# Patient Record
Sex: FEMALE | Race: WHITE | HISPANIC OR LATINO | Employment: OTHER | URBAN - METROPOLITAN AREA
[De-identification: names, ages, dates, MRNs, and addresses within clinical notes are randomized per-mention and may not be internally consistent; named-entity substitution may affect disease eponyms.]

---

## 2017-01-06 ENCOUNTER — TELEPHONE (OUTPATIENT)
Dept: FAMILY MEDICINE | Facility: CLINIC | Age: 57
End: 2017-01-06

## 2017-01-06 NOTE — TELEPHONE ENCOUNTER
----- Message from Ender Portillo LPN sent at 1/6/2017 11:36 AM CST -----  Contact: [phone] / self       ----- Message -----     From: Lucía Malloy     Sent: 1/6/2017  10:38 AM       To: Rayo Sampson Staff    Patient requesting to speak with you regarding test results.    States you can leave a voicemail.    Please advise.

## 2017-01-08 DIAGNOSIS — M06.9 RHEUMATOID ARTHRITIS INVOLVING MULTIPLE JOINTS: Primary | ICD-10-CM

## 2017-01-09 RX ORDER — LEFLUNOMIDE 20 MG/1
TABLET ORAL
Qty: 30 TABLET | Refills: 0 | Status: SHIPPED | OUTPATIENT
Start: 2017-01-09 | End: 2017-02-09 | Stop reason: SDUPTHER

## 2017-01-11 ENCOUNTER — OFFICE VISIT (OUTPATIENT)
Dept: RHEUMATOLOGY | Facility: CLINIC | Age: 57
End: 2017-01-11
Payer: COMMERCIAL

## 2017-01-11 ENCOUNTER — LAB VISIT (OUTPATIENT)
Dept: LAB | Facility: HOSPITAL | Age: 57
End: 2017-01-11
Attending: INTERNAL MEDICINE
Payer: COMMERCIAL

## 2017-01-11 VITALS
DIASTOLIC BLOOD PRESSURE: 70 MMHG | SYSTOLIC BLOOD PRESSURE: 122 MMHG | WEIGHT: 139.75 LBS | HEIGHT: 61 IN | BODY MASS INDEX: 26.39 KG/M2

## 2017-01-11 DIAGNOSIS — M06.9 RHEUMATOID ARTHRITIS INVOLVING MULTIPLE JOINTS: ICD-10-CM

## 2017-01-11 DIAGNOSIS — R21 RASH: Primary | ICD-10-CM

## 2017-01-11 LAB
ALBUMIN SERPL BCP-MCNC: 3.4 G/DL
ALP SERPL-CCNC: 68 U/L
ALT SERPL W/O P-5'-P-CCNC: 34 U/L
ANION GAP SERPL CALC-SCNC: 6 MMOL/L
AST SERPL-CCNC: 22 U/L
BASOPHILS # BLD AUTO: 0.05 K/UL
BASOPHILS NFR BLD: 0.8 %
BILIRUB SERPL-MCNC: 0.3 MG/DL
BUN SERPL-MCNC: 18 MG/DL
CALCIUM SERPL-MCNC: 9.1 MG/DL
CHLORIDE SERPL-SCNC: 107 MMOL/L
CO2 SERPL-SCNC: 30 MMOL/L
CREAT SERPL-MCNC: 0.7 MG/DL
CRP SERPL-MCNC: 1.4 MG/L
DIFFERENTIAL METHOD: ABNORMAL
EOSINOPHIL # BLD AUTO: 0.4 K/UL
EOSINOPHIL NFR BLD: 6.5 %
ERYTHROCYTE [DISTWIDTH] IN BLOOD BY AUTOMATED COUNT: 13.4 %
ERYTHROCYTE [SEDIMENTATION RATE] IN BLOOD BY WESTERGREN METHOD: 34 MM/HR
EST. GFR  (AFRICAN AMERICAN): >60 ML/MIN/1.73 M^2
EST. GFR  (NON AFRICAN AMERICAN): >60 ML/MIN/1.73 M^2
GLUCOSE SERPL-MCNC: 109 MG/DL
HCT VFR BLD AUTO: 38.3 %
HGB BLD-MCNC: 12.1 G/DL
LYMPHOCYTES # BLD AUTO: 2.2 K/UL
LYMPHOCYTES NFR BLD: 36.7 %
MCH RBC QN AUTO: 30.6 PG
MCHC RBC AUTO-ENTMCNC: 31.6 %
MCV RBC AUTO: 97 FL
MONOCYTES # BLD AUTO: 0.4 K/UL
MONOCYTES NFR BLD: 7.5 %
NEUTROPHILS # BLD AUTO: 2.9 K/UL
NEUTROPHILS NFR BLD: 48.3 %
PLATELET # BLD AUTO: 341 K/UL
PMV BLD AUTO: 9.8 FL
POTASSIUM SERPL-SCNC: 3.7 MMOL/L
PROT SERPL-MCNC: 6.7 G/DL
RBC # BLD AUTO: 3.95 M/UL
SODIUM SERPL-SCNC: 143 MMOL/L
WBC # BLD AUTO: 5.89 K/UL

## 2017-01-11 PROCEDURE — 86140 C-REACTIVE PROTEIN: CPT

## 2017-01-11 PROCEDURE — 36415 COLL VENOUS BLD VENIPUNCTURE: CPT

## 2017-01-11 PROCEDURE — 85025 COMPLETE CBC W/AUTO DIFF WBC: CPT

## 2017-01-11 PROCEDURE — 99999 PR PBB SHADOW E&M-EST. PATIENT-LVL IV: CPT | Mod: PBBFAC,,, | Performed by: INTERNAL MEDICINE

## 2017-01-11 PROCEDURE — 3078F DIAST BP <80 MM HG: CPT | Mod: S$GLB,,, | Performed by: INTERNAL MEDICINE

## 2017-01-11 PROCEDURE — 85652 RBC SED RATE AUTOMATED: CPT

## 2017-01-11 PROCEDURE — 1159F MED LIST DOCD IN RCRD: CPT | Mod: S$GLB,,, | Performed by: INTERNAL MEDICINE

## 2017-01-11 PROCEDURE — 99214 OFFICE O/P EST MOD 30 MIN: CPT | Mod: S$GLB,,, | Performed by: INTERNAL MEDICINE

## 2017-01-11 PROCEDURE — 3074F SYST BP LT 130 MM HG: CPT | Mod: S$GLB,,, | Performed by: INTERNAL MEDICINE

## 2017-01-11 PROCEDURE — 80053 COMPREHEN METABOLIC PANEL: CPT

## 2017-01-11 NOTE — MR AVS SNAPSHOT
[facility] Rheumatology  [address] [place] [address] [address]  [place] [address]  Phone: [phone]                  Silvia Cervantes   2017 11:00 AM   Office Visit    Description:  Female : 1960   Provider:  Magaly Rizo MD   Department:  [place]- Rheumatology           Diagnoses this Visit        Comments    Rash    -  Primary            To Do List           Future Appointments        Provider Department Dept Phone    2017 11:00 AM Magaly Rizo MD [facility] Rheumatology [phone]    2017 8:00 AM Adrián Cade MD [facility] Medicine [phone]    2017 11:00 AM Magaly Rizo MD [facility] [phone]      Goals (5 Years of Data)     None       These Medications        Disp Refills Start End    etanercept (ENBREL) 50 mg/mL (0.98 mL) injection 4 mL 11 2017    Inject 1 mL (50 mg total) into the skin once a week. - Subcutaneous    Pharmacy: [facility] [address] Ph #: [phone]         [facility]     [facility] -  Assistance  Registered nurses in the [facility] provide clinical advisement, health education, appointment booking, and other advisory services.  Call for this free service at [phone].             Medications           Message regarding Medications     Verify the changes and/or additions to your medication regime listed below are the same as discussed with your clinician today.  If any of these changes or additions are incorrect, please notify your healthcare provider.             Verify that the below list of medications is an accurate representation of the medications you are currently taking.  If none reported, the list may be blank. If incorrect, please contact your healthcare provider. Carry this list with you in case of emergency.           Current Medications     albuterol 90 mcg/actuation inhaler Inhale 2 puffs into the  "lungs every 6 (six) hours as needed for Wheezing.    ascorbic acid, vitamin C, (VITAMIN C) 1000 MG tablet Take 1,000 mg by mouth once daily.    cycloSPORINE (RESTASIS) 0.05 % ophthalmic emulsion Place 0.05 mLs (1 drop total) into both eyes 2 (two) times daily.    efinaconazole (JUBLIA) 10 % Ayesha Apply to affected toenails daily as directed.    estradiol (ESTRACE) 0.01 % (0.1 mg/gram) vaginal cream Use 1 gram per vagina nightly x 2 weeks then 3 times per week    etanercept (ENBREL) 50 mg/mL (0.98 mL) injection Inject 1 mL (50 mg total) into the skin once a week.    fluticasone (FLONASE) 50 mcg/actuation nasal spray 2 sprays by Each Nare route once daily.    ibuprofen (ADVIL,MOTRIN) 600 MG tablet Take 1 tablet (600 mg total) by mouth every 8 (eight) hours as needed for Pain. Take with food.    leflunomide (ARAVA) 20 MG Tab TAKE ONE TABLET BY MOUTH ONCE DAILY.    olmesartan-hydrochlorothiazide (BENICAR HCT) 40-12.5 mg Tab Take 1 tablet by mouth once daily.    omeprazole (PRILOSEC) 40 MG capsule Take 1 capsule (40 mg total) by mouth once daily.    ondansetron (ZOFRAN-ODT) 4 MG TbDL Take 2 tablets (8 mg total) by mouth every 8 (eight) hours as needed (nausea/vomiting).    triamcinolone acetonide 0.1% (KENALOG) 0.1 % cream Apply topically 2 (two) times daily.    UNABLE TO FIND medication name: Methyl Folate 400 mcg    diclofenac sodium (VOLTAREN) 1 % Gel Apply 2 g topically 4 (four) times daily.           Clinical Reference Information           Vital Signs - Last Recorded  Most recent update: 1/11/2017 10:08 AM by Kimmy Schmitt MA    BP Ht Wt BMI       122/70 5' 1" (1.549 m) 63.4 kg (139 lb 12.4 oz) 26.41 kg/m2       Blood Pressure          Most Recent Value    BP  122/70      Allergies as of 1/11/2017     No Known Allergies      Immunizations Administered on Date of Encounter - 1/11/2017     None      Orders Placed During Today's Visit      Normal Orders This Visit    Ambulatory Referral to Dermatology     "   Instructions    Dr. Camp

## 2017-01-11 NOTE — PROGRESS NOTES
Subjective:       Patient ID: Silvia Cervantes is a 54 y.o. female.    Chief Complaint:     HPI 55 yo F with PMH of HTN, abnormal pap (2013 but recent negative, no cancer), RA (+CCP, RF),erosive here for evaluation. She was diagnosed in 2011 with hand and feet with swelling and pain. She was initially treated with MTX but it gave her nausea which gave her nausea.  She was changed to leflunomide 20 mg 10/2014 from mtx due to nausea on the mtx. Then I added etanercept January 2015 due to incomplete control of her arthritis on leflunomide.  No am stiffness.  She reports mild aching in hands (3/10). Pain is aching.  Reports mild swelling in hands but better than usual.  She reports left upper quadrant pain (4/10) , non-radiating with possible nausea which has been present for months. Sometimes she has sour taste in mouth.      Interval history:  Patient is here for follow up.    She continues to take leflunomide 20mg a day and is also taking ENBREL. She skipped a week or Enbrel because she thought that it may be contributing to vitiligo.  Patient is 4/10.   She continues to have pain in right hand but uses it a lot at work.  SHE STILL HAS PAIN IN LOWER BACK.    She thinks it may be from heavy lifting at work.  Reports episodes of joint swelling.  Reports fatigue  For 6 months.  Reports poor sleep.      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   HENT: Negative for congestion, ear discharge, ear pain, facial swelling, mouth sores, sinus pressure, sneezing, sore throat, tinnitus and trouble swallowing.    Eyes: Negative for photophobia, pain,  discharge, redness, itching and visual disturbance.   Respiratory: Negative for apnea, chest tightness, shortness of breath, wheezing and stridor.    Cardiovascular: Negative for leg swelling.   Gastrointestinal: Negative for nausea, abdominal pain, diarrhea, constipation, blood in stool, abdominal distention and anal bleeding.   Endocrine: Negative for cold intolerance and heat intolerance.   Genitourinary: Negative for dysuria and difficulty urinating.   Musculoskeletal: Positive for arthralgias. Negative for myalgias, back pain, joint swelling, gait problem, neck pain and neck stiffness.   Skin: Negative for color change, pallor, rash and wound.   Neurological: Negative for dizziness, seizures, light-headedness and numbness.   Hematological: Negative for adenopathy. Does not bruise/bleed easily.   Psychiatric/Behavioral: Negative for sleep disturbance. The patient is not nervous/anxious.       Objective:        Physical Exam   Constitutional: She is oriented to person, place, and time.   HENT:   Head: Normocephalic and atraumatic.   Right Ear: External ear normal.   Left Ear: External ear normal.   Nose: Nose normal.   Mouth/Throat: Oropharynx is clear and moist. No oropharyngeal exudate.   Eyes: Conjunctivae and EOM are normal. Pupils are equal, round, and reactive to light. Right eye exhibits no discharge. Left eye exhibits no discharge. No scleral icterus.   Neck: Neck supple. No JVD present. No thyromegaly present.   Cardiovascular: Normal rate, regular rhythm, normal heart sounds and intact distal pulses.  Exam reveals no gallop and no friction rub.    No murmur heard.  Pulmonary/Chest: Effort normal and breath sounds normal. No respiratory distress. She has no wheezes. She has no rales. She exhibits no tenderness.   Abdominal: Soft. Bowel sounds are normal. She exhibits no distension and no mass. There is no tenderness. There is no rebound and no guarding.   Lymphadenopathy:     She has no cervical  adenopathy.   Neurological: She is alert and oriented to person, place, and time. No cranial nerve deficit. Gait normal. Coordination normal.   Skin:diffuse hypopigmented lesions in arms, hands, legs, chest, back   Psychiatric: Affect and judgment normal.   Musculoskeletal: She exhibits no  tenderness. She exhibits no edema.   FROM of all joints including neck, shoulders, spine, ankles, wrists, knees, and ankles; no joint deformities noted or effusions, erythema or warmth; no tophi or nodules noted; no crepitus; no nail pitting or onycholysis          Labs:  Esr-25  ccp-93  rf-36  Flor-negative    Imaging:  MRI (7/22/2015)   (Stable enhancing marginal erosions in the second and third metacarpal heads, lunate, triquetrum, and capitate)      Assessment:     56 yo F with PMH of HTN, abnormal pap (2013 but recent negative, no cancer), RA (+CCP, RF),erosive here , H. Pylori for follow up of seropositive RA/  She has been On Enbrel since Jan 2015 . She is also on Leflunomide. She is doing well clinically but has not been compliant with Enbrel due to her concern that it may be causing her skin discoloration.  I told patient that I did not think Enbrel would cause this.  She has asked me to refer her to another dermatologist for second opinion so will send her to Dr. Camp.  MRI from 7/2015 shows stable erosions.  Had long discussion with patient and asked her to be compliant.    Plan:     -labs today  -continue ENBREL and leflunomide 20mg poq day  -discuss dexa scan at next visit  rtc in  4 months*

## 2017-01-26 ENCOUNTER — OFFICE VISIT (OUTPATIENT)
Dept: FAMILY MEDICINE | Facility: CLINIC | Age: 57
End: 2017-01-26
Payer: COMMERCIAL

## 2017-01-26 VITALS
TEMPERATURE: 98 F | BODY MASS INDEX: 26.19 KG/M2 | HEART RATE: 87 BPM | DIASTOLIC BLOOD PRESSURE: 80 MMHG | SYSTOLIC BLOOD PRESSURE: 124 MMHG | HEIGHT: 61 IN | OXYGEN SATURATION: 95 % | WEIGHT: 138.69 LBS

## 2017-01-26 DIAGNOSIS — L29.9 PRURITUS: Primary | ICD-10-CM

## 2017-01-26 PROCEDURE — 1159F MED LIST DOCD IN RCRD: CPT | Mod: S$GLB,,, | Performed by: FAMILY MEDICINE

## 2017-01-26 PROCEDURE — 99999 PR PBB SHADOW E&M-EST. PATIENT-LVL IV: CPT | Mod: PBBFAC,,, | Performed by: FAMILY MEDICINE

## 2017-01-26 PROCEDURE — 3079F DIAST BP 80-89 MM HG: CPT | Mod: S$GLB,,, | Performed by: FAMILY MEDICINE

## 2017-01-26 PROCEDURE — 3074F SYST BP LT 130 MM HG: CPT | Mod: S$GLB,,, | Performed by: FAMILY MEDICINE

## 2017-01-26 PROCEDURE — 99214 OFFICE O/P EST MOD 30 MIN: CPT | Mod: S$GLB,,, | Performed by: FAMILY MEDICINE

## 2017-01-26 RX ORDER — HYDROXYZINE HYDROCHLORIDE 25 MG/1
25 TABLET, FILM COATED ORAL 4 TIMES DAILY PRN
Qty: 60 TABLET | Refills: 0 | Status: SHIPPED | OUTPATIENT
Start: 2017-01-26 | End: 2017-04-05

## 2017-01-26 NOTE — PATIENT INSTRUCTIONS
1. Over the counter claritin (not D) 24 hour for 1 week    2. Prescription hydroxyzine, you can take up to 4 x daily just for breakthrough itching (can make sleepy so it might help to take 1 pill at bedtime)    3. cetaphil cream/lotion applied daily to the skin to moisturize    4. If your symptoms get worse, you can take your leftover prednisone 20 mg pill once daily for 3 days.

## 2017-01-26 NOTE — PROGRESS NOTES
(Portions of this note were dictated using voice recognition software and may contain dictation related errors in spelling/grammar/syntax not found on text review)    CC:   Chief Complaint   Patient presents with    Rash     itching to neck with burning sensation       HPI: 56 y.o. female patient of Dr. Cade who presents for urgent care visit today.  She has history below including rheumatoid arthritis, hypertension, hyperlipidemia.  Presents with an itchy rash on the neck x 1 wk, using benadryl and cortisone cream. Worse at night. No new soap, detergent, shampoo.  However, she has been using some perfume over her clothes, also has been using some over-the-counter oil of olay lotion over the last several weeks.  Exercises outside in the park but denies any recent higher than expected sun exposure or new clothing.  Itching is mainly confined to the neck, upper shoulder region, face.  Sometimes get some redness here but denies significant eruption.  Denies itching of her extremities or chest or back.  Was concerned about possible liver disease given her symptoms, however just had recent lab work from her rheumatologist and this was reviewed with her today demonstrate a normal LFT, creatinine, hematologic profile.  Improved sedimentation rate.    Of note she mentions that she has some leftover prednisone 20 mg from her rheumatologist from her prior prescription but she is not currently taking any steroids.    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)        Past Surgical History   Procedure Laterality Date     section      Tubal ligation      Hysterectomy         Family History   Problem Relation Age of Onset    Diabetes Father      Hypertension Father     Cataracts Father     Heart disease Father     Hypertension Mother     Cataracts Mother     Stroke Brother     Hypertension Brother     Melanoma Neg Hx     Breast cancer Neg Hx     Colon cancer Neg Hx     Ovarian cancer Neg Hx     Blindness Neg Hx     Glaucoma Neg Hx        Social History     Social History    Marital status:      Spouse name: N/A    Number of children: N/A    Years of education: N/A     Occupational History    Not on file.     Social History Main Topics    Smoking status: Never Smoker    Smokeless tobacco: Never Used    Alcohol use Yes      Comment: Rarely    Drug use: Not on file    Sexual activity: Yes     Partners: Male     Birth control/ protection: Surgical      Comment: hyst     Other Topics Concern    Not on file     Social History Narrative     Lab Results   Component Value Date    WBC 5.89 01/11/2017    HGB 12.1 01/11/2017    HCT 38.3 01/11/2017     01/11/2017    CHOL 231 (H) 12/22/2015    TRIG 134 12/22/2015    HDL 53 12/22/2015    ALT 34 01/11/2017    AST 22 01/11/2017     01/11/2017    K 3.7 01/11/2017     01/11/2017    CREATININE 0.7 01/11/2017    BUN 18 01/11/2017    CO2 30 (H) 01/11/2017    TSH 2.743 08/01/2016    HGBA1C 5.7 12/27/2016    LDLCALC 151.2 12/22/2015     01/11/2017       ROS:  GENERAL: No fever, chills, fatigability or weight loss.  SKIN: Above.  HEAD: No headaches.  EYES: No visual changes  EARS: No ear pain or changes in hearing.  NOSE: No congestion or rhinorrhea.  MOUTH & THROAT: No hoarseness, change in voice, or sore throat.  NODES: Denies swollen glands.  CHEST: Denies ALY, cyanosis, wheezing, cough and sputum production.  CARDIOVASCULAR: Denies chest pain, PND, orthopnea.  ABDOMEN: No nausea, vomiting, or changes in bowel function.  URINARY: No flank pain, dysuria or hematuria.  PERIPHERAL VASCULAR: No claudication or cyanosis.  MUSCULOSKELETAL: No joint stiffness or swelling. Denies  back pain.  NEUROLOGIC: No weakness or numbness.    Vital signs reviewed  PE:   APPEARANCE: Well nourished, well developed, in no acute distress.    HEAD: Normocephalic, atraumatic.  EYES: PERRL. EOMI.   Conjunctivae noninjected.  EARS: TM's intact. Light reflex normal. No retraction or perforation  NOSE: Mucosa pink. Airway clear.  MOUTH & THROAT: No tonsillar enlargement. No pharyngeal erythema or exudate.   NECK: Supple with no cervical lymphadenopathy.    CHEST: Good inspiratory effort. Lungs clear to auscultation with no wheezes or crackles.  CARDIOVASCULAR: Normal S1, S2. No rubs, murmurs, or gallops.  ABDOMEN: Bowel sounds normal. Not distended. Soft. No tenderness or masses. No organomegaly.  EXTREMITIES: No edema  SKIN: She describes pruritus over the anterior and posterior neck, face.  There is some slight flushing here but no definitive eruption noted.  She does have some vitiligo change over her extremities      IMPRESSION  1. Pruritus            PLAN  1. Over the counter claritin (not D) 24 hour for 1 week    2. Prescription hydroxyzine, you can take up to 4 x daily just for breakthrough itching (can make sleepy so it might help to take 1 pill at bedtime)    3. cetaphil cream/lotion applied daily to the skin to moisturize    4. If your symptoms get worse, you can take your leftover prednisone 20 mg pill once daily for 3 days.    Follow-up with PCP as directed or if symptoms do not improve

## 2017-01-26 NOTE — MR AVS SNAPSHOT
[address]  [place] [address]  Phone: [phone]  Fax: [phone]                  Silvia Cervantes   2017 9:40 AM   Office Visit    Description:  Female : 1960   Provider:  Finesse Chaparro MD   Department:  [facility]           Reason for Visit     Rash           Diagnoses this Visit        Comments    Pruritus    -  Primary            To Do List           Future Appointments        Provider Department Dept Phone    2017 8:00 AM Adrián Cade MD [facility] [phone]    3/10/2017 9:20 AM Cony Camp MD [facility] - Dermatology [phone]    2017 11:00 AM Magaly Rizo MD [facility] - Rheumatology [phone]      Goals (5 Years of Data)     None       These Medications        Disp Refills Start End    hydrOXYzine HCl (ATARAX) 25 MG tablet 60 tablet 0 2017     Take 1 tablet (25 mg total) by mouth 4 (four) times daily as needed for Itching. - Oral    Pharmacy: [id number] [place] - [address] [facility] #: [phone]         [facility]     [facility] Care [facility] -  Assistance  Registered nurses in the [facility] provide clinical advisement, health education, appointment booking, and other advisory services.  Call for this free service at [phone].             Medications           Message regarding Medications     Verify the changes and/or additions to your medication regime listed below are the same as discussed with your clinician today.  If any of these changes or additions are incorrect, please notify your healthcare provider.        START taking these NEW medications        Refills    hydrOXYzine HCl (ATARAX) 25 MG tablet 0    Sig: Take 1 tablet (25 mg total) by mouth 4 (four) times daily as needed for Itching.    Class: Normal    Route: Oral           Verify that the below list of medications is an accurate representation of the  medications you are currently taking.  If none reported, the list may be blank. If incorrect, please contact your healthcare provider. Carry this list with you in case of emergency.           Current Medications     albuterol 90 mcg/actuation inhaler Inhale 2 puffs into the lungs every 6 (six) hours as needed for Wheezing.    ascorbic acid, vitamin C, (VITAMIN C) 1000 MG tablet Take 1,000 mg by mouth once daily.    cycloSPORINE (RESTASIS) 0.05 % ophthalmic emulsion Place 0.05 mLs (1 drop total) into both eyes 2 (two) times daily.    efinaconazole (JUBLIA) 10 % Ayesha Apply to affected toenails daily as directed.    estradiol (ESTRACE) 0.01 % (0.1 mg/gram) vaginal cream Use 1 gram per vagina nightly x 2 weeks then 3 times per week    etanercept (ENBREL) 50 mg/mL (0.98 mL) injection Inject 1 mL (50 mg total) into the skin once a week.    fluticasone (FLONASE) 50 mcg/actuation nasal spray 2 sprays by Each Nare route once daily.    ibuprofen (ADVIL,MOTRIN) 600 MG tablet Take 1 tablet (600 mg total) by mouth every 8 (eight) hours as needed for Pain. Take with food.    leflunomide (ARAVA) 20 MG Tab TAKE ONE TABLET BY MOUTH ONCE DAILY.    olmesartan-hydrochlorothiazide (BENICAR HCT) 40-12.5 mg Tab Take 1 tablet by mouth once daily.    omeprazole (PRILOSEC) 40 MG capsule Take 1 capsule (40 mg total) by mouth once daily.    ondansetron (ZOFRAN-ODT) 4 MG TbDL Take 2 tablets (8 mg total) by mouth every 8 (eight) hours as needed (nausea/vomiting).    triamcinolone acetonide 0.1% (KENALOG) 0.1 % cream Apply topically 2 (two) times daily.    UNABLE TO FIND medication name: Methyl Folate 400 mcg    diclofenac sodium (VOLTAREN) 1 % Gel Apply 2 g topically 4 (four) times daily.    hydrOXYzine HCl (ATARAX) 25 MG tablet Take 1 tablet (25 mg total) by mouth 4 (four) times daily as needed for Itching.           Clinical Reference Information           Vital Signs - Last Recorded  Most recent update: 1/26/2017 10:09 AM by Finesse Pichardo  "MD Shankar    BP Pulse Temp Ht Wt SpO2    124/80 87 98 °F (36.7 °C) 5' 1" (1.549 m) 62.9 kg (138 lb 10.7 oz) 95%    BMI                26.2 kg/m2          Blood Pressure          Most Recent Value    BP  124/80      Allergies as of 1/26/2017     No Known Allergies      Immunizations Administered on Date of Encounter - 1/26/2017     None      Instructions    1. Over the counter claritin (not D) 24 hour for 1 week    2. Prescription hydroxyzine, you can take up to 4 x daily just for breakthrough itching (can make sleepy so it might help to take 1 pill at bedtime)    3. cetaphil cream/lotion applied daily to the skin to moisturize    4. If your symptoms get worse, you can take your leftover prednisone 20 mg pill once daily for 3 days.             "

## 2017-02-01 ENCOUNTER — OFFICE VISIT (OUTPATIENT)
Dept: FAMILY MEDICINE | Facility: CLINIC | Age: 57
End: 2017-02-01
Payer: COMMERCIAL

## 2017-02-01 VITALS
HEART RATE: 81 BPM | WEIGHT: 138 LBS | DIASTOLIC BLOOD PRESSURE: 89 MMHG | BODY MASS INDEX: 26.06 KG/M2 | SYSTOLIC BLOOD PRESSURE: 138 MMHG | OXYGEN SATURATION: 97 % | HEIGHT: 61 IN

## 2017-02-01 DIAGNOSIS — Z23 IMMUNIZATION DUE: ICD-10-CM

## 2017-02-01 DIAGNOSIS — B00.1 HERPES LABIALIS: ICD-10-CM

## 2017-02-01 DIAGNOSIS — E78.00 HYPERCHOLESTEREMIA: ICD-10-CM

## 2017-02-01 DIAGNOSIS — Z00.00 ROUTINE GENERAL MEDICAL EXAMINATION AT HEALTH CARE FACILITY: Primary | ICD-10-CM

## 2017-02-01 DIAGNOSIS — M06.9 RHEUMATOID ARTHRITIS INVOLVING LEFT HAND, UNSPECIFIED RHEUMATOID FACTOR PRESENCE: ICD-10-CM

## 2017-02-01 DIAGNOSIS — I10 ESSENTIAL HYPERTENSION: ICD-10-CM

## 2017-02-01 PROCEDURE — 3079F DIAST BP 80-89 MM HG: CPT | Mod: S$GLB,,, | Performed by: FAMILY MEDICINE

## 2017-02-01 PROCEDURE — 99396 PREV VISIT EST AGE 40-64: CPT | Mod: 25,S$GLB,, | Performed by: FAMILY MEDICINE

## 2017-02-01 PROCEDURE — 90686 IIV4 VACC NO PRSV 0.5 ML IM: CPT | Mod: S$GLB,,, | Performed by: FAMILY MEDICINE

## 2017-02-01 PROCEDURE — 90471 IMMUNIZATION ADMIN: CPT | Mod: S$GLB,,, | Performed by: FAMILY MEDICINE

## 2017-02-01 PROCEDURE — 90715 TDAP VACCINE 7 YRS/> IM: CPT | Mod: S$GLB,,, | Performed by: FAMILY MEDICINE

## 2017-02-01 PROCEDURE — 90472 IMMUNIZATION ADMIN EACH ADD: CPT | Mod: S$GLB,,, | Performed by: FAMILY MEDICINE

## 2017-02-01 PROCEDURE — 99999 PR PBB SHADOW E&M-EST. PATIENT-LVL IV: CPT | Mod: PBBFAC,,, | Performed by: FAMILY MEDICINE

## 2017-02-01 PROCEDURE — 3075F SYST BP GE 130 - 139MM HG: CPT | Mod: S$GLB,,, | Performed by: FAMILY MEDICINE

## 2017-02-01 RX ORDER — VALACYCLOVIR HYDROCHLORIDE 1 G/1
1000 TABLET, FILM COATED ORAL EVERY 12 HOURS
Qty: 4 TABLET | Refills: 0 | Status: SHIPPED | OUTPATIENT
Start: 2017-02-01 | End: 2018-03-07

## 2017-02-01 NOTE — MR AVS SNAPSHOT
[address]  [place] [address]  Phone: [phone]  Fax: [phone]                  Silvia Cervantes   2017 8:00 AM   Office Visit    Description:  Female : 1960   Provider:  Adrián Cade MD   Department:  [facility]           Reason for Visit     Follow-up           Diagnoses this Visit        Comments    Routine general medical examination at health care facility    -  Primary     Hypercholesteremia         Essential hypertension         Rheumatoid arthritis involving left hand, unspecified rheumatoid factor presence         Immunization due         Herpes labialis                To Do List           Future Appointments        Provider Department Dept Phone    3/10/2017 9:20 AM Cony Camp MD [facility] Dermatology [phone]    2017 11:00 AM Magaly Rizo MD [facility] Rheumatology [phone]      Goals (5 Years of Data)     None      Follow-Up and Disposition     Return in about 1 year (around 2018), or if symptoms worsen or fail to improve.       These Medications        Disp Refills Start End    valacyclovir (VALTREX) 1000 MG tablet 4 tablet 0 2017 2/3/2017    Take 1 tablet (1,000 mg total) by mouth every 12 (twelve) hours. - Oral    Pharmacy: [id number] - [place] - [address] [facility] #: [phone]         [facility]     [facility] On [facility] Nurse Care Line -  Assistance  Registered nurses in the [facility] On Call Center provide clinical advisement, health education, appointment booking, and other advisory services.  Call for this free service at [phone].             Medications           Message regarding Medications     Verify the changes and/or additions to your medication regime listed below are the same as discussed with your clinician today.  If any of these changes or additions are incorrect, please notify your healthcare provider.        START taking these NEW  medications        Refills    valacyclovir (VALTREX) 1000 MG tablet 0    Sig: Take 1 tablet (1,000 mg total) by mouth every 12 (twelve) hours.    Class: Normal    Route: Oral           Verify that the below list of medications is an accurate representation of the medications you are currently taking.  If none reported, the list may be blank. If incorrect, please contact your healthcare provider. Carry this list with you in case of emergency.           Current Medications     albuterol 90 mcg/actuation inhaler Inhale 2 puffs into the lungs every 6 (six) hours as needed for Wheezing.    ascorbic acid, vitamin C, (VITAMIN C) 1000 MG tablet Take 1,000 mg by mouth once daily.    cycloSPORINE (RESTASIS) 0.05 % ophthalmic emulsion Place 0.05 mLs (1 drop total) into both eyes 2 (two) times daily.    efinaconazole (JUBLIA) 10 % Ayesha Apply to affected toenails daily as directed.    estradiol (ESTRACE) 0.01 % (0.1 mg/gram) vaginal cream Use 1 gram per vagina nightly x 2 weeks then 3 times per week    etanercept (ENBREL) 50 mg/mL (0.98 mL) injection Inject 1 mL (50 mg total) into the skin once a week.    fluticasone (FLONASE) 50 mcg/actuation nasal spray 2 sprays by Each Nare route once daily.    hydrOXYzine HCl (ATARAX) 25 MG tablet Take 1 tablet (25 mg total) by mouth 4 (four) times daily as needed for Itching.    ibuprofen (ADVIL,MOTRIN) 600 MG tablet Take 1 tablet (600 mg total) by mouth every 8 (eight) hours as needed for Pain. Take with food.    leflunomide (ARAVA) 20 MG Tab TAKE ONE TABLET BY MOUTH ONCE DAILY.    olmesartan-hydrochlorothiazide (BENICAR HCT) 40-12.5 mg Tab Take 1 tablet by mouth once daily.    omeprazole (PRILOSEC) 40 MG capsule Take 1 capsule (40 mg total) by mouth once daily.    ondansetron (ZOFRAN-ODT) 4 MG TbDL Take 2 tablets (8 mg total) by mouth every 8 (eight) hours as needed (nausea/vomiting).    triamcinolone acetonide 0.1% (KENALOG) 0.1 % cream Apply topically 2 (two) times daily.    UNABLE TO  "FIND medication name: Methyl Folate 400 mcg    diclofenac sodium (VOLTAREN) 1 % Gel Apply 2 g topically 4 (four) times daily.    valacyclovir (VALTREX) 1000 MG tablet Take 1 tablet (1,000 mg total) by mouth every 12 (twelve) hours.           Clinical Reference Information           Vital Signs - Last Recorded  Most recent update: 2/1/2017  8:14 AM by Adrián Cade MD    BP Pulse Ht Wt SpO2 BMI    138/89 81 5' 1" (1.549 m) 62.6 kg (138 lb 0.1 oz) 97% 26.08 kg/m2      Blood Pressure          Most Recent Value    BP  138/89      Allergies as of 2/1/2017     No Known Allergies      Immunizations Administered on Date of Encounter - 2/1/2017     Name Date Dose VIS Date Route    TDAP  Incomplete 0.5 mL 2/24/2015 Intramuscular    influenza - Quadrivalent - PF (ADULT)  Incomplete 0.5 mL 8/7/2015 Intramuscular      Orders Placed During Today's Visit      Normal Orders This Visit    Influenza - Quadrivalent (3 years & older) (PF)     Tdap Vaccine (Adult)     Future Labs/Procedures Expected by Expires    Lipid panel  2/1/2017 4/2/2018      "

## 2017-02-01 NOTE — PROGRESS NOTES
Subjective:       Patient ID: Silvia Cervantes is a 56 y.o. female.    Chief Complaint: Follow-up    HPI Comments: 56 yr old pleasant  female with allergies, HTN, HLD, Rheumatoid arthritis, Erosive gastritis, and other co morbidities presents today for her annual wellness check, lab work. C/o fever blisters.      HTN - controlled - on Benicar-HCT - compliant - no side effects       HLD - diet controlled -  LDLCALC                  151.2               12/22/2015      - lab due       RA - on Enbrel shots - follows Rheumatology - stable      Gastritis - stable - on PPI as needed - no side effects      History as below - reviewed       HM  -labs UTD except lipids  -vaccines due    Hypertension   This is a chronic problem. The current episode started more than 1 year ago. The problem has been gradually improving since onset. The problem is controlled. Pertinent negatives include no chest pain, headaches, malaise/fatigue, neck pain, palpitations or shortness of breath. There are no associated agents to hypertension. Risk factors for coronary artery disease include dyslipidemia and post-menopausal state. Past treatments include angiotensin blockers and diuretics. The current treatment provides significant improvement. There are no compliance problems.  There is no history of angina, CAD/MI, CVA, left ventricular hypertrophy, PVD or retinopathy. There is no history of chronic renal disease, coarctation of the aorta, hypercortisolism, hyperparathyroidism, a hypertension causing med or sleep apnea.   Hyperlipidemia   This is a chronic problem. The current episode started more than 1 year ago. The problem is controlled. Recent lipid tests were reviewed and are normal. She has no history of chronic renal disease, diabetes, hypothyroidism or liver disease. There are no known factors aggravating her hyperlipidemia. Pertinent negatives include no chest pain, focal sensory loss, focal weakness, leg pain, myalgias or shortness  of breath. She is currently on no antihyperlipidemic treatment. The current treatment provides mild improvement of lipids. There are no compliance problems.  Risk factors for coronary artery disease include dyslipidemia, hypertension and post-menopausal.   Mouth Lesions    The current episode started yesterday. The problem occurs continuously. The problem has been unchanged. The problem is mild. Nothing relieves the symptoms. Nothing aggravates the symptoms. Associated symptoms include mouth sores. Pertinent negatives include no constipation, no nausea, no congestion, no ear discharge, no headaches, no hearing loss, no rhinorrhea, no sore throat, no neck pain, no wheezing, no eye discharge and no eye pain.     Review of Systems   Constitutional: Negative.  Negative for activity change, diaphoresis, malaise/fatigue and unexpected weight change.   HENT: Positive for mouth sores. Negative for congestion, ear discharge, hearing loss, rhinorrhea, sore throat and voice change.         Fever blisters   Eyes: Negative.  Negative for pain, discharge and visual disturbance.   Respiratory: Negative.  Negative for chest tightness, shortness of breath and wheezing.    Cardiovascular: Negative.  Negative for chest pain and palpitations.   Gastrointestinal: Negative.  Negative for abdominal distention, anal bleeding, constipation and nausea.   Endocrine: Negative.  Negative for cold intolerance, polydipsia and polyuria.   Genitourinary: Negative.  Negative for decreased urine volume, difficulty urinating, dysuria, frequency, menstrual problem and vaginal pain.   Musculoskeletal: Negative.  Negative for arthralgias, gait problem, myalgias and neck pain.   Skin: Negative.  Negative for color change, pallor and wound.   Allergic/Immunologic: Negative.  Negative for environmental allergies and immunocompromised state.   Neurological: Negative.  Negative for dizziness, tremors, focal weakness, seizures, speech difficulty and headaches.    Hematological: Negative.  Negative for adenopathy. Does not bruise/bleed easily.   Psychiatric/Behavioral: Negative.  Negative for agitation, confusion, decreased concentration, hallucinations, self-injury and suicidal ideas. The patient is not nervous/anxious.        PMH/PSH/FH/SH/MED/ALLERGY reviewed    Objective:       Vitals:    02/01/17 0807   BP: 138/89   Pulse: 81       Physical Exam   Constitutional: She is oriented to person, place, and time. She appears well-developed and well-nourished. No distress.   HENT:   Head: Normocephalic and atraumatic.   Right Ear: External ear normal.   Left Ear: External ear normal.   Nose: Nose normal.   Mouth/Throat: Oropharynx is clear and moist. No oropharyngeal exudate.   Herpes labialis upper lip   Eyes: Conjunctivae and EOM are normal. Pupils are equal, round, and reactive to light. Right eye exhibits no discharge. Left eye exhibits no discharge. No scleral icterus.   Neck: Normal range of motion. Neck supple. No JVD present. No tracheal deviation present. No thyromegaly present.   Cardiovascular: Normal rate, regular rhythm, normal heart sounds and intact distal pulses.  Exam reveals no gallop and no friction rub.    No murmur heard.  Pulmonary/Chest: Effort normal and breath sounds normal. No stridor. She has no wheezes. She has no rales. She exhibits no tenderness.   Abdominal: Soft. Bowel sounds are normal. She exhibits no distension and no mass. There is no tenderness. There is no rebound and no guarding. No hernia.   Musculoskeletal: Normal range of motion. She exhibits no edema or tenderness.   Lymphadenopathy:     She has no cervical adenopathy.   Neurological: She is alert and oriented to person, place, and time. She has normal reflexes. She displays normal reflexes. No cranial nerve deficit. She exhibits normal muscle tone. Coordination normal.   Skin: Skin is warm and dry. No rash noted. She is not diaphoretic. No erythema. No pallor.   Psychiatric: She has  a normal mood and affect. Her behavior is normal. Judgment and thought content normal.       Assessment:       1. Routine general medical examination at health care facility    2. Hypercholesteremia    3. Essential hypertension    4. Rheumatoid arthritis involving left hand, unspecified rheumatoid factor presence    5. Immunization due        Plan:       Silvia was seen today for follow-up.    Diagnoses and all orders for this visit:    Routine general medical examination at health care facility  -     Lipid panel; Future    Hypercholesteremia  -     Lipid panel; Future    Essential hypertension  -     Lipid panel; Future    Rheumatoid arthritis involving left hand, unspecified rheumatoid factor presence    Immunization due  -     Influenza - Quadrivalent (3 years & older) (PF)  -     Tdap Vaccine (Adult)      Wellness check  -normal exam  -labs    HLD  -diet control  -labs    HTN  -controlled    RA  -stable  -follow rheumatology    Herpes labialis  -Valtrex as directed    Spent adequate time in obtaining history and explaining differentials    40 minutes spent during this visit of which greater than 50% devoted to face-face counseling and coordination of care regarding diagnosis and management plan    Return in about 1 year (around 2/1/2018), or if symptoms worsen or fail to improve.

## 2017-02-03 ENCOUNTER — LAB VISIT (OUTPATIENT)
Dept: LAB | Facility: HOSPITAL | Age: 57
End: 2017-02-03
Attending: FAMILY MEDICINE
Payer: COMMERCIAL

## 2017-02-03 DIAGNOSIS — Z00.00 ROUTINE GENERAL MEDICAL EXAMINATION AT HEALTH CARE FACILITY: ICD-10-CM

## 2017-02-03 DIAGNOSIS — I10 ESSENTIAL HYPERTENSION: ICD-10-CM

## 2017-02-03 DIAGNOSIS — E78.00 HYPERCHOLESTEREMIA: ICD-10-CM

## 2017-02-03 LAB
CHOLEST/HDLC SERPL: 4.5 {RATIO}
HDL/CHOLESTEROL RATIO: 22.1 %
HDLC SERPL-MCNC: 222 MG/DL
HDLC SERPL-MCNC: 49 MG/DL
LDLC SERPL CALC-MCNC: 153.8 MG/DL
NONHDLC SERPL-MCNC: 173 MG/DL
TRIGL SERPL-MCNC: 96 MG/DL

## 2017-02-03 PROCEDURE — 80061 LIPID PANEL: CPT

## 2017-02-03 PROCEDURE — 36415 COLL VENOUS BLD VENIPUNCTURE: CPT

## 2017-02-09 RX ORDER — LEFLUNOMIDE 20 MG/1
TABLET ORAL
Qty: 30 TABLET | Refills: 0 | Status: SHIPPED | OUTPATIENT
Start: 2017-02-09 | End: 2017-03-16 | Stop reason: SDUPTHER

## 2017-02-14 ENCOUNTER — TELEPHONE (OUTPATIENT)
Dept: PHARMACY | Facility: CLINIC | Age: 57
End: 2017-02-14

## 2017-02-14 ENCOUNTER — TELEPHONE (OUTPATIENT)
Dept: RHEUMATOLOGY | Facility: CLINIC | Age: 57
End: 2017-02-14

## 2017-02-14 NOTE — TELEPHONE ENCOUNTER
Verbalized to patient that I would look into this and giver her a call about her Enbrel Refill, pt. Verbalized understanding.

## 2017-02-15 ENCOUNTER — TELEPHONE (OUTPATIENT)
Dept: RHEUMATOLOGY | Facility: CLINIC | Age: 57
End: 2017-02-15

## 2017-02-16 ENCOUNTER — TELEPHONE (OUTPATIENT)
Dept: FAMILY MEDICINE | Facility: CLINIC | Age: 57
End: 2017-02-16

## 2017-02-16 NOTE — TELEPHONE ENCOUNTER
Spoke with patient, and she verbalized that she was able to  her medication through [facility] yesterday.

## 2017-02-16 NOTE — TELEPHONE ENCOUNTER
----- Message from April Rushing sent at 2/16/2017 11:45 AM CST -----  Contact: [phone].self  Pt would like to speak with the nurse to discuss test results. (pt states she may not be able to answer and would like the Dr to leave a message).   Please advise

## 2017-02-21 ENCOUNTER — TELEPHONE (OUTPATIENT)
Dept: FAMILY MEDICINE | Facility: CLINIC | Age: 57
End: 2017-02-21

## 2017-02-21 NOTE — TELEPHONE ENCOUNTER
If medications are not the option - she can just low fat diet/exercise and over the counter coenzyme Q 10 tablets

## 2017-02-21 NOTE — TELEPHONE ENCOUNTER
----- Message from Michelle Wilder sent at 2/20/2017  1:55 PM CST -----  Contact: Self [phone]  Patient Returning Your Phone Call concerning her test results. Please advice

## 2017-02-21 NOTE — TELEPHONE ENCOUNTER
Called patient to inform her of Dr. Cade's recommendations. Left a voicemail requesting a callback

## 2017-02-21 NOTE — TELEPHONE ENCOUNTER
Patient refusing cholesterol lowering medications. Will continue to diet and exercise. Do you have any other recommendations?

## 2017-02-22 ENCOUNTER — TELEPHONE (OUTPATIENT)
Dept: FAMILY MEDICINE | Facility: CLINIC | Age: 57
End: 2017-02-22

## 2017-02-22 NOTE — TELEPHONE ENCOUNTER
----- Message from Janette Schmitt sent at 2/22/2017 12:02 PM CST -----  Contact: [phone]  Pt returning your call/Erna

## 2017-03-06 ENCOUNTER — OFFICE VISIT (OUTPATIENT)
Dept: ENDOCRINOLOGY | Facility: CLINIC | Age: 57
End: 2017-03-06
Payer: COMMERCIAL

## 2017-03-06 VITALS
SYSTOLIC BLOOD PRESSURE: 129 MMHG | HEIGHT: 61 IN | WEIGHT: 137.38 LBS | DIASTOLIC BLOOD PRESSURE: 82 MMHG | BODY MASS INDEX: 25.94 KG/M2 | HEART RATE: 78 BPM

## 2017-03-06 DIAGNOSIS — M06.9 RHEUMATOID ARTHRITIS INVOLVING LEFT HAND, UNSPECIFIED RHEUMATOID FACTOR PRESENCE: Primary | ICD-10-CM

## 2017-03-06 DIAGNOSIS — L80 VITILIGO: ICD-10-CM

## 2017-03-06 DIAGNOSIS — E78.00 HYPERCHOLESTEREMIA: ICD-10-CM

## 2017-03-06 DIAGNOSIS — I10 ESSENTIAL HYPERTENSION: ICD-10-CM

## 2017-03-06 PROCEDURE — 3079F DIAST BP 80-89 MM HG: CPT | Mod: S$GLB,,, | Performed by: INTERNAL MEDICINE

## 2017-03-06 PROCEDURE — 99204 OFFICE O/P NEW MOD 45 MIN: CPT | Mod: S$GLB,,, | Performed by: INTERNAL MEDICINE

## 2017-03-06 PROCEDURE — 99999 PR PBB SHADOW E&M-EST. PATIENT-LVL III: CPT | Mod: PBBFAC,,, | Performed by: INTERNAL MEDICINE

## 2017-03-06 PROCEDURE — 3074F SYST BP LT 130 MM HG: CPT | Mod: S$GLB,,, | Performed by: INTERNAL MEDICINE

## 2017-03-06 NOTE — PROGRESS NOTES
Subjective:      Patient ID: Silvia Cervantes is a 56 y.o. female.    Chief Complaint:  No chief complaint on file.      History of Present Illness  Vitiligo and RA  Thyroid tests TSH and TPO neg    Father heart disease  Father diabetes insulin,    High chol    RA 2012  Vitiligo 2016    Review of Systems   Constitutional: Negative for fatigue and unexpected weight change.   HENT: Positive for sinus pressure. Negative for hearing loss.    Eyes: Negative for visual disturbance.   Respiratory: Negative for apnea, cough, chest tightness and shortness of breath.    Cardiovascular: Negative for chest pain and palpitations.   Gastrointestinal: Negative for abdominal pain, constipation, diarrhea, nausea and vomiting.   Genitourinary: Negative for dysuria, frequency, menstrual problem and vaginal discharge.   Musculoskeletal: Positive for arthralgias and back pain. Negative for gait problem.        Lower back pain   Skin: Negative for rash.   Neurological: Positive for headaches. Negative for dizziness, tremors, weakness and numbness.   Psychiatric/Behavioral: Negative for sleep disturbance. The patient is nervous/anxious.        Objective:   Physical Exam   Constitutional: She is oriented to person, place, and time. She appears well-developed and well-nourished.   HENT:   Head: Normocephalic.   Eyes: EOM are normal. Pupils are equal, round, and reactive to light. No scleral icterus.   Neck: No thyromegaly present.   Cardiovascular: Normal rate, regular rhythm, normal heart sounds and intact distal pulses.  Exam reveals no gallop.    No murmur heard.  Pulmonary/Chest: Effort normal and breath sounds normal. No respiratory distress. She has no wheezes. She has no rales. She exhibits no tenderness.   Abdominal: Soft. Bowel sounds are normal. She exhibits no mass. There is no tenderness. There is no rebound.   Musculoskeletal: Normal range of motion. She exhibits no edema or tenderness.   Small bone structure   Lymphadenopathy:      She has no cervical adenopathy.   Neurological: She is alert and oriented to person, place, and time. She has normal reflexes. No cranial nerve deficit. Coordination normal.   Skin: Skin is warm and dry. No rash noted.   Vitiligo hands and 2cm cirular lesion right upper back     Psychiatric: She has a normal mood and affect. Her behavior is normal. Thought content normal.   Vitals reviewed.      Lab Review:   Results for orders placed or performed in visit on 02/03/17   Lipid panel   Result Value Ref Range    Cholesterol 222 (H) 120 - 199 mg/dL    Triglycerides 96 30 - 150 mg/dL    HDL 49 40 - 75 mg/dL    LDL Cholesterol 153.8 63.0 - 159.0 mg/dL    HDL/Chol Ratio 22.1 20.0 - 50.0 %    Total Cholesterol/HDL Ratio 4.5 2.0 - 5.0    Non-HDL Cholesterol 173 mg/dL     Lab Results   Component Value Date    TSH 2.743 08/01/2016         Assessment:     Vitiligo and RA and frequent stools after eating and to check Celiac disease  Thyroid tests normal    RA    Father he had diabetes and paternal niece Vitiligo    HTN controlled    Chol high    Plan:     Orders Placed This Encounter   Procedures    DXA Bone Density Spine And Hip_Axial Skeleton     Standing Status:   Future     Standing Expiration Date:   3/6/2018    Tissue Transglutaminase Abs, IgA/IgG     Standing Status:   Future     Standing Expiration Date:   3/6/2018    Anti-thyroglobulin antibody     Standing Status:   Future     Standing Expiration Date:   5/5/2018

## 2017-03-06 NOTE — MR AVS SNAPSHOT
[facility] - End[facility]Diab/Metab  [address] [facility] [facility] [address]  Phone: [phone]  Fax: [phone]                  Silvia Cervantes   3/6/2017 8:00 AM   Office Visit    Description:  Female : 1960   Provider:  Danny Fish MD   Department:  [facility] - Endo/Diab/Metab           Diagnoses this Visit        Comments    Rheumatoid arthritis involving left hand, unspecified rheumatoid factor presence    -  Primary     Essential hypertension         Hypercholesteremia         Vitiligo                To Do List           Future Appointments        Provider Department Dept Phone    3/6/2017 9:10 AM LAB, APPOINTMENT [place] [facility] [phone]    3/10/2017 9:20 AM MD [facility] - Dermatology [phone]    3/13/2017 9:20 AM [facility], DEXA1 [facility]-Bone Mineral Density [phone]    2017 11:00 AM Magaly Rizo MD [facility] - Rheumatology [phone]      Goals (5 Years of Data)     None      Follow-Up and Disposition     Follow-up and Disposition History      [facility]     [facility] On [facility] Nurse Care Line -  Assistance  Registered nurses in the [facility] On Call Center provide clinical advisement, health education, appointment booking, and other advisory services.  Call for this free service at [phone].             Medications           Message regarding Medications     Verify the changes and/or additions to your medication regime listed below are the same as discussed with your clinician today.  If any of these changes or additions are incorrect, please notify your healthcare provider.             Verify that the below list of medications is an accurate representation of the medications you are currently taking.  If none reported, the list may be blank. If incorrect, please contact your healthcare provider. Carry this list with you in case of emergency.           Current Medications     albuterol 90 mcg/actuation inhaler Inhale  "2 puffs into the lungs every 6 (six) hours as needed for Wheezing.    ascorbic acid, vitamin C, (VITAMIN C) 1000 MG tablet Take 1,000 mg by mouth once daily.    cycloSPORINE (RESTASIS) 0.05 % ophthalmic emulsion Place 0.05 mLs (1 drop total) into both eyes 2 (two) times daily.    efinaconazole (JUBLIA) 10 % Ayesha Apply to affected toenails daily as directed.    estradiol (ESTRACE) 0.01 % (0.1 mg/gram) vaginal cream Use 1 gram per vagina nightly x 2 weeks then 3 times per week    etanercept (ENBREL) 50 mg/mL (0.98 mL) injection Inject 1 mL (50 mg total) into the skin once a week.    etanercept (ENBREL) 50 mg/mL (0.98 mL) injection Inject 1 mL (50 mg total) into the skin once a week.    fluticasone (FLONASE) 50 mcg/actuation nasal spray 2 sprays by Each Nare route once daily.    hydrOXYzine HCl (ATARAX) 25 MG tablet Take 1 tablet (25 mg total) by mouth 4 (four) times daily as needed for Itching.    ibuprofen (ADVIL,MOTRIN) 600 MG tablet Take 1 tablet (600 mg total) by mouth every 8 (eight) hours as needed for Pain. Take with food.    leflunomide (ARAVA) 20 MG Tab TAKE ONE TABLET BY MOUTH ONCE DAILY.    olmesartan-hydrochlorothiazide (BENICAR HCT) 40-12.5 mg Tab Take 1 tablet by mouth once daily.    omeprazole (PRILOSEC) 40 MG capsule Take 1 capsule (40 mg total) by mouth once daily.    ondansetron (ZOFRAN-ODT) 4 MG TbDL Take 2 tablets (8 mg total) by mouth every 8 (eight) hours as needed (nausea/vomiting).    triamcinolone acetonide 0.1% (KENALOG) 0.1 % cream Apply topically 2 (two) times daily.    UNABLE TO FIND medication name: Methyl Folate 400 mcg    diclofenac sodium (VOLTAREN) 1 % Gel Apply 2 g topically 4 (four) times daily.    valacyclovir (VALTREX) 1000 MG tablet Take 1 tablet (1,000 mg total) by mouth every 12 (twelve) hours.           Clinical Reference Information           Your Vitals Were     BP Pulse Height Weight BMI    129/82 (BP Location: Right arm, Patient Position: Sitting) 78 5' 1" (1.549 m) 62.3 " kg (137 lb 5.6 oz) 25.95 kg/m2      Blood Pressure          Most Recent Value    BP  129/82      Allergies as of 3/6/2017     No Known Allergies      Immunizations Administered on Date of Encounter - 3/6/2017     None      Orders Placed During Today's Visit     Future Labs/Procedures Expected by Expires    Anti-thyroglobulin antibody  3/6/2017 5/5/2018    DXA Bone Density Spine And Hip_Axial Skeleton  3/6/2017 3/6/2018    Tissue Transglutaminase Abs, IgA/IgG  3/6/2017 3/6/2018      Instructions    Vitiligo and RA and frequent stools after eating and to check Celiac disease  Thyroid tests normal    RA    Father he had diabetes and paternal niece Vitiligo    HTN controlled    Chol high       Language Assistance Services     ATTENTION: Language assistance services are available, free of charge. Please call [phone].      ATENCIÓN: Si reginala asif, tiene a mata disposición servicios gratuitos de asistencia lingüística. Llame al [phone].     CHÚ Ý: N?u b?n nói Ti?ng Vi?t, có các d?ch v? h? tr? ngôn ng? mi?n phí dành cho b?n. G?i s? [phone].         [facility] complies with applicable Federal civil rights laws and does not discriminate on the basis of race, color, national origin, age, disability, or sex.

## 2017-03-06 NOTE — PATIENT INSTRUCTIONS
Vitiligo and RA and frequent stools after eating and to check Celiac disease  Thyroid tests normal    RA    Father he had diabetes and paternal niece Vitiligo    HTN controlled    Chol high

## 2017-03-08 ENCOUNTER — TELEPHONE (OUTPATIENT)
Dept: PHARMACY | Facility: CLINIC | Age: 57
End: 2017-03-08

## 2017-03-09 NOTE — TELEPHONE ENCOUNTER
"F/u completed for Enbrel. Confirmed patient name and . Refill Patient wants to  in 001 tomorrow after appointment. When processed rejection of "Refills not Covered." Sending to advocate to address issue and advise patient.  Patient was advised and is aware of the situation. Understands we will reach back out to her when we get confirmation from insurance for where her medication can be filled. Patient has one dose left for Saturday, so no doses should be missed.   "

## 2017-03-10 ENCOUNTER — INITIAL CONSULT (OUTPATIENT)
Dept: DERMATOLOGY | Facility: CLINIC | Age: 57
End: 2017-03-10
Payer: COMMERCIAL

## 2017-03-10 DIAGNOSIS — L84 CLAVUS: Primary | ICD-10-CM

## 2017-03-10 DIAGNOSIS — L81.4 LENTIGINES: ICD-10-CM

## 2017-03-10 DIAGNOSIS — L80 VITILIGO: ICD-10-CM

## 2017-03-10 PROCEDURE — 1160F RVW MEDS BY RX/DR IN RCRD: CPT | Mod: S$GLB,,, | Performed by: DERMATOLOGY

## 2017-03-10 PROCEDURE — 99999 PR PBB SHADOW E&M-EST. PATIENT-LVL II: CPT | Mod: PBBFAC,,, | Performed by: DERMATOLOGY

## 2017-03-10 PROCEDURE — 99213 OFFICE O/P EST LOW 20 MIN: CPT | Mod: 25,S$GLB,, | Performed by: DERMATOLOGY

## 2017-03-10 NOTE — LETTER
March 10, 2017      Magaly Rizo MD  [address] [facility] [address]           [facility] - Dermatology  [address]  [facility] [address]  Phone: [phone]  Fax: [phone]          Patient: Silvia Cervantes   MR Number: [id number]   YOB: 1960   Date of Visit: 3/10/2017       Dear Dr. Magaly Rizo:    Thank you for referring Silvia Cervantes to me for evaluation. Attached you will find relevant portions of my assessment and plan of care.    If you have questions, please do not hesitate to call me. I look forward to following Silvia Cervantes along with you.    Sincerely,    Cony Camp MD    Enclosure  CC:  No Recipients    If you would like to receive this communication electronically, please contact [email or web address] or [phone] to request more information on [facility] Link access.    For providers and/or their staff who would like to refer a patient to [facility], please contact us through our one-stop-shop provider referral line, [facility] [place], at [phone].    If you feel you have received this communication in error or would no longer like to receive these types of communications, please e-mail [email or web address]

## 2017-03-10 NOTE — PROGRESS NOTES
Subjective:       Patient ID:  Silvia Cervantes is a 56 y.o. female who presents for   Chief Complaint   Patient presents with    Skin Discoloration     hands, back, legs     HPI Comments: Pt has RA and is on Enbrel  Thyroid tests all normal    Skin Discoloration  - Follow-up  Symptom course: worsening  Currently using: protopic oint per dr. ritchie (does not use regularly). last seen by Dr. Ritchie 3 weeks ago.  Affected locations: right hand, left hand, left fingers, right fingers, right shoulder and left upper leg  Signs / symptoms: asymptomatic        Review of Systems   Skin: Positive for daily sunscreen use and activity-related sunscreen use. Negative for itching and rash.        Objective:    Physical Exam   Constitutional: She appears well-developed and well-nourished. No distress.   Neurological: She is alert and oriented to person, place, and time. She is not disoriented.   Psychiatric: She has a normal mood and affect.   Skin:   Areas Examined (abnormalities noted in diagram):   Scalp / Hair Palpated and Inspected  Head / Face Inspection Performed  Neck Inspection Performed  Chest / Axilla Inspection Performed  Abdomen Inspection Performed  Genitals / Buttocks / Groin Inspection Performed  Back Inspection Performed  RUE Inspected  LUE Inspection Performed  RLE Inspected  LLE Inspection Performed  Nails and Digits Inspection Performed                  Diagram Legend     Erythematous scaling macule/papule c/w actinic keratosis       Vascular papule c/w angioma      Pigmented verrucoid papule/plaque c/w seborrheic keratosis      Yellow umbilicated papule c/w sebaceous hyperplasia      Irregularly shaped tan macule c/w lentigo     1-2 mm smooth white papules consistent with Milia      Movable subcutaneous cyst with punctum c/w epidermal inclusion cyst      Subcutaneous movable cyst c/w pilar cyst      Firm pink to brown papule c/w dermatofibroma      Pedunculated fleshy papule(s) c/w skin tag(s)      Evenly  pigmented macule c/w junctional nevus     Mildly variegated pigmented, slightly irregular-bordered macule c/w mildly atypical nevus      Flesh colored to evenly pigmented papule c/w intradermal nevus       Pink pearly papule/plaque c/w basal cell carcinoma      Erythematous hyperkeratotic cursted plaque c/w SCC      Surgical scar with no sign of skin cancer recurrence      Open and closed comedones      Inflammatory papules and pustules      Verrucoid papule consistent consistent with wart     Erythematous eczematous patches and plaques     Dystrophic onycholytic nail with subungual debris c/w onychomycosis     Umbilicated papule    Erythematous-base heme-crusted tan verrucoid plaque consistent with inflamed seborrheic keratosis     Erythematous Silvery Scaling Plaque c/w Psoriasis     See annotation      Assessment / Plan:        Clavus - right index finger  Cryosurgery procedure note:    Verbal consent from the patient is obtained. Liquid nitrogen cryosurgery is applied to 1 clavus with prior paring, as detailed in the physical exam, to produce a freeze injury. 2 consecutive freeze thaw cycles are applied to each verruca. The patient is aware that blisters (possibly blood blisters) may form.      Vitiligo  Unrelated to Enbrel  Thyroid nl  Discussed sun protection    Lentigines  This is a benign hyperpigmented sun induced lesion. Daily sun protection will reduce the number of new lesions. Treatment of these benign lesions are considered cosmetic.             Return if symptoms worsen or fail to improve.

## 2017-03-10 NOTE — PATIENT INSTRUCTIONS

## 2017-03-17 RX ORDER — LEFLUNOMIDE 20 MG/1
TABLET ORAL
Qty: 30 TABLET | Refills: 1 | Status: SHIPPED | OUTPATIENT
Start: 2017-03-17 | End: 2017-04-12 | Stop reason: SDUPTHER

## 2017-03-20 ENCOUNTER — TELEPHONE (OUTPATIENT)
Dept: ENDOCRINOLOGY | Facility: CLINIC | Age: 57
End: 2017-03-20

## 2017-03-22 ENCOUNTER — TELEPHONE (OUTPATIENT)
Dept: ENDOCRINOLOGY | Facility: CLINIC | Age: 57
End: 2017-03-22

## 2017-03-22 ENCOUNTER — HOSPITAL ENCOUNTER (OUTPATIENT)
Dept: RADIOLOGY | Facility: CLINIC | Age: 57
Discharge: HOME | End: 2017-03-22
Attending: INTERNAL MEDICINE
Payer: COMMERCIAL

## 2017-03-22 DIAGNOSIS — M06.9 RHEUMATOID ARTHRITIS INVOLVING LEFT HAND, UNSPECIFIED RHEUMATOID FACTOR PRESENCE: ICD-10-CM

## 2017-03-22 PROCEDURE — 77080 DXA BONE DENSITY AXIAL: CPT | Mod: 26,,, | Performed by: INTERNAL MEDICINE

## 2017-03-22 PROCEDURE — 77080 DXA BONE DENSITY AXIAL: CPT | Mod: TC

## 2017-03-22 NOTE — TELEPHONE ENCOUNTER
Please tell that blood tests were normal.  The BMD today shows osteopenia of the lumbar spine and normal hip. No bone loss and low fracture risk

## 2017-03-22 NOTE — TELEPHONE ENCOUNTER
----- Message from Eulalia Rockwell sent at 3/22/2017  2:03 PM CDT -----  Pt calling for lab and bmd results ,, call pt at [phone]

## 2017-03-28 ENCOUNTER — OFFICE VISIT (OUTPATIENT)
Dept: FAMILY MEDICINE | Facility: CLINIC | Age: 57
End: 2017-03-28
Payer: COMMERCIAL

## 2017-03-28 VITALS
SYSTOLIC BLOOD PRESSURE: 136 MMHG | WEIGHT: 136.44 LBS | HEART RATE: 79 BPM | BODY MASS INDEX: 25.76 KG/M2 | DIASTOLIC BLOOD PRESSURE: 83 MMHG | HEIGHT: 61 IN

## 2017-03-28 DIAGNOSIS — I10 ESSENTIAL HYPERTENSION: ICD-10-CM

## 2017-03-28 DIAGNOSIS — Z78.0 POST-MENOPAUSAL: ICD-10-CM

## 2017-03-28 DIAGNOSIS — F41.9 ANXIETY: Primary | ICD-10-CM

## 2017-03-28 DIAGNOSIS — E78.00 HYPERCHOLESTEREMIA: ICD-10-CM

## 2017-03-28 PROCEDURE — 3079F DIAST BP 80-89 MM HG: CPT | Mod: S$GLB,,, | Performed by: FAMILY MEDICINE

## 2017-03-28 PROCEDURE — 99214 OFFICE O/P EST MOD 30 MIN: CPT | Mod: S$GLB,,, | Performed by: FAMILY MEDICINE

## 2017-03-28 PROCEDURE — 3075F SYST BP GE 130 - 139MM HG: CPT | Mod: S$GLB,,, | Performed by: FAMILY MEDICINE

## 2017-03-28 PROCEDURE — 1160F RVW MEDS BY RX/DR IN RCRD: CPT | Mod: S$GLB,,, | Performed by: FAMILY MEDICINE

## 2017-03-28 PROCEDURE — 99999 PR PBB SHADOW E&M-EST. PATIENT-LVL III: CPT | Mod: PBBFAC,,, | Performed by: FAMILY MEDICINE

## 2017-03-28 RX ORDER — PAROXETINE 10 MG/1
10 TABLET, FILM COATED ORAL EVERY MORNING
Qty: 30 TABLET | Refills: 2 | Status: SHIPPED | OUTPATIENT
Start: 2017-03-28 | End: 2017-05-30

## 2017-03-28 NOTE — PROGRESS NOTES
Subjective:       Patient ID: Silvia Cervantes is a 56 y.o. female.    Chief Complaint: Abdominal Pain    HPI Comments: 56 yr old pleasant  female with allergies, HTN, HLD, Rheumatoid arthritis, Erosive gastritis, and other co morbidities presents today for her follow up and c/o anxiety and menopausal hot flashes. Onset 2-3 months ago and gradually worsening. Night sweats, hot flashes, abdominal pain, arm pain. No relieving factors. Denies any depression/SI/HI.      HTN - controlled - on Benicar-HCT - compliant - no side effects       HLD - diet controlled -  LDLCALC                  151.2               12/22/2015      - lab due       RA - on Enbrel shots - follows Rheumatology - stable      Gastritis - stable - on PPI as needed - no side effects      History as below - reviewed       HM  -labs UTD except lipids  -vaccines due    Hypertension   This is a chronic problem. The current episode started more than 1 year ago. The problem has been gradually improving since onset. The problem is controlled. Associated symptoms include anxiety. Pertinent negatives include no chest pain, headaches, malaise/fatigue, neck pain, palpitations or shortness of breath. There are no associated agents to hypertension. Risk factors for coronary artery disease include dyslipidemia and post-menopausal state. Past treatments include angiotensin blockers and diuretics. The current treatment provides significant improvement. There are no compliance problems.  There is no history of angina, CAD/MI, CVA, left ventricular hypertrophy, PVD or retinopathy. There is no history of chronic renal disease, coarctation of the aorta, hypercortisolism, hyperparathyroidism, a hypertension causing med or sleep apnea.   Hyperlipidemia   This is a chronic problem. The current episode started more than 1 year ago. The problem is controlled. Recent lipid tests were reviewed and are normal. She has no history of chronic renal disease, diabetes,  hypothyroidism or liver disease. There are no known factors aggravating her hyperlipidemia. Pertinent negatives include no chest pain, focal sensory loss, focal weakness, leg pain, myalgias or shortness of breath. She is currently on no antihyperlipidemic treatment. The current treatment provides mild improvement of lipids. There are no compliance problems.  Risk factors for coronary artery disease include dyslipidemia, hypertension and post-menopausal.   Mouth Lesions    The current episode started yesterday. The problem occurs continuously. The problem has been unchanged. The problem is mild. Nothing relieves the symptoms. Nothing aggravates the symptoms. Associated symptoms include mouth sores. Pertinent negatives include no constipation, no nausea, no congestion, no ear discharge, no headaches, no hearing loss, no rhinorrhea, no sore throat, no neck pain, no wheezing, no eye discharge and no eye pain.   Anxiety   Presents for initial visit. Onset was 1 to 6 months ago. The problem has been gradually worsening. Symptoms include insomnia, irritability, nervous/anxious behavior and restlessness. Patient reports no chest pain, compulsions, confusion, decreased concentration, dizziness, nausea, palpitations, shortness of breath or suicidal ideas. Symptoms occur constantly. The severity of symptoms is moderate. Nothing aggravates the symptoms. The quality of sleep is fair. Nighttime awakenings: occasional.     There are no known risk factors. Her past medical history is significant for anxiety/panic attacks. There is no history of asthma. Past treatments include nothing. Compliance with prior treatments has been good.     Review of Systems   Constitutional: Positive for irritability. Negative for activity change, diaphoresis, malaise/fatigue and unexpected weight change.   HENT: Positive for mouth sores. Negative for congestion, ear discharge, hearing loss, rhinorrhea, sore throat and voice change.    Eyes: Negative.   Negative for pain, discharge and visual disturbance.   Respiratory: Negative.  Negative for chest tightness, shortness of breath and wheezing.    Cardiovascular: Negative.  Negative for chest pain and palpitations.   Gastrointestinal: Negative.  Negative for abdominal distention, anal bleeding, constipation and nausea.   Endocrine: Negative.  Negative for cold intolerance, polydipsia and polyuria.   Genitourinary: Negative.  Negative for decreased urine volume, difficulty urinating, dysuria, frequency, menstrual problem and vaginal pain.   Musculoskeletal: Negative.  Negative for arthralgias, gait problem, myalgias and neck pain.   Skin: Negative.  Negative for color change, pallor and wound.   Allergic/Immunologic: Negative.  Negative for environmental allergies and immunocompromised state.   Neurological: Negative.  Negative for dizziness, tremors, focal weakness, seizures, speech difficulty and headaches.   Hematological: Negative.  Negative for adenopathy. Does not bruise/bleed easily.   Psychiatric/Behavioral: Positive for dysphoric mood and sleep disturbance. Negative for agitation, confusion, decreased concentration, hallucinations, self-injury and suicidal ideas. The patient is nervous/anxious and has insomnia.        PMH/PSH/FH/SH/MED/ALLERGY reviewed    Objective:       Vitals:    03/28/17 1106   BP: 136/83   Pulse: 79       Physical Exam   Constitutional: She is oriented to person, place, and time. She appears well-developed and well-nourished. No distress.   HENT:   Head: Normocephalic and atraumatic.   Right Ear: External ear normal.   Left Ear: External ear normal.   Nose: Nose normal.   Mouth/Throat: Oropharynx is clear and moist. No oropharyngeal exudate.   Eyes: Conjunctivae and EOM are normal. Pupils are equal, round, and reactive to light. Right eye exhibits no discharge. Left eye exhibits no discharge. No scleral icterus.   Neck: Normal range of motion. Neck supple. No JVD present. No tracheal  deviation present. No thyromegaly present.   Cardiovascular: Normal rate, regular rhythm, normal heart sounds and intact distal pulses.  Exam reveals no gallop and no friction rub.    No murmur heard.  Pulmonary/Chest: Effort normal and breath sounds normal. No stridor. She has no wheezes. She has no rales. She exhibits no tenderness.   Abdominal: Soft. Bowel sounds are normal. She exhibits no distension and no mass. There is no tenderness. There is no rebound and no guarding. No hernia.   Musculoskeletal: Normal range of motion. She exhibits no edema or tenderness.   Lymphadenopathy:     She has no cervical adenopathy.   Neurological: She is alert and oriented to person, place, and time. She has normal reflexes. She displays normal reflexes. No cranial nerve deficit. She exhibits normal muscle tone. Coordination normal.   Skin: Skin is warm and dry. No rash noted. She is not diaphoretic. No erythema. No pallor.   Psychiatric: Her behavior is normal. Judgment and thought content normal.   Anxious mood       Assessment:       1. Anxiety    2. Essential hypertension    3. Hypercholesteremia    4. Post-menopausal        Plan:       Silvia was seen today for abdominal pain.    Diagnoses and all orders for this visit:    Anxiety  -     paroxetine (PAXIL) 10 MG tablet; Take 1 tablet (10 mg total) by mouth every morning.    Essential hypertension    Hypercholesteremia    Post-menopausal  -     paroxetine (PAXIL) 10 MG tablet; Take 1 tablet (10 mg total) by mouth every morning.    anxiety/post menopausal  -lifestyle modification  -paxil once daily. Side effects of medications have been discussed and patient agreed to proceed with treatment and understands the risks and benefits.      HLD  -diet control  -labs    HTN  -controlled    RA  -stable  -follow rheumatology    Herpes labialis  -Valtrex as directed    Spent adequate time in obtaining history and explaining differentials    40 minutes spent during this visit of which  greater than 50% devoted to face-face counseling and coordination of care regarding diagnosis and management plan    Return in about 4 weeks (around 4/25/2017), or if symptoms worsen or fail to improve.

## 2017-03-28 NOTE — MR AVS SNAPSHOT
[address]  [place] [address]  Phone: [phone]  Fax: [phone]                  Silvia Cervantes   3/28/2017 11:00 AM   Office Visit    Description:  Female : 1960   Provider:  Adrián Cade MD   Department:  [facility]           Reason for Visit     Abdominal Pain           Diagnoses this Visit        Comments    Anxiety    -  Primary     Essential hypertension         Hypercholesteremia         Post-menopausal                To Do List           Future Appointments        Provider Department Dept Phone    2017 11:00 AM Magaly Rizo MD [facility] - Rheumatology [phone]      Goals (5 Years of Data)     None      Follow-Up and Disposition     Return in about 4 weeks (around 2017), or if symptoms worsen or fail to improve.       These Medications        Disp Refills Start End    paroxetine (PAXIL) 10 MG tablet 30 tablet 2 3/28/2017 3/28/2018    Take 1 tablet (10 mg total) by mouth every morning. - Oral    Pharmacy: [id number] - [place]  [address] [facility] #: [phone]         [facility]     [facility] On Call Nurse Care Line -  Assistance  Registered nurses in the [facility] Center provide clinical advisement, health education, appointment booking, and other advisory services.  Call for this free service at [phone].             Medications           Message regarding Medications     Verify the changes and/or additions to your medication regime listed below are the same as discussed with your clinician today.  If any of these changes or additions are incorrect, please notify your healthcare provider.        START taking these NEW medications        Refills    paroxetine (PAXIL) 10 MG tablet 2    Sig: Take 1 tablet (10 mg total) by mouth every morning.    Class: Normal    Route: Oral           Verify that the below list of medications is an accurate representation of the  "medications you are currently taking.  If none reported, the list may be blank. If incorrect, please contact your healthcare provider. Carry this list with you in case of emergency.           Current Medications     albuterol 90 mcg/actuation inhaler Inhale 2 puffs into the lungs every 6 (six) hours as needed for Wheezing.    ascorbic acid, vitamin C, (VITAMIN C) 1000 MG tablet Take 1,000 mg by mouth once daily.    cycloSPORINE (RESTASIS) 0.05 % ophthalmic emulsion Place 0.05 mLs (1 drop total) into both eyes 2 (two) times daily.    efinaconazole (JUBLIA) 10 % Ayesha Apply to affected toenails daily as directed.    estradiol (ESTRACE) 0.01 % (0.1 mg/gram) vaginal cream Use 1 gram per vagina nightly x 2 weeks then 3 times per week    etanercept (ENBREL) 50 mg/mL (0.98 mL) injection Inject 1 mL (50 mg total) into the skin once a week.    hydrOXYzine HCl (ATARAX) 25 MG tablet Take 1 tablet (25 mg total) by mouth 4 (four) times daily as needed for Itching.    ibuprofen (ADVIL,MOTRIN) 600 MG tablet Take 1 tablet (600 mg total) by mouth every 8 (eight) hours as needed for Pain. Take with food.    leflunomide (ARAVA) 20 MG Tab TAKE ONE TABLET BY MOUTH ONCE DAILY.    olmesartan-hydrochlorothiazide (BENICAR HCT) 40-12.5 mg Tab Take 1 tablet by mouth once daily.    omeprazole (PRILOSEC) 40 MG capsule Take 1 capsule (40 mg total) by mouth once daily.    ondansetron (ZOFRAN-ODT) 4 MG TbDL Take 2 tablets (8 mg total) by mouth every 8 (eight) hours as needed (nausea/vomiting).    triamcinolone acetonide 0.1% (KENALOG) 0.1 % cream Apply topically 2 (two) times daily.    UNABLE TO FIND medication name: Methyl Folate 400 mcg    paroxetine (PAXIL) 10 MG tablet Take 1 tablet (10 mg total) by mouth every morning.    valacyclovir (VALTREX) 1000 MG tablet Take 1 tablet (1,000 mg total) by mouth every 12 (twelve) hours.           Clinical Reference Information           Your Vitals Were     BP Pulse Height Weight BMI    136/83 79 5' 1" " (1.549 m) 61.9 kg (136 lb 7.4 oz) 25.78 kg/m2      Blood Pressure          Most Recent Value    BP  136/83      Allergies as of 3/28/2017     No Known Allergies      Immunizations Administered on Date of Encounter - 3/28/2017     None      Language Assistance Services     ATTENTION: Language assistance services are available, free of charge. Please call [phone].      ATENCIÓN: Si habla español, tiene a mata disposición servicios gratuitos de asistencia lingüística. Llame al [phone].     CHÚ Ý: N?u b?n nói Ti?ng Vi?t, có các d?ch v? h? tr? ngôn ng? mi?n phí dành cho b?n. G?i s? [phone].         [facility] complies with applicable Federal civil rights laws and does not discriminate on the basis of race, color, national origin, age, disability, or sex.

## 2017-04-05 ENCOUNTER — OFFICE VISIT (OUTPATIENT)
Dept: GASTROENTEROLOGY | Facility: CLINIC | Age: 57
End: 2017-04-05
Payer: COMMERCIAL

## 2017-04-05 VITALS
HEIGHT: 61 IN | BODY MASS INDEX: 25.72 KG/M2 | DIASTOLIC BLOOD PRESSURE: 78 MMHG | SYSTOLIC BLOOD PRESSURE: 142 MMHG | WEIGHT: 136.25 LBS | HEART RATE: 81 BPM

## 2017-04-05 DIAGNOSIS — R14.0 BLOATING: Primary | ICD-10-CM

## 2017-04-05 DIAGNOSIS — K58.9 IRRITABLE BOWEL SYNDROME, UNSPECIFIED TYPE: ICD-10-CM

## 2017-04-05 PROCEDURE — 3077F SYST BP >= 140 MM HG: CPT | Mod: S$GLB,,, | Performed by: INTERNAL MEDICINE

## 2017-04-05 PROCEDURE — 3078F DIAST BP <80 MM HG: CPT | Mod: S$GLB,,, | Performed by: INTERNAL MEDICINE

## 2017-04-05 PROCEDURE — 99213 OFFICE O/P EST LOW 20 MIN: CPT | Mod: S$GLB,,, | Performed by: INTERNAL MEDICINE

## 2017-04-05 PROCEDURE — 99999 PR PBB SHADOW E&M-EST. PATIENT-LVL III: CPT | Mod: PBBFAC,,, | Performed by: INTERNAL MEDICINE

## 2017-04-05 PROCEDURE — 1160F RVW MEDS BY RX/DR IN RCRD: CPT | Mod: S$GLB,,, | Performed by: INTERNAL MEDICINE

## 2017-04-05 NOTE — PATIENT INSTRUCTIONS
"  What is Irritable Bowel Syndrome (IBS)?     Muscle contraction moves food through the digestive tract.      People who have irritable bowel syndrome (IBS) have digestive tracts that react abnormally to certain substances or to stress. This leads to symptoms like cramps, gas, bloating, pain, constipation, and diarrhea. Sometimes called spastic colon, IBS is a common condition that is not a disease, but rather a group of symptoms that happen together.  IBS--a motility problem  The muscle movement that passes food through the digestive tract is called motility. When you have IBS, the normal motility of the digestive tract (especially the colon) is disrupted. Motility may speed up, slow down, or become irregular. If stool passes too quickly through the colon, not enough water is absorbed from it. Loose, watery stools (diarrhea) can result. If stool passes through the colon too slowly, too much water is absorbed and the stool becomes hard and dry (constipation). Also, stool and gas may back up and cause painful pressure and cramping. There is no single test that can diagnose IBS. It is a group of symptoms that help your healthcare provider with the diagnosis. Often multiple blood, stool, radiologic tests, or even colonoscopy are performed in the evaluation of people suspected to have IBS. These are done primarily to make sure that there are no other illnesses that can account for your symptoms.   What causes IBS?  A great deal of research has been done on IBS, but the cause is still not known. Some of the possible factors include:   · Smoking, eating certain foods, or drinking alcohol or caffeinated drinks can cause, or "trigger," symptoms of IBS.  · Although no one knows for sure, IBS may be caused by a problem with the nerves or muscles in your digestive tract.  · There is also some evidence that certain bacteria found after a severe gastroinstestinal infection in the small intestine and colon may cause " IBS.  · While stress and anxiety worsen the symptoms of IBS, it is not believed to be the cause.   What you can do  Recommendations include:  · Certain medicines may help regulate the working of your digestive tract. Your healthcare provider may prescribe one or more for you.  · Medicine cant cure IBS, but it may help manage the symptoms.  · Because some medicines may make IBS worse, dont take any medicine, especially laxatives, unless your healthcare provider prescribes it for you.  · Your healthcare provider may suggest some lifestyle changes to help control your IBS. Two of the most important are changing your diet and managing stress. If diet changes are suggested, ask for nutritional guidance from a dietitian so you maintain a healthy nutritional balance in your food intake.   Date Last Reviewed: 7/1/2016 © 2000-2016 [employer]. [address] [place] [address], [place] [address]. All rights reserved. This information is not intended as a substitute for professional medical care. Always follow your healthcare professional's instructions.

## 2017-04-05 NOTE — PROGRESS NOTES
"Subjective:      Patient ID: Silvia Cervantes is a 56 y.o. female.    Chief Complaint: GI Problem and Bloated    HPI:   Patient 55-year-old female presenting for GI follow-up.  She has no new complaints.  Still trouble with bloating at times.  Abdominal fullness after large meals.  "Noisy stomach".  No red flag symptoms.  Suspect these are functional complaints.  She was recently prescribed Paxil and is yet to begin that medication.  I encouraged her to proceed with that medication trial  Recent labs include a normal TTG and normal CRP.  Past medical history: Gastroscopy 2015 demonstrated a few erosions and a small hiatal hernia.   H. pylori was treated and documented by stool testing to have been eradicated.    Patient indicates she had a colonoscopy in  which was negative.  Most prominent medical history is that of rheumatoid arthritis. Hypertension.  Prior . Hysterectomy  Nonsmoker. Alcohol occasional.  Family history negative for GI neoplasm.  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)      Past Surgical History:   Procedure Laterality Date     SECTION      HYSTERECTOMY      TUBAL LIGATION       Family History   Problem Relation Age of Onset    Diabetes Father     Hypertension Father     Cataracts Father     Heart disease Father     Hypertension Mother     Cataracts Mother     Stroke Brother     Hypertension Brother     Melanoma Neg Hx     Breast cancer Neg Hx     Colon cancer Neg Hx     Ovarian cancer Neg Hx     Blindness Neg Hx     Glaucoma Neg Hx      Social History     Social History    Marital status:      Spouse name: N/A    Number of " "children: N/A    Years of education: N/A     Occupational History    Not on file.     Social History Main Topics    Smoking status: Never Smoker    Smokeless tobacco: Never Used    Alcohol use Yes      Comment: Rarely    Drug use: Not on file    Sexual activity: Yes     Partners: Male     Birth control/ protection: Surgical      Comment: hyst     Other Topics Concern    Not on file     Social History Narrative       Review of Systems:  Constitutional: Negative for appetite change.  Some weight gain  HENT: Negative for trouble swallowing.   Eyes: Negative for photophobia.   Respiratory: Negative for cough and shortness of breath.   Cardiovascular: Negative for palpitations.   Gastrointestinal: See HPI for details.  Genitourinary: Negative for frequency and hematuria.   Skin: Negative for rash.   Neurological: Negative for weakness and headaches.   Hematological: Negative.   Psychiatric/Behavioral: Negative for suicidal ideas and behavioral problems.  Anxiety    Objective:     BP (!) 142/78  Pulse 81  Ht 5' 1" (1.549 m)  Wt 61.8 kg (136 lb 3.9 oz)  BMI 25.74 kg/m2    Physical Exam:  Eyes: Pupils are equal, round, and reactive to light.   Neck: Supple. No mass  Cardiovascular: Regular rhythm . No murmur   Pulmonary/Chest: Lungs clear   Abdominal: Soft. No mass palpated. Nontender, no guarding. Positive bowel sounds   Musculoskeletal: No deformity. No edema.   Psychiatric: Alert and oriented    Assessment:     1. Bloating    2. Irritable bowel syndrome, unspecified type      Plan:     Silvia was seen today for gi problem and bloated.    Diagnoses and all orders for this visit:    Bloating    Irritable bowel syndrome, unspecified type      Plan:  Medications as prescribed  Routine follow-up with primary care    "

## 2017-04-05 NOTE — MR AVS SNAPSHOT
[facility]  [address]  [address]  [place] [address]  Phone: [phone]                  Silvia Cervantes   2017 4:20 PM   Office Visit    Description:  Female : 1960   Provider:  Jordi Santacruz Jr., MD   Department:  [facility]           Reason for Visit     GI Problem     Bloated           Diagnoses this Visit        Comments    Bloating    -  Primary     Irritable bowel syndrome, unspecified type                To Do List           Future Appointments        Provider Department Dept Phone    2017 11:00 AM Magaly Rizo MD [facility] - Rheumatology [phone]    2017 8:40 AM Adrián Cade MD [facility] [phone]      Goals (5 Years of Data)     None      [facility]     [facility] Care [facility] -  Assistance  Unless otherwise directed by your provider, please contact [facility], our nurse care line that is available for  assistance.     Registered nurses in the [facility] provide: appointment scheduling, clinical advisement, health education, and other advisory services.  Call: [phone] (toll free)               Medications           Message regarding Medications     Verify the changes and/or additions to your medication regime listed below are the same as discussed with your clinician today.  If any of these changes or additions are incorrect, please notify your healthcare provider.        STOP taking these medications     hydrOXYzine HCl (ATARAX) 25 MG tablet Take 1 tablet (25 mg total) by mouth 4 (four) times daily as needed for Itching.           Verify that the below list of medications is an accurate representation of the medications you are currently taking.  If none reported, the list may be blank. If incorrect, please contact your healthcare provider. Carry this list with you in case of emergency.           Current Medications     albuterol 90 mcg/actuation  "inhaler Inhale 2 puffs into the lungs every 6 (six) hours as needed for Wheezing.    ascorbic acid, vitamin C, (VITAMIN C) 1000 MG tablet Take 1,000 mg by mouth once daily.    cycloSPORINE (RESTASIS) 0.05 % ophthalmic emulsion Place 0.05 mLs (1 drop total) into both eyes 2 (two) times daily.    efinaconazole (JUBLIA) 10 % Ayesha Apply to affected toenails daily as directed.    estradiol (ESTRACE) 0.01 % (0.1 mg/gram) vaginal cream Use 1 gram per vagina nightly x 2 weeks then 3 times per week    etanercept (ENBREL) 50 mg/mL (0.98 mL) injection Inject 1 mL (50 mg total) into the skin once a week.    ibuprofen (ADVIL,MOTRIN) 600 MG tablet Take 1 tablet (600 mg total) by mouth every 8 (eight) hours as needed for Pain. Take with food.    leflunomide (ARAVA) 20 MG Tab TAKE ONE TABLET BY MOUTH ONCE DAILY.    olmesartan-hydrochlorothiazide (BENICAR HCT) 40-12.5 mg Tab Take 1 tablet by mouth once daily.    omeprazole (PRILOSEC) 40 MG capsule Take 1 capsule (40 mg total) by mouth once daily.    paroxetine (PAXIL) 10 MG tablet Take 1 tablet (10 mg total) by mouth every morning.    ondansetron (ZOFRAN-ODT) 4 MG TbDL Take 2 tablets (8 mg total) by mouth every 8 (eight) hours as needed (nausea/vomiting).    triamcinolone acetonide 0.1% (KENALOG) 0.1 % cream Apply topically 2 (two) times daily.    UNABLE TO FIND medication name: Methyl Folate 400 mcg    valacyclovir (VALTREX) 1000 MG tablet Take 1 tablet (1,000 mg total) by mouth every 12 (twelve) hours.           Clinical Reference Information           Your Vitals Were     BP Pulse Height Weight BMI    142/78 81 5' 1" (1.549 m) 61.8 kg (136 lb 3.9 oz) 25.74 kg/m2      Blood Pressure          Most Recent Value    BP  (!)  142/78      Allergies as of 4/5/2017     No Known Allergies      Immunizations Administered on Date of Encounter - 4/5/2017     None      Language Assistance Services     ATTENTION: Language assistance services are available, free of charge. Please call " [phone].      ATENCIÓN: Si habla español, tiene a mata disposición servicios gratuitos de asistencia lingüística. Llame al [phone].     CHÚ Ý: N?u b?n nói Ti?ng Vi?t, có các d?ch v? h? tr? ngôn ng? mi?n phí dành cho b?n. G?i s? [phone].         [facility] complies with applicable Federal civil rights laws and does not discriminate on the basis of race, color, national origin, age, disability, or sex.

## 2017-04-12 ENCOUNTER — OFFICE VISIT (OUTPATIENT)
Dept: RHEUMATOLOGY | Facility: CLINIC | Age: 57
End: 2017-04-12
Payer: COMMERCIAL

## 2017-04-12 ENCOUNTER — PATIENT MESSAGE (OUTPATIENT)
Dept: RHEUMATOLOGY | Facility: CLINIC | Age: 57
End: 2017-04-12

## 2017-04-12 VITALS
SYSTOLIC BLOOD PRESSURE: 115 MMHG | HEART RATE: 89 BPM | BODY MASS INDEX: 25.96 KG/M2 | WEIGHT: 137.5 LBS | HEIGHT: 61 IN | DIASTOLIC BLOOD PRESSURE: 80 MMHG

## 2017-04-12 DIAGNOSIS — D84.9 IMMUNOSUPPRESSION: ICD-10-CM

## 2017-04-12 DIAGNOSIS — M06.9 RHEUMATOID ARTHRITIS INVOLVING MULTIPLE JOINTS: Primary | ICD-10-CM

## 2017-04-12 PROCEDURE — 1160F RVW MEDS BY RX/DR IN RCRD: CPT | Mod: S$GLB,,, | Performed by: INTERNAL MEDICINE

## 2017-04-12 PROCEDURE — 99999 PR PBB SHADOW E&M-EST. PATIENT-LVL III: CPT | Mod: PBBFAC,,, | Performed by: INTERNAL MEDICINE

## 2017-04-12 PROCEDURE — 3074F SYST BP LT 130 MM HG: CPT | Mod: S$GLB,,, | Performed by: INTERNAL MEDICINE

## 2017-04-12 PROCEDURE — 99214 OFFICE O/P EST MOD 30 MIN: CPT | Mod: S$GLB,,, | Performed by: INTERNAL MEDICINE

## 2017-04-12 PROCEDURE — 3079F DIAST BP 80-89 MM HG: CPT | Mod: S$GLB,,, | Performed by: INTERNAL MEDICINE

## 2017-04-12 RX ORDER — LEFLUNOMIDE 20 MG/1
20 TABLET ORAL DAILY
Qty: 30 TABLET | Refills: 3 | Status: SHIPPED | OUTPATIENT
Start: 2017-04-12 | End: 2018-03-07

## 2017-04-12 ASSESSMENT — ROUTINE ASSESSMENT OF PATIENT INDEX DATA (RAPID3)
PATIENT GLOBAL ASSESSMENT SCORE: 5
TOTAL RAPID3 SCORE: 3.44
PSYCHOLOGICAL DISTRESS SCORE: 2.2
MDHAQ FUNCTION SCORE: .1
AM STIFFNESS SCORE: 1, YES
FATIGUE SCORE: 5.5
PAIN SCORE: 5

## 2017-04-12 NOTE — PROGRESS NOTES
Subjective:       Patient ID: Silvia Cervantes is a 54 y.o. female.    Chief Complaint:     HPI 53 yo F with PMH of HTN, abnormal pap (2013 but recent negative, no cancer), RA (+CCP, RF),erosive here for evaluation. She was diagnosed in 2011 with hand and feet with swelling and pain. She was initially treated with MTX but it gave her nausea which gave her nausea.  She was changed to leflunomide 20 mg 10/2014 from mtx due to nausea on the mtx. Then I added etanercept January 2015 due to incomplete control of her arthritis on leflunomide.  No am stiffness.  She reports mild aching in hands (3/10). Pain is aching.  Reports mild swelling in hands but better than usual.  She reports left upper quadrant pain (4/10) , non-radiating with possible nausea which has been present for months. Sometimes she has sour taste in mouth.      Interval history:  Patient is here for follow up.    She continues to take leflunomide 20mg a day and is also taking ENBREL.  Patient is 4/10.   She continues to have pain in right hand but uses it a lot at work.  SHE STILL HAS PAIN IN LOWER BACK.  Reports episodes of joint swelling.  Reports fatigue  For 6 months.      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   HENT: Negative for congestion, ear discharge, ear pain, facial swelling, mouth sores, sinus pressure, sneezing, sore throat, tinnitus and trouble swallowing.    Eyes: Negative for photophobia, pain, discharge, redness, itching and visual disturbance.   Respiratory: Negative for apnea, chest tightness, shortness of breath, wheezing and stridor.     Cardiovascular: Negative for leg swelling.   Gastrointestinal: Negative for nausea, abdominal pain, diarrhea, constipation, blood in stool, abdominal distention and anal bleeding.   Endocrine: Negative for cold intolerance and heat intolerance.   Genitourinary: Negative for dysuria and difficulty urinating.   Musculoskeletal: Positive for arthralgias. Negative for myalgias, back pain, joint swelling, gait problem, neck pain and neck stiffness.   Skin: Negative for color change, pallor, rash and wound.   Neurological: Negative for dizziness, seizures, light-headedness and numbness.   Hematological: Negative for adenopathy. Does not bruise/bleed easily.   Psychiatric/Behavioral: Negative for sleep disturbance. The patient is not nervous/anxious.       Objective:        Physical Exam   Constitutional: She is oriented to person, place, and time.   HENT:   Head: Normocephalic and atraumatic.   Right Ear: External ear normal.   Left Ear: External ear normal.   Nose: Nose normal.   Mouth/Throat: Oropharynx is clear and moist. No oropharyngeal exudate.   Eyes: Conjunctivae and EOM are normal. Pupils are equal, round, and reactive to light. Right eye exhibits no discharge. Left eye exhibits no discharge. No scleral icterus.   Neck: Neck supple. No JVD present. No thyromegaly present.   Cardiovascular: Normal rate, regular rhythm, normal heart sounds and intact distal pulses.  Exam reveals no gallop and no friction rub.    No murmur heard.  Pulmonary/Chest: Effort normal and breath sounds normal. No respiratory distress. She has no wheezes. She has no rales. She exhibits no tenderness.   Abdominal: Soft. Bowel sounds are normal. She exhibits no distension and no mass. There is no tenderness. There is no rebound and no guarding.   Lymphadenopathy:     She has no cervical adenopathy.   Neurological: She is alert and oriented to person, place, and time. No cranial nerve deficit. Gait normal. Coordination normal.   Skin:diffuse  hypopigmented lesions in arms, hands, legs, chest, back   Psychiatric: Affect and judgment normal.   Musculoskeletal: She exhibits no  tenderness. She exhibits no edema.   FROM of all joints including neck, shoulders, spine, ankles, wrists, knees, and ankles; no joint deformities noted or effusions, erythema or warmth; no tophi or nodules noted; no crepitus; no nail pitting or onycholysis          Labs:  Esr-25  ccp-93  rf-36  Flor-negative    Imaging:  MRI (7/22/2015)   (Stable enhancing marginal erosions in the second and third metacarpal heads, lunate, triquetrum, and capitate)        dexa scan:(2017):  Osteopenia of the femoral neck. FRAX calculation does not support treatment for osteoporosis.Total hip and lumbar spine BMD unchanged since 2013.    Recommendations:  1) Adequate calcium and Vitamin D therapy  2) Appropriate exercise  3) Consider repeat BMD in 2- 4 years    Assessment:     57 yo F with PMH of HTN, abnormal pap (2013 but recent negative, no cancer), RA (+CCP, RF),erosive here , H. Pylori for follow up of seropositive RA.   She has been On Enbrel since Jan 2015 . She is also on Leflunomide. She is doing well clinically .  MRI from 7/2015 shows stable erosions.  Had long discussion with patient and asked her to be compliant.    Plan:     -labs today  -continue ENBREL and leflunomide 20mg poq day  Labs today and in 4 months  rtc in  4 months*

## 2017-04-12 NOTE — MR AVS SNAPSHOT
[facility] - [facility]umat[facility]  [address]  [place] [address]  Phone: [phone]  Fax: [phone]                  Silvia Cervantes   2017 11:00 AM   Office Visit    Description:  Female : 1960   Provider:  Magaly Rizo MD   Department:  [facility]           Reason for Visit     Follow-up           Diagnoses this Visit        Comments    Rheumatoid arthritis involving multiple joints    -  Primary     Immunosuppression                To Do List           Future Appointments        Provider Department Dept Phone    2017 11:45 AM LAB, SAME DAY [facility] [phone]    2017 12:00 PM LAB, SAME DAY [facility] [phone]    2017 8:40 AM Adrián Cade MD [facility] [phone]    2017 10:30 AM [facility] [phone]      Goals (5 Years of Data)     None       These Medications        Disp Refills Start End    leflunomide (ARAVA) 20 MG Tab 30 tablet 3 2017     Take 1 tablet (20 mg total) by mouth once daily. - Oral    Pharmacy: [id number] [place]  [address] [place] [address] #: [phone]         [facility]     [facility] Care [facility] - 24/ Assistance  Unless otherwise directed by your provider, please contact [facility], our nurse care line that is available for / assistance.     Registered nurses in the [facility] provide: appointment scheduling, clinical advisement, health education, and other advisory services.  Call: [phone] (toll free)               Medications           Message regarding Medications     Verify the changes and/or additions to your medication regime listed below are the same as discussed with your clinician today.  If any of these changes or additions are incorrect, please notify your healthcare provider.        CHANGE how you are taking these medications     Start Taking Instead of     leflunomide (ARAVA) 20 MG Tab leflunomide (ARAVA) 20 MG Tab    Dosage:  Take 1 tablet (20 mg total) by mouth once daily. Dosage:  TAKE ONE TABLET BY MOUTH ONCE DAILY.    Reason for Change:  Reorder            Verify that the below list of medications is an accurate representation of the medications you are currently taking.  If none reported, the list may be blank. If incorrect, please contact your healthcare provider. Carry this list with you in case of emergency.           Current Medications     albuterol 90 mcg/actuation inhaler Inhale 2 puffs into the lungs every 6 (six) hours as needed for Wheezing.    ascorbic acid, vitamin C, (VITAMIN C) 1000 MG tablet Take 1,000 mg by mouth once daily.    cycloSPORINE (RESTASIS) 0.05 % ophthalmic emulsion Place 0.05 mLs (1 drop total) into both eyes 2 (two) times daily.    efinaconazole (JUBLIA) 10 % Ayesha Apply to affected toenails daily as directed.    estradiol (ESTRACE) 0.01 % (0.1 mg/gram) vaginal cream Use 1 gram per vagina nightly x 2 weeks then 3 times per week    etanercept (ENBREL) 50 mg/mL (0.98 mL) injection Inject 1 mL (50 mg total) into the skin once a week.    ibuprofen (ADVIL,MOTRIN) 600 MG tablet Take 1 tablet (600 mg total) by mouth every 8 (eight) hours as needed for Pain. Take with food.    leflunomide (ARAVA) 20 MG Tab Take 1 tablet (20 mg total) by mouth once daily.    olmesartan-hydrochlorothiazide (BENICAR HCT) 40-12.5 mg Tab Take 1 tablet by mouth once daily.    omeprazole (PRILOSEC) 40 MG capsule Take 1 capsule (40 mg total) by mouth once daily.    ondansetron (ZOFRAN-ODT) 4 MG TbDL Take 2 tablets (8 mg total) by mouth every 8 (eight) hours as needed (nausea/vomiting).    paroxetine (PAXIL) 10 MG tablet Take 1 tablet (10 mg total) by mouth every morning.    triamcinolone acetonide 0.1% (KENALOG) 0.1 % cream Apply topically 2 (two) times daily.    UNABLE TO FIND medication name: Methyl Folate 400 mcg    valacyclovir (VALTREX) 1000 MG tablet Take 1  "tablet (1,000 mg total) by mouth every 12 (twelve) hours.           Clinical Reference Information           Your Vitals Were     BP Pulse Height Weight BMI    115/80 (BP Location: Left arm, Patient Position: Sitting, BP Method: Automatic) 89 5' 1" (1.549 m) 62.4 kg (137 lb 8 oz) 25.98 kg/m2      Blood Pressure          Most Recent Value    BP  115/80      Allergies as of 4/12/2017     No Known Allergies      Immunizations Administered on Date of Encounter - 4/12/2017     None      Orders Placed During Today's Visit     Future Labs/Procedures Expected by Expires    C-reactive protein  4/12/2017 6/11/2018    C-reactive protein  4/12/2017 6/11/2018    CBC auto differential  4/12/2017 6/11/2018    CBC auto differential  4/12/2017 6/11/2018    Comprehensive metabolic panel  4/12/2017 6/11/2018    Comprehensive metabolic panel  4/12/2017 6/11/2018    Quantiferon Gold TB  4/12/2017 6/11/2018    Sedimentation rate, manual  4/12/2017 6/11/2018    Sedimentation rate, manual  4/12/2017 6/11/2018      Language Assistance Services     ATTENTION: Language assistance services are available, free of charge. Please call [phone].      ATENCIÓN: Si habla español, tiene a mata disposición servicios gratuitos de asistencia lingüística. Llame al [phone].     CHÚ Ý: N?u b?n nói Ti?ng Vi?t, có các d?ch v? h? tr? ngôn ng? mi?n phí dành cho b?n. G?i s? [phone].         [facility] complies with applicable Federal civil rights laws and does not discriminate on the basis of race, color, national origin, age, disability, or sex.        "

## 2017-05-04 DIAGNOSIS — I10 ESSENTIAL HYPERTENSION: ICD-10-CM

## 2017-05-04 RX ORDER — OLMESARTAN MEDOXOMIL AND HYDROCHLOROTHIAZIDE 40/12.5 40; 12.5 MG/1; MG/1
1 TABLET ORAL DAILY
Qty: 90 TABLET | Refills: 1 | Status: SHIPPED | OUTPATIENT
Start: 2017-05-04 | End: 2017-05-09 | Stop reason: SDUPTHER

## 2017-05-04 NOTE — TELEPHONE ENCOUNTER
----- Message from Gracy Cordoba sent at 5/4/2017  8:52 AM CDT -----  Contact: Self/[phone]  Patient needs a refill on her BENICAR HCT 40-12.5 mg Tab. She has an appointment scheduled on 5/30/17. Please advise

## 2017-05-09 DIAGNOSIS — I10 ESSENTIAL HYPERTENSION: ICD-10-CM

## 2017-05-09 RX ORDER — OLMESARTAN MEDOXOMIL AND HYDROCHLOROTHIAZIDE 40/12.5 40; 12.5 MG/1; MG/1
1 TABLET ORAL DAILY
Qty: 90 TABLET | Refills: 5 | Status: SHIPPED | OUTPATIENT
Start: 2017-05-09 | End: 2017-05-30

## 2017-05-11 ENCOUNTER — OFFICE VISIT (OUTPATIENT)
Dept: PODIATRY | Facility: CLINIC | Age: 57
End: 2017-05-11
Payer: COMMERCIAL

## 2017-05-11 VITALS
SYSTOLIC BLOOD PRESSURE: 113 MMHG | BODY MASS INDEX: 25.97 KG/M2 | DIASTOLIC BLOOD PRESSURE: 78 MMHG | HEART RATE: 80 BPM | HEIGHT: 61 IN | WEIGHT: 137.56 LBS

## 2017-05-11 DIAGNOSIS — B35.1 ONYCHOMYCOSIS DUE TO DERMATOPHYTE: Primary | ICD-10-CM

## 2017-05-11 PROCEDURE — 3074F SYST BP LT 130 MM HG: CPT | Mod: S$GLB,,, | Performed by: PODIATRIST

## 2017-05-11 PROCEDURE — 99213 OFFICE O/P EST LOW 20 MIN: CPT | Mod: S$GLB,,, | Performed by: PODIATRIST

## 2017-05-11 PROCEDURE — 99999 PR PBB SHADOW E&M-EST. PATIENT-LVL III: CPT | Mod: PBBFAC,,, | Performed by: PODIATRIST

## 2017-05-11 PROCEDURE — 1160F RVW MEDS BY RX/DR IN RCRD: CPT | Mod: S$GLB,,, | Performed by: PODIATRIST

## 2017-05-11 PROCEDURE — 3078F DIAST BP <80 MM HG: CPT | Mod: S$GLB,,, | Performed by: PODIATRIST

## 2017-05-11 RX ORDER — KETOCONAZOLE 20 MG/G
CREAM TOPICAL DAILY
Qty: 1 TUBE | Refills: 3 | Status: SHIPPED | OUTPATIENT
Start: 2017-05-11 | End: 2018-07-18

## 2017-05-11 NOTE — MR AVS SNAPSHOT
[facility]   [place] [address]  Phone: [phone]                  Silvia Cervantes   2017 11:00 AM   Office Visit    Description:  Female : 1960   Provider:  Jose Cruz Beach DPM   Department:  [facility]           Reason for Visit     toenail fungus           Diagnoses this Visit        Comments    Onychomycosis due to dermatophyte    -  Primary            To Do List           Future Appointments        Provider Department Dept Phone    2017 9:40 AM Adrián Cade MD [facility] [phone]    2017 10:30 AM LAB, [facility] [phone]      Goals (5 Years of Data)     None      Follow-Up and Disposition     Return if symptoms worsen or fail to improve.       These Medications        Disp Refills Start End    ketoconazole (NIZORAL) 2 % cream 1 Tube 3 2017     Apply topically once daily. - Topical (Top)    Pharmacy: [id number] - [place]  [address] [facility] Ph #: [phone]         [facility]     [facility] Care [facility] -  Assistance  Unless otherwise directed by your provider, please contact [facility], our nurse care line that is available for  assistance.     Registered nurses in the [facility] provide: appointment scheduling, clinical advisement, health education, and other advisory services.  Call: [phone] (toll free)               Medications           Message regarding Medications     Verify the changes and/or additions to your medication regime listed below are the same as discussed with your clinician today.  If any of these changes or additions are incorrect, please notify your healthcare provider.        START taking these NEW medications        Refills    ketoconazole (NIZORAL) 2 % cream 3    Sig: Apply topically once daily.    Class: Normal    Route: Topical (Top)      STOP taking these medications     efinaconazole (JUBLIA) 10 %  Ayesha Apply to affected toenails daily as directed.           Verify that the below list of medications is an accurate representation of the medications you are currently taking.  If none reported, the list may be blank. If incorrect, please contact your healthcare provider. Carry this list with you in case of emergency.           Current Medications     albuterol 90 mcg/actuation inhaler Inhale 2 puffs into the lungs every 6 (six) hours as needed for Wheezing.    ascorbic acid, vitamin C, (VITAMIN C) 1000 MG tablet Take 1,000 mg by mouth once daily.    cycloSPORINE (RESTASIS) 0.05 % ophthalmic emulsion Place 0.05 mLs (1 drop total) into both eyes 2 (two) times daily.    estradiol (ESTRACE) 0.01 % (0.1 mg/gram) vaginal cream Use 1 gram per vagina nightly x 2 weeks then 3 times per week    etanercept (ENBREL) 50 mg/mL (0.98 mL) injection Inject 1 mL (50 mg total) into the skin once a week.    ibuprofen (ADVIL,MOTRIN) 600 MG tablet Take 1 tablet (600 mg total) by mouth every 8 (eight) hours as needed for Pain. Take with food.    leflunomide (ARAVA) 20 MG Tab Take 1 tablet (20 mg total) by mouth once daily.    olmesartan-hydrochlorothiazide (BENICAR HCT) 40-12.5 mg Tab Take 1 tablet by mouth once daily.    omeprazole (PRILOSEC) 40 MG capsule Take 1 capsule (40 mg total) by mouth once daily.    ondansetron (ZOFRAN-ODT) 4 MG TbDL Take 2 tablets (8 mg total) by mouth every 8 (eight) hours as needed (nausea/vomiting).    paroxetine (PAXIL) 10 MG tablet Take 1 tablet (10 mg total) by mouth every morning.    triamcinolone acetonide 0.1% (KENALOG) 0.1 % cream Apply topically 2 (two) times daily.    UNABLE TO FIND medication name: Methyl Folate 400 mcg    ketoconazole (NIZORAL) 2 % cream Apply topically once daily.    valacyclovir (VALTREX) 1000 MG tablet Take 1 tablet (1,000 mg total) by mouth every 12 (twelve) hours.           Clinical Reference Information           Your Vitals Were     BP Pulse Height Weight BMI    113/78  "80 5' 1" (1.549 m) 62.4 kg (137 lb 9.1 oz) 25.99 kg/m2      Blood Pressure          Most Recent Value    BP  113/78      Allergies as of 5/11/2017     No Known Allergies      Immunizations Administered on Date of Encounter - 5/11/2017     None      Instructions           Language Assistance Services     ATTENTION: Language assistance services are available, free of charge. Please call [phone].      ATENCIÓN: Si habla español, tiene a mata disposición servicios gratuitos de asistencia lingüística. Llame al [phone].     CHÚ Ý: N?u b?n nói Ti?ng Vi?t, có các d?ch v? h? tr? ngôn ng? mi?n phí dành cho b?n. G?i s? [phone].         [facility] complies with applicable Federal civil rights laws and does not discriminate on the basis of race, color, national origin, age, disability, or sex.        "

## 2017-05-12 NOTE — PROGRESS NOTES
"Subjective:      Patient ID: Silvia Cervantes is a 56 y.o. female.    Chief Complaint: toenail fungus    Presents for follow up of nail fungus to both big toes. Applying Jublia as prescribed over the last year +. Notes some improvement overall but not much. Complains of mild pain in right big toe nail margin same as before she occasionally get an ingrown in this area that she trims back as needed. Denies trauma. No other complains today.     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)        Past Surgical History:   Procedure Laterality Date     SECTION      HYSTERECTOMY      TUBAL LIGATION         Family History   Problem Relation Age of Onset    Diabetes Father     Hypertension Father     Cataracts Father     Heart disease Father     Hypertension Mother     Cataracts Mother     Stroke Brother     Hypertension Brother     Melanoma Neg Hx     Breast cancer Neg Hx     Colon cancer Neg Hx     Ovarian cancer Neg Hx     Blindness Neg Hx     Glaucoma Neg Hx        Social History     Social History    Marital status:      Spouse name: N/A    Number of children: N/A    Years of education: N/A     Social History Main Topics    Smoking status: Never Smoker    Smokeless tobacco: Never Used    Alcohol use Yes      Comment: Rarely    Drug use: None    Sexual activity: Yes     Partners: Male     Birth control/ protection: Surgical      Comment: hyst     Other Topics Concern    None     Social History Narrative       Current Outpatient Prescriptions   Medication Sig Dispense Refill    albuterol 90 " mcg/actuation inhaler Inhale 2 puffs into the lungs every 6 (six) hours as needed for Wheezing. 18 g 0    ascorbic acid, vitamin C, (VITAMIN C) 1000 MG tablet Take 1,000 mg by mouth once daily.      cycloSPORINE (RESTASIS) 0.05 % ophthalmic emulsion Place 0.05 mLs (1 drop total) into both eyes 2 (two) times daily. 90 vial 11    estradiol (ESTRACE) 0.01 % (0.1 mg/gram) vaginal cream Use 1 gram per vagina nightly x 2 weeks then 3 times per week 42.5 g 1    etanercept (ENBREL) 50 mg/mL (0.98 mL) injection Inject 1 mL (50 mg total) into the skin once a week. 4 mL 11    ibuprofen (ADVIL,MOTRIN) 600 MG tablet Take 1 tablet (600 mg total) by mouth every 8 (eight) hours as needed for Pain. Take with food. 10 tablet 0    leflunomide (ARAVA) 20 MG Tab Take 1 tablet (20 mg total) by mouth once daily. 30 tablet 3    olmesartan-hydrochlorothiazide (BENICAR HCT) 40-12.5 mg Tab Take 1 tablet by mouth once daily. 90 tablet 5    omeprazole (PRILOSEC) 40 MG capsule Take 1 capsule (40 mg total) by mouth once daily. 30 capsule 2    ondansetron (ZOFRAN-ODT) 4 MG TbDL Take 2 tablets (8 mg total) by mouth every 8 (eight) hours as needed (nausea/vomiting). 20 tablet 1    paroxetine (PAXIL) 10 MG tablet Take 1 tablet (10 mg total) by mouth every morning. 30 tablet 2    triamcinolone acetonide 0.1% (KENALOG) 0.1 % cream Apply topically 2 (two) times daily. 80 g 1    UNABLE TO FIND medication name: Methyl Folate 400 mcg      ketoconazole (NIZORAL) 2 % cream Apply topically once daily. 1 Tube 3    valacyclovir (VALTREX) 1000 MG tablet Take 1 tablet (1,000 mg total) by mouth every 12 (twelve) hours. 4 tablet 0     No current facility-administered medications for this visit.        Review of patient's allergies indicates:  No Known Allergies    Review of Systems   Constitution: Negative for chills, fever, weakness and malaise/fatigue.   Cardiovascular: Negative for chest pain, claudication and leg swelling.   Respiratory: Negative  for cough and shortness of breath.    Skin: Positive for nail changes. Negative for dry skin, itching and rash.   Musculoskeletal: Positive for arthritis and back pain. Negative for muscle cramps and muscle weakness.   Gastrointestinal: Negative for nausea and vomiting.   Neurological: Negative for numbness and paresthesias.   Psychiatric/Behavioral: Negative for altered mental status.           Objective:      Physical Exam   Constitutional: She is oriented to person, place, and time. She appears well-developed and well-nourished. No distress.   Cardiovascular: Intact distal pulses.    Pulses:       Dorsalis pedis pulses are 2+ on the right side, and 2+ on the left side.        Posterior tibial pulses are 2+ on the right side, and 2+ on the left side.   Musculoskeletal:   Flexible cavus foot structure bilateral foot. Mild hallux valgus bilateral foot. Mild forefoot valgus with gastrocnemius equinus bilateral foot. No pain with ROM or MMT bilateral foot.   Neurological: She is alert and oriented to person, place, and time. She has normal strength. No sensory deficit.   Skin: Skin is warm, dry and intact. No ecchymosis and no rash noted. She is not diaphoretic. No cyanosis or erythema. No pallor. Nails show no clubbing.   Left hallux nail is thickened, distal 1/4 is yellow with loosening and moderate debris. The right hallux is slightly thickened with yellow discoloration and mild debris at distal lateral border. The hallux lateral nail border bilateral is moderately incurvated at distal border with mild localized pain on palpation. No open lesions or macerations bilateral lower extremity.       May 2017          January 2016              Assessment:       Encounter Diagnosis   Name Primary?    Onychomycosis due to dermatophyte Yes         Plan:       Silvia was seen today for toenail fungus.    Diagnoses and all orders for this visit:    Onychomycosis due to dermatophyte    Other orders  -     ketoconazole (NIZORAL)  2 % cream; Apply topically once daily.      I counseled the patient on her conditions, their implications and medical management.      Discussed treatment options for fungal toenails to include topical vs oral vs laser vs combined therapy in detail.    With patient's consent a sterile nail nipper was used to trim the hallux nail bilateral. She tolerated the procedure well without complication.  Discussed that if she continues to have problems with ingrown nails she should return for a procedure to permanently remove the ingrown portion of the nail, she will consider this.    She has been on the Jublia over a year with minimal improvement, she can discontinue the jublia. Her main complaint is itching to the area of the right great toe. Prescribed ketoconazole to be applied twice a day to the skin surrounding the area. She can try using OTC kerasal to improve the thickness and color of the nail.     Philip Santos DPM PGY-3  Pager [phone]    I have personally taken the history and examined this patient and agree with the resident's note as stated as above.   Jose Cruz Beach DPM, FACFAS        .

## 2017-05-30 ENCOUNTER — HOSPITAL ENCOUNTER (OUTPATIENT)
Dept: RADIOLOGY | Facility: HOSPITAL | Age: 57
Discharge: HOME | End: 2017-05-30
Attending: FAMILY MEDICINE
Payer: COMMERCIAL

## 2017-05-30 ENCOUNTER — OFFICE VISIT (OUTPATIENT)
Dept: FAMILY MEDICINE | Facility: CLINIC | Age: 57
End: 2017-05-30
Payer: COMMERCIAL

## 2017-05-30 ENCOUNTER — TELEPHONE (OUTPATIENT)
Dept: FAMILY MEDICINE | Facility: CLINIC | Age: 57
End: 2017-05-30

## 2017-05-30 VITALS
HEART RATE: 85 BPM | WEIGHT: 138.25 LBS | DIASTOLIC BLOOD PRESSURE: 86 MMHG | SYSTOLIC BLOOD PRESSURE: 126 MMHG | OXYGEN SATURATION: 97 % | BODY MASS INDEX: 26.1 KG/M2 | HEIGHT: 61 IN

## 2017-05-30 DIAGNOSIS — M54.50 CHRONIC LEFT-SIDED LOW BACK PAIN WITHOUT SCIATICA: ICD-10-CM

## 2017-05-30 DIAGNOSIS — M06.9 RHEUMATOID ARTHRITIS INVOLVING LEFT HAND, UNSPECIFIED RHEUMATOID FACTOR PRESENCE: ICD-10-CM

## 2017-05-30 DIAGNOSIS — E78.00 HYPERCHOLESTEREMIA: ICD-10-CM

## 2017-05-30 DIAGNOSIS — G89.29 CHRONIC LEFT-SIDED LOW BACK PAIN WITHOUT SCIATICA: ICD-10-CM

## 2017-05-30 DIAGNOSIS — I10 ESSENTIAL HYPERTENSION: Primary | ICD-10-CM

## 2017-05-30 PROCEDURE — 72202 X-RAY EXAM SI JOINTS 3/> VWS: CPT | Mod: 26,,, | Performed by: RADIOLOGY

## 2017-05-30 PROCEDURE — 72110 X-RAY EXAM L-2 SPINE 4/>VWS: CPT | Mod: TC

## 2017-05-30 PROCEDURE — 99214 OFFICE O/P EST MOD 30 MIN: CPT | Mod: S$GLB,,, | Performed by: FAMILY MEDICINE

## 2017-05-30 PROCEDURE — 99999 PR PBB SHADOW E&M-EST. PATIENT-LVL III: CPT | Mod: PBBFAC,,, | Performed by: FAMILY MEDICINE

## 2017-05-30 PROCEDURE — 72202 X-RAY EXAM SI JOINTS 3/> VWS: CPT | Mod: TC

## 2017-05-30 PROCEDURE — 72110 X-RAY EXAM L-2 SPINE 4/>VWS: CPT | Mod: 26,,, | Performed by: RADIOLOGY

## 2017-05-30 RX ORDER — LOSARTAN POTASSIUM AND HYDROCHLOROTHIAZIDE 12.5; 5 MG/1; MG/1
1 TABLET ORAL DAILY
Qty: 90 TABLET | Refills: 3 | Status: SHIPPED | OUTPATIENT
Start: 2017-05-30 | End: 2017-09-26 | Stop reason: SDUPTHER

## 2017-05-30 RX ORDER — IBUPROFEN 600 MG/1
600 TABLET ORAL EVERY 8 HOURS PRN
Qty: 30 TABLET | Refills: 0 | Status: SHIPPED | OUTPATIENT
Start: 2017-05-30 | End: 2018-04-18

## 2017-05-30 NOTE — TELEPHONE ENCOUNTER
----- Message from Adrián Cade MD sent at 5/30/2017 11:12 AM CDT -----  Call    X ray showed degeneration - continue heat pads and ibuprofen as needed

## 2017-05-30 NOTE — PROGRESS NOTES
Subjective:       Patient ID: Silvia Cervantes is a 56 y.o. female.    Chief Complaint: Follow-up and Back Pain    56 yr old pleasant  female with allergies, HTN, HLD, Rheumatoid arthritis, Erosive gastritis, and other co morbidities presents today for her 4 week follow up. C/o low back pain. Onset 2 months ago and gradually worsening - left lower back- does not radiate and no neurological symptoms. Nos addle anesthesia or bladder/bowel problems. Details as follows -      HTN - controlled - on Benicar-HCT - compliant - no side effects       HLD - diet controlled -  LDLCALC                  151.2               12/22/2015      - lab due       RA - on Enbrel shots - follows Rheumatology - stable      Gastritis - stable - on PPI as needed - no side effects      History as below - reviewed       HM  -labs UTD except lipids  -vaccines due      Hypertension   This is a chronic problem. The current episode started more than 1 year ago. The problem has been gradually improving since onset. The problem is controlled. Pertinent negatives include no chest pain, headaches, malaise/fatigue, palpitations or shortness of breath. There are no associated agents to hypertension. Risk factors for coronary artery disease include dyslipidemia and post-menopausal state. Past treatments include angiotensin blockers and diuretics. The current treatment provides significant improvement. There are no compliance problems.  There is no history of angina, CAD/MI, CVA, left ventricular hypertrophy, PVD or retinopathy. There is no history of chronic renal disease, coarctation of the aorta, hypercortisolism, hyperparathyroidism, a hypertension causing med or sleep apnea.   Hyperlipidemia   This is a chronic problem. The current episode started more than 1 year ago. The problem is controlled. Recent lipid tests were reviewed and are normal. She has no history of chronic renal disease, diabetes, hypothyroidism or liver disease. There are no  known factors aggravating her hyperlipidemia. Associated symptoms include myalgias. Pertinent negatives include no chest pain, focal sensory loss, focal weakness, leg pain or shortness of breath. She is currently on no antihyperlipidemic treatment. The current treatment provides mild improvement of lipids. There are no compliance problems.  Risk factors for coronary artery disease include dyslipidemia, hypertension and post-menopausal.   Back Pain   This is a chronic problem. The current episode started more than 1 month ago. The problem occurs constantly. The problem is unchanged. The pain is present in the sacro-iliac and lumbar spine. The quality of the pain is described as aching. The pain does not radiate. The symptoms are aggravated by bending, sitting and twisting. Pertinent negatives include no chest pain, dysuria, headaches, leg pain, perianal numbness or weight loss. She has tried nothing for the symptoms. The treatment provided no relief.     Review of Systems   Constitutional: Negative.  Negative for activity change, diaphoresis, malaise/fatigue, unexpected weight change and weight loss.   HENT: Negative.  Negative for congestion, ear discharge, hearing loss, rhinorrhea, sore throat and voice change.    Eyes: Negative.  Negative for pain, discharge and visual disturbance.   Respiratory: Negative.  Negative for chest tightness, shortness of breath and wheezing.    Cardiovascular: Negative.  Negative for chest pain and palpitations.   Gastrointestinal: Negative.  Negative for abdominal distention, anal bleeding, constipation and nausea.   Endocrine: Negative.  Negative for cold intolerance, polydipsia and polyuria.   Genitourinary: Negative.  Negative for decreased urine volume, difficulty urinating, dysuria, frequency, menstrual problem and vaginal pain.   Musculoskeletal: Positive for arthralgias, back pain and myalgias. Negative for gait problem.   Skin: Negative.  Negative for color change, pallor and  wound.   Allergic/Immunologic: Negative.  Negative for environmental allergies and immunocompromised state.   Neurological: Negative.  Negative for dizziness, tremors, focal weakness, seizures, speech difficulty and headaches.   Hematological: Negative.  Negative for adenopathy. Does not bruise/bleed easily.   Psychiatric/Behavioral: Negative.  Negative for agitation, confusion, decreased concentration, hallucinations, self-injury and suicidal ideas. The patient is not nervous/anxious.        Objective:      Physical Exam   Constitutional: She is oriented to person, place, and time. She appears well-developed and well-nourished. No distress.   HENT:   Head: Normocephalic and atraumatic.   Right Ear: External ear normal.   Left Ear: External ear normal.   Nose: Nose normal.   Mouth/Throat: Oropharynx is clear and moist. No oropharyngeal exudate.   Eyes: Conjunctivae and EOM are normal. Pupils are equal, round, and reactive to light. Right eye exhibits no discharge. Left eye exhibits no discharge. No scleral icterus.   Neck: Normal range of motion. Neck supple. No JVD present. No tracheal deviation present. No thyromegaly present.   Cardiovascular: Normal rate, regular rhythm, normal heart sounds and intact distal pulses.  Exam reveals no gallop and no friction rub.    No murmur heard.  Pulmonary/Chest: Effort normal and breath sounds normal. No stridor. She has no wheezes. She has no rales. She exhibits no tenderness.   Abdominal: Soft. Bowel sounds are normal. She exhibits no distension and no mass. There is no tenderness. There is no rebound and no guarding. No hernia.   Musculoskeletal: Normal range of motion. She exhibits tenderness (TTP left sacroiliac area. SLRT negative.). She exhibits no edema.   Lymphadenopathy:     She has no cervical adenopathy.   Neurological: She is alert and oriented to person, place, and time. She has normal reflexes. She displays normal reflexes. No cranial nerve deficit. She exhibits  normal muscle tone. Coordination normal.   Skin: Skin is warm and dry. No rash noted. She is not diaphoretic. No erythema. No pallor.   Psychiatric: She has a normal mood and affect. Her behavior is normal. Judgment and thought content normal.       Assessment:       1. Essential hypertension    2. Rheumatoid arthritis involving left hand, unspecified rheumatoid factor presence    3. Hypercholesteremia    4. Chronic left-sided low back pain without sciatica        Plan:       Silvia was seen today for follow-up and back pain.    Diagnoses and all orders for this visit:    Essential hypertension  -     losartan-hydrochlorothiazide 50-12.5 mg (HYZAAR) 50-12.5 mg per tablet; Take 1 tablet by mouth once daily.    Rheumatoid arthritis involving left hand, unspecified rheumatoid factor presence    Hypercholesteremia    Chronic left-sided low back pain without sciatica  -     X-Ray Lumbar Spine Complete 5 View; Future  -     X-Ray Sacroiliac Joints Complete; Future  -     ibuprofen (ADVIL,MOTRIN) 600 MG tablet; Take 1 tablet (600 mg total) by mouth every 8 (eight) hours as needed for Pain. Take with food.      Chronic low back pain  -likely muscular strain  -x rays  -heat pads, NSAIDS prn    HLD  -diet control  -labs    HTN  -controlled    RA  -stable  -follow rheumatology        Spent adequate time in obtaining history and explaining differentials    40 minutes spent during this visit of which greater than 50% devoted to face-face counseling and coordination of care regarding diagnosis and management plan    Return in about 3 months (around 8/30/2017), or if symptoms worsen or fail to improve.

## 2017-06-21 ENCOUNTER — TELEPHONE (OUTPATIENT)
Dept: FAMILY MEDICINE | Facility: CLINIC | Age: 57
End: 2017-06-21
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
## 2017-08-28 ENCOUNTER — TELEPHONE (OUTPATIENT)
Dept: FAMILY MEDICINE | Facility: CLINIC | Age: 57
End: 2017-08-28

## 2017-08-28 NOTE — TELEPHONE ENCOUNTER
----- Message from April Rushing sent at 8/28/2017  1:52 PM CDT -----  Contact: daughter/Nissa  [phone]  Pt daughter would like to know if you received the pt medical records from [facility] and [facility].  Please call and advise

## 2017-08-28 NOTE — TELEPHONE ENCOUNTER
Spoke to pt's daughter, Nissa and informed her that Dr. Cade has not received any medical records.

## 2017-08-28 NOTE — TELEPHONE ENCOUNTER
----- Message from Mitra Feng sent at 8/28/2017  1:43 PM CDT -----  Contact: [phone]/Ext Addie/ [facility]   Patient requesting to speak with you regarding  Evaluation. Please advise.

## 2017-08-29 ENCOUNTER — TELEPHONE (OUTPATIENT)
Dept: FAMILY MEDICINE | Facility: CLINIC | Age: 57
End: 2017-08-29

## 2017-08-29 DIAGNOSIS — Z78.9: Primary | ICD-10-CM

## 2017-08-29 NOTE — TELEPHONE ENCOUNTER
Spoke to [place] and needs a evaluation  for Transportation and assistance. [facility] fax order to  [phone] Attn: [place]

## 2017-08-29 NOTE — TELEPHONE ENCOUNTER
----- Message from Leda Mcguire sent at 8/29/2017 11:47 AM CDT -----  Contact: delma with [facility] [phone] fax    Ms delma is requesting orders for transportation

## 2017-08-31 ENCOUNTER — TELEPHONE (OUTPATIENT)
Dept: FAMILY MEDICINE | Facility: CLINIC | Age: 57
End: 2017-08-31

## 2017-08-31 NOTE — TELEPHONE ENCOUNTER
----- Message from Mitra Feng sent at 8/31/2017 11:44 AM CDT -----  Contact: [place] from [facility] [phone]  [place] stated that pt's insurance does cover  and transportation. Please advise.

## 2017-09-07 ENCOUNTER — TELEPHONE (OUTPATIENT)
Dept: FAMILY MEDICINE | Facility: CLINIC | Age: 57
End: 2017-09-07

## 2017-09-07 NOTE — TELEPHONE ENCOUNTER
----- Message from Mitra Feng sent at 9/7/2017  3:53 PM CDT -----  Contact: [phone]/self  Patient called in requesting to speak with you. Patient prefers to speak with a nurse. Please advise.
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
## 2017-09-08 ENCOUNTER — PATIENT MESSAGE (OUTPATIENT)
Dept: FAMILY MEDICINE | Facility: CLINIC | Age: 57
End: 2017-09-08

## 2017-09-11 ENCOUNTER — PATIENT MESSAGE (OUTPATIENT)
Dept: FAMILY MEDICINE | Facility: CLINIC | Age: 57
End: 2017-09-11

## 2017-09-11 ENCOUNTER — TELEPHONE (OUTPATIENT)
Dept: FAMILY MEDICINE | Facility: CLINIC | Age: 57
End: 2017-09-11

## 2017-09-11 NOTE — TELEPHONE ENCOUNTER
----- Message from Leda Mcguire sent at 9/11/2017  9:30 AM CDT -----  Contact: [phone]  Patient would like to speak with you regarding her fmla

## 2017-09-14 ENCOUNTER — TELEPHONE (OUTPATIENT)
Dept: FAMILY MEDICINE | Facility: CLINIC | Age: 57
End: 2017-09-14

## 2017-09-14 NOTE — TELEPHONE ENCOUNTER
----- Message from Delia Joshi sent at 9/14/2017  8:21 AM CDT -----  Shelly with [facility] called.    No. [phone]   Patient has a swollen lymph  node on the right side of her neck, and she has a sore throat.   Please order labs for today.    Fax no. [phone]

## 2017-09-14 NOTE — TELEPHONE ENCOUNTER
Informed Leslie with Home [facility] that pt needs an appt to determine why her lymph nodes are enlarged.

## 2017-09-14 NOTE — TELEPHONE ENCOUNTER
----- Message from Leda Mcguire sent at 9/14/2017 12:54 PM CDT -----  Contact: ms palma with [facility] [phone]  Ms palma states patient has swelling in the right side of jaw, it could an abscess as ms palma

## 2017-09-26 ENCOUNTER — TELEPHONE (OUTPATIENT)
Dept: FAMILY MEDICINE | Facility: CLINIC | Age: 57
End: 2017-09-26

## 2017-09-26 DIAGNOSIS — I10 ESSENTIAL HYPERTENSION: Primary | ICD-10-CM

## 2017-09-26 RX ORDER — LOSARTAN POTASSIUM AND HYDROCHLOROTHIAZIDE 12.5; 5 MG/1; MG/1
1 TABLET ORAL DAILY
Qty: 90 TABLET | Refills: 3 | Status: SHIPPED | OUTPATIENT
Start: 2017-09-26 | End: 2018-03-07

## 2017-09-26 NOTE — TELEPHONE ENCOUNTER
----- Message from April Rushing sent at 9/26/2017 11:33 AM CDT -----  Contact: [phone]/self  Pt requesting to speak with the nurse concerning her blood pressure being high.  Please call and advise

## 2017-09-26 NOTE — TELEPHONE ENCOUNTER
s she not taking losartan-HCTZ 50-12.5 mg daily - if she is taking - she can double up n see if that helps

## 2017-09-26 NOTE — TELEPHONE ENCOUNTER
She needs a refill of losartan-HCTZ 50-12.5 mg daily sent to [facility]. She stopped when she was in hospital.

## 2017-09-26 NOTE — TELEPHONE ENCOUNTER
Informed pt she can start HYZAAR for now and use propranolol for emergency raised BP. Pt wants to know if she still has to take the Losartan/HCTZ that was sent to the pharmacy today, also. Pls advise.

## 2017-09-26 NOTE — TELEPHONE ENCOUNTER
Spoke to pt and states that her BP was 160/120 when her therapist came this morning. Pt took a Propranolol after her BP reading at 7:30am. Pt took another Propanolol around 11:30, but has not rechecked her BP due to no machine. Pls advise.

## 2017-09-27 ENCOUNTER — TELEPHONE (OUTPATIENT)
Dept: FAMILY MEDICINE | Facility: CLINIC | Age: 57
End: 2017-09-27

## 2017-09-27 NOTE — TELEPHONE ENCOUNTER
----- Message from Shaina Mcfarlane sent at 9/27/2017  1:16 PM CDT -----  Contact: [facility]/ [place]/[phone]  They are calling to record that her bp is 154/100.

## 2017-10-02 ENCOUNTER — TELEPHONE (OUTPATIENT)
Dept: FAMILY MEDICINE | Facility: CLINIC | Age: 57
End: 2017-10-02

## 2017-10-02 NOTE — TELEPHONE ENCOUNTER
----- Message from Radha Acuna sent at 10/2/2017 10:32 AM CDT -----  Contact: Edward from [facility] [phone]  Edward would like to get a verbal order to continue physical therapy for this patient. Please advise.

## 2017-10-02 NOTE — TELEPHONE ENCOUNTER
----- Message from Shauna Foley MA sent at 10/2/2017 10:39 AM CDT -----  Contact: Edward from [facility] [phone]      ----- Message -----  From: Radha Acuna  Sent: 10/2/2017  10:32 AM  To: [facility] Staff    Edward would like to get a verbal order to continue physical therapy for this patient. Please advise.

## 2017-10-05 ENCOUNTER — OFFICE VISIT (OUTPATIENT)
Dept: FAMILY MEDICINE | Facility: CLINIC | Age: 57
End: 2017-10-05
Attending: FAMILY MEDICINE
Payer: COMMERCIAL

## 2017-10-05 VITALS
DIASTOLIC BLOOD PRESSURE: 89 MMHG | BODY MASS INDEX: 26.06 KG/M2 | TEMPERATURE: 98 F | WEIGHT: 138 LBS | HEIGHT: 61 IN | SYSTOLIC BLOOD PRESSURE: 139 MMHG | HEART RATE: 89 BPM

## 2017-10-05 DIAGNOSIS — F33.9 MAJOR DEPRESSION, RECURRENT, CHRONIC: ICD-10-CM

## 2017-10-05 DIAGNOSIS — L80 VITILIGO: ICD-10-CM

## 2017-10-05 DIAGNOSIS — M06.9 RHEUMATOID ARTHRITIS INVOLVING LEFT HAND, UNSPECIFIED RHEUMATOID FACTOR PRESENCE: ICD-10-CM

## 2017-10-05 DIAGNOSIS — I10 ESSENTIAL HYPERTENSION: Primary | ICD-10-CM

## 2017-10-05 DIAGNOSIS — R42 VERTIGO: ICD-10-CM

## 2017-10-05 PROCEDURE — 99999 PR PBB SHADOW E&M-EST. PATIENT-LVL III: CPT | Mod: PBBFAC,,, | Performed by: FAMILY MEDICINE

## 2017-10-05 PROCEDURE — 99214 OFFICE O/P EST MOD 30 MIN: CPT | Mod: S$GLB,,, | Performed by: FAMILY MEDICINE

## 2017-10-05 RX ORDER — ASPIRIN 81 MG/1
81 TABLET ORAL DAILY
COMMUNITY
End: 2023-04-03

## 2017-10-05 RX ORDER — DOCUSATE SODIUM 100 MG/1
100 CAPSULE, LIQUID FILLED ORAL 2 TIMES DAILY
COMMUNITY
End: 2018-03-07

## 2017-10-05 RX ORDER — MIRTAZAPINE 15 MG/1
15 TABLET, ORALLY DISINTEGRATING ORAL NIGHTLY
COMMUNITY
End: 2018-03-07

## 2017-10-05 RX ORDER — PANTOPRAZOLE SODIUM 40 MG/1
40 TABLET, DELAYED RELEASE ORAL DAILY
COMMUNITY
End: 2018-03-07

## 2017-10-05 RX ORDER — FLUTICASONE PROPIONATE 50 MCG
1 SPRAY, SUSPENSION (ML) NASAL DAILY
COMMUNITY
End: 2018-03-07

## 2017-10-05 RX ORDER — GABAPENTIN 800 MG/1
800 TABLET ORAL 3 TIMES DAILY
COMMUNITY
End: 2018-04-18

## 2017-10-05 RX ORDER — HYDROXYZINE HYDROCHLORIDE 25 MG/1
25 TABLET, FILM COATED ORAL 3 TIMES DAILY
COMMUNITY
End: 2018-03-07

## 2017-10-05 RX ORDER — MECLIZINE HCL 12.5 MG 12.5 MG/1
12.5 TABLET ORAL 3 TIMES DAILY PRN
COMMUNITY
End: 2017-10-05 | Stop reason: SDUPTHER

## 2017-10-05 RX ORDER — LEVOFLOXACIN 500 MG/1
500 TABLET, FILM COATED ORAL
COMMUNITY
End: 2018-03-07

## 2017-10-05 RX ORDER — MECLIZINE HCL 12.5 MG 12.5 MG/1
12.5 TABLET ORAL 2 TIMES DAILY PRN
Qty: 30 TABLET | Refills: 2 | Status: SHIPPED | OUTPATIENT
Start: 2017-10-05 | End: 2018-03-07

## 2017-10-05 RX ORDER — AMLODIPINE BESYLATE 5 MG/1
5 TABLET ORAL DAILY
COMMUNITY
End: 2018-05-08 | Stop reason: SDUPTHER

## 2017-10-05 RX ORDER — ONDANSETRON 4 MG/1
8 TABLET, ORALLY DISINTEGRATING ORAL
COMMUNITY
End: 2018-03-07
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
## 2017-10-13 ENCOUNTER — TELEPHONE (OUTPATIENT)
Dept: FAMILY MEDICINE | Facility: CLINIC | Age: 57
End: 2017-10-13

## 2017-10-13 NOTE — TELEPHONE ENCOUNTER
----- Message from Brenda Mcmillan sent at 10/13/2017  2:24 PM CDT -----  Contact: [phone]/Edward with [facility] physical therapy   Mr Leon its requesting verbal orders to continue at home physical therapy for three more weeks . Please advise

## 2017-10-13 NOTE — TELEPHONE ENCOUNTER
Edward with [facility] requesting 3 additional weeks for Physical Therapy. Pls advise.

## 2017-10-17 ENCOUNTER — TELEPHONE (OUTPATIENT)
Dept: FAMILY MEDICINE | Facility: CLINIC | Age: 57
End: 2017-10-17

## 2017-10-17 DIAGNOSIS — R29.898 WEAKNESS OF BOTH LOWER EXTREMITIES: Primary | ICD-10-CM

## 2017-10-17 NOTE — TELEPHONE ENCOUNTER
Edward with [facility] is requesting a referral for Outpatient Physical Therapy. Pls advise.

## 2017-10-20 ENCOUNTER — CLINICAL SUPPORT (OUTPATIENT)
Dept: REHABILITATION | Facility: HOSPITAL | Age: 57
End: 2017-10-20
Attending: FAMILY MEDICINE
Payer: COMMERCIAL

## 2017-10-20 DIAGNOSIS — Z74.09 DECREASED MOBILITY AND ENDURANCE: ICD-10-CM

## 2017-10-20 DIAGNOSIS — M25.669 DECREASED ROM OF LOWER EXTREMITY: ICD-10-CM

## 2017-10-20 DIAGNOSIS — R41.840 DECREASED ATTENTION SPAN: ICD-10-CM

## 2017-10-20 DIAGNOSIS — M62.81 DECREASED MUSCLE STRENGTH: ICD-10-CM

## 2017-10-20 PROCEDURE — 97163 PT EVAL HIGH COMPLEX 45 MIN: CPT | Mod: PN

## 2017-10-20 PROCEDURE — 97150 GROUP THERAPEUTIC PROCEDURES: CPT | Mod: PN

## 2017-10-20 PROCEDURE — 97530 THERAPEUTIC ACTIVITIES: CPT | Mod: PN
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
## 2017-10-23 ENCOUNTER — CLINICAL SUPPORT (OUTPATIENT)
Dept: REHABILITATION | Facility: HOSPITAL | Age: 57
End: 2017-10-23
Attending: FAMILY MEDICINE
Payer: COMMERCIAL

## 2017-10-23 DIAGNOSIS — M25.669 DECREASED ROM OF LOWER EXTREMITY: ICD-10-CM

## 2017-10-23 DIAGNOSIS — Z74.09 DECREASED MOBILITY AND ENDURANCE: ICD-10-CM

## 2017-10-23 DIAGNOSIS — R41.840 DECREASED ATTENTION SPAN: ICD-10-CM

## 2017-10-23 DIAGNOSIS — M62.81 DECREASED MUSCLE STRENGTH: ICD-10-CM

## 2017-10-23 PROCEDURE — 97112 NEUROMUSCULAR REEDUCATION: CPT | Mod: PN

## 2017-10-23 PROCEDURE — 97110 THERAPEUTIC EXERCISES: CPT | Mod: PN

## 2017-10-23 NOTE — PROGRESS NOTES
"TIME RECORD    Date:  10/23/2017    Start Time:  5:00  Stop Time:  6:00    PROCEDURES:    TIMED   Procedure Min.   TE 45   NMR 15                 UNTIMED  Procedure Min.             Total Timed Minutes:  60  Total Timed Units:  4  Total Untimed Units:  0  Charges Billed/# of units:  1NMR, 3 TE      Progress/Current Status    Subjective:     Patient ID: Silvia Cervantes is a 56 y.o. female.  Diagnosis: No diagnosis found.  Pain: pt states she "gets weak" due to B LE fatigue. She is agreeable to PT session.     Objective:   Pt initiated session on Sci fit stepper x 6 min for B LE ROM with min resistance of Level 2.0. Pt then taken to Low mat , transferred sit to stand from wheelchair w/ CGA to EOM. Pt then positioned in supine for therex as per logged below 1:1 w/ PTA, pt then taken to //bars for balance activities as tolerated: tandem gait with use of 1 UE, lateral sidestepping x 4 trials total in //bars and SBA for safety.     Date  10/23/17   VISIT 2    Gcode 2 of 10   FOTO    MT -   Long Sit HS MT   Gastroc Str. MT   Sci fit  L2 6 min    QS 5"x10   SAQ 1.5 2x10 B   SLR 1.5 2x10 B    SL Abd 2x10 B   Heel Slides -   Drew Hip Add 5"x15   LAQs 2x10 B   1.5 #   Seated Hip Flex 2x10 B  1.5#   Cybex   Leg Press -   TKE -   Hip Abd -   Hip Flex -   Hip Ext -   HS Curls -   Knee Ext -   Step Ups -   Step Downs -   Gait -   CP -   Initials JA 1/6         Assessment:     Pt tolerated session with reports of general fatigue but no pain. She required verbal instruction on postural awareness in standing and safety with standing balance.     Patient Education/Response:     Cont HEP    Plans and Goals:   Con t to advance PT as per POC, advance as appropriate  Short Term Goals (4 Weeks):   1. Pt will be I in HEP for progressive strengthening.  2. Pt will ambulate for 50' with SPC at CGA for beginning community distances and increased mobility.  3. Pt will ambulate 500' with RW at SPV for increased endurance and functional " mobility.  4. Pt will perform STS from 17 inch chair with min-mod upper extremity support for increased functional mobility.  5. Pt will be I in all bed mobility for increased functional mobility.  6. Pt will demo proper safety in w/c, ambulation, and car/wheelchair transfers for decreased fall risk.  7. Pt will stand with feet together x30 secs without LOB for increased balance.     Long Term Goals (8 Weeks):   1. Pt will demo WFL AROM in BLE for increased functional mobility.  2. Pt will ambulate with SPC for 600' on level surfaces at >/= SPV for increased community distances and mobility.  3. Pt will ambulate on outside surfaces at SBA with SPC for increased community mobility.  4. Pt will score </= 30'' on TUG to demo decreased fall risk and increased functional mobility.  5. Pt will perform STS without UE support for 5x to demo increased functional strength.  6. Pt will negotiate 12 stairs at Mod I for increased community negotiations.  7. Pt will have no falls throughout therapy to display improved safety and increased attention/cognition.  8. Pt will perform 1/2 tandem standing x30 secs B without LOB for increased balance in gait stance.

## 2017-10-26 ENCOUNTER — DOCUMENTATION ONLY (OUTPATIENT)
Dept: REHABILITATION | Facility: HOSPITAL | Age: 57
End: 2017-10-26

## 2017-10-26 DIAGNOSIS — Z74.09 DECREASED MOBILITY AND ENDURANCE: ICD-10-CM

## 2017-10-26 DIAGNOSIS — R41.840 DECREASED ATTENTION SPAN: ICD-10-CM

## 2017-10-26 DIAGNOSIS — M62.81 DECREASED MUSCLE STRENGTH: ICD-10-CM

## 2017-10-26 DIAGNOSIS — M25.669 DECREASED ROM OF LOWER EXTREMITY: ICD-10-CM

## 2017-10-26 NOTE — PROGRESS NOTES
Face to Face PTA Conference performed with Michelle Bowden PTA, Paige Fox PTA, Nikunj Schwartz PTA Reginald Frias PTA regarding patient's current status, overall progress, and plan of care    Face to face PT Conference performed with Yoli Macias PT , taran Knight PT, Bhumika Jackson PT , Jyotsna Garsia PT , Lisa Schwartz PT  regarding patient progress, current status, and plan of care.     Reginald Frias PTA    Face to face meeting completed with Taran Knight PT regarding current status and progress of   Silvia Cervantes .   Nikunj Schwartz PTA     Face to face meeting completed with Taran Knight PT regarding current status and progress of   Silvia Cullennca.  Paige Fox PTA

## 2017-10-27 ENCOUNTER — CLINICAL SUPPORT (OUTPATIENT)
Dept: REHABILITATION | Facility: HOSPITAL | Age: 57
End: 2017-10-27
Attending: FAMILY MEDICINE
Payer: COMMERCIAL

## 2017-10-27 DIAGNOSIS — M25.669 DECREASED ROM OF LOWER EXTREMITY: ICD-10-CM

## 2017-10-27 DIAGNOSIS — M62.81 DECREASED MUSCLE STRENGTH: ICD-10-CM

## 2017-10-27 DIAGNOSIS — Z74.09 DECREASED MOBILITY AND ENDURANCE: ICD-10-CM

## 2017-10-27 DIAGNOSIS — R41.840 DECREASED ATTENTION SPAN: ICD-10-CM

## 2017-10-27 PROCEDURE — 97112 NEUROMUSCULAR REEDUCATION: CPT | Mod: PN

## 2017-10-27 PROCEDURE — 97110 THERAPEUTIC EXERCISES: CPT | Mod: PN

## 2017-10-27 NOTE — PROGRESS NOTES
"TIME RECORD    Date:  10/27/2017    Start Time:  4:15  Stop Time:  5:10    PROCEDURES:    TIMED   Procedure Min.   TE 45   NMR 15                 UNTIMED  Procedure Min.             Total Timed Minutes:  60  Total Timed Units:  4  Total Untimed Units:  0  Charges Billed/# of units:  1NMR, 3 TE      Progress/Current Status    Subjective:     Patient ID: Silvia Cervantes is a 56 y.o. female.  Diagnosis:   1. Decreased ROM of upper extremity     2. Decreased ROM of lower extremity     3. Decreased muscle strength     4. Decreased mobility and endurance     5. Decreased attention Span       Pain:pt states she has been feeling "Ok". She states " I dont go out, I want to get rid of this wheelchair". Pt agreeable to PT session.      Objective:   Pt initiated session on Sci fit stepper x 6 min for B LE ROM with min resistance of Level 2.0. Pt then taken to Low mat , transferred sit to stand from wheelchair w/ CGA to EOM. Pt then positioned in supine for therex as per logged below 1:1 w/ PTA, pt then taken to //bars for balance activities as tolerated: tandem gait with use of 1 UE, lateral sidestepping x 4 trials total in //bars and SBA for safety, added ball rolling with use of 1 UE in //bars only 1' x 2 trials each.     Date  10/27/17 10/23/17   VISIT 3 2    Gcode 3 of 10 2 of 10   FOTO     MT  -   Long Sit HS MT MT   Gastroc Str. MT MT   Sci fit   L2 6 min    QS 5"X10 5"x10   SAQ 1.5 2x15 B 1.5 2x10 B   SLR 1.5 2x15 B 1.5 2x10 B    SL Abd 2x10 B 2x10 B   Lumbar bridge 2x10 -   Drew Hip Add 5"x15 5"x15   LAQs 2x10 B   1.5 # 2x10 B   1.5 #   Seated Hip Flex 2x10 B  1.5# 2x10 B  1.5#   Cybex   Leg Press  -   TKE  -   Hip Abd  -   Hip Flex  -   Hip Ext  -   HS Curls YTB 2x10   EOM -   Knee Ext  -   Step Ups  -   Step Downs  -   Gait NOTE  balance -   CP  -   Initials JA 2/6 JA 1/6         Assessment:     Pt completed session with no reports of increased pain. Pt distracts easily throughout session, reminders to perform therex " safely. Tolerated standing activities with 2 episodes of LOB but recovered with min assist while performing lateral sidesteps in //bars.     Patient Education/Response:     Cont HEP    Plans and Goals:   Con t to advance PT as per POC, advance as appropriate  Short Term Goals (4 Weeks):   1. Pt will be I in HEP for progressive strengthening.  2. Pt will ambulate for 50' with SPC at CGA for beginning community distances and increased mobility.  3. Pt will ambulate 500' with RW at SPV for increased endurance and functional mobility.  4. Pt will perform STS from 17 inch chair with min-mod upper extremity support for increased functional mobility.  5. Pt will be I in all bed mobility for increased functional mobility.  6. Pt will demo proper safety in w/c, ambulation, and car/wheelchair transfers for decreased fall risk.  7. Pt will stand with feet together x30 secs without LOB for increased balance.     Long Term Goals (8 Weeks):   1. Pt will demo WFL AROM in BLE for increased functional mobility.  2. Pt will ambulate with SPC for 600' on level surfaces at >/= SPV for increased community distances and mobility.  3. Pt will ambulate on outside surfaces at SBA with SPC for increased community mobility.  4. Pt will score </= 30'' on TUG to demo decreased fall risk and increased functional mobility.  5. Pt will perform STS without UE support for 5x to demo increased functional strength.  6. Pt will negotiate 12 stairs at Mod I for increased community negotiations.  7. Pt will have no falls throughout therapy to display improved safety and increased attention/cognition.  8. Pt will perform 1/2 tandem standing x30 secs B without LOB for increased balance in gait stance.

## 2017-10-31 ENCOUNTER — CLINICAL SUPPORT (OUTPATIENT)
Dept: REHABILITATION | Facility: HOSPITAL | Age: 57
End: 2017-10-31
Attending: FAMILY MEDICINE
Payer: COMMERCIAL

## 2017-10-31 DIAGNOSIS — Z74.09 DECREASED MOBILITY AND ENDURANCE: ICD-10-CM

## 2017-10-31 DIAGNOSIS — R41.840 DECREASED ATTENTION SPAN: ICD-10-CM

## 2017-10-31 DIAGNOSIS — M62.81 DECREASED MUSCLE STRENGTH: ICD-10-CM

## 2017-10-31 DIAGNOSIS — M25.669 DECREASED ROM OF LOWER EXTREMITY: ICD-10-CM

## 2017-10-31 PROCEDURE — 97112 NEUROMUSCULAR REEDUCATION: CPT | Mod: PN

## 2017-10-31 PROCEDURE — 97110 THERAPEUTIC EXERCISES: CPT | Mod: PN

## 2017-10-31 NOTE — PROGRESS NOTES
"TIME RECORD    Date:  10/31/2017    Start Time:  1058  Stop Time:  1145    PROCEDURES:    TIMED  Procedure Min.   NMR 12   TE 35                 UNTIMED  Procedure Min.             Total Timed Minutes:  47  Total Timed Units:  3  Total Untimed Units:  0  Charges Billed/# of units:  3 (1NMR, 2TE)      Progress/Current Status    Subjective:     Patient ID: Silvia Cervantes is a 56 y.o. female.  Diagnosis:   1. Decreased ROM of upper extremity     2. Decreased ROM of lower extremity     3. Decreased muscle strength     4. Decreased mobility and endurance     5. Decreased attention Span       Pain: 6 /10 B thighs  She stated that almost lost her balance with the walker in the bathroom.  No falls.  She stated that she     Objective:     Session initiated with Neuro re-ed and endurance training with B UE/LE extremities for reciprocal motion of all limbs on sci-fit x 8 min at level 2 at > or equal to 50 spm  w/o rest. Pt performed standing balance in // bars consisting of:  Side stepping x 3 laps // bars with B UE support, forward/backward x 3 laps // bar with R UE support only.  Pt performed TE x 35' 1:1 with PT with cues throughout.       Date  10/31/17 10/27/17 10/23/17   VISIT 4 3 2    Gcode 4 of 10 3 of 10 2 of 10   FOTO NEXT     MT --  -   Long Sit HS Next OOT MT MT   Gastroc Str. On fitter 3 x 30'' MT MT   Sci fit  L2 8'  L2 6 min    QS 5"X10 5"X10 5"x10   SAQ 1.5 2x15 B 1.5 2x15 B 1.5 2x10 B   SLR 1.5 2x15 B 1.5 2x15 B 1.5 2x10 B    SL Abd 2x10 B 2x10 B 2x10 B   Lumbar bridge 2x10 2x10 -   Drew Hip Add W/ leg press 5"x15 5"x15   LAQs 2x10 B   1.5 # 2x10 B   1.5 # 2x10 B   1.5 #   Seated Hip Flex 2x10 B  1.5# 2x10 B  1.5# 2x10 B  1.5#   Cybex   Leg Press 3.0 B 2 x 10 w/ ball squeeze  -   TKE --  -   Hip Abd --  -   Hip Flex --  -   Hip Ext --  -   HS Curls YTB 2x10   EOM YTB 2x10   EOM -   Knee Ext --  -   Step Ups --  -   Step Downs --  -   Heel raises 2 x 10 B     Gait See note NOTE  balance -   balance See note  - "   Initials FS JA 2/6 JA 1/6       Assessment:     Pt needs progression of TE as well as standing balance to increase overall function.      Patient Education/Response:     CONT HEP    Plans and Goals:     Con t to advance PT as per POC, advance as appropriate  Short Term Goals (4 Weeks):   1. Pt will be I in HEP for progressive strengthening.  2. Pt will ambulate for 50' with SPC at CGA for beginning community distances and increased mobility.  3. Pt will ambulate 500' with RW at SPV for increased endurance and functional mobility.  4. Pt will perform STS from 17 inch chair with min-mod upper extremity support for increased functional mobility.  5. Pt will be I in all bed mobility for increased functional mobility.  6. Pt will demo proper safety in w/c, ambulation, and car/wheelchair transfers for decreased fall risk.  7. Pt will stand with feet together x30 secs without LOB for increased balance.     Long Term Goals (8 Weeks):   1. Pt will demo WFL AROM in BLE for increased functional mobility.  2. Pt will ambulate with SPC for 600' on level surfaces at >/= SPV for increased community distances and mobility.  3. Pt will ambulate on outside surfaces at SBA with SPC for increased community mobility.  4. Pt will score </= 30'' on TUG to demo decreased fall risk and increased functional mobility.  5. Pt will perform STS without UE support for 5x to demo increased functional strength.  6. Pt will negotiate 12 stairs at Mod I for increased community negotiations.  7. Pt will have no falls throughout therapy to display improved safety and increased attention/cognition.  8. Pt will perform 1/2 tandem standing x30 secs B without LOB for increased balance in gait stance.

## 2017-11-02 ENCOUNTER — CLINICAL SUPPORT (OUTPATIENT)
Dept: REHABILITATION | Facility: HOSPITAL | Age: 57
End: 2017-11-02
Attending: FAMILY MEDICINE
Payer: COMMERCIAL

## 2017-11-02 DIAGNOSIS — M25.669 DECREASED ROM OF LOWER EXTREMITY: ICD-10-CM

## 2017-11-02 DIAGNOSIS — M62.81 DECREASED MUSCLE STRENGTH: ICD-10-CM

## 2017-11-02 DIAGNOSIS — R41.840 DECREASED ATTENTION SPAN: ICD-10-CM

## 2017-11-02 DIAGNOSIS — Z74.09 DECREASED MOBILITY AND ENDURANCE: ICD-10-CM

## 2017-11-02 PROCEDURE — 97112 NEUROMUSCULAR REEDUCATION: CPT | Mod: PN

## 2017-11-02 PROCEDURE — 97110 THERAPEUTIC EXERCISES: CPT | Mod: PN

## 2017-11-03 ENCOUNTER — TELEPHONE (OUTPATIENT)
Dept: RHEUMATOLOGY | Facility: CLINIC | Age: 57
End: 2017-11-03

## 2017-11-03 ENCOUNTER — CLINICAL SUPPORT (OUTPATIENT)
Dept: REHABILITATION | Facility: HOSPITAL | Age: 57
End: 2017-11-03
Attending: FAMILY MEDICINE
Payer: COMMERCIAL

## 2017-11-03 DIAGNOSIS — Z74.09 DECREASED MOBILITY AND ENDURANCE: ICD-10-CM

## 2017-11-03 DIAGNOSIS — M62.81 DECREASED MUSCLE STRENGTH: ICD-10-CM

## 2017-11-03 DIAGNOSIS — R41.840 DECREASED ATTENTION SPAN: ICD-10-CM

## 2017-11-03 DIAGNOSIS — M25.669 DECREASED ROM OF LOWER EXTREMITY: ICD-10-CM

## 2017-11-03 PROCEDURE — 97110 THERAPEUTIC EXERCISES: CPT | Mod: PN

## 2017-11-03 PROCEDURE — 97112 NEUROMUSCULAR REEDUCATION: CPT | Mod: PN

## 2017-11-03 NOTE — PROGRESS NOTES
"TIME RECORD    Date:  11/2/17  Start Time: 3:30  Stop Time: 4:30    PROCEDURES:    TIMED  Procedure Min.   NMR 30   TE 30                 UNTIMED  Procedure Min.             Total Timed Minutes:  60  Total Timed Units:  4  Total Untimed Units:  0  Charges Billed/# of units:  4 (2 NMR, 2TE)      Progress/Current Status    Subjective:     Patient ID: Silvia Cervantes is a 56 y.o. female.  Diagnosis:   1. Decreased ROM of upper extremity     2. Decreased ROM of lower extremity     3. Decreased muscle strength     4. Decreased mobility and endurance     5. Decreased attention Span       Pain: 6 /10 B thighs  She stated that almost lost her balance with the walker in the bathroom.  No falls.  She stated that she     Objective:     Session initiated with Neuro re-ed and endurance training with B UE/LE extremities for reciprocal motion of all limbs on sci-fit x 8 min at level 2 at > or equal to 50 spm  w/o rest. Pt performed standing balance in // bars consisting of:  Side stepping x 3 laps // bars with B UE support, forward/backward x 3 laps // bar with R UE support only.  Pt performed TE x 30' 1:1 with PTA with cues throughout.       Date  11/3/17 10/31/17 10/27/17 10/23/17   VISIT 5 4 3 2    Gcode 5 of 10 4 of 10 3 of 10 2 of 10   FOTO  NEXT     MT  --  -   Long Sit HS MT Next OOT MT MT   Gastroc Str.  On fitter 3 x 30'' MT MT   Sci fit  L2 8' L2 8'  L2 6 min    QS 5x10 5"X10 5"X10 5"x10   SAQ 1.5 2x15 B 1.5 2x15 B 1.5 2x15 B 1.5 2x10 B   SLR 1.5 2x15 B 1.5 2x15 B 1.5 2x15 B 1.5 2x10 B    SL Abd 2x10 B 2x10 B 2x10 B 2x10 B   Lumbar bridge 2x1 0B 2x10 2x10 -   Drew Hip Add  W/ leg press 5"x15 5"x15   LAQs 2x10 B  1.5 # 2x10 B   1.5 # 2x10 B   1.5 # 2x10 B   1.5 #   Seated Hip Flex  2x10 B  1.5# 2x10 B  1.5# 2x10 B  1.5#   Cybex   Leg Press oot 3.0 B 2 x 10 w/ ball squeeze  -   TKE  --  -   Hip Abd  --  -   Hip Flex  --  -   Hip Ext  --  -   HS Curls YTB 2x10   EOM YTB 2x10   EOM YTB 2x10   EOM -   Knee Ext  --  -   Step " Ups  --  -   Step Downs  --  -   Heel raises 2x10 B 2 x 10 B     Gait  See note NOTE  balance -   balance note See note  -   Initials JA 1/6 FS JA 2/6 JA 1/6       Assessment:   Pt completed session with reports of general B LE fatigue. No LOB with standing activities noted today. Pt cooperative throughout session, however she easily becomes distracted.    Patient Education/Response:     CONT HEP    Plans and Goals:     Con t to advance PT as per POC, advance as appropriate  Short Term Goals (4 Weeks):   1. Pt will be I in HEP for progressive strengthening.  2. Pt will ambulate for 50' with SPC at CGA for beginning community distances and increased mobility.  3. Pt will ambulate 500' with RW at SPV for increased endurance and functional mobility.  4. Pt will perform STS from 17 inch chair with min-mod upper extremity support for increased functional mobility.  5. Pt will be I in all bed mobility for increased functional mobility.  6. Pt will demo proper safety in w/c, ambulation, and car/wheelchair transfers for decreased fall risk.  7. Pt will stand with feet together x30 secs without LOB for increased balance.     Long Term Goals (8 Weeks):   1. Pt will demo WFL AROM in BLE for increased functional mobility.  2. Pt will ambulate with SPC for 600' on level surfaces at >/= SPV for increased community distances and mobility.  3. Pt will ambulate on outside surfaces at SBA with SPC for increased community mobility.  4. Pt will score </= 30'' on TUG to demo decreased fall risk and increased functional mobility.  5. Pt will perform STS without UE support for 5x to demo increased functional strength.  6. Pt will negotiate 12 stairs at Mod I for increased community negotiations.  7. Pt will have no falls throughout therapy to display improved safety and increased attention/cognition.  8. Pt will perform 1/2 tandem standing x30 secs B without LOB for increased balance in gait stance.

## 2017-11-03 NOTE — PROGRESS NOTES
"TIME RECORD    Date:  11/03/2017    Start Time:  11:50  Stop Time: 12:35    PROCEDURES:    TIMED  Procedure Min.   NMR 10   TE 35                 UNTIMED  Procedure Min.             Total Timed Minutes:  45  Total Timed Units:  3  Total Untimed Units:  0  Charges Billed/# of units:  3 (1NMR, 2TE)      Progress/Current Status    Subjective:     Patient ID: Silvia Cervantes is a 56 y.o. female.  Diagnosis:   1. Decreased ROM of upper extremity     2. Decreased ROM of lower extremity     3. Decreased muscle strength     4. Decreased mobility and endurance     5. Decreased attention Span       Pain: Pt reports she has been ambulating more at home with use of RW. Pt arrived to session in RW today. Agreeable to PT treatment. Reports minor soreness to B lateral hips today.     Objective:     Session initiated with Neuro re-ed and endurance training with B UE/LE extremities for reciprocal motion of all limbs on sci-fit x 8 min at level 2 at > or equal to 50 spm  w/o rest. Pt completed all therex as per logged below 1:1 w/ PTA for B LE strengthening and ROM. tandem gait with use of 1 UE, lateral sidestepping x 4 trials total in //bars and SBA for safety, added ball rolling with use of 1 UE in //bars only 1' x 2 trials each     Date  11/3/17 11/2/17 10/31/17 10/27/17 10/23/17   VISIT 6 5 4 3 2    Gcode 6 of 10 5 of 10 4 of 10 3 of 10 2 of 10   FOTO 6 of 10 DONE..   NEXT       MT    --   -   Long Sit HS  MT Next OOT MT MT   Gastroc Str.    On fitter 3 x 30'' MT MT   Sci fit  L2 8' L2 8' L2 8'   L2 6 min    QS 5x10 5x10 5"X10 5"X10 5"x10   SAQ 1.5 2x15 B 1.5 2x15 B 1.5 2x15 B 1.5 2x15 B 1.5 2x10 B   SLR 1.5 2x15 B 1.5 2x15 B 1.5 2x15 B 1.5 2x15 B 1.5 2x10 B    SL Abd 2x10 B 2x10 B 2x10 B 2x10 B 2x10 B   Lumbar bridge 2x10 B 2x1 0B 2x10 2x10 -   Drew Hip Add    W/ leg press 5"x15 5"x15   LAQs 2x10 B  1.5 # 2x10 B  1.5 # 2x10 B   1.5 # 2x10 B   1.5 # 2x10 B   1.5 #   Seated Hip Flex    2x10 B  1.5# 2x10 B  1.5# 2x10 B  1.5#   Cybex "   Leg Press 3.0 B 2 x 10 w/ ball squeeze oot 3.0 B 2 x 10 w/ ball squeeze   -   TKE    --   -   Hip Abd    --   -   Hip Flex    --   -   Hip Ext    --   -   HS Curls YTB 2x10   EOM YTB 2x10   EOM YTB 2x10   EOM YTB 2x10   EOM -   Knee Ext    --   -   Step Ups    --   -   Step Downs    --   -   Heel raises 2x10 B 2x10 B 2 x 10 B       Gait    See note NOTE  balance -   balance note note See note   -   Initials JA 2/6 JA 1/6 FS JA 2/6 JA 1/6         Assessment:     Pt completed all therex with no reports of increased pain today.     Patient Education/Response:     CONT HEP    Plans and Goals:     Con t to advance PT as per POC, advance as appropriate  Short Term Goals (4 Weeks):   1. Pt will be I in HEP for progressive strengthening.  2. Pt will ambulate for 50' with SPC at CGA for beginning community distances and increased mobility.  3. Pt will ambulate 500' with RW at SPV for increased endurance and functional mobility.  4. Pt will perform STS from 17 inch chair with min-mod upper extremity support for increased functional mobility.  5. Pt will be I in all bed mobility for increased functional mobility.  6. Pt will demo proper safety in w/c, ambulation, and car/wheelchair transfers for decreased fall risk.  7. Pt will stand with feet together x30 secs without LOB for increased balance.     Long Term Goals (8 Weeks):   1. Pt will demo WFL AROM in BLE for increased functional mobility.  2. Pt will ambulate with SPC for 600' on level surfaces at >/= SPV for increased community distances and mobility.  3. Pt will ambulate on outside surfaces at SBA with SPC for increased community mobility.  4. Pt will score </= 30'' on TUG to demo decreased fall risk and increased functional mobility.  5. Pt will perform STS without UE support for 5x to demo increased functional strength.  6. Pt will negotiate 12 stairs at Mod I for increased community negotiations.  7. Pt will have no falls throughout therapy to display improved  safety and increased attention/cognition.  8. Pt will perform 1/2 tandem standing x30 secs B without LOB for increased balance in gait stance.

## 2017-11-06 ENCOUNTER — TELEPHONE (OUTPATIENT)
Dept: RHEUMATOLOGY | Facility: CLINIC | Age: 57
End: 2017-11-06

## 2017-11-07 ENCOUNTER — CLINICAL SUPPORT (OUTPATIENT)
Dept: REHABILITATION | Facility: HOSPITAL | Age: 57
End: 2017-11-07
Attending: FAMILY MEDICINE
Payer: COMMERCIAL

## 2017-11-07 DIAGNOSIS — Z74.09 DECREASED MOBILITY AND ENDURANCE: ICD-10-CM

## 2017-11-07 DIAGNOSIS — M62.81 DECREASED MUSCLE STRENGTH: ICD-10-CM

## 2017-11-07 DIAGNOSIS — M25.669 DECREASED ROM OF LOWER EXTREMITY: ICD-10-CM

## 2017-11-07 DIAGNOSIS — R41.840 DECREASED ATTENTION SPAN: ICD-10-CM

## 2017-11-07 PROCEDURE — 97112 NEUROMUSCULAR REEDUCATION: CPT | Mod: PN

## 2017-11-07 PROCEDURE — 97110 THERAPEUTIC EXERCISES: CPT | Mod: PN

## 2017-11-07 NOTE — PROGRESS NOTES
"TIME RECORD    Date:  11/07/2017    Start Time:  11:50  Stop Time: 12:30    PROCEDURES:    TIMED  Procedure Min.   NMR 10   TE 35                 UNTIMED  Procedure Min.             Total Timed Minutes:  45  Total Timed Units:  3  Total Untimed Units:  0  Charges Billed/# of units:  3 (1NMR, 2TE)      Progress/Current Status    Subjective:     Patient ID: Silvia Cervantes is a 56 y.o. female.  Diagnosis:   1. Decreased ROM of upper extremity     2. Decreased ROM of lower extremity     3. Decreased muscle strength     4. Decreased mobility and endurance     5. Decreased attention Span       Pain: Pt states "I don't feel good today and I feel a little dizzy". Pt reports she thinks she's having a dizziness due to her high blood pressure. Pt reports no compliant of pain.       Objective:     Pt arrived to therapy with rolling walker. Due to pt states that she feels dizzy, SPTA performed blood pressure to monitor the patient BP. Pt BP was at 128/94 and pulsed o2 saturation at 97% without oxgyen. Pt then completed therex exercises below as per logged 1:1 with SPTA for B LE muscle strengthening.      Date  11/7/17 11/3/17 11/2/17 10/31/17 10/27/17 10/23/17   VISIT 7 6 5 4 3 2    Gcode 7/10 6 of 10 5 of 10 4 of 10 3 of 10 2 of 10   FOTO 7 of 10 6 of 10 DONE..   NEXT       MT     --   -   Long Sit HS   MT Next OOT MT MT   Gastroc Str.     On fitter 3 x 30'' MT MT   Sci fit  L2 8' L2 8' L2 8' L2 8'   L2 6 min    QS 5x10 5x10 5x10 5"X10 5"X10 5"x10   SAQ 1.5 2x15 B 1.5 2x15 B 1.5 2x15 B 1.5 2x15 B 1.5 2x15 B 1.5 2x10 B   SLR 2 x 10 B  1.5 2x15 B 1.5 2x15 B 1.5 2x15 B 1.5 2x15 B 1.5 2x10 B    SL Abd 2 x 10 B 2x10 B 2x10 B 2x10 B 2x10 B 2x10 B   Lumbar bridge  2x10 B 2x1 0B 2x10 2x10 -   Drew Hip Add     W/ leg press 5"x15 5"x15   LAQs 2 x 10 B  1.5# 2x10 B  1.5 # 2x10 B  1.5 # 2x10 B   1.5 # 2x10 B   1.5 # 2x10 B   1.5 #   Seated Hip Flex 2 x 10 B  With yellow theraband    2x10 B  1.5# 2x10 B  1.5# 2x10 B  1.5#   Cybex   Leg " Press NT 3.0 B 2 x 10 w/ ball squeeze oot 3.0 B 2 x 10 w/ ball squeeze   -   TKE     --   -   Hip Abd     --   -   Hip Flex     --   -   Hip Ext     --   -   HS Curls NT YTB 2x10   EOM YTB 2x10   EOM YTB 2x10   EOM YTB 2x10   EOM -   Knee Ext     --   -   Step Ups     --   -   Step Downs     --   -   Heel raises NT 2x10 B 2x10 B 2 x 10 B       Gait     See note NOTE  balance -   balance NT note note See note   -   Initials SM SPTA/JA 3/6 JA 2/6 JA 1/6 FS JA 2/6 JA 1/6         Assessment:     Pt was not able to performed SLR on the L LE with weight due to numbess, therefore, pt only performed one weight on the R LE. Pt also had a minimal of lower back pain during the bridges exercise and was discontinue due to pain. Pt dizziness had sub side post therapy session. Pt then developed pain at the anterior L thigh while on the scifit stepper and was deferred today. Pt pain was at 8/10 on the pain level.     Patient Education/Response:     CONT HEP    Plans and Goals:     Con t to advance PT as per POC, advance as appropriate  Short Term Goals (4 Weeks):   1. Pt will be I in HEP for progressive strengthening.  2. Pt will ambulate for 50' with SPC at [facility] for beginning community distances and increased mobility.  3. Pt will ambulate 500' with RW at SPV for increased endurance and functional mobility.  4. Pt will perform STS from 17 inch chair with min-mod upper extremity support for increased functional mobility.  5. Pt will be I in all bed mobility for increased functional mobility.  6. Pt will demo proper safety in w/c, ambulation, and car/wheelchair transfers for decreased fall risk.  7. Pt will stand with feet together x30 secs without LOB for increased balance.     Long Term Goals (8 Weeks):   1. Pt will demo WFL AROM in BLE for increased functional mobility.  2. Pt will ambulate with SPC for 600' on level surfaces at >/= SPV for increased community distances and mobility.  3. Pt will ambulate on outside surfaces at SBA  with SPC for increased community mobility.  4. Pt will score </= 30'' on TUG to demo decreased fall risk and increased functional mobility.  5. Pt will perform STS without UE support for 5x to demo increased functional strength.  6. Pt will negotiate 12 stairs at Mod I for increased community negotiations.  7. Pt will have no falls throughout therapy to display improved safety and increased attention/cognition.  8. Pt will perform 1/2 tandem standing x30 secs B without LOB for increased balance in gait stance.      Session conducted by Ana RODRIGUEZ (student Physical therapist assistant), entire session under Direct supervision of Nikunj Schwartz PTA

## 2017-11-09 ENCOUNTER — CLINICAL SUPPORT (OUTPATIENT)
Dept: REHABILITATION | Facility: HOSPITAL | Age: 57
End: 2017-11-09
Attending: FAMILY MEDICINE
Payer: COMMERCIAL

## 2017-11-09 DIAGNOSIS — M25.669 DECREASED ROM OF LOWER EXTREMITY: ICD-10-CM

## 2017-11-09 DIAGNOSIS — R41.840 DECREASED ATTENTION SPAN: ICD-10-CM

## 2017-11-09 DIAGNOSIS — Z74.09 DECREASED MOBILITY AND ENDURANCE: ICD-10-CM

## 2017-11-09 DIAGNOSIS — M62.81 DECREASED MUSCLE STRENGTH: ICD-10-CM

## 2017-11-09 PROCEDURE — 97530 THERAPEUTIC ACTIVITIES: CPT | Mod: PN

## 2017-11-09 NOTE — PROGRESS NOTES
"TIME RECORD    Date:  11/09/2017    Start Time:  12:00  Stop Time: 1:00    PROCEDURES:    TIMED  Procedure Min.   NMR    TE 60                 UNTIMED  Procedure Min.             Total Timed Minutes:  60  Total Timed Units:  4  Total Untimed Units:  0  Charges Billed/# of units:  4, (4TE)      Progress/Current Status    Subjective:     Patient ID: Silvia Cervantes is a 56 y.o. female.  Diagnosis:   1. Decreased ROM of upper extremity     2. Decreased ROM of lower extremity     3. Decreased muscle strength     4. Decreased mobility and endurance     5. Decreased attention Span       Pain: Pt states  Better today."I. Pt reports no compliant of pain.       Objective:     Pt arrived to therapy with rolling walker. Treatment initiated  completed therex exercises below as per logged 1:1 with PTA for B LE muscle strengthening.      Date  11/9/17 11/7/17 11/3/17 11/2/17 10/31/17 10/27/17 10/23/17   VISIT 8 7 6 5 4 3 2    Gcode 8/10 7/10 6 of 10 5 of 10 4 of 10 3 of 10 2 of 10   FOTO 8/10 7 of 10 6 of 10 DONE..   NEXT       MT      --   -   Eliseo quad stretch 3x30" B         Long Sit HS Sup HSS 2x30" B   MT Next OOT MT MT   Gastroc Str.      On fitter 3 x 30'' MT MT   Sci fit  L2 8' L2 8' L2 8' L2 8' L2 8'   L2 6 min    TA 5" x 15         QS 5"x 15 5x10 5x10 5x10 5"X10 5"X10 5"x10   SAQ 2x10 2# B 1.5 2x15 B 1.5 2x15 B 1.5 2x15 B 1.5 2x15 B 1.5 2x15 B 1.5 2x10 B   SLR 2x10 2# B 2 x 10 B  1.5 2x15 B 1.5 2x15 B 1.5 2x15 B 1.5 2x15 B 1.5 2x10 B    SL Abd 2x10 2# B   2 x 10 B 2x10 B 2x10 B 2x10 B 2x10 B 2x10 B   Lumbar bridge 2x10 B  2x10 B 2x1 0B 2x10 2x10 -   Drew Hip Add      W/ leg press 5"x15 5"x15   LAQs 2x10 B 2# 2 x 10 B  1.5# 2x10 B  1.5 # 2x10 B  1.5 # 2x10 B   1.5 # 2x10 B   1.5 # 2x10 B   1.5 #   Seated Hip Flex 2x10 B 2# 2 x 10 B  With yellow theraband    2x10 B  1.5# 2x10 B  1.5# 2x10 B  1.5#   Cybex   Leg Press 4.5 B 2x10  w/ball NT 3.0 B 2 x 10 w/ ball squeeze oot 3.0 B 2 x 10 w/ ball squeeze   -   TKE      --   - "   Hip Abd      --   -   Hip Flex      --   -   Hip Ext      --   -   HS Curls RTB 2 x 10 B NT YTB 2x10   EOM YTB 2x10   EOM YTB 2x10   EOM YTB 2x10   EOM -   Knee Ext      --   -   Step Ups      --   -   Step Downs      --   -   Heel raises 2x10 B NT 2x10 B 2x10 B 2 x 10 B       Gait      See note NOTE  balance -   balance  NT note note See note   -   Initials MM 4/6 SM SPTA/JA 3/6 JA 2/6 JA 1/6 FS JA 2/6 JA 1/6         Assessment:     Tolerated treatment well. Denied dizziness. Increased reps and resistance per log.    Patient Education/Response:     CONT HEP    Plans and Goals:     Con t to advance PT as per POC, advance as appropriate  Short Term Goals (4 Weeks):   1. Pt will be I in HEP for progressive strengthening.  2. Pt will ambulate for 50' with SPC at [facility] for beginning community distances and increased mobility.  3. Pt will ambulate 500' with RW at [facility] for increased endurance and functional mobility.  4. Pt will perform STS from 17 inch chair with min-mod upper extremity support for increased functional mobility.  5. Pt will be I in all bed mobility for increased functional mobility.  6. Pt will demo proper safety in w/c, ambulation, and car/wheelchair transfers for decreased fall risk.  7. Pt will stand with feet together x30 secs without LOB for increased balance.     Long Term Goals (8 Weeks):   1. Pt will demo WFL AROM in BLE for increased functional mobility.  2. Pt will ambulate with SPC for 600' on level surfaces at >/= SPV for increased community distances and mobility.  3. Pt will ambulate on outside surfaces at SBA with SPC for increased community mobility.  4. Pt will score </= 30'' on TUG to demo decreased fall risk and increased functional mobility.  5. Pt will perform STS without UE support for 5x to demo increased functional strength.  6. Pt will negotiate 12 stairs at Mod I for increased community negotiations.  7. Pt will have no falls throughout therapy to display improved safety and  increased attention/cognition.  8. Pt will perform 1/2 tandem standing x30 secs B without LOB for increased balance in gait stance.      Session conducted by Ana RODRIGUEZ (student Physical therapist assistant), entire session under Direct supervision of Reginald Frias PTA

## 2017-11-10 ENCOUNTER — CLINICAL SUPPORT (OUTPATIENT)
Dept: REHABILITATION | Facility: HOSPITAL | Age: 57
End: 2017-11-10
Attending: FAMILY MEDICINE
Payer: COMMERCIAL

## 2017-11-10 DIAGNOSIS — R41.840 DECREASED ATTENTION SPAN: ICD-10-CM

## 2017-11-10 DIAGNOSIS — M62.81 DECREASED MUSCLE STRENGTH: ICD-10-CM

## 2017-11-10 DIAGNOSIS — M25.669 DECREASED ROM OF LOWER EXTREMITY: ICD-10-CM

## 2017-11-10 DIAGNOSIS — Z74.09 DECREASED MOBILITY AND ENDURANCE: ICD-10-CM

## 2017-11-10 PROCEDURE — 97112 NEUROMUSCULAR REEDUCATION: CPT | Mod: PN

## 2017-11-10 PROCEDURE — 97110 THERAPEUTIC EXERCISES: CPT | Mod: PN

## 2017-11-10 NOTE — PROGRESS NOTES
"TIME RECORD    Date:  11/10/2017    Start Time:  1100  Stop Time: 1200    PROCEDURES:    TIMED  Procedure Min.   NMR 30   TE 30                 UNTIMED  Procedure Min.             Total Timed Minutes:  60  Total Timed Units:  4  Total Untimed Units:  0  Charges Billed/# of units:  4, (4TE)      Progress/Current Status    Subjective:     Patient ID: Silvia Cervantes is a 56 y.o. female.  Diagnosis:   1. Decreased ROM of upper extremity     2. Decreased ROM of lower extremity     3. Decreased muscle strength     4. Decreased mobility and endurance     5. Decreased attention Span       Pain: Pt states  "I feel better today". Pt reports no compliant of new pain today.     Objective:     Pt arrived to therapy with rolling walker. Treatment initiated  completed therex exercises below as per logged 1:1 with PTA for B LE muscle strengthening, included ambulation with SPC on level surfaces x 140 ft with CGA and verbal instructions on postural awareness and SPC management. NMR completed as per logged x 30 min. Tandem gait: FWD/ BWD, lateral sidestepping x4 trials each in // bars.     Date  11/10/17 11/9/17 11/7/17 11/3/17 11/2/17 10/31/17 10/27/17 10/23/17   VISIT 9 8 7 6 5 4 3 2    Gcode 9/10 8/10 7/10 6 of 10 5 of 10 4 of 10 3 of 10 2 of 10   FOTO 9/10 8/10 7 of 10 6 of 10 DONE..   NEXT       MT       --   -   Eliseo quad stretch 30"x3 B 3x30" B         Long Sit HS Sup HSS 2x30" B    MT Next OOT MT MT   Gastroc Str.       On fitter 3 x 30'' MT MT   Sci fit  L2 8' L2 8' L2 8' L2 8' L2 8' L2 8'   L2 6 min    TA 5"x15 5" x 15         QS 5"x15 5"x 15 5x10 5x10 5x10 5"X10 5"X10 5"x10   SAQ 2x10 2# B 2x10 2# B 1.5 2x15 B 1.5 2x15 B 1.5 2x15 B 1.5 2x15 B 1.5 2x15 B 1.5 2x10 B   SLR 2x10 2# B 2x10 2# B 2 x 10 B  1.5 2x15 B 1.5 2x15 B 1.5 2x15 B 1.5 2x15 B 1.5 2x10 B    SL Abd 2x10 2# B 2x10 2# B   2 x 10 B 2x10 B 2x10 B 2x10 B 2x10 B 2x10 B   Lumbar bridge 2x10 B 2x10 B  2x10 B 2x1 0B 2x10 2x10 -   Drew Hip Add       W/ leg press " "5"x15 5"x15   LAQs NT 2x10 B 2# 2 x 10 B  1.5# 2x10 B  1.5 # 2x10 B  1.5 # 2x10 B   1.5 # 2x10 B   1.5 # 2x10 B   1.5 #   Seated Hip Flex NT 2x10 B 2# 2 x 10 B  With yellow theraband    2x10 B  1.5# 2x10 B  1.5# 2x10 B  1.5#   Cybex   Leg Press 4.5 B 2x10  w/ball 4.5 B 2x10  w/ball NT 3.0 B 2 x 10 w/ ball squeeze oot 3.0 B 2 x 10 w/ ball squeeze   -   TKE       --   -   Hip Abd       --   -   Hip Flex       --   -   Hip Ext       --   -   HS Curls RTB 2x10 B RTB 2 x 10 B NT YTB 2x10   EOM YTB 2x10   EOM YTB 2x10   EOM YTB 2x10   EOM -   Knee Ext       --   -   Step Ups       --   -   Step Downs       --   -   Heel raises 2x10 B 2x10 B NT 2x10 B 2x10 B 2 x 10 B       Gait       See note NOTE  balance -   balance   NT note note See note   -   Initials JA 5/6 MM 4/6 SM SPTA/JA 3/6 JA 2/6 JA 1/6 FS JA 2/6 JA 1/6         Assessment:     Tolerated treatment with no reports of increased pain. She is continuing to show more confidence with ambulation with use of new assistive device. She experienced no LOB/ SOB with standing activities.     Patient Education/Response:     CONT HEP    Plans and Goals:     Con t to advance PT as per POC, advance as appropriate  Short Term Goals (4 Weeks):   1. Pt will be I in HEP for progressive strengthening.  2. Pt will ambulate for 50' with SPC at [facility] for beginning community distances and increased mobility.  3. Pt will ambulate 500' with RW at [facility] for increased endurance and functional mobility.  4. Pt will perform STS from 17 inch chair with min-mod upper extremity support for increased functional mobility.  5. Pt will be I in all bed mobility for increased functional mobility.  6. Pt will demo proper safety in w/c, ambulation, and car/wheelchair transfers for decreased fall risk.  7. Pt will stand with feet together x30 secs without LOB for increased balance.     Long Term Goals (8 Weeks):   1. Pt will demo WFL AROM in BLE for increased functional mobility.  2. Pt will ambulate with SPC " for 600' on level surfaces at >/= SPV for increased community distances and mobility.  3. Pt will ambulate on outside surfaces at SBA with SPC for increased community mobility.  4. Pt will score </= 30'' on TUG to demo decreased fall risk and increased functional mobility.  5. Pt will perform STS without UE support for 5x to demo increased functional strength.  6. Pt will negotiate 12 stairs at Mod I for increased community negotiations.  7. Pt will have no falls throughout therapy to display improved safety and increased attention/cognition.  8. Pt will perform 1/2 tandem standing x30 secs B without LOB for increased balance in gait stance.      Session conducted by Ana RODRIGUEZ (student Physical therapist assistant), entire session under Direct supervision of Nikunj Schwartz PTA

## 2017-11-13 ENCOUNTER — CLINICAL SUPPORT (OUTPATIENT)
Dept: REHABILITATION | Facility: HOSPITAL | Age: 57
End: 2017-11-13
Attending: FAMILY MEDICINE
Payer: COMMERCIAL

## 2017-11-13 DIAGNOSIS — M62.81 DECREASED MUSCLE STRENGTH: ICD-10-CM

## 2017-11-13 DIAGNOSIS — R41.840 DECREASED ATTENTION SPAN: ICD-10-CM

## 2017-11-13 DIAGNOSIS — Z74.09 DECREASED MOBILITY AND ENDURANCE: ICD-10-CM

## 2017-11-13 DIAGNOSIS — M25.669 DECREASED ROM OF LOWER EXTREMITY: ICD-10-CM

## 2017-11-13 PROCEDURE — 97112 NEUROMUSCULAR REEDUCATION: CPT | Mod: PN

## 2017-11-13 PROCEDURE — 97110 THERAPEUTIC EXERCISES: CPT | Mod: PN

## 2017-11-13 PROCEDURE — 97116 GAIT TRAINING THERAPY: CPT | Mod: PN

## 2017-11-13 NOTE — PROGRESS NOTES
"TIME RECORD    Date:  11/13/2017    Start Time:  1100  Stop Time:  1145    PROCEDURES:    TIMED  Procedure Min.   NMR 10   gait 8   TE 27             UNTIMED  Procedure Min.             Total Timed Minutes:  45  Total Timed Units:  3  Total Untimed Units:  0  Charges Billed/# of units:  3 (1NMR, 1gait, 1TE)      Progress/Current Status    Subjective:     Patient ID: Silvia Cervantes is a 56 y.o. female.  Diagnosis:   1. Decreased ROM of upper extremity     2. Decreased ROM of lower extremity     3. Decreased muscle strength     4. Decreased mobility and endurance     5. Decreased attention Span       Pain: 6 /10  Pt stated that she felt tight today in her L LE.     Objective:     Session initiated with Neuro re-ed and endurance training with B UE/LE extremities for reciprocal motion of all limbs on sci-fit x 10 min at level 2 at > or equal to 50 spm w/o rest. Pt performed gait training in gym on level surface 250 ft with SPC with cues from PT.  Pt performed TE per log x 20'.       Date  11/13/17 11/10/17 11/9/17 11/7/17 11/3/17 11/2/17 10/31/17 10/27/17 10/23/17   VISIT 10 9 8 7 6 5 4 3 2    Gcode 10/10 9/10 8/10 7/10 6 of 10 5 of 10 4 of 10 3 of 10 2 of 10   FOTO 10/10 9/10 8/10 7 of 10 6 of 10 DONE..   NEXT       MT --       --   -   Eliseo quad stretch NT 30"x3 B 3x30" B         Long Sit HS MT Sup HSS 2x30" L  Sup HSS 2x30" B   MT Next OOT MT MT   Gastroc Str. NT       On fitter 3 x 30'' MT MT   Sci fit  L2 10' L2 8' L2 8' L2 8' L2 8' L2 8' L2 8'   L2 6 min    TA NT 5"x15 5" x 15         QS NT 5"x15 5"x 15 5x10 5x10 5x10 5"X10 5"X10 5"x10   SAQ NT 2x10 2# B 2x10 2# B 1.5 2x15 B 1.5 2x15 B 1.5 2x15 B 1.5 2x15 B 1.5 2x15 B 1.5 2x10 B   SLR 2x10 2# B 2x10 2# B 2x10 2# B 2 x 10 B  1.5 2x15 B 1.5 2x15 B 1.5 2x15 B 1.5 2x15 B 1.5 2x10 B    SL Abd 2x10 2# B 2x10 2# B 2x10 2# B   2 x 10 B 2x10 B 2x10 B 2x10 B 2x10 B 2x10 B   Lumbar bridge --  2x10 B  2x10 B 2x1 0B 2x10 2x10 -   Drew Hip Add --       W/ leg press 5"x15 " "5"x15   LAQs 2x10 B 2# NT 2x10 B 2# 2 x 10 B  1.5# 2x10 B  1.5 # 2x10 B  1.5 # 2x10 B   1.5 # 2x10 B   1.5 # 2x10 B   1.5 #   Seated Hip Flex 2x10 B 2# NT 2x10 B 2# 2 x 10 B  With yellow theraband    2x10 B  1.5# 2x10 B  1.5# 2x10 B  1.5#   Cybex   Leg Press OOT/next  4.5 B 2x10  w/ball NT 3.0 B 2 x 10 w/ ball squeeze oot 3.0 B 2 x 10 w/ ball squeeze   -   TKE --       --   -   Hip Abd --       --   -   Hip Flex --       --   -   Hip Ext --       --   -   HS Curls OOT /next  RTB 2 x 10 B NT YTB 2x10   EOM YTB 2x10   EOM YTB 2x10   EOM YTB 2x10   EOM -   Knee Ext --       --   -   Step Ups --       --   -   Step Downs --       --   -   Heel raises NT/OOT  2x10 B NT 2x10 B 2x10 B 2 x 10 B       Gait See note 250 ft with SPC       See note NOTE  balance -   balance next   NT note note See note   -   Initials FS JA 5/6 MM 4/6 SM SPTA/JA 3/6 JA 2/6 JA 1/6 FS JA 2/6 JA 1/6         Assessment:     Pt would benefit from standing balance training reaching and catching to increase static and dy    Patient Education/Response:     CONT HEP and add HS stretch.     Plans and Goals:       Con t to advance PT as per POC, advance as appropriate  Short Term Goals (4 Weeks):   1. Pt will be I in HEP for progressive strengthening.  2. Pt will ambulate for 50' with SPC at [facility] for beginning community distances and increased mobility. Progressed with QC and Heidi  3. Pt will ambulate 500' with RW at SP for increased endurance and functional mobility. MET  4. Pt will perform STS from 17 inch chair with min-mod upper extremity support for increased functional mobility.  5. Pt will be I in all bed mobility for increased functional mobility. MET  6. Pt will demo proper safety in w/c, ambulation, and car/wheelchair transfers for decreased fall risk. MET  7. Pt will stand with feet together x30 secs without LOB for increased balance.     Long Term Goals (8 Weeks):   1. Pt will demo WFL AROM in BLE for increased functional mobility.  2. Pt will " ambulate with SPC for 600' on level surfaces at >/= SPV for increased community distances and mobility.  3. Pt will ambulate on outside surfaces at SBA with SPC for increased community mobility.  4. Pt will score </= 30'' on TUG to demo decreased fall risk and increased functional mobility.  5. Pt will perform STS without UE support for 5x to demo increased functional strength.  6. Pt will negotiate 12 stairs at Mod I for increased community negotiations.  7. Pt will have no falls throughout therapy to display improved safety and increased attention/cognition.  8. Pt will perform 1/2 tandem standing x30 secs B without LOB for increased balance in gait stance.

## 2017-11-15 ENCOUNTER — CLINICAL SUPPORT (OUTPATIENT)
Dept: REHABILITATION | Facility: HOSPITAL | Age: 57
End: 2017-11-15
Attending: FAMILY MEDICINE
Payer: COMMERCIAL

## 2017-11-15 DIAGNOSIS — M62.81 DECREASED MUSCLE STRENGTH: ICD-10-CM

## 2017-11-15 DIAGNOSIS — R41.840 DECREASED ATTENTION SPAN: ICD-10-CM

## 2017-11-15 DIAGNOSIS — M25.669 DECREASED ROM OF LOWER EXTREMITY: ICD-10-CM

## 2017-11-15 DIAGNOSIS — Z74.09 DECREASED MOBILITY AND ENDURANCE: ICD-10-CM

## 2017-11-15 PROCEDURE — 97112 NEUROMUSCULAR REEDUCATION: CPT | Mod: PN

## 2017-11-15 PROCEDURE — 97110 THERAPEUTIC EXERCISES: CPT | Mod: PN

## 2017-11-15 NOTE — PROGRESS NOTES
"TIME RECORD    Date:  11/15/2017    Start Time:  1145  Stop Time:  12:30    PROCEDURES:    TIMED  Procedure Min.   TE 30   NMR 15                 UNTIMED  Procedure Min.             Total Timed Minutes:  45  Total Timed Units:  3  Total Untimed Units:  0  Charges Billed/# of units:  3 (1NMR, 2TE)      Progress/Current Status    Subjective:     Patient ID: Silvia Cervantes is a 56 y.o. female.  Diagnosis:   1. Decreased ROM of upper extremity     2. Decreased ROM of lower extremity     3. Decreased muscle strength     4. Decreased mobility and endurance     5. Decreased attention Span       Pain: Pt agreeable to PT session. She states she has been having sensation of her R hip " asleep". She is agreeable to PT session. No reports of increased pain since her last session.     Objective:     Session initiated with completion on therex as per logged below 1:1 w/ PTA, Neuro re-ed standing therex for balance and strengthening of B Le including tandem gait fwd/ Bwd, sidestepping x 4 trials in //bars with use of 1 UE for balance. and endurance training with B UE/LE extremities for reciprocal motion of all limbs on sci-fit x 10 min at level 2 at > or equal to 50 spm w/o rest. Pt performed gait training in gym on level surface 200 ft with SPC.  Pt performed therex per log x 30'.       Date  11/15/17 11/13/17 11/10/17 11/9/17 11/7/17 11/3/17 11/2/17 10/31/17 10/27/17 10/23/17   VISIT 11 10 9 8 7 6 5 4 3 2    Gcode 1/10 10/10 9/10 8/10 7/10 6 of 10 5 of 10 4 of 10 3 of 10 2 of 10   FOTO dc 10/10 9/10 8/10 7 of 10 6 of 10 DONE..   NEXT       MT  --       --   -   Eliseo quad stretch mT NT 30"x3 B 3x30" B         Long Sit HS MT Sup HSS 2x30" L MT Sup HSS 2x30" L  Sup HSS 2x30" B   MT Next OOT MT MT   Gastroc Str.  NT       On fitter 3 x 30'' MT MT   Sci fit  L3 x5 L2 10' L2 8' L2 8' L2 8' L2 8' L2 8' L2 8'   L2 6 min    TA  NT 5"x15 5" x 15         QS  NT 5"x15 5"x 15 5x10 5x10 5x10 5"X10 5"X10 5"x10   SAQ  NT 2x10 2# B 2x10 2# B " "1.5 2x15 B 1.5 2x15 B 1.5 2x15 B 1.5 2x15 B 1.5 2x15 B 1.5 2x10 B   SLR 2x10 B  2# 2x10 2# B 2x10 2# B 2x10 2# B 2 x 10 B  1.5 2x15 B 1.5 2x15 B 1.5 2x15 B 1.5 2x15 B 1.5 2x10 B    SL Abd 2x10 B  2# 2x10 2# B 2x10 2# B 2x10 2# B   2 x 10 B 2x10 B 2x10 B 2x10 B 2x10 B 2x10 B   Lumbar bridge  --  2x10 B  2x10 B 2x1 0B 2x10 2x10 -   Drew Hip Add  --       W/ leg press 5"x15 5"x15   LAQs 2x10 B 2# 2x10 B 2# NT 2x10 B 2# 2 x 10 B  1.5# 2x10 B  1.5 # 2x10 B  1.5 # 2x10 B   1.5 # 2x10 B   1.5 # 2x10 B   1.5 #   Seated Hip Flex 2x10 B 2# 2x10 B 2# NT 2x10 B 2# 2 x 10 B  With yellow theraband    2x10 B  1.5# 2x10 B  1.5# 2x10 B  1.5#   Cybex   Leg Press  OOT/next  4.5 B 2x10  w/ball NT 3.0 B 2 x 10 w/ ball squeeze oot 3.0 B 2 x 10 w/ ball squeeze   -   TKE - --       --   -   Hip Abd - --       --   -   Hip Flex - --       --   -   Hip Ext - --       --   -   HS Curls 2x20 RTB OOT /next  RTB 2 x 10 B NT YTB 2x10   EOM YTB 2x10   EOM YTB 2x10   EOM YTB 2x10   EOM -   Knee Ext - --       --   -   Step Ups - --       --   -   Step Downs - --       --   -   Heel raises 2x20 B  // NT/OOT  2x10 B NT 2x10 B 2x10 B 2 x 10 B       Gait See note 200 ft with SPC See note 250 ft with SPC       See note NOTE  balance -   balance  next   NT note note See note   -   Initials JA 1/6 FS JA 5/6 MM 4/6 SM SPTA/JA 3/6 JA 2/6 JA 1/6 FS JA 2/6 JA 1/6         Assessment:     Pt completed session with no LOB/ SOB post standing activities. Some verbal instructions provided for postural awareness and safety in //bars. Ambulated on level surfaces with SPC with no LOB today, improved confidence noted with SPC despite request for CGA.     Patient Education/Response:     CONT HEP and add HS stretch.     Plans and Goals:       Con t to advance PT as per POC, advance as appropriate  Short Term Goals (4 Weeks):   1. Pt will be I in HEP for progressive strengthening.  2. Pt will ambulate for 50' with SPC at CGA for beginning community distances and increased " mobility. Progressed with QC and Heidi  3. Pt will ambulate 500' with RW at SPV for increased endurance and functional mobility. MET  4. Pt will perform STS from 17 inch chair with min-mod upper extremity support for increased functional mobility.  5. Pt will be I in all bed mobility for increased functional mobility. MET  6. Pt will demo proper safety in w/c, ambulation, and car/wheelchair transfers for decreased fall risk. MET  7. Pt will stand with feet together x30 secs without LOB for increased balance.     Long Term Goals (8 Weeks):   1. Pt will demo WFL AROM in BLE for increased functional mobility.  2. Pt will ambulate with SPC for 600' on level surfaces at >/= SPV for increased community distances and mobility.  3. Pt will ambulate on outside surfaces at SBA with SPC for increased community mobility.  4. Pt will score </= 30'' on TUG to demo decreased fall risk and increased functional mobility.  5. Pt will perform STS without UE support for 5x to demo increased functional strength.  6. Pt will negotiate 12 stairs at Mod I for increased community negotiations.  7. Pt will have no falls throughout therapy to display improved safety and increased attention/cognition.  8. Pt will perform 1/2 tandem standing x30 secs B without LOB for increased balance in gait stance.

## 2017-11-17 ENCOUNTER — CLINICAL SUPPORT (OUTPATIENT)
Dept: REHABILITATION | Facility: HOSPITAL | Age: 57
End: 2017-11-17
Attending: FAMILY MEDICINE
Payer: COMMERCIAL

## 2017-11-17 DIAGNOSIS — Z74.09 DECREASED MOBILITY AND ENDURANCE: ICD-10-CM

## 2017-11-17 DIAGNOSIS — M25.669 DECREASED ROM OF LOWER EXTREMITY: ICD-10-CM

## 2017-11-17 DIAGNOSIS — R41.840 DECREASED ATTENTION SPAN: ICD-10-CM

## 2017-11-17 DIAGNOSIS — M62.81 DECREASED MUSCLE STRENGTH: ICD-10-CM

## 2017-11-17 PROCEDURE — 97112 NEUROMUSCULAR REEDUCATION: CPT | Mod: PN

## 2017-11-17 PROCEDURE — 97110 THERAPEUTIC EXERCISES: CPT | Mod: PN

## 2017-11-17 NOTE — PROGRESS NOTES
"TIME RECORD    Date:  11/17/2017    Start Time:  10:05  Stop Time:  10:50    PROCEDURES:    TIMED  Procedure Min.   TE 30   NMR 15                 UNTIMED  Procedure Min.             Total Timed Minutes:  45  Total Timed Units:  3  Total Untimed Units:  0  Charges Billed/# of units:  3 (1NMR, 2TE)      Progress/Current Status    Subjective:     Patient ID: Silvia Cervantes is a 56 y.o. female.  Diagnosis:   1. Decreased ROM of upper extremity     2. Decreased ROM of lower extremity     3. Decreased muscle strength     4. Decreased mobility and endurance     5. Decreased attention Span       Pain: Pt agreeable to PT session. Pt reports only pain are in B shoulders, but L leg still feels very heavy and numb at times.    Objective:     Session initiated with completion on therex as per logged below 1:1 w/ PT, Neuro re-ed standing therex for balance and strengthening of B Le including tandem gait fwd/ Bwd, sidestepping x 4 trials in //bars with use of 1 UE for balance. and endurance training with B UE/LE extremities for reciprocal motion of all limbs on sci-fit x 10   min at level 3 at > or equal to 50 spm w/o rest. Pt performed gait training in gym on level surface 160 ft with SPC.  Pt performed therex per log x 30'.       Date  11/17/17 11/15/17 11/13/17 11/10/17 11/9/17 11/7/17 11/3/17 11/2/17 10/31/17 10/27/17 10/23/17   VISIT 12 11 10 9 8 7 6 5 4 3 2    Gcode 2/10 1/10 10/10 9/10 8/10 7/10 6 of 10 5 of 10 4 of 10 3 of 10 2 of 10   FOTO  dc 10/10 9/10 8/10 7 of 10 6 of 10 DONE..   NEXT       MT   --       --   -   Eliseo quad stretch MT mT NT 30"x3 B 3x30" B         Long Sit HS MT MT Sup HSS 2x30" L MT Sup HSS 2x30" L  Sup HSS 2x30" B   MT Next OOT MT MT   Gastroc Str. MT  NT       On fitter 3 x 30'' MT MT   Sci fit  L3 x6' L3 x5 L2 10' L2 8' L2 8' L2 8' L2 8' L2 8' L2 8'   L2 6 min    TA --  NT 5"x15 5" x 15         QS --  NT 5"x15 5"x 15 5x10 5x10 5x10 5"X10 5"X10 5"x10   SAQ --  NT 2x10 2# B 2x10 2# B 1.5 2x15 B " "1.5 2x15 B 1.5 2x15 B 1.5 2x15 B 1.5 2x15 B 1.5 2x10 B   SLR NT 2x10 B  2# 2x10 2# B 2x10 2# B 2x10 2# B 2 x 10 B  1.5 2x15 B 1.5 2x15 B 1.5 2x15 B 1.5 2x15 B 1.5 2x10 B    SL Abd NT 2x10 B  2# 2x10 2# B 2x10 2# B 2x10 2# B   2 x 10 B 2x10 B 2x10 B 2x10 B 2x10 B 2x10 B   Lumbar bridge 3x10 B  --  2x10 B  2x10 B 2x1 0B 2x10 2x10 -   Drew Hip Add --  --       W/ leg press 5"x15 5"x15   LAQs 2x15 B 2# 2x10 B 2# 2x10 B 2# NT 2x10 B 2# 2 x 10 B  1.5# 2x10 B  1.5 # 2x10 B  1.5 # 2x10 B   1.5 # 2x10 B   1.5 # 2x10 B   1.5 #   Seated Hip Flex 2x15 B 2# 2x10 B 2# 2x10 B 2# NT 2x10 B 2# 2 x 10 B  With yellow theraband    2x10 B  1.5# 2x10 B  1.5# 2x10 B  1.5#   Cybex   Leg Press 4.5 B 2x10  w/ball  OOT/next  4.5 B 2x10  w/ball NT 3.0 B 2 x 10 w/ ball squeeze oot 3.0 B 2 x 10 w/ ball squeeze   -   TKE -- - --       --   -   Hip Abd -- - --       --   -   Hip Flex -- - --       --   -   Hip Ext -- - --       --   -   HS Curls NT 2x20 RTB OOT /next  RTB 2 x 10 B NT YTB 2x10   EOM YTB 2x10   EOM YTB 2x10   EOM YTB 2x10   EOM -   Knee Ext -- - --       --   -   Step Ups -- - --       --   -   Step Downs -- - --       --   -   Heel raises 2x10 B 2x20 B  // NT/OOT  2x10 B NT 2x10 B 2x10 B 2 x 10 B       Gait See note  160' w/ SPC See note 200 ft with SPC See note 250 ft with SPC       See note NOTE  balance -   balance Next  next   NT note note See note   -   Initials JEANETTE JA 1/6 FS JA 5/6 MM 4/6 SM SPTA/JA 3/6 JA 2/6 JA 1/6 FS JA 2/6 JA 1/6         Assessment:     Pt completed session with no LOB/ SOB post standing activities. Pt required min cueing and responded well for Te. Pt ambulated 160' with SPC at [facility] w/o LOB and cues for increased L knee extension in stance phase along with increased B heel strike. Pt progressing in gait training along with confidence. Pt demo significant L HS and B quad tightness, cont soft tissue stretching.    Patient Education/Response:     CONT HEP and add HS stretch.     Plans and Goals:       Con " t to advance PT as per POC, advance as appropriate  Short Term Goals (4 Weeks):   1. Pt will be I in HEP for progressive strengthening.  2. Pt will ambulate for 50' with SPC at [facility] for beginning community distances and increased mobility. Progressed with QC and Heidi  3. Pt will ambulate 500' with RW at [facility] for increased endurance and functional mobility. MET  4. Pt will perform STS from 17 inch chair with min-mod upper extremity support for increased functional mobility.  5. Pt will be I in all bed mobility for increased functional mobility. MET  6. Pt will demo proper safety in w/c, ambulation, and car/wheelchair transfers for decreased fall risk. MET  7. Pt will stand with feet together x30 secs without LOB for increased balance.     Long Term Goals (8 Weeks):   1. Pt will demo WFL AROM in BLE for increased functional mobility.  2. Pt will ambulate with SPC for 600' on level surfaces at >/= SPV for increased community distances and mobility.  3. Pt will ambulate on outside surfaces at SBA with SPC for increased community mobility.  4. Pt will score </= 30'' on TUG to demo decreased fall risk and increased functional mobility.  5. Pt will perform STS without UE support for 5x to demo increased functional strength.  6. Pt will negotiate 12 stairs at Mod I for increased community negotiations.  7. Pt will have no falls throughout therapy to display improved safety and increased attention/cognition.  8. Pt will perform 1/2 tandem standing x30 secs B without LOB for increased balance in gait stance.

## 2017-11-20 ENCOUNTER — CLINICAL SUPPORT (OUTPATIENT)
Dept: REHABILITATION | Facility: HOSPITAL | Age: 57
End: 2017-11-20
Attending: FAMILY MEDICINE
Payer: COMMERCIAL

## 2017-11-20 DIAGNOSIS — R41.840 DECREASED ATTENTION SPAN: ICD-10-CM

## 2017-11-20 DIAGNOSIS — Z74.09 DECREASED MOBILITY AND ENDURANCE: ICD-10-CM

## 2017-11-20 DIAGNOSIS — M25.669 DECREASED ROM OF LOWER EXTREMITY: ICD-10-CM

## 2017-11-20 DIAGNOSIS — M62.81 DECREASED MUSCLE STRENGTH: ICD-10-CM

## 2017-11-20 PROCEDURE — 97110 THERAPEUTIC EXERCISES: CPT | Mod: PN

## 2017-11-20 PROCEDURE — 97112 NEUROMUSCULAR REEDUCATION: CPT | Mod: PN

## 2017-11-20 NOTE — PROGRESS NOTES
"TIME RECORD    Date:  11/20/2017    Start Time:  1100  Stop Time:  1150    PROCEDURES:    TIMED  Procedure Min.   TE 35   NMR 15                 UNTIMED  Procedure Min.             Total Timed Minutes:  50  Total Timed Units:  3  Total Untimed Units:  0  Charges Billed/# of units:  3 (1NMR, 2TE)      Progress/Current Status    Subjective:     Patient ID: Silvia Cervantes is a 56 y.o. female.  Diagnosis:   1. Decreased ROM of upper extremity     2. Decreased ROM of lower extremity     3. Decreased muscle strength     4. Decreased mobility and endurance     5. Decreased attention Span       Pain: No pain.  Left leg feels heavy.    Objective:     Session initiated with completion on therex as per logged below 1:1 w/ PT, Neuro re-ed standing therex for balance and strengthening of B Le including tandem gait fwd/ Bwd, gait training for 160' with SPC at [facility], sidestepping x 4 trials in //bars with use of 1 UE for balance and endurance training with B UE/LE extremities for reciprocal motion of all limbs on sci-fit x 6 mins at level 3 at > or equal to 50 spm w/o rest. Pt performed gait training in gym on level surface 160 ft with SPC.  Pt performed therex per log x 30'.       Date  11/20/17 11/17/17 11/15/17 11/13/17 11/10/17 11/9/17 11/7/17 11/3/17 11/2/17 10/31/17 10/27/17 10/23/17   VISIT 13 12 11 10 9 8 7 6 5 4 3 2    Gcode 3/10 2/10 1/10 10/10 9/10 8/10 7/10 6 of 10 5 of 10 4 of 10 3 of 10 2 of 10   FOTO D/C  dc 10/10 9/10 8/10 7 of 10 6 of 10 DONE..   NEXT       MT --   --       --   -   Eliseo quad stretch MT MT mT NT 30"x3 B 3x30" B         Long Sit HS MT MT MT Sup HSS 2x30" L MT Sup HSS 2x30" L  Sup HSS 2x30" B   MT Next OOT MT MT   Gastroc Str. MT MT  NT       On fitter 3 x 30'' MT MT   Sci fit  L3 6' L3 x6' L3 x5 L2 10' L2 8' L2 8' L2 8' L2 8' L2 8' L2 8'   L2 6 min    TA -- --  NT 5"x15 5" x 15         QS -- --  NT 5"x15 5"x 15 5x10 5x10 5x10 5"X10 5"X10 5"x10   SAQ -- --  NT 2x10 2# B 2x10 2# B 1.5 2x15 B 1.5 " "2x15 B 1.5 2x15 B 1.5 2x15 B 1.5 2x15 B 1.5 2x10 B   SLR 3x10 B  2# NT 2x10 B  2# 2x10 2# B 2x10 2# B 2x10 2# B 2 x 10 B  1.5 2x15 B 1.5 2x15 B 1.5 2x15 B 1.5 2x15 B 1.5 2x10 B    SL Abd 3x10 B  2# NT 2x10 B  2# 2x10 2# B 2x10 2# B 2x10 2# B   2 x 10 B 2x10 B 2x10 B 2x10 B 2x10 B 2x10 B   Lumbar bridge 3x10 B w/ hip add 3x10 B  --  2x10 B  2x10 B 2x1 0B 2x10 2x10 -   Drew Hip Add -- --  --       W/ leg press 5"x15 5"x15   LAQs 2x15 B 2# 2x15 B 2# 2x10 B 2# 2x10 B 2# NT 2x10 B 2# 2 x 10 B  1.5# 2x10 B  1.5 # 2x10 B  1.5 # 2x10 B   1.5 # 2x10 B   1.5 # 2x10 B   1.5 #   Seated Hip Flex 2x15 B 2# 2x15 B 2# 2x10 B 2# 2x10 B 2# NT 2x10 B 2# 2 x 10 B  With yellow theraband    2x10 B  1.5# 2x10 B  1.5# 2x10 B  1.5#   Cybex   Leg Press 4.5 B  3x10  w/ball 4.5 B 2x10  w/ball  OOT/next  4.5 B 2x10  w/ball NT 3.0 B 2 x 10 w/ ball squeeze oot 3.0 B 2 x 10 w/ ball squeeze   -   TKE -- -- - --       --   -   Hip Abd -- -- - --       --   -   Hip Flex -- -- - --       --   -   Hip Ext -- -- - --       --   -   HS Curls 2x10 standing NT 2x20 RTB OOT /next  RTB 2 x 10 B NT YTB 2x10   EOM YTB 2x10   EOM YTB 2x10   EOM YTB 2x10   EOM -   Knee Ext -- -- - --       --   -   Step Ups -- -- - --       --   -   Step Downs -- -- - --       --   -   Heel raises 3x10 B 2x10 B 2x20 B  // NT/OOT  2x10 B NT 2x10 B 2x10 B 2 x 10 B       Gait note See note  160' w/ SPC See note 200 ft with SPC See note 250 ft with SPC       See note NOTE  balance -   balance note Next  next   NT note note See note   -   Initials JEANETTE REID JA 1/6 FS JA 5/6 MM 4/6 SM SPTA/JA 3/6 JA 2/6 JA 1/6 FS JA 2/6 JA 1/6         Assessment:     Pt required cueing for increased speed and repeated instructions on performance; however pt improved throughout visit. Pt tolerated increased reps well, and at time would need 1-2 tries in getting started with TE until no issue. Pt performed standing balance with feet together x 1 min without LOB and 1/2 staggered stance B x1 min each " without LOB however challenged (increase to full staggered stance and foam double legged standing next visit). Pt demo improved gait pattern with nearly full B knee extension in stance B, increased chantell, increased B step lengths, and no LOB evident.      Patient Education/Response:     CONT HEP and add HS stretch.     Plans and Goals:       Con t to advance PT as per POC, advance as appropriate  Short Term Goals (4 Weeks):   1. Pt will be I in HEP for progressive strengthening.  2. Pt will ambulate for 50' with SPC at [facility] for beginning community distances and increased mobility. Progressed with QC and Heidi  3. Pt will ambulate 500' with RW at [facility] for increased endurance and functional mobility. MET  4. Pt will perform STS from 17 inch chair with min-mod upper extremity support for increased functional mobility.  5. Pt will be I in all bed mobility for increased functional mobility. MET  6. Pt will demo proper safety in w/c, ambulation, and car/wheelchair transfers for decreased fall risk. MET  7. Pt will stand with feet together x30 secs without LOB for increased balance.     Long Term Goals (8 Weeks):   1. Pt will demo WFL AROM in BLE for increased functional mobility.  2. Pt will ambulate with SPC for 600' on level surfaces at >/= SPV for increased community distances and mobility.  3. Pt will ambulate on outside surfaces at SBA with SPC for increased community mobility.  4. Pt will score </= 30'' on TUG to demo decreased fall risk and increased functional mobility.  5. Pt will perform STS without UE support for 5x to demo increased functional strength.  6. Pt will negotiate 12 stairs at Mod I for increased community negotiations.  7. Pt will have no falls throughout therapy to display improved safety and increased attention/cognition.  8. Pt will perform 1/2 tandem standing x30 secs B without LOB for increased balance in gait stance.

## 2017-11-22 ENCOUNTER — CLINICAL SUPPORT (OUTPATIENT)
Dept: REHABILITATION | Facility: HOSPITAL | Age: 57
End: 2017-11-22
Attending: FAMILY MEDICINE
Payer: COMMERCIAL

## 2017-11-22 ENCOUNTER — OFFICE VISIT (OUTPATIENT)
Dept: RHEUMATOLOGY | Facility: CLINIC | Age: 57
End: 2017-11-22
Payer: COMMERCIAL

## 2017-11-22 ENCOUNTER — LAB VISIT (OUTPATIENT)
Dept: LAB | Facility: HOSPITAL | Age: 57
End: 2017-11-22
Attending: INTERNAL MEDICINE
Payer: COMMERCIAL

## 2017-11-22 VITALS
RESPIRATION RATE: 18 BRPM | HEIGHT: 61 IN | BODY MASS INDEX: 23.27 KG/M2 | SYSTOLIC BLOOD PRESSURE: 140 MMHG | HEART RATE: 76 BPM | DIASTOLIC BLOOD PRESSURE: 84 MMHG | WEIGHT: 123.25 LBS

## 2017-11-22 DIAGNOSIS — M62.81 DECREASED MUSCLE STRENGTH: ICD-10-CM

## 2017-11-22 DIAGNOSIS — Z79.899 ENCOUNTER FOR MONITORING ARAVA THERAPY: ICD-10-CM

## 2017-11-22 DIAGNOSIS — M25.669 DECREASED ROM OF LOWER EXTREMITY: ICD-10-CM

## 2017-11-22 DIAGNOSIS — M06.9 RHEUMATOID ARTHRITIS INVOLVING MULTIPLE JOINTS: Primary | ICD-10-CM

## 2017-11-22 DIAGNOSIS — M06.9 RHEUMATOID ARTHRITIS INVOLVING MULTIPLE JOINTS: ICD-10-CM

## 2017-11-22 DIAGNOSIS — Z74.09 DECREASED MOBILITY AND ENDURANCE: ICD-10-CM

## 2017-11-22 DIAGNOSIS — Z51.81 ENCOUNTER FOR MONITORING ARAVA THERAPY: ICD-10-CM

## 2017-11-22 DIAGNOSIS — R41.840 DECREASED ATTENTION SPAN: ICD-10-CM

## 2017-11-22 LAB
ALBUMIN SERPL BCP-MCNC: 3.6 G/DL
ALP SERPL-CCNC: 135 U/L
ALT SERPL W/O P-5'-P-CCNC: 20 U/L
ANION GAP SERPL CALC-SCNC: 10 MMOL/L
AST SERPL-CCNC: 18 U/L
BASOPHILS # BLD AUTO: 0.01 K/UL
BASOPHILS NFR BLD: 0.1 %
BILIRUB SERPL-MCNC: 0.4 MG/DL
BUN SERPL-MCNC: 13 MG/DL
CALCIUM SERPL-MCNC: 9.9 MG/DL
CHLORIDE SERPL-SCNC: 104 MMOL/L
CO2 SERPL-SCNC: 28 MMOL/L
CREAT SERPL-MCNC: 0.7 MG/DL
CRP SERPL-MCNC: 3.8 MG/L
DIFFERENTIAL METHOD: ABNORMAL
EOSINOPHIL # BLD AUTO: 0.2 K/UL
EOSINOPHIL NFR BLD: 3 %
ERYTHROCYTE [DISTWIDTH] IN BLOOD BY AUTOMATED COUNT: 13.7 %
ERYTHROCYTE [SEDIMENTATION RATE] IN BLOOD BY WESTERGREN METHOD: 52 MM/HR
EST. GFR  (AFRICAN AMERICAN): >60 ML/MIN/1.73 M^2
EST. GFR  (NON AFRICAN AMERICAN): >60 ML/MIN/1.73 M^2
GLUCOSE SERPL-MCNC: 89 MG/DL
HCT VFR BLD AUTO: 41.6 %
HGB BLD-MCNC: 13.4 G/DL
LYMPHOCYTES # BLD AUTO: 1.8 K/UL
LYMPHOCYTES NFR BLD: 23.9 %
MCH RBC QN AUTO: 31.5 PG
MCHC RBC AUTO-ENTMCNC: 32.2 G/DL
MCV RBC AUTO: 98 FL
MONOCYTES # BLD AUTO: 0.7 K/UL
MONOCYTES NFR BLD: 9.8 %
NEUTROPHILS # BLD AUTO: 4.6 K/UL
NEUTROPHILS NFR BLD: 63.1 %
PLATELET # BLD AUTO: 394 K/UL
PMV BLD AUTO: 9.7 FL
POTASSIUM SERPL-SCNC: 3.5 MMOL/L
PROT SERPL-MCNC: 7.9 G/DL
RBC # BLD AUTO: 4.26 M/UL
SODIUM SERPL-SCNC: 142 MMOL/L
WBC # BLD AUTO: 7.36 K/UL

## 2017-11-22 PROCEDURE — 97112 NEUROMUSCULAR REEDUCATION: CPT | Mod: PN

## 2017-11-22 PROCEDURE — 36415 COLL VENOUS BLD VENIPUNCTURE: CPT

## 2017-11-22 PROCEDURE — 99999 PR PBB SHADOW E&M-EST. PATIENT-LVL IV: CPT | Mod: PBBFAC,,, | Performed by: INTERNAL MEDICINE

## 2017-11-22 PROCEDURE — 99214 OFFICE O/P EST MOD 30 MIN: CPT | Mod: S$GLB,,, | Performed by: INTERNAL MEDICINE

## 2017-11-22 PROCEDURE — 97110 THERAPEUTIC EXERCISES: CPT | Mod: PN

## 2017-11-22 PROCEDURE — 80053 COMPREHEN METABOLIC PANEL: CPT

## 2017-11-22 PROCEDURE — 85652 RBC SED RATE AUTOMATED: CPT

## 2017-11-22 PROCEDURE — 85025 COMPLETE CBC W/AUTO DIFF WBC: CPT

## 2017-11-22 PROCEDURE — 86140 C-REACTIVE PROTEIN: CPT

## 2017-11-22 NOTE — PROGRESS NOTES
"TIME RECORD    Date:  11/22/2017    Start Time:  1105  Stop Time:  1150    PROCEDURES:    TIMED  Procedure Min.   TE 30   NMR 15                 UNTIMED  Procedure Min.             Total Timed Minutes:  45  Total Timed Units:  3  Total Untimed Units:  0  Charges Billed/# of units:  3 (1NMR, 2TE)      Progress/Current Status    Subjective:     Patient ID: Silvia Cervantes is a 56 y.o. female.  Diagnosis:   1. Decreased ROM of upper extremity     2. Decreased ROM of lower extremity     3. Decreased muscle strength     4. Decreased mobility and endurance     5. Decreased attention Span       Pain:pt agreeable to PT session. Reports no new complaints of pain.     Objective:     Session initiated with endurance training with B UE/LE extremities for reciprocal motion of all limbs on sci-fit x 6 mins at level 3 at > or equal to 50 spm w/o rest. Pt then completed therex as per logged below 1:1 w/ PTA x30 min , Neuro re-ed standing therex for balance and strengthening of B Le including tandem gait fwd/ Bwd.  Pt ambulated in gym on level surface 160 ft with SPC w/ SBA for safety only.      Date  11/22/17 11/20/17 11/17/17 11/15/17 11/13/17 11/10/17 11/9/17 11/7/17 11/3/17 11/2/17 10/31/17 10/27/17 10/23/17   VISIT 14 13 12 11 10 9 8 7 6 5 4 3 2    Gcode 4/10 3/10 2/10 1/10 10/10 9/10 8/10 7/10 6 of 10 5 of 10 4 of 10 3 of 10 2 of 10   FOTO  D/C  dc 10/10 9/10 8/10 7 of 10 6 of 10 DONE..   NEXT       MT  --   --       --   -   Eliseo quad stretch NT MT MT mT NT 30"x3 B 3x30" B         Long Sit HS NT MT MT MT Sup HSS 2x30" L MT Sup HSS 2x30" L  Sup HSS 2x30" B   MT Next OOT MT MT   Gastroc Str. NT MT MT  NT       On fitter 3 x 30'' MT MT   Sci fit  L3 6' L3 6' L3 x6' L3 x5 L2 10' L2 8' L2 8' L2 8' L2 8' L2 8' L2 8'   L2 6 min    TA  -- --  NT 5"x15 5" x 15         QS  -- --  NT 5"x15 5"x 15 5x10 5x10 5x10 5"X10 5"X10 5"x10   SAQ  -- --  NT 2x10 2# B 2x10 2# B 1.5 2x15 B 1.5 2x15 B 1.5 2x15 B 1.5 2x15 B 1.5 2x15 B 1.5 2x10 B   SLR " "NT 3x10 B  2# NT 2x10 B  2# 2x10 2# B 2x10 2# B 2x10 2# B 2 x 10 B  1.5 2x15 B 1.5 2x15 B 1.5 2x15 B 1.5 2x15 B 1.5 2x10 B    SL Abd NT 3x10 B  2# NT 2x10 B  2# 2x10 2# B 2x10 2# B 2x10 2# B   2 x 10 B 2x10 B 2x10 B 2x10 B 2x10 B 2x10 B   Lumbar bridge NT 3x10 B w/ hip add 3x10 B  --  2x10 B  2x10 B 2x1 0B 2x10 2x10 -   Drew Hip Add  -- --  --       W/ leg press 5"x15 5"x15   LAQs NT 2x15 B 2# 2x15 B 2# 2x10 B 2# 2x10 B 2# NT 2x10 B 2# 2 x 10 B  1.5# 2x10 B  1.5 # 2x10 B  1.5 # 2x10 B   1.5 # 2x10 B   1.5 # 2x10 B   1.5 #   Seated Hip Flex NT 2x15 B 2# 2x15 B 2# 2x10 B 2# 2x10 B 2# NT 2x10 B 2# 2 x 10 B  With yellow theraband    2x10 B  1.5# 2x10 B  1.5# 2x10 B  1.5#   Cybex   Leg Press 4.5 B  3x10  w/ball 4.5 B  3x10  w/ball 4.5 B 2x10  w/ball  OOT/next  4.5 B 2x10  w/ball NT 3.0 B 2 x 10 w/ ball squeeze oot 3.0 B 2 x 10 w/ ball squeeze   -   TKE  -- -- - --       --   -   Hip Abd 2x10 B // -- -- - --       --   -   Hip Flex 2x10 B // -- -- - --       --   -   Hip Ext 2x10 B // -- -- - --       --   -   HS Curls 2x10 standing 2x10 standing NT 2x20 RTB OOT /next  RTB 2 x 10 B NT YTB 2x10   EOM YTB 2x10   EOM YTB 2x10   EOM YTB 2x10   EOM -   Knee Ext  -- -- - --       --   -   Step Ups  -- -- - --       --   -   Step Downs  -- -- - --       --   -   Heel raises 3x10 B 3x10 B 2x10 B 2x20 B  // NT/OOT  2x10 B NT 2x10 B 2x10 B 2 x 10 B       Gait See note  160' w/ SPC note See note  160' w/ SPC See note 200 ft with SPC See note 250 ft with SPC       See note NOTE  balance -   balance  note Next  next   NT note note See note   -   Initials JA 1/6 JEANETTE REID JA 1/6 FS  5/6 MM 4/6 SM SPTA/MARY ANN 3/6 JA 2/6  1/6 FS  2/6  1/6         Assessment:   Pt completed session with no reports of pain, no adverse reactions to added standing therex.   Today's session focused mainly on standing and weightbearing activities.   Patient Education/Response:     CONT HEP     Plans and Goals:       Con t to advance PT as per POC, advance as " appropriate  Short Term Goals (4 Weeks):   1. Pt will be I in HEP for progressive strengthening.  2. Pt will ambulate for 50' with SPC at [facility] for beginning community distances and increased mobility. Progressed with QC and Heidi  3. Pt will ambulate 500' with RW at [facility] for increased endurance and functional mobility. MET  4. Pt will perform STS from 17 inch chair with min-mod upper extremity support for increased functional mobility.  5. Pt will be I in all bed mobility for increased functional mobility. MET  6. Pt will demo proper safety in w/c, ambulation, and car/wheelchair transfers for decreased fall risk. MET  7. Pt will stand with feet together x30 secs without LOB for increased balance.     Long Term Goals (8 Weeks):   1. Pt will demo WFL AROM in BLE for increased functional mobility.  2. Pt will ambulate with SPC for 600' on level surfaces at >/= SPV for increased community distances and mobility.  3. Pt will ambulate on outside surfaces at SBA with SPC for increased community mobility.  4. Pt will score </= 30'' on TUG to demo decreased fall risk and increased functional mobility.  5. Pt will perform STS without UE support for 5x to demo increased functional strength.  6. Pt will negotiate 12 stairs at Mod I for increased community negotiations.  7. Pt will have no falls throughout therapy to display improved safety and increased attention/cognition.  8. Pt will perform 1/2 tandem standing x30 secs B without LOB for increased balance in gait stance.
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
## 2017-11-27 ENCOUNTER — LAB VISIT (OUTPATIENT)
Dept: LAB | Facility: HOSPITAL | Age: 57
End: 2017-11-27
Attending: INTERNAL MEDICINE
Payer: COMMERCIAL

## 2017-11-27 ENCOUNTER — CLINICAL SUPPORT (OUTPATIENT)
Dept: REHABILITATION | Facility: HOSPITAL | Age: 57
End: 2017-11-27
Attending: FAMILY MEDICINE
Payer: COMMERCIAL

## 2017-11-27 DIAGNOSIS — Z74.09 DECREASED MOBILITY AND ENDURANCE: ICD-10-CM

## 2017-11-27 DIAGNOSIS — R41.840 DECREASED ATTENTION SPAN: ICD-10-CM

## 2017-11-27 DIAGNOSIS — M62.81 DECREASED MUSCLE STRENGTH: ICD-10-CM

## 2017-11-27 DIAGNOSIS — M06.9 RHEUMATOID ARTHRITIS INVOLVING MULTIPLE JOINTS: ICD-10-CM

## 2017-11-27 DIAGNOSIS — M25.669 DECREASED ROM OF LOWER EXTREMITY: ICD-10-CM

## 2017-11-27 PROCEDURE — 97110 THERAPEUTIC EXERCISES: CPT | Mod: PN

## 2017-11-27 PROCEDURE — 36415 COLL VENOUS BLD VENIPUNCTURE: CPT | Mod: PO

## 2017-11-27 PROCEDURE — 86480 TB TEST CELL IMMUN MEASURE: CPT

## 2017-11-27 PROCEDURE — 97112 NEUROMUSCULAR REEDUCATION: CPT | Mod: PN

## 2017-11-27 NOTE — PROGRESS NOTES
"TIME RECORD    Date:  11/27/2017    Start Time:  1100  Stop Time:  1150    PROCEDURES:    TIMED  Procedure Min.   TE 30   NMR 15                 UNTIMED  Procedure Min.             Total Timed Minutes:  45  Total Timed Units:  3  Total Untimed Units:  0  Charges Billed/# of units:  3 (1NMR, 2TE)      Progress/Current Status    Subjective:     Patient ID: Silvia Cervantes is a 56 y.o. female.  Diagnosis:   1. Decreased ROM of upper extremity     2. Decreased ROM of lower extremity     3. Decreased muscle strength     4. Decreased mobility and endurance     5. Decreased attention Span       Pain:pt agreeable to PT session. Reports no she has some left anterior hip soreness today from shopping at [employer].     Objective:     Session initiated with endurance training with B UE/LE extremities for reciprocal motion of all limbs on sci-fit x 6 mins at level 3 at > or equal to 50 spm w/o rest. Pt then completed therex as per logged below 1:1 w/ PTA x30 min , Neuro re-ed standing therex for balance and strengthening of B Le including tandem gait fwd/ Bwd x 15 min       Date  11/27/17 11/22/17 11/20/17 11/17/17 11/15/17 11/13/17 11/10/17 11/9/17 11/7/17 11/3/17 11/2/17 10/31/17 10/27/17 10/23/17   VISIT 15 14 13 12 11 10 9 8 7 6 5 4 3 2    Gcode 5/10 4/10 3/10 2/10 1/10 10/10 9/10 8/10 7/10 6 of 10 5 of 10 4 of 10 3 of 10 2 of 10   FOTO   D/C  dc 10/10 9/10 8/10 7 of 10 6 of 10 DONE..   NEXT       MT   --   --       --   -   Eliseo quad stretch MT NT MT MT mT NT 30"x3 B 3x30" B         Long Sit HS MT NT MT MT MT Sup HSS 2x30" L MT Sup HSS 2x30" L  Sup HSS 2x30" B   MT Next OOT MT MT   Gastroc Str. MT NT MT MT  NT       On fitter 3 x 30'' MT MT   Sci fit  L3 6 L3 6' L3 6' L3 x6' L3 x5 L2 10' L2 8' L2 8' L2 8' L2 8' L2 8' L2 8'   L2 6 min    TA   -- --  NT 5"x15 5" x 15         QS   -- --  NT 5"x15 5"x 15 5x10 5x10 5x10 5"X10 5"X10 5"x10   SAQ   -- --  NT 2x10 2# B 2x10 2# B 1.5 2x15 B 1.5 2x15 B 1.5 2x15 B 1.5 2x15 B 1.5 2x15 B " "1.5 2x10 B   SLR  NT 3x10 B  2# NT 2x10 B  2# 2x10 2# B 2x10 2# B 2x10 2# B 2 x 10 B  1.5 2x15 B 1.5 2x15 B 1.5 2x15 B 1.5 2x15 B 1.5 2x10 B    SL Abd  NT 3x10 B  2# NT 2x10 B  2# 2x10 2# B 2x10 2# B 2x10 2# B   2 x 10 B 2x10 B 2x10 B 2x10 B 2x10 B 2x10 B   Lumbar bridge  NT 3x10 B w/ hip add 3x10 B  --  2x10 B  2x10 B 2x1 0B 2x10 2x10 -   Drew Hip Add   -- --  --       W/ leg press 5"x15 5"x15   LAQs  NT 2x15 B 2# 2x15 B 2# 2x10 B 2# 2x10 B 2# NT 2x10 B 2# 2 x 10 B  1.5# 2x10 B  1.5 # 2x10 B  1.5 # 2x10 B   1.5 # 2x10 B   1.5 # 2x10 B   1.5 #   Seated Hip Flex  NT 2x15 B 2# 2x15 B 2# 2x10 B 2# 2x10 B 2# NT 2x10 B 2# 2 x 10 B  With yellow theraband    2x10 B  1.5# 2x10 B  1.5# 2x10 B  1.5#   Cybex   Leg Press  4.5 B  3x10  w/ball 4.5 B  3x10  w/ball 4.5 B 2x10  w/ball  OOT/next  4.5 B 2x10  w/ball NT 3.0 B 2 x 10 w/ ball squeeze oot 3.0 B 2 x 10 w/ ball squeeze   -   TKE   -- -- - --       --   -   Hip Abd 2x10 B 2x10 B // -- -- - --       --   -   Hip Flex 2x10 B 2x10 B // -- -- - --       --   -   Hip Ext 2x10 B 2x10 B // -- -- - --       --   -   HS Curls 2x10 B  Standing  2x10 standing 2x10 standing NT 2x20 RTB OOT /next  RTB 2 x 10 B NT YTB 2x10   EOM YTB 2x10   EOM YTB 2x10   EOM YTB 2x10   EOM -   Mini squats 2x10 in //bars  -- -- - --       --   -   Step Ups   -- -- - --       --   -   Step Downs   -- -- - --       --   -   Heel raises 3x10 B 3x10 B 3x10 B 2x10 B 2x20 B  // NT/OOT  2x10 B NT 2x10 B 2x10 B 2 x 10 B       Gait 100 w. SPC See note  160' w/ SPC note See note  160' w/ SPC See note 200 ft with SPC See note 250 ft with SPC       See note NOTE  balance -   balance   note Next  next   NT note note See note   -   Initials JA 2/6 JA 1/6 JEANETTE JEANETTE JA 1/6 FS JA 5/6 MM 4/6 SM SPTA/JA 3/6 JA 2/6 JA 1/6 FS  2/6 JA 1/6         Assessment:   Pt completed session with no reports of increased pain. Pt demonstrating good tolerance to standing activities today. Added mini squats 2x10   Patient Education/Response: "     CONT HEP     Plans and Goals:       Con t to advance PT as per POC, advance as appropriate  Short Term Goals (4 Weeks):   1. Pt will be I in HEP for progressive strengthening.  2. Pt will ambulate for 50' with SPC at CGA for beginning community distances and increased mobility. Progressed with QC and Heidi  3. Pt will ambulate 500' with RW at SPV for increased endurance and functional mobility. MET  4. Pt will perform STS from 17 inch chair with min-mod upper extremity support for increased functional mobility.  5. Pt will be I in all bed mobility for increased functional mobility. MET  6. Pt will demo proper safety in w/c, ambulation, and car/wheelchair transfers for decreased fall risk. MET  7. Pt will stand with feet together x30 secs without LOB for increased balance.     Long Term Goals (8 Weeks):   1. Pt will demo WFL AROM in BLE for increased functional mobility.  2. Pt will ambulate with SPC for 600' on level surfaces at >/= SPV for increased community distances and mobility.  3. Pt will ambulate on outside surfaces at SBA with SPC for increased community mobility.  4. Pt will score </= 30'' on TUG to demo decreased fall risk and increased functional mobility.  5. Pt will perform STS without UE support for 5x to demo increased functional strength.  6. Pt will negotiate 12 stairs at Mod I for increased community negotiations.  7. Pt will have no falls throughout therapy to display improved safety and increased attention/cognition.  8. Pt will perform 1/2 tandem standing x30 secs B without LOB for increased balance in gait stance.

## 2017-11-29 ENCOUNTER — CLINICAL SUPPORT (OUTPATIENT)
Dept: REHABILITATION | Facility: HOSPITAL | Age: 57
End: 2017-11-29
Attending: FAMILY MEDICINE
Payer: COMMERCIAL

## 2017-11-29 DIAGNOSIS — M62.81 DECREASED MUSCLE STRENGTH: ICD-10-CM

## 2017-11-29 DIAGNOSIS — R41.840 DECREASED ATTENTION SPAN: ICD-10-CM

## 2017-11-29 DIAGNOSIS — M25.669 DECREASED ROM OF LOWER EXTREMITY: ICD-10-CM

## 2017-11-29 DIAGNOSIS — Z74.09 DECREASED MOBILITY AND ENDURANCE: ICD-10-CM

## 2017-11-29 LAB
MITOGEN NIL: 0.44 IU/ML
NIL: 0.03 IU/ML
TB ANTIGEN NIL: 0.01 IU/ML
TB ANTIGEN: 0.03 IU/ML
TB GOLD: ABNORMAL

## 2017-11-29 PROCEDURE — 97116 GAIT TRAINING THERAPY: CPT | Mod: PN

## 2017-11-29 PROCEDURE — 97110 THERAPEUTIC EXERCISES: CPT | Mod: PN

## 2017-11-29 PROCEDURE — 97112 NEUROMUSCULAR REEDUCATION: CPT | Mod: PN

## 2017-11-29 NOTE — PROGRESS NOTES
"TIME RECORD    Date:  11/29/2017    Start Time:  1100  Stop Time:  1200    PROCEDURES:    TIMED  Procedure Min.   Neuro re-edu 15   Therex 35   Gait 10     Total Timed Minutes:  60  Total Timed Units:  4  Total Untimed Units:  0  Charges Billed/# of units:  4  TEx2, NMR x 1, Gait x 1      Progress/Current Status    Subjective:     Patient ID: Silvia Cervantes is a 56 y.o. female.  Diagnosis:   1. Decreased ROM of upper extremity     2. Decreased ROM of lower extremity     3. Decreased muscle strength     4. Decreased mobility and endurance     5. Decreased attention Span       Patient reports doing HEP,  "can you stretch me - it really hellps with the tightness."    Objective:     Session initiated with endurance training with B UE/LE extremities for reciprocal motion of all limbs on sci-fit x 7 mins at level 3 at > or equal to 50 spm w/o rest. Pt then completed therex as per logged below 1:1 w/ PTA x 35 min . Worked on gait with SPC and CGA x 140', verbal cues for sequencing and technique, then additional 100' at end of session.       Date  11/29/17 11/27/17 11/22/17 11/20/17 11/17/17 11/15/17 11/13/17 11/10/17 11/9/17 11/7/17 11/3/17 11/2/17 10/31/17 10/27/17 10/23/17   VISIT 16 15 14 13 12 11 10 9 8 7 6 5 4 3 2    Gcode 6/10 5/10 4/10 3/10 2/10 1/10 10/10 9/10 8/10 7/10 6 of 10 5 of 10 4 of 10 3 of 10 2 of 10   FOTO    D/C  dc 10/10 9/10 8/10 7 of 10 6 of 10 DONE..   NEXT       MT    --   --       --   -   Eliseo quad stretch MT MT NT MT MT mT NT 30"x3 B 3x30" B         Long Sit HS MT MT NT MT MT MT Sup HSS 2x30" L MT Sup HSS 2x30" L  Sup HSS 2x30" B   MT Next OOT MT MT   Gastroc Str. MT MT NT MT MT  NT       On fitter 3 x 30'' MT MT   Sci fit  L3  7' L3   6' L3 6' L3 6' L3 x6' L3 x5 L2 10' L2 8' L2 8' L2 8' L2 8' L2 8' L2 8'   L2 6 min    TA    -- --  NT 5"x15 5" x 15         QS    -- --  NT 5"x15 5"x 15 5x10 5x10 5x10 5"X10 5"X10 5"x10   SAQ    -- --  NT 2x10 2# B 2x10 2# B 1.5 2x15 B 1.5 2x15 B 1.5 2x15 B 1.5 " "2x15 B 1.5 2x15 B 1.5 2x10 B   SLR NT  NT 3x10 B  2# NT 2x10 B  2# 2x10 2# B 2x10 2# B 2x10 2# B 2 x 10 B  1.5 2x15 B 1.5 2x15 B 1.5 2x15 B 1.5 2x15 B 1.5 2x10 B    SL Abd NT  NT 3x10 B  2# NT 2x10 B  2# 2x10 2# B 2x10 2# B 2x10 2# B   2 x 10 B 2x10 B 2x10 B 2x10 B 2x10 B 2x10 B   Lumbar bridge NT  NT 3x10 B w/ hip add 3x10 B  --  2x10 B  2x10 B 2x1 0B 2x10 2x10 -   Drew Hip Add NT   -- --  --       W/ leg press 5"x15 5"x15   LAQs NT  NT 2x15 B 2# 2x15 B 2# 2x10 B 2# 2x10 B 2# NT 2x10 B 2# 2 x 10 B  1.5# 2x10 B  1.5 # 2x10 B  1.5 # 2x10 B   1.5 # 2x10 B   1.5 # 2x10 B   1.5 #   Seated Hip Flex NT  NT 2x15 B 2# 2x15 B 2# 2x10 B 2# 2x10 B 2# NT 2x10 B 2# 2 x 10 B  With yellow theraband    2x10 B  1.5# 2x10 B  1.5# 2x10 B  1.5#   Cybex   Leg Press 4.5 B  2x15  w/ball  4.5 B  3x10  w/ball 4.5 B  3x10  w/ball 4.5 B 2x10  w/ball  OOT/next  4.5 B 2x10  w/ball NT 3.0 B 2 x 10 w/ ball squeeze oot 3.0 B 2 x 10 w/ ball squeeze   -   TKE    -- -- - --       --   -   Hip Abd // 2x12 B 2x10 B 2x10 B // -- -- - --       --   -   Hip Flex // 2x12 B 2x10 B 2x10 B // -- -- - --       --   -   Hip Ext // 2x12 B 2x10 B 2x10 B // -- -- - --       --   -   HS Curls // 2x12 B 2x10 B  Standing  2x10 standing 2x10 standing NT 2x20 RTB OOT /next  RTB 2 x 10 B NT YTB 2x10   EOM YTB 2x10   EOM YTB 2x10   EOM YTB 2x10   EOM -   Mini squats // 2x12 B 2x10 in //bars  -- -- - --       --   -   Step Ups    -- -- - --       --   -   Step Downs    -- -- - --       --   -   Heel raises 3x10 DL 3x10 B 3x10 B 3x10 B 2x10 B 2x20 B  // NT/OOT  2x10 B NT 2x10 B 2x10 B 2 x 10 B       Gait note 100 w. SPC See note  160' w/ SPC note See note  160' w/ SPC See note 200 ft with SPC See note 250 ft with SPC       See note NOTE  balance -   balance    note Next  next   NT note note See note   -   Initials DH 3/6 JA 2/6 JA 1/6 JEANETTE REID JA 1/6 FS JA 5/6 MM 4/6 SM SPTA/JA 3/6 JA 2/6 JA 1/6 FS JA 2/6 JA 1/6       Assessment:     Patient able to increase activity with " min verbal cues to stay on task and to maintain count with therex.  Demonstrate good technique with minimal cues for technique.  CGA for safety.    Patient Education/Response:     Patient educated to continue HEP.  Verbalized understanding.    Plans and Goals:     Cont to advance PT as per POC, advance as appropriate.    Short Term Goals (4 Weeks):   1. Pt will be I in HEP for progressive strengthening.  2. Pt will ambulate for 50' with SPC at [facility] for beginning community distances and increased mobility. Progressed with QC and Heidi  3. Pt will ambulate 500' with RW at SPV for increased endurance and functional mobility. MET  4. Pt will perform STS from 17 inch chair with min-mod upper extremity support for increased functional mobility.  5. Pt will be I in all bed mobility for increased functional mobility. MET  6. Pt will demo proper safety in w/c, ambulation, and car/wheelchair transfers for decreased fall risk. MET  7. Pt will stand with feet together x30 secs without LOB for increased balance.     Long Term Goals (8 Weeks):   1. Pt will demo WFL AROM in BLE for increased functional mobility.  2. Pt will ambulate with SPC for 600' on level surfaces at >/= SPV for increased community distances and mobility.  3. Pt will ambulate on outside surfaces at SBA with SPC for increased community mobility.  4. Pt will score </= 30'' on TUG to demo decreased fall risk and increased functional mobility.  5. Pt will perform STS without UE support for 5x to demo increased functional strength.  6. Pt will negotiate 12 stairs at Mod I for increased community negotiations.  7. Pt will have no falls throughout therapy to display improved safety and increased attention/cognition.  8. Pt will perform 1/2 tandem standing x30 secs B without LOB for increased balance in gait stance.

## 2017-11-30 ENCOUNTER — TELEPHONE (OUTPATIENT)
Dept: RHEUMATOLOGY | Facility: CLINIC | Age: 57
End: 2017-11-30

## 2017-12-01 ENCOUNTER — CLINICAL SUPPORT (OUTPATIENT)
Dept: REHABILITATION | Facility: HOSPITAL | Age: 57
End: 2017-12-01
Attending: FAMILY MEDICINE
Payer: COMMERCIAL

## 2017-12-01 DIAGNOSIS — R41.840 DECREASED ATTENTION SPAN: ICD-10-CM

## 2017-12-01 DIAGNOSIS — M25.669 DECREASED ROM OF LOWER EXTREMITY: ICD-10-CM

## 2017-12-01 DIAGNOSIS — M62.81 DECREASED MUSCLE STRENGTH: ICD-10-CM

## 2017-12-01 DIAGNOSIS — Z74.09 DECREASED MOBILITY AND ENDURANCE: ICD-10-CM

## 2017-12-01 PROCEDURE — 97110 THERAPEUTIC EXERCISES: CPT | Mod: PN

## 2017-12-01 PROCEDURE — 97140 MANUAL THERAPY 1/> REGIONS: CPT | Mod: PN

## 2017-12-01 PROCEDURE — 97112 NEUROMUSCULAR REEDUCATION: CPT | Mod: PN

## 2017-12-01 NOTE — PROGRESS NOTES
"TIME RECORD    Date:  12/01/2017    Start Time:  1100  Stop Time: 11:45    PROCEDURES:    TIMED  Procedure Min.   MT 10   TE 15   NMR 20     Total Timed Minutes:  45  Total Timed Units:  3  Total Untimed Units:  0  Charges Billed/# of units:1MT, 1 TE, 1 NMR      Progress/Current Status    Subjective:     Patient ID: Silvia Cervantes is a 56 y.o. female.  Diagnosis:   1. Decreased ROM of upper extremity     2. Decreased ROM of lower extremity     3. Decreased muscle strength     4. Decreased mobility and endurance     5. Decreased attention Span       Pt agreeable to PT session. She states she feels stretching of B LE's has really helped.    Objective:     Session initiated with endurance training with B UE/LE extremities for reciprocal motion of all limbs on sci-fit x 7 mins at level 3 at > or equal to 50 spm w/o rest. Pt positioned in supine for B LE Manual therapy : HSS, Gastroc st, hip add st 30"x3 w/ PTA. Pt then completed therex as per logged below 1:1 w/ PTA x 15 min, NMR including B LE 1 UE use for cone 3 Taps 2x10. Pt ambulated indoors 100 ft w/ SPC on level surfaces w/ CGA.      Date  12/1/17 11/29/17 11/27/17 11/22/17 11/20/17 11/17/17 11/15/17 11/13/17 11/10/17 11/9/17 11/7/17 11/3/17 11/2/17 10/31/17 10/27/17 10/23/17   VISIT 17 16 15 14 13 12 11 10 9 8 7 6 5 4 3 2    Gcode 7/10 6/10 5/10 4/10 3/10 2/10 1/10 10/10 9/10 8/10 7/10 6 of 10 5 of 10 4 of 10 3 of 10 2 of 10   FOTO     D/C  dc 10/10 9/10 8/10 7 of 10 6 of 10 DONE..   NEXT       MT     --   --       --   -   Eliseo quad stretch MT MT MT NT MT MT mT NT 30"x3 B 3x30" B         Long Sit HS MT MT MT NT MT MT MT Sup HSS 2x30" L MT Sup HSS 2x30" L  Sup HSS 2x30" B   MT Next OOT MT MT   Gastroc Str. MT MT MT NT MT MT  NT       On fitter 3 x 30'' MT MT   Sci fit  L4 8 min L3  7' L3   6' L3 6' L3 6' L3 x6' L3 x5 L2 10' L2 8' L2 8' L2 8' L2 8' L2 8' L2 8'   L2 6 min    TA     -- --  NT 5"x15 5" x 15         QS     -- --  NT 5"x15 5"x 15 5x10 5x10 5x10 5"X10 " "5"X10 5"x10   SAQ     -- --  NT 2x10 2# B 2x10 2# B 1.5 2x15 B 1.5 2x15 B 1.5 2x15 B 1.5 2x15 B 1.5 2x15 B 1.5 2x10 B   SLR  NT  NT 3x10 B  2# NT 2x10 B  2# 2x10 2# B 2x10 2# B 2x10 2# B 2 x 10 B  1.5 2x15 B 1.5 2x15 B 1.5 2x15 B 1.5 2x15 B 1.5 2x10 B    SL Abd  NT  NT 3x10 B  2# NT 2x10 B  2# 2x10 2# B 2x10 2# B 2x10 2# B   2 x 10 B 2x10 B 2x10 B 2x10 B 2x10 B 2x10 B   Lumbar bridge  NT  NT 3x10 B w/ hip add 3x10 B  --  2x10 B  2x10 B 2x1 0B 2x10 2x10 -   Drew Hip Add  NT   -- --  --       W/ leg press 5"x15 5"x15   LAQs  NT  NT 2x15 B 2# 2x15 B 2# 2x10 B 2# 2x10 B 2# NT 2x10 B 2# 2 x 10 B  1.5# 2x10 B  1.5 # 2x10 B  1.5 # 2x10 B   1.5 # 2x10 B   1.5 # 2x10 B   1.5 #   Seated Hip Flex  NT  NT 2x15 B 2# 2x15 B 2# 2x10 B 2# 2x10 B 2# NT 2x10 B 2# 2 x 10 B  With yellow theraband    2x10 B  1.5# 2x10 B  1.5# 2x10 B  1.5#   Cybex   Leg Press  4.5 B  2x15  w/ball  4.5 B  3x10  w/ball 4.5 B  3x10  w/ball 4.5 B 2x10  w/ball  OOT/next  4.5 B 2x10  w/ball NT 3.0 B 2 x 10 w/ ball squeeze oot 3.0 B 2 x 10 w/ ball squeeze   -   TKE     -- -- - --       --   -   Hip Abd 2x12 B // 2x12 B 2x10 B 2x10 B // -- -- - --       --   -   Hip Flex 2x12 B // 2x12 B 2x10 B 2x10 B // -- -- - --       --   -   Hip Ext 2x12 B // 2x12 B 2x10 B 2x10 B // -- -- - --       --   -   HS Curls 2x12 b // 2x12 B 2x10 B  Standing  2x10 standing 2x10 standing NT 2x20 RTB OOT /next  RTB 2 x 10 B NT YTB 2x10   EOM YTB 2x10   EOM YTB 2x10   EOM YTB 2x10   EOM -   Mini squats 2x12 B // 2x12 B 2x10 in //bars  -- -- - --       --   -   Step Ups     -- -- - --       --   -   Step Downs     -- -- - --       --   -   Heel raises 3x10 B 3x10 DL 3x10 B 3x10 B 3x10 B 2x10 B 2x20 B  // NT/OOT  2x10 B NT 2x10 B 2x10 B 2 x 10 B       Gait  note 100 w. SPC See note  160' w/ SPC note See note  160' w/ SPC See note 200 ft with SPC See note 250 ft with SPC       See note NOTE  balance -   balance note    note Next  next   NT note note See note   -   Initials JA 4/6 DH 3/6 " JA 2/6 JA 1/6 JEANETTE JEANETTE JA 1/6 FS JA 5/6 MM 4/6 SM SPTA/JA 3/6 JA 2/6 JA 1/6 FS JA 2/6 JA 1/6       Assessment:     Pt able to complete PT session with no reports of pain. No LOB/ SOB noted.     Patient Education/Response:     Patient educated to continue HEP.  Verbalized understanding.    Plans and Goals:     Cont to advance PT as per POC, advance as appropriate.    Short Term Goals (4 Weeks):   1. Pt will be I in HEP for progressive strengthening.  2. Pt will ambulate for 50' with SPC at CGA for beginning community distances and increased mobility. Progressed with QC and Heidi  3. Pt will ambulate 500' with RW at SPV for increased endurance and functional mobility. MET  4. Pt will perform STS from 17 inch chair with min-mod upper extremity support for increased functional mobility.  5. Pt will be I in all bed mobility for increased functional mobility. MET  6. Pt will demo proper safety in w/c, ambulation, and car/wheelchair transfers for decreased fall risk. MET  7. Pt will stand with feet together x30 secs without LOB for increased balance.     Long Term Goals (8 Weeks):   1. Pt will demo WFL AROM in BLE for increased functional mobility.  2. Pt will ambulate with SPC for 600' on level surfaces at >/= SPV for increased community distances and mobility.  3. Pt will ambulate on outside surfaces at SBA with SPC for increased community mobility.  4. Pt will score </= 30'' on TUG to demo decreased fall risk and increased functional mobility.  5. Pt will perform STS without UE support for 5x to demo increased functional strength.  6. Pt will negotiate 12 stairs at Mod I for increased community negotiations.  7. Pt will have no falls throughout therapy to display improved safety and increased attention/cognition.  8. Pt will perform 1/2 tandem standing x30 secs B without LOB for increased balance in gait stance.

## 2017-12-04 ENCOUNTER — CLINICAL SUPPORT (OUTPATIENT)
Dept: REHABILITATION | Facility: HOSPITAL | Age: 57
End: 2017-12-04
Attending: FAMILY MEDICINE
Payer: COMMERCIAL

## 2017-12-04 DIAGNOSIS — M25.669 DECREASED ROM OF LOWER EXTREMITY: ICD-10-CM

## 2017-12-04 DIAGNOSIS — R41.840 DECREASED ATTENTION SPAN: ICD-10-CM

## 2017-12-04 DIAGNOSIS — Z74.09 DECREASED MOBILITY AND ENDURANCE: ICD-10-CM

## 2017-12-04 DIAGNOSIS — M62.81 DECREASED MUSCLE STRENGTH: ICD-10-CM

## 2017-12-04 PROCEDURE — 97112 NEUROMUSCULAR REEDUCATION: CPT | Mod: PN

## 2017-12-04 PROCEDURE — 97116 GAIT TRAINING THERAPY: CPT | Mod: PN

## 2017-12-04 NOTE — PROGRESS NOTES
"  TIME RECORD    Date:  12/04/2017    Start Time:  1105  Stop Time: 11:50    PROCEDURES:    TIMED  Procedure Min.   Gait 30   TE    NMR 15     Total Timed Minutes:  45  Total Timed Units:  3  Total Untimed Units:  0  Charges Billed/# of units: 1 NMR, 2 GT      Progress/Current Status    Subjective:     Patient ID: Silvia Cervantes is a 56 y.o. female.  Diagnosis:   1. Decreased ROM of upper extremity     2. Decreased ROM of lower extremity     3. Decreased muscle strength     4. Decreased mobility and endurance     5. Decreased attention Span       Pt agreeable to PT session, and pt states that she tried to do her HEP at home but not always.  568'  Objective:     Session initiated with endurance training with recumbant bike x 5 mins at level 3. No MT today of :HSS, Gastroc st, hip add st 30"x3 w/ PT. Pt then completed therex as per logged below 1:1 w/ NMR x 15 min including cone tapping in // and standing balance activities, f/b gait training x 30 mins including indoor ambulation with SPC at [facility] with 1 LOB with self recovery in 360 turn to talk to PT. Pt cont gait training with pat stepping 15' x 4 with SPC at [facility], side stepping in // 4 laps with 1 UE use, and side stepping over hurdles in // with 1-2 UE use 3 laps.  Date  12/4/17 12/1/17 11/29/17 11/27/17 11/22/17 11/20/17 11/17/17 11/15/17 11/13/17 11/10/17 11/9/17 11/7/17 11/3/17 11/2/17 10/31/17 10/27/17 10/23/17   VISIT 18 17 16 15 14 13 12 11 10 9 8 7 6 5 4 3 2    Gcode 8/10 7/10 6/10 5/10 4/10 3/10 2/10 1/10 10/10 9/10 8/10 7/10 6 of 10 5 of 10 4 of 10 3 of 10 2 of 10   FOTO      D/C  dc 10/10 9/10 8/10 7 of 10 6 of 10 DONE..   NEXT       MT      --   --       --   -   Eliseo quad stretch NT MT MT MT NT MT MT mT NT 30"x3 B 3x30" B         Long Sit HS Stairs  2x45'' B MT MT MT NT MT MT MT Sup HSS 2x30" L MT Sup HSS 2x30" L  Sup HSS 2x30" B   MT Next OOT MT MT   Gastroc Str. 1x1'' MT MT MT NT MT MT  NT       On fitter 3 x 30'' MT MT   Sci fit  Bike L3  5 " "mins L4 8 min L3  7' L3   6' L3 6' L3 6' L3 x6' L3 x5 L2 10' L2 8' L2 8' L2 8' L2 8' L2 8' L2 8'   L2 6 min    TA --     -- --  NT 5"x15 5" x 15         QS --     -- --  NT 5"x15 5"x 15 5x10 5x10 5x10 5"X10 5"X10 5"x10   SAQ --     -- --  NT 2x10 2# B 2x10 2# B 1.5 2x15 B 1.5 2x15 B 1.5 2x15 B 1.5 2x15 B 1.5 2x15 B 1.5 2x10 B   SLR --  NT  NT 3x10 B  2# NT 2x10 B  2# 2x10 2# B 2x10 2# B 2x10 2# B 2 x 10 B  1.5 2x15 B 1.5 2x15 B 1.5 2x15 B 1.5 2x15 B 1.5 2x10 B    SL Abd --  NT  NT 3x10 B  2# NT 2x10 B  2# 2x10 2# B 2x10 2# B 2x10 2# B   2 x 10 B 2x10 B 2x10 B 2x10 B 2x10 B 2x10 B   Lumbar bridge --  NT  NT 3x10 B w/ hip add 3x10 B  --  2x10 B  2x10 B 2x1 0B 2x10 2x10 -   Drew Hip Add --  NT   -- --  --       W/ leg press 5"x15 5"x15   LAQs --  NT  NT 2x15 B 2# 2x15 B 2# 2x10 B 2# 2x10 B 2# NT 2x10 B 2# 2 x 10 B  1.5# 2x10 B  1.5 # 2x10 B  1.5 # 2x10 B   1.5 # 2x10 B   1.5 # 2x10 B   1.5 #   Seated Hip Flex --  NT  NT 2x15 B 2# 2x15 B 2# 2x10 B 2# 2x10 B 2# NT 2x10 B 2# 2 x 10 B  With yellow theraband    2x10 B  1.5# 2x10 B  1.5# 2x10 B  1.5#   Cybex   Leg Press Next  4.5 B  2x15  w/ball  4.5 B  3x10  w/ball 4.5 B  3x10  w/ball 4.5 B 2x10  w/ball  OOT/next  4.5 B 2x10  w/ball NT 3.0 B 2 x 10 w/ ball squeeze oot 3.0 B 2 x 10 w/ ball squeeze   -   TKE --     -- -- - --       --   -   Hip Abd NT 2x12 B // 2x12 B 2x10 B 2x10 B // -- -- - --       --   -   Hip Flex NT 2x12 B // 2x12 B 2x10 B 2x10 B // -- -- - --       --   -   Hip Ext NT 2x12 B // 2x12 B 2x10 B 2x10 B // -- -- - --       --   -   HS Curls NT 2x12 b // 2x12 B 2x10 B  Standing  2x10 standing 2x10 standing NT 2x20 RTB OOT /next  RTB 2 x 10 B NT YTB 2x10   EOM YTB 2x10   EOM YTB 2x10   EOM YTB 2x10   EOM -   Mini squats NT 2x12 B // 2x12 B 2x10 in //bars  -- -- - --       --   -   Step Ups --     -- -- - --       --   -   Step Downs --     -- -- - --       --   -   Heel raises 2x15 DL 3x10 B 3x10 DL 3x10 B 3x10 B 3x10 B 2x10 B 2x20 B  // NT/OOT  2x10 B NT " 2x10 B 2x10 B 2 x 10 B       Gait Note  note 100 w. SPC See note  160' w/ SPC note See note  160' w/ SPC See note 200 ft with SPC See note 250 ft with SPC       See note NOTE  balance -   balance Note note    note Next  next   NT note note See note   -   Initials JEANETTE JA 4/6 DH 3/6 JA 2/6 JA 1/6 JEANETTE REID JA 1/6 FS JA 5/6 MM 4/6 SM SPTA/JA 3/6 JA 2/6 JA 1/6 FS JA 2/6 JA 1/6       Assessment:     Pt did have 1 LOB in SPC gait training when she turned quickly to talked to [email or web address]; however did well after min cueing in level ground gait. Pt demo cont B LE HS tightness and requires cont stretching/strengthening along with SPC gait training. PT gave permission to pt to use SPC in home only and RW when in community.     Patient Education/Response:     Patient educated to continue HEP.  Verbalized understanding.    Plans and Goals:     Cont to advance PT as per POC, advance as appropriate.    Short Term Goals (4 Weeks):   1. Pt will be I in HEP for progressive strengthening.  2. Pt will ambulate for 50' with SPC at CGA for beginning community distances and increased mobility. Progressed with QC and Heidi  3. Pt will ambulate 500' with RW at SPV for increased endurance and functional mobility. MET  4. Pt will perform STS from 17 inch chair with min-mod upper extremity support for increased functional mobility.  5. Pt will be I in all bed mobility for increased functional mobility. MET  6. Pt will demo proper safety in w/c, ambulation, and car/wheelchair transfers for decreased fall risk. MET  7. Pt will stand with feet together x30 secs without LOB for increased balance.     Long Term Goals (8 Weeks):   1. Pt will demo WFL AROM in BLE for increased functional mobility.  2. Pt will ambulate with SPC for 600' on level surfaces at >/= SPV for increased community distances and mobility.  3. Pt will ambulate on outside surfaces at SBA with SPC for increased community mobility.  4. Pt will score </= 30'' on TUG to demo decreased fall  risk and increased functional mobility.  5. Pt will perform STS without UE support for 5x to demo increased functional strength.  6. Pt will negotiate 12 stairs at Mod I for increased community negotiations.  7. Pt will have no falls throughout therapy to display improved safety and increased attention/cognition.  8. Pt will perform 1/2 tandem standing x30 secs B without LOB for increased balance in gait stance.

## 2017-12-05 ENCOUNTER — OFFICE VISIT (OUTPATIENT)
Dept: FAMILY MEDICINE | Facility: CLINIC | Age: 57
End: 2017-12-05
Attending: FAMILY MEDICINE
Payer: COMMERCIAL

## 2017-12-05 VITALS
HEIGHT: 61 IN | HEART RATE: 96 BPM | WEIGHT: 122.13 LBS | SYSTOLIC BLOOD PRESSURE: 136 MMHG | OXYGEN SATURATION: 98 % | DIASTOLIC BLOOD PRESSURE: 87 MMHG | BODY MASS INDEX: 23.06 KG/M2

## 2017-12-05 DIAGNOSIS — Z23 IMMUNIZATION DUE: ICD-10-CM

## 2017-12-05 DIAGNOSIS — M54.9 CHRONIC NECK AND BACK PAIN: ICD-10-CM

## 2017-12-05 DIAGNOSIS — R29.898 WEAKNESS OF BOTH LOWER EXTREMITIES: ICD-10-CM

## 2017-12-05 DIAGNOSIS — F33.9 MAJOR DEPRESSION, RECURRENT, CHRONIC: ICD-10-CM

## 2017-12-05 DIAGNOSIS — M54.2 CHRONIC NECK AND BACK PAIN: ICD-10-CM

## 2017-12-05 DIAGNOSIS — G89.29 CHRONIC NECK AND BACK PAIN: ICD-10-CM

## 2017-12-05 DIAGNOSIS — M06.9 RHEUMATOID ARTHRITIS INVOLVING LEFT HAND, UNSPECIFIED RHEUMATOID FACTOR PRESENCE: ICD-10-CM

## 2017-12-05 DIAGNOSIS — I10 ESSENTIAL HYPERTENSION: Primary | ICD-10-CM

## 2017-12-05 DIAGNOSIS — E78.00 HYPERCHOLESTEREMIA: ICD-10-CM

## 2017-12-05 PROCEDURE — 90686 IIV4 VACC NO PRSV 0.5 ML IM: CPT | Mod: S$GLB,,, | Performed by: FAMILY MEDICINE

## 2017-12-05 PROCEDURE — 99214 OFFICE O/P EST MOD 30 MIN: CPT | Mod: 25,S$GLB,, | Performed by: FAMILY MEDICINE

## 2017-12-05 PROCEDURE — 99999 PR PBB SHADOW E&M-EST. PATIENT-LVL III: CPT | Mod: PBBFAC,,, | Performed by: FAMILY MEDICINE

## 2017-12-05 PROCEDURE — 90471 IMMUNIZATION ADMIN: CPT | Mod: S$GLB,,, | Performed by: FAMILY MEDICINE
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
## 2017-12-07 ENCOUNTER — CLINICAL SUPPORT (OUTPATIENT)
Dept: REHABILITATION | Facility: HOSPITAL | Age: 57
End: 2017-12-07
Attending: FAMILY MEDICINE
Payer: COMMERCIAL

## 2017-12-07 DIAGNOSIS — M62.81 DECREASED MUSCLE STRENGTH: ICD-10-CM

## 2017-12-07 DIAGNOSIS — R41.840 DECREASED ATTENTION SPAN: ICD-10-CM

## 2017-12-07 DIAGNOSIS — M25.669 DECREASED ROM OF LOWER EXTREMITY: ICD-10-CM

## 2017-12-07 DIAGNOSIS — Z74.09 DECREASED MOBILITY AND ENDURANCE: ICD-10-CM

## 2017-12-07 PROCEDURE — 97110 THERAPEUTIC EXERCISES: CPT | Mod: PN

## 2017-12-07 PROCEDURE — 97112 NEUROMUSCULAR REEDUCATION: CPT | Mod: PN
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
## 2017-12-08 ENCOUNTER — DOCUMENTATION ONLY (OUTPATIENT)
Dept: REHABILITATION | Facility: HOSPITAL | Age: 57
End: 2017-12-08

## 2017-12-08 DIAGNOSIS — M25.669 DECREASED ROM OF LOWER EXTREMITY: ICD-10-CM

## 2017-12-08 DIAGNOSIS — R41.840 DECREASED ATTENTION SPAN: ICD-10-CM

## 2017-12-08 DIAGNOSIS — Z74.09 DECREASED MOBILITY AND ENDURANCE: ICD-10-CM

## 2017-12-08 DIAGNOSIS — M62.81 DECREASED MUSCLE STRENGTH: ICD-10-CM

## 2017-12-08 NOTE — PROGRESS NOTES
Face to Face PTA Conference performed with Michelle Bowden PTA, Paige Fox PTA, Nikunj Schwartz PTA Reginald Frias PTA regarding patient's current status, overall progress, and plan of care    Dakota Knight, PT     Face to face meeting completed with Dakota Knight PT regarding current status and progress of   Silvia Cullennca .  Nikunj Schwartz PTA

## 2017-12-12 ENCOUNTER — CLINICAL SUPPORT (OUTPATIENT)
Dept: REHABILITATION | Facility: HOSPITAL | Age: 57
End: 2017-12-12
Attending: FAMILY MEDICINE
Payer: COMMERCIAL

## 2017-12-12 DIAGNOSIS — M25.669 DECREASED ROM OF LOWER EXTREMITY: ICD-10-CM

## 2017-12-12 DIAGNOSIS — M62.81 DECREASED MUSCLE STRENGTH: ICD-10-CM

## 2017-12-12 DIAGNOSIS — Z74.09 DECREASED MOBILITY AND ENDURANCE: ICD-10-CM

## 2017-12-12 DIAGNOSIS — R41.840 DECREASED ATTENTION SPAN: ICD-10-CM

## 2017-12-12 PROCEDURE — 97116 GAIT TRAINING THERAPY: CPT | Mod: PN

## 2017-12-12 PROCEDURE — 97110 THERAPEUTIC EXERCISES: CPT | Mod: PN

## 2017-12-12 PROCEDURE — 97140 MANUAL THERAPY 1/> REGIONS: CPT | Mod: PN

## 2017-12-12 NOTE — PROGRESS NOTES
"  TIME RECORD    Date:  12/12/2017    Start Time:  1015  Stop Time: 1105    PROCEDURES:    TIMED  Procedure Min.   MT 10   TE 30   GT 10     Total Timed Minutes:  50  Total Timed Units:  4  Total Untimed Units:  0  Charges Billed/# of units: 1MT, 1 TE, 2 TE      Progress/Current Status    Subjective:     Patient ID: Silvia Cervantes is a 57 y.o. female.  Diagnosis:   1. Decreased ROM of upper extremity     2. Decreased ROM of lower extremity     3. Decreased muscle strength     4. Decreased mobility and endurance     5. Decreased attention Span       PAIN: pt reports she feels "tired:" but agreeable to PT session. She states she has been walking at home with SPC.     Objective:     Session initiated with endurance training with B UE/LE extremities for reciprocal motion of all limbs on sci-fit x 7 mins at level 3 at > or equal to 50 spm w/o rest. Pt positioned in supine for B LE Manual therapy : HSS, Gastroc st, hip add st 30"x3 w/ PTA. Pt then completed therex as per logged below 1:1 w/ PTA x 15 min, NMR including B LE 1 UE use for cone 3 Taps 2x10. Pt ambulated indoors 100 ft w/ SPC on level surfaces w/ CGA.      Date  12/12/17 12/7/17 12/4/17 12/1/17 11/29/17 11/27/17 11/22/17 11/20/17 11/17/17 11/15/17 11/13/17 11/10/17 11/9/17 11/7/17 11/3/17 11/2/17 10/31/17 10/27/17 10/23/17   VISIT  19 18 17 16 15 14 13 12 11 10 9 8 7 6 5 4 3 2    Gcode 10/10 9/10 8/10 7/10 6/10 5/10 4/10 3/10 2/10 1/10 10/10 9/10 8/10 7/10 6 of 10 5 of 10 4 of 10 3 of 10 2 of 10   POC 12/20/17                      FOTO        D/C  dc 10/10 9/10 8/10 7 of 10 6 of 10 DONE..   NEXT       MT  NT      --   --       --   -   Eliseo stretch 3x30'' B 3x30'' B NT MT MT MT NT MT MT mT NT 30"x3 B 3x30" B         Long Sit HS 3x30'' B 3x30'' B Stairs  2x45'' B MT MT MT NT MT MT MT Sup HSS 2x30" L MT Sup HSS 2x30" L  Sup HSS 2x30" B   MT Next OOT MT MT   Gastroc Str. Fitter  3x30'' fitter  Fitter  3x30'' fitter  1x1'' MT MT MT NT MT MT  NT       On fitter 3 " "x 30'' MT MT   Sci fit  Bike L3  5 mins Bike L3  5 mins Bike L3  5 mins L4 8 min L3  7' L3   6' L3 6' L3 6' L3 x6' L3 x5 L2 10' L2 8' L2 8' L2 8' L2 8' L2 8' L2 8'   L2 6 min    TA  -- --     -- --  NT 5"x15 5" x 15         QS  -- --     -- --  NT 5"x15 5"x 15 5x10 5x10 5x10 5"X10 5"X10 5"x10   SAQ - -- --     -- --  NT 2x10 2# B 2x10 2# B 1.5 2x15 B 1.5 2x15 B 1.5 2x15 B 1.5 2x15 B 1.5 2x15 B 1.5 2x10 B   SLR - -- --  NT  NT 3x10 B  2# NT 2x10 B  2# 2x10 2# B 2x10 2# B 2x10 2# B 2 x 10 B  1.5 2x15 B 1.5 2x15 B 1.5 2x15 B 1.5 2x15 B 1.5 2x10 B    SL Abd - -- --  NT  NT 3x10 B  2# NT 2x10 B  2# 2x10 2# B 2x10 2# B 2x10 2# B   2 x 10 B 2x10 B 2x10 B 2x10 B 2x10 B 2x10 B   Lumbar bridge - -- --  NT  NT 3x10 B w/ hip add 3x10 B  --  2x10 B  2x10 B 2x1 0B 2x10 2x10 -   Drew Hip Add - -- --  NT   -- --  --       W/ leg press 5"x15 5"x15   LAQs - -- --  NT  NT 2x15 B 2# 2x15 B 2# 2x10 B 2# 2x10 B 2# NT 2x10 B 2# 2 x 10 B  1.5# 2x10 B  1.5 # 2x10 B  1.5 # 2x10 B   1.5 # 2x10 B   1.5 # 2x10 B   1.5 #   Seated Hip Flex - --   --  NT  NT 2x15 B 2# 2x15 B 2# 2x10 B 2# 2x10 B 2# NT 2x10 B 2# 2 x 10 B  With yellow theraband    2x10 B  1.5# 2x10 B  1.5# 2x10 B  1.5#   Cybex   Leg Press  4.5 B  310  w/ball Next  4.5 B  2x15  w/ball  4.5 B  3x10  w/ball 4.5 B  3x10  w/ball 4.5 B 2x10  w/ball  OOT/next  4.5 B 2x10  w/ball NT 3.0 B 2 x 10 w/ ball squeeze oot 3.0 B 2 x 10 w/ ball squeeze   -   TKE - -- --     -- -- - --       --   -   Hip Abd YTB 2x10 B 2x10 B NT 2x12 B // 2x12 B 2x10 B 2x10 B // -- -- - --       --   -   Hip Flex YTB 2x10 B 2x10 B NT 2x12 B // 2x12 B 2x10 B 2x10 B // -- -- - --       --   -   Hip Ext YTB 2x10 B 2x10 B NT 2x12 B // 2x12 B 2x10 B 2x10 B // -- -- - --       --   -   HS Curls 2x15 // 2x15 // NT 2x12 b // 2x12 B 2x10 B  Standing  2x10 standing 2x10 standing NT 2x20 RTB OOT /next  RTB 2 x 10 B NT YTB 2x10   EOM YTB 2x10   EOM YTB 2x10   EOM YTB 2x10   EOM -   Mini squats 3x10 B // w/ VC 3x10 B // NT 2x12 B " // 2x12 B 2x10 in //bars  -- -- - --       --   -   Step Ups - -- --     -- -- - --       --   -   Step Downs - -- --     -- -- - --       --   -   Heel raises 2x15 DL 2x15 DL 2x15 DL 3x10 B 3x10 DL 3x10 B 3x10 B 3x10 B 2x10 B 2x20 B  // NT/OOT  2x10 B NT 2x10 B 2x10 B 2 x 10 B       Gait note Note Note  note 100 w. SPC See note  160' w/ SPC note See note  160' w/ SPC See note 200 ft with SPC See note 250 ft with SPC       See note NOTE  balance -   balance   Note note    note Next  next   NT note note See note   -   Initials  1/6 JEANETTE REID  4/6 DH 3/6 JA 2/6 JA 1/6 JEANETTE REID  1/6 FS  5/6 MM 4/6 SM SPTA/JA 3/6 JA 2/6 JA 1/6 FS  2/6  1/6       Assessment:     Pt able to complete session with no episodes of pain. Ambulated indoors with no episodes of LOB, follows verbal instructions accordingly on posture awareness and proper step length.     Patient Education/Response:     Patient educated to continue HEP.  Verbalized understanding.    Plans and Goals:     Cont to advance PT as per POC, advance as appropriate.    Short Term Goals (4 Weeks):   1. Pt will be I in HEP for progressive strengthening.  2. Pt will ambulate for 50' with SPC at CGA for beginning community distances and increased mobility. Progressed with QC and Heidi  3. Pt will ambulate 500' with RW at SPV for increased endurance and functional mobility. MET  4. Pt will perform STS from 17 inch chair with min-mod upper extremity support for increased functional mobility.  5. Pt will be I in all bed mobility for increased functional mobility. MET  6. Pt will demo proper safety in w/c, ambulation, and car/wheelchair transfers for decreased fall risk. MET  7. Pt will stand with feet together x30 secs without LOB for increased balance.     Long Term Goals (8 Weeks):   1. Pt will demo WFL AROM in BLE for increased functional mobility.  2. Pt will ambulate with SPC for 600' on level surfaces at >/= SPV for increased community distances and mobility.  3. Pt  will ambulate on outside surfaces at A with SPC for increased community mobility.  4. Pt will score </= 30'' on TUG to demo decreased fall risk and increased functional mobility.  5. Pt will perform STS without UE support for 5x to demo increased functional strength.  6. Pt will negotiate 12 stairs at Mod I for increased community negotiations.  7. Pt will have no falls throughout therapy to display improved safety and increased attention/cognition.  8. Pt will perform 1/2 tandem standing x30 secs B without LOB for increased balance in gait stance.

## 2017-12-14 ENCOUNTER — TELEPHONE (OUTPATIENT)
Dept: FAMILY MEDICINE | Facility: CLINIC | Age: 57
End: 2017-12-14

## 2017-12-14 NOTE — TELEPHONE ENCOUNTER
----- Message from Gayatri Ray sent at 12/14/2017  3:34 PM CST -----  Contact: [phone]/self  Patient requesting to speak with you regarding bringing in FMLA paperwork to be filled out. Please advise.

## 2017-12-15 ENCOUNTER — CLINICAL SUPPORT (OUTPATIENT)
Dept: REHABILITATION | Facility: HOSPITAL | Age: 57
End: 2017-12-15
Attending: FAMILY MEDICINE
Payer: COMMERCIAL

## 2017-12-15 DIAGNOSIS — Z74.09 DECREASED MOBILITY AND ENDURANCE: ICD-10-CM

## 2017-12-15 DIAGNOSIS — M25.669 DECREASED ROM OF LOWER EXTREMITY: ICD-10-CM

## 2017-12-15 DIAGNOSIS — R41.840 DECREASED ATTENTION SPAN: ICD-10-CM

## 2017-12-15 DIAGNOSIS — M62.81 DECREASED MUSCLE STRENGTH: ICD-10-CM

## 2017-12-15 PROCEDURE — 97110 THERAPEUTIC EXERCISES: CPT | Mod: PN

## 2017-12-15 PROCEDURE — 97112 NEUROMUSCULAR REEDUCATION: CPT | Mod: PN

## 2017-12-15 NOTE — PROGRESS NOTES
"  TIME RECORD    Date:  12/15/2017    Start Time:  11:40  Stop Time:  12:30    PROCEDURES:    TIMED  Procedure Min.   TE 15   NMR 35         Total Timed Minutes:  50  Total Timed Units:  4  Total Untimed Units:  0  Charges Billed/# of units: 1 TE, 2 NMR      Progress/Current Status    Subjective:     Patient ID: Silvia Cervantes is a 57 y.o. female.  Diagnosis:   1. Decreased ROM of upper extremity     2. Decreased ROM of lower extremity     3. Decreased muscle strength     4. Decreased mobility and endurance     5. Decreased attention Span       PAIN: pt reports she has no complaints of pain upon arrival. She states her left ant hip feels better and seems to have decreased in pain. Pt agreeable to PT session.     Objective:     Session initiated with endurance training with B UE/LE extremities for reciprocal motion of all limbs on sci-fit x 8 mins at level 3 at > or equal to 50 spm w/o rest. Pt positioned in supine for B LE Manual therapy : HSS, Gastroc st, hip add st 30"x3 w/ PTA. Pt then completed therex as per logged below 1:1 w/ PTA x 15 min, NMR including B LE 1 UE use for cone 3 Taps 2x10. Pt ambulated indoors using SPC only today to ambulate to therex stations, parallel bars today.      Date  12/15/17 12/12/17 12/7/17 12/4/17 12/1/17 11/29/17 11/27/17 11/22/17 11/20/17 11/17/17 11/15/17 11/13/17 11/10/17 11/9/17 11/7/17 11/3/17 11/2/17 10/31/17 10/27/17 10/23/17   VISIT 21  19 18 17 16 15 14 13 12 11 10 9 8 7 6 5 4 3 2    Gcode 10/10 10/10 9/10 8/10 7/10 6/10 5/10 4/10 3/10 2/10 1/10 10/10 9/10 8/10 7/10 6 of 10 5 of 10 4 of 10 3 of 10 2 of 10   POC 12/20/17                       FOTO         D/C  dc 10/10 9/10 8/10 7 of 10 6 of 10 DONE..   NEXT       MT   NT      --   --       --   -   Eliseo stretch 30"x3 B 3x30'' B 3x30'' B NT MT MT MT NT MT MT mT NT 30"x3 B 3x30" B         Long Sit HS MT 30"X3 B 3x30'' B 3x30'' B Stairs  2x45'' B MT MT MT NT MT MT MT Sup HSS 2x30" L MT Sup HSS 2x30" L  Sup HSS 2x30" B   MT " "Next OOT MT MT   Gastroc Str. MT 30"x3 B Fitter  3x30'' fitter  Fitter  3x30'' fitter  1x1'' MT MT MT NT MT MT  NT       On fitter 3 x 30'' MT MT   Sci fit  Bike L3  6 mins Bike L3  5 mins Bike L3  5 mins Bike L3  5 mins L4 8 min L3  7' L3   6' L3 6' L3 6' L3 x6' L3 x5 L2 10' L2 8' L2 8' L2 8' L2 8' L2 8' L2 8'   L2 6 min    TA   -- --     -- --  NT 5"x15 5" x 15         QS   -- --     -- --  NT 5"x15 5"x 15 5x10 5x10 5x10 5"X10 5"X10 5"x10   SAQ  - -- --     -- --  NT 2x10 2# B 2x10 2# B 1.5 2x15 B 1.5 2x15 B 1.5 2x15 B 1.5 2x15 B 1.5 2x15 B 1.5 2x10 B   SLR  - -- --  NT  NT 3x10 B  2# NT 2x10 B  2# 2x10 2# B 2x10 2# B 2x10 2# B 2 x 10 B  1.5 2x15 B 1.5 2x15 B 1.5 2x15 B 1.5 2x15 B 1.5 2x10 B    SL Abd  - -- --  NT  NT 3x10 B  2# NT 2x10 B  2# 2x10 2# B 2x10 2# B 2x10 2# B   2 x 10 B 2x10 B 2x10 B 2x10 B 2x10 B 2x10 B   Lumbar bridge  - -- --  NT  NT 3x10 B w/ hip add 3x10 B  --  2x10 B  2x10 B 2x1 0B 2x10 2x10 -   Drew Hip Add  - -- --  NT   -- --  --       W/ leg press 5"x15 5"x15   LAQs  - -- --  NT  NT 2x15 B 2# 2x15 B 2# 2x10 B 2# 2x10 B 2# NT 2x10 B 2# 2 x 10 B  1.5# 2x10 B  1.5 # 2x10 B  1.5 # 2x10 B   1.5 # 2x10 B   1.5 # 2x10 B   1.5 #   Seated Hip Flex  - --   --  NT  NT 2x15 B 2# 2x15 B 2# 2x10 B 2# 2x10 B 2# NT 2x10 B 2# 2 x 10 B  With yellow theraband    2x10 B  1.5# 2x10 B  1.5# 2x10 B  1.5#   Cybex   Leg Press   4.5 B  310  w/ball Next  4.5 B  2x15  w/ball  4.5 B  3x10  w/ball 4.5 B  3x10  w/ball 4.5 B 2x10  w/ball  OOT/next  4.5 B 2x10  w/ball NT 3.0 B 2 x 10 w/ ball squeeze oot 3.0 B 2 x 10 w/ ball squeeze   -   TKE  - -- --     -- -- - --       --   -   Hip Abd YTB 2x10 B YTB 2x10 B 2x10 B NT 2x12 B // 2x12 B 2x10 B 2x10 B // -- -- - --       --   -   Hip Flex YTB 2x10 B YTB 2x10 B 2x10 B NT 2x12 B // 2x12 B 2x10 B 2x10 B // -- -- - --       --   -   Hip Ext YTB 2x10 B YTB 2x10 B 2x10 B NT 2x12 B // 2x12 B 2x10 B 2x10 B // -- -- - --       --   -   HS Curls 2x15 // 2x15 // 2x15 // NT 2x12 b // " 2x12 B 2x10 B  Standing  2x10 standing 2x10 standing NT 2x20 RTB OOT /next  RTB 2 x 10 B NT YTB 2x10   EOM YTB 2x10   EOM YTB 2x10   EOM YTB 2x10   EOM -   Mini squats 3x10 B // w/ VC 3x10 B // w/ VC 3x10 B // NT 2x12 B // 2x12 B 2x10 in //bars  -- -- - --       --   -   Step Ups  - -- --     -- -- - --       --   -   Step Downs  - -- --     -- -- - --       --   -   Heel raises 2x15 DL 2x15 DL 2x15 DL 2x15 DL 3x10 B 3x10 DL 3x10 B 3x10 B 3x10 B 2x10 B 2x20 B  // NT/OOT  2x10 B NT 2x10 B 2x10 B 2 x 10 B       Gait  note Note Note  note 100 w. SPC See note  160' w/ SPC note See note  160' w/ SPC See note 200 ft with SPC See note 250 ft with SPC       See note NOTE  balance -   balance    Note note    note Next  next   NT note note See note   -   Initials JA 2/6 JA 1/6 JEANETTE JEANETTE JA 4/6 DH 3/6 JA 2/6 JA 1/6 JEANETTE JEANETTE JA 1/6 FS JA 5/6 MM 4/6 SM SPTA/JA 3/6 JA 2/6 JA 1/6 FS JA 2/6 JA 1/6       Assessment:     Pt completed session with no reports of pain. She experienced no LOB/ SOB throughout session. Pt cooperative and able to follow verbal instructions on safety and postural awareness in standing.     Patient Education/Response:     Patient educated to continue HEP.  Verbalized understanding.    Plans and Goals:     Cont to advance PT as per POC, advance as appropriate.    Short Term Goals (4 Weeks):   1. Pt will be I in HEP for progressive strengthening.  2. Pt will ambulate for 50' with SPC at [facility] for beginning community distances and increased mobility. Progressed with QC and Heidi  3. Pt will ambulate 500' with RW at [facility] for increased endurance and functional mobility. MET  4. Pt will perform STS from 17 inch chair with min-mod upper extremity support for increased functional mobility.  5. Pt will be I in all bed mobility for increased functional mobility. MET  6. Pt will demo proper safety in w/c, ambulation, and car/wheelchair transfers for decreased fall risk. MET  7. Pt will stand with feet together x30 secs without  LOB for increased balance.     Long Term Goals (8 Weeks):   1. Pt will demo WFL AROM in BLE for increased functional mobility.  2. Pt will ambulate with SPC for 600' on level surfaces at >/= SPV for increased community distances and mobility.  3. Pt will ambulate on outside surfaces at SBA with SPC for increased community mobility.  4. Pt will score </= 30'' on TUG to demo decreased fall risk and increased functional mobility.  5. Pt will perform STS without UE support for 5x to demo increased functional strength.  6. Pt will negotiate 12 stairs at Mod I for increased community negotiations.  7. Pt will have no falls throughout therapy to display improved safety and increased attention/cognition.  8. Pt will perform 1/2 tandem standing x30 secs B without LOB for increased balance in gait stance.

## 2017-12-19 ENCOUNTER — CLINICAL SUPPORT (OUTPATIENT)
Dept: REHABILITATION | Facility: HOSPITAL | Age: 57
End: 2017-12-19
Attending: FAMILY MEDICINE
Payer: COMMERCIAL

## 2017-12-19 DIAGNOSIS — M25.669 DECREASED ROM OF LOWER EXTREMITY: ICD-10-CM

## 2017-12-19 DIAGNOSIS — Z74.09 DECREASED MOBILITY AND ENDURANCE: ICD-10-CM

## 2017-12-19 DIAGNOSIS — M62.81 DECREASED MUSCLE STRENGTH: ICD-10-CM

## 2017-12-19 DIAGNOSIS — R41.840 DECREASED ATTENTION SPAN: ICD-10-CM

## 2017-12-19 PROCEDURE — 97112 NEUROMUSCULAR REEDUCATION: CPT | Mod: PN

## 2017-12-19 PROCEDURE — 97110 THERAPEUTIC EXERCISES: CPT | Mod: PN

## 2017-12-19 NOTE — PROGRESS NOTES
"  TIME RECORD    Date:  12/19/2017    Start Time:  10:30  Stop Time:  11:00    PROCEDURES:    TIMED  Procedure Min.   TE 15   NMR 15         Total Timed Minutes:  30  Total Timed Units:  2  Total Untimed Units:  0  Charges Billed/# of units: 1 TE, 1 NMR      Progress/Current Status    Subjective:     Patient ID: Silvia Cervantes is a 57 y.o. female.  Diagnosis:   1. Decreased ROM of upper extremity     2. Decreased ROM of lower extremity     3. Decreased muscle strength     4. Decreased mobility and endurance     5. Decreased attention Span       PAIN: pt arrived 15 min late to session today. Pt agreeable to limited PT session.     Objective:     Session initiated with supine for B LE Manual therapy : HSS, Gastroc st, hip add st 30"x3 w/ PTA. Pt then completed therex as per logged below 1:1 w/ PTA x 15 min total, NMR including B LE 1 UE use for cone 3 Taps, lateral / forward stepping in //bars     Date  12/19/17 12/15/17 12/12/17 12/7/17 12/4/17 12/1/17 11/29/17 11/27/17 11/22/17 11/20/17 11/17/17 11/15/17 11/13/17 11/10/17 11/9/17 11/7/17 11/3/17 11/2/17 10/31/17 10/27/17 10/23/17   VISIT 22 21  19 18 17 16 15 14 13 12 11 10 9 8 7 6 5 4 3 2    Gcode   10/10 10/10 9/10 8/10 7/10 6/10 5/10 4/10 3/10 2/10 1/10 10/10 9/10 8/10 7/10 6 of 10 5 of 10 4 of 10 3 of 10 2 of 10   POC 12/20/17                        FOTO          D/C  dc 10/10 9/10 8/10 7 of 10 6 of 10 DONE..   NEXT       MT    NT      --   --       --   -   Eliseo stretch 30"x3 B 30"x3 B 3x30'' B 3x30'' B NT MT MT MT NT MT MT mT NT 30"x3 B 3x30" B         Long Sit HS 30"x3 B MT 30"X3 B 3x30'' B 3x30'' B Stairs  2x45'' B MT MT MT NT MT MT MT Sup HSS 2x30" L MT Sup HSS 2x30" L  Sup HSS 2x30" B   MT Next OOT MT MT   Gastroc Str. 30"x3 B MT 30"x3 B Fitter  3x30'' fitter  Fitter  3x30'' fitter  1x1'' MT MT MT NT MT MT  NT       On fitter 3 x 30'' MT MT   Sci fit  nt Bike L3  6 mins Bike L3  5 mins Bike L3  5 mins Bike L3  5 mins L4 8 min L3  7' L3   6' L3 6' L3 6' " "L3 x6' L3 x5 L2 10' L2 8' L2 8' L2 8' L2 8' L2 8' L2 8'   L2 6 min    TA    -- --     -- --  NT 5"x15 5" x 15         QS    -- --     -- --  NT 5"x15 5"x 15 5x10 5x10 5x10 5"X10 5"X10 5"x10   SAQ -  - -- --     -- --  NT 2x10 2# B 2x10 2# B 1.5 2x15 B 1.5 2x15 B 1.5 2x15 B 1.5 2x15 B 1.5 2x15 B 1.5 2x10 B   SLR -  - -- --  NT  NT 3x10 B  2# NT 2x10 B  2# 2x10 2# B 2x10 2# B 2x10 2# B 2 x 10 B  1.5 2x15 B 1.5 2x15 B 1.5 2x15 B 1.5 2x15 B 1.5 2x10 B    SL Abd -  - -- --  NT  NT 3x10 B  2# NT 2x10 B  2# 2x10 2# B 2x10 2# B 2x10 2# B   2 x 10 B 2x10 B 2x10 B 2x10 B 2x10 B 2x10 B   Lumbar bridge -  - -- --  NT  NT 3x10 B w/ hip add 3x10 B  --  2x10 B  2x10 B 2x1 0B 2x10 2x10 -   Drew Hip Add -  - -- --  NT   -- --  --       W/ leg press 5"x15 5"x15   LAQs -  - -- --  NT  NT 2x15 B 2# 2x15 B 2# 2x10 B 2# 2x10 B 2# NT 2x10 B 2# 2 x 10 B  1.5# 2x10 B  1.5 # 2x10 B  1.5 # 2x10 B   1.5 # 2x10 B   1.5 # 2x10 B   1.5 #   Seated Hip Flex -  - --   --  NT  NT 2x15 B 2# 2x15 B 2# 2x10 B 2# 2x10 B 2# NT 2x10 B 2# 2 x 10 B  With yellow theraband    2x10 B  1.5# 2x10 B  1.5# 2x10 B  1.5#   Cybex   Leg Press 4.5 B  310  w/ball   4.5 B  310  w/ball Next  4.5 B  2x15  w/ball  4.5 B  3x10  w/ball 4.5 B  3x10  w/ball 4.5 B 2x10  w/ball  OOT/next  4.5 B 2x10  w/ball NT 3.0 B 2 x 10 w/ ball squeeze oot 3.0 B 2 x 10 w/ ball squeeze   -   TKE   - -- --     -- -- - --       --   -   Hip Abd YTB 2x10 B YTB 2x10 B YTB 2x10 B 2x10 B NT 2x12 B // 2x12 B 2x10 B 2x10 B // -- -- - --       --   -   Hip Flex YTB 2x10 B YTB 2x10 B YTB 2x10 B 2x10 B NT 2x12 B // 2x12 B 2x10 B 2x10 B // -- -- - --       --   -   Hip Ext YTB 2x10 B YTB 2x10 B YTB 2x10 B 2x10 B NT 2x12 B // 2x12 B 2x10 B 2x10 B // -- -- - --       --   -   HS Curls  2x15 // 2x15 // 2x15 // NT 2x12 b // 2x12 B 2x10 B  Standing  2x10 standing 2x10 standing NT 2x20 RTB OOT /next  RTB 2 x 10 B NT YTB 2x10   EOM YTB 2x10   EOM YTB 2x10   EOM YTB 2x10   EOM -   Mini squats  3x10 B // w/ VC 3x10 " B // w/ VC 3x10 B // NT 2x12 B // 2x12 B 2x10 in //bars  -- -- - --       --   -   Step Ups   - -- --     -- -- - --       --   -   Step Downs   - -- --     -- -- - --       --   -   Heel raises  2x15 DL 2x15 DL 2x15 DL 2x15 DL 3x10 B 3x10 DL 3x10 B 3x10 B 3x10 B 2x10 B 2x20 B  // NT/OOT  2x10 B NT 2x10 B 2x10 B 2 x 10 B       Gait   note Note Note  note 100 w. SPC See note  160' w/ SPC note See note  160' w/ SPC See note 200 ft with SPC See note 250 ft with SPC       See note NOTE  balance -   balance     Note note    note Next  next   NT note note See note   -   Initials JA 3/6 JA 2/6 JA 1/6 JEANETTE JEANETTE JA 4/6 DH 3/6 JA 2/6 JA 1/6 JEANETTE JEANETTE JA 1/6 FS JA 5/6 MM 4/6 SM SPTA/JA 3/6 JA 2/6 JA 1/6 FS JA 2/6 JA 1/6       Assessment:     Pt able to complete session today with no reports of increased pain . She required verbal instruction on postural awareness with standing activities.      Patient Education/Response:     Patient educated to continue HEP.  Verbalized understanding.    Plans and Goals:     Cont to advance PT as per POC, advance as appropriate.    Short Term Goals (4 Weeks):   1. Pt will be I in HEP for progressive strengthening.  2. Pt will ambulate for 50' with SPC at [facility] for beginning community distances and increased mobility. Progressed with QC and Heidi  3. Pt will ambulate 500' with RW at SPV for increased endurance and functional mobility. MET  4. Pt will perform STS from 17 inch chair with min-mod upper extremity support for increased functional mobility.  5. Pt will be I in all bed mobility for increased functional mobility. MET  6. Pt will demo proper safety in w/c, ambulation, and car/wheelchair transfers for decreased fall risk. MET  7. Pt will stand with feet together x30 secs without LOB for increased balance.     Long Term Goals (8 Weeks):   1. Pt will demo WFL AROM in BLE for increased functional mobility.  2. Pt will ambulate with SPC for 600' on level surfaces at >/= SPV for increased community  distances and mobility.  3. Pt will ambulate on outside surfaces at A with SPC for increased community mobility.  4. Pt will score </= 30'' on TUG to demo decreased fall risk and increased functional mobility.  5. Pt will perform STS without UE support for 5x to demo increased functional strength.  6. Pt will negotiate 12 stairs at Mod I for increased community negotiations.  7. Pt will have no falls throughout therapy to display improved safety and increased attention/cognition.  8. Pt will perform 1/2 tandem standing x30 secs B without LOB for increased balance in gait stance.

## 2017-12-21 ENCOUNTER — CLINICAL SUPPORT (OUTPATIENT)
Dept: REHABILITATION | Facility: HOSPITAL | Age: 57
End: 2017-12-21
Attending: FAMILY MEDICINE
Payer: COMMERCIAL

## 2017-12-21 DIAGNOSIS — R41.840 DECREASED ATTENTION SPAN: ICD-10-CM

## 2017-12-21 DIAGNOSIS — M25.669 DECREASED ROM OF LOWER EXTREMITY: ICD-10-CM

## 2017-12-21 DIAGNOSIS — M62.81 DECREASED MUSCLE STRENGTH: ICD-10-CM

## 2017-12-21 DIAGNOSIS — Z74.09 DECREASED MOBILITY AND ENDURANCE: ICD-10-CM

## 2017-12-21 PROCEDURE — 97110 THERAPEUTIC EXERCISES: CPT | Mod: PN

## 2017-12-21 PROCEDURE — 97116 GAIT TRAINING THERAPY: CPT | Mod: PN

## 2017-12-21 NOTE — PROGRESS NOTES
"  TIME RECORD    Date:  12/21/2017    Start Time:  3:05  Stop Time:  3:55    PROCEDURES:    TIMED  Procedure Min.   TE 37   NMR    GT 13     Total Timed Minutes:  50  Total Timed Units:  3  Total Untimed Units:  0  Charges Billed/# of units: 2 TE, 1 GT      Progress/Current Status    Subjective:     Patient ID: Silvia Cervantes is a 57 y.o. female.  Diagnosis:   1. Decreased ROM of upper extremity     2. Decreased ROM of lower extremity     3. Decreased muscle strength     4. Decreased mobility and endurance     5. Decreased attention Span       Pain: 2/10 L leg  Pt reports she has been using her QC about 90% of time indoors and also outside at times.    Objective:     Pt then completed therex x 37 mins including NuStep x 8 mins as per logged below 1:1 w/ PT, and then performed GT with SPC indoors 180', outdoors 220', over grass 10', curb negotiation x4, and stair negotiation with 1 HR x 6 all without LOB at SPV-Mod I.     Date  12/21/17 12/19/17 12/15/17 12/12/17 12/7/17 12/4/17 12/1/17 11/29/17 11/27/17 11/22/17 11/20/17 11/17/17 11/15/17 11/13/17 11/10/17 11/9/17 11/7/17 11/3/17 11/2/17 10/31/17 10/27/17 10/23/17   VISIT 23 22 21  19 18 17 16 15 14 13 12 11 10 9 8 7 6 5 4 3 2    Gcode    10/10 10/10 9/10 8/10 7/10 6/10 5/10 4/10 3/10 2/10 1/10 10/10 9/10 8/10 7/10 6 of 10 5 of 10 4 of 10 3 of 10 2 of 10   POC 12/20/17                         FOTO           D/C  dc 10/10 9/10 8/10 7 of 10 6 of 10 DONE..   NEXT       MT     NT      --   --       --   -   Eliseo stretch 3x30'' B 30"x3 B 30"x3 B 3x30'' B 3x30'' B NT MT MT MT NT MT MT mT NT 30"x3 B 3x30" B         Long Sit HS 3x30'' B 30"x3 B MT 30"X3 B 3x30'' B 3x30'' B Stairs  2x45'' B MT MT MT NT MT MT MT Sup HSS 2x30" L MT Sup HSS 2x30" L  Sup HSS 2x30" B   MT Next OOT MT MT   Gastroc Str. 2x45' B 30"x3 B MT 30"x3 B Fitter  3x30'' fitter  Fitter  3x30'' fitter  1x1'' MT MT MT NT MT MT  NT       On fitter 3 x 30'' MT MT   Sci fit  L2 8 mins nt Bike L3  6 mins Bike " "L3  5 mins Bike L3  5 mins Bike L3  5 mins L4 8 min L3  7' L3   6' L3 6' L3 6' L3 x6' L3 x5 L2 10' L2 8' L2 8' L2 8' L2 8' L2 8' L2 8'   L2 6 min    TA --    -- --     -- --  NT 5"x15 5" x 15         QS --    -- --     -- --  NT 5"x15 5"x 15 5x10 5x10 5x10 5"X10 5"X10 5"x10   SAQ -- --  - -- --     -- --  NT 2x10 2# B 2x10 2# B 1.5 2x15 B 1.5 2x15 B 1.5 2x15 B 1.5 2x15 B 1.5 2x15 B 1.5 2x10 B   SLR -- --  - -- --  NT  NT 3x10 B  2# NT 2x10 B  2# 2x10 2# B 2x10 2# B 2x10 2# B 2 x 10 B  1.5 2x15 B 1.5 2x15 B 1.5 2x15 B 1.5 2x15 B 1.5 2x10 B    SL Abd -- --  - -- --  NT  NT 3x10 B  2# NT 2x10 B  2# 2x10 2# B 2x10 2# B 2x10 2# B   2 x 10 B 2x10 B 2x10 B 2x10 B 2x10 B 2x10 B   Lumbar bridge --- --  - -- --  NT  NT 3x10 B w/ hip add 3x10 B  --  2x10 B  2x10 B 2x1 0B 2x10 2x10 -   Drew Hip Add -- --  - -- --  NT   -- --  --       W/ leg press 5"x15 5"x15   LAQs -- --  - -- --  NT  NT 2x15 B 2# 2x15 B 2# 2x10 B 2# 2x10 B 2# NT 2x10 B 2# 2 x 10 B  1.5# 2x10 B  1.5 # 2x10 B  1.5 # 2x10 B   1.5 # 2x10 B   1.5 # 2x10 B   1.5 #   Seated Hip Flex -- --  - --   --  NT  NT 2x15 B 2# 2x15 B 2# 2x10 B 2# 2x10 B 2# NT 2x10 B 2# 2 x 10 B  With yellow theraband    2x10 B  1.5# 2x10 B  1.5# 2x10 B  1.5#   Cybex   Leg Press 4.5 B  3x10  w/ball 4.5 B  310  w/ball   4.5 B  310  w/ball Next  4.5 B  2x15  w/ball  4.5 B  3x10  w/ball 4.5 B  3x10  w/ball 4.5 B 2x10  w/ball  OOT/next  4.5 B 2x10  w/ball NT 3.0 B 2 x 10 w/ ball squeeze oot 3.0 B 2 x 10 w/ ball squeeze   -   TKE --   - -- --     -- -- - --       --   -   Hip Abd NT YTB 2x10 B YTB 2x10 B YTB 2x10 B 2x10 B NT 2x12 B // 2x12 B 2x10 B 2x10 B // -- -- - --       --   -   Hip Flex NT YTB 2x10 B YTB 2x10 B YTB 2x10 B 2x10 B NT 2x12 B // 2x12 B 2x10 B 2x10 B // -- -- - --       --   -   Hip Ext NT YTB 2x10 B YTB 2x10 B YTB 2x10 B 2x10 B NT 2x12 B // 2x12 B 2x10 B 2x10 B // -- -- - --       --   -   HS Curls 2x15 //  2x15 // 2x15 // 2x15 // NT 2x12 b // 2x12 B 2x10 B  Standing  2x10 " standing 2x10 standing NT 2x20 RTB OOT /next  RTB 2 x 10 B NT YTB 2x10   EOM YTB 2x10   EOM YTB 2x10   EOM YTB 2x10   EOM -   Mini squats NT  3x10 B // w/ VC 3x10 B // w/ VC 3x10 B // NT 2x12 B // 2x12 B 2x10 in //bars  -- -- - --       --   -   Foot taps Blue 2x15 B //  1 HR                         Step Ups NT   - -- --     -- -- - --       --   -   Step Downs --   - -- --     -- -- - --       --   -   Heel raises 2x15 DL  2x15 DL 2x15 DL 2x15 DL 2x15 DL 3x10 B 3x10 DL 3x10 B 3x10 B 3x10 B 2x10 B 2x20 B  // NT/OOT  2x10 B NT 2x10 B 2x10 B 2 x 10 B       Gait note   note Note Note  note 100 w. SPC See note  160' w/ SPC note See note  160' w/ SPC See note 200 ft with SPC See note 250 ft with SPC       See note NOTE  balance -   balance --     Note note    note Next  next   NT note note See note   -   Initials JEANETTE JA 3/6 JA 2/6 JA 1/6 JEANETTE JEANETTE JA 4/6 DH 3/6 JA 2/6 JA 1/6 JEANETTE JEANETTE JA 1/6 FS JA 5/6 MM 4/6 SM SPTA/JA 3/6 JA 2/6 JA 1/6 FS JA 2/6 JA 1/6       Assessment:     Pt performed well today with improved stretching tolerance and improved functional level in gait with SPC at SPV-Mod I. Pt needs to start ambulation with SPC or QC and to leave RW at home for now on unless severe weather conditions exist. Pt progressing and needs cont dynamic gait training and standing TE.    Patient Education/Response:     Patient educated to continue HEP.  Verbalized understanding.    Plans and Goals:     Cont to advance PT as per POC, advance as appropriate.    Short Term Goals (4 Weeks):   1. Pt will be I in HEP for progressive strengthening.  2. Pt will ambulate for 50' with SPC at [facility] for beginning community distances and increased mobility. Progressed with QC and Heidi  3. Pt will ambulate 500' with RW at [facility] for increased endurance and functional mobility. MET  4. Pt will perform STS from 17 inch chair with min-mod upper extremity support for increased functional mobility.  5. Pt will be I in all bed mobility for increased  functional mobility. MET  6. Pt will demo proper safety in w/c, ambulation, and car/wheelchair transfers for decreased fall risk. MET  7. Pt will stand with feet together x30 secs without LOB for increased balance.     Long Term Goals (8 Weeks):   1. Pt will demo WFL AROM in BLE for increased functional mobility.  2. Pt will ambulate with SPC for 600' on level surfaces at >/= SPV for increased community distances and mobility.  3. Pt will ambulate on outside surfaces at SBA with SPC for increased community mobility.  4. Pt will score </= 30'' on TUG to demo decreased fall risk and increased functional mobility.  5. Pt will perform STS without UE support for 5x to demo increased functional strength.  6. Pt will negotiate 12 stairs at Mod I for increased community negotiations.  7. Pt will have no falls throughout therapy to display improved safety and increased attention/cognition.  8. Pt will perform 1/2 tandem standing x30 secs B without LOB for increased balance in gait stance.

## 2017-12-26 ENCOUNTER — CLINICAL SUPPORT (OUTPATIENT)
Dept: REHABILITATION | Facility: HOSPITAL | Age: 57
End: 2017-12-26
Attending: FAMILY MEDICINE
Payer: COMMERCIAL

## 2017-12-26 DIAGNOSIS — Z74.09 DECREASED MOBILITY AND ENDURANCE: ICD-10-CM

## 2017-12-26 DIAGNOSIS — R41.840 DECREASED ATTENTION SPAN: ICD-10-CM

## 2017-12-26 DIAGNOSIS — M25.669 DECREASED ROM OF LOWER EXTREMITY: ICD-10-CM

## 2017-12-26 DIAGNOSIS — M62.81 DECREASED MUSCLE STRENGTH: ICD-10-CM

## 2017-12-26 PROCEDURE — 97112 NEUROMUSCULAR REEDUCATION: CPT | Mod: PN

## 2017-12-26 PROCEDURE — 97110 THERAPEUTIC EXERCISES: CPT | Mod: PN

## 2017-12-26 PROCEDURE — 97116 GAIT TRAINING THERAPY: CPT | Mod: PN

## 2017-12-26 NOTE — PLAN OF CARE
Date: 12/26/2017    PHYSICAL THERAPY UPDATED PLAN OF TREATMENT    Patient name: Silvia Cervantes  Onset Date:  6/17/17  SOC Date:  10/20/17  Primary Diagnosis:    1. Decreased ROM of upper extremity     2. Decreased ROM of lower extremity     3. Decreased muscle strength     4. Decreased mobility and endurance     5. Decreased attention Span       Treatment Diagnosis:   Decreased L UE/BLE A/PROM, Decreased B UE/LE strength, decreased endurance, decreased balance, gait instability, decreased attention, decreased functional mobility  Certification Period:  12/26/17 to 2/26/18  Precautions:  C2 Dens fracture, multiple fractures, and brain injury  Visits from SOC:  24  Functional Level Prior to SOC:   Range of Motion/Strength:   EVAL:  AROM: CERVICAL LUMBAR   Flexion 42 NT   Extension 30 NT   Right side bending 25 NT   Left side bending 15 NT   Right rotation 53 NT   Left rotation 33 with neck pain NT      Shoulder   Right     Left   Pain/Dysfunction with Movement     AROM PROM MMT AROM PROM MMT     flexion WFL WFL 4/5 80 81 2+/5 L UE pain with testing   extension WFL WFL 4/5 39 40 2+/5 L UE pain with testing   abduction WFL WFL 4/5 68 70 2+/5 L UE pain with testing   adduction WFL WFL 4/5 20 20 2+/5 L UE pain with testing   Internal rotation WFL WFL 4/5 40 retest retest 2+/5 L UE pain with testing   ER at 0° abd WFL WFL 4/5 50  retest retest 2+/5 L UE pain with testing        Hip   Right     Left   Pain/Dysfunction with Movement     AROM PROM MMT AROM PROM MMT     Flexion 115 115 3+/5 110 110 2+/5 Pain B with testing   Extension 25% 25% 2+/5 25% 25% 2+/5 LBP with B testing   Abduction 75% NT 3-/5 75% NT 3-/5     Adduction WFL NT 3+/5 WFL NT 3+/5     Internal rotation 50% 75% 4-/5 50% 75% 4/5 Pain B with testing   External rotation WFL 75% 4-/5 WFL 75% 4/5 Pain B with testing      Knee   Right     Left   Pain/Dysfunction with Movement     AROM PROM MMT AROM PROM MMT     Flexion 131 133 3+/5 104 115 3+/5 Pain with R  testing   Extension 0 0 4+/5 0 0 4-/5        Ankle   Right     Left   Pain/Dysfunction with Movement     AROM PROM MMT AROM PROM MMT     Plantarflexion 45 NT 5/5 43 NT 5/5     Dorsiflexion 8 NT 5/5 3 NT 4/5     Inversion WFL NT 5/5 WFL NT 4/5     Eversion 75% NT 5/5 75% NT 4/5        Gait: With AD.  Device Used -  Rolling walker  Analysis: CGA - decreased chantell, decreased coordination with foot placement (min), decreased B heel strike, decreased B knee extension (L>R) in stance.  Bed Mobility:Assistance - SBA  Transfers: Assistance - CGA - SBA with RW  Special Tests:   TUG = 77 seconds  145 feet (1 loop around gym) in 2 mins and 42 seconds  Other:   Able to stand without UE support for 1 min ( L knee shakes)    Updated Assessment:   Range of Motion/Strength:       Hip   Right     Left   Pain/Dysfunction with Movement     AROM PROM MMT AROM PROM MMT     Flexion 104 120 4-/5 108 116 4-/5 Pain with testing on L   Extension 50% 75% 4-/5 25% 75% 3+/5    Abduction 75% NT 3-/5 75% NT 3-/5     Adduction WFL NT 3+/5 WFL NT 3+/5     Internal rotation 50% 75% 4-/5 50% 75% 4/5    External rotation WFL 75% 4-/5 75% WFL 4/5       Knee   Right     Left   Pain/Dysfunction with Movement     AROM PROM MMT AROM PROM MMT     Flexion 131 140 4-/5 116 121 4-/5    Extension 0 0 5/5 0 0 4/5        Ankle   Right     Left   Pain/Dysfunction with Movement     AROM PROM MMT AROM PROM MMT     Plantarflexion WFL NT 5/5 WFL NT 5/5     Dorsiflexion WFL NT 5/5 WFL NT 4/5     Inversion WFL NT 5/5 WFL NT 4/5     Eversion WFL NT 5/5 WFL NT 4/5         Gait: With AD.  Device Used - QC  Analysis: CGA - decreased R step length, R trunk shift, decreased chantell, decreased L WB  Bed Mobility: Independent  Transfers: Mod I  Special Tests:   TUG = 29.48 seconds w/ SPC  145 feet (1 loop around gym) in 1 min and 48 seconds  Other:   Able to stand without UE support for 1 min ( L knee shakes)    Previous Short Term Goals Status:   Short Term Goals (4 Weeks):    1. Pt will be I in HEP for progressive strengthening.- MET  2. Pt will ambulate for 50' with SPC at CGA for beginning community distances and increased mobility. - Progressed with QC and Min A  3. Pt will ambulate 500' with RW at SPV for increased endurance and functional mobility. MET  4. Pt will perform STS from 17 inch chair with min-mod upper extremity support for increased functional mobility. - MET  5. Pt will be I in all bed mobility for increased functional mobility. MET  6. Pt will demo proper safety in w/c, ambulation, and car/wheelchair transfers for decreased fall risk. MET  7. Pt will stand with feet together x30 secs without LOB for increased balance. - MET     Long Term Goals (8 Weeks):   1. Pt will demo WFL AROM in BLE for increased functional mobility. - Partially met  2. Pt will ambulate with SPC for 600' on level surfaces at >/= SPV for increased community distances and mobility. - Met with Qc, partially with SPC  3. Pt will ambulate on outside surfaces at SBA with SPC for increased community mobility. - Met with Qc, partially with SPC  4. Pt will score </= 30'' on TUG to demo decreased fall risk and increased functional mobility. - MET  5. Pt will perform STS without UE support for 5x to demo increased functional strength. - Nearly MET  6. Pt will negotiate 12 stairs at Mod I for increased community negotiations. - currently at SPV, Partially MET  7. Pt will have no falls throughout therapy to display improved safety and increased attention/cognition. - MET  8. Pt will perform 1/2 tandem standing x30 secs B without LOB for increased balance in gait stance. - MET    New Short Term Goals Status:  SAME as above with additions of LTGs as noted below  Long Term Goal Status:   modified:    1. Pt will demo WFL AROM in BLE for increased functional mobility.   2. Pt will ambulate with SPC for 600' on level surfaces at Mod I for increased community distances and mobility.   3. Pt will ambulate on outside  surfaces at Mod I with SPC for increased community mobility.   4. Pt will score </= 20'' on TUG to demo decreased fall risk and increased functional mobility.  5. Pt will perform STS without UE support for 15x to demo increased functional strength.  6. Pt will negotiate 12 stairs at Mod I for increased community negotiations.   7. Pt will have no falls throughout therapy to display improved safety and increased attention/cognition.   8. Pt will perform tandem standing x30 secs B without LOB for increased balance in gait stance.    Reasons for Recertification of Therapy:  UPOC    Certification Period: 12/26/17 to 2/26/18  Recommended Treatment Plan: 3 times per week for 8 weeks: Cervical/Lumbar Traction, Electrical Stimulation IFC/Russian, Gait Training, Manual Therapy, Moist Heat/ Ice, Neuromuscular Re-ed, Paraffin, Patient Education, Self Care, Therapeutic Activites, Therapeutic Exercise and Ultrasound/Phonophoresis  Other Recommendations: Cont agressive B LE strengthening/stretching, and progress in gait training.        Therapist's Name: Dakota Knight PT, DPT  Date: 12/26/2017    I CERTIFY THE NEED FOR THESE SERVICES FURNISHED UNDER THIS PLAN OF TREATMENT AND WHILE UNDER MY CARE    Physician's comments: ________________________________________________________________________________________________________________________________________________      Physician's Name: ___________________________________

## 2017-12-26 NOTE — PROGRESS NOTES
"  TIME RECORD    Date:  12/26/2017    Start Time:  2:05  Stop Time:  2:55    PROCEDURES:    TIMED  Procedure Min.   TE 25   NMR 10   GT 15     Total Timed Minutes:  50  Total Timed Units:  4  Total Untimed Units:  0  Charges Billed/# of units: 4 (2 TE, 1 Gt, 1 NMR)      Progress/Current Status    Subjective:     Patient ID: Silvia Cervantes is a 57 y.o. female.  Diagnosis:   1. Decreased ROM of upper extremity     2. Decreased ROM of lower extremity     3. Decreased muscle strength     4. Decreased mobility and endurance     5. Decreased attention Span       Pain: 5/10 L leg  Pt came to therapy with QC today; pt c/o pain with tingling    Objective:     Pt first completed gait training x 15 mins on level surfaces with SPC with VC/TC and mirror use for self correction, and then TE x 30 mins per log below including tests and measures for UPOC, and finally finished on SciFit NeuStepwith SPC indoors 180', outdoors 220', over grass 10', curb negotiation x4, and stair negotiation with 1 HR x 6 all without LOB at SPV-Mod I.     Date  12/26/17 12/21/17 12/19/17 12/15/17 12/12/17 12/7/17 12/4/17 12/1/17 11/29/17 11/27/17 11/22/17 11/20/17 11/17/17 11/15/17 11/13/17 11/10/17 11/9/17 11/7/17 11/3/17 11/2/17 10/31/17 10/27/17 10/23/17   VISIT 24 23 22 21  19 18 17 16 15 14 13 12 11 10 9 8 7 6 5 4 3 2    Gcode     10/10 10/10 9/10 8/10 7/10 6/10 5/10 4/10 3/10 2/10 1/10 10/10 9/10 8/10 7/10 6 of 10 5 of 10 4 of 10 3 of 10 2 of 10   POC 12/20/17                          FOTO Done  UPOC           D/C  dc 10/10 9/10 8/10 7 of 10 6 of 10 DONE..   NEXT       MT      NT      --   --       --   -   Eliseo stretch NT 3x30'' B 30"x3 B 30"x3 B 3x30'' B 3x30'' B NT MT MT MT NT MT MT mT NT 30"x3 B 3x30" B         Long Sit HS NT 3x30'' B 30"x3 B MT 30"X3 B 3x30'' B 3x30'' B Stairs  2x45'' B MT MT MT NT MT MT MT Sup HSS 2x30" L MT Sup HSS 2x30" L  Sup HSS 2x30" B   MT Next OOT MT MT   Gastroc Str. 2x45'' B 2x45' B 30"x3 B MT 30"x3 B " "Fitter  3x30'' fitter  Fitter  3x30'' fitter  1x1'' MT MT MT NT MT MT  NT       On fitter 3 x 30'' MT MT   Sci fit  L2 10 mins L2 8 mins nt Bike L3  6 mins Bike L3  5 mins Bike L3  5 mins Bike L3  5 mins L4 8 min L3  7' L3   6' L3 6' L3 6' L3 x6' L3 x5 L2 10' L2 8' L2 8' L2 8' L2 8' L2 8' L2 8'   L2 6 min    TA -- --    -- --     -- --  NT 5"x15 5" x 15         QS -- --    -- --     -- --  NT 5"x15 5"x 15 5x10 5x10 5x10 5"X10 5"X10 5"x10   SAQ -- -- --  - -- --     -- --  NT 2x10 2# B 2x10 2# B 1.5 2x15 B 1.5 2x15 B 1.5 2x15 B 1.5 2x15 B 1.5 2x15 B 1.5 2x10 B   SLR -- -- --  - -- --  NT  NT 3x10 B  2# NT 2x10 B  2# 2x10 2# B 2x10 2# B 2x10 2# B 2 x 10 B  1.5 2x15 B 1.5 2x15 B 1.5 2x15 B 1.5 2x15 B 1.5 2x10 B    SL Abd -- -- --  - -- --  NT  NT 3x10 B  2# NT 2x10 B  2# 2x10 2# B 2x10 2# B 2x10 2# B   2 x 10 B 2x10 B 2x10 B 2x10 B 2x10 B 2x10 B   Lumbar bridge -- --- --  - -- --  NT  NT 3x10 B w/ hip add 3x10 B  --  2x10 B  2x10 B 2x1 0B 2x10 2x10 -   Drew Hip Add -- -- --  - -- --  NT   -- --  --       W/ leg press 5"x15 5"x15   LAQs --- -- --  - -- --  NT  NT 2x15 B 2# 2x15 B 2# 2x10 B 2# 2x10 B 2# NT 2x10 B 2# 2 x 10 B  1.5# 2x10 B  1.5 # 2x10 B  1.5 # 2x10 B   1.5 # 2x10 B   1.5 # 2x10 B   1.5 #   Seated Hip Flex -- -- --  - --   --  NT  NT 2x15 B 2# 2x15 B 2# 2x10 B 2# 2x10 B 2# NT 2x10 B 2# 2 x 10 B  With yellow theraband    2x10 B  1.5# 2x10 B  1.5# 2x10 B  1.5#   Cybex   Leg Press NT 4.5 B  3x10  w/ball 4.5 B  310  w/ball   4.5 B  310  w/ball Next  4.5 B  2x15  w/ball  4.5 B  3x10  w/ball 4.5 B  3x10  w/ball 4.5 B 2x10  w/ball  OOT/next  4.5 B 2x10  w/ball NT 3.0 B 2 x 10 w/ ball squeeze oot 3.0 B 2 x 10 w/ ball squeeze   -   TKE -- --   - -- --     -- -- - --       --   -   Hip Abd 2x10 B NT YTB 2x10 B YTB 2x10 B YTB 2x10 B 2x10 B NT 2x12 B // 2x12 B 2x10 B 2x10 B // -- -- - --       --   -   Hip Flex 2x10 B NT YTB 2x10 B YTB 2x10 B YTB 2x10 B 2x10 B NT 2x12 B // 2x12 B 2x10 B 2x10 B // -- -- - --       --   " -   Hip Ext 2x10 B NT YTB 2x10 B YTB 2x10 B YTB 2x10 B 2x10 B NT 2x12 B // 2x12 B 2x10 B 2x10 B // -- -- - --       --   -   HS Curls 2x15 // 2x15 //  2x15 // 2x15 // 2x15 // NT 2x12 b // 2x12 B 2x10 B  Standing  2x10 standing 2x10 standing NT 2x20 RTB OOT /next  RTB 2 x 10 B NT YTB 2x10   EOM YTB 2x10   EOM YTB 2x10   EOM YTB 2x10   EOM -   Mini squats NT NT  3x10 B // w/ VC 3x10 B // w/ VC 3x10 B // NT 2x12 B // 2x12 B 2x10 in //bars  -- -- - --       --   -   Foot taps NT Blue 2x15 B //  1 HR                         Step Ups NT NT   - -- --     -- -- - --       --   -   Step Downs -- --   - -- --     -- -- - --       --   -   Heel raises 2x15 DL 2x15 DL  2x15 DL 2x15 DL 2x15 DL 2x15 DL 3x10 B 3x10 DL 3x10 B 3x10 B 3x10 B 2x10 B 2x20 B  // NT/OOT  2x10 B NT 2x10 B 2x10 B 2 x 10 B       Gait Note note   note Note Note  note 100 w. SPC See note  160' w/ SPC note See note  160' w/ SPC See note 200 ft with SPC See note 250 ft with SPC       See note NOTE  balance -   balance -- --     Note note    note Next  next   NT note note See note   -   Initials JEANETTE JEANETTE JA 3/6 JA 2/6 JA 1/6 JEANETTE JEANETTE JA 4/6 DH 3/6 JA 2/6 JA 1/6 JEANETTE JEANETTE JA 1/6 FS JA 5/6 MM 4/6 SM SPTA/JA 3/6 JA 2/6 JA 1/6 FS JA 2/6 JA 1/6     Functional Limitation Reports:  Tool: FOTO Lower Leg SURVEY  Category: Mobility   Limitation: 68%  Predicted: 58%    Assessment:     PT took tests and measures today - see UPOC. Pt requires cont B LE strengthening that has improved along with outcomes measures. Pt still lacking in B hip A/PROM in IR/ER/Flexion/Extension and B LE strength (R>L). Pt requires cont gait training with mod-max VC/TCs for L trunk shift and upright posture - pt would benefit from trunk shift mobilization for R lateral trunk shift to trial next visit. Also check hip level and leg length for possible discrepancy.    Patient Education/Response:     Patient educated to continue HEP.  Verbalized understanding.    Plans and Goals:     Cont to advance PT as  per POC, advance as appropriate.    Short Term Goals (4 Weeks):   1. Pt will be I in HEP for progressive strengthening.  2. Pt will ambulate for 50' with SPC at CGA for beginning community distances and increased mobility. Progressed with QC and Heidi  3. Pt will ambulate 500' with RW at SPV for increased endurance and functional mobility. MET  4. Pt will perform STS from 17 inch chair with min-mod upper extremity support for increased functional mobility.  5. Pt will be I in all bed mobility for increased functional mobility. MET  6. Pt will demo proper safety in w/c, ambulation, and car/wheelchair transfers for decreased fall risk. MET  7. Pt will stand with feet together x30 secs without LOB for increased balance.     Long Term Goals (8 Weeks):   1. Pt will demo WFL AROM in BLE for increased functional mobility.  2. Pt will ambulate with SPC for 600' on level surfaces at >/= SPV for increased community distances and mobility.  3. Pt will ambulate on outside surfaces at SBA with SPC for increased community mobility.  4. Pt will score </= 30'' on TUG to demo decreased fall risk and increased functional mobility.  5. Pt will perform STS without UE support for 5x to demo increased functional strength.  6. Pt will negotiate 12 stairs at Mod I for increased community negotiations.  7. Pt will have no falls throughout therapy to display improved safety and increased attention/cognition.  8. Pt will perform 1/2 tandem standing x30 secs B without LOB for increased balance in gait stance.

## 2017-12-28 ENCOUNTER — CLINICAL SUPPORT (OUTPATIENT)
Dept: REHABILITATION | Facility: HOSPITAL | Age: 57
End: 2017-12-28
Attending: FAMILY MEDICINE
Payer: COMMERCIAL

## 2017-12-28 DIAGNOSIS — M54.2 NECK PAIN: Primary | ICD-10-CM

## 2017-12-28 DIAGNOSIS — M25.669 DECREASED ROM OF LOWER EXTREMITY: ICD-10-CM

## 2017-12-28 DIAGNOSIS — Z74.09 DECREASED MOBILITY AND ENDURANCE: ICD-10-CM

## 2017-12-28 DIAGNOSIS — R41.840 DECREASED ATTENTION SPAN: ICD-10-CM

## 2017-12-28 DIAGNOSIS — M62.81 DECREASED MUSCLE STRENGTH: ICD-10-CM

## 2017-12-28 PROCEDURE — 97112 NEUROMUSCULAR REEDUCATION: CPT | Mod: PN

## 2017-12-28 PROCEDURE — 97110 THERAPEUTIC EXERCISES: CPT | Mod: PN

## 2017-12-28 NOTE — PROGRESS NOTES
"TIME RECORD    Date: 12/28/2017      Start Time:  105  Stop Time:  200    PROCEDURES:    TIMED  Procedure Min.   NMR 15   Therex 40         Total Timed Minutes:  55  Total Timed Units:  4  Total Untimed Units:  0  Charges Billed/# of units:  4  TEx3, NMR x 1      Progress/Current Status    Subjective:     Patient ID: Silvia Cervantes is a 57 y.o. female.  Diagnosis:   1. Decreased ROM of upper extremity     2. Decreased ROM of lower extremity     3. Decreased muscle strength     4. Decreased mobility and endurance     5. Decreased attention Span         Reports of Left LE pain "Especially with standing and walking"  Not specific to scale.    Objective:     Neuro re-ed and endurance training with B UE/LE extremities for reciprocal motion of all limbs on sci-fit x 7 min at level L2 at > or equal to 50 spm w/o rest.  Patient then performed all therex as per log with 1:1 by PTA x 40 minutes total time.  Worked on stair negotiation with 1 HR with reciprocal step up, step to down pattern x 4 trials each all without LOB.  Added trial 4 laps each on foam side stepping R<>L with 1 UE support, and tandem walking with 2 UE support.  Also performed alternate toe taps as noted.     Date  12/28/17 12/26/17 12/21/17 12/19/17 12/15/17 12/12/17 12/7/17 12/4/17 12/1/17 11/29/17 11/27/17 11/22/17 11/20/17 11/17/17 11/15/17 11/13/17 11/10/17 11/9/17 11/7/17 11/3/17 11/2/17 10/31/17 10/27/17 10/23/17   VISIT 25 24 23 22 21  19 18 17 16 15 14 13 12 11 10 9 8 7 6 5 4 3 2    Gcode N/A     10/10 10/10 9/10 8/10 7/10 6/10 5/10 4/10 3/10 2/10 1/10 10/10 9/10 8/10 7/10 6 of 10 5 of 10 4 of 10 3 of 10 2 of 10   POC 12/20/17                           FOTO At dc Done  UPOC           D/C  dc 10/10 9/10 8/10 7 of 10 6 of 10 DONE..   NEXT       MT       NT      --   --       --   -   Eliseo stretch 30"x3 B NT 3x30'' B 30"x3 B 30"x3 B 3x30'' B 3x30'' B NT MT MT MT NT MT MT mT NT 30"x3 B 3x30" B         Long Sit HS 30"x3 B NT 3x30'' B 30"x3 B MT 30"X3 B " "3x30'' B 3x30'' B Stairs  2x45'' B MT MT MT NT MT MT MT Sup HSS 2x30" L MT Sup HSS 2x30" L  Sup HSS 2x30" B   MT Next OOT MT MT   Gastroc Str. 2x45" B 2x45'' B 2x45' B 30"x3 B MT 30"x3 B Fitter  3x30'' fitter  Fitter  3x30'' fitter  1x1'' MT MT MT NT MT MT  NT       On fitter 3 x 30'' MT MT   Sci fit  L2 8' L2 10 mins L2 8 mins nt Bike L3  6 mins Bike L3  5 mins Bike L3  5 mins Bike L3  5 mins L4 8 min L3  7' L3   6' L3 6' L3 6' L3 x6' L3 x5 L2 10' L2 8' L2 8' L2 8' L2 8' L2 8' L2 8'   L2 6 min    TA -- -- --    -- --     -- --  NT 5"x15 5" x 15         QS -- -- --    -- --     -- --  NT 5"x15 5"x 15 5x10 5x10 5x10 5"X10 5"X10 5"x10   SAQ -- -- -- --  - -- --     -- --  NT 2x10 2# B 2x10 2# B 1.5 2x15 B 1.5 2x15 B 1.5 2x15 B 1.5 2x15 B 1.5 2x15 B 1.5 2x10 B   SLR -- -- -- --  - -- --  NT  NT 3x10 B  2# NT 2x10 B  2# 2x10 2# B 2x10 2# B 2x10 2# B 2 x 10 B  1.5 2x15 B 1.5 2x15 B 1.5 2x15 B 1.5 2x15 B 1.5 2x10 B    SL Abd -- -- -- --  - -- --  NT  NT 3x10 B  2# NT 2x10 B  2# 2x10 2# B 2x10 2# B 2x10 2# B   2 x 10 B 2x10 B 2x10 B 2x10 B 2x10 B 2x10 B   Lumbar bridge -- -- --- --  - -- --  NT  NT 3x10 B w/ hip add 3x10 B  --  2x10 B  2x10 B 2x1 0B 2x10 2x10 -   Drew Hip Add -- -- -- --  - -- --  NT   -- --  --       W/ leg press 5"x15 5"x15   LAQs -- --- -- --  - -- --  NT  NT 2x15 B 2# 2x15 B 2# 2x10 B 2# 2x10 B 2# NT 2x10 B 2# 2 x 10 B  1.5# 2x10 B  1.5 # 2x10 B  1.5 # 2x10 B   1.5 # 2x10 B   1.5 # 2x10 B   1.5 #   Seated Hip Flex -- -- -- --  - --   --  NT  NT 2x15 B 2# 2x15 B 2# 2x10 B 2# 2x10 B 2# NT 2x10 B 2# 2 x 10 B  With yellow theraband    2x10 B  1.5# 2x10 B  1.5# 2x10 B  1.5#   Cybex   Leg Press 4.5 DL   NT 4.5 B  3x10  w/ball 4.5 B  310  w/ball   4.5 B  310  w/ball Next  4.5 B  2x15  w/ball  4.5 B  3x10  w/ball 4.5 B  3x10  w/ball 4.5 B 2x10  w/ball  OOT/next  4.5 B 2x10  w/ball NT 3.0 B 2 x 10 w/ ball squeeze oot 3.0 B 2 x 10 w/ ball squeeze   -   TKE  -- --   - -- --     -- -- - --       --   -   Hip Abd " "YTB 2x10 B 2x10 B NT YTB 2x10 B YTB 2x10 B YTB 2x10 B 2x10 B NT 2x12 B // 2x12 B 2x10 B 2x10 B // -- -- - --       --   -   Hip Flex YTB 2x10 B 2x10 B NT YTB 2x10 B YTB 2x10 B YTB 2x10 B 2x10 B NT 2x12 B // 2x12 B 2x10 B 2x10 B // -- -- - --       --   -   Hip Ext YTB 2x10 B 2x10 B NT YTB 2x10 B YTB 2x10 B YTB 2x10 B 2x10 B NT 2x12 B // 2x12 B 2x10 B 2x10 B // -- -- - --       --   -   HS Curls 2x15 // 2x15 // 2x15 //  2x15 // 2x15 // 2x15 // NT 2x12 b // 2x12 B 2x10 B  Standing  2x10 standing 2x10 standing NT 2x20 RTB OOT /next  RTB 2 x 10 B NT YTB 2x10   EOM YTB 2x10   EOM YTB 2x10   EOM YTB 2x10   EOM -   Mini squats 3x10 B NT NT  3x10 B // w/ VC 3x10 B // w/ VC 3x10 B // NT 2x12 B // 2x12 B 2x10 in //bars  -- -- - --       --   -   Foot taps 30"x2 1 UE sup.  28/31 NT Blue 2x15 B //  1 HR                         Step Ups Blue Unil 1x10 ea NT NT   - -- --     -- -- - --       --   -   Step Downs  -- --   - -- --     -- -- - --       --   -   Heel raises 2x15 DL 2x15 DL 2x15 DL  2x15 DL 2x15 DL 2x15 DL 2x15 DL 3x10 B 3x10 DL 3x10 B 3x10 B 3x10 B 2x10 B 2x20 B  // NT/OOT  2x10 B NT 2x10 B 2x10 B 2 x 10 B       Gait stairs Note note   note Note Note  note 100 w. SPC See note  160' w/ SPC note See note  160' w/ SPC See note 200 ft with SPC See note 250 ft with SPC       See note NOTE  balance -   balance  -- --     Note note    note Next  next   NT note note See note   -   Initials DH 1/6 JEANETTE REESE 3/6 JA 2/6 JA 1/6 [facility] JA 4/6 DH 3/6 JA 2/6 JA 1/6 [facility] JA 1/6 FS JA 5/6 MM 4/6 SM SPTA/JA 3/6 JA 2/6 JA 1/6 FS JA 2/6 JA 1/6       Assessment:     Patient requires verbal cues to maintain count and stay on task with therex.  Able to tolerate steamboats with resistance today.  Patient performed increased activity without complaint of increased pain.  Reports "tired" after treatment.    Patient Education/Response:     Patient educated to continue HEP.  Verbalized understanding.    Plans and Goals:     Cont to advance " PT as per POC, advance as appropriate.    Short Term Goals (4 Weeks):   1. Pt will be I in HEP for progressive strengthening.  2. Pt will ambulate for 50' with SPC at CGA for beginning community distances and increased mobility. Progressed with QC and Heidi  3. Pt will ambulate 500' with RW at SPV for increased endurance and functional mobility. MET  4. Pt will perform STS from 17 inch chair with min-mod upper extremity support for increased functional mobility.  5. Pt will be I in all bed mobility for increased functional mobility. MET  6. Pt will demo proper safety in w/c, ambulation, and car/wheelchair transfers for decreased fall risk. MET  7. Pt will stand with feet together x30 secs without LOB for increased balance.     Long Term Goals (8 Weeks):   1. Pt will demo WFL AROM in BLE for increased functional mobility.  2. Pt will ambulate with SPC for 600' on level surfaces at >/= SPV for increased community distances and mobility.  3. Pt will ambulate on outside surfaces at SBA with SPC for increased community mobility.  4. Pt will score </= 30'' on TUG to demo decreased fall risk and increased functional mobility.  5. Pt will perform STS without UE support for 5x to demo increased functional strength.  6. Pt will negotiate 12 stairs at Mod I for increased community negotiations.  7. Pt will have no falls throughout therapy to display improved safety and increased attention/cognition.  8. Pt will perform 1/2 tandem standing x30 secs B without LOB for increased balance in gait stance.

## 2017-12-29 ENCOUNTER — DOCUMENTATION ONLY (OUTPATIENT)
Dept: REHABILITATION | Facility: HOSPITAL | Age: 57
End: 2017-12-29

## 2017-12-29 DIAGNOSIS — M25.669 DECREASED ROM OF LOWER EXTREMITY: ICD-10-CM

## 2017-12-29 DIAGNOSIS — R41.840 DECREASED ATTENTION SPAN: ICD-10-CM

## 2017-12-29 DIAGNOSIS — M62.81 DECREASED MUSCLE STRENGTH: ICD-10-CM

## 2017-12-29 DIAGNOSIS — Z74.09 DECREASED MOBILITY AND ENDURANCE: ICD-10-CM

## 2018-01-03 ENCOUNTER — CLINICAL SUPPORT (OUTPATIENT)
Dept: REHABILITATION | Facility: HOSPITAL | Age: 58
End: 2018-01-03
Attending: FAMILY MEDICINE
Payer: COMMERCIAL

## 2018-01-03 DIAGNOSIS — M62.81 DECREASED MUSCLE STRENGTH: ICD-10-CM

## 2018-01-03 DIAGNOSIS — R41.840 DECREASED ATTENTION SPAN: ICD-10-CM

## 2018-01-03 DIAGNOSIS — Z74.09 DECREASED MOBILITY AND ENDURANCE: ICD-10-CM

## 2018-01-03 DIAGNOSIS — M25.669 DECREASED ROM OF LOWER EXTREMITY: ICD-10-CM

## 2018-01-03 PROCEDURE — 97110 THERAPEUTIC EXERCISES: CPT | Mod: PN

## 2018-01-03 PROCEDURE — 97112 NEUROMUSCULAR REEDUCATION: CPT | Mod: PN

## 2018-01-03 NOTE — PROGRESS NOTES
"TIME RECORD    Date: 1/3/2018      Start Time:  100  Stop Time:  200    PROCEDURES:    TIMED  Procedure Min.   NMR  20   Therex 40         Total Timed Minutes:  60  Total Timed Units:  4  Total Untimed Units:  0  Charges Billed/# of units:  4  NMRx1, TEx3      Progress/Current Status    Subjective:     Patient ID: Silvia Cervantes is a 57 y.o. female.  Diagnosis:   1. Decreased ROM of upper extremity     2. Decreased ROM of lower extremity     3. Decreased muscle strength     4. Decreased mobility and endurance     5. Decreased attention Span         Complaint of L leg "almost numb - worse than usual - maybe the weather."    Objective:     Patient then performed all therex as per log with 1:1 by PTA x 40 minutes total time.  Worked on stair negotiation with 1 HR with reciprocal step up, step to down pattern x 3 trials each all without LOB.  Added trial 3 laps each on foam side stepping R<>L without UE support, and tandem walking with 1 UE support.  Also performed alternate toe taps as noted without UE support.     Date  1/3/18 12/28/17 12/26/17 12/21/17 12/19/17 12/15/17 12/12/17 12/7/17 12/4/17 12/1/17 11/29/17 11/27/17 11/22/17 11/20/17 11/17/17 11/15/17 11/13/17 11/10/17 11/9/17 11/7/17 11/3/17 11/2/17 10/31/17 10/27/17 10/23/17   VISIT 26 25 24 23 22 21  19 18 17 16 15 14 13 12 11 10 9 8 7 6 5 4 3 2    Gcode -- N/A     10/10 10/10 9/10 8/10 7/10 6/10 5/10 4/10 3/10 2/10 1/10 10/10 9/10 8/10 7/10 6 of 10 5 of 10 4 of 10 3 of 10 2 of 10   POC 2/26/18                            FOTO At d/c At dc Done  UPOC           D/C  dc 10/10 9/10 8/10 7 of 10 6 of 10 DONE..   NEXT       MT        NT      --   --       --   -   Eliseo stretch 30"x3 B 30"x3 B NT 3x30'' B 30"x3 B 30"x3 B 3x30'' B 3x30'' B NT MT MT MT NT MT MT mT NT 30"x3 B 3x30" B         Long Sit HS 30"x3 B 30"x3 B NT 3x30'' B 30"x3 B MT 30"X3 B 3x30'' B 3x30'' B Stairs  2x45'' B MT MT MT NT MT MT MT Sup HSS 2x30" L MT Sup HSS 2x30" L  Sup HSS 2x30" B   MT Next " "OOT MT MT   Gastroc Str. w/hss 2x45" B 2x45'' B 2x45' B 30"x3 B MT 30"x3 B Fitter  3x30'' fitter  Fitter  3x30'' fitter  1x1'' MT MT MT NT MT MT  NT       On fitter 3 x 30'' MT MT   Sci fit  L3 6' L2 8' L2 10 mins L2 8 mins nt Bike L3  6 mins Bike L3  5 mins Bike L3  5 mins Bike L3  5 mins L4 8 min L3  7' L3   6' L3 6' L3 6' L3 x6' L3 x5 L2 10' L2 8' L2 8' L2 8' L2 8' L2 8' L2 8'   L2 6 min    TA -- -- -- --    -- --     -- --  NT 5"x15 5" x 15         QS -- -- -- --    -- --     -- --  NT 5"x15 5"x 15 5x10 5x10 5x10 5"X10 5"X10 5"x10   SAQ -- -- -- -- --  - -- --     -- --  NT 2x10 2# B 2x10 2# B 1.5 2x15 B 1.5 2x15 B 1.5 2x15 B 1.5 2x15 B 1.5 2x15 B 1.5 2x10 B   SLR -- -- -- -- --  - -- --  NT  NT 3x10 B  2# NT 2x10 B  2# 2x10 2# B 2x10 2# B 2x10 2# B 2 x 10 B  1.5 2x15 B 1.5 2x15 B 1.5 2x15 B 1.5 2x15 B 1.5 2x10 B    SL Abd -- -- -- -- --  - -- --  NT  NT 3x10 B  2# NT 2x10 B  2# 2x10 2# B 2x10 2# B 2x10 2# B   2 x 10 B 2x10 B 2x10 B 2x10 B 2x10 B 2x10 B   Lumbar bridge -- -- -- --- --  - -- --  NT  NT 3x10 B w/ hip add 3x10 B  --  2x10 B  2x10 B 2x1 0B 2x10 2x10 -   Drew Hip Add -- -- -- -- --  - -- --  NT   -- --  --       W/ leg press 5"x15 5"x15   LAQs -- -- --- -- --  - -- --  NT  NT 2x15 B 2# 2x15 B 2# 2x10 B 2# 2x10 B 2# NT 2x10 B 2# 2 x 10 B  1.5# 2x10 B  1.5 # 2x10 B  1.5 # 2x10 B   1.5 # 2x10 B   1.5 # 2x10 B   1.5 #   Seated Hip Flex -- -- -- -- --  - --   --  NT  NT 2x15 B 2# 2x15 B 2# 2x10 B 2# 2x10 B 2# NT 2x10 B 2# 2 x 10 B  With yellow theraband    2x10 B  1.5# 2x10 B  1.5# 2x10 B  1.5#   Cybex   Leg Press 4.5 DL  2x15 w/ball   4.5 DL   NT 4.5 B  3x10  w/ball 4.5 B  310  w/ball   4.5 B  310  w/ball Next  4.5 B  2x15  w/ball  4.5 B  3x10  w/ball 4.5 B  3x10  w/ball 4.5 B 2x10  w/ball  OOT/next  4.5 B 2x10  w/ball NT 3.0 B 2 x 10 w/ ball squeeze oot 3.0 B 2 x 10 w/ ball squeeze   -   TKE   -- --   - -- --     -- -- - --       --   -   Hip Abd YTB  2x12 B YTB 2x10 B 2x10 B NT YTB 2x10 B YTB 2x10 B " "YTB 2x10 B 2x10 B NT 2x12 B // 2x12 B 2x10 B 2x10 B // -- -- - --       --   -   Hip Flex YTB  2x12 B YTB 2x10 B 2x10 B NT YTB 2x10 B YTB 2x10 B YTB 2x10 B 2x10 B NT 2x12 B // 2x12 B 2x10 B 2x10 B // -- -- - --       --   -   Hip Ext YTB   2x12 B YTB 2x10 B 2x10 B NT YTB 2x10 B YTB 2x10 B YTB 2x10 B 2x10 B NT 2x12 B // 2x12 B 2x10 B 2x10 B // -- -- - --       --   -   HS Curls 1#   2x10 B 2x15 // 2x15 // 2x15 //  2x15 // 2x15 // 2x15 // NT 2x12 b // 2x12 B 2x10 B  Standing  2x10 standing 2x10 standing NT 2x20 RTB OOT /next  RTB 2 x 10 B NT YTB 2x10   EOM YTB 2x10   EOM YTB 2x10   EOM YTB 2x10   EOM -   Mini squats 3x10 3x10 B NT NT  3x10 B // w/ VC 3x10 B // w/ VC 3x10 B // NT 2x12 B // 2x12 B 2x10 in //bars  -- -- - --       --   -   Foot taps 30"x2 No UE sup.  17/21 30"x2 1 UE sup.  28/31 NT Blue 2x15 B //  1 HR                         Step Ups Blue 2x10 B Blue Unil 1x10 ea NT NT   - -- --     -- -- - --       --   -   Step Downs   -- --   - -- --     -- -- - --       --   -   Heel raises 2x15 DL 2x15 DL 2x15 DL 2x15 DL  2x15 DL 2x15 DL 2x15 DL 2x15 DL 3x10 B 3x10 DL 3x10 B 3x10 B 3x10 B 2x10 B 2x20 B  // NT/OOT  2x10 B NT 2x10 B 2x10 B 2 x 10 B       Gait stairs stairs Note note   note Note Note  note 100 w. SPC See note  160' w/ SPC note See note  160' w/ SPC See note 200 ft with SPC See note 250 ft with SPC       See note NOTE  balance -   balance   -- --     Note note    note Next  next   NT note note See note   -   Initials DH 2/6 DH 1/6 [facility] 3/6 JA 2/6 JA 1/6 [facility] 4/6 DH 3/6 JA 2/6 JA 1/6 [facility] 1/6 FS  5/6 MM 4/6 SM SPTA/JA 3/6 JA 2/6 JA 1/6 FS  2/6 JA 1/6       Assessment:     Able to progress TE with added weight to HS curls, added reps as noted and decreased UE support with balance activity side stepping and alternate toe taps.  No LOB. No complaints of pain or difficulty.    Patient Education/Response:     Patient educated to continue HEP.  Verbalized understanding.    Plans and " Goals:     Cont to advance PT as per POC, advance as appropriate.    Short Term Goals (4 Weeks):   1. Pt will be I in HEP for progressive strengthening.  2. Pt will ambulate for 50' with SPC at CGA for beginning community distances and increased mobility. Progressed with QC and Heidi  3. Pt will ambulate 500' with RW at SPV for increased endurance and functional mobility. MET  4. Pt will perform STS from 17 inch chair with min-mod upper extremity support for increased functional mobility.  5. Pt will be I in all bed mobility for increased functional mobility. MET  6. Pt will demo proper safety in w/c, ambulation, and car/wheelchair transfers for decreased fall risk. MET  7. Pt will stand with feet together x30 secs without LOB for increased balance.     Long Term Goals (8 Weeks):   1. Pt will demo WFL AROM in BLE for increased functional mobility.  2. Pt will ambulate with SPC for 600' on level surfaces at >/= SPV for increased community distances and mobility.  3. Pt will ambulate on outside surfaces at SBA with SPC for increased community mobility.  4. Pt will score </= 30'' on TUG to demo decreased fall risk and increased functional mobility.  5. Pt will perform STS without UE support for 5x to demo increased functional strength.  6. Pt will negotiate 12 stairs at Mod I for increased community negotiations.  7. Pt will have no falls throughout therapy to display improved safety and increased attention/cognition.  8. Pt will perform 1/2 tandem standing x30 secs B without LOB for increased balance in gait stance.

## 2018-01-09 ENCOUNTER — CLINICAL SUPPORT (OUTPATIENT)
Dept: REHABILITATION | Facility: HOSPITAL | Age: 58
End: 2018-01-09
Attending: FAMILY MEDICINE
Payer: COMMERCIAL

## 2018-01-09 DIAGNOSIS — M62.81 DECREASED MUSCLE STRENGTH: ICD-10-CM

## 2018-01-09 DIAGNOSIS — M25.669 DECREASED ROM OF LOWER EXTREMITY: ICD-10-CM

## 2018-01-09 DIAGNOSIS — Z74.09 DECREASED MOBILITY AND ENDURANCE: ICD-10-CM

## 2018-01-09 DIAGNOSIS — R41.840 DECREASED ATTENTION SPAN: ICD-10-CM

## 2018-01-09 PROCEDURE — 97112 NEUROMUSCULAR REEDUCATION: CPT | Mod: PN

## 2018-01-09 PROCEDURE — 97116 GAIT TRAINING THERAPY: CPT | Mod: PN

## 2018-01-09 PROCEDURE — 97110 THERAPEUTIC EXERCISES: CPT | Mod: PN

## 2018-01-09 NOTE — PROGRESS NOTES
"TIME RECORD    Date: 1/9/2018      Start Time:  1100  Stop Time:  1155    PROCEDURES:    TIMED  Procedure Min.   NMR  8   Therex 30   Gait 17     Total Timed Minutes:  55  Total Timed Units:  4  Total Untimed Units:  0  Charges Billed/# of units:  4  (NMRx1, 1 Gt, Tex2)      Progress/Current Status    Subjective:     Patient ID: Silvia Cervantes is a 57 y.o. female.  Diagnosis:   1. Decreased ROM of lower extremity     2. Decreased muscle strength     3. Decreased mobility and endurance     4. Decreased attention Span       Pt reports that her L leg feel heavy, and the numbness is about the same.    Objective:   Pt started on stepper L3 x 8 mins. Patient then performed all therex as per log with 1:1 by PT x 30 minutes total time.  Pt also performed gait x 17 mins consisting of stair negotiation with 1 HR with reciprocal step up, step to down pattern x 3 trials each all without LOB, and ambulation around gym 160' with QC at Mod I and then again at [facility] with Vcs to increase chantell.    Date  1/9/18 1/3/18 12/28/17 12/26/17 12/21/17 12/19/17 12/15/17 12/12/17 12/7/17 12/4/17 12/1/17 11/29/17 11/27/17 11/22/17 11/20/17 11/17/17 11/15/17 11/13/17 11/10/17 11/9/17 11/7/17 11/3/17 11/2/17 10/31/17 10/27/17 10/23/17   VISIT 27 26 25 24 23 22 21  19 18 17 16 15 14 13 12 11 10 9 8 7 6 5 4 3 2    Gcode  -- N/A     10/10 10/10 9/10 8/10 7/10 6/10 5/10 4/10 3/10 2/10 1/10 10/10 9/10 8/10 7/10 6 of 10 5 of 10 4 of 10 3 of 10 2 of 10   POC 2/26/18                             FOTO At d/c At d/c At dc Done  UPOC           D/C  dc 10/10 9/10 8/10 7 of 10 6 of 10 DONE..   NEXT       MT         NT      --   --       --   -   Eliseo stretch NT 30"x3 B 30"x3 B NT 3x30'' B 30"x3 B 30"x3 B 3x30'' B 3x30'' B NT MT MT MT NT MT MT mT NT 30"x3 B 3x30" B         Long Sit HS 3x30'' B  Stairs 30"x3 B 30"x3 B NT 3x30'' B 30"x3 B MT 30"X3 B 3x30'' B 3x30'' B Stairs  2x45'' B MT MT MT NT MT MT MT Sup HSS 2x30" L MT Sup HSS 2x30" L  Sup HSS 2x30" B   MT " "Next OOT MT MT   Gastroc Str. 2x45''  Fitter  w/hss 2x45" B 2x45'' B 2x45' B 30"x3 B MT 30"x3 B Fitter  3x30'' fitter  Fitter  3x30'' fitter  1x1'' MT MT MT NT MT MT  NT       On fitter 3 x 30'' MT MT   Sci fit  L2 8' L3 6' L2 8' L2 10 mins L2 8 mins nt Bike L3  6 mins Bike L3  5 mins Bike L3  5 mins Bike L3  5 mins L4 8 min L3  7' L3   6' L3 6' L3 6' L3 x6' L3 x5 L2 10' L2 8' L2 8' L2 8' L2 8' L2 8' L2 8'   L2 6 min    TA -- -- -- -- --    -- --     -- --  NT 5"x15 5" x 15         QS -- -- -- -- --    -- --     -- --  NT 5"x15 5"x 15 5x10 5x10 5x10 5"X10 5"X10 5"x10   SAQ -- -- -- -- -- --  - -- --     -- --  NT 2x10 2# B 2x10 2# B 1.5 2x15 B 1.5 2x15 B 1.5 2x15 B 1.5 2x15 B 1.5 2x15 B 1.5 2x10 B   SLR -- -- -- -- -- --  - -- --  NT  NT 3x10 B  2# NT 2x10 B  2# 2x10 2# B 2x10 2# B 2x10 2# B 2 x 10 B  1.5 2x15 B 1.5 2x15 B 1.5 2x15 B 1.5 2x15 B 1.5 2x10 B    SL Abd - -- -- -- -- --  - -- --  NT  NT 3x10 B  2# NT 2x10 B  2# 2x10 2# B 2x10 2# B 2x10 2# B   2 x 10 B 2x10 B 2x10 B 2x10 B 2x10 B 2x10 B   Lumbar bridge -- -- -- -- --- --  - -- --  NT  NT 3x10 B w/ hip add 3x10 B  --  2x10 B  2x10 B 2x1 0B 2x10 2x10 -   Drew Hip Add -- -- -- -- -- --  - -- --  NT   -- --  --       W/ leg press 5"x15 5"x15   LAQs -- -- -- --- -- --  - -- --  NT  NT 2x15 B 2# 2x15 B 2# 2x10 B 2# 2x10 B 2# NT 2x10 B 2# 2 x 10 B  1.5# 2x10 B  1.5 # 2x10 B  1.5 # 2x10 B   1.5 # 2x10 B   1.5 # 2x10 B   1.5 #   Seated Hip Flex -- -- -- -- -- --  - --   --  NT  NT 2x15 B 2# 2x15 B 2# 2x10 B 2# 2x10 B 2# NT 2x10 B 2# 2 x 10 B  With yellow theraband    2x10 B  1.5# 2x10 B  1.5# 2x10 B  1.5#   Cybex   Leg Press 4.5 DL  2x15  2.0 SL  2x10 B   4.5 DL  2x15 w/ball   4.5 DL   NT 4.5 B  3x10  w/ball 4.5 B  310  w/ball   4.5 B  310  w/ball Next  4.5 B  2x15  w/ball  4.5 B  3x10  w/ball 4.5 B  3x10  w/ball 4.5 B 2x10  w/ball  OOT/next  4.5 B 2x10  w/ball NT 3.0 B 2 x 10 w/ ball squeeze oot 3.0 B 2 x 10 w/ ball squeeze   -   TKE    -- --   - -- --     -- -- " "- --       --   -   Hip Abd oot YTB  2x12 B YTB 2x10 B 2x10 B NT YTB 2x10 B YTB 2x10 B YTB 2x10 B 2x10 B NT 2x12 B // 2x12 B 2x10 B 2x10 B // -- -- - --       --   -   Hip Flex oot YTB  2x12 B YTB 2x10 B 2x10 B NT YTB 2x10 B YTB 2x10 B YTB 2x10 B 2x10 B NT 2x12 B // 2x12 B 2x10 B 2x10 B // -- -- - --       --   -   Hip Ext oot YTB   2x12 B YTB 2x10 B 2x10 B NT YTB 2x10 B YTB 2x10 B YTB 2x10 B 2x10 B NT 2x12 B // 2x12 B 2x10 B 2x10 B // -- -- - --       --   -   HS Curls 1# 2x15 B 1#   2x10 B 2x15 // 2x15 // 2x15 //  2x15 // 2x15 // 2x15 // NT 2x12 b // 2x12 B 2x10 B  Standing  2x10 standing 2x10 standing NT 2x20 RTB OOT /next  RTB 2 x 10 B NT YTB 2x10   EOM YTB 2x10   EOM YTB 2x10   EOM YTB 2x10   EOM -   Mini squats 2x10  moderate depth 3x10 3x10 B NT NT  3x10 B // w/ VC 3x10 B // w/ VC 3x10 B // NT 2x12 B // 2x12 B 2x10 in //bars  -- -- - --       --   -   Foot taps 2x30'' No UE support  Blue 30"x2 No UE sup.  17/21 30"x2 1 UE sup.  28/31 NT Blue 2x15 B //  1 HR                         Step Ups Blue  2x10 b Blue 2x10 B Blue Unil 1x10 ea NT NT   - -- --     -- -- - --       --   -   Step Downs --   -- --   - -- --     -- -- - --       --   -   Heel raises 2x15 DL 2x15 DL 2x15 DL 2x15 DL 2x15 DL  2x15 DL 2x15 DL 2x15 DL 2x15 DL 3x10 B 3x10 DL 3x10 B 3x10 B 3x10 B 2x10 B 2x20 B  // NT/OOT  2x10 B NT 2x10 B 2x10 B 2 x 10 B       Gait note stairs stairs Note note   note Note Note  note 100 w. SPC See note  160' w/ SPC note See note  160' w/ SPC See note 200 ft with SPC See note 250 ft with SPC       See note NOTE  balance -   balance    -- --     Note note    note Next  next   NT note note See note   -   Initials JEANETTE DH 2/6 DH 1/6 JEANETTE JEANETTE JA 3/6 JA 2/6 JA 1/6 JEANETTE JEANETTE JA 4/6 DH 3/6 JA 2/6 JA 1/6 JEANETTE JEANETTE JA 1/6 FS JA 5/6 MM 4/6 SM SPTA/JA 3/6 JA 2/6 JA 1/6 FS JA 2/6 JA 1/6       Assessment:     Pt progressing in gait and needs cont encouragement to increased independence level from AD in clinic and at home. Pt performed " leg press well today as well as other TE performed.     Patient Education/Response:     Patient educated to continue HEP.  Verbalized understanding.    Plans and Goals:     Cont to advance PT as per POC, advance as appropriate.    Short Term Goals (4 Weeks):   1. Pt will be I in HEP for progressive strengthening.  2. Pt will ambulate for 50' with SPC at CGA for beginning community distances and increased mobility. Progressed with QC and Heidi  3. Pt will ambulate 500' with RW at SPV for increased endurance and functional mobility. MET  4. Pt will perform STS from 17 inch chair with min-mod upper extremity support for increased functional mobility.  5. Pt will be I in all bed mobility for increased functional mobility. MET  6. Pt will demo proper safety in w/c, ambulation, and car/wheelchair transfers for decreased fall risk. MET  7. Pt will stand with feet together x30 secs without LOB for increased balance.     Long Term Goals (8 Weeks):   1. Pt will demo WFL AROM in BLE for increased functional mobility.  2. Pt will ambulate with SPC for 600' on level surfaces at >/= SPV for increased community distances and mobility.  3. Pt will ambulate on outside surfaces at SBA with SPC for increased community mobility.  4. Pt will score </= 30'' on TUG to demo decreased fall risk and increased functional mobility.  5. Pt will perform STS without UE support for 5x to demo increased functional strength.  6. Pt will negotiate 12 stairs at Mod I for increased community negotiations.  7. Pt will have no falls throughout therapy to display improved safety and increased attention/cognition.  8. Pt will perform 1/2 tandem standing x30 secs B without LOB for increased balance in gait stance.

## 2018-01-11 ENCOUNTER — CLINICAL SUPPORT (OUTPATIENT)
Dept: REHABILITATION | Facility: HOSPITAL | Age: 58
End: 2018-01-11
Attending: FAMILY MEDICINE
Payer: COMMERCIAL

## 2018-01-11 DIAGNOSIS — M62.81 DECREASED MUSCLE STRENGTH: ICD-10-CM

## 2018-01-11 DIAGNOSIS — M25.669 DECREASED ROM OF LOWER EXTREMITY: ICD-10-CM

## 2018-01-11 DIAGNOSIS — Z74.09 DECREASED MOBILITY AND ENDURANCE: ICD-10-CM

## 2018-01-11 DIAGNOSIS — R41.840 DECREASED ATTENTION SPAN: ICD-10-CM

## 2018-01-11 PROCEDURE — 97116 GAIT TRAINING THERAPY: CPT | Mod: PN

## 2018-01-11 PROCEDURE — 97110 THERAPEUTIC EXERCISES: CPT | Mod: PN

## 2018-01-11 PROCEDURE — 97112 NEUROMUSCULAR REEDUCATION: CPT | Mod: PN

## 2018-01-11 NOTE — PROGRESS NOTES
"TIME RECORD    Date: 1/11/2018      Start Time:  1105  Stop Time:  1200    PROCEDURES:    TIMED  Procedure Min.   NMR 8   Therex 35   Gait  10     Total Timed Minutes:  53  Total Timed Units:  4  Total Untimed Units:  0  Charges Billed/# of units:  4  NMRx1, Gait x 1, TEx2      Progress/Current Status    Subjective:     Patient ID: Silvia Cervantes is a 57 y.o. female.  Diagnosis:   1. Decreased ROM of lower extremity     2. Decreased muscle strength     3. Decreased mobility and endurance     4. Decreased attention Span       Patient reports using cane in Left hand "to even my hips"  Not sure if it is working.  "I'm trying - my daughter said it looked good."    Objective:     Pt started on stepper L3 x 8 mins. Patient then performed all therex as per log with 1:1 by PTA x 35 minutes total time.  Pt also performed gait x 10 mins consisting of review of technique, not to switch hands intermittently and ambulation around gym 140' with SBQC in left Mod with Vcs to increase chantell.    Date  1/11/18 1/9/18 1/3/18 12/28/17 12/26/17 12/21/17 12/19/17 12/15/17 12/12/17 12/7/17 12/4/17 12/1/17 11/29/17 11/27/17 11/22/17 11/20/17 11/17/17 11/15/17 11/13/17 11/10/17 11/9/17 11/7/17 11/3/17 11/2/17 10/31/17 10/27/17 10/23/17   VISIT 28 27 26 25 24 23 22 21  19 18 17 16 15 14 13 12 11 10 9 8 7 6 5 4 3 2    Gcode   -- N/A     10/10 10/10 9/10 8/10 7/10 6/10 5/10 4/10 3/10 2/10 1/10 10/10 9/10 8/10 7/10 6 of 10 5 of 10 4 of 10 3 of 10 2 of 10   POC 2/26/18                              FOTO w/dc At d/c At d/c At dc Done  UPOC           D/C  dc 10/10 9/10 8/10 7 of 10 6 of 10 DONE..   NEXT       MT          NT      --   --       --   -   Eliseo stretch  NT 30"x3 B 30"x3 B NT 3x30'' B 30"x3 B 30"x3 B 3x30'' B 3x30'' B NT MT MT MT NT MT MT mT NT 30"x3 B 3x30" B         Long Sit HS 30"x3 B  stairs 3x30'' B  Stairs 30"x3 B 30"x3 B NT 3x30'' B 30"x3 B MT 30"X3 B 3x30'' B 3x30'' B Stairs  2x45'' B MT MT MT NT MT MT MT Sup HSS 2x30" L MT " "Sup HSS 2x30" L  Sup HSS 2x30" B   MT Next OOT MT MT   Gastroc Str. Fitter  2x45" 2x45''  Fitter  w/hss 2x45" B 2x45'' B 2x45' B 30"x3 B MT 30"x3 B Fitter  3x30'' fitter  Fitter  3x30'' fitter  1x1'' MT MT MT NT MT MT  NT       On fitter 3 x 30'' MT MT   Sci fit  L3 8' L2 8' L3 6' L2 8' L2 10 mins L2 8 mins nt Bike L3  6 mins Bike L3  5 mins Bike L3  5 mins Bike L3  5 mins L4 8 min L3  7' L3   6' L3 6' L3 6' L3 x6' L3 x5 L2 10' L2 8' L2 8' L2 8' L2 8' L2 8' L2 8'   L2 6 min    TA -- -- -- -- -- --    -- --     -- --  NT 5"x15 5" x 15         QS -- -- -- -- -- --    -- --     -- --  NT 5"x15 5"x 15 5x10 5x10 5x10 5"X10 5"X10 5"x10   SAQ -- -- -- -- -- -- --  - -- --     -- --  NT 2x10 2# B 2x10 2# B 1.5 2x15 B 1.5 2x15 B 1.5 2x15 B 1.5 2x15 B 1.5 2x15 B 1.5 2x10 B   SLR -- -- -- -- -- -- --  - -- --  NT  NT 3x10 B  2# NT 2x10 B  2# 2x10 2# B 2x10 2# B 2x10 2# B 2 x 10 B  1.5 2x15 B 1.5 2x15 B 1.5 2x15 B 1.5 2x15 B 1.5 2x10 B    SL Abd -- - -- -- -- -- --  - -- --  NT  NT 3x10 B  2# NT 2x10 B  2# 2x10 2# B 2x10 2# B 2x10 2# B   2 x 10 B 2x10 B 2x10 B 2x10 B 2x10 B 2x10 B   Lumbar bridge -- -- -- -- -- --- --  - -- --  NT  NT 3x10 B w/ hip add 3x10 B  --  2x10 B  2x10 B 2x1 0B 2x10 2x10 -   Drew Hip Add -- -- -- -- -- -- --  - -- --  NT   -- --  --       W/ leg press 5"x15 5"x15   LAQs -- -- -- -- --- -- --  - -- --  NT  NT 2x15 B 2# 2x15 B 2# 2x10 B 2# 2x10 B 2# NT 2x10 B 2# 2 x 10 B  1.5# 2x10 B  1.5 # 2x10 B  1.5 # 2x10 B   1.5 # 2x10 B   1.5 # 2x10 B   1.5 #   Seated Hip Flex -- -- -- -- -- -- --  - --   --  NT  NT 2x15 B 2# 2x15 B 2# 2x10 B 2# 2x10 B 2# NT 2x10 B 2# 2 x 10 B  With yellow theraband    2x10 B  1.5# 2x10 B  1.5# 2x10 B  1.5#   Cybex   Leg Press 4.5 DL  2x15  2.0 SL  3x10 B 4.5 DL  2x15  2.0 SL  2x10 B   4.5 DL  2x15 w/ball   4.5 DL   NT 4.5 B  3x10  w/ball 4.5 B  310  w/ball   4.5 B  310  w/ball Next  4.5 B  2x15  w/ball  4.5 B  3x10  w/ball 4.5 B  3x10  w/ball 4.5 B 2x10  w/ball  OOT/next  4.5 B " "2x10  w/ball NT 3.0 B 2 x 10 w/ ball squeeze oot 3.0 B 2 x 10 w/ ball squeeze   -   TKE     -- --   - -- --     -- -- - --       --   -   Hip Abd YTB   2x15 B oot YTB  2x12 B YTB 2x10 B 2x10 B NT YTB 2x10 B YTB 2x10 B YTB 2x10 B 2x10 B NT 2x12 B // 2x12 B 2x10 B 2x10 B // -- -- - --       --   -   Hip Flex YTB 2x15 oot YTB  2x12 B YTB 2x10 B 2x10 B NT YTB 2x10 B YTB 2x10 B YTB 2x10 B 2x10 B NT 2x12 B // 2x12 B 2x10 B 2x10 B // -- -- - --       --   -   Hip Ext YTB  2x15 B oot YTB   2x12 B YTB 2x10 B 2x10 B NT YTB 2x10 B YTB 2x10 B YTB 2x10 B 2x10 B NT 2x12 B // 2x12 B 2x10 B 2x10 B // -- -- - --       --   -   HS Curls YTB  2x15 B 1# 2x15 B 1#   2x10 B 2x15 // 2x15 // 2x15 //  2x15 // 2x15 // 2x15 // NT 2x12 b // 2x12 B 2x10 B  Standing  2x10 standing 2x10 standing NT 2x20 RTB OOT /next  RTB 2 x 10 B NT YTB 2x10   EOM YTB 2x10   EOM YTB 2x10   EOM YTB 2x10   EOM -   Mini squats 2x10  moderate depth 2x10  moderate depth 3x10 3x10 B NT NT  3x10 B // w/ VC 3x10 B // w/ VC 3x10 B // NT 2x12 B // 2x12 B 2x10 in //bars  -- -- - --       --   -   Foot taps 2x30" w/o  UE sup  18/23 2x30'' No UE support  Blue 30"x2 No UE sup.  17/21 30"x2 1 UE sup.  28/31 NT Blue 2x15 B //  1 HR                         Step Ups Blue  2x10 B Blue  2x10 b Blue 2x10 B Blue Unil 1x10 ea NT NT   - -- --     -- -- - --       --   -   Step Downs -- --   -- --   - -- --     -- -- - --       --   -   Heel raises 2x15 DL 2x15 DL 2x15 DL 2x15 DL 2x15 DL 2x15 DL  2x15 DL 2x15 DL 2x15 DL 2x15 DL 3x10 B 3x10 DL 3x10 B 3x10 B 3x10 B 2x10 B 2x20 B  // NT/OOT  2x10 B NT 2x10 B 2x10 B 2 x 10 B       Gait note note stairs stairs Note note   note Note Note  note 100 w. SPC See note  160' w/ SPC note See note  160' w/ SPC See note 200 ft with SPC See note 250 ft with SPC       See note NOTE  balance -   balance     -- --     Note note    note Next  next   NT note note See note   -   Initials  1/6 JEANETTE  2/6  1/6 JEANETTE REESE 3/6 MARY ANN 2/6 MARY ANN 1/6 JEANETTE REESE 4/6  " "3/6 JA 2/6 JA 1/6 JEANETTE JEANETTE JA 1/6 FS JA 5/6 MM 4/6 SM SPTA/JA 3/6 JA 2/6 JA 1/6 FS JA 2/6 JA 1/6       Assessment:     Patient able to increase activity with reps and complete program with reports of no pain "just a little tired" after treatment.  Improved technique with SBQC after review and practice.    Patient Education/Response:     Patient educated to continue HEP.  Importance and reason of not switching SBQC between R/L due to angle of feet.  Verbalized understanding.    Plans and Goals:     Cont to advance PT as per POC, advance as appropriate.    Short Term Goals (4 Weeks):   1. Pt will be I in HEP for progressive strengthening.  2. Pt will ambulate for 50' with SPC at CGA for beginning community distances and increased mobility. Progressed with QC and Heidi  3. Pt will ambulate 500' with RW at SPV for increased endurance and functional mobility. MET  4. Pt will perform STS from 17 inch chair with min-mod upper extremity support for increased functional mobility.  5. Pt will be I in all bed mobility for increased functional mobility. MET  6. Pt will demo proper safety in w/c, ambulation, and car/wheelchair transfers for decreased fall risk. MET  7. Pt will stand with feet together x30 secs without LOB for increased balance.     Long Term Goals (8 Weeks):   1. Pt will demo WFL AROM in BLE for increased functional mobility.  2. Pt will ambulate with SPC for 600' on level surfaces at >/= SPV for increased community distances and mobility.  3. Pt will ambulate on outside surfaces at SBA with SPC for increased community mobility.  4. Pt will score </= 30'' on TUG to demo decreased fall risk and increased functional mobility.  5. Pt will perform STS without UE support for 5x to demo increased functional strength.  6. Pt will negotiate 12 stairs at Mod I for increased community negotiations.  7. Pt will have no falls throughout therapy to display improved safety and increased attention/cognition.  8. Pt will perform " 1/2 tandem standing x30 secs B without LOB for increased balance in gait stance.

## 2018-01-16 ENCOUNTER — CLINICAL SUPPORT (OUTPATIENT)
Dept: REHABILITATION | Facility: HOSPITAL | Age: 58
End: 2018-01-16
Attending: FAMILY MEDICINE
Payer: COMMERCIAL

## 2018-01-16 DIAGNOSIS — R41.840 DECREASED ATTENTION SPAN: ICD-10-CM

## 2018-01-16 DIAGNOSIS — M62.81 DECREASED MUSCLE STRENGTH: ICD-10-CM

## 2018-01-16 DIAGNOSIS — M25.669 DECREASED ROM OF LOWER EXTREMITY: ICD-10-CM

## 2018-01-16 DIAGNOSIS — Z74.09 DECREASED MOBILITY AND ENDURANCE: ICD-10-CM

## 2018-01-16 PROCEDURE — 97112 NEUROMUSCULAR REEDUCATION: CPT | Mod: PN

## 2018-01-16 PROCEDURE — 97116 GAIT TRAINING THERAPY: CPT | Mod: PN

## 2018-01-16 PROCEDURE — 97110 THERAPEUTIC EXERCISES: CPT | Mod: PN

## 2018-01-16 NOTE — PROGRESS NOTES
"TIME RECORD    Date: 1/16/2018      Start Time:  1:00  Stop Time:   2:00    PROCEDURES:    TIMED  Procedure Min.   NMR 8   Therex 35   Gait  10     Total Timed Minutes:  53  Total Timed Units:  4  Total Untimed Units:  0  Charges Billed/# of units:  4  NMRx1, Gait x 1, TEx2      Progress/Current Status    Subjective:     Patient ID: Silvia Cervantes is a 57 y.o. female.  Diagnosis:   1. Decreased ROM of lower extremity     2. Decreased muscle strength     3. Decreased mobility and endurance     4. Decreased attention Span       Patient reports using cane in Left hand "to even my hips"  Not sure if it is working.  "I'm trying - my daughter said it looked good."    Objective:     Pt started on stepper L3 x 8 mins. Patient then performed all therex as per log with 1:1 by PTA x 35 minutes total time.  Pt also performed gait x 10 mins consisting of review of technique, not to switch hands intermittently and ambulation around gym 140' with SBQC in left Mod with Vcs to increase chantell and for = step length    Date  1/16/18 1/11/18 1/9/18 1/3/18 12/28/17 12/26/17 12/21/17 12/19/17 12/15/17 12/12/17 12/7/17 12/4/17 12/1/17 11/29/17 11/27/17 11/22/17 11/20/17 11/17/17 11/15/17 11/13/17 11/10/17 11/9/17 11/7/17 11/3/17 11/2/17 10/31/17 10/27/17 10/23/17   VISIT 29 28 27 26 25 24 23 22 21  19 18 17 16 15 14 13 12 11 10 9 8 7 6 5 4 3 2    Gcode    -- N/A     10/10 10/10 9/10 8/10 7/10 6/10 5/10 4/10 3/10 2/10 1/10 10/10 9/10 8/10 7/10 6 of 10 5 of 10 4 of 10 3 of 10 2 of 10   POC 2/26/18                               FOTO w/dc w/dc At d/c At d/c At dc Done  UPOC           D/C  dc 10/10 9/10 8/10 7 of 10 6 of 10 DONE..   NEXT       MT           NT      --   --       --   -   [facility] stretch   NT 30"x3 B 30"x3 B NT 3x30'' B 30"x3 B 30"x3 B 3x30'' B 3x30'' B NT MT MT MT NT MT MT mT NT 30"x3 B 3x30" B         Long Sit HS 3x30" HSS supine w/strap 30"x3 B  stairs 3x30'' B  Stairs 30"x3 B 30"x3 B NT 3x30'' B 30"x3 B MT 30"X3 B 3x30'' B " "3x30'' B Stairs  2x45'' B MT MT MT NT MT MT MT Sup HSS 2x30" L MT Sup HSS 2x30" L  Sup HSS 2x30" B   MT Next OOT MT MT   Gastroc Str. 2x45' fitter Fitter  2x45" 2x45''  Fitter  w/hss 2x45" B 2x45'' B 2x45' B 30"x3 B MT 30"x3 B Fitter  3x30'' fitter  Fitter  3x30'' fitter  1x1'' MT MT MT NT MT MT  NT       On fitter 3 x 30'' MT MT   Sci fit  L4 8' L3 8' L2 8' L3 6' L2 8' L2 10 mins L2 8 mins nt Bike L3  6 mins Bike L3  5 mins Bike L3  5 mins Bike L3  5 mins L4 8 min L3  7' L3   6' L3 6' L3 6' L3 x6' L3 x5 L2 10' L2 8' L2 8' L2 8' L2 8' L2 8' L2 8'   L2 6 min    TA  -- -- -- -- -- --    -- --     -- --  NT 5"x15 5" x 15         QS  -- -- -- -- -- --    -- --     -- --  NT 5"x15 5"x 15 5x10 5x10 5x10 5"X10 5"X10 5"x10   SAQ  -- -- -- -- -- -- --  - -- --     -- --  NT 2x10 2# B 2x10 2# B 1.5 2x15 B 1.5 2x15 B 1.5 2x15 B 1.5 2x15 B 1.5 2x15 B 1.5 2x10 B   SLR  -- -- -- -- -- -- --  - -- --  NT  NT 3x10 B  2# NT 2x10 B  2# 2x10 2# B 2x10 2# B 2x10 2# B 2 x 10 B  1.5 2x15 B 1.5 2x15 B 1.5 2x15 B 1.5 2x15 B 1.5 2x10 B    SL Abd  -- - -- -- -- -- --  - -- --  NT  NT 3x10 B  2# NT 2x10 B  2# 2x10 2# B 2x10 2# B 2x10 2# B   2 x 10 B 2x10 B 2x10 B 2x10 B 2x10 B 2x10 B   Lumbar bridge  -- -- -- -- -- --- --  - -- --  NT  NT 3x10 B w/ hip add 3x10 B  --  2x10 B  2x10 B 2x1 0B 2x10 2x10 -   Drew Hip Add  -- -- -- -- -- -- --  - -- --  NT   -- --  --       W/ leg press 5"x15 5"x15   LAQs  -- -- -- -- --- -- --  - -- --  NT  NT 2x15 B 2# 2x15 B 2# 2x10 B 2# 2x10 B 2# NT 2x10 B 2# 2 x 10 B  1.5# 2x10 B  1.5 # 2x10 B  1.5 # 2x10 B   1.5 # 2x10 B   1.5 # 2x10 B   1.5 #   Seated Hip Flex  -- -- -- -- -- -- --  - --   --  NT  NT 2x15 B 2# 2x15 B 2# 2x10 B 2# 2x10 B 2# NT 2x10 B 2# 2 x 10 B  With yellow theraband    2x10 B  1.5# 2x10 B  1.5# 2x10 B  1.5#   Cybex   Leg Press 4.5 DL  2x15  2.5 SL  3x10 B 4.5 DL  2x15  2.0 SL  3x10 B 4.5 DL  2x15  2.0 SL  2x10 B   4.5 DL  2x15 w/ball   4.5 DL   NT 4.5 B  3x10  w/ball 4.5 B  310  w/ball   " "4.5 B  310  w/ball Next  4.5 B  2x15  w/ball  4.5 B  3x10  w/ball 4.5 B  3x10  w/ball 4.5 B 2x10  w/ball  OOT/next  4.5 B 2x10  w/ball NT 3.0 B 2 x 10 w/ ball squeeze oot 3.0 B 2 x 10 w/ ball squeeze   -   TKE      -- --   - -- --     -- -- - --       --   -   Hip Abd RTB 2x15 B YTB   2x15 B oot YTB  2x12 B YTB 2x10 B 2x10 B NT YTB 2x10 B YTB 2x10 B YTB 2x10 B 2x10 B NT 2x12 B // 2x12 B 2x10 B 2x10 B // -- -- - --       --   -   Hip Flex RTB 2x15 B YTB 2x15 oot YTB  2x12 B YTB 2x10 B 2x10 B NT YTB 2x10 B YTB 2x10 B YTB 2x10 B 2x10 B NT 2x12 B // 2x12 B 2x10 B 2x10 B // -- -- - --       --   -   Hip Ext RTB 2x15 B YTB  2x15 B oot YTB   2x12 B YTB 2x10 B 2x10 B NT YTB 2x10 B YTB 2x10 B YTB 2x10 B 2x10 B NT 2x12 B // 2x12 B 2x10 B 2x10 B // -- -- - --       --   -   HS Curls RTB  2x15 B YTB  2x15 B 1# 2x15 B 1#   2x10 B 2x15 // 2x15 // 2x15 //  2x15 // 2x15 // 2x15 // NT 2x12 b // 2x12 B 2x10 B  Standing  2x10 standing 2x10 standing NT 2x20 RTB OOT /next  RTB 2 x 10 B NT YTB 2x10   EOM YTB 2x10   EOM YTB 2x10   EOM YTB 2x10   EOM -   Mini squats  2x10  moderate depth 2x10  moderate depth 3x10 3x10 B NT NT  3x10 B // w/ VC 3x10 B // w/ VC 3x10 B // NT 2x12 B // 2x12 B 2x10 in //bars  -- -- - --       --   -   Foot taps 2x13" w/o UE sup  22/23 2x30" w/o  UE sup  18/23 2x30'' No UE support  Blue 30"x2 No UE sup.  17/21 30"x2 1 UE sup.  28/31 NT Blue 2x15 B //  1 HR                         Step Ups  Blue  2x10 B Blue  2x10 b Blue 2x10 B Blue Unil 1x10 ea NT NT   - -- --     -- -- - --       --   -   Step Downs  -- --   -- --   - -- --     -- -- - --       --   -   Heel raises 2x15 B  No UE support 2x15 DL 2x15 DL 2x15 DL 2x15 DL 2x15 DL 2x15 DL  2x15 DL 2x15 DL 2x15 DL 2x15 DL 3x10 B 3x10 DL 3x10 B 3x10 B 3x10 B 2x10 B 2x20 B  // NT/OOT  2x10 B NT 2x10 B 2x10 B 2 x 10 B       Gait note note note stairs stairs Note note   note Note Note  note 100 w. SPC See note  160' w/ SPC note See note  160' w/ SPC See note 200 ft with " "SPC See note 250 ft with SPC       See note NOTE  balance -   balance      -- --     Note note    note Next  next   NT note note See note   -   Initials MB 2/6 DH 1/6 [facility] 2/6 DH 1/6 [facility] JA 3/6 JA 2/6 JA 1/6 [facility] JA 4/6 DH 3/6 JA 2/6 JA 1/6 [facility] JA 1/6 FS JA 5/6 MM 4/6 SM SPTA/JA 3/6 JA 2/6 JA 1/6 FS JA 2/6 JA 1/6       Assessment:     Patient able to increase activity with reps and complete program with reports of no pain "just a little tired" after treatment.  Improved technique with SBQC after review and practice.    Patient Education/Response:     Patient educated to continue HEP.  Importance and reason of not switching SBQC between R/L due to angle of feet.  Verbalized understanding.    Plans and Goals:     Cont to advance PT as per POC, advance as appropriate.    Short Term Goals (4 Weeks):   1. Pt will be I in HEP for progressive strengthening.  2. Pt will ambulate for 50' with SPC at [facility] for beginning community distances and increased mobility. Progressed with QC and Heidi  3. Pt will ambulate 500' with RW at [facility] for increased endurance and functional mobility. MET  4. Pt will perform STS from 17 inch chair with min-mod upper extremity support for increased functional mobility.  5. Pt will be I in all bed mobility for increased functional mobility. MET  6. Pt will demo proper safety in w/c, ambulation, and car/wheelchair transfers for decreased fall risk. MET  7. Pt will stand with feet together x30 secs without LOB for increased balance.     Long Term Goals (8 Weeks):   1. Pt will demo WFL AROM in BLE for increased functional mobility.  2. Pt will ambulate with SPC for 600' on level surfaces at >/= SPV for increased community distances and mobility.  3. Pt will ambulate on outside surfaces at SBA with SPC for increased community mobility.  4. Pt will score </= 30'' on TUG to demo decreased fall risk and increased functional mobility.  5. Pt will perform STS without UE support for 5x to demo " increased functional strength.  6. Pt will negotiate 12 stairs at Mod I for increased community negotiations.  7. Pt will have no falls throughout therapy to display improved safety and increased attention/cognition.  8. Pt will perform 1/2 tandem standing x30 secs B without LOB for increased balance in gait stance.

## 2018-01-24 ENCOUNTER — CLINICAL SUPPORT (OUTPATIENT)
Dept: REHABILITATION | Facility: HOSPITAL | Age: 58
End: 2018-01-24
Attending: FAMILY MEDICINE
Payer: COMMERCIAL

## 2018-01-24 DIAGNOSIS — M62.81 DECREASED MUSCLE STRENGTH: ICD-10-CM

## 2018-01-24 DIAGNOSIS — M25.669 DECREASED ROM OF LOWER EXTREMITY: ICD-10-CM

## 2018-01-24 DIAGNOSIS — Z74.09 DECREASED MOBILITY AND ENDURANCE: ICD-10-CM

## 2018-01-24 DIAGNOSIS — R41.840 DECREASED ATTENTION SPAN: ICD-10-CM

## 2018-01-24 PROCEDURE — 97112 NEUROMUSCULAR REEDUCATION: CPT | Mod: PN

## 2018-01-24 PROCEDURE — 97110 THERAPEUTIC EXERCISES: CPT | Mod: PN

## 2018-01-24 PROCEDURE — 97116 GAIT TRAINING THERAPY: CPT | Mod: PN

## 2018-01-24 NOTE — PROGRESS NOTES
"TIME RECORD    Date: 1/24/2018      Start Time:  11:00  Stop Time:   12:00    PROCEDURES:    TIMED  Procedure Min.   NMR 15   TE 30   GT 15     Total Timed Minutes:  60  Total Timed Units:  4  Total Untimed Units:  0  Charges Billed/# of units:  4  NMRx1, Gait x 1, TEx2      Progress/Current Status    Subjective:     Patient ID: Silvia Cervantes is a 57 y.o. female.  Diagnosis:   1. Decreased ROM of lower extremity     2. Decreased muscle strength     3. Decreased mobility and endurance     4. Decreased attention Span       Pt states she feels some soreness in B LE's and hips today. She is agreeable to PT session.     Objective:     Pt started on stepper L3 x 8 mins. Patient then performed all therex as per log with 1:1 by PTA x 30 minutes total time.  Pt also performed gait x 15 min ambulated outdoors w/ QC up / down 4 steps with use of Hand rail Left, up /down concrete slope and level/ unlevel surface 800 ft w/ SBA.   Date  1/24/18 1/16/18 1/11/18 1/9/18 1/3/18 12/28/17 12/26/17 12/21/17 12/19/17 12/15/17   VISIT 30 29 28 27 26 25 24 23 22 21   Gcode     -- N/A     10/10   POC 2/26/18             FOTO  w/dc w/dc At d/c At d/c At dc Done  UPOC      [facility] stretch    NT 30"x3 B 30"x3 B NT 3x30'' B 30"x3 B 30"x3 B   Long Sit HS 3x30" HSS supine w/strap 3x30" HSS supine w/strap 30"x3 B  stairs 3x30'' B  Stairs 30"x3 B 30"x3 B NT 3x30'' B 30"x3 B MT 30"X3 B   Gastroc Str.  2x45' fitter Fitter  2x45" 2x45''  Fitter  w/hss 2x45" B 2x45'' B 2x45' B 30"x3 B MT 30"x3 B   Sci fit  L4 8' L4 8' L3 8' L2 8' L3 6' L2 8' L2 10 mins L2 8 mins nt Bike L3  6 mins   TA -  -- -- -- -- -- --     QS -  -- -- -- -- -- --     SAQ -  -- -- -- -- -- -- --    SLR -  -- -- -- -- -- -- --    SL Abd -  -- - -- -- -- -- --    Lumbar bridge -  -- -- -- -- -- --- --    Drew Hip Add --  -- -- -- -- -- -- --    LAQs -  -- -- -- -- --- -- --    Seated Hip Flex -  -- -- -- -- -- -- --    Cybex   Leg Press -4.5 DL  2x15  2.5 SL  3x10 B 4.5 " "DL  2x15  2.5 SL  3x10 B 4.5 DL  2x15  2.0 SL  3x10 B 4.5 DL  2x15  2.0 SL  2x10 B   4.5 DL  2x15 w/ball   4.5 DL   NT 4.5 B  3x10  w/ball 4.5 B  310  w/ball    TKE       -- --     Hip Abd RTB 2x15 B RTB 2x15 B YTB   2x15 B oot YTB  2x12 B YTB 2x10 B 2x10 B NT YTB 2x10 B YTB 2x10 B   Hip Flex RTB 2x15 B RTB 2x15 B YTB 2x15 oot YTB  2x12 B YTB 2x10 B 2x10 B NT YTB 2x10 B YTB 2x10 B   Hip Ext RTB 2x15 B RTB 2x15 B YTB  2x15 B oot YTB   2x12 B YTB 2x10 B 2x10 B NT YTB 2x10 B YTB 2x10 B   HS Curls RTB 2x15 B RTB  2x15 B YTB  2x15 B 1# 2x15 B 1#   2x10 B 2x15 // 2x15 // 2x15 //  2x15 //   Mini squats   2x10  moderate depth 2x10  moderate depth 3x10 3x10 B NT NT  3x10 B // w/ VC   Foot taps NT 2x13" w/o UE sup  22/23 2x30" w/o  UE sup  18/23 2x30'' No UE support  Blue 30"x2 No UE sup.  17/21 30"x2 1 UE sup.  28/31 NT Blue 2x15 B //  1 HR      Step Ups   Blue  2x10 B Blue  2x10 b Blue 2x10 B Blue Unil 1x10 ea NT NT     Step Downs   -- --   -- --     Heel raises 2x15 B  No UE support 2x15 B  No UE support 2x15 DL 2x15 DL 2x15 DL 2x15 DL 2x15 DL 2x15 DL  2x15 DL   Gait note note note note stairs stairs Note note     balance       -- --     Initials  3/6 MB 2/6  1/6 JEANETTE  2/6  1/6 JEANETTE REID  3/6  2/6       Assessment:     Pt ambulated with no episodes of LOB on unlevel / level surfaces outdoors. She was able to complete session with no reports of increased pain in LEs / lower back today. Increased ambulation distance today.     Patient Education/Response:     Patient educated to continue HEP.  Reviewed proper use of QC outdoors and indoors.   Plans and Goals:     Cont to advance PT as per POC, advance as appropriate.    Short Term Goals (4 Weeks):   1. Pt will be I in HEP for progressive strengthening.  2. Pt will ambulate for 50' with SPC at [facility] for beginning community distances and increased mobility. Progressed with QC and Heidi  3. Pt will ambulate 500' with RW at SP for increased endurance and functional " mobility. MET  4. Pt will perform STS from 17 inch chair with min-mod upper extremity support for increased functional mobility.  5. Pt will be I in all bed mobility for increased functional mobility. MET  6. Pt will demo proper safety in w/c, ambulation, and car/wheelchair transfers for decreased fall risk. MET  7. Pt will stand with feet together x30 secs without LOB for increased balance.     Long Term Goals (8 Weeks):   1. Pt will demo WFL AROM in BLE for increased functional mobility.  2. Pt will ambulate with SPC for 600' on level surfaces at >/= SPV for increased community distances and mobility.  3. Pt will ambulate on outside surfaces at SBA with SPC for increased community mobility.  4. Pt will score </= 30'' on TUG to demo decreased fall risk and increased functional mobility.  5. Pt will perform STS without UE support for 5x to demo increased functional strength.  6. Pt will negotiate 12 stairs at Mod I for increased community negotiations.  7. Pt will have no falls throughout therapy to display improved safety and increased attention/cognition.  8. Pt will perform 1/2 tandem standing x30 secs B without LOB for increased balance in gait stance.

## 2018-01-25 ENCOUNTER — CLINICAL SUPPORT (OUTPATIENT)
Dept: REHABILITATION | Facility: HOSPITAL | Age: 58
End: 2018-01-25
Attending: FAMILY MEDICINE
Payer: COMMERCIAL

## 2018-01-25 DIAGNOSIS — Z74.09 DECREASED MOBILITY AND ENDURANCE: ICD-10-CM

## 2018-01-25 DIAGNOSIS — M62.81 DECREASED MUSCLE STRENGTH: ICD-10-CM

## 2018-01-25 DIAGNOSIS — M25.669 DECREASED ROM OF LOWER EXTREMITY: ICD-10-CM

## 2018-01-25 DIAGNOSIS — R41.840 DECREASED ATTENTION SPAN: ICD-10-CM

## 2018-01-25 PROCEDURE — 97110 THERAPEUTIC EXERCISES: CPT | Mod: PN

## 2018-01-25 PROCEDURE — 97116 GAIT TRAINING THERAPY: CPT | Mod: PN

## 2018-01-25 PROCEDURE — 97112 NEUROMUSCULAR REEDUCATION: CPT | Mod: PN

## 2018-01-25 NOTE — PROGRESS NOTES
"TIME RECORD    Date: 1/25/2018      Start Time:  11:00  Stop Time:   11:55    PROCEDURES:    TIMED  Procedure Min.   NMR 15   TE 30   GT 10     Total Timed Minutes:  55  Total Timed Units:  4  Total Untimed Units:  0  Charges Billed/# of units:  4  NMRx1, Gait x 1, TEx2      Progress/Current Status    Subjective:     Patient ID: Silvia Cervantes is a 57 y.o. female.  Diagnosis:   1. Decreased ROM of lower extremity     2. Decreased muscle strength     3. Decreased mobility and endurance     4. Decreased attention Span       Pt states she feels some soreness in B LE's and hips today. She is agreeable to PT session.     Objective:     Pt started on stepper L4 x 8 mins. Patient then performed all therex as per log with 1:1 by PTA x 30 minutes total time.  Pt also performed gait x 10 w/ QC up / down 4 steps 2 x then smooth level surface 400 ft w/ SBA.   Date  1/25/18 1/24/18 1/16/18 1/11/18 1/9/18 1/3/18 12/28/17 12/26/17 12/21/17 12/19/17 12/15/17   VISIT 31 30 29 28 27 26 25 24 23 22 21   Gcode      -- N/A     10/10   POC 2/26/18              FOTO At DC  w/dc w/dc At d/c At d/c At dc Done  UPOC      [facility] stretch     NT 30"x3 B 30"x3 B NT 3x30'' B 30"x3 B 30"x3 B   Long Sit HS 3x30" B 3x30" HSS supine w/strap 3x30" HSS supine w/strap 30"x3 B  stairs 3x30'' B  Stairs 30"x3 B 30"x3 B NT 3x30'' B 30"x3 B MT 30"X3 B   Gastroc Str. 3x30" B  2x45' fitter Fitter  2x45" 2x45''  Fitter  w/hss 2x45" B 2x45'' B 2x45' B 30"x3 B MT 30"x3 B   Sci fit  L4 8' L4 8' L4 8' L3 8' L2 8' L3 6' L2 8' L2 10 mins L2 8 mins nt Bike L3  6 mins   TA  -  -- -- -- -- -- --     QS  -  -- -- -- -- -- --     SAQ  -  -- -- -- -- -- -- --    SLR  -  -- -- -- -- -- -- --    SL Abd  -  -- - -- -- -- -- --    Lumbar bridge  -  -- -- -- -- -- --- --    Drew Hip Add  --  -- -- -- -- -- -- --    LAQs  -  -- -- -- -- --- -- --    Seated Hip Flex  -  -- -- -- -- -- -- --    Cybex   Leg Press 5.0 B  2x15  3.0 SL  3x30 B   -4.5 DL  2x15  2.5 " "SL  3x10 B 4.5 DL  2x15  2.5 SL  3x10 B 4.5 DL  2x15  2.0 SL  3x10 B 4.5 DL  2x15  2.0 SL  2x10 B   4.5 DL  2x15 w/ball   4.5 DL   NT 4.5 B  3x10  w/ball 4.5 B  310  w/ball    TKE        -- --     Hip Abd RTB  2x15 B RTB 2x15 B RTB 2x15 B YTB   2x15 B oot YTB  2x12 B YTB 2x10 B 2x10 B NT YTB 2x10 B YTB 2x10 B   Hip Flex RTB  2x15 B RTB 2x15 B RTB 2x15 B YTB 2x15 oot YTB  2x12 B YTB 2x10 B 2x10 B NT YTB 2x10 B YTB 2x10 B   Hip Ext RTB  2x15 B RTB 2x15 B RTB 2x15 B YTB  2x15 B oot YTB   2x12 B YTB 2x10 B 2x10 B NT YTB 2x10 B YTB 2x10 B   HS Curls RTB  2x15 B RTB 2x15 B RTB  2x15 B YTB  2x15 B 1# 2x15 B 1#   2x10 B 2x15 // 2x15 // 2x15 //  2x15 //   Mini squats    2x10  moderate depth 2x10  moderate depth 3x10 3x10 B NT NT  3x10 B // w/ VC   Cybex LAQ 2x15 2.5 B             Foot taps  NT 2x13" w/o UE sup  22/23 2x30" w/o  UE sup  18/23 2x30'' No UE support  Blue 30"x2 No UE sup.  17/21 30"x2 1 UE sup.  28/31 NT Blue 2x15 B //  1 HR      Step Ups 2x15  Blue   Blue  2x10 B Blue  2x10 b Blue 2x10 B Blue Unil 1x10 ea NT NT     Step Downs    -- --   -- --     Heel raises 2x15 B  No UE sup 2x15 B  No UE support 2x15 B  No UE support 2x15 DL 2x15 DL 2x15 DL 2x15 DL 2x15 DL 2x15 DL  2x15 DL   Gait  note note note note stairs stairs Note note     balance        -- --     Initials MB 4/6 JA 3/6 MB 2/6 DH 1/6 [facility] 2/6  1/6 [facility] 3/6 JA 2/6       Assessment:     Pt ambulated with no episodes of LOB on unlevel / level surfaces outdoors. She was able to complete session with ine episode of increased pain in  Left LE / lower back but abolished shortly thereafter.Increased ambulation distance today.     Patient Education/Response:     Patient educated to continue HEP.  Reviewed proper use of QC outdoors and indoors.   Plans and Goals:     Cont to advance PT as per POC, advance as appropriate.    Short Term Goals (4 Weeks):   1. Pt will be I in HEP for progressive strengthening.  2. Pt will ambulate for 50' with SPC at [facility] for " beginning community distances and increased mobility. Progressed with QC and Heidi  3. Pt will ambulate 500' with RW at SPV for increased endurance and functional mobility. MET  4. Pt will perform STS from 17 inch chair with min-mod upper extremity support for increased functional mobility.  5. Pt will be I in all bed mobility for increased functional mobility. MET  6. Pt will demo proper safety in w/c, ambulation, and car/wheelchair transfers for decreased fall risk. MET  7. Pt will stand with feet together x30 secs without LOB for increased balance.     Long Term Goals (8 Weeks):   1. Pt will demo WFL AROM in BLE for increased functional mobility.  2. Pt will ambulate with SPC for 600' on level surfaces at >/= SPV for increased community distances and mobility.  3. Pt will ambulate on outside surfaces at SBA with SPC for increased community mobility.  4. Pt will score </= 30'' on TUG to demo decreased fall risk and increased functional mobility.  5. Pt will perform STS without UE support for 5x to demo increased functional strength.  6. Pt will negotiate 12 stairs at Mod I for increased community negotiations.  7. Pt will have no falls throughout therapy to display improved safety and increased attention/cognition.  8. Pt will perform 1/2 tandem standing x30 secs B without LOB for increased balance in gait stance.

## 2018-01-30 ENCOUNTER — CLINICAL SUPPORT (OUTPATIENT)
Dept: REHABILITATION | Facility: HOSPITAL | Age: 58
End: 2018-01-30
Attending: FAMILY MEDICINE
Payer: COMMERCIAL

## 2018-01-30 DIAGNOSIS — Z74.09 DECREASED MOBILITY AND ENDURANCE: ICD-10-CM

## 2018-01-30 DIAGNOSIS — M62.81 DECREASED MUSCLE STRENGTH: ICD-10-CM

## 2018-01-30 DIAGNOSIS — R41.840 DECREASED ATTENTION SPAN: ICD-10-CM

## 2018-01-30 DIAGNOSIS — M25.669 DECREASED ROM OF LOWER EXTREMITY: ICD-10-CM

## 2018-01-30 PROCEDURE — 97110 THERAPEUTIC EXERCISES: CPT | Mod: PN

## 2018-01-30 NOTE — PROGRESS NOTES
"TIME RECORD    Date: 1/30/2018      Start Time:  11:00  Stop Time:   11:55    PROCEDURES:    TIMED  Procedure Min.   TE 45             Total Timed Minutes:  45  Total Timed Units:  3  Total Untimed Units:  0  Charges Billed/# of units:  3 TE      Progress/Current Status    Subjective:     Patient ID: Silvia Cervantes is a 57 y.o. female.  Diagnosis:   1. Decreased ROM of lower extremity     2. Decreased muscle strength     3. Decreased mobility and endurance     4. Decreased attention Span       Pt reports she drove here today due to her mother being too sick to drive her today. She states no complaints of pain and is agreeable to PT session.     Objective:     Pt started on stepper L4 x 8 mins. Patient then performed all therex as per log with 1:1 by PTA x 45 minutes total time.  Date  1/30/18 1/25/18 1/24/18 1/16/18 1/11/18 1/9/18 1/3/18 12/28/17 12/26/17 12/21/17 12/19/17 12/15/17   VISIT 33 31 30 29 28 27 26 25 24 23 22 21   Gcode       -- N/A     10/10   POC 2/26/18               FOTO  At DC  w/dc w/dc At d/c At d/c At dc Done  UPOC      MT               Eliseo stretch      NT 30"x3 B 30"x3 B NT 3x30'' B 30"x3 B 30"x3 B   Long Sit HS 3x30" B 3x30" B 3x30" HSS supine w/strap 3x30" HSS supine w/strap 30"x3 B  stairs 3x30'' B  Stairs 30"x3 B 30"x3 B NT 3x30'' B 30"x3 B MT 30"X3 B   Gastroc Str. 30"X3 B 3x30" B  2x45' fitter Fitter  2x45" 2x45''  Fitter  w/hss 2x45" B 2x45'' B 2x45' B 30"x3 B MT 30"x3 B   Sci fit  L4 8' L4 8' L4 8' L4 8' L3 8' L2 8' L3 6' L2 8' L2 10 mins L2 8 mins nt Bike L3  6 mins   TA   -  -- -- -- -- -- --     QS   -  -- -- -- -- -- --     SAQ   -  -- -- -- -- -- -- --    SLR   -  -- -- -- -- -- -- --    SL Abd   -  -- - -- -- -- -- --    Lumbar bridge   -  -- -- -- -- -- --- --    Drew Hip Add   --  -- -- -- -- -- -- --    LAQs   -  -- -- -- -- --- -- --    Seated Hip Flex   -  -- -- -- -- -- -- --    Cybex   Leg Press 5.0 B  2x15  3.0 SL  3x30 B 5.0 B  2x15  3.0 SL  3x30 B   -4.5 DL  2x15  2.5 " "SL  3x10 B 4.5 DL  2x15  2.5 SL  3x10 B 4.5 DL  2x15  2.0 SL  3x10 B 4.5 DL  2x15  2.0 SL  2x10 B   4.5 DL  2x15 w/ball   4.5 DL   NT 4.5 B  3x10  w/ball 4.5 B  310  w/ball    TKE         -- --     Hip Abd RTB  2x15 B RTB  2x15 B RTB 2x15 B RTB 2x15 B YTB   2x15 B oot YTB  2x12 B YTB 2x10 B 2x10 B NT YTB 2x10 B YTB 2x10 B   Hip Flex RTB  2x15 B RTB  2x15 B RTB 2x15 B RTB 2x15 B YTB 2x15 oot YTB  2x12 B YTB 2x10 B 2x10 B NT YTB 2x10 B YTB 2x10 B   Hip Ext RTB  2x15 B RTB  2x15 B RTB 2x15 B RTB 2x15 B YTB  2x15 B oot YTB   2x12 B YTB 2x10 B 2x10 B NT YTB 2x10 B YTB 2x10 B   HS Curls RTB  2x15 B RTB  2x15 B RTB 2x15 B RTB  2x15 B YTB  2x15 B 1# 2x15 B 1#   2x10 B 2x15 // 2x15 // 2x15 //  2x15 //   Mini squats     2x10  moderate depth 2x10  moderate depth 3x10 3x10 B NT NT  3x10 B // w/ VC   Cybex LAQ 15# 2x15 B 2x15 2.5 B             Foot taps   NT 2x13" w/o UE sup  22/23 2x30" w/o  UE sup  18/23 2x30'' No UE support  Blue 30"x2 No UE sup.  17/21 30"x2 1 UE sup.  28/31 NT Blue 2x15 B //  1 HR      Step Ups 2x15  Blue 2x15  Blue   Blue  2x10 B Blue  2x10 b Blue 2x10 B Blue Unil 1x10 ea NT NT     Step Downs     -- --   -- --     Heel raises 2x15 B  No UE sup 2x15 B  No UE sup 2x15 B  No UE support 2x15 B  No UE support 2x15 DL 2x15 DL 2x15 DL 2x15 DL 2x15 DL 2x15 DL  2x15 DL   Gait   note note note note stairs stairs Note note     balance         -- --     Initials JA 5/6 MB 4/6 JA 3/6 MB 2/6  1/6 [facility] 2/6  1/6 [facility] 3/6  2/6       Assessment:   Pt tolerated session with no reports of pain in B LE's.s he states she feels like she is getting stronger post session.     Patient Education/Response:     Patient educated to continue HEP.  Reviewed proper use of QC outdoors and indoors.   Plans and Goals:     Cont to advance PT as per POC, advance as appropriate.    Short Term Goals (4 Weeks):   1. Pt will be I in HEP for progressive strengthening.  2. Pt will ambulate for 50' with SPC at [facility] for beginning " community distances and increased mobility. Progressed with QC and Heidi  3. Pt will ambulate 500' with RW at SPV for increased endurance and functional mobility. MET  4. Pt will perform STS from 17 inch chair with min-mod upper extremity support for increased functional mobility.  5. Pt will be I in all bed mobility for increased functional mobility. MET  6. Pt will demo proper safety in w/c, ambulation, and car/wheelchair transfers for decreased fall risk. MET  7. Pt will stand with feet together x30 secs without LOB for increased balance.     Long Term Goals (8 Weeks):   1. Pt will demo WFL AROM in BLE for increased functional mobility.  2. Pt will ambulate with SPC for 600' on level surfaces at >/= SPV for increased community distances and mobility.  3. Pt will ambulate on outside surfaces at SBA with SPC for increased community mobility.  4. Pt will score </= 30'' on TUG to demo decreased fall risk and increased functional mobility.  5. Pt will perform STS without UE support for 5x to demo increased functional strength.  6. Pt will negotiate 12 stairs at Mod I for increased community negotiations.  7. Pt will have no falls throughout therapy to display improved safety and increased attention/cognition.  8. Pt will perform 1/2 tandem standing x30 secs B without LOB for increased balance in gait stance.

## 2018-02-01 ENCOUNTER — CLINICAL SUPPORT (OUTPATIENT)
Dept: REHABILITATION | Facility: HOSPITAL | Age: 58
End: 2018-02-01
Attending: FAMILY MEDICINE
Payer: COMMERCIAL

## 2018-02-01 DIAGNOSIS — M25.669 DECREASED ROM OF LOWER EXTREMITY: ICD-10-CM

## 2018-02-01 DIAGNOSIS — R41.840 DECREASED ATTENTION SPAN: ICD-10-CM

## 2018-02-01 DIAGNOSIS — M62.81 DECREASED MUSCLE STRENGTH: ICD-10-CM

## 2018-02-01 DIAGNOSIS — Z74.09 DECREASED MOBILITY AND ENDURANCE: ICD-10-CM

## 2018-02-01 PROCEDURE — 97110 THERAPEUTIC EXERCISES: CPT | Mod: PN

## 2018-02-01 NOTE — PROGRESS NOTES
"TIME RECORD    Date: 2/1/2018      Start Time:  1:00  Stop Time:   1:50    PROCEDURES:    TIMED  Procedure Min.   TE 50             Total Timed Minutes:  50  Total Timed Units:  3  Total Untimed Units:  0  Charges Billed/# of units:  3 TE      Progress/Current Status    Subjective:     Patient ID: Silvia Cervantes is a 57 y.o. female.  Diagnosis:   1. Decreased ROM of lower extremity     2. Decreased muscle strength     3. Decreased mobility and endurance     4. Decreased attention Span       Pt reports she was driven by mother today, and that her L leg feels heavy.    Objective:     Pt performed 50 mins of TE 1:1  With PT per log below. PT gave cues for increased L knee full extension, upright posture, decreased forward trunk lean, and decreased UE use. Pt also ambulated 320' without AD at SPV-SBA (more SPV than SBA) for endurance and gait training, and also negotiated 4 steps x 4 with min to mod UE use at CGA at step to initially progressing to step through stepping.    Date  2/1/18 1/30/18 1/25/18 1/24/18 1/16/18 1/11/18 1/9/18 1/3/18 12/28/17 12/26/17 12/21/17 12/19/17 12/15/17   VISIT 34 33 31 30 29 28 27 26 25 24 23 22 21   Gcode        -- N/A     10/10   POC 2/26/18                FOTO   At DC  w/dc w/dc At d/c At d/c At dc Done  UPOC      MT --               Eliseo stretch --      NT 30"x3 B 30"x3 B NT 3x30'' B 30"x3 B 30"x3 B   Long Sit HS 2x30'' B 3x30" B 3x30" B 3x30" HSS supine w/strap 3x30" HSS supine w/strap 30"x3 B  stairs 3x30'' B  Stairs 30"x3 B 30"x3 B NT 3x30'' B 30"x3 B MT 30"X3 B   Gastroc Str. 3x30'' B 30"X3 B 3x30" B  2x45' fitter Fitter  2x45" 2x45''  Fitter  w/hss 2x45" B 2x45'' B 2x45' B 30"x3 B MT 30"x3 B   Sci fit  Bike  Next L3  X 8   L4 8' L4 8' L4 8' L4 8' L3 8' L2 8' L3 6' L2 8' L2 10 mins L2 8 mins nt Bike L3  6 mins   TA --   -  -- -- -- -- -- --     QS --   -  -- -- -- -- -- --     SAQ --   -  -- -- -- -- -- -- --    SLR --   -  -- -- -- -- -- -- --    SL Abd --   -  -- - -- -- -- -- " "--    Lumbar bridge --   -  -- -- -- -- -- --- --    Drew Hip Add --   --  -- -- -- -- -- -- --    LAQs --   -  -- -- -- -- --- -- --    Seated Hip Flex --   -  -- -- -- -- -- -- --    Cybex   Leg Press NT 5.0 B  2x15  3.0 SL  3x30 B 5.0 B  2x15  3.0 SL  3x30 B   -4.5 DL  2x15  2.5 SL  3x10 B 4.5 DL  2x15  2.5 SL  3x10 B 4.5 DL  2x15  2.0 SL  3x10 B 4.5 DL  2x15  2.0 SL  2x10 B   4.5 DL  2x15 w/ball   4.5 DL   NT 4.5 B  3x10  w/ball 4.5 B  310  w/ball    TKE          -- --     Hip Abd NT RTB  2x15 B RTB  2x15 B RTB 2x15 B RTB 2x15 B YTB   2x15 B oot YTB  2x12 B YTB 2x10 B 2x10 B NT YTB 2x10 B YTB 2x10 B   Hip Flex NT RTB  2x15 B RTB  2x15 B RTB 2x15 B RTB 2x15 B YTB 2x15 oot YTB  2x12 B YTB 2x10 B 2x10 B NT YTB 2x10 B YTB 2x10 B   Hip Ext NT RTB  2x15 B RTB  2x15 B RTB 2x15 B RTB 2x15 B YTB  2x15 B oot YTB   2x12 B YTB 2x10 B 2x10 B NT YTB 2x10 B YTB 2x10 B   HS Curls NT RTB  2x15 B RTB  2x15 B RTB 2x15 B RTB  2x15 B YTB  2x15 B 1# 2x15 B 1#   2x10 B 2x15 // 2x15 // 2x15 //  2x15 //   Mini squats -     2x10  moderate depth 2x10  moderate depth 3x10 3x10 B NT NT  3x10 B // w/ VC   Cybex LAQ 25# 2x10  B 15# 2x15 B 2x15 2.5 B             Cybex HS curls 2x10 B  4 plates               Foot taps 2x15 B   NT 2x13" w/o UE sup  22/23 2x30" w/o  UE sup  18/23 2x30'' No UE support  Blue 30"x2 No UE sup.  17/21 30"x2 1 UE sup.  28/31 NT Blue 2x15 B //  1 HR      Step Ups 2x15 B Blue 2x15  Blue 2x15  Blue   Blue  2x10 B Blue  2x10 b Blue 2x10 B Blue Unil 1x10 ea NT NT     Step Downs --     -- --   -- --     Heel raises 2x15 B  No UE support 2x15 B  No UE sup 2x15 B  No UE sup 2x15 B  No UE support 2x15 B  No UE support 2x15 DL 2x15 DL 2x15 DL 2x15 DL 2x15 DL 2x15 DL  2x15 DL   Gait note   note note note note stairs stairs Note note     balance          -- --     Initials JEANETTE REESE 5/6 MB 4/6  3/6 MB 2/6  1/6 JEANETTE  2/6  1/6 JEANETTE REID  3/6  2/6       Assessment:   Pt performed well in endurance ambulation training without AD " at SPV-SBA, and stairs with decreased UE assistance with Hrs. Pt required increased cues due to anxiety to falls and encouragement to tolerate L LE discomfort. Pt well challenged with TE and requires cont aggressive strengthening and gait training without AD.    Patient Education/Response:     Patient educated to continue HEP.  Reviewed proper use of QC outdoors and indoors.   Plans and Goals:     Cont to advance PT as per POC, advance as appropriate.    Short Term Goals (4 Weeks):   1. Pt will be I in HEP for progressive strengthening.  2. Pt will ambulate for 50' with SPC at CGA for beginning community distances and increased mobility. Progressed with QC and Heidi  3. Pt will ambulate 500' with RW at SPV for increased endurance and functional mobility. MET  4. Pt will perform STS from 17 inch chair with min-mod upper extremity support for increased functional mobility.  5. Pt will be I in all bed mobility for increased functional mobility. MET  6. Pt will demo proper safety in w/c, ambulation, and car/wheelchair transfers for decreased fall risk. MET  7. Pt will stand with feet together x30 secs without LOB for increased balance.     Long Term Goals (8 Weeks):   1. Pt will demo WFL AROM in BLE for increased functional mobility.  2. Pt will ambulate with SPC for 600' on level surfaces at >/= SPV for increased community distances and mobility.  3. Pt will ambulate on outside surfaces at SBA with SPC for increased community mobility.  4. Pt will score </= 30'' on TUG to demo decreased fall risk and increased functional mobility.  5. Pt will perform STS without UE support for 5x to demo increased functional strength.  6. Pt will negotiate 12 stairs at Mod I for increased community negotiations.  7. Pt will have no falls throughout therapy to display improved safety and increased attention/cognition.  8. Pt will perform 1/2 tandem standing x30 secs B without LOB for increased balance in gait stance.

## 2018-02-05 ENCOUNTER — CLINICAL SUPPORT (OUTPATIENT)
Dept: REHABILITATION | Facility: HOSPITAL | Age: 58
End: 2018-02-05
Attending: FAMILY MEDICINE
Payer: COMMERCIAL

## 2018-02-05 DIAGNOSIS — Z74.09 DECREASED MOBILITY AND ENDURANCE: ICD-10-CM

## 2018-02-05 DIAGNOSIS — M25.669 DECREASED ROM OF LOWER EXTREMITY: ICD-10-CM

## 2018-02-05 DIAGNOSIS — R41.840 DECREASED ATTENTION SPAN: ICD-10-CM

## 2018-02-05 DIAGNOSIS — M62.81 DECREASED MUSCLE STRENGTH: ICD-10-CM

## 2018-02-05 PROCEDURE — 97110 THERAPEUTIC EXERCISES: CPT | Mod: PN

## 2018-02-05 NOTE — PROGRESS NOTES
"TIME RECORD    Date: 2/5/2018      Start Time:  12:35  Stop Time:   1:15    PROCEDURES:    TIMED  Procedure Min.   TE 45             Total Timed Minutes:  45  Total Timed Units:  3  Total Untimed Units:  0  Charges Billed/# of units:  3 TE      Progress/Current Status    Subjective:     Patient ID: Silvia Cervantes is a 57 y.o. female.  Diagnosis:   1. Decreased ROM of lower extremity     2. Decreased muscle strength     3. Decreased mobility and endurance     4. Decreased attention Span       Pt reports she feels "Ok" today. She is agreeable to PT session.    Objective:     Pt performed 45 mins of therapeutic exercise 1:1 with PTA per log below.    Date  2/6/18 2/1/18 1/30/18 1/25/18 1/24/18 1/16/18 1/11/18 1/9/18 1/3/18 12/28/17 12/26/17 12/21/17 12/19/17 12/15/17   VISIT 35 34 33 31 30 29 28 27 26 25 24 23 22 21   Gcode         -- N/A     10/10   POC 2/26/18                 FOTO    At DC  w/dc w/dc At d/c At d/c At dc Done  UPOC      MT - --               Eliseo stretch - --      NT 30"x3 B 30"x3 B NT 3x30'' B 30"x3 B 30"x3 B   Long Sit HS 2x30'' B 2x30'' B 3x30" B 3x30" B 3x30" HSS supine w/strap 3x30" HSS supine w/strap 30"x3 B  stairs 3x30'' B  Stairs 30"x3 B 30"x3 B NT 3x30'' B 30"x3 B MT 30"X3 B   Gastroc Str.  3x30'' B 30"X3 B 3x30" B  2x45' fitter Fitter  2x45" 2x45''  Fitter  w/hss 2x45" B 2x45'' B 2x45' B 30"x3 B MT 30"x3 B   Sci fit  Bike  Next L3  X 8 Bike  Next L3  X 8   L4 8' L4 8' L4 8' L4 8' L3 8' L2 8' L3 6' L2 8' L2 10 mins L2 8 mins nt Bike L3  6 mins   TA  --   -  -- -- -- -- -- --     QS  --   -  -- -- -- -- -- --     SAQ  --   -  -- -- -- -- -- -- --    SLR  --   -  -- -- -- -- -- -- --    SL Abd  --   -  -- - -- -- -- -- --    Lumbar bridge  --   -  -- -- -- -- -- --- --    Drew Hip Add  --   --  -- -- -- -- -- -- --    LAQs  --   -  -- -- -- -- --- -- --    Seated Hip Flex  --   -  -- -- -- -- -- -- --    Cybex   Leg Press 5.0 B  2x15  3.0 SL  3x30 B NT 5.0 B  2x15  3.0 SL  3x30 B 5.0 " "B  2x15  3.0 SL  3x30 B   -4.5 DL  2x15  2.5 SL  3x10 B 4.5 DL  2x15  2.5 SL  3x10 B 4.5 DL  2x15  2.0 SL  3x10 B 4.5 DL  2x15  2.0 SL  2x10 B   4.5 DL  2x15 w/ball   4.5 DL   NT 4.5 B  3x10  w/ball 4.5 B  310  w/ball    TKE           -- --     Hip Abd RTB  2x15 B NT RTB  2x15 B RTB  2x15 B RTB 2x15 B RTB 2x15 B YTB   2x15 B oot YTB  2x12 B YTB 2x10 B 2x10 B NT YTB 2x10 B YTB 2x10 B   Hip Flex RTB  2x15 B NT RTB  2x15 B RTB  2x15 B RTB 2x15 B RTB 2x15 B YTB 2x15 oot YTB  2x12 B YTB 2x10 B 2x10 B NT YTB 2x10 B YTB 2x10 B   Hip Ext RTB  2x15 B NT RTB  2x15 B RTB  2x15 B RTB 2x15 B RTB 2x15 B YTB  2x15 B oot YTB   2x12 B YTB 2x10 B 2x10 B NT YTB 2x10 B YTB 2x10 B   HS Curls RTB  2x15 B NT RTB  2x15 B RTB  2x15 B RTB 2x15 B RTB  2x15 B YTB  2x15 B 1# 2x15 B 1#   2x10 B 2x15 // 2x15 // 2x15 //  2x15 //   Mini squats  -     2x10  moderate depth 2x10  moderate depth 3x10 3x10 B NT NT  3x10 B // w/ VC   Cybex LAQ 25# 3x10  B 25# 2x10  B 15# 2x15 B 2x15 2.5 B             Cybex HS curls 3x10 B  4 plates 2x10 B  4 plates               Foot taps 2x15 B 2x15 B   NT 2x13" w/o UE sup  22/23 2x30" w/o  UE sup  18/23 2x30'' No UE support  Blue 30"x2 No UE sup.  17/21 30"x2 1 UE sup.  28/31 NT Blue 2x15 B //  1 HR      Step Ups 2x15 B Blue 2x15 B Blue 2x15  Blue 2x15  Blue   Blue  2x10 B Blue  2x10 b Blue 2x10 B Blue Unil 1x10 ea NT NT     Step Downs  --     -- --   -- --     Heel raises 2x15 B  No UE support 2x15 B  No UE support 2x15 B  No UE sup 2x15 B  No UE sup 2x15 B  No UE support 2x15 B  No UE support 2x15 DL 2x15 DL 2x15 DL 2x15 DL 2x15 DL 2x15 DL  2x15 DL   Gait  note   note note note note stairs stairs Note note     balance           -- --     Initials  1/6 [facility] 5/6 MB 4/6  3/6 MB 2/6  1/6 [facility] 2/6  1/6 [facility] 3/6  2/6       Assessment:   Pt able to complete session with no reports of pain . Pt states she felt B LE's are getting stronger with use of cybex machinery.     Patient Education/Response: "     Patient educated to continue HEP.  Reviewed proper use of QC outdoors and indoors.   Plans and Goals:     Cont to advance PT as per POC, advance as appropriate.    Short Term Goals (4 Weeks):   1. Pt will be I in HEP for progressive strengthening.  2. Pt will ambulate for 50' with SPC at CGA for beginning community distances and increased mobility. Progressed with QC and Heidi  3. Pt will ambulate 500' with RW at SPV for increased endurance and functional mobility. MET  4. Pt will perform STS from 17 inch chair with min-mod upper extremity support for increased functional mobility.  5. Pt will be I in all bed mobility for increased functional mobility. MET  6. Pt will demo proper safety in w/c, ambulation, and car/wheelchair transfers for decreased fall risk. MET  7. Pt will stand with feet together x30 secs without LOB for increased balance.     Long Term Goals (8 Weeks):   1. Pt will demo WFL AROM in BLE for increased functional mobility.  2. Pt will ambulate with SPC for 600' on level surfaces at >/= SPV for increased community distances and mobility.  3. Pt will ambulate on outside surfaces at SBA with SPC for increased community mobility.  4. Pt will score </= 30'' on TUG to demo decreased fall risk and increased functional mobility.  5. Pt will perform STS without UE support for 5x to demo increased functional strength.  6. Pt will negotiate 12 stairs at Mod I for increased community negotiations.  7. Pt will have no falls throughout therapy to display improved safety and increased attention/cognition.  8. Pt will perform 1/2 tandem standing x30 secs B without LOB for increased balance in gait stance.

## 2018-02-07 ENCOUNTER — CLINICAL SUPPORT (OUTPATIENT)
Dept: REHABILITATION | Facility: HOSPITAL | Age: 58
End: 2018-02-07
Attending: FAMILY MEDICINE
Payer: COMMERCIAL

## 2018-02-07 DIAGNOSIS — Z74.09 DECREASED MOBILITY AND ENDURANCE: ICD-10-CM

## 2018-02-07 DIAGNOSIS — M25.669 DECREASED ROM OF LOWER EXTREMITY: ICD-10-CM

## 2018-02-07 DIAGNOSIS — M62.81 DECREASED MUSCLE STRENGTH: ICD-10-CM

## 2018-02-07 DIAGNOSIS — R41.840 DECREASED ATTENTION SPAN: ICD-10-CM

## 2018-02-07 PROCEDURE — 97110 THERAPEUTIC EXERCISES: CPT | Mod: PN

## 2018-02-07 NOTE — PROGRESS NOTES
"TIME RECORD    Date: 2/7/2018      Start Time:  11:00  Stop Time:  12:00    PROCEDURES:    TIMED  Procedure Min.   TE 55             Total Timed Minutes:  55  Total Timed Units:  4  Total Untimed Units:  0  Charges Billed/# of units:  4 TE      Progress/Current Status    Subjective:     Patient ID: Silvia Cervantes is a 57 y.o. female.  Diagnosis:   1. Decreased ROM of lower extremity     2. Decreased muscle strength     3. Decreased mobility and endurance     4. Decreased attention Span       Pt agreeable to PT session today. She states she has no new complaints of pain today.     Objective:     Pt performed 45 mins of therapeutic exercise 1:1 with PTA per log below.  Lunges: 1/2 kneel on Blue step w/ foam block, limited UE support allowed 2x10 B  Resisted (red sport cord) marching 30"X3 FWD / BWD resistance.      Date  2/7/18 2/6/18 2/1/18 1/30/18 1/25/18 1/24/18 1/16/18 1/11/18 1/9/18 1/3/18   VISIT 36 35 34 33 31 30 29 28 27 26   Gcode          --   POC 2/26/18             FOTO     At DC  w/dc w/dc At d/c At d/c   MT -- - --          Eliseo stretch  - --      NT 30"x3 B   Long Sit HS 2x30 " B 2x30'' B 2x30'' B 3x30" B 3x30" B 3x30" HSS supine w/strap 3x30" HSS supine w/strap 30"x3 B  stairs 3x30'' B  Stairs 30"x3 B   Gastroc Str.   3x30'' B 30"X3 B 3x30" B  2x45' fitter Fitter  2x45" 2x45''  Fitter  w/hss   Sci fit  Bike Level 3.5  X 8min.  Bike  Next L3  X 8 Bike  Next L3  X 8   L4 8' L4 8' L4 8' L4 8' L3 8' L2 8' L3 6'   TA -  --   -  -- -- --   QS -  --   -  -- -- --   SAQ -  --   -  -- -- --   SLR -  --   -  -- -- --   SL Abd -  --   -  -- - --   Lumbar bridge -  --   -  -- -- --   Drew Hip Add -  --   --  -- -- --   LAQs -  --   -  -- -- --   Seated Hip Flex -  --   -  -- -- --   Cybex   Leg Press  5.0 B  2x15  3.0 SL  3x30 B NT 5.0 B  2x15  3.0 SL  3x30 B 5.0 B  2x15  3.0 SL  3x30 B   -4.5 DL  2x15  2.5 SL  3x10 B 4.5 DL  2x15  2.5 SL  3x10 B 4.5 DL  2x15  2.0 SL  3x10 B 4.5 DL  2x15  2.0 SL  2x10 B   4.5 " "DL  2x15 w/ball     TKE             Hip Abd Cbex 1.0   2x10 B RTB  2x15 B NT RTB  2x15 B RTB  2x15 B RTB 2x15 B RTB 2x15 B YTB   2x15 B oot YTB  2x12 B   Hip Flex Cbex 1.0   2x10 B RTB  2x15 B NT RTB  2x15 B RTB  2x15 B RTB 2x15 B RTB 2x15 B YTB 2x15 oot YTB  2x12 B   Hip Ext Cbex 1.0   2x10 B RTB  2x15 B NT RTB  2x15 B RTB  2x15 B RTB 2x15 B RTB 2x15 B YTB  2x15 B oot YTB   2x12 B   HS Curls CYBEX RTB  2x15 B NT RTB  2x15 B RTB  2x15 B RTB 2x15 B RTB  2x15 B YTB  2x15 B 1# 2x15 B 1#   2x10 B   Mini squats   -     2x10  moderate depth 2x10  moderate depth 3x10   Cybex LAQ 25# 3x10  B 25# 3x10  B 25# 2x10  B 15# 2x15 B 2x15 2.5 B        Cybex HS curls 3x10 B  4 plates 3x10 B  4 plates 2x10 B  4 plates          Foot taps  2x15 B 2x15 B   NT 2x13" w/o UE sup  22/23 2x30" w/o  UE sup  18/23 2x30'' No UE support  Blue 30"x2 No UE sup.  17/21   Step Ups 2x15 B BLue 2x15 B Blue 2x15 B Blue 2x15  Blue 2x15  Blue   Blue  2x10 B Blue  2x10 b Blue 2x10 B   Step Downs   --     -- --    Heel raises 2x15 B  No UE support 2x15 B  No UE support 2x15 B  No UE support 2x15 B  No UE sup 2x15 B  No UE sup 2x15 B  No UE support 2x15 B  No UE support 2x15 DL 2x15 DL 2x15 DL   Gait   note   note note note note stairs   balance NOTE            Initials JA 2/6 JA 1/6 JEANETTE JA 5/6 MB 4/6 JA 3/6 MB 2/6  1/6 [facility] 2/6       Assessment:   Pt able to complete session with no reports of pain . She responded well to additional multi hip machinery and resisted sports cord use today. Verbal instructions provided for technique, safety and proper.     Patient Education/Response:     Patient educated to continue HEP.  Reviewed proper use of QC outdoors and indoors.   Plans and Goals:     Cont to advance PT as per POC, advance as appropriate.    Short Term Goals (4 Weeks):   1. Pt will be I in HEP for progressive strengthening.  2. Pt will ambulate for 50' with SPC at CGA for beginning community distances and increased mobility. Progressed with QC and " Heidi  3. Pt will ambulate 500' with RW at SPV for increased endurance and functional mobility. MET  4. Pt will perform STS from 17 inch chair with min-mod upper extremity support for increased functional mobility.  5. Pt will be I in all bed mobility for increased functional mobility. MET  6. Pt will demo proper safety in w/c, ambulation, and car/wheelchair transfers for decreased fall risk. MET  7. Pt will stand with feet together x30 secs without LOB for increased balance.     Long Term Goals (8 Weeks):   1. Pt will demo WFL AROM in BLE for increased functional mobility.  2. Pt will ambulate with SPC for 600' on level surfaces at >/= SPV for increased community distances and mobility.  3. Pt will ambulate on outside surfaces at SBA with SPC for increased community mobility.  4. Pt will score </= 30'' on TUG to demo decreased fall risk and increased functional mobility.  5. Pt will perform STS without UE support for 5x to demo increased functional strength.  6. Pt will negotiate 12 stairs at Mod I for increased community negotiations.  7. Pt will have no falls throughout therapy to display improved safety and increased attention/cognition.  8. Pt will perform 1/2 tandem standing x30 secs B without LOB for increased balance in gait stance.

## 2018-02-09 ENCOUNTER — OFFICE VISIT (OUTPATIENT)
Dept: FAMILY MEDICINE | Facility: CLINIC | Age: 58
End: 2018-02-09
Attending: FAMILY MEDICINE
Payer: COMMERCIAL

## 2018-02-09 VITALS
TEMPERATURE: 98 F | SYSTOLIC BLOOD PRESSURE: 124 MMHG | DIASTOLIC BLOOD PRESSURE: 72 MMHG | OXYGEN SATURATION: 98 % | HEART RATE: 97 BPM | HEIGHT: 61 IN | BODY MASS INDEX: 23.22 KG/M2 | WEIGHT: 123 LBS

## 2018-02-09 DIAGNOSIS — R19.04 LEFT LOWER QUADRANT ABDOMINAL MASS: Primary | ICD-10-CM

## 2018-02-09 PROCEDURE — 3008F BODY MASS INDEX DOCD: CPT | Mod: S$GLB,,, | Performed by: FAMILY MEDICINE

## 2018-02-09 PROCEDURE — 99999 PR PBB SHADOW E&M-EST. PATIENT-LVL III: CPT | Mod: PBBFAC,,, | Performed by: FAMILY MEDICINE

## 2018-02-09 PROCEDURE — 99213 OFFICE O/P EST LOW 20 MIN: CPT | Mod: S$GLB,,, | Performed by: FAMILY MEDICINE

## 2018-02-09 NOTE — PROGRESS NOTES
Subjective:       Patient ID: Silvia Cervantes is a 57 y.o. female.    Chief Complaint: Mass (lump on the left side lower abdomen )    57 yr old pleasant  female with allergies, HTN, HLD, Rheumatoid arthritis, Erosive gastritis, and other co morbidities presents today for evaluation of mass in her left sided of lower abdomen. She noticed in a week ago and it is constant. She has no pain or any bowel or bladder issues. She just want to be sure this is normal. Details as follows -      History as below - reviewed       Mass   This is a new problem. The current episode started 1 to 4 weeks ago. The problem occurs constantly. The problem has been unchanged. Pertinent negatives include no abdominal pain, arthralgias, chest pain, chills, congestion, diaphoresis, fatigue, headaches, myalgias, nausea, numbness, rash, sore throat, vertigo or weakness. Nothing aggravates the symptoms. She has tried nothing for the symptoms. The treatment provided no relief.     Review of Systems   Constitutional: Negative.  Negative for activity change, chills, diaphoresis, fatigue and unexpected weight change.   HENT: Negative.  Negative for congestion, ear discharge, hearing loss, rhinorrhea, sore throat and voice change.    Eyes: Negative.  Negative for pain, discharge and visual disturbance.   Respiratory: Negative.  Negative for chest tightness, shortness of breath and wheezing.    Cardiovascular: Negative.  Negative for chest pain.   Gastrointestinal: Negative.  Negative for abdominal distention, abdominal pain, anal bleeding, constipation and nausea.        Abdominal mass   Endocrine: Negative.  Negative for cold intolerance, polydipsia and polyuria.   Genitourinary: Negative.  Negative for decreased urine volume, difficulty urinating, dysuria, frequency, menstrual problem and vaginal pain.   Musculoskeletal: Negative.  Negative for arthralgias, gait problem and myalgias.   Skin: Negative.  Negative for color change, pallor, rash  and wound.   Allergic/Immunologic: Negative.  Negative for environmental allergies and immunocompromised state.   Neurological: Negative.  Negative for dizziness, vertigo, tremors, seizures, speech difficulty, weakness, numbness and headaches.   Hematological: Negative.  Negative for adenopathy. Does not bruise/bleed easily.   Psychiatric/Behavioral: Negative.  Negative for agitation, confusion, decreased concentration, hallucinations, self-injury and suicidal ideas. The patient is not nervous/anxious.        Objective:      Physical Exam   Constitutional: She is oriented to person, place, and time. She appears well-developed and well-nourished. No distress.   HENT:   Head: Normocephalic and atraumatic.   Right Ear: External ear normal.   Left Ear: External ear normal.   Nose: Nose normal.   Mouth/Throat: Oropharynx is clear and moist. No oropharyngeal exudate.   Eyes: Conjunctivae and EOM are normal. Pupils are equal, round, and reactive to light. Right eye exhibits no discharge. Left eye exhibits no discharge. No scleral icterus.   Neck: Normal range of motion. Neck supple. No JVD present. No tracheal deviation present. No thyromegaly present.   Cardiovascular: Normal rate, regular rhythm, normal heart sounds and intact distal pulses.  Exam reveals no gallop and no friction rub.    No murmur heard.  Pulmonary/Chest: Effort normal and breath sounds normal. No stridor. She has no wheezes. She has no rales. She exhibits no tenderness.   Abdominal: Soft. Bowel sounds are normal. She exhibits mass (1.5 cm soft tissue mobile mass in left lower quadrant and few small satellite lesions in the surrpunding areas. non tender and they are mobile as well.). She exhibits no distension. There is no tenderness. There is no rebound and no guarding. No hernia.   Musculoskeletal: Normal range of motion. She exhibits no edema or tenderness.   Lymphadenopathy:     She has no cervical adenopathy.   Neurological: She is alert and  oriented to person, place, and time. She has normal reflexes. She displays normal reflexes. No cranial nerve deficit. She exhibits normal muscle tone. Coordination normal.   Skin: Skin is warm and dry. No rash noted. She is not diaphoretic. No erythema. No pallor.   Psychiatric: She has a normal mood and affect. Her behavior is normal. Judgment and thought content normal.       Assessment:       1. Left lower quadrant abdominal mass        Plan:       Silvia was seen today for mass.    Diagnoses and all orders for this visit:    Left lower quadrant abdominal mass  -     US Abdomen Limited; Future      LLQ abdominal swelling/mass  -likely scar tissue, lipoma or lymph node, less likely hernia  -USG abdomen  -reassurance    Spent adequate time in obtaining history and explaining differentials    Follow-up in about 3 months (around 5/9/2018), or if symptoms worsen or fail to improve.

## 2018-02-15 ENCOUNTER — CLINICAL SUPPORT (OUTPATIENT)
Dept: REHABILITATION | Facility: HOSPITAL | Age: 58
End: 2018-02-15
Attending: FAMILY MEDICINE
Payer: COMMERCIAL

## 2018-02-15 DIAGNOSIS — M62.81 DECREASED MUSCLE STRENGTH: ICD-10-CM

## 2018-02-15 DIAGNOSIS — Z74.09 DECREASED MOBILITY AND ENDURANCE: ICD-10-CM

## 2018-02-15 DIAGNOSIS — M25.669 DECREASED ROM OF LOWER EXTREMITY: ICD-10-CM

## 2018-02-15 DIAGNOSIS — R41.840 DECREASED ATTENTION SPAN: ICD-10-CM

## 2018-02-15 PROCEDURE — 97110 THERAPEUTIC EXERCISES: CPT | Mod: PN

## 2018-02-15 NOTE — PROGRESS NOTES
"TIME RECORD    Date: 2/15/2018      Start Time:  11:00  Stop Time:  12:00    PROCEDURES:    TIMED  Procedure Min.   TE 53             Total Timed Minutes:  53  Total Timed Units:  4  Total Untimed Units:  0  Charges Billed/# of units:  4 TE      Progress/Current Status    Subjective:     Patient ID: Silvia Cervantes is a 57 y.o. female.  Diagnosis:   1. Decreased ROM of lower extremity     2. Decreased muscle strength     3. Decreased mobility and endurance     4. Decreased attention Span       Pt agreeable to PT session today. She states she has no new complaints of pain today.     Objective:     Pt performed 45 mins of therapeutic exercise 1:1 with PTA per log below.  BOSU static stance 1' intermittent UE support; BOSU lateral weight shifts intermittent UE support.    Date  2/15/18 2/7/18 2/6/18 2/1/18 1/30/18 1/25/18 1/24/18 1/16/18 1/11/18 1/9/18 1/3/18   VISIT 37 36 35 34 33 31 30 29 28 27 26   Gcode           --   POC 2/26/18              FOTO      At DC  w/dc w/dc At d/c At d/c   MT  -- - --          Eliseo stretch   - --      NT 30"x3 B   Long Sit HS  2x30 " B 2x30'' B 2x30'' B 3x30" B 3x30" B 3x30" HSS supine w/strap 3x30" HSS supine w/strap 30"x3 B  stairs 3x30'' B  Stairs 30"x3 B   Gastroc Str. 3x30" B   3x30'' B 30"X3 B 3x30" B  2x45' fitter Fitter  2x45" 2x45''  Fitter  w/hss   Sci fit  Sci fit L6  X 7 min. Bike Level 3.5  X 8min.  Bike  Next L3  X 8 Bike  Next L3  X 8   L4 8' L4 8' L4 8' L4 8' L3 8' L2 8' L3 6'   TA  -  --   -  -- -- --   QS  -  --   -  -- -- --   SAQ  -  --   -  -- -- --   SLR  -  --   -  -- -- --   SL Abd  -  --   -  -- - --   Lumbar bridge  -  --   -  -- -- --   Drew Hip Add  -  --   --  -- -- --   LAQs  -  --   -  -- -- --   Seated Hip Flex  -  --   -  -- -- --   Cybex   Leg Press 6.0 B  2x15  3.5 SL B  3x10  5.0 B  2x15  3.0 SL  3x30 B NT 5.0 B  2x15  3.0 SL  3x30 B 5.0 B  2x15  3.0 SL  3x30 B   -4.5 DL  2x15  2.5 SL  3x10 B 4.5 DL  2x15  2.5 SL  3x10 B 4.5 DL  2x15  2.0 SL  3x10 B " "4.5 DL  2x15  2.0 SL  2x10 B   4.5 DL  2x15 w/ball     TKE              Hip Abd Cybex 1.5 B  2x10 Cbex 1.0   2x10 B RTB  2x15 B NT RTB  2x15 B RTB  2x15 B RTB 2x15 B RTB 2x15 B YTB   2x15 B oot YTB  2x12 B   Hip Flex Cybex 1.5 B   2x10 Cbex 1.0   2x10 B RTB  2x15 B NT RTB  2x15 B RTB  2x15 B RTB 2x15 B RTB 2x15 B YTB 2x15 oot YTB  2x12 B   Hip Ext Cybex 1.0 L  1.5 R  2x10  Cbex 1.0   2x10 B RTB  2x15 B NT RTB  2x15 B RTB  2x15 B RTB 2x15 B RTB 2x15 B YTB  2x15 B oot YTB   2x12 B   HS Curls See below CYBEX RTB  2x15 B NT RTB  2x15 B RTB  2x15 B RTB 2x15 B RTB  2x15 B YTB  2x15 B 1# 2x15 B 1#   2x10 B   Mini squats    -     2x10  moderate depth 2x10  moderate depth 3x10   Cybex LAQ 25# 3x10 B 25# 3x10  B 25# 3x10  B 25# 2x10  B 15# 2x15 B 2x15 2.5 B        Cybex HS curls 3x10 B  4 plates 3x10 B  4 plates 3x10 B  4 plates 2x10 B  4 plates          Foot taps   2x15 B 2x15 B   NT 2x13" w/o UE sup  22/23 2x30" w/o  UE sup  18/23 2x30'' No UE support  Blue 30"x2 No UE sup.  17/21   Lunge squats 2x10 B             Sports cord 4 way march 2x10 x 4 directions             Step Ups 2x15 B Blue  No UE support 2x15 B BLue 2x15 B Blue 2x15 B Blue 2x15  Blue 2x15  Blue   Blue  2x10 B Blue  2x10 b Blue 2x10 B   Step Downs    --     -- --    Heel raises 2x15 B   No UE support 2x15 B  No UE support 2x15 B  No UE support 2x15 B  No UE support 2x15 B  No UE sup 2x15 B  No UE sup 2x15 B  No UE support 2x15 B  No UE support 2x15 DL 2x15 DL 2x15 DL   Gait    note   note note note note stairs   balance NOTE NOTE            Initials MB 3/6 JA 2/6 JA 1/6 JEANETTE JA 5/6 MB 4/6 JA 3/6 MB 2/6  1/6 JEANETTE  2/6       Assessment:   Pt able to complete session with no reports of pain . No adverse reactions following addition of multi hip machinery and resisted sports cord use last visit.  Added trial  BOSU balance activities per log with good tolerance. Increased reps and resistance per log.      Patient Education/Response:     Patient educated to " continue HEP.  Reviewed proper use of QC outdoors and indoors.   Plans and Goals:     Cont to advance PT as per POC, advance as appropriate.    Short Term Goals (4 Weeks):   1. Pt will be I in HEP for progressive strengthening.  2. Pt will ambulate for 50' with SPC at CGA for beginning community distances and increased mobility. Progressed with QC and Heidi  3. Pt will ambulate 500' with RW at SPV for increased endurance and functional mobility. MET  4. Pt will perform STS from 17 inch chair with min-mod upper extremity support for increased functional mobility.  5. Pt will be I in all bed mobility for increased functional mobility. MET  6. Pt will demo proper safety in w/c, ambulation, and car/wheelchair transfers for decreased fall risk. MET  7. Pt will stand with feet together x30 secs without LOB for increased balance.     Long Term Goals (8 Weeks):   1. Pt will demo WFL AROM in BLE for increased functional mobility.  2. Pt will ambulate with SPC for 600' on level surfaces at >/= SPV for increased community distances and mobility.  3. Pt will ambulate on outside surfaces at SBA with SPC for increased community mobility.  4. Pt will score </= 30'' on TUG to demo decreased fall risk and increased functional mobility.  5. Pt will perform STS without UE support for 5x to demo increased functional strength.  6. Pt will negotiate 12 stairs at Mod I for increased community negotiations.  7. Pt will have no falls throughout therapy to display improved safety and increased attention/cognition.  8. Pt will perform 1/2 tandem standing x30 secs B without LOB for increased balance in gait stance.

## 2018-02-16 ENCOUNTER — TELEPHONE (OUTPATIENT)
Dept: PHARMACY | Facility: CLINIC | Age: 58
End: 2018-02-16

## 2018-02-20 ENCOUNTER — CLINICAL SUPPORT (OUTPATIENT)
Dept: REHABILITATION | Facility: HOSPITAL | Age: 58
End: 2018-02-20
Attending: FAMILY MEDICINE
Payer: COMMERCIAL

## 2018-02-20 ENCOUNTER — HOSPITAL ENCOUNTER (OUTPATIENT)
Dept: RADIOLOGY | Facility: HOSPITAL | Age: 58
Discharge: HOME | End: 2018-02-20
Attending: FAMILY MEDICINE
Payer: COMMERCIAL

## 2018-02-20 DIAGNOSIS — M62.81 DECREASED MUSCLE STRENGTH: ICD-10-CM

## 2018-02-20 DIAGNOSIS — R41.840 DECREASED ATTENTION SPAN: ICD-10-CM

## 2018-02-20 DIAGNOSIS — M25.669 DECREASED ROM OF LOWER EXTREMITY: ICD-10-CM

## 2018-02-20 DIAGNOSIS — Z74.09 DECREASED MOBILITY AND ENDURANCE: ICD-10-CM

## 2018-02-20 DIAGNOSIS — R19.04 LEFT LOWER QUADRANT ABDOMINAL MASS: ICD-10-CM

## 2018-02-20 PROCEDURE — 76999 ECHO EXAMINATION PROCEDURE: CPT | Mod: TC

## 2018-02-20 PROCEDURE — 76705 ECHO EXAM OF ABDOMEN: CPT | Mod: 26,,, | Performed by: RADIOLOGY

## 2018-02-20 PROCEDURE — 97110 THERAPEUTIC EXERCISES: CPT | Mod: PN

## 2018-02-20 NOTE — PROGRESS NOTES
"TIME RECORD    Date: 2/20/2018      Start Time:  11:00  Stop Time:  12:00    PROCEDURES:    TIMED  Procedure Min.   TE 54   NMR 6'         Total Timed Minutes:  54  Total Timed Units:  4  Total Untimed Units:  0  Charges Billed/# of units:  4 TE      Progress/Current Status    Subjective:     Patient ID: Silvia Cervantes is a 57 y.o. female.  Diagnosis:   1. Decreased ROM of lower extremity     2. Decreased muscle strength     3. Decreased mobility and endurance     4. Decreased attention Span       Pt reports no pain just stiffness and heaviness feeling when moving her L leg.     Objective:     Pt performed TE x 54' with PT per log below. Pt given cues in gait without QC at independent for increased chantell, increased B step length, and increased B heel strike when walking to and from exercise locations.    Date  2/20/18 2/15/18 2/7/18 2/6/18 2/1/18 1/30/18 1/25/18 1/24/18 1/16/18 1/11/18 1/9/18 1/3/18   VISIT 38 37 36 35 34 33 31 30 29 28 27 26   Gcode            --   POC 2/26/18               FOTO       At DC  w/dc w/dc At d/c At d/c   MT   -- - --          Eliseo stretch    - --      NT 30"x3 B   Long Sit HS NT  2x30 " B 2x30'' B 2x30'' B 3x30" B 3x30" B 3x30" HSS supine w/strap 3x30" HSS supine w/strap 30"x3 B  stairs 3x30'' B  Stairs 30"x3 B   Gastroc Str. 3x30'' B fitter 3x30" B   3x30'' B 30"X3 B 3x30" B  2x45' fitter Fitter  2x45" 2x45''  Fitter  w/hss   Sci fit  Sci Fit L6  x6 mins Sci fit L6  X 7 min. Bike Level 3.5  X 8min.  Bike  Next L3  X 8 Bike  Next L3  X 8   L4 8' L4 8' L4 8' L4 8' L3 8' L2 8' L3 6'   TA --  -  --   -  -- -- --   QS --  -  --   -  -- -- --   SAQ ---  -  --   -  -- -- --   SLR --  -  --   -  -- -- --   SL Abd --  -  --   -  -- - --   Lumbar bridge --  -  --   -  -- -- --   Drew Hip Add --  -  --   --  -- -- --   LAQs --  -  --   -  -- -- --   Seated Hip Flex --  -  --   -  -- -- --   Cybex   Leg Press 6.0 B  2x15  3.5 SL B  3x10 6.0 B  2x15  3.5 SL B  3x10  5.0 B  2x15  3.0 SL  3x30 B " "NT 5.0 B  2x15  3.0 SL  3x30 B 5.0 B  2x15  3.0 SL  3x30 B   -4.5 DL  2x15  2.5 SL  3x10 B 4.5 DL  2x15  2.5 SL  3x10 B 4.5 DL  2x15  2.0 SL  3x10 B 4.5 DL  2x15  2.0 SL  2x10 B   4.5 DL  2x15 w/ball     TKE               Hip Abd oot Cybex 1.5 B  2x10 Cbex 1.0   2x10 B RTB  2x15 B NT RTB  2x15 B RTB  2x15 B RTB 2x15 B RTB 2x15 B YTB   2x15 B oot YTB  2x12 B   Hip Flex oot Cybex 1.5 B   2x10 Cbex 1.0   2x10 B RTB  2x15 B NT RTB  2x15 B RTB  2x15 B RTB 2x15 B RTB 2x15 B YTB 2x15 oot YTB  2x12 B   Hip Ext oot Cybex 1.0 L  1.5 R  2x10  Cbex 1.0   2x10 B RTB  2x15 B NT RTB  2x15 B RTB  2x15 B RTB 2x15 B RTB 2x15 B YTB  2x15 B oot YTB   2x12 B   HS Curls CYBEX See below CYBEX RTB  2x15 B NT RTB  2x15 B RTB  2x15 B RTB 2x15 B RTB  2x15 B YTB  2x15 B 1# 2x15 B 1#   2x10 B   Mini squats --    -     2x10  moderate depth 2x10  moderate depth 3x10   Cybex LAQ 25# 3x10 B 25# 3x10 B 25# 3x10  B 25# 3x10  B 25# 2x10  B 15# 2x15 B 2x15 2.5 B        Cybex HS curls 4x10 B  4 plates  ^ weight 3x10 B  4 plates 3x10 B  4 plates 3x10 B  4 plates 2x10 B  4 plates          Foot taps 30x b no UE support   2x15 B 2x15 B   NT 2x13" w/o UE sup  22/23 2x30" w/o  UE sup  18/23 2x30'' No UE support  Blue 30"x2 No UE sup.  17/21   Lunge squats 2x10 B 2x10 B             Sports cord 4 way march  2x10 x 4 directions             Step Ups 2x10 B Blue  No UE support 2x15 B Blue  No UE support 2x15 B BLue 2x15 B Blue 2x15 B Blue 2x15  Blue 2x15  Blue   Blue  2x10 B Blue  2x10 b Blue 2x10 B   Lateral step ups 2x10 B Blue              Step Downs     --     -- --    Heel raises 2x15 B  No UE support 2x15 B   No UE support 2x15 B  No UE support 2x15 B  No UE support 2x15 B  No UE support 2x15 B  No UE sup 2x15 B  No UE sup 2x15 B  No UE support 2x15 B  No UE support 2x15 DL 2x15 DL 2x15 DL   Gait Note    note   note note note note stairs   balance Note  NOTE NOTE            Initials JEANETTE MB 3/6 JA 2/6 JA 1/6 JEANETTE JA 5/6 MB 4/6 JA 3/6 MB 2/6  1/6 JEANETTE  2/6 "       Assessment:   Pt responded fairly well to cueing for decreased B UE support for increased balance training throughout all Te, and cues for increased B knee extension in stance for gait and in lateral step ups. Cont balance and B LE strength training and progress pt away from QC use during outside activities. Start reassessment next visit for potential UPOC.     Patient Education/Response:     Patient educated to continue HEP.  Reviewed proper use of QC outdoors and indoors.   Plans and Goals:     Cont to advance PT as per POC, advance as appropriate.    Short Term Goals (4 Weeks):   1. Pt will be I in HEP for progressive strengthening.  2. Pt will ambulate for 50' with SPC at CGA for beginning community distances and increased mobility. Progressed with QC and Heidi  3. Pt will ambulate 500' with RW at SPV for increased endurance and functional mobility. MET  4. Pt will perform STS from 17 inch chair with min-mod upper extremity support for increased functional mobility.  5. Pt will be I in all bed mobility for increased functional mobility. MET  6. Pt will demo proper safety in w/c, ambulation, and car/wheelchair transfers for decreased fall risk. MET  7. Pt will stand with feet together x30 secs without LOB for increased balance.     Long Term Goals (8 Weeks):   1. Pt will demo WFL AROM in BLE for increased functional mobility.  2. Pt will ambulate with SPC for 600' on level surfaces at >/= SPV for increased community distances and mobility.  3. Pt will ambulate on outside surfaces at SBA with SPC for increased community mobility.  4. Pt will score </= 30'' on TUG to demo decreased fall risk and increased functional mobility.  5. Pt will perform STS without UE support for 5x to demo increased functional strength.  6. Pt will negotiate 12 stairs at Mod I for increased community negotiations.  7. Pt will have no falls throughout therapy to display improved safety and increased attention/cognition.  8. Pt will  perform 1/2 tandem standing x30 secs B without LOB for increased balance in gait stance.

## 2018-02-22 ENCOUNTER — CLINICAL SUPPORT (OUTPATIENT)
Dept: REHABILITATION | Facility: HOSPITAL | Age: 58
End: 2018-02-22
Attending: FAMILY MEDICINE
Payer: COMMERCIAL

## 2018-02-22 DIAGNOSIS — Z74.09 DECREASED MOBILITY AND ENDURANCE: ICD-10-CM

## 2018-02-22 DIAGNOSIS — M62.81 DECREASED MUSCLE STRENGTH: ICD-10-CM

## 2018-02-22 DIAGNOSIS — R41.840 DECREASED ATTENTION SPAN: ICD-10-CM

## 2018-02-22 DIAGNOSIS — M25.669 DECREASED ROM OF LOWER EXTREMITY: ICD-10-CM

## 2018-02-22 PROCEDURE — 97110 THERAPEUTIC EXERCISES: CPT | Mod: PN

## 2018-02-22 NOTE — PROGRESS NOTES
"TIME RECORD    Date: 2/22/2018      Start Time:  1100  Stop Time:  1200    PROCEDURES:    TIMED  Procedure Min.   Therex 55   Nu Step 5         Total Timed Minutes:  60  Total Timed Units:  4  Total Untimed Units:  0  Charges Billed/# of units:  4 TE      Progress/Current Status    Subjective:     Patient ID: Silvia Cervantes is a 57 y.o. female.  Diagnosis:   1. Decreased ROM of lower extremity     2. Decreased muscle strength     3. Decreased mobility and endurance     4. Decreased attention Span       Patient reports "heaviness and numbness" Left anterior thigh all the way to her foot.  "It's getting worse."    Objective:       Pt performed TE x 55' with PTA per log below. Pt given cues in gait without QC at independent for increased chantell, increased B step length, and increased B heel strike when walking to and from exercise locations.    Date  2/22/18 2/20/18 2/15/18 2/7/18 2/6/18 2/1/18 1/30/18 1/25/18 1/24/18 1/16/18 1/11/18 1/9/18 1/3/18   VISIT 39 38 37 36 35 34 33 31 30 29 28 27 26   Gcode             --   POC 2/26/18                FOTO        At DC  w/dc w/dc At d/c At d/c   MT    -- - --          Eliseo stretch     - --      NT 30"x3 B   Long Sit HS 30"x3 NT  2x30 " B 2x30'' B 2x30'' B 3x30" B 3x30" B 3x30" HSS supine w/strap 3x30" HSS supine w/strap 30"x3 B  stairs 3x30'' B  Stairs 30"x3 B   Gastroc Str. 30"3 B fitter 3x30'' B fitter 3x30" B   3x30'' B 30"X3 B 3x30" B  2x45' fitter Fitter  2x45" 2x45''  Fitter  w/hss   Sci fit  Sci fit L6  X 5 min Sci Fit L6  x6 mins Sci fit L6  X 7 min. Bike Level 3.5  X 8min.  Bike  Next L3  X 8 Bike  Next L3  X 8   L4 8' L4 8' L4 8' L4 8' L3 8' L2 8' L3 6'   TA -- --  -  --   -  -- -- --   QS -- --  -  --   -  -- -- --   SAQ -- ---  -  --   -  -- -- --   SLR -- --  -  --   -  -- -- --   SL Abd -- --  -  --   -  -- - --   Lumbar bridge -- --  -  --   -  -- -- --   Drew hip Add -- --  -  --   --  -- -- --   LAQs -- --  -  --   -  -- -- --   Seated Hip Flex -- --  -  " "--   -  -- -- --   Cybex   Leg Press 5.0 B  2x15  3.5 SL B  3x10   6.0 B  2x15  3.5 SL B  3x10 6.0 B  2x15  3.5 SL B  3x10  5.0 B  2x15  3.0 SL  3x30 B NT 5.0 B  2x15  3.0 SL  3x30 B 5.0 B  2x15  3.0 SL  3x30 B   -4.5 DL  2x15  2.5 SL  3x10 B 4.5 DL  2x15  2.5 SL  3x10 B 4.5 DL  2x15  2.0 SL  3x10 B 4.5 DL  2x15  2.0 SL  2x10 B   4.5 DL  2x15 w/ball     TKE                Hip Abd 1.5 B  2x10 oot Cybex 1.5 B  2x10 Cbex 1.0   2x10 B RTB  2x15 B NT RTB  2x15 B RTB  2x15 B RTB 2x15 B RTB 2x15 B YTB   2x15 B oot YTB  2x12 B   Hip Flex 1.5 B  2x10 oot Cybex 1.5 B   2x10 Cbex 1.0   2x10 B RTB  2x15 B NT RTB  2x15 B RTB  2x15 B RTB 2x15 B RTB 2x15 B YTB 2x15 oot YTB  2x12 B   Hip Ext 1.0 L/1.5 R  2x10    oot Cybex 1.0 L  1.5 R  2x10  Cbex 1.0   2x10 B RTB  2x15 B NT RTB  2x15 B RTB  2x15 B RTB 2x15 B RTB 2x15 B YTB  2x15 B oot YTB   2x12 B   HS Curls See below CYBEX See below CYBEX RTB  2x15 B NT RTB  2x15 B RTB  2x15 B RTB 2x15 B RTB  2x15 B YTB  2x15 B 1# 2x15 B 1#   2x10 B   Mini squats  --    -     2x10  moderate depth 2x10  moderate depth 3x10   Cybex LAQ 25# 3x10 B 25# 3x10 B 25# 3x10 B 25# 3x10  B 25# 3x10  B 25# 2x10  B 15# 2x15 B 2x15 2.5 B        Cybex HS curls 4.0  3x10 B 4x10 B  4 plates  ^ weight 3x10 B  4 plates 3x10 B  4 plates 3x10 B  4 plates 2x10 B  4 plates          Foot taps  30x b no UE support   2x15 B 2x15 B   NT 2x13" w/o UE sup  22/23 2x30" w/o  UE sup  18/23 2x30'' No UE support  Blue 30"x2 No UE sup.  17/21   Lunge squats -- 2x10 B 2x10 B             Sports cord 4 way march oot  2x10 x 4 directions             Step Ups 2x10 B Blue  No UE support 2x10 B Blue  No UE support 2x15 B Blue  No UE support 2x15 B BLue 2x15 B Blue 2x15 B Blue 2x15  Blue 2x15  Blue   Blue  2x10 B Blue  2x10 b Blue 2x10 B   Lateral step ups  2x10 B Blue              Step Downs      --     -- --    Heel raises 2x15 no UE 2x15 B  No UE support 2x15 B   No UE support 2x15 B  No UE support 2x15 B  No UE support 2x15 B  No UE " support 2x15 B  No UE sup 2x15 B  No UE sup 2x15 B  No UE support 2x15 B  No UE support 2x15 DL 2x15 DL 2x15 DL   Gait Note Note    note   note note note note stairs   balance  Note  NOTE NOTE            Initials  1/6 [facility] 3/6  2/6  1/6 [facility] 5/6 MB 4/6 JA 3/6 MB 2/6  1/6 On license of [facility] 2/6       Assessment:     Patient with difficulty performing Leg press initially with 6.0, requested to lower weight, then able to complete.  Patient required several short rest breaks due to reports of fatigue.  Intermittent rubbing/shaking left LE throughout session.    Patient Education/Response:     Patient educated to continue HEP.  Verbalized understanding.  Strongly advised to call MD and notify of change of status - increased symptoms.  See if they can move her appointment up sooner.    Plans and Goals:     Cont to advance PT as per POC, advance as appropriate.    Short Term Goals (4 Weeks):   1. Pt will be I in HEP for progressive strengthening.  2. Pt will ambulate for 50' with SPC at CGA for beginning community distances and increased mobility. Progressed with QC and Heidi  3. Pt will ambulate 500' with RW at SPV for increased endurance and functional mobility. MET  4. Pt will perform STS from 17 inch chair with min-mod upper extremity support for increased functional mobility.  5. Pt will be I in all bed mobility for increased functional mobility. MET  6. Pt will demo proper safety in w/c, ambulation, and car/wheelchair transfers for decreased fall risk. MET  7. Pt will stand with feet together x30 secs without LOB for increased balance.     Long Term Goals (8 Weeks):   1. Pt will demo WFL AROM in BLE for increased functional mobility.  2. Pt will ambulate with SPC for 600' on level surfaces at >/= SPV for increased community distances and mobility.  3. Pt will ambulate on outside surfaces at SBA with SPC for increased community mobility.  4. Pt will score </= 30'' on TUG to demo decreased fall risk and increased  functional mobility.  5. Pt will perform STS without UE support for 5x to demo increased functional strength.  6. Pt will negotiate 12 stairs at Mod I for increased community negotiations.  7. Pt will have no falls throughout therapy to display improved safety and increased attention/cognition.  8. Pt will perform 1/2 tandem standing x30 secs B without LOB for increased balance in gait stance.

## 2018-02-27 ENCOUNTER — CLINICAL SUPPORT (OUTPATIENT)
Dept: REHABILITATION | Facility: HOSPITAL | Age: 58
End: 2018-02-27
Attending: FAMILY MEDICINE
Payer: COMMERCIAL

## 2018-02-27 DIAGNOSIS — M25.669 DECREASED ROM OF LOWER EXTREMITY: ICD-10-CM

## 2018-02-27 DIAGNOSIS — M62.81 DECREASED MUSCLE STRENGTH: ICD-10-CM

## 2018-02-27 DIAGNOSIS — Z74.09 DECREASED MOBILITY AND ENDURANCE: ICD-10-CM

## 2018-02-27 DIAGNOSIS — R41.840 DECREASED ATTENTION SPAN: ICD-10-CM

## 2018-02-27 PROCEDURE — 97110 THERAPEUTIC EXERCISES: CPT | Mod: PN

## 2018-02-27 NOTE — PROGRESS NOTES
"TIME RECORD    Date: 2/27/2018      Start Time:  1100  Stop Time:  1200    PROCEDURES:    TIMED  Procedure Min.   Therex 55   Nu Step 5         Total Timed Minutes:  60  Total Timed Units:  4  Total Untimed Units:  0  Charges Billed/# of units:  4 TE      Progress/Current Status    Subjective:     Patient ID: Silvia Cervantes is a 57 y.o. female.  Diagnosis:   1. Decreased ROM of lower extremity     2. Decreased muscle strength     3. Decreased mobility and endurance     4. Decreased attention Span       Pt reports that she has been doing her walking without cane at home.    Objective:       Pt started with recumbant bike x 5 mins at L1 f/b TE x 55 mins per log below including tests and measures for UPOC/discharge today.    Date  2/27/18 2/22/18 2/20/18 2/15/18 2/7/18 2/6/18 2/1/18 1/30/18 1/25/18 1/24/18 1/16/18 1/11/18 1/9/18 1/3/18   VISIT 40 39 38 37 36 35 34 33 31 30 29 28 27 26   Gcode              --   POC 2/26/18                 FOTO         At DC  w/dc w/dc At d/c At d/c   MT     -- - --          Eliseo stretch      - --      NT 30"x3 B   Long Sit HS 3x30''  30"x3 NT  2x30 " B 2x30'' B 2x30'' B 3x30" B 3x30" B 3x30" HSS supine w/strap 3x30" HSS supine w/strap 30"x3 B  stairs 3x30'' B  Stairs 30"x3 B   Gastroc Str. 3x30'' B fitter 30"3 B fitter 3x30'' B fitter 3x30" B   3x30'' B 30"X3 B 3x30" B  2x45' fitter Fitter  2x45" 2x45''  Fitter  w/hss   Sci fit  SciFit L1 bike  x5mins Sci fit L6  X 5 min Sci Fit L6  x6 mins Sci fit L6  X 7 min. Bike Level 3.5  X 8min.  Bike  Next L3  X 8 Bike  Next L3  X 8   L4 8' L4 8' L4 8' L4 8' L3 8' L2 8' L3 6'   TA -- -- --  -  --   -  -- -- --   QS -- -- --  -  --   -  -- -- --   SAQ -- -- ---  -  --   -  -- -- --   SLR --- -- --  -  --   -  -- -- --   SL Abd -- -- --  -  --   -  -- - --   Lumbar bridge -- -- --  -  --   -  -- -- --   Drew hip Add --- -- --  -  --   --  -- -- --   LAQs -- -- --  -  --   -  -- -- --   Seated Hip Flex -- -- --  -  --   -  -- -- --   Cybex   Leg " "Press 5.0 B  2x15  3.5 SL B  3x10 5.0 B  2x15  3.5 SL B  3x10   6.0 B  2x15  3.5 SL B  3x10 6.0 B  2x15  3.5 SL B  3x10  5.0 B  2x15  3.0 SL  3x30 B NT 5.0 B  2x15  3.0 SL  3x30 B 5.0 B  2x15  3.0 SL  3x30 B   -4.5 DL  2x15  2.5 SL  3x10 B 4.5 DL  2x15  2.5 SL  3x10 B 4.5 DL  2x15  2.0 SL  3x10 B 4.5 DL  2x15  2.0 SL  2x10 B   4.5 DL  2x15 w/ball     TKE                 Hip Abd 1.5 B  2x10 1.5 B  2x10 oot Cybex 1.5 B  2x10 Cbex 1.0   2x10 B RTB  2x15 B NT RTB  2x15 B RTB  2x15 B RTB 2x15 B RTB 2x15 B YTB   2x15 B oot YTB  2x12 B   Hip Flex 1.5 B  2x10 1.5 B  2x10 oot Cybex 1.5 B   2x10 Cbex 1.0   2x10 B RTB  2x15 B NT RTB  2x15 B RTB  2x15 B RTB 2x15 B RTB 2x15 B YTB 2x15 oot YTB  2x12 B   Hip Ext 1.0 L/1.5 R  2x10  1.0 L/1.5 R  2x10    oot Cybex 1.0 L  1.5 R  2x10  Cbex 1.0   2x10 B RTB  2x15 B NT RTB  2x15 B RTB  2x15 B RTB 2x15 B RTB 2x15 B YTB  2x15 B oot YTB   2x12 B   HS Curls -- See below CYBEX See below CYBEX RTB  2x15 B NT RTB  2x15 B RTB  2x15 B RTB 2x15 B RTB  2x15 B YTB  2x15 B 1# 2x15 B 1#   2x10 B   Mini squats --  --    -     2x10  moderate depth 2x10  moderate depth 3x10   Cybex LAQ 25# 3x10 B 25# 3x10 B 25# 3x10 B 25# 3x10 B 25# 3x10  B 25# 3x10  B 25# 2x10  B 15# 2x15 B 2x15 2.5 B        Cybex HS curls 4.0  3x10 B 4.0  3x10 B 4x10 B  4 plates  ^ weight 3x10 B  4 plates 3x10 B  4 plates 3x10 B  4 plates 2x10 B  4 plates          Foot taps --  30x b no UE support   2x15 B 2x15 B   NT 2x13" w/o UE sup  22/23 2x30" w/o  UE sup  18/23 2x30'' No UE support  Blue 30"x2 No UE sup.  17/21   Lunge squats -- -- 2x10 B 2x10 B             Sports cord 4 way march -- oot  2x10 x 4 directions             Step Ups -- 2x10 B Blue  No UE support 2x10 B Blue  No UE support 2x15 B Blue  No UE support 2x15 B BLue 2x15 B Blue 2x15 B Blue 2x15  Blue 2x15  Blue   Blue  2x10 B Blue  2x10 b Blue 2x10 B   Lateral step ups --  2x10 B Blue              Step Downs --      --     -- --    Heel raises -- 2x15 no UE 2x15 B  No UE " support 2x15 B   No UE support 2x15 B  No UE support 2x15 B  No UE support 2x15 B  No UE support 2x15 B  No UE sup 2x15 B  No UE sup 2x15 B  No UE support 2x15 B  No UE support 2x15 DL 2x15 DL 2x15 DL   Gait Note Note Note    note   note note note note stairs   balance   Note  NOTE NOTE            Initials JEANETTE DH 1/6 JEANETTE MB 3/6 JA 2/6 JA 1/6 JEANETTE JA 5/6 MB 4/6 JA 3/6 MB 2/6  1/6 [facility] 2/6       Assessment:     Pt performed all TE well and ambulated well without SPC at independent with no cues. Pt is currently self limiting in gait and mobility as pt performs well with cues in therapy. Pt given education on to cont HEP and progressing all of her regular ADLs and cont to get back to all old activities.    Patient Education/Response:     See d/c note and UPOC.    Plans and Goals:     See d/c note and UPOC.    Short Term Goals (4 Weeks):   1. Pt will be I in HEP for progressive strengthening.  2. Pt will ambulate for 50' with SPC at CGA for beginning community distances and increased mobility. Progressed with QC and Heidi  3. Pt will ambulate 500' with RW at SPV for increased endurance and functional mobility. MET  4. Pt will perform STS from 17 inch chair with min-mod upper extremity support for increased functional mobility.  5. Pt will be I in all bed mobility for increased functional mobility. MET  6. Pt will demo proper safety in w/c, ambulation, and car/wheelchair transfers for decreased fall risk. MET  7. Pt will stand with feet together x30 secs without LOB for increased balance.     Long Term Goals (8 Weeks):   1. Pt will demo WFL AROM in BLE for increased functional mobility.  2. Pt will ambulate with SPC for 600' on level surfaces at >/= SPV for increased community distances and mobility.  3. Pt will ambulate on outside surfaces at SBA with SPC for increased community mobility.  4. Pt will score </= 30'' on TUG to demo decreased fall risk and increased functional mobility.  5. Pt will perform STS without UE  support for 5x to demo increased functional strength.  6. Pt will negotiate 12 stairs at Mod I for increased community negotiations.  7. Pt will have no falls throughout therapy to display improved safety and increased attention/cognition.  8. Pt will perform 1/2 tandem standing x30 secs B without LOB for increased balance in gait stance.

## 2018-03-01 ENCOUNTER — TELEPHONE (OUTPATIENT)
Dept: FAMILY MEDICINE | Facility: CLINIC | Age: 58
End: 2018-03-01

## 2018-03-01 NOTE — TELEPHONE ENCOUNTER
----- Message from Deliafroylan Joshi sent at 3/1/2018  1:01 PM [facility] -----  No. [phone]   Patient needs a copy of the ultrasound results from 3 weeks ago.   If she needs to sign a medical release form, she will  the copy.   If not, please mail the copy to her home.   She needs this copy for an upcoming doctor's appointment on 3/13/18.
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
## 2018-03-07 ENCOUNTER — OFFICE VISIT (OUTPATIENT)
Dept: RHEUMATOLOGY | Facility: CLINIC | Age: 58
End: 2018-03-07
Payer: COMMERCIAL

## 2018-03-07 VITALS
BODY MASS INDEX: 23.6 KG/M2 | SYSTOLIC BLOOD PRESSURE: 128 MMHG | DIASTOLIC BLOOD PRESSURE: 85 MMHG | RESPIRATION RATE: 18 BRPM | HEART RATE: 86 BPM | HEIGHT: 61 IN | WEIGHT: 125 LBS

## 2018-03-07 DIAGNOSIS — M06.9 RHEUMATOID ARTHRITIS INVOLVING MULTIPLE JOINTS: Primary | ICD-10-CM

## 2018-03-07 DIAGNOSIS — Z79.620 ENCOUNTER FOR MONITORING OF ETANERCEPT THERAPY: ICD-10-CM

## 2018-03-07 DIAGNOSIS — Z51.81 ENCOUNTER FOR MONITORING OF ETANERCEPT THERAPY: ICD-10-CM

## 2018-03-07 PROCEDURE — 3079F DIAST BP 80-89 MM HG: CPT | Mod: S$GLB,,, | Performed by: INTERNAL MEDICINE

## 2018-03-07 PROCEDURE — 99999 PR PBB SHADOW E&M-EST. PATIENT-LVL III: CPT | Mod: PBBFAC,,, | Performed by: INTERNAL MEDICINE

## 2018-03-07 PROCEDURE — 3074F SYST BP LT 130 MM HG: CPT | Mod: S$GLB,,, | Performed by: INTERNAL MEDICINE

## 2018-03-07 PROCEDURE — 99214 OFFICE O/P EST MOD 30 MIN: CPT | Mod: S$GLB,,, | Performed by: INTERNAL MEDICINE

## 2018-03-07 RX ORDER — AMANTADINE HYDROCHLORIDE 100 MG/1
100 CAPSULE, GELATIN COATED ORAL 2 TIMES DAILY
COMMUNITY
End: 2018-07-18

## 2018-03-07 RX ORDER — CELECOXIB 200 MG/1
200 CAPSULE ORAL DAILY PRN
Qty: 30 CAPSULE | Refills: 5 | Status: SHIPPED | OUTPATIENT
Start: 2018-03-07 | End: 2018-04-18

## 2018-03-07 RX ORDER — CELECOXIB 200 MG/1
200 CAPSULE ORAL 2 TIMES DAILY
COMMUNITY
End: 2018-04-18
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
## 2018-03-08 ENCOUNTER — LAB VISIT (OUTPATIENT)
Dept: LAB | Facility: HOSPITAL | Age: 58
End: 2018-03-08
Attending: INTERNAL MEDICINE
Payer: COMMERCIAL

## 2018-03-08 DIAGNOSIS — M06.9 RHEUMATOID ARTHRITIS INVOLVING MULTIPLE JOINTS: ICD-10-CM

## 2018-03-08 LAB
ALBUMIN SERPL BCP-MCNC: 4 G/DL
ALP SERPL-CCNC: 106 U/L
ALT SERPL W/O P-5'-P-CCNC: 23 U/L
ANION GAP SERPL CALC-SCNC: 9 MMOL/L
AST SERPL-CCNC: 20 U/L
BASOPHILS # BLD AUTO: 0.02 K/UL
BASOPHILS NFR BLD: 0.4 %
BILIRUB SERPL-MCNC: 0.3 MG/DL
BUN SERPL-MCNC: 12 MG/DL
CALCIUM SERPL-MCNC: 10.3 MG/DL
CHLORIDE SERPL-SCNC: 105 MMOL/L
CO2 SERPL-SCNC: 28 MMOL/L
CREAT SERPL-MCNC: 0.7 MG/DL
CRP SERPL-MCNC: 2.5 MG/L
DIFFERENTIAL METHOD: NORMAL
EOSINOPHIL # BLD AUTO: 0.2 K/UL
EOSINOPHIL NFR BLD: 4.1 %
ERYTHROCYTE [DISTWIDTH] IN BLOOD BY AUTOMATED COUNT: 13.5 %
ERYTHROCYTE [SEDIMENTATION RATE] IN BLOOD BY WESTERGREN METHOD: 38 MM/HR
EST. GFR  (AFRICAN AMERICAN): >60 ML/MIN/1.73 M^2
EST. GFR  (NON AFRICAN AMERICAN): >60 ML/MIN/1.73 M^2
GLUCOSE SERPL-MCNC: 73 MG/DL
HCT VFR BLD AUTO: 44.9 %
HGB BLD-MCNC: 14.4 G/DL
LYMPHOCYTES # BLD AUTO: 1.9 K/UL
LYMPHOCYTES NFR BLD: 34.5 %
MCH RBC QN AUTO: 31 PG
MCHC RBC AUTO-ENTMCNC: 32.1 G/DL
MCV RBC AUTO: 97 FL
MONOCYTES # BLD AUTO: 0.6 K/UL
MONOCYTES NFR BLD: 11.2 %
NEUTROPHILS # BLD AUTO: 2.7 K/UL
NEUTROPHILS NFR BLD: 49.6 %
PLATELET # BLD AUTO: 288 K/UL
PMV BLD AUTO: 9.6 FL
POTASSIUM SERPL-SCNC: 3.9 MMOL/L
PROT SERPL-MCNC: 8 G/DL
RBC # BLD AUTO: 4.64 M/UL
SODIUM SERPL-SCNC: 142 MMOL/L
WBC # BLD AUTO: 5.36 K/UL

## 2018-03-08 PROCEDURE — 86140 C-REACTIVE PROTEIN: CPT

## 2018-03-08 PROCEDURE — 80053 COMPREHEN METABOLIC PANEL: CPT

## 2018-03-08 PROCEDURE — 85652 RBC SED RATE AUTOMATED: CPT

## 2018-03-08 PROCEDURE — 85025 COMPLETE CBC W/AUTO DIFF WBC: CPT

## 2018-03-14 ENCOUNTER — TELEPHONE (OUTPATIENT)
Dept: FAMILY MEDICINE | Facility: CLINIC | Age: 58
End: 2018-03-14

## 2018-03-14 DIAGNOSIS — R93.5 ABNORMAL ULTRASOUND OF ABDOMEN: Primary | ICD-10-CM

## 2018-03-14 DIAGNOSIS — M54.9 CHRONIC NECK AND BACK PAIN: ICD-10-CM

## 2018-03-14 DIAGNOSIS — M54.2 CHRONIC NECK AND BACK PAIN: ICD-10-CM

## 2018-03-14 DIAGNOSIS — G89.29 CHRONIC NECK AND BACK PAIN: ICD-10-CM
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
## 2018-03-16 ENCOUNTER — HOSPITAL ENCOUNTER (OUTPATIENT)
Dept: RADIOLOGY | Facility: HOSPITAL | Age: 58
Discharge: HOME | End: 2018-03-16
Attending: FAMILY MEDICINE
Payer: COMMERCIAL

## 2018-03-16 DIAGNOSIS — R93.5 ABNORMAL ULTRASOUND OF ABDOMEN: ICD-10-CM

## 2018-03-16 PROCEDURE — 74150 CT ABDOMEN W/O CONTRAST: CPT | Mod: 26,,, | Performed by: RADIOLOGY

## 2018-03-16 PROCEDURE — 74150 CT ABDOMEN W/O CONTRAST: CPT | Mod: TC

## 2018-04-09 ENCOUNTER — TELEPHONE (OUTPATIENT)
Dept: FAMILY MEDICINE | Facility: CLINIC | Age: 58
End: 2018-04-09

## 2018-04-09 NOTE — TELEPHONE ENCOUNTER
----- Message from Delia Joshi sent at 4/9/2018  1:40 PM CDT -----  No. [phone]   Patient's appointment with the back and spine office called the patient and cancelled the appointment she had today.    She is rescheduled for 5/14/18.  She needs to be seen sooner.  Do you have a recommendation who else she can see.

## 2018-04-09 NOTE — TELEPHONE ENCOUNTER
Spoke to pt and stated that [facility] cancelled her appt and she wanted the doctor to schedule a sooner appt with them. Informed pt to call [facility] to see if they have a sooner appt. Pt stated that she will call on tomorrow to see if she can be seen sooner.

## 2018-04-09 NOTE — TELEPHONE ENCOUNTER
----- Message from Michelle Wilder sent at 4/9/2018 11:22 AM CDT -----  Contact: Self [phone]  Patient is calling to see if the doctor can recommend her another back doctor. Please Advice

## 2018-04-10 ENCOUNTER — TELEPHONE (OUTPATIENT)
Dept: SPINE | Facility: CLINIC | Age: 58
End: 2018-04-10

## 2018-04-10 NOTE — TELEPHONE ENCOUNTER
No Answer, left message to call back.  ----- Message from Conchita Boudreaux sent at 4/9/2018  4:26 PM CDT -----  Contact: pt  Name of Who is Calling: DARYN VENEGAS [[id number]]      What is the request in detail: Pt is upset that her original appt got rescheduled due to the DrSabiha Being in surgery. Pt very upset. Please call to schedule a sooner appt      Can the clinic reply by [facility]:yes      [facility] [phone]
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
## 2018-04-18 ENCOUNTER — OFFICE VISIT (OUTPATIENT)
Dept: SPINE | Facility: CLINIC | Age: 58
End: 2018-04-18
Attending: PHYSICAL MEDICINE & REHABILITATION
Payer: COMMERCIAL

## 2018-04-18 ENCOUNTER — HOSPITAL ENCOUNTER (OUTPATIENT)
Dept: RADIOLOGY | Facility: OTHER | Age: 58
Discharge: HOME | End: 2018-04-18
Attending: PHYSICAL MEDICINE & REHABILITATION
Payer: COMMERCIAL

## 2018-04-18 VITALS
DIASTOLIC BLOOD PRESSURE: 101 MMHG | BODY MASS INDEX: 23.22 KG/M2 | HEART RATE: 107 BPM | HEIGHT: 61 IN | SYSTOLIC BLOOD PRESSURE: 130 MMHG | WEIGHT: 123 LBS

## 2018-04-18 DIAGNOSIS — S12.100S CLOSED ODONTOID FRACTURE, SEQUELA: ICD-10-CM

## 2018-04-18 DIAGNOSIS — M54.50 CHRONIC BILATERAL LOW BACK PAIN WITHOUT SCIATICA: Primary | ICD-10-CM

## 2018-04-18 DIAGNOSIS — G89.29 CHRONIC BILATERAL LOW BACK PAIN WITHOUT SCIATICA: ICD-10-CM

## 2018-04-18 DIAGNOSIS — S32.810S MULTIPLE CLOSED FRACTURES OF PELVIS WITH STABLE DISRUPTION OF PELVIC RING, SEQUELA: ICD-10-CM

## 2018-04-18 DIAGNOSIS — M54.50 CHRONIC BILATERAL LOW BACK PAIN WITHOUT SCIATICA: ICD-10-CM

## 2018-04-18 DIAGNOSIS — G89.29 CHRONIC BILATERAL LOW BACK PAIN WITHOUT SCIATICA: Primary | ICD-10-CM

## 2018-04-18 PROCEDURE — 3080F DIAST BP >= 90 MM HG: CPT | Mod: CPTII,S$GLB,, | Performed by: PHYSICAL MEDICINE & REHABILITATION

## 2018-04-18 PROCEDURE — 72190 X-RAY EXAM OF PELVIS: CPT | Mod: TC,FY

## 2018-04-18 PROCEDURE — 72052 X-RAY EXAM NECK SPINE 6/>VWS: CPT | Mod: TC,FY

## 2018-04-18 PROCEDURE — 72052 X-RAY EXAM NECK SPINE 6/>VWS: CPT | Mod: 26,,, | Performed by: RADIOLOGY

## 2018-04-18 PROCEDURE — 99999 PR PBB SHADOW E&M-EST. PATIENT-LVL IV: CPT | Mod: PBBFAC,,, | Performed by: PHYSICAL MEDICINE & REHABILITATION

## 2018-04-18 PROCEDURE — 3075F SYST BP GE 130 - 139MM HG: CPT | Mod: CPTII,S$GLB,, | Performed by: PHYSICAL MEDICINE & REHABILITATION

## 2018-04-18 PROCEDURE — 72114 X-RAY EXAM L-S SPINE BENDING: CPT | Mod: TC,FY

## 2018-04-18 PROCEDURE — 99205 OFFICE O/P NEW HI 60 MIN: CPT | Mod: S$GLB,,, | Performed by: PHYSICAL MEDICINE & REHABILITATION

## 2018-04-18 PROCEDURE — 72114 X-RAY EXAM L-S SPINE BENDING: CPT | Mod: 26,,, | Performed by: RADIOLOGY

## 2018-04-18 PROCEDURE — 72190 X-RAY EXAM OF PELVIS: CPT | Mod: 26,,, | Performed by: RADIOLOGY

## 2018-04-18 RX ORDER — DIAZEPAM 2 MG/1
2 TABLET ORAL
Qty: 2 TABLET | Refills: 0 | Status: SHIPPED | OUTPATIENT
Start: 2018-04-18 | End: 2018-05-08 | Stop reason: SDUPTHER

## 2018-04-18 RX ORDER — CELECOXIB 200 MG/1
200 CAPSULE ORAL DAILY PRN
Qty: 30 CAPSULE | Refills: 5 | Status: SHIPPED | OUTPATIENT
Start: 2018-04-18 | End: 2018-07-25

## 2018-04-18 RX ORDER — GABAPENTIN 300 MG/1
300 CAPSULE ORAL 3 TIMES DAILY
Qty: 90 CAPSULE | Refills: 2 | Status: SHIPPED | OUTPATIENT
Start: 2018-04-18 | End: 2018-12-27 | Stop reason: SDUPTHER

## 2018-04-18 NOTE — LETTER
April 18, 2018      Adrián Cade MD  [address]  [address]  [facility] [address]           [facility]  [address], [address]  [facility] [address]  Phone: [phone]  Fax: [phone]          Patient: Silvia Cervantes   MR Number: [id number]   YOB: 1960   Date of Visit: 4/18/2018       Dear Dr. Adrián Cade:    Thank you for referring Silvia Cervantes to me for evaluation. Attached you will find relevant portions of my assessment and plan of care.    If you have questions, please do not hesitate to call me. I look forward to following Silvia Cervatnes along with you.    Sincerely,    Mariella Connelly MD    Enclosure  CC:  No Recipients    If you would like to receive this communication electronically, please contact [email or web address] or [phone] to request more information on [employer] Link access.    For providers and/or their staff who would like to refer a patient to [facility], please contact us through our one-stop-shop provider referral line, [facility], at [phone].    If you feel you have received this communication in error or would no longer like to receive these types of communications, please e-mail [email or web address]

## 2018-04-25 ENCOUNTER — HOSPITAL ENCOUNTER (OUTPATIENT)
Dept: RADIOLOGY | Facility: HOSPITAL | Age: 58
Discharge: HOME | End: 2018-04-25
Attending: PHYSICAL MEDICINE & REHABILITATION
Payer: COMMERCIAL

## 2018-04-25 DIAGNOSIS — S12.100S CLOSED ODONTOID FRACTURE, SEQUELA: ICD-10-CM

## 2018-04-25 DIAGNOSIS — M54.50 CHRONIC BILATERAL LOW BACK PAIN WITHOUT SCIATICA: ICD-10-CM

## 2018-04-25 DIAGNOSIS — G89.29 CHRONIC BILATERAL LOW BACK PAIN WITHOUT SCIATICA: ICD-10-CM

## 2018-04-25 PROCEDURE — 72148 MRI LUMBAR SPINE W/O DYE: CPT | Mod: TC

## 2018-04-25 PROCEDURE — 72141 MRI NECK SPINE W/O DYE: CPT | Mod: TC

## 2018-04-25 PROCEDURE — 72148 MRI LUMBAR SPINE W/O DYE: CPT | Mod: 26,,, | Performed by: RADIOLOGY

## 2018-04-25 PROCEDURE — 72141 MRI NECK SPINE W/O DYE: CPT | Mod: 26,,, | Performed by: RADIOLOGY

## 2018-04-30 ENCOUNTER — PATIENT MESSAGE (OUTPATIENT)
Dept: SPINE | Facility: CLINIC | Age: 58
End: 2018-04-30

## 2018-05-08 ENCOUNTER — TELEPHONE (OUTPATIENT)
Dept: SPINE | Facility: CLINIC | Age: 58
End: 2018-05-08

## 2018-05-08 ENCOUNTER — OFFICE VISIT (OUTPATIENT)
Dept: FAMILY MEDICINE | Facility: CLINIC | Age: 58
End: 2018-05-08
Attending: FAMILY MEDICINE
Payer: COMMERCIAL

## 2018-05-08 VITALS
OXYGEN SATURATION: 97 % | DIASTOLIC BLOOD PRESSURE: 84 MMHG | HEART RATE: 82 BPM | TEMPERATURE: 98 F | WEIGHT: 125.25 LBS | HEIGHT: 61 IN | BODY MASS INDEX: 23.65 KG/M2 | SYSTOLIC BLOOD PRESSURE: 142 MMHG

## 2018-05-08 DIAGNOSIS — Z12.31 ENCOUNTER FOR SCREENING MAMMOGRAM FOR BREAST CANCER: ICD-10-CM

## 2018-05-08 DIAGNOSIS — L80 VITILIGO: ICD-10-CM

## 2018-05-08 DIAGNOSIS — F33.9 MAJOR DEPRESSION, RECURRENT, CHRONIC: ICD-10-CM

## 2018-05-08 DIAGNOSIS — M06.9 RHEUMATOID ARTHRITIS INVOLVING LEFT HAND, UNSPECIFIED RHEUMATOID FACTOR PRESENCE: ICD-10-CM

## 2018-05-08 DIAGNOSIS — M54.2 CHRONIC NECK AND BACK PAIN: ICD-10-CM

## 2018-05-08 DIAGNOSIS — E74.39 GLUCOSE INTOLERANCE: ICD-10-CM

## 2018-05-08 DIAGNOSIS — M54.9 CHRONIC NECK AND BACK PAIN: ICD-10-CM

## 2018-05-08 DIAGNOSIS — M79.2 NEURALGIA: ICD-10-CM

## 2018-05-08 DIAGNOSIS — G89.29 CHRONIC NECK AND BACK PAIN: ICD-10-CM

## 2018-05-08 DIAGNOSIS — R29.898 WEAKNESS OF BOTH LOWER EXTREMITIES: ICD-10-CM

## 2018-05-08 DIAGNOSIS — I10 ESSENTIAL HYPERTENSION: Primary | ICD-10-CM

## 2018-05-08 DIAGNOSIS — T14.90XA TRAUMA: ICD-10-CM

## 2018-05-08 DIAGNOSIS — S12.100S CLOSED ODONTOID FRACTURE, SEQUELA: Primary | ICD-10-CM

## 2018-05-08 DIAGNOSIS — E78.00 HYPERCHOLESTEREMIA: ICD-10-CM

## 2018-05-08 DIAGNOSIS — S32.810S MULTIPLE CLOSED FRACTURES OF PELVIS WITH STABLE DISRUPTION OF PELVIC RING, SEQUELA: ICD-10-CM

## 2018-05-08 PROCEDURE — 99214 OFFICE O/P EST MOD 30 MIN: CPT | Mod: S$GLB,,, | Performed by: FAMILY MEDICINE

## 2018-05-08 PROCEDURE — 99999 PR PBB SHADOW E&M-EST. PATIENT-LVL III: CPT | Mod: PBBFAC,,, | Performed by: FAMILY MEDICINE

## 2018-05-08 PROCEDURE — 3008F BODY MASS INDEX DOCD: CPT | Mod: CPTII,S$GLB,, | Performed by: FAMILY MEDICINE

## 2018-05-08 PROCEDURE — 3077F SYST BP >= 140 MM HG: CPT | Mod: CPTII,S$GLB,, | Performed by: FAMILY MEDICINE

## 2018-05-08 PROCEDURE — 3079F DIAST BP 80-89 MM HG: CPT | Mod: CPTII,S$GLB,, | Performed by: FAMILY MEDICINE

## 2018-05-08 RX ORDER — DIAZEPAM 2 MG/1
2 TABLET ORAL
Qty: 2 TABLET | Refills: 0 | Status: SHIPPED | OUTPATIENT
Start: 2018-05-08 | End: 2018-06-15

## 2018-05-08 RX ORDER — LOSARTAN POTASSIUM AND HYDROCHLOROTHIAZIDE 12.5; 5 MG/1; MG/1
1 TABLET ORAL DAILY
COMMUNITY
End: 2018-05-08 | Stop reason: SDUPTHER

## 2018-05-08 RX ORDER — AMLODIPINE BESYLATE 5 MG/1
5 TABLET ORAL DAILY
Qty: 90 TABLET | Refills: 3 | Status: SHIPPED | OUTPATIENT
Start: 2018-05-08 | End: 2018-08-02 | Stop reason: SDUPTHER

## 2018-05-08 RX ORDER — LOSARTAN POTASSIUM AND HYDROCHLOROTHIAZIDE 12.5; 5 MG/1; MG/1
1 TABLET ORAL DAILY
Qty: 90 TABLET | Refills: 3 | Status: SHIPPED | OUTPATIENT
Start: 2018-05-08 | End: 2018-08-02 | Stop reason: SDUPTHER

## 2018-05-08 NOTE — TELEPHONE ENCOUNTER
Called to follow up on MRI results.  Discussed MRI of the brain to look for cause of left sided tingling and pain and CT scan of cervical spine to look at bone healing
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
## 2018-05-09 NOTE — TELEPHONE ENCOUNTER
Called spoke with patient schedule appointments for CT scan and MRI for 5/16 in [place] patient verbally agreed to times and date.

## 2018-05-10 ENCOUNTER — HOSPITAL ENCOUNTER (OUTPATIENT)
Dept: RADIOLOGY | Facility: HOSPITAL | Age: 58
Discharge: HOME | End: 2018-05-10
Attending: FAMILY MEDICINE
Payer: COMMERCIAL

## 2018-05-10 DIAGNOSIS — Z12.31 ENCOUNTER FOR SCREENING MAMMOGRAM FOR BREAST CANCER: ICD-10-CM

## 2018-05-10 PROCEDURE — 77067 SCR MAMMO BI INCL CAD: CPT | Mod: 26,,, | Performed by: RADIOLOGY

## 2018-05-10 PROCEDURE — 77067 SCR MAMMO BI INCL CAD: CPT | Mod: TC

## 2018-05-10 PROCEDURE — 77063 BREAST TOMOSYNTHESIS BI: CPT | Mod: 26,,, | Performed by: RADIOLOGY

## 2018-05-15 ENCOUNTER — TELEPHONE (OUTPATIENT)
Dept: SPINE | Facility: CLINIC | Age: 58
End: 2018-05-15

## 2018-05-15 NOTE — TELEPHONE ENCOUNTER
Called patient no answer left message on voicemail to return office call to reschedule her imaging.

## 2018-05-15 NOTE — TELEPHONE ENCOUNTER
Called spoke with patient reschedule patient MRI and CT for 5/23 at 9:15am.  ----- Message from Eugenio Barrera sent at 5/15/2018  2:48 PM CDT -----  Contact: Silvia Cervantes             Name of Who is Calling: Silvia Cervantes      What is the request in detail: Patient returning phone call to reschedule MRI      Can the clinic reply by [facility]: No      What Number to Call Back if not in [facility]: [phone]

## 2018-05-16 ENCOUNTER — TELEPHONE (OUTPATIENT)
Dept: SPINE | Facility: CLINIC | Age: 58
End: 2018-05-16

## 2018-05-16 NOTE — TELEPHONE ENCOUNTER
Called patient no answer left message on voicemail to return office call.  ----- Message from Luíca Schmitt sent at 5/16/2018  2:01 PM CDT -----            Name of Who is Calling: anibal      What is the request in detail: pt. Would like to speak with manuel.please call to discuss      Can the clinic reply by [facility]: no      What [facility] [phone]

## 2018-05-23 ENCOUNTER — HOSPITAL ENCOUNTER (OUTPATIENT)
Dept: RADIOLOGY | Facility: HOSPITAL | Age: 58
Discharge: HOME | End: 2018-05-23
Attending: PHYSICAL MEDICINE & REHABILITATION
Payer: COMMERCIAL

## 2018-05-23 DIAGNOSIS — S12.100S CLOSED ODONTOID FRACTURE, SEQUELA: ICD-10-CM

## 2018-05-23 DIAGNOSIS — M79.2 NEURALGIA: ICD-10-CM

## 2018-05-23 DIAGNOSIS — T14.90XA TRAUMA: ICD-10-CM

## 2018-05-23 PROCEDURE — 72125 CT NECK SPINE W/O DYE: CPT | Mod: 26,,, | Performed by: RADIOLOGY

## 2018-05-23 PROCEDURE — 72125 CT NECK SPINE W/O DYE: CPT | Mod: TC

## 2018-05-23 PROCEDURE — 70551 MRI BRAIN STEM W/O DYE: CPT | Mod: TC

## 2018-05-23 PROCEDURE — 70551 MRI BRAIN STEM W/O DYE: CPT | Mod: 26,,, | Performed by: RADIOLOGY

## 2018-05-24 ENCOUNTER — TELEPHONE (OUTPATIENT)
Dept: SPINE | Facility: CLINIC | Age: 58
End: 2018-05-24

## 2018-05-24 DIAGNOSIS — R91.1 LUNG NODULE: ICD-10-CM

## 2018-05-24 NOTE — TELEPHONE ENCOUNTER
Called and left message to return call.  Cervical fracture looks good, but pulmonary nodule.  She has had studies of her chest for nodules in the past.  The last one in 2013.  I will send a message to PCP to see if needs further imaging

## 2018-05-24 NOTE — TELEPHONE ENCOUNTER
----- Message from Jena Higgins sent at 5/24/2018  1:16 PM CDT -----  Contact: pt            Name of Who is Calling: Silvia      What is the request in detail: pt requesting call back in reference to test results for further instructions,Pt missed call. Please call pt and advise.      Can the clinic reply by [facility]: no      What Number to Call Back if not in [facility]: [phone]

## 2018-05-24 NOTE — TELEPHONE ENCOUNTER
Reviewed the imaging with her.  Dr. Cade ordered CT chest.  We discussed The MRI of the brain and Ct of the cervical spine, but does not appear to be causing left sided numbness

## 2018-05-31 ENCOUNTER — TELEPHONE (OUTPATIENT)
Dept: PHARMACY | Facility: CLINIC | Age: 58
End: 2018-05-31

## 2018-05-31 NOTE — TELEPHONE ENCOUNTER
Left v/m Please tell patient that I sent to our specialty pharmacy for authorization and once it gets authorized we can send it to [facility]. Tell her that her insurance only approves it for 6 months so we have to do this every 6 months.      //AEL

## 2018-05-31 NOTE — TELEPHONE ENCOUNTER
Left v/m that enbrel is at our specialty pharmacy and has 11 refills that should last her until 2/19     //AEL
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
## 2018-06-15 ENCOUNTER — OFFICE VISIT (OUTPATIENT)
Dept: SPINE | Facility: CLINIC | Age: 58
End: 2018-06-15
Attending: PHYSICAL MEDICINE & REHABILITATION
Payer: MEDICAID

## 2018-06-15 VITALS
HEART RATE: 92 BPM | HEIGHT: 61 IN | BODY MASS INDEX: 23.98 KG/M2 | WEIGHT: 127 LBS | SYSTOLIC BLOOD PRESSURE: 123 MMHG | DIASTOLIC BLOOD PRESSURE: 87 MMHG

## 2018-06-15 DIAGNOSIS — M54.50 CHRONIC BILATERAL LOW BACK PAIN WITHOUT SCIATICA: ICD-10-CM

## 2018-06-15 DIAGNOSIS — S12.100S CLOSED ODONTOID FRACTURE, SEQUELA: Primary | ICD-10-CM

## 2018-06-15 DIAGNOSIS — T14.90XA TRAUMA: ICD-10-CM

## 2018-06-15 DIAGNOSIS — G89.29 CHRONIC BILATERAL LOW BACK PAIN WITHOUT SCIATICA: ICD-10-CM

## 2018-06-15 DIAGNOSIS — M79.2 NEURALGIA: ICD-10-CM

## 2018-06-15 DIAGNOSIS — S32.810S MULTIPLE CLOSED FRACTURES OF PELVIS WITH STABLE DISRUPTION OF PELVIC RING, SEQUELA: ICD-10-CM

## 2018-06-15 DIAGNOSIS — M51.36 DDD (DEGENERATIVE DISC DISEASE), LUMBAR: ICD-10-CM

## 2018-06-15 PROCEDURE — 99999 PR PBB SHADOW E&M-EST. PATIENT-LVL III: CPT | Mod: PBBFAC,,, | Performed by: PHYSICAL MEDICINE & REHABILITATION

## 2018-06-15 PROCEDURE — 3008F BODY MASS INDEX DOCD: CPT | Mod: CPTII,S$GLB,, | Performed by: PHYSICAL MEDICINE & REHABILITATION

## 2018-06-15 PROCEDURE — 99214 OFFICE O/P EST MOD 30 MIN: CPT | Mod: S$GLB,,, | Performed by: PHYSICAL MEDICINE & REHABILITATION

## 2018-06-15 PROCEDURE — 99213 OFFICE O/P EST LOW 20 MIN: CPT | Mod: PBBFAC | Performed by: PHYSICAL MEDICINE & REHABILITATION

## 2018-06-21 ENCOUNTER — HOSPITAL ENCOUNTER (OUTPATIENT)
Dept: RADIOLOGY | Facility: HOSPITAL | Age: 58
Discharge: HOME | End: 2018-06-21
Attending: FAMILY MEDICINE
Payer: MEDICAID

## 2018-06-21 DIAGNOSIS — R91.1 LUNG NODULE: ICD-10-CM

## 2018-06-21 PROCEDURE — 71250 CT THORAX DX C-: CPT | Mod: 26,,, | Performed by: RADIOLOGY

## 2018-06-21 PROCEDURE — 71250 CT THORAX DX C-: CPT | Mod: TC

## 2018-06-26 ENCOUNTER — TELEPHONE (OUTPATIENT)
Dept: FAMILY MEDICINE | Facility: CLINIC | Age: 58
End: 2018-06-26

## 2018-06-26 DIAGNOSIS — R91.8 PULMONARY NODULES/LESIONS, MULTIPLE: Primary | ICD-10-CM

## 2018-06-26 NOTE — TELEPHONE ENCOUNTER
----- Message from Asmita Feng MD sent at 6/25/2018  4:16 PM CDT -----  Please schedule patient for a follow up visit with Dr Cade thanks  ----- Message -----  From: Tera Alvarado  Sent: 6/25/2018  11:13 AM  To: Adrián Cade MD    Ms. Cervantes would like a phone call to get an appt with Dr. Cade for some test she had done.

## 2018-06-26 NOTE — TELEPHONE ENCOUNTER
Left msg for CB to inform pt her CT showed multiple nodules in chest/lungs. Dr. Cade recommends pulmonologist opinion - let me know if she needs referral

## 2018-06-26 NOTE — TELEPHONE ENCOUNTER
CT showed multiple nodules in chest/lungs - I recommend pulmonologist opinion - let me know if she needs referral

## 2018-06-26 NOTE — TELEPHONE ENCOUNTER
Informed pt that her CT showed multiple nodules in chest/lungs. Dr. Cade recommends a Pulmonologist opinion. Pt states that she has previously seen Dr. Gallegos with [facility] and call her for an appt. Pt also stated that she would like for Dr. Cade to put in a referral also. Pls advise.

## 2018-06-27 ENCOUNTER — PATIENT MESSAGE (OUTPATIENT)
Dept: FAMILY MEDICINE | Facility: CLINIC | Age: 58
End: 2018-06-27

## 2018-07-06 ENCOUNTER — TELEPHONE (OUTPATIENT)
Dept: RHEUMATOLOGY | Facility: CLINIC | Age: 58
End: 2018-07-06

## 2018-07-06 NOTE — TELEPHONE ENCOUNTER
----- Message from Fady Torres sent at 7/6/2018 11:41 AM CDT -----  Contact: self  Patient ask for a call need to know what to due because she has medicaid now and she have to get her medication Patient can be reached on her home phone

## 2018-07-09 ENCOUNTER — TELEPHONE (OUTPATIENT)
Dept: FAMILY MEDICINE | Facility: CLINIC | Age: 58
End: 2018-07-09

## 2018-07-09 NOTE — TELEPHONE ENCOUNTER
----- Message from aGyatri Ray sent at 7/9/2018  1:09 PM CDT -----  Contact: Bridget hartley/ [employer] [phone]  Bridget is requesting to speak with you regarding forms for patient's restrictions and limitations. Please call and advise.

## 2018-07-10 ENCOUNTER — TELEPHONE (OUTPATIENT)
Dept: FAMILY MEDICINE | Facility: CLINIC | Age: 58
End: 2018-07-10

## 2018-07-10 NOTE — TELEPHONE ENCOUNTER
----- Message from Mai Wiseman sent at 7/10/2018 11:12 AM CDT -----  Contact: Bridget with [employer] - [phone]  Bridget with [employer] - [phone], forms that need to be filled out. Please advise

## 2018-07-11 ENCOUNTER — TELEPHONE (OUTPATIENT)
Dept: FAMILY MEDICINE | Facility: CLINIC | Age: 58
End: 2018-07-11

## 2018-07-11 NOTE — TELEPHONE ENCOUNTER
----- Message from Brenda Mcmillan sent at 7/11/2018 10:11 AM CDT -----  Contact: [phone]  Patient called in returning your call from yesterday . Please advise

## 2018-07-11 NOTE — TELEPHONE ENCOUNTER
Spoke to pt and informed her that a company names [employer] was requesting medical records on the pt. Pt was given [employer] number and stated that she will call them. Informed pt their request was forwarded to Medical Records.

## 2018-07-12 ENCOUNTER — TELEPHONE (OUTPATIENT)
Dept: SPINE | Facility: CLINIC | Age: 58
End: 2018-07-12

## 2018-07-17 ENCOUNTER — TELEPHONE (OUTPATIENT)
Dept: SPINE | Facility: CLINIC | Age: 58
End: 2018-07-17

## 2018-07-17 NOTE — TELEPHONE ENCOUNTER
Called and left message that the paperwork for disability was received and that it was sent to the disability department.  If any additional information is needed our phone number was provided.

## 2018-07-17 NOTE — TELEPHONE ENCOUNTER
----- Message from Leeroy Grier sent at 7/17/2018  4:50 PM CDT -----  Contact: Malaika ([employer])    Name of Who is Calling: Malaika ([employer])      What is the request in detail: Would like to speak with staff in regards to confirming that patients Long term disability papers were received. Needs to be completed ASAP      Can the clinic reply by [facility]: no      What Number to Call Back if not in [facility]: [phone] ext [phone]

## 2018-07-18 ENCOUNTER — OFFICE VISIT (OUTPATIENT)
Dept: RHEUMATOLOGY | Facility: CLINIC | Age: 58
End: 2018-07-18
Payer: MEDICAID

## 2018-07-18 ENCOUNTER — TELEPHONE (OUTPATIENT)
Dept: PHARMACY | Facility: CLINIC | Age: 58
End: 2018-07-18

## 2018-07-18 ENCOUNTER — TELEPHONE (OUTPATIENT)
Dept: SPINE | Facility: CLINIC | Age: 58
End: 2018-07-18

## 2018-07-18 VITALS
WEIGHT: 129.19 LBS | HEIGHT: 61 IN | BODY MASS INDEX: 24.39 KG/M2 | HEART RATE: 99 BPM | SYSTOLIC BLOOD PRESSURE: 145 MMHG | DIASTOLIC BLOOD PRESSURE: 91 MMHG

## 2018-07-18 DIAGNOSIS — Z79.620 ENCOUNTER FOR MONITORING OF ETANERCEPT THERAPY: ICD-10-CM

## 2018-07-18 DIAGNOSIS — Z51.81 ENCOUNTER FOR MONITORING OF ETANERCEPT THERAPY: ICD-10-CM

## 2018-07-18 DIAGNOSIS — D84.9 IMMUNOCOMPROMISED: Primary | ICD-10-CM

## 2018-07-18 PROCEDURE — 99214 OFFICE O/P EST MOD 30 MIN: CPT | Mod: S$PBB,,, | Performed by: INTERNAL MEDICINE

## 2018-07-18 PROCEDURE — 99999 PR PBB SHADOW E&M-EST. PATIENT-LVL III: CPT | Mod: PBBFAC,,, | Performed by: INTERNAL MEDICINE

## 2018-07-18 PROCEDURE — 99213 OFFICE O/P EST LOW 20 MIN: CPT | Mod: PBBFAC,PO | Performed by: INTERNAL MEDICINE
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
## 2018-07-18 NOTE — TELEPHONE ENCOUNTER
Called left message that the paperwork is with the disability department.  Will get the number to them so they can call them directly

## 2018-07-18 NOTE — TELEPHONE ENCOUNTER
----- Message from Agustin Schaffer sent at 7/18/2018  4:26 PM CDT -----            Name of Who is Calling: Malaika([employer])      What is the request in detail:Rep is calling to follow up regarding a disability formed that was faxed over on 7/11. Please call to discuss. She states it's urgent.      Can the clinic reply by [facility]: no      What Number [facility]: [phone] ext [phone]

## 2018-07-25 ENCOUNTER — HOSPITAL ENCOUNTER (INPATIENT)
Facility: HOSPITAL | Age: 58
LOS: 2 days | Discharge: HOME | DRG: 204 | End: 2018-07-27
Attending: EMERGENCY MEDICINE | Admitting: INTERNAL MEDICINE
Payer: MEDICAID

## 2018-07-25 ENCOUNTER — TELEPHONE (OUTPATIENT)
Dept: FAMILY MEDICINE | Facility: CLINIC | Age: 58
End: 2018-07-25

## 2018-07-25 ENCOUNTER — TELEPHONE (OUTPATIENT)
Dept: SPINE | Facility: CLINIC | Age: 58
End: 2018-07-25

## 2018-07-25 DIAGNOSIS — R91.8 PULMONARY NODULES: ICD-10-CM

## 2018-07-25 DIAGNOSIS — L80 VITILIGO: ICD-10-CM

## 2018-07-25 DIAGNOSIS — R07.9 CHEST PAIN: ICD-10-CM

## 2018-07-25 DIAGNOSIS — J98.4 PNEUMONITIS: ICD-10-CM

## 2018-07-25 DIAGNOSIS — R91.8 RETICULONODULAR INFILTRATE PRESENT ON IMAGING OF CHEST: ICD-10-CM

## 2018-07-25 DIAGNOSIS — M62.81 DECREASED MUSCLE STRENGTH: Primary | ICD-10-CM

## 2018-07-25 DIAGNOSIS — Z74.09 DECREASED MOBILITY AND ENDURANCE: ICD-10-CM

## 2018-07-25 DIAGNOSIS — M06.9 RHEUMATOID ARTHRITIS INVOLVING BOTH HANDS, UNSPECIFIED RHEUMATOID FACTOR PRESENCE: ICD-10-CM

## 2018-07-25 DIAGNOSIS — R07.9 CHEST PAIN, UNSPECIFIED TYPE: ICD-10-CM

## 2018-07-25 LAB
ALBUMIN SERPL BCP-MCNC: 3.9 G/DL
ALP SERPL-CCNC: 85 U/L
ALT SERPL W/O P-5'-P-CCNC: 22 U/L
ANION GAP SERPL CALC-SCNC: 9 MMOL/L
AST SERPL-CCNC: 20 U/L
BASOPHILS # BLD AUTO: 0.03 K/UL
BASOPHILS NFR BLD: 0.4 %
BILIRUB SERPL-MCNC: 0.6 MG/DL
BNP SERPL-MCNC: 73 PG/ML
BUN SERPL-MCNC: 10 MG/DL
CALCIUM SERPL-MCNC: 10.2 MG/DL
CHLORIDE SERPL-SCNC: 105 MMOL/L
CO2 SERPL-SCNC: 27 MMOL/L
CREAT SERPL-MCNC: 0.7 MG/DL
D DIMER PPP IA.FEU-MCNC: 0.67 MG/L FEU
DIFFERENTIAL METHOD: ABNORMAL
EOSINOPHIL # BLD AUTO: 0.1 K/UL
EOSINOPHIL NFR BLD: 1.9 %
ERYTHROCYTE [DISTWIDTH] IN BLOOD BY AUTOMATED COUNT: 13.3 %
EST. GFR  (AFRICAN AMERICAN): >60 ML/MIN/1.73 M^2
EST. GFR  (NON AFRICAN AMERICAN): >60 ML/MIN/1.73 M^2
GLUCOSE SERPL-MCNC: 104 MG/DL
HCT VFR BLD AUTO: 42.5 %
HGB BLD-MCNC: 14.1 G/DL
LYMPHOCYTES # BLD AUTO: 1.2 K/UL
LYMPHOCYTES NFR BLD: 17.6 %
MCH RBC QN AUTO: 31.7 PG
MCHC RBC AUTO-ENTMCNC: 33.2 G/DL
MCV RBC AUTO: 96 FL
MONOCYTES # BLD AUTO: 0.7 K/UL
MONOCYTES NFR BLD: 9.5 %
NEUTROPHILS # BLD AUTO: 4.9 K/UL
NEUTROPHILS NFR BLD: 70.3 %
PLATELET # BLD AUTO: 286 K/UL
PMV BLD AUTO: 9.3 FL
POTASSIUM SERPL-SCNC: 3.8 MMOL/L
PROT SERPL-MCNC: 8.3 G/DL
RBC # BLD AUTO: 4.45 M/UL
SODIUM SERPL-SCNC: 141 MMOL/L
TROPONIN I SERPL DL<=0.01 NG/ML-MCNC: 0.01 NG/ML
TROPONIN I SERPL DL<=0.01 NG/ML-MCNC: 0.01 NG/ML
WBC # BLD AUTO: 6.95 K/UL

## 2018-07-25 PROCEDURE — 85379 FIBRIN DEGRADATION QUANT: CPT

## 2018-07-25 PROCEDURE — 93306 TTE W/DOPPLER COMPLETE: CPT

## 2018-07-25 PROCEDURE — 99285 EMERGENCY DEPT VISIT HI MDM: CPT

## 2018-07-25 PROCEDURE — 25500020 PHARM REV CODE 255: Performed by: EMERGENCY MEDICINE

## 2018-07-25 PROCEDURE — 83880 ASSAY OF NATRIURETIC PEPTIDE: CPT

## 2018-07-25 PROCEDURE — 80053 COMPREHEN METABOLIC PANEL: CPT

## 2018-07-25 PROCEDURE — 11000001 HC ACUTE MED/SURG PRIVATE ROOM

## 2018-07-25 PROCEDURE — 84484 ASSAY OF TROPONIN QUANT: CPT

## 2018-07-25 PROCEDURE — 93005 ELECTROCARDIOGRAM TRACING: CPT

## 2018-07-25 PROCEDURE — 63600175 PHARM REV CODE 636 W HCPCS: Performed by: STUDENT IN AN ORGANIZED HEALTH CARE EDUCATION/TRAINING PROGRAM

## 2018-07-25 PROCEDURE — 25000003 PHARM REV CODE 250: Performed by: PHYSICIAN ASSISTANT

## 2018-07-25 PROCEDURE — 25000003 PHARM REV CODE 250: Performed by: STUDENT IN AN ORGANIZED HEALTH CARE EDUCATION/TRAINING PROGRAM

## 2018-07-25 PROCEDURE — A4216 STERILE WATER/SALINE, 10 ML: HCPCS | Performed by: STUDENT IN AN ORGANIZED HEALTH CARE EDUCATION/TRAINING PROGRAM

## 2018-07-25 PROCEDURE — 85025 COMPLETE CBC W/AUTO DIFF WBC: CPT

## 2018-07-25 RX ORDER — ASPIRIN 81 MG/1
81 TABLET ORAL DAILY
Status: DISCONTINUED | OUTPATIENT
Start: 2018-07-25 | End: 2018-07-27 | Stop reason: HOSPADM

## 2018-07-25 RX ORDER — ENOXAPARIN SODIUM 100 MG/ML
40 INJECTION SUBCUTANEOUS EVERY 24 HOURS
Status: DISCONTINUED | OUTPATIENT
Start: 2018-07-25 | End: 2018-07-27 | Stop reason: HOSPADM

## 2018-07-25 RX ORDER — ASPIRIN 81 MG/1
81 TABLET ORAL
Status: DISCONTINUED | OUTPATIENT
Start: 2018-07-25 | End: 2018-07-25

## 2018-07-25 RX ORDER — LOSARTAN POTASSIUM 50 MG/1
50 TABLET ORAL DAILY
Status: DISCONTINUED | OUTPATIENT
Start: 2018-07-26 | End: 2018-07-27 | Stop reason: HOSPADM

## 2018-07-25 RX ORDER — ACETAMINOPHEN 325 MG/1
325 TABLET ORAL EVERY 6 HOURS PRN
Status: DISCONTINUED | OUTPATIENT
Start: 2018-07-25 | End: 2018-07-27 | Stop reason: HOSPADM

## 2018-07-25 RX ORDER — FOLIC ACID 1 MG/1
1 TABLET ORAL DAILY
Status: DISCONTINUED | OUTPATIENT
Start: 2018-07-25 | End: 2018-07-27 | Stop reason: HOSPADM

## 2018-07-25 RX ORDER — SODIUM CHLORIDE 0.9 % (FLUSH) 0.9 %
3 SYRINGE (ML) INJECTION EVERY 8 HOURS
Status: DISCONTINUED | OUTPATIENT
Start: 2018-07-25 | End: 2018-07-27 | Stop reason: HOSPADM

## 2018-07-25 RX ORDER — AMLODIPINE BESYLATE 5 MG/1
5 TABLET ORAL DAILY
Status: DISCONTINUED | OUTPATIENT
Start: 2018-07-26 | End: 2018-07-26

## 2018-07-25 RX ORDER — ACETAMINOPHEN 500 MG
1000 TABLET ORAL
Status: COMPLETED | OUTPATIENT
Start: 2018-07-25 | End: 2018-07-25

## 2018-07-25 RX ORDER — HYDROCHLOROTHIAZIDE 12.5 MG/1
12.5 TABLET ORAL DAILY
Status: DISCONTINUED | OUTPATIENT
Start: 2018-07-26 | End: 2018-07-27 | Stop reason: HOSPADM

## 2018-07-25 RX ORDER — AMLODIPINE BESYLATE 5 MG/1
5 TABLET ORAL DAILY
Status: DISCONTINUED | OUTPATIENT
Start: 2018-07-25 | End: 2018-07-25

## 2018-07-25 RX ORDER — GABAPENTIN 300 MG/1
300 CAPSULE ORAL 3 TIMES DAILY
Status: DISCONTINUED | OUTPATIENT
Start: 2018-07-25 | End: 2018-07-27 | Stop reason: HOSPADM

## 2018-07-25 RX ADMIN — FOLIC ACID 1 MG: 1 TABLET ORAL at 04:07

## 2018-07-25 RX ADMIN — GABAPENTIN 300 MG: 300 CAPSULE ORAL at 08:07

## 2018-07-25 RX ADMIN — ACETAMINOPHEN 325 MG: 325 TABLET ORAL at 08:07

## 2018-07-25 RX ADMIN — SODIUM CHLORIDE, PRESERVATIVE FREE 3 ML: 5 INJECTION INTRAVENOUS at 10:07

## 2018-07-25 RX ADMIN — ACETAMINOPHEN 1000 MG: 500 TABLET ORAL at 12:07

## 2018-07-25 RX ADMIN — GABAPENTIN 300 MG: 300 CAPSULE ORAL at 04:07

## 2018-07-25 RX ADMIN — SODIUM CHLORIDE, PRESERVATIVE FREE 3 ML: 5 INJECTION INTRAVENOUS at 04:07

## 2018-07-25 RX ADMIN — ASPIRIN 81 MG: 81 TABLET, COATED ORAL at 04:07

## 2018-07-25 RX ADMIN — ENOXAPARIN SODIUM 40 MG: 100 INJECTION SUBCUTANEOUS at 04:07

## 2018-07-25 RX ADMIN — IOHEXOL 100 ML: 350 INJECTION, SOLUTION INTRAVENOUS at 11:07

## 2018-07-25 NOTE — TELEPHONE ENCOUNTER
Staff contacted Malaika with [employer] [phone] ext [phone] and left a message on her voicemail informing her that the patient's disability paperwork was sent to [facility]'s disability dept and the contact info was [phone] is the phone number and [phone] is the fax number

## 2018-07-25 NOTE — SUBJECTIVE & OBJECTIVE
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    aspirin (ECOTRIN) 81 MG EC tablet Take 81 mg by mouth once daily.    gabapentin (NEURONTIN) 300 MG capsule Take 1 capsule (300 mg total) by mouth 3 (three) times daily.    losartan-hydrochlorothiazide 50-12.5 mg (HYZAAR) 50-12.5 mg per tablet Take 1 tablet by mouth once daily.    etanercept (ENBREL) 50 mg/mL (0.98 mL) Syrg injection Inject 1 mL (50 mg total) into the skin once a week.     Antibiotics     None        Antifungals     None        Antivirals     None           Immunization History   Administered Date(s) Administered    Hepatitis B 2013, 2013, 01/10/2014    Influenza - Quadrivalent - PF 2017, 2017    Pneumococcal Conjugate 2013    Pneumococcal Polysaccharide - 23 Valent 2013    Tdap 2017    influenza - Quadrivalent 01/15/2016       Family History     Problem Relation (Age of Onset)    Cataracts Father, Mother    Diabetes Father    Heart disease Father    Hypertension Father, Mother, Brother    Stroke Brother        Social History     Social History    Marital status:      Spouse name: N/A    Number of children: N/A    Years of education: N/A     Social History Main Topics    Smoking status: Never Smoker    Smokeless tobacco: Never Used    Alcohol  use Yes      Comment: Rarely    Drug use: Unknown    Sexual activity: Yes     Partners: Male     Birth control/ protection: Surgical      Comment: hyst     Other Topics Concern    None     Social History Narrative    None     Review of Systems   Review of Systems   Constitutional: Negative for chills, diaphoresis, fatigue, fever and unexpected weight change.   HENT: Negative for congestion and sore throat.    Respiratory: +cough negative for shortness of breath and wheezing.    Cardiovascular: Negative for chest pain, palpitations and leg swelling.   Gastrointestinal: Negative for abdominal pain, constipation, diarrhea, nausea and vomiting.   Genitourinary: Negative for dysuria and hematuria.   Musculoskeletal: + Low back pain  Negative for arthralgias and myalgias.   Skin: Negative for rash and wound.   Neurological: + numbness and parasthesias Negative for light-headedness, headaches.     Objective:     Vital Signs (Most Recent):  Temp: 98.7 °F (37.1 °C) (07/25/18 1606)  Pulse: 85 (07/25/18 1626)  Resp: 18 (07/25/18 1606)  BP: 134/81 (07/25/18 1606)  SpO2: 96 % (07/25/18 1629) Vital Signs (24h Range):  Temp:  [98.3 °F (36.8 °C)-98.9 °F (37.2 °C)] 98.7 °F (37.1 °C)  Pulse:  [] 85  Resp:  [18-20] 18  SpO2:  [93 %-98 %] 96 %  BP: (126-159)/(81-96) 134/81     Weight: 58.5 kg (129 lb)  Body mass index is 24.37 kg/m².    Estimated Creatinine Clearance: 72.1 mL/min (based on SCr of 0.7 mg/dL).    Physical Exam    Constitutional: Well-developed, well-nourished. No acute distress.  HENT:   NCAT.  Bilateral external ears normal. OP clear. MMM   EYES:   Conjunctivae normal. EOMI. PERRL.   NECK:  Normal ROM. Neck supple. No cervical, supraclavicular, or axillary LAD  CV:  RRR. Normal S1 & S2. No murmurs, rubs, gallops.    Pulses:  Radial, DP, and PT 2+ bilateral and symmetric  Cap refil <2 secs.  PULM:   Diffuse Bilateral dry crackles throughout. No respiratory distress. No wheezes   ABD:   Normoactive BS. Soft,  NTND. No rebound, no guarding, no mass.  Back:   Spine midline and symmetric  TTP over lower lumber spinous processes  SKIN:   Warm and dry. No rash, no erythema. Cutaneous depigmentation consistent with her vitiligo  Extremity: LUE with well healed surgical incision  No cyanosis, clubbing, or edema  NEURO:  A&Ox4. CN III-XII grossly intact  Psychiatric:  Normal mood & affect.     Significant Labs:   CBC:   Recent Labs  Lab 07/25/18  0920   WBC 6.95   HGB 14.1   HCT 42.5        CMP:   Recent Labs  Lab 07/25/18  0920      K 3.8      CO2 27      BUN 10   CREATININE 0.7   CALCIUM 10.2   PROT 8.3   ALBUMIN 3.9   BILITOT 0.6   ALKPHOS 85   AST 20   ALT 22   ANIONGAP 9   EGFRNONAA >60     Procalcitonin: No results for input(s): PROCAL in the last 48 hours.  All pertinent labs within the past 24 hours have been reviewed.    Significant Imaging: I have reviewed all pertinent imaging results/findings within the past 24 hours.   Imaging Results          CTA Chest Non-Coronary (PE Study) (Final result)     Abnormal  Result time 07/25/18 11:29:08    Final result by Diana Morataya MD (07/25/18 11:29:08)                 Impression:      There is no evidence pulmonary embolus.  There is a continued micronodular infiltrative pattern seen throughout the pulmonary parenchyma in addition to mediastinal lymphadenopathy.  These findings are similar to prior exam and may be seen with infectious or neoplastic processes, it should be noted that to brachial oasis is in this differential.    This report was flagged in Epic as abnormal.      Electronically signed by: Diana Morataya MD  Date:    07/25/2018  Time:    11:29             Narrative:    EXAMINATION:  CTA CHEST NON CORONARY    CLINICAL HISTORY:  elevated d-dimer;    TECHNIQUE:  Low dose axial images, sagittal and coronal reformations were obtained from the thoracic inlet to the lung bases following the IV administration of 100 mL of Omnipaque 350.   Contrast timing was optimized to evaluate the pulmonary arteries.  MIP images were performed.    COMPARISON:  None    FINDINGS:  There is no evidence aneurysmal dilatation or dissection of the thoracic aorta.    There are no pulmonary arterial filling defects.    There are innumerable pulmonary nodules similar to prior exam in predominantly a micro nodular configuration.    There is right hilar and mediastinal lymphadenopathy similar to prior exam.    There is no evidence pericardial effusion.  Visualized portions of the superior abdomen are unremarkable.    There are degenerative changes of the spine.                               X-Ray Chest AP Portable (Final result)     Abnormal  Result time 07/25/18 09:47:28    Final result by Diana Morataya MD (07/25/18 09:47:28)                 Impression:      Again seen is the micronodular pattern throughout the pulmonary parenchyma which may be seen with neoplastic or infectious processes.  Tuberculosis is in the differential for this process.    This report was flagged in Epic as abnormal.      Electronically signed by: Diana Morataya MD  Date:    07/25/2018  Time:    09:47             Narrative:    EXAMINATION:  XR CHEST AP PORTABLE    CLINICAL HISTORY:  Chest pain, unspecified    TECHNIQUE:  Single frontal view of the chest was performed.    COMPARISON:  Ct 6/21/18    FINDINGS:  There is a micronodular pattern seen throughout CT from 2018.  There are no new regions of consolidation.

## 2018-07-25 NOTE — ED NOTES
Took over care of pt from Lorena CANDELARIO. Pt resting in stretcher. NAD noted. Remains on cardiac monitor, pt aware of plan for CTA. Will continue to monitor pt.
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
## 2018-07-25 NOTE — TELEPHONE ENCOUNTER
----- Message from Eugenio Barrera sent at 7/25/2018 10:06 AM CDT -----  Contact: Malaika from [employer]            Name of Who is Calling: Malaika from [employer]      What is the request in detail: Called to see if disability paperwork is ready      Can the clinic reply by [facility]: No      What Number to [facility] Back [facility] not [facility]: [phone] Ext. [phone]

## 2018-07-25 NOTE — ED NOTES
APPEARANCE: Alert, oriented and in no acute distress.  CARDIAC: Normal rate and rhythm, no murmur heard.   PERIPHERAL VASCULAR: peripheral pulses present. Normal cap refill. No edema. Warm to touch.    RESPIRATORY:Normal rate and effort, breath sounds clear bilaterally throughout chest. Respirations are equal and unlabored no obvious signs of distress. Mid sternal non radiating chest pain upon inspiration.   GASTRO: soft, bowel sounds normal, no tenderness, no abdominal distention.  MUSC: Full ROM. No bony tenderness or soft tissue tenderness. No obvious deformity.  SKIN: Skin is warm and dry, normal skin turgor, mucous membranes moist.  NEURO: 5/5 strength major flexors/extensors bilaterally. Sensory intact to light touch bilaterally. Simran coma scale: eyes open spontaneously-4, oriented & converses-5, obeys commands-6. No neurological abnormalities.   MENTAL STATUS: awake, alert and aware of environment.  EYE: PERRL, both eyes: pupils brisk and reactive to light. Normal size.  ENT: EARS: no obvious drainage. NOSE: no active bleeding.
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
## 2018-07-25 NOTE — ASSESSMENT & PLAN NOTE
- Pt with RA treated with eternacept with c/o achey chest pain x12 hours  - troponin neg  - appears non-cardiac in nature possibly 2/2 interstitial lung changes seen on imaging  - CT from May with innumerable pulmonary nodules  - XR and CT at admit showing again innumerable pulmonary nodules  - differential including TB vs RA vs malignant etiology  - Pt was to have outpatient F/U with Pulmonology in September following CT finings in May  - Quanteferon TB test in March negative  prior Quanteferon Gold test in Nov 2017 intermediate  - No white count  afebrile  clinically stable    Recs:  --- admitted for TB rule out and workup of numerous pulmonary nodules   --- airborne isolation  --- Induced sputum AFB smear/cultures q8 hours with respiratory  --- Ordered AFB blood cultures to be collected  --- Pulmonology consulted
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
## 2018-07-26 ENCOUNTER — TELEPHONE (OUTPATIENT)
Dept: FAMILY MEDICINE | Facility: CLINIC | Age: 58
End: 2018-07-26

## 2018-07-26 PROBLEM — M54.2 NECK PAIN, MUSCULOSKELETAL: Status: ACTIVE | Noted: 2018-07-26

## 2018-07-26 PROBLEM — J18.9 PNEUMONIA: Status: ACTIVE | Noted: 2017-07-23

## 2018-07-26 PROBLEM — S72.442A: Status: ACTIVE | Noted: 2017-06-18

## 2018-07-26 PROBLEM — R29.898 WEAKNESS OF BOTH LOWER EXTREMITIES: Status: RESOLVED | Noted: 2017-12-05 | Resolved: 2018-07-26

## 2018-07-26 PROBLEM — R07.9 CHEST PAIN: Status: RESOLVED | Noted: 2018-07-25 | Resolved: 2018-07-26

## 2018-07-26 PROBLEM — S42.293A HUMERUS HEAD FRACTURE: Status: ACTIVE | Noted: 2018-07-26

## 2018-07-26 PROBLEM — S32.9XXA: Status: ACTIVE | Noted: 2017-06-18

## 2018-07-26 PROBLEM — T14.90XA BLUNT TRAUMA: Status: ACTIVE | Noted: 2017-06-18

## 2018-07-26 PROBLEM — S12.100A FRACTURE OF C2 VERTEBRA, CLOSED: Status: ACTIVE | Noted: 2017-06-20

## 2018-07-26 PROBLEM — J18.9 PNEUMONIA: Status: RESOLVED | Noted: 2017-07-23 | Resolved: 2018-07-26

## 2018-07-26 PROBLEM — S06.6XAA TRAUMATIC SUBARACHNOID HEMORRHAGE: Status: RESOLVED | Noted: 2017-06-20 | Resolved: 2018-07-26

## 2018-07-26 PROBLEM — S15.102A INJURY OF LEFT VERTEBRAL ARTERY: Status: ACTIVE | Noted: 2017-06-20

## 2018-07-26 PROBLEM — S72.141A FRACTURE, INTERTROCHANTERIC, RIGHT FEMUR: Status: ACTIVE | Noted: 2018-07-26

## 2018-07-26 PROBLEM — S06.6XAA TRAUMATIC SUBARACHNOID HEMORRHAGE: Status: ACTIVE | Noted: 2017-06-20

## 2018-07-26 PROBLEM — E87.0 HYPERNATREMIA: Status: ACTIVE | Noted: 2017-06-25

## 2018-07-26 PROBLEM — S42.309A CLOSED FRACTURE OF HUMERUS: Status: ACTIVE | Noted: 2017-06-18

## 2018-07-26 PROBLEM — S72.90XA FRACTURE OF FEMUR: Status: ACTIVE | Noted: 2018-07-26

## 2018-07-26 PROBLEM — R13.10 DYSPHAGIA: Status: ACTIVE | Noted: 2017-06-18

## 2018-07-26 PROBLEM — S72.301A CLOSED FRACTURE OF SHAFT OF RIGHT FEMUR: Status: ACTIVE | Noted: 2017-06-18

## 2018-07-26 LAB
ALBUMIN SERPL BCP-MCNC: 3.4 G/DL
ALP SERPL-CCNC: 71 U/L
ALT SERPL W/O P-5'-P-CCNC: 16 U/L
ANION GAP SERPL CALC-SCNC: 9 MMOL/L
AST SERPL-CCNC: 16 U/L
BASOPHILS # BLD AUTO: 0.02 K/UL
BASOPHILS NFR BLD: 0.4 %
BILIRUB SERPL-MCNC: 0.5 MG/DL
BUN SERPL-MCNC: 12 MG/DL
CALCIUM SERPL-MCNC: 10 MG/DL
CHLORIDE SERPL-SCNC: 105 MMOL/L
CO2 SERPL-SCNC: 27 MMOL/L
CREAT SERPL-MCNC: 0.7 MG/DL
DIASTOLIC DYSFUNCTION: NO
DIFFERENTIAL METHOD: ABNORMAL
EOSINOPHIL # BLD AUTO: 0.2 K/UL
EOSINOPHIL NFR BLD: 4.9 %
ERYTHROCYTE [DISTWIDTH] IN BLOOD BY AUTOMATED COUNT: 13.4 %
EST. GFR  (AFRICAN AMERICAN): >60 ML/MIN/1.73 M^2
EST. GFR  (NON AFRICAN AMERICAN): >60 ML/MIN/1.73 M^2
ESTIMATED PA SYSTOLIC PRESSURE: 20.81
GLUCOSE SERPL-MCNC: 86 MG/DL
HCT VFR BLD AUTO: 39.5 %
HGB BLD-MCNC: 12.4 G/DL
LYMPHOCYTES # BLD AUTO: 1.6 K/UL
LYMPHOCYTES NFR BLD: 31.4 %
MCH RBC QN AUTO: 30 PG
MCHC RBC AUTO-ENTMCNC: 31.4 G/DL
MCV RBC AUTO: 95 FL
MONOCYTES # BLD AUTO: 0.7 K/UL
MONOCYTES NFR BLD: 14 %
NEUTROPHILS # BLD AUTO: 2.4 K/UL
NEUTROPHILS NFR BLD: 49.1 %
PLATELET # BLD AUTO: 282 K/UL
PMV BLD AUTO: 9.4 FL
POTASSIUM SERPL-SCNC: 3.9 MMOL/L
PROT SERPL-MCNC: 7.5 G/DL
RBC # BLD AUTO: 4.14 M/UL
RETIRED EF AND QEF - SEE NOTES: 60 (ref 55–65)
SODIUM SERPL-SCNC: 141 MMOL/L
TRICUSPID VALVE REGURGITATION: NORMAL
WBC # BLD AUTO: 4.93 K/UL

## 2018-07-26 PROCEDURE — 97165 OT EVAL LOW COMPLEX 30 MIN: CPT

## 2018-07-26 PROCEDURE — 11000001 HC ACUTE MED/SURG PRIVATE ROOM

## 2018-07-26 PROCEDURE — 87116 MYCOBACTERIA CULTURE: CPT

## 2018-07-26 PROCEDURE — 25000003 PHARM REV CODE 250: Performed by: STUDENT IN AN ORGANIZED HEALTH CARE EDUCATION/TRAINING PROGRAM

## 2018-07-26 PROCEDURE — 86480 TB TEST CELL IMMUN MEASURE: CPT

## 2018-07-26 PROCEDURE — 25000003 PHARM REV CODE 250: Performed by: INTERNAL MEDICINE

## 2018-07-26 PROCEDURE — 36415 COLL VENOUS BLD VENIPUNCTURE: CPT

## 2018-07-26 PROCEDURE — 94640 AIRWAY INHALATION TREATMENT: CPT

## 2018-07-26 PROCEDURE — 80053 COMPREHEN METABOLIC PANEL: CPT

## 2018-07-26 PROCEDURE — 97161 PT EVAL LOW COMPLEX 20 MIN: CPT

## 2018-07-26 PROCEDURE — 97535 SELF CARE MNGMENT TRAINING: CPT

## 2018-07-26 PROCEDURE — 94761 N-INVAS EAR/PLS OXIMETRY MLT: CPT

## 2018-07-26 PROCEDURE — G8978 MOBILITY CURRENT STATUS: HCPCS | Mod: CH

## 2018-07-26 PROCEDURE — G8980 MOBILITY D/C STATUS: HCPCS | Mod: CH

## 2018-07-26 PROCEDURE — 85025 COMPLETE CBC W/AUTO DIFF WBC: CPT

## 2018-07-26 PROCEDURE — A4216 STERILE WATER/SALINE, 10 ML: HCPCS | Performed by: STUDENT IN AN ORGANIZED HEALTH CARE EDUCATION/TRAINING PROGRAM

## 2018-07-26 PROCEDURE — 63600175 PHARM REV CODE 636 W HCPCS: Performed by: STUDENT IN AN ORGANIZED HEALTH CARE EDUCATION/TRAINING PROGRAM

## 2018-07-26 PROCEDURE — G8979 MOBILITY GOAL STATUS: HCPCS | Mod: CH

## 2018-07-26 PROCEDURE — 25000242 PHARM REV CODE 250 ALT 637 W/ HCPCS: Performed by: STUDENT IN AN ORGANIZED HEALTH CARE EDUCATION/TRAINING PROGRAM

## 2018-07-26 RX ORDER — SODIUM CHLORIDE FOR INHALATION 3 %
4 VIAL, NEBULIZER (ML) INHALATION EVERY 8 HOURS
Status: DISCONTINUED | OUTPATIENT
Start: 2018-07-26 | End: 2018-07-27 | Stop reason: HOSPADM

## 2018-07-26 RX ORDER — DIPHENHYDRAMINE HCL 25 MG
25 CAPSULE ORAL ONCE
Status: COMPLETED | OUTPATIENT
Start: 2018-07-26 | End: 2018-07-26

## 2018-07-26 RX ORDER — FOLIC ACID 1 MG/1
1 TABLET ORAL DAILY
Qty: 90 TABLET | Refills: 3 | Status: SHIPPED | OUTPATIENT
Start: 2018-07-27 | End: 2018-09-13 | Stop reason: SDUPTHER

## 2018-07-26 RX ORDER — AMLODIPINE BESYLATE 5 MG/1
5 TABLET ORAL NIGHTLY
Status: DISCONTINUED | OUTPATIENT
Start: 2018-07-26 | End: 2018-07-27 | Stop reason: HOSPADM

## 2018-07-26 RX ADMIN — SODIUM CHLORIDE, PRESERVATIVE FREE 3 ML: 5 INJECTION INTRAVENOUS at 10:07

## 2018-07-26 RX ADMIN — DIPHENHYDRAMINE HYDROCHLORIDE 25 MG: 25 CAPSULE ORAL at 09:07

## 2018-07-26 RX ADMIN — GABAPENTIN 300 MG: 300 CAPSULE ORAL at 08:07

## 2018-07-26 RX ADMIN — SODIUM CHLORIDE 30 MG/ML INHALATION SOLUTION 4 ML: 30 SOLUTION INHALANT at 08:07

## 2018-07-26 RX ADMIN — LOSARTAN POTASSIUM 50 MG: 50 TABLET, FILM COATED ORAL at 09:07

## 2018-07-26 RX ADMIN — SODIUM CHLORIDE, PRESERVATIVE FREE 3 ML: 5 INJECTION INTRAVENOUS at 06:07

## 2018-07-26 RX ADMIN — AMLODIPINE BESYLATE 5 MG: 5 TABLET ORAL at 08:07

## 2018-07-26 RX ADMIN — ASPIRIN 81 MG: 81 TABLET, COATED ORAL at 09:07

## 2018-07-26 RX ADMIN — SODIUM CHLORIDE 30 MG/ML INHALATION SOLUTION 15 ML: 30 SOLUTION INHALANT at 11:07

## 2018-07-26 RX ADMIN — ENOXAPARIN SODIUM 40 MG: 100 INJECTION SUBCUTANEOUS at 06:07

## 2018-07-26 RX ADMIN — GABAPENTIN 300 MG: 300 CAPSULE ORAL at 03:07

## 2018-07-26 RX ADMIN — FOLIC ACID 1 MG: 1 TABLET ORAL at 09:07

## 2018-07-26 RX ADMIN — HYDROCHLOROTHIAZIDE 12.5 MG: 12.5 TABLET ORAL at 09:07

## 2018-07-26 RX ADMIN — SODIUM CHLORIDE 30 MG/ML INHALATION SOLUTION 4 ML: 30 SOLUTION INHALANT at 06:07

## 2018-07-26 RX ADMIN — GABAPENTIN 300 MG: 300 CAPSULE ORAL at 09:07

## 2018-07-26 RX ADMIN — SODIUM CHLORIDE, PRESERVATIVE FREE 3 ML: 5 INJECTION INTRAVENOUS at 03:07
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
## 2018-07-26 NOTE — PROGRESS NOTES
Ambulatory Referral to Rheumatology [REF97] (Order [id number])   Outpatient Referral   Date and Time: 2018  3:33 PM Department: [facility] Acute Rel By/Authorizing: Cricket Ballard MD   Dept Order Information     Date Department   2018 [facility]   Order Information     Order Date/Time Release Date/Time Start Date/Time End Date/Time   18 03:33 PM None 2018 None   Order Details     Frequency Duration Priority Order Class   None None Routine External Referral   Quantity     Ordering Quantity   1   Quantity     Ordering Quantity Ordering Quantity   1 1   Order Details     Order ID   [id number]   Reprint Order Requisition     AMB REFERRAL TO RHEUMATOLOGY (Order #[id number]) on 18   Associated Diagnoses      ICD-10-CM ICD-9-CM   Rheumatoid arthritis involving both hands, unspecified rheumatoid factor presence     M06.9 714.0   Collection Information     Patient Details   MRN:[id number]   Name Sex  SSN   Daryn Cervantes Female 1960       Address Phone Email Aliases   [address]  [place] [address] Home: [phone]   Work:     Cell: [phone]    [email or web address] DARYN CERVANTES      Inpatient Unit Inpatient Room Inpatient Bed    [facility] MEDICAL SURGICAL UNIT ACUTE K514 K514 A       PCP Allergies     Adrián Cade MD No Known Allergies      Primary Visit Coverage     Payor Plan Sponsor Code Group Number Group Name   MEDICAID [employer] [place] [facility])      Primary visit coverage subscriber     Subscriber ID Subscriber Name Subscriber Address   [id number] DARYN CERVANTES [address]     [place] [id number]   Additional Information     Associated Reports   Priority and Order Details   ADT-Related Order Information    Encounter     View Encounter          Order Provider Info         Office phone Pager E-mail   Ordering User Cricket Ballard [phone] -- [email or web address]   Authorizing Provider Cricket Ballard MD  [phone] -- --   Attending Provider Nura Choi [phone] -- --   Ambulatory Referral to Rheumatology [[id number]]     Electronically signed by: Cricket Ballard MD on 07/26/18 1533 Status: Active   Ordering user: Cricket Ballard MD 07/26/18 1533 Ordering provider: Cricket Ballard MD   Authorized by: Cricket Ballard MD Ordering mode: Standard   Frequency:  07/26/18 -     Diagnoses  Rheumatoid arthritis involving both hands, unspecified rheumatoid factor presence [M06.9]   Order Diagnosis: Rheumatoid arthritis involving both hands, unspecified rheumatoid factor presence [M06.9 (ICD-10-CM)] CPT:             Electronically signed by: Cricket Ballard MD Lic #  < Not on File > NPI: [id number]     , Latoya #[id number] (CSN: [id number])  (57 y.o. F)  (Adm: 07/25/18)   [id number]   PCP     RAPHAEL PIÑA   Date of Birth     1960   Demographics     Address: Home Phone: Work Phone: Mobile Phone:     [address]  [place] [address] [phone]    SSN: Insurance: Marital Status: Bahai:      MEDICAID  Buddhism    Admission Dx      Chest pain   Chief Complaint     Complaint Comment   Chest Pain c/o midsternal chest pain that radiates to her back since last night. Had a CT scan about 2 months ago that showed nodules, and is scheduled to follow up with pulmonology in September   Documents Filed to Patient     Power of  Living Will Clinical Unknown Study Attachment Consent Form ABN Waiver After Visit Summary Lab Result Scan Code Status Patient Portal Status   Not on File Not on File Not on File Not on File Filed Not on File Filed Not on File FULL [Updated on 07/25/18 2470] Active   Auth/Cert Information      Open Auth/Cert for Hospital Account [id number]      Admission Information     Attending Provider Admitting Provider Admission Type Admission Date/Time   MD Nura Gonzales MD Emergency 07/25/18  7184   Discharge Date [facility]  Service Auth/Cert Status Service Area    Internal Medicine Incomplete [facility]   Unit Room/Bed Admission Status    [facility] MEDICAL SURGICAL UNIT ACUTE K514/K514 A Admission (Confirmed)    Hospital Account     Name Acct ID Class Status Primary Coverage   Silvia Cervantes [id number] IP- Inpatient Open MEDICAID - [facility])          Guarantor Account (for Hospital Account #[id number])     Name Relation to Pt Service Area Active? Acct Type   BillySilvia mccallum V Self OHSSA Yes Personal/Family   Address Phone     [address]  [place] [address] [phone](H)            Coverage Information (for Hospital Account #[id number])     F/O Payor/Plan Precert #   MEDICAID/[facility] [facility] ([facility])    Subscriber Subscriber #   Silvia Cervantes V [id number]   Address Phone   [address]  [place] [address]           Emergency Contact Information     Name: BillyNissa Relationship: Daughter   Address: [address]    City: [place] State: [place] Zip: [address] Phone:     Business phone:

## 2018-07-26 NOTE — PLAN OF CARE
[place] met with pt and her mother Polly  [phone]   pt lives with her mother     uses a quad cane - has a RW at home but doesn't use it   no HH     reviewed f/u apts with pt     unable to make a Rheumatology apt at [facility] or at [facility] not accepted   referral and pt info sent to [facility] at ; [phone] and  -- pt will be contacted with an apt.            Follow-Up           Follow-up With   Details   Why   Contact Info   Adrián Cade MD   Schedule an appointment as soon as possible for a visit on 8/2/2018   9:40 am - PLEASE call the office if you must cancel    [address]  [address]  [facility] [address]  [phone]   [facility]   Schedule an appointment as soon as possible for a visit on 8/6/2018   Hospital follow up ASAP 11:00 am You will see Dr. Mosher -- 1st floor of clinic    [address] [place] [address] [id number] [phone]           Rheumatology -- [facility] -- 504 [phone]                07/26/18 1704   Discharge Assessment   Assessment Type Discharge Planning Assessment   Confirmed/corrected address and phone number on facesheet? Yes   Assessment information obtained from? Patient   Expected Length of Stay (days) 3   Communicated expected length of stay with patient/caregiver yes   Prior to hospitilization cognitive status: Alert/Oriented   Prior to hospitalization functional status: Assistive Equipment   Current cognitive status: Alert/Oriented   Current Functional Status: Assistive Equipment   Lives With parent(s)   Able to Return to Prior Arrangements yes   Is patient able to care for self after discharge? Yes   Who are your caregiver(s) and their phone number(s)? mother - Ms. Matias  ([phone]      Patient's perception of discharge disposition home or selfcare   Readmission Within The Last 30 Days no previous admission in last 30 days   Patient currently being followed by outpatient case management? No    Patient currently receives any other outside agency services? No   Equipment Currently Used at Home cane, quad;walker, rolling   Do you have any problems affording any of your prescribed medications? TBD   Is the patient taking medications as prescribed? yes   Does the patient have transportation home? Yes   Transportation Available family or friend will provide   Does the patient receive services at the [facility]? No   Discharge Plan A Home;Home with family   Patient/Family In Agreement With Plan yes

## 2018-07-26 NOTE — PROGRESS NOTES
Pt. Did a 15min. 3% hypertonic saline sol. Tx. Via mouth piece.  Pt. With good non-prod. Cough.  Breath sounds clear.

## 2018-07-26 NOTE — PLAN OF CARE
Problem: Physical Therapy Goal  Goal: Physical Therapy Goal  Goals to be met by: 7/26/2018     No PT goals established        Outcome: Outcome(s) achieved Date Met: 07/26/18  No acute skilled PT needs  Patient appears to be at her functional baseline  Will DC PT service at this time

## 2018-07-26 NOTE — ASSESSMENT & PLAN NOTE
- Pt with RA treated with eternacept with c/o achey chest pain x12 hours  - troponin neg  - appears non-cardiac in nature possibly 2/2 interstitial lung changes seen on imaging  - CT from May with innumerable pulmonary nodules  - XR and CT at admit showing again innumerable pulmonary nodules  - differential including TB vs RA vs malignant etiology  - Pt was to have outpatient F/U with Pulmonology in September following CT finings in May  - Quanteferon TB test in March negative  prior Quanteferon Gold test in Nov 2017 intermediate  - No white count  afebrile  clinically stable  - No sputum yet  Pulmonology consulted with f/u recs    Recs:  --- admitted for TB rule out and workup of numerous pulmonary nodules   --- airborne isolation  --- Induced sputum AFB smear/cultures q8 hours with respiratory  --- Ordered AFB blood cultures to be collected  pending collection  --- Quanteferon gold oldered  --- Believe patient would benefit from pulmonary work-up inpatient if possible. Will follow up pulmonology recs

## 2018-07-26 NOTE — PROGRESS NOTES
Follow-Up       Follow-up With  Details  Why  Contact Info   Adrián Cade MD  Schedule an appointment as soon as possible for a visit on 8/2/2018  9:40 am - PLEASE call the office if you must cancel   [address]  [address]  [place] [address]  [phone]   [facility]  Schedule an appointment as soon as possible for a visit on 8/6/2018  Hospital follow up ASAP 11:00 am You will see Dr. Mosher -- 1st floor of clinic   [address] [place] [id number]  [phone]       Rheumatology -- [facility] -- [phone]
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
## 2018-07-26 NOTE — SUBJECTIVE & OBJECTIVE
Interval History:   Overnight pateint c/o posterior headache was given tylenol with minimal improvement. Reports having coffee this AM and headache improving. Pt also noticed urticaria-like rash to RUE which has improved overnight. Has no complaints at this time. Denies fever, chills, nausea, vomiting, diarrhea, diaphoresis, abdominal pain, dyspnea. Pt reports that she is still having achy sternal chest pain. Induced sputum attempted this AM with respiratory, no productive cough as of yet.     Review of Systems   All other systems reviewed and are negative.    Objective:     Vital Signs (Most Recent):  Temp: 98.1 °F (36.7 °C) (07/26/18 0508)  Pulse: 69 (07/26/18 0800)  Resp: 16 (07/26/18 0730)  BP: 131/80 (07/26/18 0508)  SpO2: 97 % (07/26/18 0730) Vital Signs (24h Range):  Temp:  [97.4 °F (36.3 °C)-98.8 °F (37.1 °C)] 98.1 °F (36.7 °C)  Pulse:  [69-91] 69  Resp:  [16-18] 16  SpO2:  [93 %-98 %] 97 %  BP: (126-144)/(73-95) 131/80     Weight: 58.5 kg (129 lb)  Body mass index is 24.37 kg/m².    Estimated Creatinine Clearance: 72.1 mL/min (based on SCr of 0.7 mg/dL).    Physical Exam   Constitutional: NAD  HENT:   NCAT  OP clear  MMM   EYES:   Conjunctivae normal  EOMI  PERRL.   CV:  RRR  Normal S1 & S2  No murmurs, rubs, gallops  Pulses:  Radial, DP, and PT 2+ bilateral and symmetric  Cap refil <2 secs.  PULM:    No respiratory distress. Diffuse soft crackles throughout  ABD:   Normoactive BS. Soft, NTND. No rebound, no guarding, no mass.  SKIN:   Small area of resolving urticarial rash RUE. Warm and dry. no erythema.  Extremity:  No cyanosis, clubbing, or edema  NEURO:  A&Ox4. No focal deficit  Psychiatric:  Normal mood & affect.    Significant Labs:   CBC:   Recent Labs  Lab 07/25/18  0920 07/26/18  0556   WBC 6.95 4.93   HGB 14.1 12.4   HCT 42.5 39.5    282     CMP:   Recent Labs  Lab 07/25/18  0920 07/26/18  0556    141   K 3.8 3.9    105   CO2 27 27    86   BUN 10 12   CREATININE  0.7 0.7   CALCIUM 10.2 10.0   PROT 8.3 7.5   ALBUMIN 3.9 3.4*   BILITOT 0.6 0.5   ALKPHOS 85 71   AST 20 16   ALT 22 16   ANIONGAP 9 9   EGFRNONAA >60 >60     Microbiology Results (last 7 days)     Procedure Component Value Units Date/Time    AFB Culture & Smear [[id number]]     Order Status:  No result Specimen:  Respiratory from Sputum, Induced     AFB Culture & Smear [[id number]]     Order Status:  No result Specimen:  Respiratory from Sputum, Induced     AFB Culture & Smear [[id number]]     Order Status:  Sent Specimen:  Blood from Blood     AFB Culture & Smear [[id number]]     Order Status:  Canceled Specimen:  Blood from Blood     AFB Culture & Smear [[id number]]     Order Status:  No result Specimen:  Respiratory from Sputum, Induced     Direct AFB stain [[id number]]     Order Status:  Canceled Specimen:  Respiratory from Sputum, Induced         All pertinent labs within the past 24 hours have been reviewed.    Significant Imaging: I have reviewed all pertinent imaging results/findings within the past 24 hours.   None new

## 2018-07-26 NOTE — CONSULTS
[facility]  Initial Consult Note      Primary Attending:  Nura Choi MD   Consultant Attending: Lane Molina MD   Consultant Fellow: Calos Gnun MD       Chief Complaint/Reason for Consult      Pulmonary Nodules     History of Present Illness      Ms. Cervantes is a 58yo White  femial w/ pmhx of RA and vitiligo who presented to the ED complaining of Chest pain. She described it as middle, substernal and pressure like pain. It came on at 9pm the night prior to arrival. It lasted the entire night. It worsened with laying on her side. Relieved with seleeping on her back. She notes some exertional pain when working. In the ED, she received a CTA to r/o PE. CTA revealed innumerable pulmonary nodules.  These were present on CT in . It was recommended that she follow up with a pulmonologist. She used to see Dr. Gallegos at [facility] for the following of pulmonary nodules that were deemed stable. ID was consulted and recommened ruling out for TB d/t hx of RA and currently on Enbrel (although reports not taking for last 3 weeks). She denies any fevers, chills, night sweats, cough, or sick contacts.      Past Medical History      Medical:  has a past medical history of Abnormal Pap smear; DEMENTIA; Dry eyes; Fever blister; History of bronchitis; History of headache; Hypercholesteremia; Hypertension; Major depression, recurrent, chronic; Rheumatoid arthritis(714.0); and 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    Surgical:  has a past surgical history that includes  section; Tubal ligation; and Hysterectomy.    Family: family history includes Cataracts in her father and mother; Diabetes in her father; Heart disease in her father; Hypertension in her brother, father, and mother; Stroke in her brother.    Social:  reports that she has never smoked. She has never used smokeless tobacco. She reports that she drinks alcohol.    Allergies and Medications reviewed      Review of Systems      · Other than those symptoms mentioned above, an extensive review of systems was unremarkable.         On Examination     Vital Signs   Temp:  [97.4 °F (36.3 °C)-98.8 °F (37.1 °C)]   Pulse:  [69-91]   Resp:  [16-18]   BP: (126-144)/(73-95)   SpO2:  [93 %-98 %]    Physical Exam   GENERAL: The patient is alert and oriented, in no apparent distress. she is pleasant and conversant in full sentences.    HEENT: Pupils are equally round and briskly reactive to light. Extraocular muscles are grossly intact. Oral mucous membranes are moist without lesions.    NECK: The patient has no noted JVD. No adenopathy is appreciated.    CHEST/LUNGS:her lungs are clear bilaterally without rhonchi, rales, or wheezes. There is no subcutaneous air appreciated. There is no tenderness to the chest wall.    HEART: The patient has a regular rate and rhythm. No murmurs, rubs, or gallops are appreciated. Distal pulses are 2+. Extremities are warm without evidence of cyanosis or poor perfusion.    ABDOMEN: The patients abdomen is completely soft, nontender, and nondistended. Bowel sounds are present. No organomegaly is appreciated. No masses are appreciated. There are no peritoneal signs.    EXTREMITIES: The patient has no peripheral edema. There is no focal long bone tenderness or deformity.    SKIN: The patients skin is warm and dry, vitiligo noted.    PSYCHIATRIC: The patient has normal mental status and has an appropriate affect.    NEUROLOGIC: The patient has equal strength in both upper and lower extremities without focal deficit. There are no gross deficits to the cranial nerves.       All recent labs and imaging studies have been reviewed    Pertinent findings include:      Recent Labs  Lab 07/26/18  0556   WBC 4.93   RBC 4.14   HGB 12.4   HCT 39.5      MCV 95   MCH 30.0   MCHC 31.4*       Recent Labs  Lab 07/26/18  0556      K 3.9      CO2 27   BUN 12   CREATININE 0.7         I have  personally and independently interpretted the following tests:  7/25 CTA  6/21 CT chest w/o      Assessment and Plan / Recommendations     Silvia Cervantes is a 58yo white  female with pmhx of vitiligo and RA who presented to the ED c/o chest pain. CTA found innumerable micronodules. Pulmonary consulted for evaluation    1) Pulmonary Nodules  -infectious etiologies considered; d/t immunocompromised state currently being r/o TB w/ AFB  -Patient formerly evaluated for nodules by Dr. Gallegos at [facility], will try to facilitate earlier appt (pt has appt in September)  -DDx remains lengthy at this point, Infectious vs CTD related vs Malignant  -will likely need EBUS d/t noted mediastinal LAD    We will continue to follow with you, thank you for the opportunity to be involved in /Ms. Cervantes's care.    Please call or message directly through EPIC with any additional questions or concerns.    Calos Gunn MD  [facility] Fellow  Pager: [phone]  Cell: [phone]    07/26/2018  8:59 AM
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
## 2018-07-26 NOTE — PLAN OF CARE
Nursing alerted me that the patient was complaining of headache. I went to assess the patient. She reports a 6/10 headache with photophobia, no nausea, localized to the back of her head. She denied dizziness.   I ordered PRN acetaminophen 350 mg for her headache.   While I was in the room she pointed out a rash she just noticed. It was a raised, erythematous, pruritic plaque over her cubital fossa that extended up her arm. The rash was not present on her abdomen or chest. She denied SOB, chest pain or itching of her throat.  I checked her medication list with the nurse and she has not received any medications other that her home medications.  I advised the nurse to observe her closely while in the room and to report any new symptoms of possible reaction to medication or otherwise.

## 2018-07-26 NOTE — PT/OT/SLP EVAL
Physical Therapy Evaluation Discharge    Patient Name:  Silvia Cervantes   MRN:  [id number]    Recommendations:     Discharge Recommendations:  home   Discharge Equipment Recommendations: none   Barriers to discharge: None    Assessment:     Silvia Cervantes is a 57 y.o. female admitted with a medical diagnosis of Pulmonary nodules.  She presents with the following impairments/functional limitations:    weakness, gait disturbance.  Patient at functional baseline. No acute skilled PT needs noted. Will dc PT service at this time  Rehab Prognosis:  good; patient would benefit from acute skilled PT services to address these deficits and reach maximum level of function.      Recent Surgery: * No surgery found *      Plan:     During this hospitalization, patient to be seen   to address the above listed problems via    · Plan of Care Expires:  07/26/18   Plan of Care Reviewed with: patient, mother    Subjective     Communicated with primary nurse prior to session.  Patient found supine upon PT entry to room, agreeable to evaluation.      Chief Complaint: none voiced  Patient comments/goals: go home  Pain/Comfort:  · Pain Rating 1: 0/10  · Pain Rating Post-Intervention 1: 0/10    Patients cultural, spiritual, Adventism conflicts given the current situation:      Living Environment:  Lives with mother SSH no concerns  Prior to admission, patients level of function was independent.  Patient has the following equipment: cane, quad, walker, rolling.  DME owned (not currently used): none.  Upon discharge, patient will have assistance from family.    Objective:     Patient found with: telemetry     General Precautions: Standard, fall   Orthopedic Precautions:N/A   Braces: N/A     Exams:  · RLE ROM: WFL  · RLE Strength: WFL  · LLE ROM: WFL  · LLE Strength: WFL    Functional Mobility:  · Bed Mobility:     · Supine to Sit: modified independence  · Sit to Supine: modified independence  · Transfers:     · Sit to Stand:  modified  independence with no AD  · Gait: Mod I   · Balance: Good    AM-PAC 6 CLICK MOBILITY  Total Score:24       Therapeutic Activities and Exercises:   na    Patient left up in chair with all lines intact and call button in reach.    GOALS:    Physical Therapy Goals     Not on file          Multidisciplinary Problems (Resolved)        Problem: Physical Therapy Goal    Goal Priority Disciplines Outcome Goal Variances Interventions   Physical Therapy Goal   (Resolved)     PT/OT, PT Outcome(s) achieved     Description:  Goals to be met by: 2018     No PT goals established                          History:     Past Medical History:   Diagnosis Date    Abnormal Pap smear     VAIN 2    DEMENTIA     Dry eyes     Fever blister     History of bronchitis     History of headache     Hypercholesteremia 3/6/2015    Hypertension     Major depression, recurrent, chronic 10/5/2017    Rheumatoid arthritis(714.0)     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)        Past Surgical History:   Procedure Laterality Date     SECTION      HYSTERECTOMY      TUBAL LIGATION         Clinical Decision Making:     History  Co-morbidities and personal factors that may impact the plan of care Examination  Body Structures and Functions, activity limitations and participation restrictions that may impact the plan of care Clinical Presentation   Decision Making/ Complexity Score   Co-morbidities:   [] Time since onset of injury / illness / exacerbation  [] Status of current condition  []Patient's cognitive status and safety concerns    [x] Multiple Medical Problems (see med hx)  Personal Factors:   [] Patient's age  [] Prior Level of function   [] Patient's home situation (environment and family support)  [] Patient's level of motivation  [] Expected progression of patient      HISTORY:(criteria)    [] 10977 - no personal factors/history    [x] 59371 - has 1-2 personal factor/comorbidity     [] 61026 - has >3 personal  factor/comorbidity     Body Regions:  [] Objective examination findings  [] Head     []  Neck  [] Trunk   [] Upper Extremity  [x] Lower Extremity    Body Systems:  [] For communication ability, affect, cognition, language, and learning style: the assessment of the ability to make needs known, consciousness, orientation (person, place, and time), expected emotional /behavioral responses, and learning preferences (eg, learning barriers, education  needs)  [x] For the neuromuscular system: a general assessment of gross coordinated movement (eg, balance, gait, locomotion, transfers, and transitions) and motor function  (motor control and motor learning)  [x] For the musculoskeletal system: the assessment of gross symmetry, gross range of motion, gross strength, height, and weight  [] For the integumentary system: the assessment of pliability(texture), presence of scar formation, skin color, and skin integrity  [] For cardiovascular/pulmonary system: the assessment of heart rate, respiratory rate, blood pressure, and edema     Activity limitations:    [] Patient's cognitive status and saf ety concerns          [] Status of current condition      [] Weight bearing restriction  [] Cardiopulmunary Restriction    Participation Restrictions:   [] Goals and goal agreement with the patient     [] Rehab potential (prognosis) and probable outcome      Examination of Body System: (criteria)    [x] 26217 - addressing 1-2 elements    [] 55305 - addressing a total of 3 or more elements     [] 79793 -  Addressing a total of 4 or more elements         Clinical Presentation: (criteria)  Stable - 77988     On examination of body system using standardized tests and measures patient presents with 1-2 elements from any of the following: body structures and functions, activity limitations, and/or participation restrictions.  Leading to a clinical presentation that is considered evolving with changing  characteristics                              Clinical Decision Making  (Eval Complexity):  Low- 39601     Time Tracking:     PT Received On: 07/26/18  PT Start Time: 1011     PT Stop Time: 1027  PT Total Time (min): 16 min     Billable Minutes: Evaluation 16      Rolly Gonzalez, PT  07/26/2018

## 2018-07-27 VITALS
HEIGHT: 61 IN | TEMPERATURE: 98 F | BODY MASS INDEX: 24.35 KG/M2 | RESPIRATION RATE: 15 BRPM | OXYGEN SATURATION: 97 % | SYSTOLIC BLOOD PRESSURE: 154 MMHG | DIASTOLIC BLOOD PRESSURE: 87 MMHG | HEART RATE: 103 BPM | WEIGHT: 129 LBS

## 2018-07-27 PROCEDURE — 25000242 PHARM REV CODE 250 ALT 637 W/ HCPCS: Performed by: STUDENT IN AN ORGANIZED HEALTH CARE EDUCATION/TRAINING PROGRAM

## 2018-07-27 PROCEDURE — 25000003 PHARM REV CODE 250: Performed by: STUDENT IN AN ORGANIZED HEALTH CARE EDUCATION/TRAINING PROGRAM

## 2018-07-27 PROCEDURE — 87206 SMEAR FLUORESCENT/ACID STAI: CPT

## 2018-07-27 PROCEDURE — 87385 HISTOPLASMA CAPSUL AG IA: CPT

## 2018-07-27 PROCEDURE — 87385 HISTOPLASMA CAPSUL AG IA: CPT | Mod: 91

## 2018-07-27 PROCEDURE — 87116 MYCOBACTERIA CULTURE: CPT

## 2018-07-27 PROCEDURE — A4216 STERILE WATER/SALINE, 10 ML: HCPCS | Performed by: STUDENT IN AN ORGANIZED HEALTH CARE EDUCATION/TRAINING PROGRAM

## 2018-07-27 PROCEDURE — 86403 PARTICLE AGGLUT ANTBDY SCRN: CPT

## 2018-07-27 PROCEDURE — 36415 COLL VENOUS BLD VENIPUNCTURE: CPT

## 2018-07-27 PROCEDURE — 94640 AIRWAY INHALATION TREATMENT: CPT

## 2018-07-27 PROCEDURE — 87015 SPECIMEN INFECT AGNT CONCNTJ: CPT

## 2018-07-27 PROCEDURE — 94761 N-INVAS EAR/PLS OXIMETRY MLT: CPT

## 2018-07-27 RX ORDER — DIPHENHYDRAMINE HCL 25 MG
25 CAPSULE ORAL ONCE
Status: COMPLETED | OUTPATIENT
Start: 2018-07-27 | End: 2018-07-27

## 2018-07-27 RX ORDER — CYCLOSPORINE 0.5 MG/ML
1 EMULSION OPHTHALMIC 2 TIMES DAILY
COMMUNITY
End: 2019-03-14 | Stop reason: SDUPTHER

## 2018-07-27 RX ADMIN — FOLIC ACID 1 MG: 1 TABLET ORAL at 09:07

## 2018-07-27 RX ADMIN — SODIUM CHLORIDE 30 MG/ML INHALATION SOLUTION 4 ML: 30 SOLUTION INHALANT at 07:07

## 2018-07-27 RX ADMIN — GABAPENTIN 300 MG: 300 CAPSULE ORAL at 09:07

## 2018-07-27 RX ADMIN — HYDROCHLOROTHIAZIDE 12.5 MG: 12.5 TABLET ORAL at 09:07

## 2018-07-27 RX ADMIN — LOSARTAN POTASSIUM 50 MG: 50 TABLET, FILM COATED ORAL at 09:07

## 2018-07-27 RX ADMIN — SODIUM CHLORIDE, PRESERVATIVE FREE 3 ML: 5 INJECTION INTRAVENOUS at 03:07

## 2018-07-27 RX ADMIN — GABAPENTIN 300 MG: 300 CAPSULE ORAL at 03:07

## 2018-07-27 RX ADMIN — SODIUM CHLORIDE, PRESERVATIVE FREE 3 ML: 5 INJECTION INTRAVENOUS at 06:07

## 2018-07-27 RX ADMIN — SODIUM CHLORIDE 30 MG/ML INHALATION SOLUTION 4 ML: 30 SOLUTION INHALANT at 03:07

## 2018-07-27 RX ADMIN — ASPIRIN 81 MG: 81 TABLET, COATED ORAL at 09:07

## 2018-07-27 RX ADMIN — DIPHENHYDRAMINE HYDROCHLORIDE 25 MG: 25 CAPSULE ORAL at 09:07

## 2018-07-27 NOTE — PROGRESS NOTES
Discharge instructions provided to pt.  Verbalized understanding of teaching.  Pt discharged to home.

## 2018-07-27 NOTE — PLAN OF CARE
Plan of Care    Discussed case with Infectious Disease team regarding plan.  Okay to discharge with close follow up.    Quantiferon Gold TB pending  Histoplasma urine and serum antigen pending  Cryptococcal serum Ag pending  AFB sputum smear and culture pending  AFB blood culture with smear pending    Future Appointments  Date Time Provider Department Center   8/2/2018 9:40 AM Adrián Cade MD [facility] [place] [facility]   8/6/2018 11:00 AM Tin Mosher MD [facility]   8/7/2018 1:20 PM Lane Molina MD [facility] [place]   9/13/2018 9:30 AM Mariella Connelly MD [facility]   9/26/2018 10:30 AM Pankaj Melo MD [facility]     Reliable phone number for patient: [phone] (cell)    Please call with questions. Will follow up results of above.    Dallin Dai DO  [facility] Internal Medicine-Pediatrics PGY-IV  [facility] Hospitalist Service Team B    [facility] Medicine Hospitalist Pager numbers:   [facility] Hospitalist Medicine Team B (Bel/Jimbo):  [phone]

## 2018-07-27 NOTE — ASSESSMENT & PLAN NOTE
- Pt with RA treated with eternacept with c/o achey chest pain x12 hours  - troponin neg  - appears non-cardiac in nature possibly 2/2 interstitial lung changes seen on imaging  - CT from May with innumerable pulmonary nodules  - XR and CT at admit showing again innumerable pulmonary nodules  - differential including TB vs RA vs malignant etiology  - Pt was to have outpatient F/U with Pulmonology in September following CT finings in May  - Quanteferon TB test in March negative  prior Quanteferon Gold test in Nov 2017 intermediate  - No white count  afebrile  clinically stable  - Pt produced 2 scant sputums with induction    Recs:  --- airborne isolation  Pt was given mask to wear and advised to use it when traveling to public or populated areas.  --- Will f/u quantiferon gold  AFB blood cultures  AFB sputum cultures   --- Histo urine and serum ag ordered  Crypto ag ordered  will f/u  --- Pt to have close follow up on discharge  has appt with PCP on 8/2  ID on 8/6  and Dr. Molina on 8/7  --- Unable to get good sputum during admission  okay to send patient out given that she has very close follow up.

## 2018-07-27 NOTE — PROGRESS NOTES
Follow-Up       Follow-up With  Details  Why  Contact Info   Adrián Cade MD  On 8/2/2018  9:40 am - PLEASE call the office if you must cancel   [address]  [address]  [place] [address]  [phone]   [facility]  On 8/6/2018  Hospital follow up ASAP 11:00 am You will see Dr. Mosher -- 1st floor of clinic   [address] [facility] [address]  [phone]       Rheumatology -- [facility] -- [phone] -- you will be contacted with a f/u apt      Pankaj Melo MD  On 9/26/2018  10:30 am previously booked -- you are on the wait list for a sooner apt.   [address] [facility] [address]  [phone]   Mariella Connelly MD  On 9/13/2018  9:30 am - previously booked   [address]  [address]  [facility]  [facility] [address]  [phone]   Lane Molina MD  On 8/7/2018  1:20 pm   [address]  [address]  [place] [address]  [phone]
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
## 2018-07-27 NOTE — SUBJECTIVE & OBJECTIVE
Interval History:   No acute events overnight. Pt noticing urticarial lesions on BUE and thigh which have been itching. Received benadryl. Pt denies fever, chills, chest pain, or dyspnea. No abdominal pain. No diarrhea.    Review of Systems   All other systems reviewed and are negative.    Objective:     Vital Signs (Most Recent):  Temp: 97.6 °F (36.4 °C) (07/27/18 0815)  Pulse: 91 (07/27/18 0815)  Resp: 17 (07/27/18 0815)  BP: 127/79 (07/27/18 0815)  SpO2: (!) 92 % (07/27/18 0815) Vital Signs (24h Range):  Temp:  [96.8 °F (36 °C)-98.3 °F (36.8 °C)] 97.6 °F (36.4 °C)  Pulse:  [] 91  Resp:  [16-20] 17  SpO2:  [92 %-100 %] 92 %  BP: (119-140)/(79-89) 127/79     Weight: 58.5 kg (129 lb)  Body mass index is 24.37 kg/m².    Estimated Creatinine Clearance: 72.1 mL/min (based on SCr of 0.7 mg/dL).    Physical Exam   Constitutional: NAD  HENT:   NCAT  OP clear  MMM   EYES:   Conjunctivae normal  EOMI  PERRL.   CV:  RRR  Normal S1 & S2  No murmurs, rubs, gallops  Pulses:  Radial, DP, and PT 2+ bilateral and symmetric  Cap refil <2 secs.  PULM:  CTAB. No respiratory distress. No wheezes, rales, rhonchi    ABD:   Normoactive BS. Soft, NTND. No rebound, no guarding, no mass.  SKIN:   Mild Urticarial patches on upper extremities and interior thigh.  Extremity:  No cyanosis, clubbing, or edema  NEURO:  A&Ox4. No focal deficit  Psychiatric:  Normal mood & affect.    Significant Labs:   CBC:   Recent Labs  Lab 07/25/18 0920 07/26/18  0556   WBC 6.95 4.93   HGB 14.1 12.4   HCT 42.5 39.5    282     CMP:   Recent Labs  Lab 07/25/18 0920 07/26/18  0556    141   K 3.8 3.9    105   CO2 27 27    86   BUN 10 12   CREATININE 0.7 0.7   CALCIUM 10.2 10.0   PROT 8.3 7.5   ALBUMIN 3.9 3.4*   BILITOT 0.6 0.5   ALKPHOS 85 71   AST 20 16   ALT 22 16   ANIONGAP 9 9   EGFRNONAA >60 >60     All pertinent labs within the past 24 hours have been reviewed.    Significant Imaging: I have reviewed all pertinent  imaging results/findings within the past 24 hours.   None new

## 2018-07-27 NOTE — PLAN OF CARE
Plan of care reviewed with patient. Patient is AAOx4. SR on cardiac monitoring, no true red alarms noted. Denies pain. Unable to obtain AFB culture during shift. Airborne precautions maintained throughout shift. Fall precautions explained and maintained. Patient advised to use call light for assistance, patient verbalized complete understanding. Will continue to monitor.

## 2018-07-27 NOTE — PROGRESS NOTES
Silvia Cervantes #[id number] (CSN: [id number])  (57 y.o. F)  (Adm: 07/25/18)   [facility] [id number]   PCP     RAPHAEL PIÑA   Date of Birth     1960   Demographics     Address: Home Phone: Work Phone: Mobile Phone:     [address]  [place] [address] [phone]    SSN: Insurance: Marital Status: Congregation:      MEDICAID  [employer]    Admission Dx      Chest pain   Chief Complaint     Complaint Comment   Chest Pain c/o midsternal chest pain that radiates to her back since last night. Had a CT scan about 2 months ago that showed nodules, and is scheduled to follow up with pulmonology in September   Documents Filed to Patient     Power of  Living Will Clinical Unknown Study Attachment Consent Form ABN Waiver After Visit Summary Lab Result Scan Code Status Patient Portal Status   Not on File Not on File Not on File Not on File Filed Not on File Filed Not on File FULL [Updated on 07/25/18 1330] Active   Auth/Cert Information      Open Auth/Cert for Hospital Account [id number]      Admission Information     Attending Provider Admitting Provider Admission Type Admission Date/Time   MD Nura Gonzales MD Emergency 07/25/18  0826   Discharge Date Hospital Service Auth/Cert Status Service Area    Internal Medicine Incomplete [facility] SERVICE AREA   Unit Room/Bed Admission Status    [facility] MEDICAL SURGICAL UNIT ACUTE [id number] A Admission (Confirmed)    Hospital Account     Name Acct ID Class Status Primary Coverage   Silvia Cervantes [id number] IP- Inpatient Open MEDICAID - [employer] [facility] ([facility])          Guarantor Account (for Hospital Account #[id number])     Name Relation to Pt Service Area Active? Acct Type   Silvia Cervantes Self OHSSA Yes Personal/Family   Address Phone     [address]  [place] [id number] [phone](H)            Coverage Information (for Hospital Account #[id number])     F/O Payor/Plan Precert #   MEDICAID/[employer]HEALTH  [place] ([facility])    Subscriber Subscriber #   Silvia Cervantes [id number]   Address Phone   [address]  [place] [address]           Emergency Contact Information     Name: BillyNissa Relationship: Daughter   Address: [address]    City: [place] State: [place] Zip: [address] Phone:     Business phone:

## 2018-07-27 NOTE — PLAN OF CARE
TN met with pt prior to d/c   Mother Polly at bedside     reviewed post d/c apts extensively     pt has transportation to home   no hh, no dme ordered.    pt's nurse to review d/c meds and instructions     Follow-Up           Follow-up With   Details   Why   Contact Info   Adrián Cade MD   On 8/2/2018   9:40 am - PLEASE call the office if you must cancel    [address]  [address]  [place] [address]  [phone]   [facility]ious [facility]   On 8/6/2018   Hospital follow up ASAP 11:00 am You will see Dr. Mosher -- 1st floor of clinic    [address] [facility] [address]  [phone]           Rheumatology -- [facility] -- [phone] -- you will be contacted with a f/u apt        Pankaj Melo MD   On 9/26/2018   10:30 am previously booked -- you are on the wait list for a sooner apt.    [address] [facility] [address]  [phone]   Mariella Connelly MD   On 9/13/2018   9:30 am - previously booked    [address]  [address]  [facility]  [facility] [address]  [phone]   Lane Molina MD   On 8/7/2018   1:20 pm    [address]  [address]  [place] [address]  [phone]               07/27/18 1649   Final Note   Assessment Type Final Discharge Note   Discharge Disposition Home   What phone number can be called within the next 1-3 days to see how you are doing after discharge? [phone]   Hospital Follow Up  Appt(s) scheduled? Yes   Discharge plans and expectations educations in teach back method with documentation complete? Yes   Right Care Referral Info   Post Acute Recommendation SNF / Sub-Acute Rehab   Referral Type (no care )

## 2018-07-27 NOTE — PROGRESS NOTES
Sputum specimen cup placed at bedside.  Instructed patient to cough any sputum into the cup and inform the nurse/respiratory when obtained.
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
## 2018-07-27 NOTE — PLAN OF CARE
Problem: Patient Care Overview  Goal: Plan of Care Review  Outcome: Ongoing (interventions implemented as appropriate)  The proper method of use, as well as anticipated side effects, of this aerosol treatment are discussed and demonstrated to the patient.   Pt on RA with documented sats.  Will continue to monitor.

## 2018-07-27 NOTE — PROGRESS NOTES
Follow-Up       Follow-up With  Details  Why  Contact Info   Adrián Cade MD  Schedule an appointment as soon as possible for a visit on 8/2/2018  9:40 am - PLEASE call the office if you must cancel   [address]  [address]  [place] [address]  [phone]   [facility]  Schedule an appointment as soon as possible for a visit on 8/6/2018  Hospital follow up ASAP 11:00 am You will see Dr. Mosher -- 1st floor of clinic   [address] [facility] [address]  [phone]       Rheumatology -- [facility] -- [phone] -- you will be contacted with a f/u apt      Pankaj Melo MD  On 9/26/2018  10:30 am previously booked -- you are on the wait list for a sooner apt.   [address] [facility] [address]  [phone]   Mariella Connelly MD  On 9/13/2018  9:30 am - previously booked   [address]  [address]  [facility] [address]  [phone]   Lane Molina MD  On 8/7/2018  1:20 pm   [address]  [address]  [place] [address]  [phone]         Discharge Phone Number    [facility]

## 2018-07-28 LAB — CRYPTOC AG SER QL LA: NEGATIVE
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
## 2018-07-30 ENCOUNTER — PATIENT OUTREACH (OUTPATIENT)
Dept: ADMINISTRATIVE | Facility: CLINIC | Age: 58
End: 2018-07-30

## 2018-07-30 NOTE — PATIENT INSTRUCTIONS

## 2018-07-31 ENCOUNTER — TELEPHONE (OUTPATIENT)
Dept: SPINE | Facility: CLINIC | Age: 58
End: 2018-07-31

## 2018-07-31 LAB
HISTOPLASMA AG VALUE: 0 NG/ML
HISTOPLASMA ANTIGEN URINE: NEGATIVE
MITOGEN IGNF BCKGRD COR BLD-ACNC: 0.05 IU/ML
MITOGEN NIL: 4.07 IU/ML
TB GOLD PLUS: NEGATIVE
TB1 - NIL: 0.01 IU/ML
TB2 - NIL: 0 IU/ML

## 2018-07-31 NOTE — TELEPHONE ENCOUNTER
Staff contacted [employer] and left a message on Malaika [employer] informing her that she is to contact [facility] Disability [facility] [phone] phone and fax [phone] regarding patient's paperwork.

## 2018-07-31 NOTE — TELEPHONE ENCOUNTER
----- Message from Jena Higgins sent at 7/31/2018  4:38 PM CDT -----  Contact: jennifer Dai            Name of Who is Calling: Malaika      What is the request in detail: requesting a call back in reference to disability paperwork for pt. Please call and advise      Can the clinic reply by [facility]: no      [facility] to [facility] Back [facility]: [phone] ext [phone]

## 2018-08-01 LAB
HISTOPLASMA AG INTERP.: NEGATIVE
HISTOPLASMA RESULT: NORMAL NG/ML

## 2018-08-02 ENCOUNTER — OFFICE VISIT (OUTPATIENT)
Dept: FAMILY MEDICINE | Facility: CLINIC | Age: 58
End: 2018-08-02
Attending: FAMILY MEDICINE
Payer: MEDICAID

## 2018-08-02 VITALS
SYSTOLIC BLOOD PRESSURE: 135 MMHG | BODY MASS INDEX: 24.26 KG/M2 | DIASTOLIC BLOOD PRESSURE: 85 MMHG | TEMPERATURE: 98 F | HEIGHT: 61 IN | HEART RATE: 86 BPM | OXYGEN SATURATION: 94 % | WEIGHT: 128.5 LBS

## 2018-08-02 DIAGNOSIS — L80 VITILIGO: ICD-10-CM

## 2018-08-02 DIAGNOSIS — I10 ESSENTIAL HYPERTENSION: ICD-10-CM

## 2018-08-02 DIAGNOSIS — M06.9 RHEUMATOID ARTHRITIS INVOLVING BOTH HANDS, UNSPECIFIED RHEUMATOID FACTOR PRESENCE: ICD-10-CM

## 2018-08-02 DIAGNOSIS — F33.9 MAJOR DEPRESSION, RECURRENT, CHRONIC: Primary | ICD-10-CM

## 2018-08-02 DIAGNOSIS — E78.00 HYPERCHOLESTEREMIA: ICD-10-CM

## 2018-08-02 DIAGNOSIS — R91.8 PULMONARY NODULES: ICD-10-CM

## 2018-08-02 PROCEDURE — 99999 PR PBB SHADOW E&M-EST. PATIENT-LVL III: CPT | Mod: PBBFAC,,, | Performed by: FAMILY MEDICINE

## 2018-08-02 PROCEDURE — 99213 OFFICE O/P EST LOW 20 MIN: CPT | Mod: PBBFAC,PO | Performed by: FAMILY MEDICINE

## 2018-08-02 PROCEDURE — 99214 OFFICE O/P EST MOD 30 MIN: CPT | Mod: S$PBB,,, | Performed by: FAMILY MEDICINE

## 2018-08-02 RX ORDER — AMLODIPINE BESYLATE 5 MG/1
5 TABLET ORAL DAILY
Qty: 90 TABLET | Refills: 3 | Status: SHIPPED | OUTPATIENT
Start: 2018-08-02 | End: 2019-02-06 | Stop reason: SDUPTHER

## 2018-08-02 RX ORDER — ATORVASTATIN CALCIUM 10 MG/1
10 TABLET, FILM COATED ORAL DAILY
Qty: 90 TABLET | Refills: 3 | Status: SHIPPED | OUTPATIENT
Start: 2018-08-02 | End: 2018-09-13 | Stop reason: SDUPTHER

## 2018-08-02 RX ORDER — LOSARTAN POTASSIUM AND HYDROCHLOROTHIAZIDE 12.5; 5 MG/1; MG/1
1 TABLET ORAL DAILY
Qty: 90 TABLET | Refills: 3 | Status: SHIPPED | OUTPATIENT
Start: 2018-08-02 | End: 2019-02-06 | Stop reason: SDUPTHER
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
## 2018-08-03 ENCOUNTER — TELEPHONE (OUTPATIENT)
Dept: SPINE | Facility: CLINIC | Age: 58
End: 2018-08-03

## 2018-08-03 NOTE — TELEPHONE ENCOUNTER
----- Message from [place] [place] sent at 8/3/2018 10:25 AM CDT -----            Name of Who is Calling: celestine from [employer]    What is the request in detail: they would like to verify your office received the  the disability paper work that they fax over.          What Number to Call Back [phone] ext [phone]

## 2018-08-03 NOTE — TELEPHONE ENCOUNTER
Staff spoke with Malaika from [facility] and informed her that our office has not received patient's disability paperwork and that [facility] has a disability dept that handles this paperwork.Malaika stated that she contacted the number that was alpavne to her but was told that she contact our office back instead. She stated that she would have her manager look into this matter and contact us

## 2018-08-06 ENCOUNTER — LAB VISIT (OUTPATIENT)
Dept: LAB | Facility: HOSPITAL | Age: 58
End: 2018-08-06
Attending: INTERNAL MEDICINE
Payer: MEDICAID

## 2018-08-06 ENCOUNTER — OFFICE VISIT (OUTPATIENT)
Dept: INFECTIOUS DISEASES | Facility: CLINIC | Age: 58
End: 2018-08-06
Payer: MEDICAID

## 2018-08-06 ENCOUNTER — CLINICAL SUPPORT (OUTPATIENT)
Dept: INFECTIOUS DISEASES | Facility: CLINIC | Age: 58
End: 2018-08-06
Payer: MEDICAID

## 2018-08-06 VITALS
SYSTOLIC BLOOD PRESSURE: 148 MMHG | WEIGHT: 129 LBS | DIASTOLIC BLOOD PRESSURE: 86 MMHG | HEIGHT: 61 IN | BODY MASS INDEX: 24.35 KG/M2 | TEMPERATURE: 98 F | HEART RATE: 85 BPM

## 2018-08-06 DIAGNOSIS — R91.1 PULMONARY NODULE: Primary | ICD-10-CM

## 2018-08-06 DIAGNOSIS — R05.3 CHRONIC COUGH: ICD-10-CM

## 2018-08-06 DIAGNOSIS — Z23 IMMUNIZATION DUE: ICD-10-CM

## 2018-08-06 DIAGNOSIS — R91.1 PULMONARY NODULE: ICD-10-CM

## 2018-08-06 PROCEDURE — 99999 PR PBB SHADOW E&M-EST. PATIENT-LVL III: CPT | Mod: PBBFAC,,, | Performed by: INTERNAL MEDICINE

## 2018-08-06 PROCEDURE — 99212 OFFICE O/P EST SF 10 MIN: CPT | Mod: PBBFAC,27,25

## 2018-08-06 PROCEDURE — 82164 ANGIOTENSIN I ENZYME TEST: CPT

## 2018-08-06 PROCEDURE — 86612 BLASTOMYCES ANTIBODY: CPT

## 2018-08-06 PROCEDURE — 90732 PPSV23 VACC 2 YRS+ SUBQ/IM: CPT | Mod: PBBFAC

## 2018-08-06 PROCEDURE — 86635 COCCIDIOIDES ANTIBODY: CPT

## 2018-08-06 PROCEDURE — 99213 OFFICE O/P EST LOW 20 MIN: CPT | Mod: PBBFAC,25 | Performed by: INTERNAL MEDICINE

## 2018-08-06 PROCEDURE — 87449 NOS EACH ORGANISM AG IA: CPT

## 2018-08-06 PROCEDURE — 86403 PARTICLE AGGLUT ANTBDY SCRN: CPT

## 2018-08-06 PROCEDURE — 99204 OFFICE O/P NEW MOD 45 MIN: CPT | Mod: S$PBB,,, | Performed by: INTERNAL MEDICINE

## 2018-08-06 PROCEDURE — 99999 PR PBB SHADOW E&M-EST. PATIENT-LVL II: CPT | Mod: PBBFAC,,,
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
## 2018-08-07 ENCOUNTER — TELEPHONE (OUTPATIENT)
Dept: FAMILY MEDICINE | Facility: CLINIC | Age: 58
End: 2018-08-07

## 2018-08-07 LAB
ACE SERPL-CCNC: 84 U/L
CRYPTOC AG SER QL LA: NEGATIVE

## 2018-08-07 NOTE — TELEPHONE ENCOUNTER
----- Message from Cindi Schmitt sent at 8/7/2018 11:29 AM CDT -----  Contact: Michelle from [employer]/[phone], ext case number [id number]  Michelle called to confirm your office received the disability form that she sent and also wants to know when it will be completed.    Please call and advise.

## 2018-08-07 NOTE — TELEPHONE ENCOUNTER
Informed Michelle that pt's disability paperwork has been received and forwarded to Medical records for medical records from pt's various doctor appts.

## 2018-08-07 NOTE — TELEPHONE ENCOUNTER
FOR DOCUMENTATION:  Enbrel prior authorization approved   Dates: 08/07/18 through 02/07/19  Co pay: $3

## 2018-08-07 NOTE — TELEPHONE ENCOUNTER
Pt returned Naga's call regarding her disability paperwork. Pt was transferred to [facility].

## 2018-08-09 LAB
BLASTOMYCES AG INTERP: NEGATIVE
BLASTOMYCES AG RESULT: NORMAL NG/ML
BLASTOMYCES AG SPECIMEN: NORMAL

## 2018-08-11 LAB
ASPERGILLUS AB SER QL ID: NORMAL
B DERMAT AB SER QL ID: NORMAL
C IMMITIS AB SER QL ID: NORMAL
H CAPSUL AB TITR SER ID: NORMAL {TITER}

## 2018-08-23 ENCOUNTER — TELEPHONE (OUTPATIENT)
Dept: PHARMACY | Facility: CLINIC | Age: 58
End: 2018-08-23

## 2018-08-23 ENCOUNTER — PATIENT MESSAGE (OUTPATIENT)
Dept: RHEUMATOLOGY | Facility: CLINIC | Age: 58
End: 2018-08-23

## 2018-08-27 ENCOUNTER — PATIENT MESSAGE (OUTPATIENT)
Dept: RHEUMATOLOGY | Facility: CLINIC | Age: 58
End: 2018-08-27

## 2018-08-28 ENCOUNTER — TELEPHONE (OUTPATIENT)
Dept: RHEUMATOLOGY | Facility: CLINIC | Age: 58
End: 2018-08-28

## 2018-08-29 ENCOUNTER — PATIENT MESSAGE (OUTPATIENT)
Dept: PHARMACY | Facility: CLINIC | Age: 58
End: 2018-08-29

## 2018-09-05 ENCOUNTER — CLINICAL SUPPORT (OUTPATIENT)
Dept: INFECTIOUS DISEASES | Facility: CLINIC | Age: 58
End: 2018-09-05
Payer: MEDICAID

## 2018-09-05 ENCOUNTER — OFFICE VISIT (OUTPATIENT)
Dept: INFECTIOUS DISEASES | Facility: CLINIC | Age: 58
End: 2018-09-05
Payer: MEDICAID

## 2018-09-05 VITALS
TEMPERATURE: 99 F | HEIGHT: 61 IN | DIASTOLIC BLOOD PRESSURE: 89 MMHG | WEIGHT: 129.63 LBS | BODY MASS INDEX: 24.47 KG/M2 | SYSTOLIC BLOOD PRESSURE: 132 MMHG | HEART RATE: 85 BPM

## 2018-09-05 DIAGNOSIS — Z23 IMMUNIZATION DUE: ICD-10-CM

## 2018-09-05 DIAGNOSIS — R91.1 PULMONARY NODULE: Primary | ICD-10-CM

## 2018-09-05 DIAGNOSIS — R05.3 CHRONIC COUGH: ICD-10-CM

## 2018-09-05 PROCEDURE — 90472 IMMUNIZATION ADMIN EACH ADD: CPT | Mod: PBBFAC

## 2018-09-05 PROCEDURE — 99999 PR PBB SHADOW E&M-EST. PATIENT-LVL III: CPT | Mod: PBBFAC,,, | Performed by: INTERNAL MEDICINE

## 2018-09-05 PROCEDURE — 99214 OFFICE O/P EST MOD 30 MIN: CPT | Mod: S$PBB,,, | Performed by: INTERNAL MEDICINE

## 2018-09-05 PROCEDURE — 99213 OFFICE O/P EST LOW 20 MIN: CPT | Mod: PBBFAC,25 | Performed by: INTERNAL MEDICINE

## 2018-09-05 PROCEDURE — 90471 IMMUNIZATION ADMIN: CPT | Mod: PBBFAC

## 2018-09-05 NOTE — PROGRESS NOTES
Subjective:      Patient ID: Silvia Cervantes is a 57 y.o. female.    Chief Complaint:Follow-up    History of Present Illness    58 y/o with rheumatoid arthritis followed by Dr. Rizo.  She saw me in clinic about 4 weeks ago for cough, pulmonary nodules and mediastinal lymphadenopathy.  She is still coughing.  She feels something tickling in her throat.  She saw Dr. Molina, [facility], and a  bronchoscopy was performed with samples sent to pathology.    Medical History  Saw Dr. Lane Molina with [facility].    Review of Systems   Constitution: Positive for weakness. Negative for chills, decreased appetite, fever, malaise/fatigue, night sweats, weight gain and weight loss.   HENT: Positive for sore throat. Negative for congestion, ear pain, hearing loss, hoarse voice and tinnitus.    Eyes: Negative for blurred vision, redness and visual disturbance.   Cardiovascular: Negative for chest pain, leg swelling and palpitations.   Respiratory: Positive for cough. Negative for hemoptysis, shortness of breath and sputum production.    Hematologic/Lymphatic: Negative for adenopathy. Does not bruise/bleed easily.   Skin: Negative for dry skin, itching, rash and suspicious lesions.   Musculoskeletal: Positive for myalgias. Negative for back pain, joint pain and neck pain.   Gastrointestinal: Positive for constipation. Negative for abdominal pain, diarrhea, heartburn, nausea and vomiting.   Genitourinary: Positive for frequency. Negative for dysuria, flank pain, hematuria, hesitancy and urgency.   Neurological: Positive for numbness and paresthesias. Negative for dizziness and headaches.   Psychiatric/Behavioral: Negative for depression and memory loss. The patient has insomnia and is nervous/anxious.      Objective:   Physical Exam   Constitutional: She is oriented to person, place, and time. She appears well-developed and well-nourished. No distress.   HENT:   Head: Normocephalic and atraumatic.   Right Ear:  External ear normal.   Left Ear: External ear normal.   Nose: Nose normal.   Mouth/Throat: Oropharynx is clear and moist. No oropharyngeal exudate.   Eyes: Conjunctivae and EOM are normal. Pupils are equal, round, and reactive to light. Right eye exhibits no discharge. Left eye exhibits no discharge. No scleral icterus.   Neck: Normal range of motion. Neck supple. No JVD present. No tracheal deviation present. No thyromegaly present.   Cardiovascular: Normal rate, regular rhythm, normal heart sounds and intact distal pulses. Exam reveals no gallop and no friction rub.   No murmur heard.  Pulmonary/Chest: Effort normal and breath sounds normal. No stridor. No respiratory distress. She has no wheezes. She has no rales. She exhibits no tenderness.   Abdominal: Soft. Bowel sounds are normal. She exhibits no distension and no mass. There is no tenderness. There is no rebound and no guarding.   Musculoskeletal: Normal range of motion. She exhibits no edema or tenderness.   Lymphadenopathy:     She has no cervical adenopathy.   Neurological: She is alert and oriented to person, place, and time. She displays normal reflexes. No cranial nerve deficit. She exhibits normal muscle tone. Coordination normal.   Skin: Skin is warm. No rash noted. She is not diaphoretic. No erythema. No pallor.   Psychiatric: She has a normal mood and affect. Her behavior is normal. Judgment and thought content normal.   Nursing note and vitals reviewed.    Assessment:       1. Pulmonary nodule :  Tests for tuberculosis exposure and endemic fungi were negative.  Labs from [facility] reviewed.  Sample wasn't sent for culture but was sent to pathology.  Special stains for AFB and fungi were negative. Pathology showed gramulomas.  I am suspicious that she does in fact have sarcoidosis.  She has a slightly elevated serum ACE on labs that I checked here.     2. Chronic cough :  Suspect this is secondary to sarcoidosis.   3. Immunization due :  Will give  influenza and hepatitis a vaccine.         Plan:       Pulmonary nodule    Chronic cough    Immunization due  -     Influenza - Quadrivalent (3 years & older) (PF); Future; Expected date: 09/05/2018  -     Hepatitis A Vaccine (Adult) (IM); Standing

## 2018-09-05 NOTE — PROGRESS NOTES
Patient received Influenza Quadrivalent and Hepatitis A vaccinations per order - understands to stay on campus 15 minutes and report any reactions - patient left in no acute distress

## 2018-09-12 ENCOUNTER — TELEPHONE (OUTPATIENT)
Dept: SPINE | Facility: CLINIC | Age: 58
End: 2018-09-12
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
## 2018-09-13 ENCOUNTER — OFFICE VISIT (OUTPATIENT)
Dept: SPINE | Facility: CLINIC | Age: 58
End: 2018-09-13
Attending: PHYSICAL MEDICINE & REHABILITATION
Payer: MEDICAID

## 2018-09-13 ENCOUNTER — TELEPHONE (OUTPATIENT)
Dept: NEUROLOGY | Facility: CLINIC | Age: 58
End: 2018-09-13

## 2018-09-13 VITALS
BODY MASS INDEX: 24.35 KG/M2 | HEART RATE: 85 BPM | WEIGHT: 129 LBS | SYSTOLIC BLOOD PRESSURE: 117 MMHG | HEIGHT: 61 IN | DIASTOLIC BLOOD PRESSURE: 82 MMHG

## 2018-09-13 DIAGNOSIS — R20.0 LEFT SIDED NUMBNESS: Primary | ICD-10-CM

## 2018-09-13 DIAGNOSIS — M54.50 CHRONIC BILATERAL LOW BACK PAIN WITHOUT SCIATICA: ICD-10-CM

## 2018-09-13 DIAGNOSIS — S32.810S MULTIPLE CLOSED FRACTURES OF PELVIS WITH STABLE DISRUPTION OF PELVIC RING, SEQUELA: ICD-10-CM

## 2018-09-13 DIAGNOSIS — G89.29 CHRONIC BILATERAL LOW BACK PAIN WITHOUT SCIATICA: ICD-10-CM

## 2018-09-13 DIAGNOSIS — S12.100S CLOSED ODONTOID FRACTURE, SEQUELA: ICD-10-CM

## 2018-09-13 DIAGNOSIS — M79.2 NEURALGIA: ICD-10-CM

## 2018-09-13 DIAGNOSIS — S15.102S: ICD-10-CM

## 2018-09-13 PROCEDURE — 99214 OFFICE O/P EST MOD 30 MIN: CPT | Mod: S$PBB,,, | Performed by: PHYSICAL MEDICINE & REHABILITATION

## 2018-09-13 PROCEDURE — 99999 PR PBB SHADOW E&M-EST. PATIENT-LVL III: CPT | Mod: PBBFAC,,, | Performed by: PHYSICAL MEDICINE & REHABILITATION

## 2018-09-13 PROCEDURE — 99213 OFFICE O/P EST LOW 20 MIN: CPT | Mod: PBBFAC | Performed by: PHYSICAL MEDICINE & REHABILITATION

## 2018-09-13 RX ORDER — FOLIC ACID 1 MG/1
1 TABLET ORAL
COMMUNITY
End: 2019-03-13 | Stop reason: SDUPTHER

## 2018-09-13 RX ORDER — ASPIRIN 81 MG/1
81 TABLET ORAL
COMMUNITY
End: 2018-09-13 | Stop reason: SDUPTHER

## 2018-09-13 RX ORDER — ATORVASTATIN CALCIUM 10 MG/1
10 TABLET, FILM COATED ORAL
COMMUNITY
End: 2019-02-06 | Stop reason: SDUPTHER

## 2018-09-13 NOTE — TELEPHONE ENCOUNTER
----- Message from Kenisha Mcelroy sent at 9/13/2018  9:33 AM CDT -----  Contact: Pt. [phone]  Needs Advice    Reason for call:The patient would like to speak to someone regarding scheduling her appointment. She needs to be seen right away per Dr. Connelly        Communication Preference:PHONE    Additional Information:

## 2018-09-18 ENCOUNTER — OFFICE VISIT (OUTPATIENT)
Dept: RHEUMATOLOGY | Facility: CLINIC | Age: 58
End: 2018-09-18
Payer: MEDICAID

## 2018-09-18 VITALS
HEIGHT: 61 IN | SYSTOLIC BLOOD PRESSURE: 138 MMHG | BODY MASS INDEX: 24.52 KG/M2 | HEART RATE: 76 BPM | DIASTOLIC BLOOD PRESSURE: 89 MMHG | WEIGHT: 129.88 LBS

## 2018-09-18 DIAGNOSIS — M06.9 RHEUMATOID ARTHRITIS INVOLVING MULTIPLE JOINTS: ICD-10-CM

## 2018-09-18 DIAGNOSIS — Z51.81 ENCOUNTER FOR MONITORING OF ETANERCEPT THERAPY: Primary | ICD-10-CM

## 2018-09-18 DIAGNOSIS — Z79.620 ENCOUNTER FOR MONITORING OF ETANERCEPT THERAPY: Primary | ICD-10-CM

## 2018-09-18 PROCEDURE — 99214 OFFICE O/P EST MOD 30 MIN: CPT | Mod: S$PBB,,, | Performed by: INTERNAL MEDICINE

## 2018-09-18 PROCEDURE — 99213 OFFICE O/P EST LOW 20 MIN: CPT | Mod: PBBFAC | Performed by: INTERNAL MEDICINE

## 2018-09-18 PROCEDURE — 99999 PR PBB SHADOW E&M-EST. PATIENT-LVL III: CPT | Mod: PBBFAC,,, | Performed by: INTERNAL MEDICINE
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
## 2018-09-19 ENCOUNTER — TELEPHONE (OUTPATIENT)
Dept: PHARMACY | Facility: CLINIC | Age: 58
End: 2018-09-19

## 2018-09-19 ENCOUNTER — PATIENT MESSAGE (OUTPATIENT)
Dept: PHARMACY | Facility: CLINIC | Age: 58
End: 2018-09-19

## 2018-09-24 DIAGNOSIS — M06.9 RHEUMATOID ARTHRITIS INVOLVING MULTIPLE JOINTS: Primary | ICD-10-CM

## 2018-09-26 ENCOUNTER — OFFICE VISIT (OUTPATIENT)
Dept: PULMONOLOGY | Facility: CLINIC | Age: 58
End: 2018-09-26
Payer: MEDICAID

## 2018-09-26 ENCOUNTER — TELEPHONE (OUTPATIENT)
Dept: RHEUMATOLOGY | Facility: CLINIC | Age: 58
End: 2018-09-26

## 2018-09-26 VITALS
WEIGHT: 129.63 LBS | HEART RATE: 70 BPM | SYSTOLIC BLOOD PRESSURE: 102 MMHG | OXYGEN SATURATION: 97 % | BODY MASS INDEX: 24.47 KG/M2 | HEIGHT: 61 IN | DIASTOLIC BLOOD PRESSURE: 78 MMHG

## 2018-09-26 DIAGNOSIS — D86.0 PULMONARY SARCOIDOSIS: Primary | ICD-10-CM

## 2018-09-26 PROCEDURE — 99999 PR PBB SHADOW E&M-EST. PATIENT-LVL III: CPT | Mod: PBBFAC,,, | Performed by: INTERNAL MEDICINE

## 2018-09-26 PROCEDURE — 99213 OFFICE O/P EST LOW 20 MIN: CPT | Mod: PBBFAC | Performed by: INTERNAL MEDICINE

## 2018-09-26 PROCEDURE — 99214 OFFICE O/P EST MOD 30 MIN: CPT | Mod: S$PBB,,, | Performed by: INTERNAL MEDICINE

## 2018-09-26 NOTE — TELEPHONE ENCOUNTER
----- Message from Magaly Rizo MD sent at 9/25/2018  4:26 PM CDT -----  Contact: patient  Please ask her what the question she has about the labs.    ----- Message -----  From: Erica Fuentes MA  Sent: 9/25/2018   3:39 PM  To: Magaly Rizo MD        ----- Message -----  From: Bekah Mccall  Sent: 9/25/2018   2:21 PM  To: [facility] Staff    Please call pt at [phone]. Patient would like to discuss future labs    Thank you

## 2018-09-26 NOTE — LETTER
September 26, 2018      Adrián Cade MD  [address]  [address]  [place], [place] [address]           [facility]  [address]  [place] [address]  Phone: [phone]          Patient: Silvia Cervantes   MR Number: [id number]   YOB: 1960   Date of Visit: 9/26/2018       Dear Dr. Adrián Cade:    Thank you for referring Silvia Cervantes to me for evaluation. Attached you will find relevant portions of my assessment and plan of care.    If you have questions, please do not hesitate to call me. I look forward to following Silvia Cervantes along with you.    Sincerely,    Pankaj Melo MD    Enclosure  CC:  No Recipients    If you would like to receive this communication electronically, please contact [email or web address] or [phone] to request more information on [facility] access.    For providers and/or their staff who would like to refer a patient to [facility], please contact us through our one-stop-shop provider referral line, [facility], at [phone].    If you feel you have received this communication in error or would no longer like to receive these types of communications, please e-mail [email or web address]
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
## 2018-09-27 LAB — MYCOBACTERIUM SPEC QL CULT: NORMAL

## 2018-09-28 LAB
ACID FAST MOD KINY STN SPEC: NORMAL
MYCOBACTERIUM SPEC QL CULT: NORMAL

## 2018-10-15 ENCOUNTER — OFFICE VISIT (OUTPATIENT)
Dept: NEUROLOGY | Facility: CLINIC | Age: 58
End: 2018-10-15
Payer: MEDICAID

## 2018-10-15 ENCOUNTER — PATIENT MESSAGE (OUTPATIENT)
Dept: RHEUMATOLOGY | Facility: CLINIC | Age: 58
End: 2018-10-15

## 2018-10-15 ENCOUNTER — LAB VISIT (OUTPATIENT)
Dept: LAB | Facility: HOSPITAL | Age: 58
End: 2018-10-15
Attending: INTERNAL MEDICINE
Payer: MEDICAID

## 2018-10-15 VITALS
DIASTOLIC BLOOD PRESSURE: 103 MMHG | WEIGHT: 130.94 LBS | HEART RATE: 88 BPM | SYSTOLIC BLOOD PRESSURE: 156 MMHG | BODY MASS INDEX: 24.72 KG/M2 | HEIGHT: 61 IN

## 2018-10-15 DIAGNOSIS — I10 ESSENTIAL HYPERTENSION: ICD-10-CM

## 2018-10-15 DIAGNOSIS — M54.16 LUMBAR RADICULOPATHY: Chronic | ICD-10-CM

## 2018-10-15 DIAGNOSIS — S15.102D INJURY OF LEFT VERTEBRAL ARTERY, SUBSEQUENT ENCOUNTER: ICD-10-CM

## 2018-10-15 DIAGNOSIS — M54.2 CHRONIC NECK AND BACK PAIN: ICD-10-CM

## 2018-10-15 DIAGNOSIS — M54.16 LUMBAR RADICULAR PAIN: ICD-10-CM

## 2018-10-15 DIAGNOSIS — M06.9 RHEUMATOID ARTHRITIS INVOLVING MULTIPLE JOINTS: ICD-10-CM

## 2018-10-15 DIAGNOSIS — E78.00 HYPERCHOLESTEREMIA: ICD-10-CM

## 2018-10-15 DIAGNOSIS — G89.29 CHRONIC NECK AND BACK PAIN: ICD-10-CM

## 2018-10-15 DIAGNOSIS — M54.9 CHRONIC NECK AND BACK PAIN: ICD-10-CM

## 2018-10-15 DIAGNOSIS — G56.92 NEUROPATHY, ARM, LEFT: Primary | ICD-10-CM

## 2018-10-15 LAB
ALBUMIN SERPL BCP-MCNC: 3.7 G/DL
ALP SERPL-CCNC: 89 U/L
ALT SERPL W/O P-5'-P-CCNC: 21 U/L
ANION GAP SERPL CALC-SCNC: 8 MMOL/L
AST SERPL-CCNC: 16 U/L
BASOPHILS # BLD AUTO: 0.04 K/UL
BASOPHILS NFR BLD: 0.5 %
BILIRUB SERPL-MCNC: 0.2 MG/DL
BUN SERPL-MCNC: 15 MG/DL
CALCIUM SERPL-MCNC: 10.1 MG/DL
CHLORIDE SERPL-SCNC: 105 MMOL/L
CO2 SERPL-SCNC: 27 MMOL/L
CREAT SERPL-MCNC: 0.7 MG/DL
CRP SERPL-MCNC: 5.3 MG/L
DIFFERENTIAL METHOD: ABNORMAL
EOSINOPHIL # BLD AUTO: 0.2 K/UL
EOSINOPHIL NFR BLD: 2.9 %
ERYTHROCYTE [DISTWIDTH] IN BLOOD BY AUTOMATED COUNT: 13 %
ERYTHROCYTE [SEDIMENTATION RATE] IN BLOOD BY WESTERGREN METHOD: 82 MM/HR
EST. GFR  (AFRICAN AMERICAN): >60 ML/MIN/1.73 M^2
EST. GFR  (NON AFRICAN AMERICAN): >60 ML/MIN/1.73 M^2
GLUCOSE SERPL-MCNC: 107 MG/DL
HCT VFR BLD AUTO: 41.5 %
HGB BLD-MCNC: 13.6 G/DL
IMM GRANULOCYTES # BLD AUTO: 0.03 K/UL
IMM GRANULOCYTES NFR BLD AUTO: 0.4 %
LYMPHOCYTES # BLD AUTO: 1.8 K/UL
LYMPHOCYTES NFR BLD: 21.6 %
MCH RBC QN AUTO: 32.2 PG
MCHC RBC AUTO-ENTMCNC: 32.8 G/DL
MCV RBC AUTO: 98 FL
MONOCYTES # BLD AUTO: 0.7 K/UL
MONOCYTES NFR BLD: 8 %
NEUTROPHILS # BLD AUTO: 5.4 K/UL
NEUTROPHILS NFR BLD: 66.6 %
NRBC BLD-RTO: 0 /100 WBC
PLATELET # BLD AUTO: 365 K/UL
PMV BLD AUTO: 9 FL
POTASSIUM SERPL-SCNC: 3.6 MMOL/L
PROT SERPL-MCNC: 8.2 G/DL
RBC # BLD AUTO: 4.22 M/UL
SODIUM SERPL-SCNC: 140 MMOL/L
WBC # BLD AUTO: 8.14 K/UL

## 2018-10-15 PROCEDURE — 99213 OFFICE O/P EST LOW 20 MIN: CPT | Mod: PBBFAC | Performed by: PSYCHIATRY & NEUROLOGY

## 2018-10-15 PROCEDURE — 80053 COMPREHEN METABOLIC PANEL: CPT

## 2018-10-15 PROCEDURE — 86140 C-REACTIVE PROTEIN: CPT

## 2018-10-15 PROCEDURE — 85025 COMPLETE CBC W/AUTO DIFF WBC: CPT

## 2018-10-15 PROCEDURE — 99204 OFFICE O/P NEW MOD 45 MIN: CPT | Mod: S$PBB,,, | Performed by: PSYCHIATRY & NEUROLOGY

## 2018-10-15 PROCEDURE — 85652 RBC SED RATE AUTOMATED: CPT

## 2018-10-15 PROCEDURE — 99999 PR PBB SHADOW E&M-EST. PATIENT-LVL III: CPT | Mod: PBBFAC,,, | Performed by: PSYCHIATRY & NEUROLOGY

## 2018-10-15 PROCEDURE — 36415 COLL VENOUS BLD VENIPUNCTURE: CPT

## 2018-10-15 RX ORDER — DULOXETIN HYDROCHLORIDE 30 MG/1
30 CAPSULE, DELAYED RELEASE ORAL 2 TIMES DAILY
Qty: 60 CAPSULE | Refills: 11 | Status: SHIPPED | OUTPATIENT
Start: 2018-10-15 | End: 2018-12-05
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
## 2018-10-15 NOTE — LETTER
October 15, 2018      Mariella Connelly MD  [address] [facility] [address]  [facility]  [facility] [address]           [facility]  [address]  [place] [address]  Phone: [phone]  Fax: [phone]          Patient: Silvia Cervantes   MR Number: [id number]   YOB: 1960   Date of Visit: 10/15/2018       Dear Dr. Mariella Connelly:    Thank you for referring Silvia Cervantes to me for evaluation. Attached you will find relevant portions of my assessment and plan of care.    If you have questions, please do not hesitate to call me. I look forward to following Silvia Cervantes along with you.    Sincerely,    Jose Maria Randolph MD    Enclosure  CC:  No Recipients    If you would like to receive this communication electronically, please contact [email or web address] or [phone] to request more information on SH[employer] Link access.    For providers and/or their staff who would like to refer a patient to [facility], please contact us through our one-stop-shop provider referral line, [facility], at [phone].    If you feel you have received this communication in error or would no longer like to receive these types of communications, please e-mail [email or web address]
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
## 2018-10-18 ENCOUNTER — OFFICE VISIT (OUTPATIENT)
Dept: OBSTETRICS AND GYNECOLOGY | Facility: CLINIC | Age: 58
End: 2018-10-18
Payer: MEDICAID

## 2018-10-18 VITALS
WEIGHT: 131.75 LBS | HEIGHT: 61 IN | DIASTOLIC BLOOD PRESSURE: 82 MMHG | SYSTOLIC BLOOD PRESSURE: 137 MMHG | BODY MASS INDEX: 24.87 KG/M2

## 2018-10-18 DIAGNOSIS — N89.1 VAIN II (VAGINAL INTRAEPITHELIAL NEOPLASIA GRADE II): ICD-10-CM

## 2018-10-18 DIAGNOSIS — Z01.419 WELL WOMAN EXAM WITH ROUTINE GYNECOLOGICAL EXAM: Primary | ICD-10-CM

## 2018-10-18 DIAGNOSIS — Z12.72 SCREENING FOR VAGINAL CANCER: ICD-10-CM

## 2018-10-18 PROCEDURE — 88175 CYTOPATH C/V AUTO FLUID REDO: CPT

## 2018-10-18 PROCEDURE — 99999 PR PBB SHADOW E&M-EST. PATIENT-LVL III: CPT | Mod: PBBFAC,,, | Performed by: OBSTETRICS & GYNECOLOGY

## 2018-10-18 PROCEDURE — 99213 OFFICE O/P EST LOW 20 MIN: CPT | Mod: PBBFAC,PO | Performed by: OBSTETRICS & GYNECOLOGY

## 2018-10-18 PROCEDURE — 99396 PREV VISIT EST AGE 40-64: CPT | Mod: S$PBB,,, | Performed by: OBSTETRICS & GYNECOLOGY
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
## 2018-11-06 ENCOUNTER — OFFICE VISIT (OUTPATIENT)
Dept: FAMILY MEDICINE | Facility: CLINIC | Age: 58
End: 2018-11-06
Attending: FAMILY MEDICINE
Payer: MEDICAID

## 2018-11-06 VITALS
DIASTOLIC BLOOD PRESSURE: 89 MMHG | HEIGHT: 61 IN | HEART RATE: 83 BPM | SYSTOLIC BLOOD PRESSURE: 137 MMHG | BODY MASS INDEX: 24.72 KG/M2 | WEIGHT: 130.94 LBS | OXYGEN SATURATION: 97 %

## 2018-11-06 DIAGNOSIS — J30.1 NON-SEASONAL ALLERGIC RHINITIS DUE TO POLLEN: ICD-10-CM

## 2018-11-06 DIAGNOSIS — E78.00 HYPERCHOLESTEREMIA: ICD-10-CM

## 2018-11-06 DIAGNOSIS — R91.8 PULMONARY NODULES: ICD-10-CM

## 2018-11-06 DIAGNOSIS — L80 VITILIGO: ICD-10-CM

## 2018-11-06 DIAGNOSIS — R22.31 MASS OF RIGHT FOREARM: ICD-10-CM

## 2018-11-06 DIAGNOSIS — I10 ESSENTIAL HYPERTENSION: Primary | ICD-10-CM

## 2018-11-06 DIAGNOSIS — F33.9 MAJOR DEPRESSION, RECURRENT, CHRONIC: ICD-10-CM

## 2018-11-06 PROCEDURE — 99999 PR PBB SHADOW E&M-EST. PATIENT-LVL III: CPT | Mod: PBBFAC,,, | Performed by: FAMILY MEDICINE

## 2018-11-06 PROCEDURE — 99213 OFFICE O/P EST LOW 20 MIN: CPT | Mod: PBBFAC,PO | Performed by: FAMILY MEDICINE

## 2018-11-06 PROCEDURE — 99214 OFFICE O/P EST MOD 30 MIN: CPT | Mod: S$PBB,,, | Performed by: FAMILY MEDICINE

## 2018-11-06 RX ORDER — AZELASTINE 1 MG/ML
1 SPRAY, METERED NASAL 2 TIMES DAILY
Qty: 30 ML | Refills: 3 | Status: SHIPPED | OUTPATIENT
Start: 2018-11-06 | End: 2021-01-26 | Stop reason: SDUPTHER
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
## 2018-11-12 ENCOUNTER — OFFICE VISIT (OUTPATIENT)
Dept: SURGERY | Facility: CLINIC | Age: 58
End: 2018-11-12
Attending: FAMILY MEDICINE
Payer: MEDICAID

## 2018-11-12 ENCOUNTER — APPOINTMENT (OUTPATIENT)
Dept: LAB | Facility: HOSPITAL | Age: 58
End: 2018-11-12
Attending: STUDENT IN AN ORGANIZED HEALTH CARE EDUCATION/TRAINING PROGRAM
Payer: MEDICAID

## 2018-11-12 VITALS
HEIGHT: 61 IN | BODY MASS INDEX: 25.02 KG/M2 | SYSTOLIC BLOOD PRESSURE: 130 MMHG | DIASTOLIC BLOOD PRESSURE: 87 MMHG | WEIGHT: 132.5 LBS | HEART RATE: 85 BPM | TEMPERATURE: 98 F

## 2018-11-12 DIAGNOSIS — M79.89 SOFT TISSUE MASS: Primary | ICD-10-CM

## 2018-11-12 PROCEDURE — 99999 PR PBB SHADOW E&M-EST. PATIENT-LVL III: CPT | Mod: PBBFAC,,, | Performed by: STUDENT IN AN ORGANIZED HEALTH CARE EDUCATION/TRAINING PROGRAM

## 2018-11-12 PROCEDURE — 99204 OFFICE O/P NEW MOD 45 MIN: CPT | Mod: S$PBB,25,, | Performed by: STUDENT IN AN ORGANIZED HEALTH CARE EDUCATION/TRAINING PROGRAM

## 2018-11-12 PROCEDURE — 88312 SPECIAL STAINS GROUP 1: CPT | Mod: 26,,, | Performed by: PATHOLOGY

## 2018-11-12 PROCEDURE — 11403 EXC TR-EXT B9+MARG 2.1-3CM: CPT | Mod: S$PBB,,, | Performed by: STUDENT IN AN ORGANIZED HEALTH CARE EDUCATION/TRAINING PROGRAM

## 2018-11-12 PROCEDURE — 11403 EXC TR-EXT B9+MARG 2.1-3CM: CPT | Mod: PBBFAC,PO | Performed by: STUDENT IN AN ORGANIZED HEALTH CARE EDUCATION/TRAINING PROGRAM

## 2018-11-12 PROCEDURE — 88305 TISSUE EXAM BY PATHOLOGIST: CPT | Mod: 26,,, | Performed by: PATHOLOGY

## 2018-11-12 PROCEDURE — 88305 TISSUE EXAM BY PATHOLOGIST: CPT | Performed by: PATHOLOGY

## 2018-11-12 PROCEDURE — 99213 OFFICE O/P EST LOW 20 MIN: CPT | Mod: PBBFAC,25,PO | Performed by: STUDENT IN AN ORGANIZED HEALTH CARE EDUCATION/TRAINING PROGRAM

## 2018-11-12 NOTE — LETTER
November 13, 2018      Adrián Cade MD  [address]  [address]  [place] [address]           [facility]  [address]  [address]  [place] [address]  Phone: [phone]          Patient: Silvia Cervantes   MR Number: [id number]   YOB: 1960   Date of Visit: 11/12/2018       Dear Dr. Adrián Cade:    Thank you for referring Silvia Cervantes to me for evaluation. Attached you will find relevant portions of my assessment and plan of care.    If you have questions, please do not hesitate to call me. I look forward to following Silvia Cervantes along with you.    Sincerely,    Velasquez Ulrich MD    Enclosure  CC:  No Recipients    If you would like to receive this communication electronically, please contact [email or web address] or [phone] to request more information on [email or web address] Link access.    For providers and/or their staff who would like to refer a patient to [facility], please contact us through our one-stop-shop provider referral line, [facility], at [phone].    If you feel you have received this communication in error or would no longer like to receive these types of communications, please e-mail [email or web address]
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
## 2018-11-13 PROBLEM — M79.89 SOFT TISSUE MASS: Status: ACTIVE | Noted: 2018-11-13

## 2018-11-13 NOTE — PROGRESS NOTES
"Silvia Cervantes is a 57 y.o. female patient.    Temp: 98.2 °F (36.8 °C) (11/12/18 1013)  Pulse: 85 (11/12/18 1013)  BP: 130/87 (11/12/18 1013)  Weight: 60.1 kg (132 lb 7.9 oz) (11/12/18 1013)  Height: 5' 1" (154.9 cm) (11/12/18 1013)       Exc, Benign Lesion  Date/Time: 11/13/2018 2:17 PM  Performed by: Velasquez Ulrich MD  Authorized by: Velasquez Ulrich MD     Consent Done?:  Yes (Written)  Time out: Immediately prior to procedure a "time out" was called to verify the correct patient, procedure, equipment, support staff and site/side marked as required.    INDICATIONS:cyst  LOCATION:  Body area:  Upper arm / elbowright    PREP:Position:  Supine    ANESTHESIA:  Anesthesia:  Local infiltration  Local anesthetic:  Lidocaine 1% with epinephrine    PROCEDURE DETAILS:  Excision size (cm):  2  Scalpel size:  15  Incision type:  Elliptical  Specimens?: Yes    Specimens submitted to pathology.  Hemostasis was obtained.  Wound closure:  Intermediate layered  Wound repair size (cm):  2  Sutures:  3-0 Vicryl and 4-0 Monocryl  Sterile dressings:  Gauze  Post-op diagnosis: Same as pre-op diagnosis.  Needle, instrument, and sponge counts were correct.  Patient tolerated the procedure well with no immediate complications.  Post-operative instructions were provided for the patient.  Patient was discharged and will follow up for wound check and pathology results. and Patient was discharged and will follow up for wound check.        Velasquez Ulrich  11/13/2018    "

## 2018-11-26 ENCOUNTER — OFFICE VISIT (OUTPATIENT)
Dept: SURGERY | Facility: CLINIC | Age: 58
End: 2018-11-26
Payer: MEDICAID

## 2018-11-26 VITALS
WEIGHT: 132.25 LBS | SYSTOLIC BLOOD PRESSURE: 134 MMHG | TEMPERATURE: 98 F | DIASTOLIC BLOOD PRESSURE: 87 MMHG | HEART RATE: 92 BPM | BODY MASS INDEX: 24.97 KG/M2 | HEIGHT: 61 IN

## 2018-11-26 DIAGNOSIS — M79.89 SOFT TISSUE MASS: Primary | ICD-10-CM

## 2018-11-26 PROCEDURE — 99999 PR PBB SHADOW E&M-EST. PATIENT-LVL III: CPT | Mod: PBBFAC,,, | Performed by: STUDENT IN AN ORGANIZED HEALTH CARE EDUCATION/TRAINING PROGRAM

## 2018-11-26 PROCEDURE — 99213 OFFICE O/P EST LOW 20 MIN: CPT | Mod: PBBFAC,PO | Performed by: STUDENT IN AN ORGANIZED HEALTH CARE EDUCATION/TRAINING PROGRAM

## 2018-11-26 PROCEDURE — 99024 POSTOP FOLLOW-UP VISIT: CPT | Mod: ,,, | Performed by: STUDENT IN AN ORGANIZED HEALTH CARE EDUCATION/TRAINING PROGRAM

## 2018-11-26 NOTE — PROGRESS NOTES
Feeling well with respect to RUE  No drainage  Minimal soreness at this point s/p excision of RUE subQ mass    Incision healed well  No erythema    Path - reviewed, consistent with rheumatoid nodule    Ok to get wet, resume normal activity  RTC prn

## 2018-11-29 ENCOUNTER — CLINICAL SUPPORT (OUTPATIENT)
Dept: REHABILITATION | Facility: OTHER | Age: 58
End: 2018-11-29
Attending: PSYCHIATRY & NEUROLOGY
Payer: MEDICAID

## 2018-11-29 DIAGNOSIS — M54.42 CHRONIC BILATERAL LOW BACK PAIN WITH LEFT-SIDED SCIATICA: ICD-10-CM

## 2018-11-29 DIAGNOSIS — G89.29 CHRONIC BILATERAL LOW BACK PAIN WITH LEFT-SIDED SCIATICA: ICD-10-CM

## 2018-11-29 PROCEDURE — 97110 THERAPEUTIC EXERCISES: CPT

## 2018-11-29 PROCEDURE — 97162 PT EVAL MOD COMPLEX 30 MIN: CPT
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
## 2018-12-05 ENCOUNTER — OFFICE VISIT (OUTPATIENT)
Dept: RHEUMATOLOGY | Facility: CLINIC | Age: 58
End: 2018-12-05
Payer: MEDICAID

## 2018-12-05 VITALS
DIASTOLIC BLOOD PRESSURE: 92 MMHG | BODY MASS INDEX: 24.92 KG/M2 | HEIGHT: 61 IN | WEIGHT: 132 LBS | HEART RATE: 87 BPM | SYSTOLIC BLOOD PRESSURE: 139 MMHG

## 2018-12-05 DIAGNOSIS — M06.9 RHEUMATOID ARTHRITIS INVOLVING MULTIPLE JOINTS: Primary | ICD-10-CM

## 2018-12-05 DIAGNOSIS — D86.9 SARCOIDOSIS: ICD-10-CM

## 2018-12-05 PROCEDURE — 99213 OFFICE O/P EST LOW 20 MIN: CPT | Mod: PBBFAC,PO | Performed by: INTERNAL MEDICINE

## 2018-12-05 PROCEDURE — 99214 OFFICE O/P EST MOD 30 MIN: CPT | Mod: S$PBB,,, | Performed by: INTERNAL MEDICINE

## 2018-12-05 PROCEDURE — 99999 PR PBB SHADOW E&M-EST. PATIENT-LVL III: CPT | Mod: PBBFAC,,, | Performed by: INTERNAL MEDICINE

## 2018-12-05 RX ORDER — FOLIC ACID 1 MG/1
1 TABLET ORAL DAILY
Qty: 90 TABLET | Refills: 3 | Status: SHIPPED | OUTPATIENT
Start: 2018-12-05 | End: 2018-12-05 | Stop reason: SDUPTHER

## 2018-12-05 RX ORDER — CELECOXIB 200 MG/1
200 CAPSULE ORAL 2 TIMES DAILY PRN
Qty: 60 CAPSULE | Refills: 6 | Status: SHIPPED | OUTPATIENT
Start: 2018-12-05 | End: 2020-11-12

## 2018-12-05 RX ORDER — METHOTREXATE 2.5 MG/1
TABLET ORAL
Qty: 32 TABLET | Refills: 0 | Status: SHIPPED | OUTPATIENT
Start: 2018-12-05 | End: 2019-03-13 | Stop reason: SDUPTHER

## 2018-12-05 RX ORDER — METHOTREXATE 2.5 MG/1
TABLET ORAL
Qty: 32 TABLET | Refills: 0 | Status: SHIPPED | OUTPATIENT
Start: 2018-12-05 | End: 2018-12-05 | Stop reason: SDUPTHER

## 2018-12-05 RX ORDER — FOLIC ACID 1 MG/1
1 TABLET ORAL DAILY
Qty: 90 TABLET | Refills: 3 | Status: SHIPPED | OUTPATIENT
Start: 2018-12-05 | End: 2019-03-14 | Stop reason: SDUPTHER
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
## 2018-12-20 ENCOUNTER — OFFICE VISIT (OUTPATIENT)
Dept: SPINE | Facility: CLINIC | Age: 58
End: 2018-12-20
Attending: PHYSICAL MEDICINE & REHABILITATION
Payer: MEDICAID

## 2018-12-20 VITALS
WEIGHT: 135 LBS | HEIGHT: 61 IN | BODY MASS INDEX: 25.49 KG/M2 | DIASTOLIC BLOOD PRESSURE: 85 MMHG | HEART RATE: 90 BPM | SYSTOLIC BLOOD PRESSURE: 131 MMHG

## 2018-12-20 DIAGNOSIS — M54.50 CHRONIC BILATERAL LOW BACK PAIN WITHOUT SCIATICA: ICD-10-CM

## 2018-12-20 DIAGNOSIS — S12.100S CLOSED ODONTOID FRACTURE, SEQUELA: ICD-10-CM

## 2018-12-20 DIAGNOSIS — R20.0 LEFT SIDED NUMBNESS: Primary | ICD-10-CM

## 2018-12-20 DIAGNOSIS — T14.90XA TRAUMA: ICD-10-CM

## 2018-12-20 DIAGNOSIS — S15.102S: ICD-10-CM

## 2018-12-20 DIAGNOSIS — G89.29 CHRONIC BILATERAL LOW BACK PAIN WITHOUT SCIATICA: ICD-10-CM

## 2018-12-20 DIAGNOSIS — S32.810S MULTIPLE CLOSED FRACTURES OF PELVIS WITH STABLE DISRUPTION OF PELVIC RING, SEQUELA: ICD-10-CM

## 2018-12-20 DIAGNOSIS — M79.2 NEURALGIA: ICD-10-CM

## 2018-12-20 PROCEDURE — 99999 PR PBB SHADOW E&M-EST. PATIENT-LVL III: CPT | Mod: PBBFAC,,, | Performed by: PHYSICAL MEDICINE & REHABILITATION

## 2018-12-20 PROCEDURE — 99214 OFFICE O/P EST MOD 30 MIN: CPT | Mod: S$PBB,,, | Performed by: PHYSICAL MEDICINE & REHABILITATION

## 2018-12-20 PROCEDURE — 99213 OFFICE O/P EST LOW 20 MIN: CPT | Mod: PBBFAC | Performed by: PHYSICAL MEDICINE & REHABILITATION

## 2018-12-27 ENCOUNTER — PROCEDURE VISIT (OUTPATIENT)
Dept: NEUROLOGY | Facility: CLINIC | Age: 58
End: 2018-12-27
Payer: MEDICAID

## 2018-12-27 DIAGNOSIS — G56.92 NEUROPATHY, ARM, LEFT: ICD-10-CM

## 2018-12-27 DIAGNOSIS — M54.16 LUMBAR RADICULOPATHY: Chronic | ICD-10-CM

## 2018-12-27 DIAGNOSIS — M54.16 LUMBAR RADICULAR PAIN: ICD-10-CM

## 2018-12-27 PROCEDURE — 95886 MUSC TEST DONE W/N TEST COMP: CPT | Mod: PBBFAC | Performed by: PSYCHIATRY & NEUROLOGY

## 2018-12-27 PROCEDURE — 95911 NRV CNDJ TEST 9-10 STUDIES: CPT | Mod: PBBFAC | Performed by: PSYCHIATRY & NEUROLOGY

## 2018-12-27 PROCEDURE — 95886 MUSC TEST DONE W/N TEST COMP: CPT | Mod: 26,S$PBB,, | Performed by: PSYCHIATRY & NEUROLOGY

## 2018-12-27 PROCEDURE — 95911 NRV CNDJ TEST 9-10 STUDIES: CPT | Mod: 26,S$PBB,, | Performed by: PSYCHIATRY & NEUROLOGY

## 2018-12-27 PROCEDURE — 95860 NEEDLE EMG 1 EXTREMITY: CPT | Mod: PBBFAC | Performed by: PSYCHIATRY & NEUROLOGY

## 2018-12-27 RX ORDER — GABAPENTIN 300 MG/1
300 CAPSULE ORAL 3 TIMES DAILY
Qty: 90 CAPSULE | Refills: 11 | Status: SHIPPED | OUTPATIENT
Start: 2018-12-27 | End: 2019-09-23 | Stop reason: SDUPTHER

## 2019-01-11 ENCOUNTER — CLINICAL SUPPORT (OUTPATIENT)
Dept: REHABILITATION | Facility: OTHER | Age: 59
End: 2019-01-11
Attending: PSYCHIATRY & NEUROLOGY
Payer: MEDICAID

## 2019-01-11 DIAGNOSIS — G89.29 CHRONIC BILATERAL LOW BACK PAIN WITH LEFT-SIDED SCIATICA: ICD-10-CM

## 2019-01-11 DIAGNOSIS — M54.42 CHRONIC BILATERAL LOW BACK PAIN WITH LEFT-SIDED SCIATICA: ICD-10-CM

## 2019-01-11 PROCEDURE — G8979 MOBILITY GOAL STATUS: HCPCS | Mod: CL

## 2019-01-11 PROCEDURE — 97110 THERAPEUTIC EXERCISES: CPT

## 2019-01-11 PROCEDURE — G8978 MOBILITY CURRENT STATUS: HCPCS | Mod: CL
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
## 2019-01-15 ENCOUNTER — CLINICAL SUPPORT (OUTPATIENT)
Dept: REHABILITATION | Facility: OTHER | Age: 59
End: 2019-01-15
Attending: PSYCHIATRY & NEUROLOGY
Payer: MEDICAID

## 2019-01-15 DIAGNOSIS — M54.42 CHRONIC BILATERAL LOW BACK PAIN WITH LEFT-SIDED SCIATICA: ICD-10-CM

## 2019-01-15 DIAGNOSIS — G89.29 CHRONIC BILATERAL LOW BACK PAIN WITH LEFT-SIDED SCIATICA: ICD-10-CM

## 2019-01-15 PROCEDURE — 97110 THERAPEUTIC EXERCISES: CPT
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
## 2019-01-17 ENCOUNTER — CLINICAL SUPPORT (OUTPATIENT)
Dept: REHABILITATION | Facility: OTHER | Age: 59
End: 2019-01-17
Attending: PSYCHIATRY & NEUROLOGY
Payer: MEDICAID

## 2019-01-17 ENCOUNTER — TELEPHONE (OUTPATIENT)
Dept: FAMILY MEDICINE | Facility: CLINIC | Age: 59
End: 2019-01-17

## 2019-01-17 DIAGNOSIS — G89.29 CHRONIC BILATERAL LOW BACK PAIN WITH LEFT-SIDED SCIATICA: ICD-10-CM

## 2019-01-17 DIAGNOSIS — M54.42 CHRONIC BILATERAL LOW BACK PAIN WITH LEFT-SIDED SCIATICA: ICD-10-CM

## 2019-01-17 PROCEDURE — 97110 THERAPEUTIC EXERCISES: CPT

## 2019-01-17 NOTE — TELEPHONE ENCOUNTER
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
## 2019-01-17 NOTE — TELEPHONE ENCOUNTER
----- Message from Mai Wiseman sent at 1/17/2019  3:26 PM CST -----  Contact: Nissa Miller - [phone]  Patient is requesting a call back regarding, a form she needs to be filled out. Please advise

## 2019-01-22 ENCOUNTER — CLINICAL SUPPORT (OUTPATIENT)
Dept: REHABILITATION | Facility: OTHER | Age: 59
End: 2019-01-22
Attending: PSYCHIATRY & NEUROLOGY
Payer: MEDICAID

## 2019-01-22 DIAGNOSIS — G89.29 CHRONIC BILATERAL LOW BACK PAIN WITH LEFT-SIDED SCIATICA: Primary | ICD-10-CM

## 2019-01-22 DIAGNOSIS — M54.42 CHRONIC BILATERAL LOW BACK PAIN WITH LEFT-SIDED SCIATICA: Primary | ICD-10-CM

## 2019-01-22 PROCEDURE — 97110 THERAPEUTIC EXERCISES: CPT
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
## 2019-01-24 ENCOUNTER — CLINICAL SUPPORT (OUTPATIENT)
Dept: REHABILITATION | Facility: OTHER | Age: 59
End: 2019-01-24
Attending: PSYCHIATRY & NEUROLOGY
Payer: MEDICAID

## 2019-01-24 DIAGNOSIS — M54.42 CHRONIC BILATERAL LOW BACK PAIN WITH LEFT-SIDED SCIATICA: Primary | ICD-10-CM

## 2019-01-24 DIAGNOSIS — G89.29 CHRONIC BILATERAL LOW BACK PAIN WITH LEFT-SIDED SCIATICA: Primary | ICD-10-CM

## 2019-01-24 PROCEDURE — 97110 THERAPEUTIC EXERCISES: CPT
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
## 2019-01-29 ENCOUNTER — CLINICAL SUPPORT (OUTPATIENT)
Dept: REHABILITATION | Facility: OTHER | Age: 59
End: 2019-01-29
Attending: PSYCHIATRY & NEUROLOGY
Payer: MEDICAID

## 2019-01-29 DIAGNOSIS — M54.42 CHRONIC BILATERAL LOW BACK PAIN WITH LEFT-SIDED SCIATICA: Primary | ICD-10-CM

## 2019-01-29 DIAGNOSIS — G89.29 CHRONIC BILATERAL LOW BACK PAIN WITH LEFT-SIDED SCIATICA: Primary | ICD-10-CM

## 2019-01-29 PROCEDURE — 97110 THERAPEUTIC EXERCISES: CPT
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
## 2019-01-31 ENCOUNTER — CLINICAL SUPPORT (OUTPATIENT)
Dept: REHABILITATION | Facility: OTHER | Age: 59
End: 2019-01-31
Attending: PSYCHIATRY & NEUROLOGY
Payer: MEDICAID

## 2019-01-31 DIAGNOSIS — G89.29 CHRONIC BILATERAL LOW BACK PAIN WITH LEFT-SIDED SCIATICA: Primary | ICD-10-CM

## 2019-01-31 DIAGNOSIS — M54.42 CHRONIC BILATERAL LOW BACK PAIN WITH LEFT-SIDED SCIATICA: Primary | ICD-10-CM

## 2019-01-31 PROCEDURE — 97110 THERAPEUTIC EXERCISES: CPT
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
## 2019-02-04 ENCOUNTER — LAB VISIT (OUTPATIENT)
Dept: LAB | Facility: HOSPITAL | Age: 59
End: 2019-02-04
Attending: FAMILY MEDICINE
Payer: MEDICAID

## 2019-02-04 DIAGNOSIS — I10 ESSENTIAL HYPERTENSION: ICD-10-CM

## 2019-02-04 DIAGNOSIS — E78.00 HYPERCHOLESTEREMIA: ICD-10-CM

## 2019-02-04 LAB
25(OH)D3+25(OH)D2 SERPL-MCNC: 23 NG/ML
ALBUMIN SERPL BCP-MCNC: 3.8 G/DL
ALP SERPL-CCNC: 86 U/L
ALT SERPL W/O P-5'-P-CCNC: 30 U/L
ANION GAP SERPL CALC-SCNC: 9 MMOL/L
AST SERPL-CCNC: 24 U/L
BASOPHILS # BLD AUTO: 0.03 K/UL
BASOPHILS NFR BLD: 0.4 %
BILIRUB SERPL-MCNC: 0.4 MG/DL
BUN SERPL-MCNC: 12 MG/DL
CALCIUM SERPL-MCNC: 10.1 MG/DL
CHLORIDE SERPL-SCNC: 104 MMOL/L
CHOLEST SERPL-MCNC: 180 MG/DL
CHOLEST/HDLC SERPL: 3.8 {RATIO}
CO2 SERPL-SCNC: 29 MMOL/L
CREAT SERPL-MCNC: 0.8 MG/DL
DIFFERENTIAL METHOD: ABNORMAL
EOSINOPHIL # BLD AUTO: 0.3 K/UL
EOSINOPHIL NFR BLD: 3.5 %
ERYTHROCYTE [DISTWIDTH] IN BLOOD BY AUTOMATED COUNT: 13.8 %
EST. GFR  (AFRICAN AMERICAN): >60 ML/MIN/1.73 M^2
EST. GFR  (NON AFRICAN AMERICAN): >60 ML/MIN/1.73 M^2
GLUCOSE SERPL-MCNC: 88 MG/DL
HCT VFR BLD AUTO: 41.8 %
HDLC SERPL-MCNC: 47 MG/DL
HDLC SERPL: 26.1 %
HGB BLD-MCNC: 13.2 G/DL
LDLC SERPL CALC-MCNC: 106.6 MG/DL
LYMPHOCYTES # BLD AUTO: 2.5 K/UL
LYMPHOCYTES NFR BLD: 32.4 %
MCH RBC QN AUTO: 31.7 PG
MCHC RBC AUTO-ENTMCNC: 31.6 G/DL
MCV RBC AUTO: 100 FL
MONOCYTES # BLD AUTO: 0.6 K/UL
MONOCYTES NFR BLD: 7.1 %
NEUTROPHILS # BLD AUTO: 4.3 K/UL
NEUTROPHILS NFR BLD: 56.3 %
NONHDLC SERPL-MCNC: 133 MG/DL
PLATELET # BLD AUTO: 385 K/UL
PMV BLD AUTO: 9.3 FL
POTASSIUM SERPL-SCNC: 4 MMOL/L
PROT SERPL-MCNC: 7.9 G/DL
RBC # BLD AUTO: 4.17 M/UL
SODIUM SERPL-SCNC: 142 MMOL/L
TRIGL SERPL-MCNC: 132 MG/DL
WBC # BLD AUTO: 7.71 K/UL

## 2019-02-04 PROCEDURE — 80053 COMPREHEN METABOLIC PANEL: CPT

## 2019-02-04 PROCEDURE — 82306 VITAMIN D 25 HYDROXY: CPT

## 2019-02-04 PROCEDURE — 80061 LIPID PANEL: CPT

## 2019-02-04 PROCEDURE — 85025 COMPLETE CBC W/AUTO DIFF WBC: CPT

## 2019-02-04 PROCEDURE — 36415 COLL VENOUS BLD VENIPUNCTURE: CPT

## 2019-02-06 ENCOUNTER — OFFICE VISIT (OUTPATIENT)
Dept: FAMILY MEDICINE | Facility: CLINIC | Age: 59
End: 2019-02-06
Attending: FAMILY MEDICINE
Payer: MEDICAID

## 2019-02-06 VITALS
WEIGHT: 138.25 LBS | DIASTOLIC BLOOD PRESSURE: 81 MMHG | BODY MASS INDEX: 26.1 KG/M2 | OXYGEN SATURATION: 98 % | HEIGHT: 61 IN | HEART RATE: 98 BPM | TEMPERATURE: 98 F | SYSTOLIC BLOOD PRESSURE: 120 MMHG

## 2019-02-06 DIAGNOSIS — I10 ESSENTIAL HYPERTENSION: Primary | ICD-10-CM

## 2019-02-06 DIAGNOSIS — Z12.31 ENCOUNTER FOR SCREENING MAMMOGRAM FOR BREAST CANCER: ICD-10-CM

## 2019-02-06 DIAGNOSIS — F33.9 MAJOR DEPRESSION, RECURRENT, CHRONIC: ICD-10-CM

## 2019-02-06 DIAGNOSIS — E78.00 HYPERCHOLESTEREMIA: ICD-10-CM

## 2019-02-06 PROCEDURE — 99214 OFFICE O/P EST MOD 30 MIN: CPT | Mod: S$PBB,,, | Performed by: FAMILY MEDICINE

## 2019-02-06 PROCEDURE — 99214 PR OFFICE/OUTPT VISIT, EST, LEVL IV, 30-39 MIN: ICD-10-PCS | Mod: S$PBB,,, | Performed by: FAMILY MEDICINE

## 2019-02-06 PROCEDURE — 99999 PR PBB SHADOW E&M-EST. PATIENT-LVL IV: CPT | Mod: PBBFAC,,, | Performed by: FAMILY MEDICINE

## 2019-02-06 PROCEDURE — 99999 PR PBB SHADOW E&M-EST. PATIENT-LVL IV: ICD-10-PCS | Mod: PBBFAC,,, | Performed by: FAMILY MEDICINE

## 2019-02-06 PROCEDURE — 99214 OFFICE O/P EST MOD 30 MIN: CPT | Mod: PBBFAC,PO | Performed by: FAMILY MEDICINE

## 2019-02-06 RX ORDER — ATORVASTATIN CALCIUM 10 MG/1
10 TABLET, FILM COATED ORAL DAILY
Qty: 90 TABLET | Refills: 3 | Status: SHIPPED | OUTPATIENT
Start: 2019-02-06 | End: 2019-08-06 | Stop reason: SDUPTHER

## 2019-02-06 RX ORDER — SERTRALINE HYDROCHLORIDE 25 MG/1
25 TABLET, FILM COATED ORAL DAILY
Qty: 90 TABLET | Refills: 3 | Status: SHIPPED | OUTPATIENT
Start: 2019-02-06 | End: 2019-08-06 | Stop reason: SDUPTHER

## 2019-02-06 RX ORDER — AMLODIPINE BESYLATE 5 MG/1
5 TABLET ORAL DAILY
Qty: 90 TABLET | Refills: 3 | Status: SHIPPED | OUTPATIENT
Start: 2019-02-06 | End: 2019-08-06 | Stop reason: SDUPTHER

## 2019-02-06 RX ORDER — LOSARTAN POTASSIUM AND HYDROCHLOROTHIAZIDE 12.5; 5 MG/1; MG/1
1 TABLET ORAL DAILY
Qty: 90 TABLET | Refills: 3 | Status: SHIPPED | OUTPATIENT
Start: 2019-02-06 | End: 2019-05-28 | Stop reason: SDUPTHER

## 2019-02-12 ENCOUNTER — CLINICAL SUPPORT (OUTPATIENT)
Dept: REHABILITATION | Facility: OTHER | Age: 59
End: 2019-02-12
Attending: PSYCHIATRY & NEUROLOGY
Payer: MEDICAID

## 2019-02-12 DIAGNOSIS — M54.42 CHRONIC BILATERAL LOW BACK PAIN WITH LEFT-SIDED SCIATICA: Primary | ICD-10-CM

## 2019-02-12 DIAGNOSIS — G89.29 CHRONIC BILATERAL LOW BACK PAIN WITH LEFT-SIDED SCIATICA: Primary | ICD-10-CM

## 2019-02-12 PROCEDURE — 97110 THERAPEUTIC EXERCISES: CPT
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
## 2019-02-14 ENCOUNTER — CLINICAL SUPPORT (OUTPATIENT)
Dept: REHABILITATION | Facility: OTHER | Age: 59
End: 2019-02-14
Attending: PSYCHIATRY & NEUROLOGY
Payer: MEDICAID

## 2019-02-14 DIAGNOSIS — M54.42 CHRONIC BILATERAL LOW BACK PAIN WITH LEFT-SIDED SCIATICA: Primary | ICD-10-CM

## 2019-02-14 DIAGNOSIS — G89.29 CHRONIC BILATERAL LOW BACK PAIN WITH LEFT-SIDED SCIATICA: Primary | ICD-10-CM

## 2019-02-14 PROCEDURE — 97110 THERAPEUTIC EXERCISES: CPT

## 2019-02-14 PROCEDURE — 97750 PHYSICAL PERFORMANCE TEST: CPT
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
## 2019-02-22 ENCOUNTER — CLINICAL SUPPORT (OUTPATIENT)
Dept: REHABILITATION | Facility: OTHER | Age: 59
End: 2019-02-22
Attending: PSYCHIATRY & NEUROLOGY
Payer: MEDICAID

## 2019-02-22 DIAGNOSIS — G89.29 CHRONIC BILATERAL LOW BACK PAIN WITH LEFT-SIDED SCIATICA: Primary | ICD-10-CM

## 2019-02-22 DIAGNOSIS — M54.42 CHRONIC BILATERAL LOW BACK PAIN WITH LEFT-SIDED SCIATICA: Primary | ICD-10-CM

## 2019-02-22 PROBLEM — Z74.09 DECREASED MOBILITY AND ENDURANCE: Status: RESOLVED | Noted: 2017-10-23 | Resolved: 2019-02-22

## 2019-02-22 PROBLEM — M62.81 DECREASED MUSCLE STRENGTH: Status: RESOLVED | Noted: 2017-10-23 | Resolved: 2019-02-22

## 2019-02-22 PROBLEM — R41.840 DECREASED ATTENTION SPAN: Status: RESOLVED | Noted: 2017-10-23 | Resolved: 2019-02-22

## 2019-02-22 PROBLEM — M25.669 DECREASED ROM OF LOWER EXTREMITY: Status: RESOLVED | Noted: 2017-10-23 | Resolved: 2019-02-22

## 2019-02-22 PROCEDURE — 97110 THERAPEUTIC EXERCISES: CPT
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
## 2019-02-26 ENCOUNTER — CLINICAL SUPPORT (OUTPATIENT)
Dept: REHABILITATION | Facility: OTHER | Age: 59
End: 2019-02-26
Attending: PSYCHIATRY & NEUROLOGY
Payer: MEDICAID

## 2019-02-26 DIAGNOSIS — R13.10 DIFFICULTY IN SWALLOWING: Primary | ICD-10-CM

## 2019-02-26 DIAGNOSIS — M54.42 CHRONIC BILATERAL LOW BACK PAIN WITH LEFT-SIDED SCIATICA: Primary | ICD-10-CM

## 2019-02-26 DIAGNOSIS — G89.29 CHRONIC BILATERAL LOW BACK PAIN WITH LEFT-SIDED SCIATICA: Primary | ICD-10-CM

## 2019-02-26 PROCEDURE — 97110 THERAPEUTIC EXERCISES: CPT | Performed by: PHYSICAL THERAPIST
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
## 2019-02-27 ENCOUNTER — LAB VISIT (OUTPATIENT)
Dept: LAB | Facility: HOSPITAL | Age: 59
End: 2019-02-27
Attending: INTERNAL MEDICINE
Payer: MEDICAID

## 2019-02-27 DIAGNOSIS — M06.9 RHEUMATOID ARTHRITIS INVOLVING MULTIPLE JOINTS: ICD-10-CM

## 2019-02-27 LAB
ALBUMIN SERPL BCP-MCNC: 4.1 G/DL
ALP SERPL-CCNC: 85 U/L
ALT SERPL W/O P-5'-P-CCNC: 25 U/L
ANION GAP SERPL CALC-SCNC: 9 MMOL/L
AST SERPL-CCNC: 23 U/L
BASOPHILS # BLD AUTO: 0.02 K/UL
BASOPHILS NFR BLD: 0.3 %
BILIRUB SERPL-MCNC: 0.3 MG/DL
BUN SERPL-MCNC: 13 MG/DL
CALCIUM SERPL-MCNC: 10.3 MG/DL
CHLORIDE SERPL-SCNC: 102 MMOL/L
CO2 SERPL-SCNC: 30 MMOL/L
CREAT SERPL-MCNC: 0.7 MG/DL
CRP SERPL-MCNC: 6.7 MG/L
DIFFERENTIAL METHOD: ABNORMAL
EOSINOPHIL # BLD AUTO: 0.2 K/UL
EOSINOPHIL NFR BLD: 2.4 %
ERYTHROCYTE [DISTWIDTH] IN BLOOD BY AUTOMATED COUNT: 13.7 %
ERYTHROCYTE [SEDIMENTATION RATE] IN BLOOD BY WESTERGREN METHOD: 54 MM/HR
EST. GFR  (AFRICAN AMERICAN): >60 ML/MIN/1.73 M^2
EST. GFR  (NON AFRICAN AMERICAN): >60 ML/MIN/1.73 M^2
GLUCOSE SERPL-MCNC: 107 MG/DL
HCT VFR BLD AUTO: 41.2 %
HGB BLD-MCNC: 13 G/DL
LYMPHOCYTES # BLD AUTO: 2.3 K/UL
LYMPHOCYTES NFR BLD: 28.9 %
MCH RBC QN AUTO: 31.7 PG
MCHC RBC AUTO-ENTMCNC: 31.6 G/DL
MCV RBC AUTO: 101 FL
MONOCYTES # BLD AUTO: 0.5 K/UL
MONOCYTES NFR BLD: 5.9 %
NEUTROPHILS # BLD AUTO: 4.9 K/UL
NEUTROPHILS NFR BLD: 62.4 %
PLATELET # BLD AUTO: 384 K/UL
PMV BLD AUTO: 9.1 FL
POTASSIUM SERPL-SCNC: 3.9 MMOL/L
PROT SERPL-MCNC: 8.2 G/DL
RBC # BLD AUTO: 4.1 M/UL
SODIUM SERPL-SCNC: 141 MMOL/L
WBC # BLD AUTO: 7.79 K/UL

## 2019-02-27 PROCEDURE — 85025 COMPLETE CBC W/AUTO DIFF WBC: CPT

## 2019-02-27 PROCEDURE — 86140 C-REACTIVE PROTEIN: CPT

## 2019-02-27 PROCEDURE — 36415 COLL VENOUS BLD VENIPUNCTURE: CPT

## 2019-02-27 PROCEDURE — 83520 IMMUNOASSAY QUANT NOS NONAB: CPT | Mod: 59

## 2019-02-27 PROCEDURE — 85652 RBC SED RATE AUTOMATED: CPT

## 2019-02-27 PROCEDURE — 84165 PROTEIN E-PHORESIS SERUM: CPT | Mod: 26,,, | Performed by: PATHOLOGY

## 2019-02-27 PROCEDURE — 80053 COMPREHEN METABOLIC PANEL: CPT

## 2019-02-27 PROCEDURE — 84165 PROTEIN E-PHORESIS SERUM: CPT

## 2019-02-27 PROCEDURE — 84165 PATHOLOGIST INTERPRETATION SPE: ICD-10-PCS | Mod: 26,,, | Performed by: PATHOLOGY

## 2019-02-28 ENCOUNTER — CLINICAL SUPPORT (OUTPATIENT)
Dept: REHABILITATION | Facility: OTHER | Age: 59
End: 2019-02-28
Attending: PSYCHIATRY & NEUROLOGY
Payer: MEDICAID

## 2019-02-28 DIAGNOSIS — M54.42 CHRONIC BILATERAL LOW BACK PAIN WITH LEFT-SIDED SCIATICA: Primary | ICD-10-CM

## 2019-02-28 DIAGNOSIS — G89.29 CHRONIC BILATERAL LOW BACK PAIN WITH LEFT-SIDED SCIATICA: Primary | ICD-10-CM

## 2019-02-28 LAB
ALBUMIN SERPL ELPH-MCNC: 4.22 G/DL
ALPHA1 GLOB SERPL ELPH-MCNC: 0.33 G/DL
ALPHA2 GLOB SERPL ELPH-MCNC: 0.94 G/DL
B-GLOBULIN SERPL ELPH-MCNC: 1.03 G/DL
GAMMA GLOB SERPL ELPH-MCNC: 1.29 G/DL
KAPPA LC SER QL IA: 1.89 MG/DL
KAPPA LC/LAMBDA SER IA: 0.98
LAMBDA LC SER QL IA: 1.92 MG/DL
PROT SERPL-MCNC: 7.8 G/DL

## 2019-02-28 PROCEDURE — 97110 THERAPEUTIC EXERCISES: CPT
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
## 2019-03-01 LAB — PATHOLOGIST INTERPRETATION SPE: NORMAL

## 2019-03-06 ENCOUNTER — CLINICAL SUPPORT (OUTPATIENT)
Dept: INFECTIOUS DISEASES | Facility: CLINIC | Age: 59
End: 2019-03-06
Payer: MEDICAID

## 2019-03-06 DIAGNOSIS — R13.10 PROBLEMS WITH SWALLOWING AND MASTICATION: Primary | ICD-10-CM

## 2019-03-06 DIAGNOSIS — Z23 IMMUNIZATION DUE: ICD-10-CM

## 2019-03-06 DIAGNOSIS — R13.10 DYSPHAGIA: ICD-10-CM

## 2019-03-06 PROCEDURE — 90471 IMMUNIZATION ADMIN: CPT | Mod: PBBFAC

## 2019-03-07 ENCOUNTER — CLINICAL SUPPORT (OUTPATIENT)
Dept: REHABILITATION | Facility: OTHER | Age: 59
End: 2019-03-07
Attending: PSYCHIATRY & NEUROLOGY
Payer: MEDICAID

## 2019-03-07 DIAGNOSIS — M54.42 CHRONIC BILATERAL LOW BACK PAIN WITH LEFT-SIDED SCIATICA: Primary | ICD-10-CM

## 2019-03-07 DIAGNOSIS — G89.29 CHRONIC BILATERAL LOW BACK PAIN WITH LEFT-SIDED SCIATICA: Primary | ICD-10-CM

## 2019-03-07 PROCEDURE — 97110 THERAPEUTIC EXERCISES: CPT
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
## 2019-03-11 ENCOUNTER — CLINICAL SUPPORT (OUTPATIENT)
Dept: REHABILITATION | Facility: OTHER | Age: 59
End: 2019-03-11
Attending: PSYCHIATRY & NEUROLOGY
Payer: MEDICAID

## 2019-03-11 DIAGNOSIS — G89.29 CHRONIC BILATERAL LOW BACK PAIN WITH LEFT-SIDED SCIATICA: Primary | ICD-10-CM

## 2019-03-11 DIAGNOSIS — M54.42 CHRONIC BILATERAL LOW BACK PAIN WITH LEFT-SIDED SCIATICA: Primary | ICD-10-CM

## 2019-03-11 PROCEDURE — 97110 THERAPEUTIC EXERCISES: CPT
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
## 2019-03-13 ENCOUNTER — OFFICE VISIT (OUTPATIENT)
Dept: RHEUMATOLOGY | Facility: CLINIC | Age: 59
End: 2019-03-13
Payer: MEDICAID

## 2019-03-13 VITALS
WEIGHT: 138.25 LBS | TEMPERATURE: 98 F | HEIGHT: 61 IN | DIASTOLIC BLOOD PRESSURE: 91 MMHG | BODY MASS INDEX: 26.1 KG/M2 | HEART RATE: 88 BPM | SYSTOLIC BLOOD PRESSURE: 144 MMHG

## 2019-03-13 DIAGNOSIS — D86.9 SARCOID: ICD-10-CM

## 2019-03-13 DIAGNOSIS — M06.9 RHEUMATOID ARTHRITIS INVOLVING MULTIPLE JOINTS: Primary | ICD-10-CM

## 2019-03-13 DIAGNOSIS — Z79.631 ENCOUNTER FOR METHOTREXATE MONITORING: ICD-10-CM

## 2019-03-13 DIAGNOSIS — Z51.81 ENCOUNTER FOR METHOTREXATE MONITORING: ICD-10-CM

## 2019-03-13 PROCEDURE — 99214 PR OFFICE/OUTPT VISIT, EST, LEVL IV, 30-39 MIN: ICD-10-PCS | Mod: S$PBB,,, | Performed by: INTERNAL MEDICINE

## 2019-03-13 PROCEDURE — 99999 PR PBB SHADOW E&M-EST. PATIENT-LVL IV: CPT | Mod: PBBFAC,,, | Performed by: INTERNAL MEDICINE

## 2019-03-13 PROCEDURE — 99214 OFFICE O/P EST MOD 30 MIN: CPT | Mod: S$PBB,,, | Performed by: INTERNAL MEDICINE

## 2019-03-13 PROCEDURE — 99214 OFFICE O/P EST MOD 30 MIN: CPT | Mod: PBBFAC,PO | Performed by: INTERNAL MEDICINE

## 2019-03-13 PROCEDURE — 99999 PR PBB SHADOW E&M-EST. PATIENT-LVL IV: ICD-10-PCS | Mod: PBBFAC,,, | Performed by: INTERNAL MEDICINE

## 2019-03-13 RX ORDER — FOLIC ACID 1 MG/1
1 TABLET ORAL DAILY
Qty: 90 TABLET | Refills: 1 | Status: SHIPPED | OUTPATIENT
Start: 2019-03-13 | End: 2019-03-13 | Stop reason: SDUPTHER

## 2019-03-13 RX ORDER — METHOTREXATE 2.5 MG/1
TABLET ORAL
Qty: 32 TABLET | Refills: 0 | Status: SHIPPED | OUTPATIENT
Start: 2019-03-13 | End: 2019-06-05 | Stop reason: SDUPTHER

## 2019-03-13 RX ORDER — METHOTREXATE 2.5 MG/1
TABLET ORAL
Qty: 32 TABLET | Refills: 0 | Status: SHIPPED | OUTPATIENT
Start: 2019-03-13 | End: 2019-03-13 | Stop reason: SDUPTHER

## 2019-03-13 RX ORDER — FOLIC ACID 1 MG/1
1 TABLET ORAL DAILY
Qty: 90 TABLET | Refills: 1 | Status: SHIPPED | OUTPATIENT
Start: 2019-03-13 | End: 2019-06-05 | Stop reason: SDUPTHER

## 2019-03-13 NOTE — PROGRESS NOTES
"Subjective:       Patient ID: Silvia Cervantes is a 54 y.o. female.    Chief Complaint:     HPI 53 yo F with PMH of HTN, abnormal pap (2013 but recent negative, no cancer), RA (+CCP, RF),erosive here for evaluation. She was diagnosed in 2011 with hand and feet with swelling and pain. She was initially treated with MTX but it gave her nausea which gave her nausea.  She was changed to leflunomide 20 mg 10/2014 from mtx due to nausea on the mtx. Then I added etanercept January 2015 due to incomplete control of her arthritis on leflunomide.  No am stiffness.  She reports mild aching in hands (3/10). Pain is aching.  Reports mild swelling in hands but better than usual.  She reports left upper quadrant pain (4/10) , non-radiating with possible nausea which has been present for months. Sometimes she has sour taste in mouth.       Interval history:  Patient is here for follow up.   Reports she is getting rheumatoid nodules in elbows.  Reports mild swelling and mild swelling in hands.  Reports she has diffuse morning stiffness for a few minutes. She is not taking the celebrex.  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  She took methotrexate for a laura and then stopped.        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   HENT: Negative for congestion, ear discharge, ear pain, facial swelling, mouth sores, sinus pressure, sneezing, sore throat, tinnitus and trouble swallowing.    Eyes: Negative for photophobia, pain, discharge, redness, itching and visual disturbance.   Respiratory: Negative for apnea, chest " tightness, shortness of breath, wheezing and stridor.    Cardiovascular: Negative for leg swelling.   Gastrointestinal: Negative for nausea, abdominal pain, diarrhea, constipation, blood in stool, abdominal distention and anal bleeding.   Endocrine: Negative for cold intolerance and heat intolerance.   Genitourinary: Negative for dysuria and difficulty urinating.   Musculoskeletal: Positive for arthralgias. Negative for myalgias, back pain, joint swelling, gait problem, neck pain and neck stiffness.   Skin: Negative for color change, pallor, rash and wound.   Neurological: Negative for dizziness, seizures, light-headedness and numbness.   Hematological: Negative for adenopathy. Does not bruise/bleed easily.   Psychiatric/Behavioral: Negative for sleep disturbance. The patient is not nervous/anxious.       Objective:        Physical Exam   Constitutional: She is oriented to person, place, and time.   HENT:   Head: Normocephalic and atraumatic.   Right Ear: External ear normal.   Left Ear: External ear normal.   Nose: Nose normal.   Mouth/Throat: Oropharynx is clear and moist. No oropharyngeal exudate.   Eyes: Conjunctivae and EOM are normal. Pupils are equal, round, and reactive to light. Right eye exhibits no discharge. Left eye exhibits no discharge. No scleral icterus.   Neck: Neck supple. No JVD present. No thyromegaly present.   Cardiovascular: Normal rate, regular rhythm, normal heart sounds and intact distal pulses.  Exam reveals no gallop and no friction rub.    No murmur heard.  Pulmonary/Chest: Effort normal and breath sounds normal. No respiratory distress. She has no wheezes. She has no rales. She exhibits no tenderness.   Abdominal: Soft. Bowel sounds are normal. She exhibits no distension and no mass. There is no tenderness. There is no rebound and no guarding.   Lymphadenopathy:     She has no cervical adenopathy.   Neurological: She is alert and oriented to person, place, and time. No cranial nerve  "deficit. Gait normal. Coordination normal.   Skin:diffuse hypopigmented lesions in arms, hands, legs, chest, back   Psychiatric: Affect and judgment normal.   Musculoskeletal: She exhibits no  tenderness. She exhibits no edema.   FROM of all joints including neck, shoulders, spine, ankles, wrists, knees, and ankles; no joint deformities noted or effusions, erythema or warmth; no tophi or nodules noted; no crepitus; no nail pitting or onycholysis          Labs:  Esr-25  ccp-93  rf-36  Flor-negative    Imaging:  MRI (7/22/2015)   (Stable enhancing marginal erosions in the second and third metacarpal heads, lunate, triquetrum, and capitate)        dexa scan:(2017):  Osteopenia of the femoral neck. FRAX calculation does not support treatment for osteoporosis.Total hip and lumbar spine BMD unchanged since 2013.    Recommendations:  1) Adequate calcium and Vitamin D therapy  2) Appropriate exercise  3) Consider repeat BMD in 2- 4 years    Assessment:     59 yo F with PMH of HTN, abnormal pap (2013 but recent negative, no cancer), RA (+CCP, RF),erosive here , H. Pylori for follow up of seropositive RA.  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  She has abnormal brain MRI  AMYLOID ANGIOPATHY and IS SEEING NEURO.  I discussed treatment option with patient and plan was for her to take methotrexate but she took it for a month and then stopped.Patient was previously doing well on enbrel but she would prefer not to injections.    Plan:    - start MTX 4 pills a week for first 2 weeks and then increase to 6 pills a week  (Risks and benefits of initiating MTX discussed. Patient aware that risks include and not limited to lung toxicity, liver abnormalities, cell count abnormalities, malignancy, and allergic  reaction to medication. Patient agrees with starting medication.  Start folic acid 1 mg po qday  Labs in a month  -follow up with [facility]  continue " celebrex 200mg po BID PRN  rtc in  3 -4 months*

## 2019-03-14 ENCOUNTER — CLINICAL SUPPORT (OUTPATIENT)
Dept: REHABILITATION | Facility: OTHER | Age: 59
End: 2019-03-14
Attending: PSYCHIATRY & NEUROLOGY
Payer: MEDICAID

## 2019-03-14 ENCOUNTER — OFFICE VISIT (OUTPATIENT)
Dept: SPINE | Facility: CLINIC | Age: 59
End: 2019-03-14
Attending: PHYSICAL MEDICINE & REHABILITATION
Payer: MEDICAID

## 2019-03-14 VITALS
HEART RATE: 90 BPM | HEIGHT: 61 IN | BODY MASS INDEX: 26.06 KG/M2 | TEMPERATURE: 99 F | DIASTOLIC BLOOD PRESSURE: 81 MMHG | SYSTOLIC BLOOD PRESSURE: 124 MMHG | WEIGHT: 138 LBS

## 2019-03-14 DIAGNOSIS — G89.29 CHRONIC BILATERAL LOW BACK PAIN WITHOUT SCIATICA: ICD-10-CM

## 2019-03-14 DIAGNOSIS — R20.0 LEFT SIDED NUMBNESS: Primary | ICD-10-CM

## 2019-03-14 DIAGNOSIS — M54.50 CHRONIC BILATERAL LOW BACK PAIN WITHOUT SCIATICA: ICD-10-CM

## 2019-03-14 DIAGNOSIS — T14.90XA TRAUMA: ICD-10-CM

## 2019-03-14 DIAGNOSIS — M79.2 NEURALGIA: ICD-10-CM

## 2019-03-14 DIAGNOSIS — M54.42 CHRONIC BILATERAL LOW BACK PAIN WITH LEFT-SIDED SCIATICA: Primary | ICD-10-CM

## 2019-03-14 DIAGNOSIS — G89.29 CHRONIC BILATERAL LOW BACK PAIN WITH LEFT-SIDED SCIATICA: Primary | ICD-10-CM

## 2019-03-14 DIAGNOSIS — S12.100S CLOSED ODONTOID FRACTURE, SEQUELA: ICD-10-CM

## 2019-03-14 DIAGNOSIS — M51.36 DDD (DEGENERATIVE DISC DISEASE), LUMBAR: ICD-10-CM

## 2019-03-14 DIAGNOSIS — S32.810S MULTIPLE CLOSED FRACTURES OF PELVIS WITH STABLE DISRUPTION OF PELVIC RING, SEQUELA: ICD-10-CM

## 2019-03-14 PROCEDURE — 99214 OFFICE O/P EST MOD 30 MIN: CPT | Mod: S$PBB,,, | Performed by: PHYSICAL MEDICINE & REHABILITATION

## 2019-03-14 PROCEDURE — 99999 PR PBB SHADOW E&M-EST. PATIENT-LVL III: CPT | Mod: PBBFAC,,, | Performed by: PHYSICAL MEDICINE & REHABILITATION

## 2019-03-14 PROCEDURE — 99999 PR PBB SHADOW E&M-EST. PATIENT-LVL III: ICD-10-PCS | Mod: PBBFAC,,, | Performed by: PHYSICAL MEDICINE & REHABILITATION

## 2019-03-14 PROCEDURE — 99214 PR OFFICE/OUTPT VISIT, EST, LEVL IV, 30-39 MIN: ICD-10-PCS | Mod: S$PBB,,, | Performed by: PHYSICAL MEDICINE & REHABILITATION

## 2019-03-14 PROCEDURE — 99213 OFFICE O/P EST LOW 20 MIN: CPT | Mod: PBBFAC | Performed by: PHYSICAL MEDICINE & REHABILITATION

## 2019-03-14 PROCEDURE — 97110 THERAPEUTIC EXERCISES: CPT
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
## 2019-03-14 NOTE — PATIENT INSTRUCTIONS
If decide to take something for depression.  Try the cymbalta 30mg once a day, can increase to 2 a day, but try once a day first.  Take with food    Was given zoloft as well.  Can take that instead but do not take both     Taking 2 gabapentin a day, can decrease to one and see if feel different.  If not try stopping it.  Ok to keeping taking, but if not helping you do not need to

## 2019-03-19 ENCOUNTER — CLINICAL SUPPORT (OUTPATIENT)
Dept: REHABILITATION | Facility: OTHER | Age: 59
End: 2019-03-19
Attending: PSYCHIATRY & NEUROLOGY
Payer: MEDICAID

## 2019-03-19 DIAGNOSIS — M54.42 CHRONIC BILATERAL LOW BACK PAIN WITH LEFT-SIDED SCIATICA: Primary | ICD-10-CM

## 2019-03-19 DIAGNOSIS — G89.29 CHRONIC BILATERAL LOW BACK PAIN WITH LEFT-SIDED SCIATICA: Primary | ICD-10-CM

## 2019-03-19 PROCEDURE — 97110 THERAPEUTIC EXERCISES: CPT
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
## 2019-03-20 ENCOUNTER — CLINICAL SUPPORT (OUTPATIENT)
Dept: SPEECH THERAPY | Facility: HOSPITAL | Age: 59
End: 2019-03-20
Attending: INTERNAL MEDICINE
Payer: MEDICAID

## 2019-03-20 ENCOUNTER — HOSPITAL ENCOUNTER (OUTPATIENT)
Dept: RADIOLOGY | Facility: HOSPITAL | Age: 59
Discharge: HOME | End: 2019-03-20
Attending: INTERNAL MEDICINE
Payer: MEDICAID

## 2019-03-20 DIAGNOSIS — R13.10 DIFFICULTY IN SWALLOWING: ICD-10-CM

## 2019-03-20 PROCEDURE — 92611 MOTION FLUOROSCOPY/SWALLOW: CPT

## 2019-03-20 PROCEDURE — 74230 FL MODIFIED BARIUM SWALLOW SPEECH STUDY: ICD-10-PCS | Mod: 26,,, | Performed by: RADIOLOGY

## 2019-03-20 PROCEDURE — 74230 X-RAY XM SWLNG FUNCJ C+: CPT | Mod: TC

## 2019-03-20 PROCEDURE — 74230 X-RAY XM SWLNG FUNCJ C+: CPT | Mod: 26,,, | Performed by: RADIOLOGY

## 2019-03-20 PROCEDURE — 97535 SELF CARE MNGMENT TRAINING: CPT
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
## 2019-03-21 ENCOUNTER — CLINICAL SUPPORT (OUTPATIENT)
Dept: REHABILITATION | Facility: OTHER | Age: 59
End: 2019-03-21
Attending: PSYCHIATRY & NEUROLOGY
Payer: MEDICAID

## 2019-03-21 DIAGNOSIS — M54.42 CHRONIC BILATERAL LOW BACK PAIN WITH LEFT-SIDED SCIATICA: Primary | ICD-10-CM

## 2019-03-21 DIAGNOSIS — G89.29 CHRONIC BILATERAL LOW BACK PAIN WITH LEFT-SIDED SCIATICA: Primary | ICD-10-CM

## 2019-03-21 PROCEDURE — 97110 THERAPEUTIC EXERCISES: CPT
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
## 2019-03-26 ENCOUNTER — CLINICAL SUPPORT (OUTPATIENT)
Dept: REHABILITATION | Facility: OTHER | Age: 59
End: 2019-03-26
Attending: PSYCHIATRY & NEUROLOGY
Payer: MEDICAID

## 2019-03-26 DIAGNOSIS — M54.42 CHRONIC BILATERAL LOW BACK PAIN WITH LEFT-SIDED SCIATICA: Primary | ICD-10-CM

## 2019-03-26 DIAGNOSIS — G89.29 CHRONIC BILATERAL LOW BACK PAIN WITH LEFT-SIDED SCIATICA: Primary | ICD-10-CM

## 2019-03-26 PROCEDURE — 97110 THERAPEUTIC EXERCISES: CPT
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
## 2019-03-28 ENCOUNTER — CLINICAL SUPPORT (OUTPATIENT)
Dept: REHABILITATION | Facility: OTHER | Age: 59
End: 2019-03-28
Attending: PSYCHIATRY & NEUROLOGY
Payer: MEDICAID

## 2019-03-28 DIAGNOSIS — M54.42 CHRONIC BILATERAL LOW BACK PAIN WITH LEFT-SIDED SCIATICA: Primary | ICD-10-CM

## 2019-03-28 DIAGNOSIS — G89.29 CHRONIC BILATERAL LOW BACK PAIN WITH LEFT-SIDED SCIATICA: Primary | ICD-10-CM

## 2019-03-28 PROCEDURE — 97110 THERAPEUTIC EXERCISES: CPT
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
## 2019-04-01 ENCOUNTER — DOCUMENTATION ONLY (OUTPATIENT)
Dept: REHABILITATION | Facility: OTHER | Age: 59
End: 2019-04-01
Attending: PSYCHIATRY & NEUROLOGY
Payer: MEDICAID
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
## 2019-04-03 DIAGNOSIS — M51.36 DDD (DEGENERATIVE DISC DISEASE), LUMBAR: ICD-10-CM

## 2019-04-03 DIAGNOSIS — M54.50 CHRONIC BILATERAL LOW BACK PAIN WITHOUT SCIATICA: ICD-10-CM

## 2019-04-03 DIAGNOSIS — G89.29 CHRONIC BILATERAL LOW BACK PAIN WITHOUT SCIATICA: ICD-10-CM

## 2019-04-03 DIAGNOSIS — T14.90XA TRAUMA: ICD-10-CM

## 2019-04-03 DIAGNOSIS — S32.810S MULTIPLE CLOSED FRACTURES OF PELVIS WITH STABLE DISRUPTION OF PELVIC RING, SEQUELA: Primary | ICD-10-CM

## 2019-04-10 ENCOUNTER — TELEPHONE (OUTPATIENT)
Dept: FAMILY MEDICINE | Facility: CLINIC | Age: 59
End: 2019-04-10

## 2019-04-10 ENCOUNTER — OFFICE VISIT (OUTPATIENT)
Dept: URGENT CARE | Facility: CLINIC | Age: 59
End: 2019-04-10
Payer: MEDICAID

## 2019-04-10 VITALS
WEIGHT: 138 LBS | BODY MASS INDEX: 26.06 KG/M2 | OXYGEN SATURATION: 97 % | HEART RATE: 90 BPM | RESPIRATION RATE: 18 BRPM | TEMPERATURE: 99 F | HEIGHT: 61 IN | SYSTOLIC BLOOD PRESSURE: 151 MMHG | DIASTOLIC BLOOD PRESSURE: 86 MMHG

## 2019-04-10 DIAGNOSIS — K59.00 CONSTIPATION, UNSPECIFIED CONSTIPATION TYPE: ICD-10-CM

## 2019-04-10 DIAGNOSIS — R10.32 LEFT LOWER QUADRANT PAIN: Primary | ICD-10-CM

## 2019-04-10 LAB
BILIRUB UR QL STRIP: NEGATIVE
GLUCOSE UR QL STRIP: NEGATIVE
KETONES UR QL STRIP: NEGATIVE
LEUKOCYTE ESTERASE UR QL STRIP: NEGATIVE
PH, POC UA: 6.5 (ref 5–8)
POC BLOOD, URINE: NEGATIVE
POC NITRATES, URINE: NEGATIVE
PROT UR QL STRIP: NEGATIVE
SP GR UR STRIP: 1 (ref 1–1.03)
UROBILINOGEN UR STRIP-ACNC: NORMAL (ref 0.1–1.1)

## 2019-04-10 PROCEDURE — 81003 URINALYSIS AUTO W/O SCOPE: CPT | Mod: QW,S$GLB,, | Performed by: NURSE PRACTITIONER

## 2019-04-10 PROCEDURE — 81003 POCT URINALYSIS, DIPSTICK, AUTOMATED, W/O SCOPE: ICD-10-PCS | Mod: QW,S$GLB,, | Performed by: NURSE PRACTITIONER

## 2019-04-10 PROCEDURE — 99213 PR OFFICE/OUTPT VISIT, EST, LEVL III, 20-29 MIN: ICD-10-PCS | Mod: 25,S$GLB,, | Performed by: NURSE PRACTITIONER

## 2019-04-10 PROCEDURE — 99213 OFFICE O/P EST LOW 20 MIN: CPT | Mod: 25,S$GLB,, | Performed by: NURSE PRACTITIONER

## 2019-04-10 RX ORDER — HYDROCORTISONE 25 MG/G
CREAM TOPICAL 2 TIMES DAILY
Qty: 1 TUBE | Refills: 1 | Status: SHIPPED | OUTPATIENT
Start: 2019-04-10 | End: 2019-04-10

## 2019-04-10 RX ORDER — HYDROCORTISONE 25 MG/G
CREAM TOPICAL 2 TIMES DAILY
Qty: 30 G | Refills: 1 | Status: SHIPPED | OUTPATIENT
Start: 2019-04-10 | End: 2019-08-28 | Stop reason: SDUPTHER

## 2019-04-10 RX ORDER — POLYETHYLENE GLYCOL 3350 17 G/17G
17 POWDER, FOR SOLUTION ORAL DAILY
Qty: 1 BOTTLE | Refills: 1 | Status: SHIPPED | OUTPATIENT
Start: 2019-04-10 | End: 2019-04-10

## 2019-04-10 RX ORDER — POLYETHYLENE GLYCOL 3350 17 G/17G
17 POWDER, FOR SOLUTION ORAL DAILY
Qty: 510 G | Refills: 1 | Status: SHIPPED | OUTPATIENT
Start: 2019-04-10 | End: 2022-07-28

## 2019-04-10 NOTE — TELEPHONE ENCOUNTER
----- Message from Chioma Kolb sent at 4/10/2019  7:19 AM CDT -----  Contact: self  Pt is calling to schedule an appt to be seen today for stomach pain and blood in her stool.  She can be reached at [phone]

## 2019-04-10 NOTE — PROGRESS NOTES
"+Subjective:       Patient ID: Silvia Cervantes is a 58 y.o. female.    Vitals:  height is 5' 1" (1.549 m) and weight is 62.6 kg (138 lb). Her temperature is 98.7 °F (37.1 °C). Her blood pressure is 151/86 (abnormal) and her pulse is 90. Her respiration is 18 and oxygen saturation is 97%.     Chief Complaint: Abdominal Pain    Pt. Reports having LLQ abdominal pain x 1 hour that was severe 4 days ago.  She then had a large BM which was mixed with liquid.  The abdominal pain resolved but she still feels bloated and she noticed blood on the tissue when she wipes since then. She has a history of hemorrhoids and constipation.  Denies fever, chills, back pain, dysuria. She has an appointment with GI on 4/29. She is taking gabapentin and was instructed to take a stool softener, but has not.     Abdominal Pain   This is a new problem. Episode onset: 4 days. The onset quality is sudden. The pain is located in the generalized abdominal region and LLQ. Pertinent negatives include no arthralgias, diarrhea, dysuria, fever, frequency, headaches, myalgias, nausea or vomiting. The pain is relieved by bowel movements.       Constitution: Negative for chills, fatigue and fever.   HENT: Negative for congestion and sore throat.    Neck: Negative for painful lymph nodes.   Cardiovascular: Negative for chest pain and leg swelling.   Eyes: Negative for double vision and blurred vision.   Respiratory: Negative for cough and shortness of breath.    Gastrointestinal: Positive for abdominal pain and abdominal bloating. Negative for nausea, vomiting and diarrhea.   Genitourinary: Negative for dysuria, frequency, urgency and history of kidney stones.   Musculoskeletal: Negative for joint pain, joint swelling, muscle cramps and muscle ache.   Skin: Negative for color change, pale, rash and bruising.   Allergic/Immunologic: Negative for seasonal allergies.   Neurological: Negative for dizziness, history of vertigo, light-headedness, passing out and " headaches.   Hematologic/Lymphatic: Negative for swollen lymph nodes.   Psychiatric/Behavioral: Negative for nervous/anxious, sleep disturbance and depression. The patient is not nervous/anxious.        Objective:      Physical Exam   Constitutional: She is oriented to person, place, and time. She appears well-developed and well-nourished.   HENT:   Head: Normocephalic and atraumatic.   Right Ear: External ear normal.   Left Ear: External ear normal.   Nose: Nose normal.   Mouth/Throat: Oropharynx is clear and moist.   Eyes: Pupils are equal, round, and reactive to light. EOM are normal.   Neck: Normal range of motion. Neck supple.   Cardiovascular: Normal rate and regular rhythm.   Pulses:       Radial pulses are 2+ on the right side, and 2+ on the left side.   Pulmonary/Chest: Effort normal and breath sounds normal. No stridor. No respiratory distress. She has no wheezes.   Abdominal: Soft. Normal appearance and bowel sounds are normal. She exhibits no distension. There is no tenderness. There is no CVA tenderness.   Neurological: She is alert and oriented to person, place, and time. She exhibits normal muscle tone. Coordination and gait normal.   Skin: Skin is warm, dry and intact. No rash noted.   Psychiatric: She has a normal mood and affect. Her speech is normal and behavior is normal.   Nursing note and vitals reviewed.      Results for orders placed or performed in visit on 04/10/19   POCT Urinalysis, Dipstick, Automated, W/O Scope   Result Value Ref Range    POC Blood, Urine Negative Negative    POC Bilirubin, Urine Negative Negative    POC Urobilinogen, Urine normal 0.1 - 1.1    POC Ketones, Urine Negative Negative    POC Protein, Urine Negative Negative    POC Nitrates, Urine Negative Negative    POC Glucose, Urine Negative Negative    pH, UA 6.5 5 - 8    POC Specific Gravity, Urine 1.005 1.003 - 1.029    POC Leukocytes, Urine Negative Negative         Assessment:       1. Left lower quadrant pain    2.  Constipation, unspecified constipation type        Plan:         Left lower quadrant pain  -     POCT Urinalysis, Dipstick, Automated, W/O Scope    Constipation, unspecified constipation type    Other orders  -     Discontinue: polyethylene glycol (GLYCOLAX) 17 gram/dose powder; Take 17 g by mouth once daily.  Dispense: 1 Bottle; Refill: 1  -     Discontinue: hydrocortisone 2.5 % cream; Apply topically 2 (two) times daily.  Dispense: 1 Tube; Refill: 1  -     polyethylene glycol (GLYCOLAX) 17 gram/dose powder; Take 17 g by mouth once daily.  Dispense: 510 g; Refill: 1  -     hydrocortisone 2.5 % cream; Apply topically 2 (two) times daily.  Dispense: 30 g; Refill: 1

## 2019-04-10 NOTE — TELEPHONE ENCOUNTER
----- Message from Kisha López sent at 4/10/2019 12:12 PM CDT -----  Contact: Patient came to office  Patient came in the office she said that she was told from the Urgent care to follow up with Dr. Cade because she had blood coming from her side. She is scheduled to see a Gastro doctor and patient states that she saw Dr. Cade [place] and he told her to speak with Shauna. I try to book patient for an appointment patient has medicaid. Patient would like for Shauna to please give her a call.

## 2019-04-10 NOTE — PATIENT INSTRUCTIONS
Keep appointment with GI for 4/29/19  Take Colace as instructed  May take with miralax  F/u with PCP if symptoms worsen or fail to improve with treatment  Constipation (Adult)  Constipation means that you have bowel movements that are less frequent than usual. Stools often become very hard and difficult to pass.  Constipation is very common. At some point in life it affects almost everyone. Since everyone's bowel habits are different, what is constipation to one person may not be to another. Your healthcare provider may do tests to diagnose constipation. It depends on what he or she finds when evaluating you.    Symptoms of constipation include:  · Abdominal pain  · Bloating  · Vomiting  · Painful bowel movements  · Itching, swelling, bleeding, or pain around the anus  Causes  Constipation can have many causes. These include:  · Diet low in fiber  · Too much dairy  · Not drinking enough liquids  · Lack of exercise or physical activity. This is especially true for older adults.  · Changes in lifestyle or daily routine, including pregnancy, aging, work, and travel  · Frequent use or misuse of laxatives  · Ignoring the urge to have a bowel movement or delaying it until later  · Medicines, such as certain prescription pain medicines, iron supplements, antacids, certain antidepressants, and calcium supplements  · Diseases like irritable bowel syndrome, bowel obstructions, stroke, diabetes, thyroid disease, Parkinson disease, hemorrhoids, and colon cancer  Complications  Potential complications of constipation can include:  · Hemorrhoids  · Rectal bleeding from hemorrhoids or anal fissures (skin tears)  · Hernias  · Dependency on laxatives  · Chronic constipation  · Fecal impaction  · Bowel obstruction or perforation  Home care  All treatment should be done after talking with your healthcare provider. This is especially true if you have another medical problems, are taking prescription medicines, or are an older adult.  Treatment most often involves lifestyle changes. You may also need medicines. Your healthcare provider will tell you which will work best for you. Follow the advice below to help avoid this problem in the future.  Lifestyle changes  These lifestyle changes can help prevent constipation:  · Diet. Eat a high-fiber diet, with fresh fruit and vegetables, and reduce dairy intake, meats, and processed foods  · Fluids. It's important to get enough fluids each day. Drink plenty of water when you eat more fiber. If you are on diet that limits the amount of fluid you can have, talk about this with your healthcare provider.  · Regular exercise. Check with your healthcare provider first.  Medications  Take any medicines as directed. Some laxatives are safe to use only every now and then. Others can be taken on a regular basis. Talk with your doctor or pharmacist if you have questions.  Prescription pain medicines can cause constipation. If you are taking this kind of medicine, ask your healthcare provider if you should also take a stool softener.  Medicines you may take to treat constipation include:  · Fiber supplements  · Stool softeners  · Laxatives  · Enemas  · Rectal suppositories  Follow-up care  Follow up with your healthcare provider if symptoms don't get better in the next few days. You may need to have more tests or see a specialist.  Call 911  Call 911 if any of these occur:  · Trouble breathing  · Stiff, rigid abdomen that is severely painful to touch  · Confusion  · Fainting or loss of consciousness  · Rapid heart rate  · Chest pain  When to seek medical advice  Call your healthcare provider right away if any of these occur:  · Fever over 100.4°F (38°C)  · Failure to resume normal bowel movements  · Pain in your abdomen or back gets worse  · Nausea or vomiting  · Swelling in your abdomen  · Blood in the stool  · Black, tarry stool  · Involuntary weight loss  · Weakness  Date Last Reviewed: 12/30/2015  © 5051-9208  [employer]. [address] [place] [address], [place] [address]. All rights reserved. This information is not intended as a substitute for professional medical care. Always follow your healthcare professional's instructions.        Treating Constipation    Constipation is a common and often uncomfortable problem. Constipation means you have bowel movements fewer than 3 times per week, or strain to pass hard, dry stool. It can last a short time. Or it can be a problem that never seems to go away. The good news is that it can often be treated and controlled.  Eat more fiber  One of the best ways to help treat constipation is to increase your fiber intake. You can do this either through diet or by using fiber supplements. Fiber (in whole grains, fruits, and vegetables) adds bulk and absorbs water to soften the stool. This helps the stool pass through the colon more easily. When you increase your fiber intake, do it slowly to avoid side effects such as bloating. Also increase the amount of water that you drink. Eating more of the following foods can add fiber to your diet.  · High-fiber cereals  · Whole grains, bran, and brown rice  · Vegetables such as carrots, broccoli, and greens  · Fresh fruits (especially apples, pears, and dried fruits like raisins and apricots)  · Nuts and legumes (especially beans such as lentils, kidney beans, and lima beans)  Get physically active  Exercise helps improve the working of your colon which helps ease constipation. Try to get some physical activity every day. If you havent been active for a while, talk to your healthcare provider before starting again.  Laxatives  Your healthcare provider may suggest an over-the-counter product to help ease your constipation. He or she may suggest the use of bulk-forming agents or laxatives. The use of laxatives, if used as directed, is common and safe. Follow directions carefully when using them. See your healthcare provider for new-onset  constipation, or long-term constipation, to rule out other causes such as medicines or thyroid disease.  Date Last Reviewed: 7/1/2016  © 3143-0425 [employer]. [address] [place] [address], [place] [address]. All rights reserved. This information is not intended as a substitute for professional medical care. Always follow your healthcare professional's instructions.

## 2019-04-11 ENCOUNTER — HOSPITAL ENCOUNTER (OUTPATIENT)
Dept: RADIOLOGY | Facility: HOSPITAL | Age: 59
Discharge: HOME | End: 2019-04-11
Attending: INTERNAL MEDICINE
Payer: MEDICAID

## 2019-04-11 ENCOUNTER — OFFICE VISIT (OUTPATIENT)
Dept: FAMILY MEDICINE | Facility: CLINIC | Age: 59
End: 2019-04-11
Attending: FAMILY MEDICINE
Payer: MEDICAID

## 2019-04-11 VITALS
DIASTOLIC BLOOD PRESSURE: 78 MMHG | WEIGHT: 135.38 LBS | BODY MASS INDEX: 25.56 KG/M2 | SYSTOLIC BLOOD PRESSURE: 118 MMHG | HEART RATE: 99 BPM | HEIGHT: 61 IN | OXYGEN SATURATION: 96 %

## 2019-04-11 DIAGNOSIS — D86.0 SARCOIDOSIS OF LUNG: Primary | ICD-10-CM

## 2019-04-11 DIAGNOSIS — L80 VITILIGO: ICD-10-CM

## 2019-04-11 DIAGNOSIS — M06.9 RHEUMATOID ARTHRITIS INVOLVING BOTH HANDS, UNSPECIFIED RHEUMATOID FACTOR PRESENCE: ICD-10-CM

## 2019-04-11 DIAGNOSIS — E78.00 HYPERCHOLESTEREMIA: ICD-10-CM

## 2019-04-11 DIAGNOSIS — D86.0 SARCOIDOSIS OF LUNG: ICD-10-CM

## 2019-04-11 DIAGNOSIS — I10 ESSENTIAL HYPERTENSION: Primary | ICD-10-CM

## 2019-04-11 PROCEDURE — 71046 X-RAY EXAM CHEST 2 VIEWS: CPT | Mod: TC,FY

## 2019-04-11 PROCEDURE — 99999 PR PBB SHADOW E&M-EST. PATIENT-LVL IV: ICD-10-PCS | Mod: PBBFAC,,, | Performed by: FAMILY MEDICINE

## 2019-04-11 PROCEDURE — 71046 X-RAY EXAM CHEST 2 VIEWS: CPT | Mod: 26,,, | Performed by: RADIOLOGY

## 2019-04-11 PROCEDURE — 71046 XR CHEST PA AND LATERAL: ICD-10-PCS | Mod: 26,,, | Performed by: RADIOLOGY

## 2019-04-11 PROCEDURE — 99214 OFFICE O/P EST MOD 30 MIN: CPT | Mod: PBBFAC,PO,25 | Performed by: FAMILY MEDICINE

## 2019-04-11 PROCEDURE — 99999 PR PBB SHADOW E&M-EST. PATIENT-LVL IV: CPT | Mod: PBBFAC,,, | Performed by: FAMILY MEDICINE

## 2019-04-11 PROCEDURE — 99214 OFFICE O/P EST MOD 30 MIN: CPT | Mod: S$PBB,,, | Performed by: FAMILY MEDICINE

## 2019-04-11 PROCEDURE — 99214 PR OFFICE/OUTPT VISIT, EST, LEVL IV, 30-39 MIN: ICD-10-PCS | Mod: S$PBB,,, | Performed by: FAMILY MEDICINE
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
## 2019-04-12 ENCOUNTER — TELEPHONE (OUTPATIENT)
Dept: NEUROLOGY | Facility: HOSPITAL | Age: 59
End: 2019-04-12

## 2019-04-12 NOTE — TELEPHONE ENCOUNTER
----- Message from Gayatri Ray sent at 4/9/2019  2:29 PM CDT -----  Regarding: new pt appt  Contact: [phone]  GI  New patient requesting to establish care. Stomach pain, rectal bleeding. Please call and advise.

## 2019-04-29 ENCOUNTER — OFFICE VISIT (OUTPATIENT)
Dept: GASTROENTEROLOGY | Facility: CLINIC | Age: 59
End: 2019-04-29
Payer: MEDICAID

## 2019-04-29 VITALS
DIASTOLIC BLOOD PRESSURE: 55 MMHG | HEART RATE: 109 BPM | SYSTOLIC BLOOD PRESSURE: 115 MMHG | BODY MASS INDEX: 25.78 KG/M2 | WEIGHT: 136.44 LBS

## 2019-04-29 DIAGNOSIS — Z12.11 SCREENING FOR MALIGNANT NEOPLASM OF COLON: Primary | ICD-10-CM

## 2019-04-29 DIAGNOSIS — R10.9 ABDOMINAL CRAMPING: ICD-10-CM

## 2019-04-29 PROCEDURE — 99214 OFFICE O/P EST MOD 30 MIN: CPT | Mod: S$PBB,,, | Performed by: INTERNAL MEDICINE

## 2019-04-29 PROCEDURE — 99214 PR OFFICE/OUTPT VISIT, EST, LEVL IV, 30-39 MIN: ICD-10-PCS | Mod: S$PBB,,, | Performed by: INTERNAL MEDICINE

## 2019-04-29 PROCEDURE — 99999 PR PBB SHADOW E&M-EST. PATIENT-LVL III: CPT | Mod: PBBFAC,,, | Performed by: INTERNAL MEDICINE

## 2019-04-29 PROCEDURE — 99213 OFFICE O/P EST LOW 20 MIN: CPT | Mod: PBBFAC,PO | Performed by: INTERNAL MEDICINE

## 2019-04-29 PROCEDURE — 99999 PR PBB SHADOW E&M-EST. PATIENT-LVL III: ICD-10-PCS | Mod: PBBFAC,,, | Performed by: INTERNAL MEDICINE

## 2019-04-29 RX ORDER — POLYETHYLENE GLYCOL 3350, SODIUM SULFATE ANHYDROUS, SODIUM BICARBONATE, SODIUM CHLORIDE, POTASSIUM CHLORIDE 236; 22.74; 6.74; 5.86; 2.97 G/4L; G/4L; G/4L; G/4L; G/4L
4 POWDER, FOR SOLUTION ORAL ONCE
Qty: 4000 ML | Refills: 0 | Status: SHIPPED | OUTPATIENT
Start: 2019-04-29 | End: 2019-04-30

## 2019-04-29 RX ORDER — HYOSCYAMINE SULFATE 0.12 MG/1
0.12 TABLET SUBLINGUAL EVERY 6 HOURS PRN
Qty: 10 TABLET | Refills: 0 | Status: SHIPPED | OUTPATIENT
Start: 2019-04-29 | End: 2020-11-12

## 2019-04-29 NOTE — PATIENT INSTRUCTIONS
GOLYTELY/ COLYTE/ NULYTELY Instructions    You are scheduled for a colonoscopy with Dr. Woodall  on 5/23/19 at [facility]  To ensure that your test is accurate and complete, you MUST follow these instructions listed below.  If you have any questions, please call our office at [phone].  Plan on being at the hospital for your procedure for 3-4 hours.    1.  Follow a CLEAR LIQUID DIET for the entire day before your scheduled colonoscopy.  This means no solid food the entire day starting when you wake.  You may have as much of the clear liquids as you want throughout the day.   CLEAR LIQUID DIET:   - Avoid Red, Orange, Purple, and/or Blue food coloring   - NO DAIRY   - You can have:  Coffee with sugar (no creamer), tea, water, soda, apple or white grape juice, chicken or beef broth/bouillon (no meat, noodles, or veggies), green/yellow popsicles, green/yellow Jell-O, lemonade.    2.  MIX GOLYTELY/COLYTE/NULYTELY (all names for same product) WITH ONE (1) GALLON OF WATER.  YOU MAY ADD A FLAVOR PACKET OR YELLOW/GREEN POWDER DRINK MIX TO THIS.  PUT IN REFRIGERATOR.  This is easier to drink if this solution is cold, so you can mix the solution one day ahead of time and place in the refrigerator prior to drinking.  You have to drink the solution within 24-36 hours of mixing it.  Do NOT put this solution over ice.  It IS ok to drink with a straw.    3. AT 5 PM THE DAY BEFORE YOUR COLONOSCOPY, DRINK ONE (1) 8 OUNCE GLASS OF MIXTURE EVERY 10 MINUTES UNTIL HALF OF THE GALLON IS CONSUMED.  Keep this mixture cold and in refrigerator as much as you can while drinking it.  Place the remaining half of mixture in the refrigerator when you finish the first half.    4.  The endoscopy department will call you 2 days before your colonoscopy to tell you the exact time to arrive, AND to tell you the exact time to drink the 2nd portion of your prep (which will be FIVE HOURS BEFORE YOUR ARRIVAL TIME).  At this time given to you, DRINK  ONE (1) 8 OUNCE GLASS OF MIXTURE EVERY 10 MINUTES UNTIL THE OTHER HALF IS CONSUMED. Keep the mixture cold while you are drinking it. Once this is complete, you may not have ANYTHING else by mouth!      5.  You must have someone with you to DRIVE YOU HOME since you will be receiving IV sedation for the colonoscopy.    6.  It is ok to take your heart, blood pressure, and seizure medications in the morning of your test with a SIP of water.  Hold other medications until after your procedure.  Do NOT have anything else to eat or drink the morning of your colonoscopy.  It is ok to brush your teeth.    7.  If you are on blood thinners THAT YOU HAVE BEEN INSTRUCTED TO HOLD BY YOUR DOCTOR FOR THIS PROCEDURE, then do NOT take this the morning of your colonoscopy.  Do NOT stop these medications on your own, they must be approved to be held by your doctor.  Your colonoscopy can NOT be done if you are on these medications.  Examples of blood thinners include: Coumadin, Aggrenox, Plavix, Pradaxa, Reapro, Pletal, Xarelto, Ticagrelor, Brilinta, Eliquis, and high dose aspirin (325 mg).  You do not have to stop baby aspirin 81 mg.    8.  IF YOU ARE DIABETIC:  NO INSULIN OR ORAL MEDICATIONS THE MORNING OF THE COLONOSCOPY.  TAKE ONLY HALF THE DOSE OF YOUR INSULIN THE DAY BEFORE THE COLONOSCOPY.  DO NOT TAKE ANY ORAL DIABETIC MEDICATIONS THE DAY BEFORE THE COLONOSCOPY.  IF YOU ARE AN INSULIN DEPENDENT DIABETIC WITH UNSTABLE BLOOD SUGARDS, NOTIFY YOUR PRIMARY CARE PHYSICIAN FOR INSTRUCTIONS.

## 2019-04-29 NOTE — PROGRESS NOTES
Subjective:       Patient ID: Silvia Cervantes is a 58 y.o. female.    Chief Complaint: Abdominal Pain    This is a 58-year-old female here for a follow-up visit.  She is noted to have chronic abdominal complaints described as a diffuse abdominal distention and bloating which can be postprandial.  It can occur with spicy foods.  Occurs approximately twice a year is severe, mostly left upper quadrant nonradiating, similar to prior.  It was for this symptom that she had the workup.  No other exacerbating or relieving factors or other associated symptoms.  She has had a CT scan in 2018 which was without acute findings and ultrasound of 2016 which was negative.  She underwent upper endoscopy in 2015 with some mild antral erosions in H pylori was treated with documented eradication.  Last colonoscopy was in 2011 with a 7 year follow-up recommended.    The following portions of the patient's history were reviewed and updated as appropriate: allergies, current medications, past family history, past medical history, past social history, past surgical history and problem list.    (Portions of this note were dictated using voice recognition software and may contain dictation related errors in spelling/grammar/syntax not found on text review)    HPI  Review of Systems   Constitutional: Negative for appetite change and unexpected weight change.   Respiratory: Negative for cough and chest tightness.    Cardiovascular: Negative for chest pain and palpitations.   Gastrointestinal: Positive for abdominal pain. Negative for diarrhea.       Objective:      Physical Exam   Constitutional: She is oriented to person, place, and time. She appears well-developed and well-nourished. No distress.   HENT:   Head: Normocephalic and atraumatic.   Eyes: Conjunctivae are normal. No scleral icterus.   Neck: Normal range of motion. Neck supple. No tracheal deviation present. No thyromegaly present.   Cardiovascular: Normal rate and regular rhythm.  Exam reveals no gallop and no friction rub.   Pulmonary/Chest: Effort normal. No respiratory distress. She has no wheezes.   Abdominal: Soft. Bowel sounds are normal. She exhibits no distension. There is no tenderness.   Neurological: She is alert and oriented to person, place, and time.   Skin: Skin is warm and dry. No rash noted. She is not diaphoretic. No erythema.   Psychiatric: She has a normal mood and affect. Her behavior is normal.   Nursing note and vitals reviewed.      Labs; reviewed  Assessment:       1. Screening for malignant neoplasm of colon    2. Abdominal cramping        Plan:   1. Screening colonoscopy  2. Continue Levsin

## 2019-05-09 ENCOUNTER — PATIENT MESSAGE (OUTPATIENT)
Dept: SPINE | Facility: CLINIC | Age: 59
End: 2019-05-09
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
## 2019-05-15 ENCOUNTER — HOSPITAL ENCOUNTER (OUTPATIENT)
Dept: RADIOLOGY | Facility: HOSPITAL | Age: 59
Discharge: HOME | End: 2019-05-15
Attending: FAMILY MEDICINE
Payer: MEDICAID

## 2019-05-15 DIAGNOSIS — Z12.31 ENCOUNTER FOR SCREENING MAMMOGRAM FOR BREAST CANCER: ICD-10-CM

## 2019-05-15 PROCEDURE — 77067 SCR MAMMO BI INCL CAD: CPT | Mod: TC

## 2019-05-15 PROCEDURE — 77063 BREAST TOMOSYNTHESIS BI: CPT | Mod: 26,,, | Performed by: RADIOLOGY

## 2019-05-15 PROCEDURE — 77067 SCR MAMMO BI INCL CAD: CPT | Mod: 26,,, | Performed by: RADIOLOGY

## 2019-05-15 PROCEDURE — 77067 MAMMO DIGITAL SCREENING BILAT WITH TOMOSYNTHESIS_CAD: ICD-10-PCS | Mod: 26,,, | Performed by: RADIOLOGY

## 2019-05-15 PROCEDURE — 77063 MAMMO DIGITAL SCREENING BILAT WITH TOMOSYNTHESIS_CAD: ICD-10-PCS | Mod: 26,,, | Performed by: RADIOLOGY

## 2019-05-16 ENCOUNTER — TELEPHONE (OUTPATIENT)
Dept: RADIOLOGY | Facility: HOSPITAL | Age: 59
End: 2019-05-16

## 2019-05-21 ENCOUNTER — TELEPHONE (OUTPATIENT)
Dept: ENDOSCOPY | Facility: HOSPITAL | Age: 59
End: 2019-05-21

## 2019-05-21 NOTE — TELEPHONE ENCOUNTER
Left message instructing patient to call dept @ [phone] between 8am-4pm.    Arrival time to be given @ 7341/colon

## 2019-05-22 ENCOUNTER — TELEPHONE (OUTPATIENT)
Dept: ENDOSCOPY | Facility: HOSPITAL | Age: 59
End: 2019-05-22

## 2019-05-22 NOTE — TELEPHONE ENCOUNTER
Spoke with patient about arrival time @ 0930.     Patient stated she ate oatmeal this morning and reinforced the CLEAR liquid diet and resent the Golytely prep instructions via patient portal.    Prep instructions reviewed: the day before the procedure, follow a clear liquid diet all day, then start the first 1/2 of prep at 5pm and take 2nd 1/2 of prep @ 0430.  Pt must be completely NPO when prep completed @ 0630.              Medications: Do not take Insulin or oral diabetic medications the day of the procedure.  Take as prescribed: heart, seizure and blood pressure medication in the morning with a sip of water (less than an ounce).  Take any breathing medications and bring inhalers to hospital with you Leave all valuables and jewelry at home.     Wear comfortable clothes to procedure to change into hospital gown You cannot drive for 24 hours after your procedure because you will receive sedation for your procedure to make you comfortable.  A ride must be provided at discharge.

## 2019-05-23 ENCOUNTER — HOSPITAL ENCOUNTER (OUTPATIENT)
Facility: HOSPITAL | Age: 59
Discharge: HOME | End: 2019-05-23
Attending: INTERNAL MEDICINE | Admitting: INTERNAL MEDICINE
Payer: MEDICAID

## 2019-05-23 ENCOUNTER — ANESTHESIA (OUTPATIENT)
Dept: ENDOSCOPY | Facility: HOSPITAL | Age: 59
End: 2019-05-23
Payer: MEDICAID

## 2019-05-23 ENCOUNTER — ANESTHESIA EVENT (OUTPATIENT)
Dept: ENDOSCOPY | Facility: HOSPITAL | Age: 59
End: 2019-05-23
Payer: MEDICAID

## 2019-05-23 VITALS
DIASTOLIC BLOOD PRESSURE: 92 MMHG | BODY MASS INDEX: 24.17 KG/M2 | SYSTOLIC BLOOD PRESSURE: 145 MMHG | RESPIRATION RATE: 18 BRPM | HEIGHT: 61 IN | TEMPERATURE: 98 F | OXYGEN SATURATION: 100 % | WEIGHT: 128 LBS | HEART RATE: 62 BPM

## 2019-05-23 DIAGNOSIS — Z12.11 SCREENING FOR MALIGNANT NEOPLASM OF COLON: ICD-10-CM

## 2019-05-23 PROCEDURE — 63600175 PHARM REV CODE 636 W HCPCS: Performed by: NURSE ANESTHETIST, CERTIFIED REGISTERED

## 2019-05-23 PROCEDURE — 45385 COLONOSCOPY W/LESION REMOVAL: CPT | Mod: ,,, | Performed by: INTERNAL MEDICINE

## 2019-05-23 PROCEDURE — 88305 TISSUE EXAM BY PATHOLOGIST: CPT | Performed by: PATHOLOGY

## 2019-05-23 PROCEDURE — 25000003 PHARM REV CODE 250: Performed by: NURSE ANESTHETIST, CERTIFIED REGISTERED

## 2019-05-23 PROCEDURE — 45385 PR COLONOSCOPY,REMV LESN,SNARE: ICD-10-PCS | Mod: ,,, | Performed by: INTERNAL MEDICINE

## 2019-05-23 PROCEDURE — 45385 COLONOSCOPY W/LESION REMOVAL: CPT | Performed by: INTERNAL MEDICINE

## 2019-05-23 PROCEDURE — 88305 TISSUE SPECIMEN TO PATHOLOGY - SURGERY: ICD-10-PCS | Mod: 26,,, | Performed by: PATHOLOGY

## 2019-05-23 PROCEDURE — 37000009 HC ANESTHESIA EA ADD 15 MINS: Performed by: INTERNAL MEDICINE

## 2019-05-23 PROCEDURE — 25000003 PHARM REV CODE 250: Performed by: INTERNAL MEDICINE

## 2019-05-23 PROCEDURE — 37000008 HC ANESTHESIA 1ST 15 MINUTES: Performed by: INTERNAL MEDICINE

## 2019-05-23 PROCEDURE — 27201089 HC SNARE, DISP (ANY): Performed by: INTERNAL MEDICINE

## 2019-05-23 PROCEDURE — 88305 TISSUE EXAM BY PATHOLOGIST: CPT | Mod: 26,,, | Performed by: PATHOLOGY

## 2019-05-23 PROCEDURE — 00811 ANES LWR INTST NDSC NOS: CPT | Performed by: INTERNAL MEDICINE

## 2019-05-23 RX ORDER — PROPOFOL 10 MG/ML
VIAL (ML) INTRAVENOUS
Status: DISCONTINUED | OUTPATIENT
Start: 2019-05-23 | End: 2019-05-23

## 2019-05-23 RX ORDER — LIDOCAINE HCL/PF 100 MG/5ML
SYRINGE (ML) INTRAVENOUS
Status: DISCONTINUED | OUTPATIENT
Start: 2019-05-23 | End: 2019-05-23

## 2019-05-23 RX ORDER — SODIUM CHLORIDE 9 MG/ML
INJECTION, SOLUTION INTRAVENOUS CONTINUOUS
Status: DISCONTINUED | OUTPATIENT
Start: 2019-05-23 | End: 2019-05-23 | Stop reason: HOSPADM

## 2019-05-23 RX ORDER — DEXTROMETHORPHAN/PSEUDOEPHED 2.5-7.5/.8
DROPS ORAL
Status: COMPLETED | OUTPATIENT
Start: 2019-05-23 | End: 2019-05-23

## 2019-05-23 RX ORDER — SODIUM CHLORIDE 0.9 % (FLUSH) 0.9 %
10 SYRINGE (ML) INJECTION
Status: DISCONTINUED | OUTPATIENT
Start: 2019-05-23 | End: 2019-05-23 | Stop reason: HOSPADM

## 2019-05-23 RX ORDER — PROPOFOL 10 MG/ML
VIAL (ML) INTRAVENOUS CONTINUOUS PRN
Status: DISCONTINUED | OUTPATIENT
Start: 2019-05-23 | End: 2019-05-23

## 2019-05-23 RX ADMIN — PROPOFOL 150 MCG/KG/MIN: 10 INJECTION, EMULSION INTRAVENOUS at 10:05

## 2019-05-23 RX ADMIN — SODIUM CHLORIDE: 0.9 INJECTION, SOLUTION INTRAVENOUS at 10:05

## 2019-05-23 RX ADMIN — PROPOFOL 50 MG: 10 INJECTION, EMULSION INTRAVENOUS at 10:05

## 2019-05-23 RX ADMIN — LIDOCAINE HYDROCHLORIDE 100 MG: 20 INJECTION, SOLUTION INTRAVENOUS at 10:05

## 2019-05-23 RX ADMIN — SIMETHICONE 66.7 MG: 20 SUSPENSION/ DROPS ORAL at 11:05

## 2019-05-23 NOTE — ANESTHESIA PREPROCEDURE EVALUATION
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)      Past Surgical History:   Procedure Laterality Date     SECTION      ESOPHAGOGASTRODUODENOSCOPY (EGD) N/A 2015    Performed by Turner Victoria [facility] at [facility]    HYSTERECTOMY      REDUCTION-TURBINATE Bilateral 2016    [facility]    SEPTOPLASTY N/A 2016    Performed by [facility] at [facility] OR [address] [place] [address]    SINUS SURGERY FUNCTIONAL ENDOSCOPIC WITH NAVIGATION Bilateral 2016    Performed by [facility] at [facility] OR [address] [place] [address]    TUBAL LIGATION           Anesthesia Evaluation    I have reviewed the Patient Summary Reports.    I have reviewed the Nursing Notes.   I have reviewed the Medications.     Review of Systems  Anesthesia Hx:  History of prior surgery of interest to airway management or planning:  Denies Personal Hx of Anesthesia complications.   Cardiovascular:   Exercise tolerance: good Hypertension hyperlipidemia    Musculoskeletal:   Arthritis (Rheumatoid)     Neurological:   Neuromuscular Disease, Headaches    Psych:   depression          Physical Exam  General:  Well nourished     Eyes/Ears/Nose:  EYES/EARS/NOSE FINDINGS: Normal    Chest/Lungs:  Chest/Lungs Clear    Heart/Vascular:  Heart Findings: Normal       Mental Status:  Mental Status Findings: Normal        Anesthesia Plan  Type of Anesthesia, risks & benefits discussed:  Anesthesia Type:   general, MAC  Patient's Preference:   Intra-op Monitoring Plan:   Intra-op Monitoring Plan Comments:   Post Op Pain Control Plan:   Post Op Pain Control Plan Comments:   Induction:    Beta Blocker:         Informed Consent: Patient understands risks and agrees with Anesthesia plan.  Questions answered. Anesthesia consent signed with patient.  ASA Score: 3     Day of Surgery Review of History & Physical:            Ready For Surgery From Anesthesia Perspective.

## 2019-05-23 NOTE — [employer] PATIENT INSTRUCTIONS
Discharge Summary/Instructions after an Endoscopic Procedure  Patient Name: Silvia Cervantes  Patient MRN: [id number]  Patient YOB: 1960  Thursday, May 23, 2019  Dimas Woodall MD  RESTRICTIONS:  During your procedure today, you received medications for sedation.  These   medications may affect your judgment, balance and coordination.  Therefore,   for 24 hours, you have the following restrictions:   - DO NOT drive a car, operate machinery, make legal/financial decisions,   sign important papers or drink alcohol.    ACTIVITY:  Today: no heavy lifting, straining or running due to procedural   sedation/anesthesia.  The following day: return to full activity including work.  DIET:  Eat and drink normally unless instructed otherwise.     TREATMENT FOR COMMON SIDE EFFECTS:  - Mild abdominal pain, nausea, belching, bloating or excessive gas:  rest,   eat lightly and use a heating pad.  - Sore Throat: treat with throat lozenges and/or gargle with warm salt   water.  - Because air was used during the procedure, expelling large amounts of air   from your rectum or belching is normal.  - If a bowel prep was taken, you may not have a bowel movement for 1-3 days.    This is normal.  SYMPTOMS TO WATCH FOR AND REPORT TO YOUR PHYSICIAN:  1. Abdominal pain or bloating, other than gas cramps.  2. Chest pain.  3. Back pain.  4. Signs of infection such as: chills or fever occurring within 24 hours   after the procedure.  5. Rectal bleeding, which would show as bright red, maroon, or black stools.   (A tablespoon of blood from the rectum is not serious, especially if   hemorrhoids are present.)  6. Vomiting.  7. Weakness or dizziness.  GO DIRECTLY TO THE NEAREST EMERGENCY ROOM IF YOU HAVE ANY OF THE FOLLOWING:      Difficulty breathing              Chills and/or fever over 101 F   Persistent vomiting and/or vomiting blood   Severe abdominal pain   Severe chest pain   Black, tarry stools   Bleeding- more than one  tablespoon   Any other symptom or condition that you feel may need urgent attention  Your doctor recommends these additional instructions:  If any biopsies were taken, your doctors clinic will contact you in 1 to 2   weeks with any results.  - Discharge patient to home (via wheelchair).   - Patient has a contact number available for emergencies.  The signs and   symptoms of potential delayed complications were discussed with the   patient.  Return to normal activities tomorrow.  Written discharge   instructions were provided to the patient.   - Resume previous diet.   - Continue present medications.   - Await pathology results.   - Repeat colonoscopy date to be determined after pending pathology results   are reviewed for surveillance.  For questions, problems or results please call your physician - Dimas Woodall MD at Work:  [phone].  EMERGENCY PHONE NUMBER: [phone],  LAB RESULTS: [phone]  IF A COMPLICATION OR EMERGENCY SITUATION ARISES AND YOU ARE UNABLE TO REACH   YOUR PHYSICIAN - GO DIRECTLY TO THE EMERGENCY ROOM.  Dimas Woodall MD  5/23/2019 11:30:21 AM  This report has been verified and signed electronically.  [employer]

## 2019-05-23 NOTE — ANESTHESIA POSTPROCEDURE EVALUATION
Anesthesia Post Evaluation    Patient: Silvia Cervantes    Procedure(s) Performed: Procedure(s) (LRB):  COLONOSCOPY/golytley  (N/A)    Final Anesthesia Type: MAC  Patient location during evaluation: GI PACU  Patient participation: Yes- Able to Participate  Level of consciousness: awake and alert and oriented  Post-procedure vital signs: reviewed and stable  Pain management: adequate  Airway patency: patent  PONV status at discharge: No PONV  Anesthetic complications: no      Cardiovascular status: blood pressure returned to baseline, hemodynamically stable and stable  Respiratory status: unassisted, spontaneous ventilation and room air  Hydration status: euvolemic  Follow-up not needed.          Vitals Value Taken Time   /78 5/23/2019 11:20 AM   Temp 36 °C (96.8 °F) 5/23/2019 11:20 AM   Pulse 68 5/23/2019 11:20 AM   Resp 12 5/23/2019 11:20 AM   SpO2 100 % 5/23/2019 11:20 AM         No case tracking events are documented in the log.      Pain/Mita Score: No data recorded

## 2019-05-23 NOTE — TRANSFER OF CARE
"Anesthesia Transfer of Care Note    Patient: Sivlia Cervantes    Procedure(s) Performed: Procedure(s) (LRB):  COLONOSCOPY/golytley  (N/A)    Patient location: GI    Anesthesia Type: MAC    Transport from OR: Transported from OR on room air with adequate spontaneous ventilation    Post pain: adequate analgesia    Post assessment: no apparent anesthetic complications and tolerated procedure well    Post vital signs: stable    Level of consciousness: awake, alert and oriented    Nausea/Vomiting: no nausea/vomiting    Complications: none    Transfer of care protocol was followed      Last vitals:   Visit Vitals  BP (!) 140/90 (BP Location: Left arm, Patient Position: Lying)   Pulse 90   Temp 36.7 °C (98.1 °F) (Skin)   Resp 16   Ht 5' 1" (1.549 m)   Wt 58.1 kg (128 lb)   LMP  (LMP Unknown)   SpO2 99%   Breastfeeding? No   BMI 24.19 kg/m²     "

## 2019-05-24 ENCOUNTER — HOSPITAL ENCOUNTER (OUTPATIENT)
Dept: RADIOLOGY | Facility: HOSPITAL | Age: 59
Discharge: HOME | End: 2019-05-24
Attending: FAMILY MEDICINE
Payer: MEDICAID

## 2019-05-24 DIAGNOSIS — R92.8 ABNORMAL MAMMOGRAM: ICD-10-CM

## 2019-05-24 PROCEDURE — 77061 BREAST TOMOSYNTHESIS UNI: CPT | Mod: TC

## 2019-05-24 PROCEDURE — 77065 MAMMO DIGITAL DIAGNOSTIC RIGHT WITH TOMOSYNTHESIS_CAD: ICD-10-PCS | Mod: 26,,, | Performed by: RADIOLOGY

## 2019-05-24 PROCEDURE — 77061 BREAST TOMOSYNTHESIS UNI: CPT | Mod: 26,,, | Performed by: RADIOLOGY

## 2019-05-24 PROCEDURE — 77065 DX MAMMO INCL CAD UNI: CPT | Mod: 26,,, | Performed by: RADIOLOGY

## 2019-05-24 PROCEDURE — 77061 MAMMO DIGITAL DIAGNOSTIC RIGHT WITH TOMOSYNTHESIS_CAD: ICD-10-PCS | Mod: 26,,, | Performed by: RADIOLOGY

## 2019-05-24 PROCEDURE — 77065 DX MAMMO INCL CAD UNI: CPT | Mod: TC

## 2019-05-28 DIAGNOSIS — I10 ESSENTIAL HYPERTENSION: ICD-10-CM

## 2019-05-28 RX ORDER — LOSARTAN POTASSIUM AND HYDROCHLOROTHIAZIDE 12.5; 5 MG/1; MG/1
TABLET ORAL
Qty: 90 TABLET | Refills: 1 | Status: SHIPPED | OUTPATIENT
Start: 2019-05-28 | End: 2019-06-21 | Stop reason: SDUPTHER

## 2019-06-03 ENCOUNTER — TELEPHONE (OUTPATIENT)
Dept: RHEUMATOLOGY | Facility: CLINIC | Age: 59
End: 2019-06-03

## 2019-06-04 ENCOUNTER — TELEPHONE (OUTPATIENT)
Dept: GASTROENTEROLOGY | Facility: CLINIC | Age: 59
End: 2019-06-04

## 2019-06-04 NOTE — TELEPHONE ENCOUNTER
----- Message from Dimas Woodall MD sent at 6/4/2019  7:53 AM CDT -----  One tubular adenoma, 5 year recall colonoscopy

## 2019-06-05 ENCOUNTER — OFFICE VISIT (OUTPATIENT)
Dept: RHEUMATOLOGY | Facility: CLINIC | Age: 59
End: 2019-06-05
Payer: MEDICAID

## 2019-06-05 VITALS
HEIGHT: 61 IN | BODY MASS INDEX: 25.78 KG/M2 | DIASTOLIC BLOOD PRESSURE: 78 MMHG | SYSTOLIC BLOOD PRESSURE: 116 MMHG | HEART RATE: 81 BPM | WEIGHT: 136.56 LBS

## 2019-06-05 DIAGNOSIS — Z51.81 ENCOUNTER FOR METHOTREXATE MONITORING: ICD-10-CM

## 2019-06-05 DIAGNOSIS — Z79.631 ENCOUNTER FOR METHOTREXATE MONITORING: ICD-10-CM

## 2019-06-05 DIAGNOSIS — D86.0 SARCOIDOSIS OF LUNG: ICD-10-CM

## 2019-06-05 DIAGNOSIS — M06.9 RHEUMATOID ARTHRITIS FLARE: Primary | ICD-10-CM

## 2019-06-05 PROCEDURE — 99999 PR PBB SHADOW E&M-EST. PATIENT-LVL III: ICD-10-PCS | Mod: PBBFAC,,, | Performed by: INTERNAL MEDICINE

## 2019-06-05 PROCEDURE — 99213 OFFICE O/P EST LOW 20 MIN: CPT | Mod: PBBFAC,PO | Performed by: INTERNAL MEDICINE

## 2019-06-05 PROCEDURE — 99999 PR PBB SHADOW E&M-EST. PATIENT-LVL III: CPT | Mod: PBBFAC,,, | Performed by: INTERNAL MEDICINE

## 2019-06-05 PROCEDURE — 99215 PR OFFICE/OUTPT VISIT, EST, LEVL V, 40-54 MIN: ICD-10-PCS | Mod: S$PBB,,, | Performed by: INTERNAL MEDICINE

## 2019-06-05 PROCEDURE — 99215 OFFICE O/P EST HI 40 MIN: CPT | Mod: S$PBB,,, | Performed by: INTERNAL MEDICINE

## 2019-06-05 RX ORDER — METHOTREXATE 2.5 MG/1
20 TABLET ORAL
Qty: 32 TABLET | Refills: 4 | Status: SHIPPED | OUTPATIENT
Start: 2019-06-05 | End: 2019-08-06 | Stop reason: SDUPTHER

## 2019-06-05 RX ORDER — FOLIC ACID 1 MG/1
1 TABLET ORAL DAILY
Qty: 90 TABLET | Refills: 4 | Status: SHIPPED | OUTPATIENT
Start: 2019-06-05 | End: 2019-11-06 | Stop reason: SDUPTHER

## 2019-06-05 NOTE — PROGRESS NOTES
Subjective:       Patient ID: Silvia Cervantes is a 54 y.o. female.    Chief Complaint:     HPI 53 yo F with PMH of HTN, abnormal pap (2013 but recent negative, no cancer), RA (+CCP, RF),erosive here for evaluation. She was diagnosed in 2011 with hand and feet with swelling and pain. She was initially treated with MTX but it gave her nausea which gave her nausea.  She was changed to leflunomide 20 mg 10/2014 from mtx due to nausea on the mtx. Then I added etanercept January 2015 due to incomplete control of her arthritis on leflunomide.  No am stiffness.  She reports mild aching in hands (3/10). Pain is aching.  Reports mild swelling in hands but better than usual.  She reports left upper quadrant pain (4/10) , non-radiating with possible nausea which has been present for months. Sometimes she has sour taste in mouth.       Interval history:  Patient is here for follow up.   She is taking methotrexate 6 pills a week. She is taking folic acid 1 mg po qday.  Last dose last week.  She has mild pain in elbows.Reports she is getting rheumatoid nodules in elbows.  Reports mild swelling in hands.Reports she has diffuse morning stiffness for a few minutes. She is not taking the celebrex.      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   HENT: Negative for congestion, ear discharge, ear pain, facial swelling, mouth sores, sinus pressure, sneezing, sore throat, tinnitus and trouble swallowing.    Eyes: Negative for photophobia, pain, discharge, redness, itching and visual disturbance.   Respiratory: Negative for apnea, chest tightness, shortness  of breath, wheezing and stridor.    Cardiovascular: Negative for leg swelling.   Gastrointestinal: Negative for nausea, abdominal pain, diarrhea, constipation, blood in stool, abdominal distention and anal bleeding.   Endocrine: Negative for cold intolerance and heat intolerance.   Genitourinary: Negative for dysuria and difficulty urinating.   Musculoskeletal: Positive for arthralgias. Negative for myalgias, back pain, joint swelling, gait problem, neck pain and neck stiffness.   Skin: Negative for color change, pallor, rash and wound.   Neurological: Negative for dizziness, seizures, light-headedness and numbness.   Hematological: Negative for adenopathy. Does not bruise/bleed easily.   Psychiatric/Behavioral: Negative for sleep disturbance. The patient is not nervous/anxious.       Objective:        Physical Exam   Constitutional: She is oriented to person, place, and time.   HENT:   Head: Normocephalic and atraumatic.   Right Ear: External ear normal.   Left Ear: External ear normal.   Nose: Nose normal.   Mouth/Throat: Oropharynx is clear and moist. No oropharyngeal exudate.   Eyes: Conjunctivae and EOM are normal. Pupils are equal, round, and reactive to light. Right eye exhibits no discharge. Left eye exhibits no discharge. No scleral icterus.   Neck: Neck supple. No JVD present. No thyromegaly present.   Cardiovascular: Normal rate, regular rhythm, normal heart sounds and intact distal pulses.  Exam reveals no gallop and no friction rub.    No murmur heard.  Pulmonary/Chest: Effort normal and breath sounds normal. No respiratory distress. She has no wheezes. She has no rales. She exhibits no tenderness.   Abdominal: Soft. Bowel sounds are normal. She exhibits no distension and no mass. There is no tenderness. There is no rebound and no guarding.   Lymphadenopathy:     She has no cervical adenopathy.   Neurological: She is alert and oriented to person, place, and time. No cranial nerve deficit. Gait normal.  "Coordination normal.   Skin:diffuse hypopigmented lesions in arms, hands, legs, chest, back   Psychiatric: Affect and judgment normal.   Musculoskeletal: She exhibits no  tenderness. She exhibits no edema.   FROM of all joints including neck, shoulders, spine, ankles, wrists, knees, and ankles; no joint deformities noted or effusions, erythema or warmth; no tophi or nodules noted; no crepitus; no nail pitting or onycholysis          Labs:  Esr-25  ccp-93  rf-36  Flor-negative    Imaging:  MRI (7/22/2015)   (Stable enhancing marginal erosions in the second and third metacarpal heads, lunate, triquetrum, and capitate)        dexa scan:(2017):  Osteopenia of the femoral neck. FRAX calculation does not support treatment for osteoporosis.Total hip and lumbar spine BMD unchanged since 2013.    Recommendations:  1) Adequate calcium and Vitamin D therapy  2) Appropriate exercise  3) Consider repeat BMD in 2- 4 years    Assessment:     57 yo F with PMH of HTN, abnormal pap (2013 but recent negative, no cancer), RA (+CCP, RF),erosive here , H. Pylori for follow up of seropositive RA and sarcoid.   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  She has abnormal brain MRI  AMYLOID ANGIOPATHY and IS SEEING NEURO.  Patient was previously doing well on enbrel but she would prefer not to injections. She is having increased pain and swelling in hands, so will increase MTX.    Plan:    - increase  MTX  From 6 pills a week to 8 pills a week  continue folic acid 1 mg po qday  Labs in a month and in 4 months  -follow up with EMG  restart celebrex 200mg po BID PRN  rtc in  3 -4 months*    "

## 2019-06-17 ENCOUNTER — OFFICE VISIT (OUTPATIENT)
Dept: URGENT CARE | Facility: CLINIC | Age: 59
End: 2019-06-17
Payer: MEDICAID

## 2019-06-17 ENCOUNTER — PATIENT MESSAGE (OUTPATIENT)
Dept: FAMILY MEDICINE | Facility: CLINIC | Age: 59
End: 2019-06-17

## 2019-06-17 ENCOUNTER — TELEPHONE (OUTPATIENT)
Dept: FAMILY MEDICINE | Facility: CLINIC | Age: 59
End: 2019-06-17

## 2019-06-17 VITALS
WEIGHT: 136 LBS | RESPIRATION RATE: 18 BRPM | OXYGEN SATURATION: 100 % | BODY MASS INDEX: 25.68 KG/M2 | DIASTOLIC BLOOD PRESSURE: 85 MMHG | HEART RATE: 90 BPM | HEIGHT: 61 IN | TEMPERATURE: 98 F | SYSTOLIC BLOOD PRESSURE: 128 MMHG

## 2019-06-17 DIAGNOSIS — B02.9 HERPES ZOSTER WITHOUT COMPLICATION: Primary | ICD-10-CM

## 2019-06-17 PROCEDURE — 99213 OFFICE O/P EST LOW 20 MIN: CPT | Mod: S$GLB,,, | Performed by: NURSE PRACTITIONER

## 2019-06-17 PROCEDURE — 99213 PR OFFICE/OUTPT VISIT, EST, LEVL III, 20-29 MIN: ICD-10-PCS | Mod: S$GLB,,, | Performed by: NURSE PRACTITIONER

## 2019-06-17 RX ORDER — VALACYCLOVIR HYDROCHLORIDE 1 G/1
1000 TABLET, FILM COATED ORAL 3 TIMES DAILY
Qty: 21 TABLET | Refills: 0 | Status: SHIPPED | OUTPATIENT
Start: 2019-06-17 | End: 2019-06-17

## 2019-06-17 RX ORDER — IBUPROFEN 600 MG/1
600 TABLET ORAL EVERY 6 HOURS PRN
Qty: 60 TABLET | Refills: 0 | Status: SHIPPED | OUTPATIENT
Start: 2019-06-17 | End: 2019-06-17

## 2019-06-17 RX ORDER — VALACYCLOVIR HYDROCHLORIDE 1 G/1
1000 TABLET, FILM COATED ORAL 3 TIMES DAILY
Qty: 21 TABLET | Refills: 0 | Status: SHIPPED | OUTPATIENT
Start: 2019-06-17 | End: 2021-06-03

## 2019-06-17 NOTE — TELEPHONE ENCOUNTER
Patient was confirming that medication was for shingles.  Advised that it was and that she definitely take it to keep it from spreading.  Patient verbalized understanding,.

## 2019-06-17 NOTE — PROGRESS NOTES
"Subjective:       Patient ID: Silvia Cervantes is a 58 y.o. female.    Vitals:  height is 5' 1" (1.549 m) and weight is 61.7 kg (136 lb). Her temperature is 98.2 °F (36.8 °C). Her blood pressure is 128/85 and her pulse is 90. Her respiration is 18 and oxygen saturation is 100%.     Chief Complaint: Rash    Rash   This is a new problem. The current episode started in the past 7 days (3 days). The problem has been gradually worsening since onset. Location: left thigh  The rash is characterized by pain, redness, swelling, burning and blistering. She was exposed to nothing. Pertinent negatives include no anorexia, congestion, cough, diarrhea, eye pain, facial edema, fatigue, fever, joint pain, nail changes, rhinorrhea, shortness of breath, sore throat or vomiting. Past treatments include anti-itch cream and antibiotic cream. The treatment provided mild relief.       Constitution: Negative for chills, fatigue and fever.   HENT: Negative for facial swelling, congestion and sore throat.    Neck: Negative for painful lymph nodes.   Eyes: Negative for eye itching, eye pain and eyelid swelling.   Respiratory: Negative for cough and shortness of breath.    Gastrointestinal: Negative for vomiting and diarrhea.   Musculoskeletal: Positive for pain. Negative for joint pain and joint swelling.   Skin: Positive for color change, rash, erythema, bruising and abscess. Negative for pale, wound, abrasion, laceration, lesion, skin thickening/induration, puncture wound, avulsion and hives.   Allergic/Immunologic: Negative for environmental allergies, immunocompromised state and hives.   Hematologic/Lymphatic: Negative for swollen lymph nodes.       Objective:      Physical Exam   Constitutional: She is oriented to person, place, and time. She appears well-developed and well-nourished. She is cooperative.  Non-toxic appearance. She does not appear ill. No distress.   HENT:   Head: Normocephalic and atraumatic.   Right Ear: Hearing, tympanic " membrane, external ear and ear canal normal.   Left Ear: Hearing, tympanic membrane, external ear and ear canal normal.   Nose: Nose normal. No mucosal edema, rhinorrhea or nasal deformity. No epistaxis. Right sinus exhibits no maxillary sinus tenderness and no frontal sinus tenderness. Left sinus exhibits no maxillary sinus tenderness and no frontal sinus tenderness.   Mouth/Throat: Uvula is midline, oropharynx is clear and moist and mucous membranes are normal. No trismus in the jaw. Normal dentition. No uvula swelling. No posterior oropharyngeal erythema.   Eyes: Conjunctivae and lids are normal. Right eye exhibits no discharge. Left eye exhibits no discharge. No scleral icterus.   Sclera clear bilat   Neck: Trachea normal, normal range of motion, full passive range of motion without pain and phonation normal. Neck supple.   Cardiovascular: Normal rate, regular rhythm, normal heart sounds, intact distal pulses and normal pulses.   Pulmonary/Chest: Effort normal and breath sounds normal. No respiratory distress.   Abdominal: Soft. Normal appearance and bowel sounds are normal. She exhibits no distension, no pulsatile midline mass and no mass. There is no tenderness.   Musculoskeletal: Normal range of motion. She exhibits no edema or deformity.   Neurological: She is alert and oriented to person, place, and time. She exhibits normal muscle tone. Coordination normal.   Skin: Skin is warm, dry and intact. Lesion and rash noted. Rash is vesicular. She is not diaphoretic. There is erythema. No pallor.        Psychiatric: She has a normal mood and affect. Her speech is normal and behavior is normal. Judgment and thought content normal. Cognition and memory are normal.   Nursing note and vitals reviewed.      Assessment:       1. Herpes zoster without complication        Plan:         Herpes zoster without complication  -     Discontinue: valACYclovir (VALTREX) 1000 MG tablet; Take 1 tablet (1,000 mg total) by mouth 3  (three) times daily. for 7 days  Dispense: 21 tablet; Refill: 0  -     valACYclovir (VALTREX) 1000 MG tablet; Take 1 tablet (1,000 mg total) by mouth 3 (three) times daily. for 7 days  Dispense: 21 tablet; Refill: 0  -     Discontinue: ibuprofen (ADVIL,MOTRIN) 600 MG tablet; Take 1 tablet (600 mg total) by mouth every 6 (six) hours as needed.  Dispense: 60 tablet; Refill: 0    Please follow up with your Primary care provider within 2-5 days if your signs and symptoms have not resolved or worsen.     If your condition worsens or fails to improve we recommend that you receive another evaluation at the emergency room immediately or contact your primary medical clinic to discuss your concerns.   You must understand that you have received an Urgent Care treatment only and that you may be released before all of your medical problems are known or treated. You, the patient, will arrange for follow up care as instructed.     RED FLAGS/WARNING SYMPTOMS DISCUSSED WITH PATIENT THAT WOULD WARRANT EMERGENT MEDICAL ATTENTION. PATIENT VERBALIZED UNDERSTANDING.       Shingles  Shingles is a viral infection caused by the same virus as chicken pox. Anyone who has had chicken pox may get shingles later in life. The virus stays in the body, but remains dormant (asleep). Shingles often occurs in older persons or persons with lowered immunity. But it can affect anyone at any age.  Shingles starts as a tingling patch of skin on one side of the body. Small, painful blisters may then appear. The rash does not spread to the rest of the body.  Exposure to shingles cannot cause shingles. However, it can cause chicken pox in anyone who has not had chicken pox or has not been vaccinated. The contagious period ends when all blisters have crusted over (generally about 2 weeks after the illness begins).  After the blisters heal, the affected skin may be sensitive or painful for months (neuralgia). This often gradually goes away.  A shingles vaccine  is available. This can help prevent shingles or make it less painful. It is generally recommended for adults over the age of 60 who have had chicken pox in the past, but who have never had shingles. Adults over 60 who have had neither chicken pox nor shingles can prevent both diseases with the chicken pox vaccine. Ask your healthcare provider about these vaccines.  Home care  · Medicines may be prescribed to help relieve pain. Take these medicines as directed. Ask your healthcare provider or pharmacist before using over-the-counter medicines for helping treat pain and itching.  · In certain cases, antiviral medicines may be prescribed to reduce pain, shorten the illness, and prevent neuralgia. Take these medicines as directed.  · Compresses made from a solution of cool water mixed with cornstarch or baking soda may help relieve pain and itching.   · Gently wash skin daily with soap and water to help prevent infection.  Be certain to rinse off all of the soap, which can be irritating.  · Trim fingernails and try not to scratch. Scratching the sores may leave scars.  · Stay home from work or school until all blisters have formed a crust and you are no longer contagious.  Follow-up care  Follow up with your healthcare provider or as directed by our staff.  When to seek medical advice  · Fever of 100.4°F (38°C) or higher, or as directed by your healthcare provider  · Affected skin is on the face or neck and any of the following occur:  ¨ Headache  ¨ Eye pain  ¨ Changes in vision  ¨ Sores near the eye  ¨ Weakness of facial muscles  · Pain, redness, or swelling of a joint  · Signs of skin infection: colored drainage from the sores, warmth, increasing redness, or increasing pain  Date Last Reviewed: 9/25/2015  © 0940-1244 [employer]. [address] [place] [address], [place] [address]. All rights reserved. This information is not intended as a substitute for professional medical care. Always follow your healthcare  professional's instructions.

## 2019-06-17 NOTE — PATIENT INSTRUCTIONS
Please follow up with your Primary care provider within 2-5 days if your signs and symptoms have not resolved or worsen.     If your condition worsens or fails to improve we recommend that you receive another evaluation at the emergency room immediately or contact your primary medical clinic to discuss your concerns.   You must understand that you have received an Urgent Care treatment only and that you may be released before all of your medical problems are known or treated. You, the patient, will arrange for follow up care as instructed.     RED FLAGS/WARNING SYMPTOMS DISCUSSED WITH PATIENT THAT WOULD WARRANT EMERGENT MEDICAL ATTENTION. PATIENT VERBALIZED UNDERSTANDING.       Shingles  Shingles is a viral infection caused by the same virus as chicken pox. Anyone who has had chicken pox may get shingles later in life. The virus stays in the body, but remains dormant (asleep). Shingles often occurs in older persons or persons with lowered immunity. But it can affect anyone at any age.  Shingles starts as a tingling patch of skin on one side of the body. Small, painful blisters may then appear. The rash does not spread to the rest of the body.  Exposure to shingles cannot cause shingles. However, it can cause chicken pox in anyone who has not had chicken pox or has not been vaccinated. The contagious period ends when all blisters have crusted over (generally about 2 weeks after the illness begins).  After the blisters heal, the affected skin may be sensitive or painful for months (neuralgia). This often gradually goes away.  A shingles vaccine is available. This can help prevent shingles or make it less painful. It is generally recommended for adults over the age of 60 who have had chicken pox in the past, but who have never had shingles. Adults over 60 who have had neither chicken pox nor shingles can prevent both diseases with the chicken pox vaccine. Ask your healthcare provider about these vaccines.  Home  care  · Medicines may be prescribed to help relieve pain. Take these medicines as directed. Ask your healthcare provider or pharmacist before using over-the-counter medicines for helping treat pain and itching.  · In certain cases, antiviral medicines may be prescribed to reduce pain, shorten the illness, and prevent neuralgia. Take these medicines as directed.  · Compresses made from a solution of cool water mixed with cornstarch or baking soda may help relieve pain and itching.   · Gently wash skin daily with soap and water to help prevent infection.  Be certain to rinse off all of the soap, which can be irritating.  · Trim fingernails and try not to scratch. Scratching the sores may leave scars.  · Stay home from work or school until all blisters have formed a crust and you are no longer contagious.  Follow-up care  Follow up with your healthcare provider or as directed by our staff.  When to seek medical advice  · Fever of 100.4°F (38°C) or higher, or as directed by your healthcare provider  · Affected skin is on the face or neck and any of the following occur:  ¨ Headache  ¨ Eye pain  ¨ Changes in vision  ¨ Sores near the eye  ¨ Weakness of facial muscles  · Pain, redness, or swelling of a joint  · Signs of skin infection: colored drainage from the sores, warmth, increasing redness, or increasing pain  Date Last Reviewed: 9/25/2015  © 9415-0753 [employer] [facility]. [address] [place] [address], [place] [address]. All rights reserved. This information is not intended as a substitute for professional medical care. Always follow your healthcare professional's instructions.

## 2019-06-17 NOTE — TELEPHONE ENCOUNTER
----- Message from Sveta Hanson sent at 6/17/2019  3:06 PM CDT -----  Contact: Self/ [phone]  Patient has a question about the medicine she was given valACYclovir (VALTREX) 1000 MG tablet.    She thinks it might be too strong.    Please call.

## 2019-06-21 ENCOUNTER — OFFICE VISIT (OUTPATIENT)
Dept: FAMILY MEDICINE | Facility: CLINIC | Age: 59
End: 2019-06-21
Attending: FAMILY MEDICINE
Payer: MEDICAID

## 2019-06-21 VITALS
WEIGHT: 136 LBS | BODY MASS INDEX: 25.68 KG/M2 | DIASTOLIC BLOOD PRESSURE: 82 MMHG | OXYGEN SATURATION: 98 % | SYSTOLIC BLOOD PRESSURE: 110 MMHG | HEART RATE: 87 BPM | HEIGHT: 61 IN

## 2019-06-21 DIAGNOSIS — B02.9 HERPES ZOSTER WITHOUT COMPLICATION: Primary | ICD-10-CM

## 2019-06-21 DIAGNOSIS — I10 ESSENTIAL HYPERTENSION: ICD-10-CM

## 2019-06-21 PROCEDURE — 99999 PR PBB SHADOW E&M-EST. PATIENT-LVL III: ICD-10-PCS | Mod: PBBFAC,,, | Performed by: FAMILY MEDICINE

## 2019-06-21 PROCEDURE — 99214 PR OFFICE/OUTPT VISIT, EST, LEVL IV, 30-39 MIN: ICD-10-PCS | Mod: S$PBB,,, | Performed by: FAMILY MEDICINE

## 2019-06-21 PROCEDURE — 99214 OFFICE O/P EST MOD 30 MIN: CPT | Mod: S$PBB,,, | Performed by: FAMILY MEDICINE

## 2019-06-21 PROCEDURE — 99999 PR PBB SHADOW E&M-EST. PATIENT-LVL III: CPT | Mod: PBBFAC,,, | Performed by: FAMILY MEDICINE

## 2019-06-21 PROCEDURE — 99213 OFFICE O/P EST LOW 20 MIN: CPT | Mod: PBBFAC,PO | Performed by: FAMILY MEDICINE

## 2019-06-21 RX ORDER — TRIAMCINOLONE ACETONIDE 1 MG/G
CREAM TOPICAL 2 TIMES DAILY
Qty: 15 G | Refills: 1 | Status: SHIPPED | OUTPATIENT
Start: 2019-06-21 | End: 2021-07-23 | Stop reason: SDUPTHER

## 2019-06-21 RX ORDER — LOSARTAN POTASSIUM AND HYDROCHLOROTHIAZIDE 12.5; 5 MG/1; MG/1
1 TABLET ORAL DAILY
Qty: 90 TABLET | Refills: 3 | Status: SHIPPED | OUTPATIENT
Start: 2019-06-21 | End: 2019-08-06 | Stop reason: SDUPTHER

## 2019-06-21 NOTE — PROGRESS NOTES
Subjective:       Patient ID: Silvia Cervantes is a 58 y.o. female.    Chief Complaint: Follow-up (Shingles)    58 yr old pleasant  female with depression, allergies, vitiligo, HTN, HLD, Rheumatoid arthritis, Erosive gastritis, and other co morbidities presents today for evaluation of HTN and shingles. She was seen at urgent care on 17 th for rash left thigh n shingles and given valtrex. She is not fully compliant with valtrex. She also want referral for neurology.    Hypertension   This is a chronic problem. The current episode started more than 1 year ago. The problem has been gradually improving since onset. The problem is controlled. Associated symptoms include neck pain. Pertinent negatives include no chest pain, headaches, malaise/fatigue, palpitations or shortness of breath. There are no associated agents to hypertension. Risk factors for coronary artery disease include dyslipidemia and post-menopausal state. Past treatments include angiotensin blockers and diuretics. The current treatment provides significant improvement. There are no compliance problems.  There is no history of angina, CAD/MI, CVA, left ventricular hypertrophy, PVD or retinopathy. There is no history of chronic renal disease, coarctation of the aorta, hypercortisolism, hyperparathyroidism, a hypertension causing med or sleep apnea.   Hyperlipidemia   This is a chronic problem. The current episode started more than 1 year ago. The problem is controlled. Recent lipid tests were reviewed and are normal. She has no history of chronic renal disease, diabetes, hypothyroidism or liver disease. There are no known factors aggravating her hyperlipidemia. Associated symptoms include myalgias. Pertinent negatives include no chest pain, focal sensory loss, focal weakness, leg pain or shortness of breath. She is currently on no antihyperlipidemic treatment. The current treatment provides mild improvement of lipids. There are no compliance problems.   Risk factors for coronary artery disease include dyslipidemia, hypertension and post-menopausal.   Back Pain   This is a chronic problem. The current episode started more than 1 month ago. The problem occurs constantly. The problem is unchanged. The pain is present in the sacro-iliac and lumbar spine. The quality of the pain is described as aching. The pain does not radiate. The symptoms are aggravated by bending, sitting and twisting. Associated symptoms include weakness. Pertinent negatives include no chest pain, dysuria, headaches, leg pain, perianal numbness or weight loss. She has tried nothing for the symptoms. The treatment provided no relief.     Review of Systems   Constitutional: Negative.  Negative for activity change, diaphoresis, malaise/fatigue, unexpected weight change and weight loss.   HENT: Negative.  Negative for congestion, ear discharge, hearing loss, rhinorrhea, sore throat and voice change.    Eyes: Negative.  Negative for pain, discharge and visual disturbance.   Respiratory: Negative.  Negative for chest tightness, shortness of breath and wheezing.    Cardiovascular: Negative.  Negative for chest pain and palpitations.   Gastrointestinal: Negative.  Negative for abdominal distention, anal bleeding, constipation and nausea.   Endocrine: Negative.  Negative for cold intolerance, polydipsia and polyuria.   Genitourinary: Negative.  Negative for decreased urine volume, difficulty urinating, dysuria, frequency, menstrual problem and vaginal pain.   Musculoskeletal: Positive for back pain, myalgias and neck pain. Negative for arthralgias and gait problem.   Skin: Positive for rash. Negative for color change, pallor and wound.   Allergic/Immunologic: Negative.  Negative for environmental allergies and immunocompromised state.   Neurological: Positive for weakness. Negative for dizziness, tremors, focal weakness, seizures, speech difficulty and headaches.   Hematological: Negative.  Negative for  adenopathy. Does not bruise/bleed easily.   Psychiatric/Behavioral: Negative.  Negative for agitation, confusion, decreased concentration, hallucinations, self-injury and suicidal ideas. The patient is not nervous/anxious.        PMH/PSH/FH/SH/MED/ALLERGY reviewed    Objective:       Vitals:    06/21/19 1059   BP: 110/82   Pulse: 87       Physical Exam   Constitutional: She is oriented to person, place, and time. She appears well-developed and well-nourished. No distress.   HENT:   Head: Normocephalic and atraumatic.   Right Ear: External ear normal.   Left Ear: External ear normal.   Nose: Nose normal.   Mouth/Throat: Oropharynx is clear and moist. No oropharyngeal exudate.   Eyes: Pupils are equal, round, and reactive to light. Conjunctivae and EOM are normal. Right eye exhibits no discharge. Left eye exhibits no discharge. No scleral icterus.   Neck: Normal range of motion. Neck supple. No JVD present. No tracheal deviation present. No thyromegaly present.   Cardiovascular: Normal rate, regular rhythm, normal heart sounds and intact distal pulses. Exam reveals no gallop and no friction rub.   No murmur heard.  Pulmonary/Chest: Effort normal and breath sounds normal. No stridor. She has no wheezes. She has no rales. She exhibits no tenderness.   Abdominal: Soft. Bowel sounds are normal. She exhibits no distension and no mass. There is no tenderness. There is no rebound and no guarding. No hernia.   Musculoskeletal: Normal range of motion. She exhibits no edema or tenderness.   Lymphadenopathy:     She has no cervical adenopathy.   Neurological: She is alert and oriented to person, place, and time. She has normal reflexes. She displays normal reflexes. No cranial nerve deficit. She exhibits normal muscle tone. Coordination normal.   Skin: Skin is warm and dry. Rash noted. She is not diaphoretic. There is erythema. No pallor.   shingles healing rash left thigh   Psychiatric: She has a normal mood and affect. Her  S/P PDA repair  Questionable, as child behavior is normal. Judgment and thought content normal.       Assessment:       1. Herpes zoster without complication    2. Essential hypertension        Plan:           Silvia was seen today for follow-up.    Diagnoses and all orders for this visit:    Herpes zoster without complication  -     triamcinolone acetonide 0.1% (KENALOG) 0.1 % cream; Apply topically 2 (two) times daily.    Essential hypertension  -     losartan-hydrochlorothiazide 50-12.5 mg (HYZAAR) 50-12.5 mg per tablet; Take 1 tablet by mouth once daily.      Shingles/rash left leg  -continue valtrex  -kenalog cream - use as directed    HTN  -controlled    Spent adequate time in obtaining history and explaining differentials    40 minutes spent during this visit of which greater than 50% devoted to face-face counseling and coordination of care regarding diagnosis and management plan    RTC prn worsening

## 2019-07-05 ENCOUNTER — LAB VISIT (OUTPATIENT)
Dept: LAB | Facility: HOSPITAL | Age: 59
End: 2019-07-05
Attending: INTERNAL MEDICINE
Payer: MEDICAID

## 2019-07-05 DIAGNOSIS — M06.9 RHEUMATOID ARTHRITIS FLARE: ICD-10-CM

## 2019-07-05 LAB
ALBUMIN SERPL BCP-MCNC: 4 G/DL (ref 3.5–5.2)
ALP SERPL-CCNC: 79 U/L (ref 55–135)
ALT SERPL W/O P-5'-P-CCNC: 21 U/L (ref 10–44)
ANION GAP SERPL CALC-SCNC: 10 MMOL/L (ref 8–16)
AST SERPL-CCNC: 20 U/L (ref 10–40)
BASOPHILS # BLD AUTO: 0.04 K/UL (ref 0–0.2)
BASOPHILS NFR BLD: 0.6 % (ref 0–1.9)
BILIRUB SERPL-MCNC: 0.3 MG/DL (ref 0.1–1)
BUN SERPL-MCNC: 13 MG/DL (ref 6–20)
CALCIUM SERPL-MCNC: 10 MG/DL (ref 8.7–10.5)
CHLORIDE SERPL-SCNC: 105 MMOL/L (ref 95–110)
CO2 SERPL-SCNC: 27 MMOL/L (ref 23–29)
CREAT SERPL-MCNC: 0.7 MG/DL (ref 0.5–1.4)
CRP SERPL-MCNC: 3.8 MG/L (ref 0–8.2)
DIFFERENTIAL METHOD: ABNORMAL
EOSINOPHIL # BLD AUTO: 0.2 K/UL (ref 0–0.5)
EOSINOPHIL NFR BLD: 3 % (ref 0–8)
ERYTHROCYTE [DISTWIDTH] IN BLOOD BY AUTOMATED COUNT: 14.9 % (ref 11.5–14.5)
ERYTHROCYTE [SEDIMENTATION RATE] IN BLOOD BY WESTERGREN METHOD: 18 MM/HR (ref 0–36)
EST. GFR  (AFRICAN AMERICAN): >60 ML/MIN/1.73 M^2
EST. GFR  (NON AFRICAN AMERICAN): >60 ML/MIN/1.73 M^2
GLUCOSE SERPL-MCNC: 84 MG/DL (ref 70–110)
HCT VFR BLD AUTO: 40.9 % (ref 37–48.5)
HGB BLD-MCNC: 13.1 G/DL (ref 12–16)
IMM GRANULOCYTES # BLD AUTO: 0.02 K/UL (ref 0–0.04)
IMM GRANULOCYTES NFR BLD AUTO: 0.3 % (ref 0–0.5)
LYMPHOCYTES # BLD AUTO: 1.9 K/UL (ref 1–4.8)
LYMPHOCYTES NFR BLD: 26.6 % (ref 18–48)
MCH RBC QN AUTO: 32.5 PG (ref 27–31)
MCHC RBC AUTO-ENTMCNC: 32 G/DL (ref 32–36)
MCV RBC AUTO: 102 FL (ref 82–98)
MONOCYTES # BLD AUTO: 0.7 K/UL (ref 0.3–1)
MONOCYTES NFR BLD: 9.4 % (ref 4–15)
NEUTROPHILS # BLD AUTO: 4.4 K/UL (ref 1.8–7.7)
NEUTROPHILS NFR BLD: 60.1 % (ref 38–73)
NRBC BLD-RTO: 0 /100 WBC
PLATELET # BLD AUTO: 401 K/UL (ref 150–350)
PMV BLD AUTO: 9.5 FL (ref 9.2–12.9)
POTASSIUM SERPL-SCNC: 3.7 MMOL/L (ref 3.5–5.1)
PROT SERPL-MCNC: 7.8 G/DL (ref 6–8.4)
RBC # BLD AUTO: 4.03 M/UL (ref 4–5.4)
SODIUM SERPL-SCNC: 142 MMOL/L (ref 136–145)
WBC # BLD AUTO: 7.25 K/UL (ref 3.9–12.7)

## 2019-07-05 PROCEDURE — 80053 COMPREHEN METABOLIC PANEL: CPT

## 2019-07-05 PROCEDURE — 85652 RBC SED RATE AUTOMATED: CPT

## 2019-07-05 PROCEDURE — 85025 COMPLETE CBC W/AUTO DIFF WBC: CPT

## 2019-07-05 PROCEDURE — 86140 C-REACTIVE PROTEIN: CPT

## 2019-07-05 PROCEDURE — 86480 TB TEST CELL IMMUN MEASURE: CPT

## 2019-07-05 PROCEDURE — 36415 COLL VENOUS BLD VENIPUNCTURE: CPT | Mod: PO

## 2019-07-06 ENCOUNTER — PATIENT MESSAGE (OUTPATIENT)
Dept: RHEUMATOLOGY | Facility: HOSPITAL | Age: 59
End: 2019-07-06

## 2019-07-09 LAB
M TB IFN-G CD4+ BCKGRND COR BLD-ACNC: -0.03 IU/ML
MITOGEN IGNF BCKGRD COR BLD-ACNC: 3.89 IU/ML
MITOGEN IGNF BCKGRD COR BLD-ACNC: NEGATIVE [IU]/ML
NIL: 0.06 IU/ML
TB2 - NIL: -0.04 IU/ML

## 2019-07-10 ENCOUNTER — TELEPHONE (OUTPATIENT)
Dept: SPINE | Facility: CLINIC | Age: 59
End: 2019-07-10

## 2019-07-10 ENCOUNTER — PATIENT MESSAGE (OUTPATIENT)
Dept: SPINE | Facility: CLINIC | Age: 59
End: 2019-07-10

## 2019-07-10 NOTE — TELEPHONE ENCOUNTER
----- Message from Tonya Melo sent at 7/10/2019  4:37 PM CDT -----  Contact: [phone]  Patient is requesting to speak to someone to reschedule her appointment she has for tomorrow to a sooner date.    Please advise thanks
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
## 2019-07-19 ENCOUNTER — OFFICE VISIT (OUTPATIENT)
Dept: SPINE | Facility: CLINIC | Age: 59
End: 2019-07-19
Attending: PHYSICAL MEDICINE & REHABILITATION
Payer: MEDICAID

## 2019-07-19 VITALS
HEART RATE: 76 BPM | WEIGHT: 138 LBS | HEIGHT: 61 IN | DIASTOLIC BLOOD PRESSURE: 81 MMHG | BODY MASS INDEX: 26.06 KG/M2 | TEMPERATURE: 99 F | SYSTOLIC BLOOD PRESSURE: 121 MMHG

## 2019-07-19 DIAGNOSIS — M79.2 NEURALGIA: ICD-10-CM

## 2019-07-19 DIAGNOSIS — R20.0 LEFT SIDED NUMBNESS: ICD-10-CM

## 2019-07-19 DIAGNOSIS — M51.36 DDD (DEGENERATIVE DISC DISEASE), LUMBAR: ICD-10-CM

## 2019-07-19 DIAGNOSIS — G89.29 CHRONIC BILATERAL LOW BACK PAIN WITHOUT SCIATICA: ICD-10-CM

## 2019-07-19 DIAGNOSIS — M54.50 CHRONIC BILATERAL LOW BACK PAIN WITHOUT SCIATICA: ICD-10-CM

## 2019-07-19 DIAGNOSIS — S32.810S MULTIPLE CLOSED FRACTURES OF PELVIS WITH STABLE DISRUPTION OF PELVIC RING, SEQUELA: Primary | ICD-10-CM

## 2019-07-19 DIAGNOSIS — T14.90XA TRAUMA: ICD-10-CM

## 2019-07-19 PROCEDURE — 99214 PR OFFICE/OUTPT VISIT, EST, LEVL IV, 30-39 MIN: ICD-10-PCS | Mod: S$PBB,,, | Performed by: PHYSICAL MEDICINE & REHABILITATION

## 2019-07-19 PROCEDURE — 99999 PR PBB SHADOW E&M-EST. PATIENT-LVL III: ICD-10-PCS | Mod: PBBFAC,,, | Performed by: PHYSICAL MEDICINE & REHABILITATION

## 2019-07-19 PROCEDURE — 99213 OFFICE O/P EST LOW 20 MIN: CPT | Mod: PBBFAC | Performed by: PHYSICAL MEDICINE & REHABILITATION

## 2019-07-19 PROCEDURE — 99214 OFFICE O/P EST MOD 30 MIN: CPT | Mod: S$PBB,,, | Performed by: PHYSICAL MEDICINE & REHABILITATION

## 2019-07-19 PROCEDURE — 99999 PR PBB SHADOW E&M-EST. PATIENT-LVL III: CPT | Mod: PBBFAC,,, | Performed by: PHYSICAL MEDICINE & REHABILITATION

## 2019-08-06 ENCOUNTER — PATIENT MESSAGE (OUTPATIENT)
Dept: RHEUMATOLOGY | Facility: CLINIC | Age: 59
End: 2019-08-06

## 2019-08-06 ENCOUNTER — OFFICE VISIT (OUTPATIENT)
Dept: FAMILY MEDICINE | Facility: CLINIC | Age: 59
End: 2019-08-06
Attending: FAMILY MEDICINE
Payer: MEDICAID

## 2019-08-06 ENCOUNTER — TELEPHONE (OUTPATIENT)
Dept: FAMILY MEDICINE | Facility: CLINIC | Age: 59
End: 2019-08-06

## 2019-08-06 VITALS
BODY MASS INDEX: 25.72 KG/M2 | DIASTOLIC BLOOD PRESSURE: 78 MMHG | HEIGHT: 61 IN | SYSTOLIC BLOOD PRESSURE: 134 MMHG | OXYGEN SATURATION: 97 % | WEIGHT: 136.25 LBS | HEART RATE: 71 BPM

## 2019-08-06 DIAGNOSIS — M06.9 RHEUMATOID ARTHRITIS INVOLVING BOTH HANDS, UNSPECIFIED RHEUMATOID FACTOR PRESENCE: ICD-10-CM

## 2019-08-06 DIAGNOSIS — F33.9 MAJOR DEPRESSION, RECURRENT, CHRONIC: ICD-10-CM

## 2019-08-06 DIAGNOSIS — G89.29 CHRONIC BILATERAL LOW BACK PAIN WITH LEFT-SIDED SCIATICA: ICD-10-CM

## 2019-08-06 DIAGNOSIS — I10 ESSENTIAL HYPERTENSION: Primary | ICD-10-CM

## 2019-08-06 DIAGNOSIS — L80 VITILIGO: ICD-10-CM

## 2019-08-06 DIAGNOSIS — M54.42 CHRONIC BILATERAL LOW BACK PAIN WITH LEFT-SIDED SCIATICA: ICD-10-CM

## 2019-08-06 DIAGNOSIS — E78.00 HYPERCHOLESTEREMIA: ICD-10-CM

## 2019-08-06 DIAGNOSIS — I10 ESSENTIAL HYPERTENSION: ICD-10-CM

## 2019-08-06 DIAGNOSIS — Z00.00 ROUTINE GENERAL MEDICAL EXAMINATION AT A HEALTH CARE FACILITY: Primary | ICD-10-CM

## 2019-08-06 PROBLEM — Z12.11 SCREENING FOR MALIGNANT NEOPLASM OF COLON: Status: RESOLVED | Noted: 2019-05-23 | Resolved: 2019-08-06

## 2019-08-06 PROCEDURE — 99214 PR OFFICE/OUTPT VISIT, EST, LEVL IV, 30-39 MIN: ICD-10-PCS | Mod: S$PBB,,, | Performed by: FAMILY MEDICINE

## 2019-08-06 PROCEDURE — 99999 PR PBB SHADOW E&M-EST. PATIENT-LVL III: ICD-10-PCS | Mod: PBBFAC,,, | Performed by: FAMILY MEDICINE

## 2019-08-06 PROCEDURE — 99999 PR PBB SHADOW E&M-EST. PATIENT-LVL III: CPT | Mod: PBBFAC,,, | Performed by: FAMILY MEDICINE

## 2019-08-06 PROCEDURE — 99214 OFFICE O/P EST MOD 30 MIN: CPT | Mod: S$PBB,,, | Performed by: FAMILY MEDICINE

## 2019-08-06 PROCEDURE — 99213 OFFICE O/P EST LOW 20 MIN: CPT | Mod: PBBFAC,PO | Performed by: FAMILY MEDICINE

## 2019-08-06 RX ORDER — SERTRALINE HYDROCHLORIDE 25 MG/1
25 TABLET, FILM COATED ORAL DAILY
Qty: 90 TABLET | Refills: 3 | Status: SHIPPED | OUTPATIENT
Start: 2019-08-06 | End: 2020-05-11 | Stop reason: SDUPTHER

## 2019-08-06 RX ORDER — AMLODIPINE BESYLATE 5 MG/1
5 TABLET ORAL DAILY
Qty: 90 TABLET | Refills: 3 | Status: SHIPPED | OUTPATIENT
Start: 2019-08-06 | End: 2020-02-11 | Stop reason: SDUPTHER

## 2019-08-06 RX ORDER — ATORVASTATIN CALCIUM 10 MG/1
10 TABLET, FILM COATED ORAL DAILY
Qty: 90 TABLET | Refills: 3 | Status: SHIPPED | OUTPATIENT
Start: 2019-08-06 | End: 2020-05-11 | Stop reason: SDUPTHER

## 2019-08-06 RX ORDER — METHOTREXATE 2.5 MG/1
20 TABLET ORAL
Qty: 32 TABLET | Refills: 0 | Status: SHIPPED | OUTPATIENT
Start: 2019-08-06 | End: 2019-12-30 | Stop reason: SDUPTHER

## 2019-08-06 RX ORDER — LOSARTAN POTASSIUM AND HYDROCHLOROTHIAZIDE 12.5; 5 MG/1; MG/1
1 TABLET ORAL DAILY
Qty: 90 TABLET | Refills: 3 | Status: SHIPPED | OUTPATIENT
Start: 2019-08-06 | End: 2019-10-24
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
## 2019-08-26 ENCOUNTER — PATIENT MESSAGE (OUTPATIENT)
Dept: FAMILY MEDICINE | Facility: CLINIC | Age: 59
End: 2019-08-26

## 2019-08-26 ENCOUNTER — PATIENT MESSAGE (OUTPATIENT)
Dept: OBSTETRICS AND GYNECOLOGY | Facility: CLINIC | Age: 59
End: 2019-08-26

## 2019-08-27 ENCOUNTER — TELEPHONE (OUTPATIENT)
Dept: FAMILY MEDICINE | Facility: CLINIC | Age: 59
End: 2019-08-27

## 2019-08-27 NOTE — TELEPHONE ENCOUNTER
----- Message from April Rushing sent at 8/26/2019  4:37 PM CDT -----  Contact: [phone]/self  Patient is returning your call   Please advise

## 2019-08-27 NOTE — TELEPHONE ENCOUNTER
Spoke to pt and states that she has been having problems with her Hemorrhoids. Pt states that she has been using Preparation H. Pt would like to know if there's anything else she can use.

## 2019-08-28 ENCOUNTER — OFFICE VISIT (OUTPATIENT)
Dept: URGENT CARE | Facility: CLINIC | Age: 59
End: 2019-08-28
Payer: MEDICAID

## 2019-08-28 VITALS
HEIGHT: 61 IN | TEMPERATURE: 98 F | SYSTOLIC BLOOD PRESSURE: 129 MMHG | DIASTOLIC BLOOD PRESSURE: 88 MMHG | BODY MASS INDEX: 25.68 KG/M2 | OXYGEN SATURATION: 97 % | WEIGHT: 136 LBS | RESPIRATION RATE: 18 BRPM | HEART RATE: 95 BPM

## 2019-08-28 DIAGNOSIS — B37.89 CANDIDIASIS OF ANUS: ICD-10-CM

## 2019-08-28 DIAGNOSIS — K64.9 HEMORRHOIDS, UNSPECIFIED HEMORRHOID TYPE: Primary | ICD-10-CM

## 2019-08-28 PROCEDURE — 99214 PR OFFICE/OUTPT VISIT, EST, LEVL IV, 30-39 MIN: ICD-10-PCS | Mod: S$GLB,,, | Performed by: STUDENT IN AN ORGANIZED HEALTH CARE EDUCATION/TRAINING PROGRAM

## 2019-08-28 PROCEDURE — 99214 OFFICE O/P EST MOD 30 MIN: CPT | Mod: S$GLB,,, | Performed by: STUDENT IN AN ORGANIZED HEALTH CARE EDUCATION/TRAINING PROGRAM

## 2019-08-28 RX ORDER — FLUNISOLIDE 0.25 MG/ML
SOLUTION NASAL
Refills: 11 | COMMUNITY
Start: 2019-08-20 | End: 2021-03-23

## 2019-08-28 RX ORDER — HYDROCORTISONE 25 MG/G
CREAM TOPICAL 2 TIMES DAILY
Qty: 30 G | Refills: 1 | Status: SHIPPED | OUTPATIENT
Start: 2019-08-28 | End: 2019-08-28

## 2019-08-28 RX ORDER — KETOCONAZOLE 20 MG/G
CREAM TOPICAL DAILY
Qty: 15 G | Refills: 0 | Status: SHIPPED | OUTPATIENT
Start: 2019-08-28 | End: 2021-04-22

## 2019-08-28 RX ORDER — HYDROCORTISONE 25 MG/G
CREAM TOPICAL 2 TIMES DAILY
Qty: 30 G | Refills: 0 | Status: SHIPPED | OUTPATIENT
Start: 2019-08-28 | End: 2021-06-03

## 2019-08-28 NOTE — PROGRESS NOTES
"Subjective:       Patient ID: Silvia Cervantes is a 58 y.o. female.    Vitals:  height is 5' 1" (1.549 m) and weight is 61.7 kg (136 lb). Her temperature is 98.3 °F (36.8 °C). Her blood pressure is 129/88 and her pulse is 95. Her respiration is 18 and oxygen saturation is 97%.     Chief Complaint: Hemorrhoids    Pt presenting for hemorrhoids. Has had them on and off for last 30 years, concerned because over last few days she has noticed blood on toilet paper. Has been using preparation H x3d without resolution. No blood in toilet or anemia sx's. Stools normal without constipation, diarrhea, or color change.    Rectal Bleeding   This is a recurrent problem. The current episode started in the past 7 days. The problem occurs constantly. The problem has been unchanged. Pertinent negatives include no abdominal pain, arthralgias, chest pain, chills, congestion, coughing, fatigue, headaches, joint swelling, myalgias, nausea, rash, vomiting or weakness. Treatments tried: wipes and hemorrhoid cream. The treatment provided mild relief.       Constitution: Negative for chills, fatigue and generalized weakness.   HENT: Negative for congestion.    Neck: Negative for painful lymph nodes.   Cardiovascular: Negative for chest pain, leg swelling and sob on exertion.   Eyes: Negative for double vision and blurred vision.   Respiratory: Negative for cough and shortness of breath.    Gastrointestinal: Positive for rectal bleeding and hemorrhoids. Negative for abdominal pain, nausea, vomiting, constipation, diarrhea, bright red blood in stool, dark colored stools and rectal pain.   Genitourinary: Negative for dysuria, frequency, urgency, history of kidney stones and vaginal pain.   Musculoskeletal: Negative for joint pain, joint swelling, muscle cramps and muscle ache.   Skin: Negative for color change, pale, rash and bruising.   Allergic/Immunologic: Negative for seasonal allergies.   Neurological: Negative for dizziness, " "light-headedness, passing out and headaches.   Hematologic/Lymphatic: Negative for swollen lymph nodes.   Psychiatric/Behavioral: Negative for confusion, nervous/anxious, sleep disturbance and depression. The patient is not nervous/anxious.        Objective:       Vitals:    08/28/19 0858   BP: 129/88   Pulse: 95   Resp: 18   Temp: 98.3 °F (36.8 °C)   SpO2: 97%   Weight: 61.7 kg (136 lb)   Height: 5' 1" (1.549 m)     Physical Exam   Constitutional: She is oriented to person, place, and time. She appears well-developed and well-nourished. No distress.   HENT:   Head: Normocephalic and atraumatic.   Right Ear: External ear normal.   Left Ear: External ear normal.   Nose: Nose normal.   Eyes: Conjunctivae and EOM are normal. Right eye exhibits no discharge. Left eye exhibits no discharge.   Neck: Normal range of motion. Neck supple.   Cardiovascular: Normal rate, regular rhythm and normal heart sounds. Exam reveals no gallop and no friction rub.   No murmur heard.  Pulmonary/Chest: Effort normal and breath sounds normal. She has no wheezes. She has no rales.   Abdominal: Soft. Bowel sounds are normal. She exhibits no distension. There is no tenderness.   Rectal exam without blood or mass; erythema and satellite lesions are anus; small mixed hemorrhoid   Musculoskeletal: She exhibits no edema or tenderness.   Neurological: She is alert and oriented to person, place, and time. No cranial nerve deficit (grossly intact).   Skin: Skin is warm and dry. No rash noted.   Psychiatric: She has a normal mood and affect. Her behavior is normal. Judgment and thought content normal.   Nursing note and vitals reviewed.      Assessment:       1. Hemorrhoids, unspecified hemorrhoid type    2. Candidiasis of anus        Plan:         Hemorrhoids, unspecified hemorrhoid type  -     hydrocortisone 2.5 % cream; Apply topically 2 (two) times daily for 7 days.  Dispense: 30 g; Refill: 0    Candidiasis of anus  -     ketoconazole (NIZORAL) 2 " % cream; Apply topically once daily for 7 days.  Dispense: 15 g; Refill: 0    Medications and diagnosis reviewed with patient, questions answered, and return precautions given.    Follow up if symptoms worsen or fail to improve.    Brandan Jolly MD/MPH  [facility]  [facility]

## 2019-08-28 NOTE — PATIENT INSTRUCTIONS
Hemorrhoids    Hemorrhoids are swollen and inflamed veins inside the rectum and near the anus. The rectum is the last several inches of the colon. The anus is the passage between the rectum and the outside of the body.  Causes  The veins can become swollen due to increased pressure in them. This is most often caused by:  · Chronic constipation or diarrhea  · Straining when having a bowel movement  · Sitting too long on the toilet  · A low-fiber diet  · Pregnancy  Symptoms  · Bleeding from the rectum (this may be noticeable after bowel movements)  · Lump near the anus  · Itching around the anus  · Pain around the anus  There are different types of hemorrhoids. Depending on the type you have and the severity, you may be able to treat yourself at home. In some cases, a procedure may be the best treatment option. Your healthcare provider can tell you more about this, if needed.  Home care  General care  · To get relief from pain or itching, try:  ¨ Topical products. Your healthcare provider may prescribe or recommend creams, ointments, or pads that can be applied to the hemorrhoid. Use these exactly as directed.  ¨ Medicines. Your healthcare provider may recommend stool softeners, suppositories, or laxatives to help manage constipation. Use these exactly as directed.  ¨ Sitz baths. A sitz bath involves sitting in a few inches of warm bath water. Be careful not to make the water so hot that you burn yourself--test it before sitting in it. Soak for about 10 to 15 minutes a few times a day. This may help relieve pain.  Tips to help prevent hemorrhoids  · Eat more fiber. Fiber adds bulk to stool and absorbs water as it moves through your colon. This makes stool softer and easier to pass.  ¨ Increase the fiber in your diet with more fiber-rich foods. These include fresh fruit, vegetables, and whole grains.  ¨ Take a fiber supplement or bulking agent, if advised to by your provider. These include products such as psyllium  or methylcellulose.  · Drink plenty of water, if directed to by your provider. This can help keep stool soft.  · Be more active. Frequent exercise aids digestion and helps prevent constipation. It may also help make bowel movements more regular.  · Dont strain during bowel movements. This can make hemorrhoids more likely. Also, dont sit on the toilet for long periods of time.  Follow-up care  Follow up with your healthcare provider, or as advised. If a culture or imaging tests were done, you will be notified of the results when they are ready. This may take a few days or longer.  When to seek medical advice  Call your healthcare provider right away if any of these occur:  · Increased bleeding from the rectum  · Increased pain around the rectum or anus  · Weakness or dizziness  Call 911  Call 911 or return to the emergency department right away if any of these occur:  · Trouble breathing or swallowing  · Fainting or loss of consciousness  · Unusually fast heart rate  · Vomiting blood  · Large amounts of blood in stool  Date Last Reviewed: 6/22/2015 © 2000-2017 [employer]. [address] [place] [address], [place] [address]. All rights reserved. This information is not intended as a substitute for professional medical care. Always follow your healthcare professional's instructions.

## 2019-09-20 ENCOUNTER — PATIENT OUTREACH (OUTPATIENT)
Dept: ADMINISTRATIVE | Facility: OTHER | Age: 59
End: 2019-09-20

## 2019-09-23 ENCOUNTER — PATIENT MESSAGE (OUTPATIENT)
Dept: RHEUMATOLOGY | Facility: CLINIC | Age: 59
End: 2019-09-23

## 2019-09-23 ENCOUNTER — OFFICE VISIT (OUTPATIENT)
Dept: NEUROLOGY | Facility: CLINIC | Age: 59
End: 2019-09-23
Payer: MEDICAID

## 2019-09-23 DIAGNOSIS — M54.16 LUMBAR RADICULAR PAIN: ICD-10-CM

## 2019-09-23 DIAGNOSIS — I10 ESSENTIAL HYPERTENSION: ICD-10-CM

## 2019-09-23 DIAGNOSIS — M54.2 CHRONIC NECK AND BACK PAIN: Primary | ICD-10-CM

## 2019-09-23 DIAGNOSIS — M54.16 LUMBAR RADICULOPATHY: Chronic | ICD-10-CM

## 2019-09-23 DIAGNOSIS — E78.00 HYPERCHOLESTEREMIA: ICD-10-CM

## 2019-09-23 DIAGNOSIS — M54.9 CHRONIC NECK AND BACK PAIN: Primary | ICD-10-CM

## 2019-09-23 DIAGNOSIS — G56.92 NEUROPATHY, ARM, LEFT: ICD-10-CM

## 2019-09-23 DIAGNOSIS — G89.29 CHRONIC NECK AND BACK PAIN: Primary | ICD-10-CM

## 2019-09-23 PROCEDURE — 99214 OFFICE O/P EST MOD 30 MIN: CPT | Mod: S$PBB,,, | Performed by: PSYCHIATRY & NEUROLOGY

## 2019-09-23 PROCEDURE — 99999 PR PBB SHADOW E&M-EST. PATIENT-LVL III: CPT | Mod: PBBFAC,,, | Performed by: PSYCHIATRY & NEUROLOGY

## 2019-09-23 PROCEDURE — 99214 PR OFFICE/OUTPT VISIT, EST, LEVL IV, 30-39 MIN: ICD-10-PCS | Mod: S$PBB,,, | Performed by: PSYCHIATRY & NEUROLOGY

## 2019-09-23 PROCEDURE — 99999 PR PBB SHADOW E&M-EST. PATIENT-LVL III: ICD-10-PCS | Mod: PBBFAC,,, | Performed by: PSYCHIATRY & NEUROLOGY

## 2019-09-23 PROCEDURE — 99213 OFFICE O/P EST LOW 20 MIN: CPT | Mod: PBBFAC | Performed by: PSYCHIATRY & NEUROLOGY

## 2019-09-23 RX ORDER — GABAPENTIN 300 MG/1
300 CAPSULE ORAL 3 TIMES DAILY
Qty: 90 CAPSULE | Refills: 11 | Status: SHIPPED | OUTPATIENT
Start: 2019-09-23 | End: 2020-09-25 | Stop reason: SDUPTHER
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
## 2019-10-01 NOTE — ASSESSMENT & PLAN NOTE
Previously reported LBP which improved with visits to [facility]. She had cervical spine pains at that time too but attention was only to the low back.   - Referral to the [facility] for work with cervical spine pain.

## 2019-10-04 ENCOUNTER — LAB VISIT (OUTPATIENT)
Dept: LAB | Facility: HOSPITAL | Age: 59
End: 2019-10-04
Attending: INTERNAL MEDICINE
Payer: MEDICAID

## 2019-10-04 DIAGNOSIS — M06.9 RHEUMATOID ARTHRITIS FLARE: ICD-10-CM

## 2019-10-04 LAB
ALBUMIN SERPL BCP-MCNC: 4 G/DL (ref 3.5–5.2)
ALP SERPL-CCNC: 80 U/L (ref 55–135)
ALT SERPL W/O P-5'-P-CCNC: 11 U/L (ref 10–44)
ANION GAP SERPL CALC-SCNC: 11 MMOL/L (ref 8–16)
AST SERPL-CCNC: 14 U/L (ref 10–40)
BASOPHILS # BLD AUTO: 0.02 K/UL (ref 0–0.2)
BASOPHILS NFR BLD: 0.3 % (ref 0–1.9)
BILIRUB SERPL-MCNC: 0.3 MG/DL (ref 0.1–1)
BUN SERPL-MCNC: 14 MG/DL (ref 6–20)
CALCIUM SERPL-MCNC: 9.8 MG/DL (ref 8.7–10.5)
CHLORIDE SERPL-SCNC: 104 MMOL/L (ref 95–110)
CO2 SERPL-SCNC: 27 MMOL/L (ref 23–29)
CREAT SERPL-MCNC: 0.8 MG/DL (ref 0.5–1.4)
CRP SERPL-MCNC: 11.3 MG/L (ref 0–8.2)
DIFFERENTIAL METHOD: ABNORMAL
EOSINOPHIL # BLD AUTO: 0.2 K/UL (ref 0–0.5)
EOSINOPHIL NFR BLD: 2.3 % (ref 0–8)
ERYTHROCYTE [DISTWIDTH] IN BLOOD BY AUTOMATED COUNT: 13 % (ref 11.5–14.5)
ERYTHROCYTE [SEDIMENTATION RATE] IN BLOOD BY WESTERGREN METHOD: 46 MM/HR (ref 0–20)
EST. GFR  (AFRICAN AMERICAN): >60 ML/MIN/1.73 M^2
EST. GFR  (NON AFRICAN AMERICAN): >60 ML/MIN/1.73 M^2
GLUCOSE SERPL-MCNC: 81 MG/DL (ref 70–110)
HCT VFR BLD AUTO: 41.5 % (ref 37–48.5)
HGB BLD-MCNC: 13.3 G/DL (ref 12–16)
LYMPHOCYTES # BLD AUTO: 1.7 K/UL (ref 1–4.8)
LYMPHOCYTES NFR BLD: 23.9 % (ref 18–48)
MCH RBC QN AUTO: 32.3 PG (ref 27–31)
MCHC RBC AUTO-ENTMCNC: 32 G/DL (ref 32–36)
MCV RBC AUTO: 101 FL (ref 82–98)
MONOCYTES # BLD AUTO: 0.6 K/UL (ref 0.3–1)
MONOCYTES NFR BLD: 9.2 % (ref 4–15)
NEUTROPHILS # BLD AUTO: 4.5 K/UL (ref 1.8–7.7)
NEUTROPHILS NFR BLD: 64.3 % (ref 38–73)
PLATELET # BLD AUTO: 390 K/UL (ref 150–350)
PMV BLD AUTO: 9.4 FL (ref 9.2–12.9)
POTASSIUM SERPL-SCNC: 3.9 MMOL/L (ref 3.5–5.1)
PROT SERPL-MCNC: 7.9 G/DL (ref 6–8.4)
RBC # BLD AUTO: 4.12 M/UL (ref 4–5.4)
SODIUM SERPL-SCNC: 142 MMOL/L (ref 136–145)
WBC # BLD AUTO: 6.99 K/UL (ref 3.9–12.7)

## 2019-10-04 PROCEDURE — 36415 COLL VENOUS BLD VENIPUNCTURE: CPT

## 2019-10-04 PROCEDURE — 85652 RBC SED RATE AUTOMATED: CPT

## 2019-10-04 PROCEDURE — 86140 C-REACTIVE PROTEIN: CPT

## 2019-10-04 PROCEDURE — 80053 COMPREHEN METABOLIC PANEL: CPT

## 2019-10-04 PROCEDURE — 85025 COMPLETE CBC W/AUTO DIFF WBC: CPT

## 2019-10-07 ENCOUNTER — IMMUNIZATION (OUTPATIENT)
Dept: PHARMACY | Facility: CLINIC | Age: 59
End: 2019-10-07
Payer: MEDICAID

## 2019-10-08 ENCOUNTER — PATIENT MESSAGE (OUTPATIENT)
Dept: RHEUMATOLOGY | Facility: CLINIC | Age: 59
End: 2019-10-08

## 2019-10-09 ENCOUNTER — CLINICAL SUPPORT (OUTPATIENT)
Dept: REHABILITATION | Facility: OTHER | Age: 59
End: 2019-10-09
Attending: PSYCHIATRY & NEUROLOGY
Payer: MEDICAID

## 2019-10-09 DIAGNOSIS — M54.2 NECK PAIN: ICD-10-CM

## 2019-10-09 DIAGNOSIS — R29.898 DECREASED RANGE OF MOTION OF NECK: ICD-10-CM

## 2019-10-09 PROCEDURE — 97162 PT EVAL MOD COMPLEX 30 MIN: CPT

## 2019-10-09 PROCEDURE — 97110 THERAPEUTIC EXERCISES: CPT

## 2019-10-09 NOTE — PATIENT INSTRUCTIONS
AROM: Lateral Neck Flexion        Slowly tilt head toward one shoulder, then the other. Hold each position ____ seconds.  Repeat ____ times per set. Do ____ sets per session. Do ____ sessions per day.     https://[employer].OpenQ/296     Copyright © [employer]. All rights reserved.   Flexibility: Neck Retraction        Pull head straight back, keeping eyes and jaw level.  Repeat ____ times per set. Do ____ sets per session. Do ____ sessions per day.     https://[employer].reMail.OpenQ/344     Copyright © [employer]. All rights reserved.
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
## 2019-10-14 ENCOUNTER — PATIENT MESSAGE (OUTPATIENT)
Dept: OBSTETRICS AND GYNECOLOGY | Facility: CLINIC | Age: 59
End: 2019-10-14

## 2019-10-14 ENCOUNTER — CLINICAL SUPPORT (OUTPATIENT)
Dept: REHABILITATION | Facility: OTHER | Age: 59
End: 2019-10-14
Attending: PSYCHIATRY & NEUROLOGY
Payer: MEDICAID

## 2019-10-14 DIAGNOSIS — R29.898 DECREASED RANGE OF MOTION OF NECK: ICD-10-CM

## 2019-10-14 DIAGNOSIS — M54.2 NECK PAIN: ICD-10-CM

## 2019-10-14 PROCEDURE — 97110 THERAPEUTIC EXERCISES: CPT
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
## 2019-10-15 ENCOUNTER — HOSPITAL ENCOUNTER (OUTPATIENT)
Dept: RADIOLOGY | Facility: HOSPITAL | Age: 59
Discharge: HOME | End: 2019-10-15
Attending: INTERNAL MEDICINE
Payer: MEDICAID

## 2019-10-15 ENCOUNTER — PATIENT MESSAGE (OUTPATIENT)
Dept: OBSTETRICS AND GYNECOLOGY | Facility: CLINIC | Age: 59
End: 2019-10-15

## 2019-10-15 DIAGNOSIS — D86.0 SARCOIDOSIS OF LUNG: Primary | ICD-10-CM

## 2019-10-15 DIAGNOSIS — D86.0 SARCOIDOSIS OF LUNG: ICD-10-CM

## 2019-10-15 PROCEDURE — 71046 X-RAY EXAM CHEST 2 VIEWS: CPT | Mod: TC,FY

## 2019-10-15 PROCEDURE — 71046 X-RAY EXAM CHEST 2 VIEWS: CPT | Mod: 26,,, | Performed by: RADIOLOGY

## 2019-10-15 PROCEDURE — 71046 XR CHEST PA AND LATERAL: ICD-10-PCS | Mod: 26,,, | Performed by: RADIOLOGY

## 2019-10-15 NOTE — TELEPHONE ENCOUNTER
Yes and if symptoms sound like BV as well can send Flagyl. Has never had yeast but affirm not done in awhile

## 2019-10-15 NOTE — TELEPHONE ENCOUNTER
"Patient sent a message on 10/12/2019 requesting an appointment.  I messaged her back and asked if it was for a problem visit or for her well woman exam?  (Patient's last well woman exam was on 10/18/2018 and she has not scheduled for her annual exam).    Patient responded in a new portal message last night: ", I feel I have  a vaginal yeast infection  symptoms"   I messaged her back asking her specific symptoms. Patient states in a separate portal message:"Burn,iching  outside  vagina".     Please advise if I should have patient use Hydrocortisone cream for the external itching and have her schedule for the next available appointment for her WWE?    "

## 2019-10-16 ENCOUNTER — TELEPHONE (OUTPATIENT)
Dept: PODIATRY | Facility: CLINIC | Age: 59
End: 2019-10-16

## 2019-10-16 NOTE — TELEPHONE ENCOUNTER
----- Message from Mai Wiseman sent at 10/16/2019  4:32 PM CDT -----  Contact: [facility] / [phone]  Patient is returning a call from your office. Please advise

## 2019-10-17 ENCOUNTER — CLINICAL SUPPORT (OUTPATIENT)
Dept: REHABILITATION | Facility: OTHER | Age: 59
End: 2019-10-17
Attending: PSYCHIATRY & NEUROLOGY
Payer: MEDICAID

## 2019-10-17 DIAGNOSIS — M54.2 NECK PAIN: ICD-10-CM

## 2019-10-17 DIAGNOSIS — R29.898 DECREASED RANGE OF MOTION OF NECK: ICD-10-CM

## 2019-10-17 PROCEDURE — 97110 THERAPEUTIC EXERCISES: CPT
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
## 2019-10-18 ENCOUNTER — TELEPHONE (OUTPATIENT)
Dept: SPINE | Facility: CLINIC | Age: 59
End: 2019-10-18

## 2019-10-18 NOTE — TELEPHONE ENCOUNTER
----- Message from Grisel Barron sent at 10/18/2019  3:35 PM CDT -----  Contact: DARYN VENEGAS [[id number]]  Name of Who is Calling: DARYN VENEGAS [[id number]]      What is the request in detail: Pt is calling in regards to appt on 11.13.2019     Please contact to further discuss and advise.       Can the clinic reply by [facility]:       What Number to Call Back if not in [facility]: [phone]

## 2019-10-18 NOTE — TELEPHONE ENCOUNTER
Patient is complaining of vulvar itching.  Patient denies vaginal discharge or abnormal odor.  Notified it sounds like a contact irritant, can try some Hydrocortisone cream.  Also due for annual exam.  Patient verbalized understanding and scheduled Thursday 10/24/19 @ 1:30

## 2019-10-22 ENCOUNTER — CLINICAL SUPPORT (OUTPATIENT)
Dept: REHABILITATION | Facility: OTHER | Age: 59
End: 2019-10-22
Attending: PSYCHIATRY & NEUROLOGY
Payer: MEDICAID

## 2019-10-22 DIAGNOSIS — R29.898 DECREASED RANGE OF MOTION OF NECK: ICD-10-CM

## 2019-10-22 DIAGNOSIS — M54.2 NECK PAIN: ICD-10-CM

## 2019-10-22 PROCEDURE — 97110 THERAPEUTIC EXERCISES: CPT
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
## 2019-10-24 ENCOUNTER — TELEPHONE (OUTPATIENT)
Dept: FAMILY MEDICINE | Facility: CLINIC | Age: 59
End: 2019-10-24

## 2019-10-24 ENCOUNTER — OFFICE VISIT (OUTPATIENT)
Dept: OBSTETRICS AND GYNECOLOGY | Facility: CLINIC | Age: 59
End: 2019-10-24
Payer: MEDICAID

## 2019-10-24 VITALS
BODY MASS INDEX: 25.07 KG/M2 | SYSTOLIC BLOOD PRESSURE: 106 MMHG | DIASTOLIC BLOOD PRESSURE: 64 MMHG | HEIGHT: 61 IN | WEIGHT: 132.81 LBS

## 2019-10-24 DIAGNOSIS — Z12.4 ROUTINE CERVICAL SMEAR: ICD-10-CM

## 2019-10-24 DIAGNOSIS — R35.0 URINARY FREQUENCY: ICD-10-CM

## 2019-10-24 DIAGNOSIS — Z01.419 WELL WOMAN EXAM WITH ROUTINE GYNECOLOGICAL EXAM: Primary | ICD-10-CM

## 2019-10-24 DIAGNOSIS — N89.8 VAGINAL DISCHARGE: ICD-10-CM

## 2019-10-24 PROCEDURE — 87481 CANDIDA DNA AMP PROBE: CPT | Mod: 59

## 2019-10-24 PROCEDURE — 88175 CYTOPATH C/V AUTO FLUID REDO: CPT

## 2019-10-24 PROCEDURE — 99396 PR PREVENTIVE VISIT,EST,40-64: ICD-10-PCS | Mod: S$PBB,,, | Performed by: OBSTETRICS & GYNECOLOGY

## 2019-10-24 PROCEDURE — 99999 PR PBB SHADOW E&M-EST. PATIENT-LVL III: ICD-10-PCS | Mod: PBBFAC,,, | Performed by: OBSTETRICS & GYNECOLOGY

## 2019-10-24 PROCEDURE — 87801 DETECT AGNT MULT DNA AMPLI: CPT

## 2019-10-24 PROCEDURE — 99396 PREV VISIT EST AGE 40-64: CPT | Mod: S$PBB,,, | Performed by: OBSTETRICS & GYNECOLOGY

## 2019-10-24 PROCEDURE — 87086 URINE CULTURE/COLONY COUNT: CPT

## 2019-10-24 PROCEDURE — 87661 TRICHOMONAS VAGINALIS AMPLIF: CPT

## 2019-10-24 PROCEDURE — 99213 OFFICE O/P EST LOW 20 MIN: CPT | Mod: PBBFAC,PO | Performed by: OBSTETRICS & GYNECOLOGY

## 2019-10-24 PROCEDURE — 99999 PR PBB SHADOW E&M-EST. PATIENT-LVL III: CPT | Mod: PBBFAC,,, | Performed by: OBSTETRICS & GYNECOLOGY

## 2019-10-24 RX ORDER — TERBINAFINE HYDROCHLORIDE 250 MG/1
250 TABLET ORAL DAILY
Refills: 0 | COMMUNITY
Start: 2019-09-19 | End: 2021-04-22

## 2019-10-24 RX ORDER — CLOTRIMAZOLE AND BETAMETHASONE DIPROPIONATE 10; .64 MG/G; MG/G
1 CREAM TOPICAL 2 TIMES DAILY
Refills: 0 | COMMUNITY
Start: 2019-09-26 | End: 2021-06-03

## 2019-10-24 RX ORDER — LOSARTAN POTASSIUM 50 MG/1
50 TABLET ORAL DAILY
Qty: 90 TABLET | Refills: 3 | Status: SHIPPED | OUTPATIENT
Start: 2019-10-24 | End: 2019-11-27

## 2019-10-24 RX ORDER — HYDROCHLOROTHIAZIDE 12.5 MG/1
12.5 TABLET ORAL DAILY
Qty: 90 TABLET | Refills: 3 | Status: SHIPPED | OUTPATIENT
Start: 2019-10-24 | End: 2019-11-27
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
## 2019-10-24 NOTE — TELEPHONE ENCOUNTER
----- Message from Loli Joseph MA sent at 10/24/2019  2:07 PM CDT -----      ----- Message -----  From: Bruce Montiel PharmD  Sent: 10/24/2019   1:10 PM CDT  To: [facility] Staff    Pt has been on losartn 50/12.5 combo tablet. This is on manuf back order until middle to end of November. If possible can Md re-send 2 prescriptions ( 1 )   For losartan 50mg and ( 1 ) for HCTZ 12.5mg. Please. thank you

## 2019-10-26 ENCOUNTER — PATIENT MESSAGE (OUTPATIENT)
Dept: OBSTETRICS AND GYNECOLOGY | Facility: HOSPITAL | Age: 59
End: 2019-10-26

## 2019-10-26 LAB
BACTERIAL VAGINOSIS DNA: POSITIVE
CANDIDA GLABRATA DNA: NEGATIVE
CANDIDA KRUSEI DNA: NEGATIVE
CANDIDA RRNA VAG QL PROBE: NEGATIVE
T VAGINALIS RRNA GENITAL QL PROBE: NEGATIVE

## 2019-10-26 RX ORDER — METRONIDAZOLE 500 MG/1
500 TABLET ORAL EVERY 12 HOURS
Qty: 14 TABLET | Refills: 0 | Status: SHIPPED | OUTPATIENT
Start: 2019-10-26 | End: 2019-11-06

## 2019-10-27 LAB — BACTERIA UR CULT: NORMAL

## 2019-10-29 ENCOUNTER — CLINICAL SUPPORT (OUTPATIENT)
Dept: REHABILITATION | Facility: OTHER | Age: 59
End: 2019-10-29
Attending: PSYCHIATRY & NEUROLOGY
Payer: MEDICAID

## 2019-10-29 DIAGNOSIS — R29.898 DECREASED RANGE OF MOTION OF NECK: ICD-10-CM

## 2019-10-29 DIAGNOSIS — M54.2 NECK PAIN: ICD-10-CM

## 2019-10-29 PROCEDURE — 97110 THERAPEUTIC EXERCISES: CPT
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
## 2019-10-31 ENCOUNTER — CLINICAL SUPPORT (OUTPATIENT)
Dept: REHABILITATION | Facility: OTHER | Age: 59
End: 2019-10-31
Attending: PSYCHIATRY & NEUROLOGY
Payer: MEDICAID

## 2019-10-31 DIAGNOSIS — R29.898 DECREASED RANGE OF MOTION OF NECK: ICD-10-CM

## 2019-10-31 DIAGNOSIS — M54.2 NECK PAIN: ICD-10-CM

## 2019-10-31 PROCEDURE — 97110 THERAPEUTIC EXERCISES: CPT
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
## 2019-11-01 ENCOUNTER — PATIENT MESSAGE (OUTPATIENT)
Dept: RHEUMATOLOGY | Facility: CLINIC | Age: 59
End: 2019-11-01

## 2019-11-01 ENCOUNTER — TELEPHONE (OUTPATIENT)
Dept: RHEUMATOLOGY | Facility: CLINIC | Age: 59
End: 2019-11-01

## 2019-11-01 NOTE — TELEPHONE ENCOUNTER
LM for pt to move her appt 11/6 to [facility] [place] at the same time. Provided #

## 2019-11-05 ENCOUNTER — CLINICAL SUPPORT (OUTPATIENT)
Dept: REHABILITATION | Facility: OTHER | Age: 59
End: 2019-11-05
Attending: PSYCHIATRY & NEUROLOGY
Payer: MEDICAID

## 2019-11-05 DIAGNOSIS — M54.2 NECK PAIN: ICD-10-CM

## 2019-11-05 DIAGNOSIS — R29.898 DECREASED RANGE OF MOTION OF NECK: ICD-10-CM

## 2019-11-05 PROCEDURE — 97110 THERAPEUTIC EXERCISES: CPT
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
## 2019-11-06 ENCOUNTER — OFFICE VISIT (OUTPATIENT)
Dept: RHEUMATOLOGY | Facility: CLINIC | Age: 59
End: 2019-11-06
Payer: MEDICAID

## 2019-11-06 VITALS
SYSTOLIC BLOOD PRESSURE: 139 MMHG | BODY MASS INDEX: 25.64 KG/M2 | DIASTOLIC BLOOD PRESSURE: 84 MMHG | HEIGHT: 61 IN | WEIGHT: 135.81 LBS | HEART RATE: 88 BPM

## 2019-11-06 DIAGNOSIS — D86.9 SARCOIDOSIS: Primary | ICD-10-CM

## 2019-11-06 PROCEDURE — 99213 OFFICE O/P EST LOW 20 MIN: CPT | Mod: PBBFAC | Performed by: INTERNAL MEDICINE

## 2019-11-06 PROCEDURE — 99999 PR PBB SHADOW E&M-EST. PATIENT-LVL III: ICD-10-PCS | Mod: PBBFAC,,, | Performed by: INTERNAL MEDICINE

## 2019-11-06 PROCEDURE — 99214 PR OFFICE/OUTPT VISIT, EST, LEVL IV, 30-39 MIN: ICD-10-PCS | Mod: S$PBB,,, | Performed by: INTERNAL MEDICINE

## 2019-11-06 PROCEDURE — 99999 PR PBB SHADOW E&M-EST. PATIENT-LVL III: CPT | Mod: PBBFAC,,, | Performed by: INTERNAL MEDICINE

## 2019-11-06 PROCEDURE — 99214 OFFICE O/P EST MOD 30 MIN: CPT | Mod: S$PBB,,, | Performed by: INTERNAL MEDICINE

## 2019-11-06 RX ORDER — FOLIC ACID 1 MG/1
2 TABLET ORAL DAILY
Qty: 60 TABLET | Refills: 11 | Status: SHIPPED | OUTPATIENT
Start: 2019-11-06 | End: 2020-03-18 | Stop reason: SDUPTHER

## 2019-11-06 RX ORDER — METHOTREXATE 2.5 MG/1
TABLET ORAL
Qty: 32 TABLET | Refills: 0 | Status: SHIPPED | OUTPATIENT
Start: 2019-11-06 | End: 2020-02-11

## 2019-11-06 ASSESSMENT — ROUTINE ASSESSMENT OF PATIENT INDEX DATA (RAPID3)
AM STIFFNESS SCORE: 1, YES
PATIENT GLOBAL ASSESSMENT SCORE: 5.5
FATIGUE SCORE: 5
TOTAL RAPID3 SCORE: 4.94
MDHAQ FUNCTION SCORE: 1.3
PAIN SCORE: 5
PSYCHOLOGICAL DISTRESS SCORE: 3.3

## 2019-11-06 NOTE — PROGRESS NOTES
Subjective:       Patient ID: Silvia Cervantes is a 54 y.o. female.    Chief Complaint:     HPI 55 yo F with PMH of HTN, abnormal pap (2013 but recent negative, no cancer), RA (+CCP, RF),erosive here for evaluation. She was diagnosed in 2011 with hand and feet with swelling and pain. She was initially treated with MTX but it gave her nausea which gave her nausea.  She was changed to leflunomide 20 mg 10/2014 from mtx due to nausea on the mtx. Then I added etanercept January 2015 due to incomplete control of her arthritis on leflunomide.  No am stiffness.  She reports mild aching in hands (3/10). Pain is aching.  Reports mild swelling in hands but better than usual.  She reports left upper quadrant pain (4/10) , non-radiating with possible nausea which has been present for months. Sometimes she has sour taste in mouth.       Interval history:  Patient is here for follow up.  She has not taking methotrexate in a month. She stopped methotrexate because she didn't want to take it. She is taking folic acid 1 mg po qday.  .Reports she is getting rheumatoid nodules in elbows.  Reports worsening swelling in hands.  Reports she has diffuse morning stiffness for a few minutes. She is not taking the celebrex.  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   HENT: Negative for congestion, ear discharge, ear pain, facial swelling, mouth sores, sinus pressure, sneezing, sore throat, tinnitus and trouble swallowing.    Eyes: Negative for photophobia, pain, discharge, redness, itching and visual disturbance.   Respiratory: Negative for apnea, chest  tightness, shortness of breath, wheezing and stridor.    Cardiovascular: Negative for leg swelling.   Gastrointestinal: Negative for nausea, abdominal pain, diarrhea, constipation, blood in stool, abdominal distention and anal bleeding.   Endocrine: Negative for cold intolerance and heat intolerance.   Genitourinary: Negative for dysuria and difficulty urinating.   Musculoskeletal: Positive for arthralgias. Negative for myalgias, back pain, joint swelling, gait problem, neck pain and neck stiffness.   Skin: Negative for color change, pallor, rash and wound.   Neurological: Negative for dizziness, seizures, light-headedness and numbness.   Hematological: Negative for adenopathy. Does not bruise/bleed easily.   Psychiatric/Behavioral: Negative for sleep disturbance. The patient is not nervous/anxious.       Objective:        Physical Exam   Constitutional: She is oriented to person, place, and time.   HENT:   Head: Normocephalic and atraumatic.   Right Ear: External ear normal.   Left Ear: External ear normal.   Nose: Nose normal.   Mouth/Throat: Oropharynx is clear and moist. No oropharyngeal exudate.   Eyes: Conjunctivae and EOM are normal. Pupils are equal, round, and reactive to light. Right eye exhibits no discharge. Left eye exhibits no discharge. No scleral icterus.   Neck: Neck supple. No JVD present. No thyromegaly present.   Cardiovascular: Normal rate, regular rhythm, normal heart sounds and intact distal pulses.  Exam reveals no gallop and no friction rub.    No murmur heard.  Pulmonary/Chest: Effort normal and breath sounds normal. No respiratory distress. She has no wheezes. She has no rales. She exhibits no tenderness.   Abdominal: Soft. Bowel sounds are normal. She exhibits no distension and no mass. There is no tenderness. There is no rebound and no guarding.   Lymphadenopathy:     She has no cervical adenopathy.   Neurological: She is alert and oriented to person, place, and time. No cranial nerve  "deficit. Gait normal. Coordination normal.   Skin:diffuse hypopigmented lesions in arms, hands, legs, chest, back   Psychiatric: Affect and judgment normal.   Musculoskeletal: She exhibits no  tenderness. She exhibits no edema.   FROM of all joints including neck, shoulders, spine, ankles, wrists, knees, and ankles; no joint deformities noted or effusions, erythema or warmth; no tophi or nodules noted; no crepitus; no nail pitting or onycholysis          Labs:  Esr-25  ccp-93  rf-36  Flor-negative    Imaging:  MRI (7/22/2015)   (Stable enhancing marginal erosions in the second and third metacarpal heads, lunate, triquetrum, and capitate)        dexa scan:(2017):  Osteopenia of the femoral neck. FRAX calculation does not support treatment for osteoporosis.Total hip and lumbar spine BMD unchanged since 2013.    Recommendations:  1) Adequate calcium and Vitamin D therapy  2) Appropriate exercise  3) Consider repeat BMD in 2- 4 years    Assessment:     59 yo F with PMH of HTN, abnormal pap (2013 but recent negative, no cancer), RA (+CCP, RF),erosive here , H. Pylori for follow up of seropositive RA and sarcoid.   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  She has abnormal brain MRI  AMYLOID ANGIOPATHY and IS SEEING NEURO.  Patient was previously doing well on enbrel but she would prefer not to injections. She was doing well on MTX but stopped it and is coming in with flare.    Plan:    - restart methotrexate 4 pills a week for first 2 weeks and then increase to 6 pills  A week  continue folic acid 1 mg po qday  Labs in a month and in 3 months  -follow up  EMG  Continue celebrex 200mg po BID PRN  rtc in  4 months*    "

## 2019-11-07 ENCOUNTER — CLINICAL SUPPORT (OUTPATIENT)
Dept: REHABILITATION | Facility: OTHER | Age: 59
End: 2019-11-07
Attending: PSYCHIATRY & NEUROLOGY
Payer: MEDICAID

## 2019-11-07 DIAGNOSIS — M54.2 NECK PAIN: ICD-10-CM

## 2019-11-07 DIAGNOSIS — R29.898 DECREASED RANGE OF MOTION OF NECK: ICD-10-CM

## 2019-11-07 PROCEDURE — 97110 THERAPEUTIC EXERCISES: CPT
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
## 2019-11-11 ENCOUNTER — CLINICAL SUPPORT (OUTPATIENT)
Dept: REHABILITATION | Facility: OTHER | Age: 59
End: 2019-11-11
Attending: PSYCHIATRY & NEUROLOGY
Payer: MEDICAID

## 2019-11-11 DIAGNOSIS — R29.898 DECREASED RANGE OF MOTION OF NECK: ICD-10-CM

## 2019-11-11 DIAGNOSIS — M54.2 NECK PAIN: ICD-10-CM

## 2019-11-11 PROCEDURE — 97110 THERAPEUTIC EXERCISES: CPT | Performed by: PHYSICAL THERAPIST
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
## 2019-11-18 ENCOUNTER — CLINICAL SUPPORT (OUTPATIENT)
Dept: REHABILITATION | Facility: OTHER | Age: 59
End: 2019-11-18
Attending: PSYCHIATRY & NEUROLOGY
Payer: MEDICAID

## 2019-11-18 DIAGNOSIS — R29.898 DECREASED RANGE OF MOTION OF NECK: ICD-10-CM

## 2019-11-18 DIAGNOSIS — M54.2 NECK PAIN: ICD-10-CM

## 2019-11-18 PROCEDURE — G8978 MOBILITY CURRENT STATUS: HCPCS | Mod: CK | Performed by: PHYSICAL THERAPIST

## 2019-11-18 PROCEDURE — G8979 MOBILITY GOAL STATUS: HCPCS | Mod: CJ | Performed by: PHYSICAL THERAPIST

## 2019-11-18 PROCEDURE — 97110 THERAPEUTIC EXERCISES: CPT | Performed by: PHYSICAL THERAPIST
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
## 2019-11-19 ENCOUNTER — HOSPITAL ENCOUNTER (OUTPATIENT)
Dept: PULMONOLOGY | Facility: HOSPITAL | Age: 59
Discharge: HOME | End: 2019-11-19
Attending: INTERNAL MEDICINE
Payer: MEDICAID

## 2019-11-19 DIAGNOSIS — D86.0 SARCOIDOSIS OF LUNG: ICD-10-CM

## 2019-11-19 PROCEDURE — 94010 BREATHING CAPACITY TEST: CPT

## 2019-11-19 PROCEDURE — 94727 GAS DIL/WSHOT DETER LNG VOL: CPT

## 2019-11-19 PROCEDURE — 94729 DIFFUSING CAPACITY: CPT
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
## 2019-11-20 ENCOUNTER — OFFICE VISIT (OUTPATIENT)
Dept: SPINE | Facility: CLINIC | Age: 59
End: 2019-11-20
Attending: PHYSICAL MEDICINE & REHABILITATION
Payer: MEDICAID

## 2019-11-20 VITALS
HEART RATE: 87 BPM | HEIGHT: 61 IN | DIASTOLIC BLOOD PRESSURE: 79 MMHG | WEIGHT: 135.81 LBS | BODY MASS INDEX: 25.64 KG/M2 | SYSTOLIC BLOOD PRESSURE: 131 MMHG

## 2019-11-20 DIAGNOSIS — R20.0 LEFT SIDED NUMBNESS: ICD-10-CM

## 2019-11-20 DIAGNOSIS — S32.810S MULTIPLE CLOSED FRACTURES OF PELVIS WITH STABLE DISRUPTION OF PELVIC RING, SEQUELA: Primary | ICD-10-CM

## 2019-11-20 DIAGNOSIS — M51.36 DDD (DEGENERATIVE DISC DISEASE), LUMBAR: ICD-10-CM

## 2019-11-20 DIAGNOSIS — T14.90XA TRAUMA: ICD-10-CM

## 2019-11-20 DIAGNOSIS — M79.2 NEURALGIA: ICD-10-CM

## 2019-11-20 DIAGNOSIS — M54.50 CHRONIC BILATERAL LOW BACK PAIN WITHOUT SCIATICA: ICD-10-CM

## 2019-11-20 DIAGNOSIS — G89.29 CHRONIC BILATERAL LOW BACK PAIN WITHOUT SCIATICA: ICD-10-CM

## 2019-11-20 PROCEDURE — 99214 OFFICE O/P EST MOD 30 MIN: CPT | Mod: S$PBB,,, | Performed by: PHYSICAL MEDICINE & REHABILITATION

## 2019-11-20 PROCEDURE — 99999 PR PBB SHADOW E&M-EST. PATIENT-LVL III: CPT | Mod: PBBFAC,,, | Performed by: PHYSICAL MEDICINE & REHABILITATION

## 2019-11-20 PROCEDURE — 99213 OFFICE O/P EST LOW 20 MIN: CPT | Mod: PBBFAC | Performed by: PHYSICAL MEDICINE & REHABILITATION

## 2019-11-20 PROCEDURE — 99214 PR OFFICE/OUTPT VISIT, EST, LEVL IV, 30-39 MIN: ICD-10-PCS | Mod: S$PBB,,, | Performed by: PHYSICAL MEDICINE & REHABILITATION

## 2019-11-20 PROCEDURE — 99999 PR PBB SHADOW E&M-EST. PATIENT-LVL III: ICD-10-PCS | Mod: PBBFAC,,, | Performed by: PHYSICAL MEDICINE & REHABILITATION

## 2019-11-21 ENCOUNTER — CLINICAL SUPPORT (OUTPATIENT)
Dept: REHABILITATION | Facility: OTHER | Age: 59
End: 2019-11-21
Attending: PSYCHIATRY & NEUROLOGY
Payer: MEDICAID

## 2019-11-21 DIAGNOSIS — R29.898 DECREASED RANGE OF MOTION OF NECK: ICD-10-CM

## 2019-11-21 DIAGNOSIS — M54.2 NECK PAIN: ICD-10-CM

## 2019-11-21 LAB
BRPFT: ABNORMAL
DLCO ADJ PRE: 17.29 ML/(MIN*MMHG) (ref 15.31–26.78)
DLCO SINGLE BREATH LLN: 15.31
DLCO SINGLE BREATH PRE REF: 82.2 %
DLCO SINGLE BREATH REF: 21.05
DLCOC SBVA LLN: 3.07
DLCOC SBVA PRE REF: 95.8 %
DLCOC SBVA REF: 4.74
DLCOC SINGLE BREATH LLN: 15.31
DLCOC SINGLE BREATH PRE REF: 82.2 %
DLCOC SINGLE BREATH REF: 21.05
DLCOVA LLN: 3.07
DLCOVA PRE REF: 95.8 %
DLCOVA PRE: 4.54 ML/(MIN*MMHG*L) (ref 3.07–6.41)
DLCOVA REF: 4.74
DLVAADJ PRE: 4.54 ML/(MIN*MMHG*L) (ref 3.07–6.41)
ERVN2 LLN: 0.78
ERVN2 PRE REF: 28.2 %
ERVN2 PRE: 0.22 L (ref 0.78–0.78)
ERVN2 REF: 0.78
FEF 25 75 LLN: 1.09
FEF 25 75 PRE REF: 90.7 %
FEF 25 75 REF: 2.13
FEV1 FVC LLN: 68
FEV1 FVC PRE REF: 98.2 %
FEV1 FVC REF: 80
FEV1 LLN: 1.71
FEV1 PRE REF: 88.1 %
FEV1 REF: 2.26
FRCN2 LLN: 1.71
FRCN2 PRE REF: 52.5 %
FRCN2 REF: 2.53
FVC LLN: 2.16
FVC PRE REF: 89.3 %
FVC REF: 2.85
IVC PRE: 2.55 L (ref 2.16–3.53)
IVC SINGLE BREATH LLN: 2.16
IVC SINGLE BREATH PRE REF: 89.7 %
IVC SINGLE BREATH REF: 2.85
PEF LLN: 3.83
PEF PRE REF: 87.9 %
PEF REF: 5.66
PRE DLCO: 17.29 ML/(MIN*MMHG) (ref 15.31–26.78)
PRE FEF 25 75: 1.93 L/S (ref 1.09–3.16)
PRE FET 100: 6.82 SEC
PRE FEV1 FVC: 78.35 % (ref 67.68–91.83)
PRE FEV1: 1.99 L (ref 1.71–2.81)
PRE FRC N2: 1.33 L
PRE FVC: 2.54 L (ref 2.16–3.53)
PRE PEF: 4.97 L/S (ref 3.83–7.48)
RVN2 LLN: 1.17
RVN2 PRE REF: 60 %
RVN2 PRE: 1.05 L (ref 1.17–2.33)
RVN2 REF: 1.75
RVN2TLCN2 LLN: 29.43
RVN2TLCN2 PRE REF: 82 %
RVN2TLCN2 PRE: 32.01 % (ref 29.43–48.61)
RVN2TLCN2 REF: 39.02
TLCN2 LLN: 3.45
TLCN2 PRE REF: 73.9 %
TLCN2 PRE: 3.28 L (ref 3.45–5.43)
TLCN2 REF: 4.44
VA PRE: 3.81 L (ref 4.29–4.29)
VA SINGLE BREATH LLN: 4.29
VA SINGLE BREATH PRE REF: 88.8 %
VA SINGLE BREATH REF: 4.29
VCMAXN2 LLN: 2.16
VCMAXN2 PRE REF: 89.3 %
VCMAXN2 PRE: 2.54 L (ref 2.16–3.53)
VCMAXN2 REF: 2.85

## 2019-11-21 PROCEDURE — 97110 THERAPEUTIC EXERCISES: CPT | Performed by: PHYSICAL THERAPIST
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
## 2019-11-25 ENCOUNTER — OFFICE VISIT (OUTPATIENT)
Dept: URGENT CARE | Facility: CLINIC | Age: 59
End: 2019-11-25
Payer: MEDICAID

## 2019-11-25 ENCOUNTER — TELEPHONE (OUTPATIENT)
Dept: FAMILY MEDICINE | Facility: CLINIC | Age: 59
End: 2019-11-25

## 2019-11-25 VITALS
WEIGHT: 135 LBS | SYSTOLIC BLOOD PRESSURE: 141 MMHG | BODY MASS INDEX: 25.49 KG/M2 | OXYGEN SATURATION: 98 % | RESPIRATION RATE: 16 BRPM | HEART RATE: 86 BPM | DIASTOLIC BLOOD PRESSURE: 82 MMHG | HEIGHT: 61 IN | TEMPERATURE: 99 F

## 2019-11-25 DIAGNOSIS — R42 DIZZINESS: Primary | ICD-10-CM

## 2019-11-25 PROCEDURE — 99213 PR OFFICE/OUTPT VISIT, EST, LEVL III, 20-29 MIN: ICD-10-PCS | Mod: S$GLB,,, | Performed by: FAMILY MEDICINE

## 2019-11-25 PROCEDURE — 99213 OFFICE O/P EST LOW 20 MIN: CPT | Mod: S$GLB,,, | Performed by: FAMILY MEDICINE

## 2019-11-25 RX ORDER — LORATADINE 10 MG/1
10 TABLET ORAL DAILY
Qty: 30 TABLET | Refills: 2 | Status: SHIPPED | OUTPATIENT
Start: 2019-11-25 | End: 2021-06-03

## 2019-11-25 RX ORDER — MECLIZINE HYDROCHLORIDE 25 MG/1
25 TABLET ORAL 3 TIMES DAILY PRN
Qty: 20 TABLET | Refills: 0 | Status: SHIPPED | OUTPATIENT
Start: 2019-11-25 | End: 2022-07-28

## 2019-11-25 NOTE — TELEPHONE ENCOUNTER
----- Message from Cindi Schmitt sent at 11/25/2019  7:12 AM CST -----  Contact: patient  Type:  Same Day Appointment Request    Caller is requesting a same day appointment.  Caller declined first available appointment listed below.    Name of Caller: Silvia  When is the first available appointment? Medicaid patient  Symptoms: severe pain, stiff head, dizziness  Best Call Back Number: [phone]  Additional Information:  no
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
## 2019-11-25 NOTE — PATIENT INSTRUCTIONS
Mareo [Dizziness, Uncertain Cause]  El mareo (dizziness) es un síntoma común, que en ocasiones se describe brady sentirse aturdido (lightheaded) o sentir que eliana está a punto de desmayarse (faint). Si dura sólo unos segundos y se relaciona con un cambio de posición (por ejemplo, levantarse después de amberly estado mucho tiempo sentado o acostado), no suele ser signo de ninguna afección seria. En cambio, un mareo (dizziness) que dura minutos u horas, o que aparece sin motivo aparente, puede ser lia señal de un problema más brook (por ejemplo, deshidratación [dehydration], lia reacción a un medicamento, lia enfermedad del corazón [heart disease] o del cerebro [brain disease]).  El examen que le hicieron hoy no nos permitió establecer con certeza a qué se debió mata mareo. En ocasiones, es necesario hacer más pruebas para poder encontrar la causa. Por lo tanto, es importante que sabra las visitas de control con mata médico si los síntomas continúan.  Cuidados En [place]:  1) Si tiene un mareo que dura más que unos pocos segundos, recuéstese hasta que haya pasado. Así, si llegase a desmayarse, no se lastimará con la caída.  2) No conduzca ni opere ninguna máquina peligrosa hasta que los mareos hayan cesado claudia, al menos, 48 horas.  3) Si usted se marea al ponerse de pie en forma repentina, puede ser un signo de deshidratación leve (mild dehydration). En los próximos días, irwin cantidades adicionales de líquidos.  4) Si hace poco que comenzó a loki un medicamento nuevo o le aumentaron la dosis de mata medicamento actual (especialmente, si se trata de un medicamento para tratar la presión arterial [blood pressure medicine]), explique kemal síntomas al médico que le recetó el medicamento. Quizás deba ajustarle la dosis.  Si los síntomas continúan, programe lia VISITA DE CONTROL con mata médico en los próximos siete días para que lo evalúen mejor.  Busque Prontamente Atención Médica  si algo de lo siguiente ocurre:  -- Los  síntomas empeoran.  -- Desmayo, dolor de marissa o convulsiones (seizure).  -- Vómito persistente.  -- Siente que usted o el cuarto amaury vueltas.  -- Dolor en el pecho, el brazo, la espalda, el eagle o la mandíbula.  -- Palpitaciones (palpitations) [la sensación de que el corazón está agitado, late con rapidez o siobhan].  -- Falta de aire.  -- Mio en el vómito o en la materia fecal (de color negruzco o rojizo).  -- Debilidad en un brazo o lia pierna, o en un lado de la ramona.  -- Dificultades para hablar o richa.  Date Last Reviewed: 8/23/2015  © [id number] [employer]. [address] [place] [id number]. Todos los derechos reservados. Esta información no pretende sustituir la atención médica profesional. Sólo mata médico puede diagnosticar y tratar un problema de leonidas.

## 2019-11-26 ENCOUNTER — PATIENT MESSAGE (OUTPATIENT)
Dept: FAMILY MEDICINE | Facility: CLINIC | Age: 59
End: 2019-11-26

## 2019-11-27 ENCOUNTER — OFFICE VISIT (OUTPATIENT)
Dept: FAMILY MEDICINE | Facility: CLINIC | Age: 59
End: 2019-11-27
Attending: FAMILY MEDICINE
Payer: MEDICAID

## 2019-11-27 ENCOUNTER — TELEPHONE (OUTPATIENT)
Dept: FAMILY MEDICINE | Facility: CLINIC | Age: 59
End: 2019-11-27

## 2019-11-27 VITALS
DIASTOLIC BLOOD PRESSURE: 82 MMHG | HEART RATE: 91 BPM | OXYGEN SATURATION: 95 % | BODY MASS INDEX: 25.14 KG/M2 | SYSTOLIC BLOOD PRESSURE: 132 MMHG | TEMPERATURE: 98 F | HEIGHT: 61 IN | WEIGHT: 133.19 LBS

## 2019-11-27 DIAGNOSIS — I10 ESSENTIAL HYPERTENSION: Primary | ICD-10-CM

## 2019-11-27 DIAGNOSIS — R42 DIZZINESS: ICD-10-CM

## 2019-11-27 PROCEDURE — 99999 PR PBB SHADOW E&M-EST. PATIENT-LVL III: ICD-10-PCS | Mod: PBBFAC,,, | Performed by: FAMILY MEDICINE

## 2019-11-27 PROCEDURE — 99214 OFFICE O/P EST MOD 30 MIN: CPT | Mod: S$PBB,,, | Performed by: FAMILY MEDICINE

## 2019-11-27 PROCEDURE — 99213 OFFICE O/P EST LOW 20 MIN: CPT | Mod: PBBFAC,PO | Performed by: FAMILY MEDICINE

## 2019-11-27 PROCEDURE — 99999 PR PBB SHADOW E&M-EST. PATIENT-LVL III: CPT | Mod: PBBFAC,,, | Performed by: FAMILY MEDICINE

## 2019-11-27 PROCEDURE — 99214 PR OFFICE/OUTPT VISIT, EST, LEVL IV, 30-39 MIN: ICD-10-PCS | Mod: S$PBB,,, | Performed by: FAMILY MEDICINE

## 2019-11-27 RX ORDER — LOSARTAN POTASSIUM 100 MG/1
100 TABLET ORAL DAILY
Qty: 90 TABLET | Refills: 3 | Status: SHIPPED | OUTPATIENT
Start: 2019-11-27 | End: 2020-05-11 | Stop reason: SDUPTHER

## 2019-11-27 NOTE — TELEPHONE ENCOUNTER
----- Message from Nupur Cooper sent at 11/26/2019  8:11 AM CST -----  Pt missed a call and would the nurse to return their call.    Pt can be reached at [phone]

## 2019-11-27 NOTE — PROGRESS NOTES
Subjective:       Patient ID: Silvia Cervantes is a 58 y.o. female.    Chief Complaint: Dizziness    58 yr old pleasant  female with depression, allergies, vitiligo, HTN, HLD, Rheumatoid arthritis, Erosive gastritis, and other co morbidities presents today for 3 month follow up. C/o dizziness. She went to urgent care and received meclizine and it made her dorwsiness.          Chronic low back pain. Onset 6 months ago and gradually worsening - left lower back- does not radiate and no neurological symptoms. No saddle anesthesia or bladder/bowel problems. Details as follows -      HTN - controlled - on Losartan-HCT and Norvasc 5 - compliant - no side effects       Major depression - uncontrolled - not on meds - would like to start one - given zoloft - she will start from today - - no SI/HI    Vertigo - much better - on vertin as needed       HLD - diet controlled -  LDLCALC                  106.6               02/04/2019                   RA - on Enbrel shots - follows Rheumatology - stable      Gastritis - stable - on PPI as needed - no side effects      History as below - reviewed       HM  -labs UTD  -vaccines UTD    Hypertension   This is a chronic problem. The current episode started more than 1 year ago. The problem has been gradually improving since onset. The problem is controlled. Associated symptoms include neck pain. Pertinent negatives include no chest pain, headaches, malaise/fatigue, palpitations or shortness of breath. There are no associated agents to hypertension. Risk factors for coronary artery disease include dyslipidemia and post-menopausal state. Past treatments include angiotensin blockers and diuretics. The current treatment provides significant improvement. There are no compliance problems.  There is no history of angina, CAD/MI, CVA, left ventricular hypertrophy, PVD or retinopathy. There is no history of chronic renal disease, coarctation of the aorta, hypercortisolism,  hyperparathyroidism, a hypertension causing med or sleep apnea.   Hyperlipidemia   This is a chronic problem. The current episode started more than 1 year ago. The problem is controlled. Recent lipid tests were reviewed and are normal. She has no history of chronic renal disease, diabetes, hypothyroidism or liver disease. There are no known factors aggravating her hyperlipidemia. Associated symptoms include myalgias. Pertinent negatives include no chest pain, focal sensory loss, focal weakness, leg pain or shortness of breath. She is currently on no antihyperlipidemic treatment. The current treatment provides mild improvement of lipids. There are no compliance problems.  Risk factors for coronary artery disease include dyslipidemia, hypertension and post-menopausal.   Back Pain   This is a chronic problem. The current episode started more than 1 month ago. The problem occurs constantly. The problem is unchanged. The pain is present in the sacro-iliac and lumbar spine. The quality of the pain is described as aching. The pain does not radiate. The symptoms are aggravated by bending, sitting and twisting. Associated symptoms include weakness. Pertinent negatives include no chest pain, dysuria, headaches, leg pain, perianal numbness or weight loss. She has tried nothing for the symptoms. The treatment provided no relief.     Review of Systems   Constitutional: Negative.  Negative for activity change, diaphoresis, malaise/fatigue, unexpected weight change and weight loss.   HENT: Negative.  Negative for congestion, ear discharge, hearing loss, rhinorrhea, sore throat and voice change.    Eyes: Negative.  Negative for pain, discharge and visual disturbance.   Respiratory: Negative.  Negative for chest tightness, shortness of breath and wheezing.    Cardiovascular: Negative.  Negative for chest pain and palpitations.   Gastrointestinal: Negative.  Negative for abdominal distention, anal bleeding, constipation and nausea.    Endocrine: Negative.  Negative for cold intolerance, polydipsia and polyuria.   Genitourinary: Negative.  Negative for decreased urine volume, difficulty urinating, dysuria, frequency, menstrual problem and vaginal pain.   Musculoskeletal: Positive for back pain, myalgias and neck pain. Negative for arthralgias and gait problem.   Skin: Negative.  Negative for color change, pallor and wound.   Allergic/Immunologic: Negative.  Negative for environmental allergies and immunocompromised state.   Neurological: Positive for dizziness and weakness. Negative for tremors, focal weakness, seizures, speech difficulty and headaches.   Hematological: Negative.  Negative for adenopathy. Does not bruise/bleed easily.   Psychiatric/Behavioral: Negative.  Negative for agitation, confusion, decreased concentration, hallucinations, self-injury and suicidal ideas. The patient is not nervous/anxious.        PMH/PSH/FH/SH/MED/ALLERGY reviewed    Objective:       Vitals:    11/27/19 0943   BP: 132/82   Pulse: 91   Temp: 97.8 °F (36.6 °C)       Physical Exam   Constitutional: She is oriented to person, place, and time. She appears well-developed and well-nourished. No distress.   HENT:   Head: Normocephalic and atraumatic.   Right Ear: External ear normal.   Left Ear: External ear normal.   Nose: Nose normal.   Mouth/Throat: Oropharynx is clear and moist. No oropharyngeal exudate.   Eyes: Pupils are equal, round, and reactive to light. Conjunctivae and EOM are normal. Right eye exhibits no discharge. Left eye exhibits no discharge. No scleral icterus.   Neck: Normal range of motion. Neck supple. No JVD present. No tracheal deviation present. No thyromegaly present.   Cardiovascular: Normal rate, regular rhythm, normal heart sounds and intact distal pulses. Exam reveals no gallop and no friction rub.   No murmur heard.  Pulmonary/Chest: Effort normal and breath sounds normal. No stridor. She has no wheezes. She has no rales. She exhibits  no tenderness.   Abdominal: Soft. Bowel sounds are normal. She exhibits no distension and no mass. There is no tenderness. There is no rebound and no guarding. No hernia.   Musculoskeletal: Normal range of motion. She exhibits no edema or tenderness.   Lymphadenopathy:     She has no cervical adenopathy.   Neurological: She is alert and oriented to person, place, and time. She has normal reflexes. She displays normal reflexes. No cranial nerve deficit. She exhibits normal muscle tone. Coordination normal.   Skin: Skin is warm and dry. No rash noted. She is not diaphoretic. No erythema. No pallor.   Psychiatric: She has a normal mood and affect. Her behavior is normal. Judgment and thought content normal.       Assessment:       1. Essential hypertension    2. Dizziness        Plan:       Silvia was seen today for dizziness.    Diagnoses and all orders for this visit:    Essential hypertension  -     losartan (COZAAR) 100 MG tablet; Take 1 tablet (100 mg total) by mouth once daily.    Dizziness      HTN  -controlled  -increase Losartan to 100 and DC HCTZ    Dizziness  -fluids w/electrolytes    Persistent dizziness - pt will follow neurology    Spent adequate time in obtaining history and explaining differentials    25 minutes spent during this visit of which greater than 50% devoted to face-face counseling and coordination of care regarding diagnosis and management plan    Follow up in about 3 months (around 2/27/2020), or if symptoms worsen or fail to improve.

## 2019-12-02 ENCOUNTER — OFFICE VISIT (OUTPATIENT)
Dept: PODIATRY | Facility: CLINIC | Age: 59
End: 2019-12-02
Payer: MEDICARE

## 2019-12-02 VITALS
DIASTOLIC BLOOD PRESSURE: 84 MMHG | HEIGHT: 61 IN | WEIGHT: 133 LBS | BODY MASS INDEX: 25.11 KG/M2 | HEART RATE: 69 BPM | SYSTOLIC BLOOD PRESSURE: 135 MMHG

## 2019-12-02 DIAGNOSIS — B35.1 ONYCHOMYCOSIS: Primary | ICD-10-CM

## 2019-12-02 DIAGNOSIS — L60.0 ONYCHOCRYPTOSIS: ICD-10-CM

## 2019-12-02 DIAGNOSIS — R20.0 NUMBNESS OF LEFT FOOT: ICD-10-CM

## 2019-12-02 PROCEDURE — 99213 PR OFFICE/OUTPT VISIT, EST, LEVL III, 20-29 MIN: ICD-10-PCS | Mod: HCNC,S$GLB,, | Performed by: PODIATRIST

## 2019-12-02 PROCEDURE — 3075F SYST BP GE 130 - 139MM HG: CPT | Mod: HCNC,CPTII,S$GLB, | Performed by: PODIATRIST

## 2019-12-02 PROCEDURE — 87102 FUNGUS ISOLATION CULTURE: CPT | Mod: HCNC

## 2019-12-02 PROCEDURE — 99213 OFFICE O/P EST LOW 20 MIN: CPT | Mod: HCNC,S$GLB,, | Performed by: PODIATRIST

## 2019-12-02 PROCEDURE — 99999 PR PBB SHADOW E&M-EST. PATIENT-LVL III: CPT | Mod: PBBFAC,HCNC,, | Performed by: PODIATRIST

## 2019-12-02 PROCEDURE — 3008F BODY MASS INDEX DOCD: CPT | Mod: HCNC,CPTII,S$GLB, | Performed by: PODIATRIST

## 2019-12-02 PROCEDURE — 3079F DIAST BP 80-89 MM HG: CPT | Mod: HCNC,CPTII,S$GLB, | Performed by: PODIATRIST

## 2019-12-02 PROCEDURE — 3075F PR MOST RECENT SYSTOLIC BLOOD PRESS GE 130-139MM HG: ICD-10-PCS | Mod: HCNC,CPTII,S$GLB, | Performed by: PODIATRIST

## 2019-12-02 PROCEDURE — 3008F PR BODY MASS INDEX (BMI) DOCUMENTED: ICD-10-PCS | Mod: HCNC,CPTII,S$GLB, | Performed by: PODIATRIST

## 2019-12-02 PROCEDURE — 3079F PR MOST RECENT DIASTOLIC BLOOD PRESSURE 80-89 MM HG: ICD-10-PCS | Mod: HCNC,CPTII,S$GLB, | Performed by: PODIATRIST

## 2019-12-02 PROCEDURE — 99999 PR PBB SHADOW E&M-EST. PATIENT-LVL III: ICD-10-PCS | Mod: PBBFAC,HCNC,, | Performed by: PODIATRIST

## 2019-12-02 NOTE — PATIENT INSTRUCTIONS
Kerasal nail gel available over the counter at local pharmacy. Recommend applying nightly 6-12 months for toenails.      Discussed performing a ingrown nail procedure for right big toe lateral nail border in office    Prescribed topical compound nail solution from [facility] to be applied twice daily for up to 12 months or more.
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
## 2019-12-29 ENCOUNTER — PATIENT MESSAGE (OUTPATIENT)
Dept: RHEUMATOLOGY | Facility: CLINIC | Age: 59
End: 2019-12-29

## 2019-12-30 DIAGNOSIS — M06.9 RHEUMATOID ARTHRITIS INVOLVING MULTIPLE JOINTS: Primary | ICD-10-CM

## 2019-12-30 RX ORDER — METHOTREXATE 2.5 MG/1
20 TABLET ORAL
Qty: 32 TABLET | Refills: 3 | Status: SHIPPED | OUTPATIENT
Start: 2019-12-30 | End: 2020-03-18 | Stop reason: SDUPTHER

## 2019-12-31 ENCOUNTER — PATIENT MESSAGE (OUTPATIENT)
Dept: FAMILY MEDICINE | Facility: CLINIC | Age: 59
End: 2019-12-31

## 2019-12-31 ENCOUNTER — OFFICE VISIT (OUTPATIENT)
Dept: URGENT CARE | Facility: CLINIC | Age: 59
End: 2019-12-31
Payer: MEDICARE

## 2019-12-31 VITALS
RESPIRATION RATE: 18 BRPM | TEMPERATURE: 98 F | DIASTOLIC BLOOD PRESSURE: 93 MMHG | SYSTOLIC BLOOD PRESSURE: 142 MMHG | BODY MASS INDEX: 25.11 KG/M2 | HEIGHT: 61 IN | WEIGHT: 133 LBS | OXYGEN SATURATION: 99 % | HEART RATE: 85 BPM

## 2019-12-31 DIAGNOSIS — M79.672 LEFT FOOT PAIN: Primary | ICD-10-CM

## 2019-12-31 PROCEDURE — 99213 PR OFFICE/OUTPT VISIT, EST, LEVL III, 20-29 MIN: ICD-10-PCS | Mod: S$GLB,,, | Performed by: PHYSICIAN ASSISTANT

## 2019-12-31 PROCEDURE — 99213 OFFICE O/P EST LOW 20 MIN: CPT | Mod: S$GLB,,, | Performed by: PHYSICIAN ASSISTANT

## 2019-12-31 RX ORDER — METHYLPREDNISOLONE 4 MG/1
TABLET ORAL
Qty: 21 TABLET | Refills: 0 | Status: SHIPPED | OUTPATIENT
Start: 2019-12-31 | End: 2020-02-11

## 2019-12-31 NOTE — TELEPHONE ENCOUNTER
Spoke to pt and states that she has feet swelling and numbness. Informed pt no available appts today. Pt stated that she will go to the Urgent [facility] on [address] today.

## 2019-12-31 NOTE — PATIENT INSTRUCTIONS
PLEASE READ YOUR DISCHARGE INSTRUCTIONS ENTIRELY AS IT CONTAINS IMPORTANT INFORMATION.  - Rest.    - Drink plenty of fluids.    - Tylenol or Ibuprofen as directed as needed for fever/pain.    - If you were prescribed antibiotics, please take them to completion.  - If you are female and on birth control pills - please use additional methods of contraception to prevent pregnancy while on antibiotics and for one cycle after.   - If you were prescribed a narcotic medication or muscle relaxer, do not drive or operate heavy equipment or machinery while taking these medications, as they can cause drowsiness.   - If you smoke, please stop smoking.  -You must understand that you've received an Urgent Care treatment only and that you may be released before all your medical problems are known or treated. You, the patient, will    arrange for follow up care as instructed. Please arrange follow up with your primary medical clinic as soon as possible.   - Follow up with your PCP or specialty clinic as directed in the next 1-2 weeks if not improved or as needed.  You can call [phone] to schedule an appointment with the appropriate provider.    - Please return to Urgent Care or to the Emergency Department if your symptoms worsen.  You received a steroid today - this can elevate your blood pressure, elevate your blood sugar, water weight gain, nervous energy, redness to the face and dimpling of the skin where the shot goes in if you had an injection.   Do not use steroids more than 3 times per year.   If you have diabetes, please check you blood sugar frequently.  If you have high blood pressure, please check your blood pressure frequently.     Patient aware and verbalized understanding.    Arthralgia    Arthralgia is the term for pain in or around the joint. It is a symptom, not a disease. This pain may involve one or more joints. In some cases, the pain moves from joint to joint.  There are many causes for joint pain. These  include:  · Injury  · Osteoarthritis (wearing out of the joint surface)  · Gout (inflammation of the joint due to crystals in the joint fluid)  · Infection inside the joint    · Bursitis (inflammation of the fluid-filled sacs around the joint)  · Autoimmune disorders such as rheumatoid arthritis or lupus  · Tendonitis (inflammation of chords that attach muscle to bone)  Home care  · Rest the involved joint(s) until your symptoms improve.   · You may be prescribed pain medicine. If none is prescribed, you may use acetaminophen or ibuprofen to control pain and inflammation.  Follow-up care  Follow up with your healthcare provider or as advised.  When to seek medical advice  Contact your healthcare provider right away if any of the following occurs:  · Pain, swelling, or redness of joint increases  · Pain worsens or recurs after a period of improvement  · Pain moves to other joints  · You cannot bear weight on the affected joint   · You cannot move the affected joint  · Joint appears deformed  · New rash appears  · Fever of 100.4ºF (38ºC) or higher, or as directed by your healthcare provider  Date Last Reviewed: 3/1/2017  © 5608-7585 [employer]. [address] [place] [address]. All rights reserved. This information is not intended as a substitute for professional medical care. Always follow your healthcare professional's instructions.

## 2019-12-31 NOTE — PROGRESS NOTES
"Subjective:       Patient ID: Silvia Cervantes is a 59 y.o. female.    Vitals:  height is 5' 1" (1.549 m) and weight is 60.3 kg (133 lb). Her temperature is 97.8 °F (36.6 °C). Her blood pressure is 142/93 (abnormal) and her pulse is 85. Her respiration is 18 and oxygen saturation is 99%.     Chief Complaint: Foot Pain (Left Toe )    Ms. Cervantes presents for evaluation of left toe pain and swelling. She has a history of RA.  She is having left 2nd distal toe pain at the joint.  The pain started yesterday and is worse with walking and weight-bearing.  She denies any history of gout.  She denies any foot or ankle swelling. She was recently taken off hydrochlorothiazide and is concerned that she might need to be back on it because of her toe swelling.    Foot Pain   This is a new problem. The current episode started yesterday. The problem occurs constantly. The problem has been unchanged. Associated symptoms include arthralgias and joint swelling. Pertinent negatives include no chest pain, chills, congestion, coughing, fatigue, fever, headaches, myalgias, nausea, rash, sore throat, vertigo, vomiting or weakness. The symptoms are aggravated by walking. She has tried nothing for the symptoms. The treatment provided no relief.       Constitution: Negative for chills, fatigue and fever.   HENT: Negative for congestion and sore throat.    Neck: Negative for painful lymph nodes.   Cardiovascular: Negative for chest pain and leg swelling.   Eyes: Negative for double vision and blurred vision.   Respiratory: Negative for cough and shortness of breath.    Gastrointestinal: Negative for nausea, vomiting and diarrhea.   Genitourinary: Negative for dysuria, frequency, urgency and history of kidney stones.   Musculoskeletal: Positive for pain, joint pain and joint swelling. Negative for muscle cramps and muscle ache.   Skin: Negative for color change, pale, rash and bruising.   Allergic/Immunologic: Negative for seasonal allergies. "   Neurological: Negative for dizziness, history of vertigo, light-headedness, passing out and headaches.   Hematologic/Lymphatic: Negative for swollen lymph nodes.   Psychiatric/Behavioral: Negative for nervous/anxious, sleep disturbance and depression. The patient is not nervous/anxious.        Objective:      Physical Exam   Constitutional: She is oriented to person, place, and time. She appears well-developed and well-nourished. She is cooperative.  Non-toxic appearance. She does not appear ill. No distress.   HENT:   Head: Normocephalic and atraumatic.   Right Ear: Hearing and external ear normal.   Left Ear: Hearing and external ear normal.   Nose: Nose normal. No rhinorrhea or nasal deformity. No epistaxis.   Mouth/Throat: Mucous membranes are normal.   Eyes: Conjunctivae and lids are normal. Right eye exhibits no discharge. Left eye exhibits no discharge. No scleral icterus.   Neck: Trachea normal, normal range of motion, full passive range of motion without pain and phonation normal. Neck supple.   Cardiovascular: Normal rate, regular rhythm, normal heart sounds, intact distal pulses and normal pulses.   Bilateral lower extremities with no edema to feet or ankles.   Pulmonary/Chest: Effort normal and breath sounds normal. No stridor. No respiratory distress. She has no decreased breath sounds. She has no wheezes. She has no rhonchi. She has no rales.   Abdominal: Soft. Normal appearance and bowel sounds are normal. She exhibits no distension, no pulsatile midline mass and no mass. There is no tenderness.   Musculoskeletal: Normal range of motion. She exhibits no edema or deformity.        Left foot: There is tenderness. There is normal range of motion, no bony tenderness, no swelling, normal capillary refill, no crepitus, no deformity and no laceration.        Feet:    Edema and tenderness at left 2nd toe PIP joint. No warmth or erythema.     Neurological: She is alert and oriented to person, place, and  time. She exhibits normal muscle tone. Coordination normal.   Skin: Skin is warm, dry, intact, not diaphoretic and not pale. not left foot  Psychiatric: She has a normal mood and affect. Her speech is normal and behavior is normal. Judgment and thought content normal. Cognition and memory are normal.   Nursing note and vitals reviewed.        Assessment:       1. Left foot pain        Plan:         Left foot pain    Other orders  -     methylPREDNISolone (MEDROL DOSEPACK) 4 mg tablet; use as directed  Dispense: 21 tablet; Refill: 0    Assured patient that she is not having symptoms that indicate need for systemic diuretic.  Edema is isolated to one joint in toe.     Patient Instructions   PLEASE READ YOUR DISCHARGE INSTRUCTIONS ENTIRELY AS IT CONTAINS IMPORTANT INFORMATION.  - Rest.    - Drink plenty of fluids.    - Tylenol or Ibuprofen as directed as needed for fever/pain.    - If you were prescribed antibiotics, please take them to completion.  - If you are female and on birth control pills - please use additional methods of contraception to prevent pregnancy while on antibiotics and for one cycle after.   - If you were prescribed a narcotic medication or muscle relaxer, do not drive or operate heavy equipment or machinery while taking these medications, as they can cause drowsiness.   - If you smoke, please stop smoking.  -You must understand that you've received an Urgent Care treatment only and that you may be released before all your medical problems are known or treated. You, the patient, will    arrange for follow up care as instructed. Please arrange follow up with your primary medical clinic as soon as possible.   - Follow up with your PCP or specialty clinic as directed in the next 1-2 weeks if not improved or as needed.  You can call [phone] to schedule an appointment with the appropriate provider.    - Please return to Urgent Care or to the Emergency Department if your symptoms worsen.  You  received a steroid today - this can elevate your blood pressure, elevate your blood sugar, water weight gain, nervous energy, redness to the face and dimpling of the skin where the shot goes in if you had an injection.   Do not use steroids more than 3 times per year.   If you have diabetes, please check you blood sugar frequently.  If you have high blood pressure, please check your blood pressure frequently.     Patient aware and verbalized understanding.    Arthralgia    Arthralgia is the term for pain in or around the joint. It is a symptom, not a disease. This pain may involve one or more joints. In some cases, the pain moves from joint to joint.  There are many causes for joint pain. These include:  · Injury  · Osteoarthritis (wearing out of the joint surface)  · Gout (inflammation of the joint due to crystals in the joint fluid)  · Infection inside the joint    · Bursitis (inflammation of the fluid-filled sacs around the joint)  · Autoimmune disorders such as rheumatoid arthritis or lupus  · Tendonitis (inflammation of chords that attach muscle to bone)  Home care  · Rest the involved joint(s) until your symptoms improve.   · You may be prescribed pain medicine. If none is prescribed, you may use acetaminophen or ibuprofen to control pain and inflammation.  Follow-up care  Follow up with your healthcare provider or as advised.  When to seek medical advice  Contact your healthcare provider right away if any of the following occurs:  · Pain, swelling, or redness of joint increases  · Pain worsens or recurs after a period of improvement  · Pain moves to other joints  · You cannot bear weight on the affected joint   · You cannot move the affected joint  · Joint appears deformed  · New rash appears  · Fever of 100.4ºF (38ºC) or higher, or as directed by your healthcare provider  Date Last Reviewed: 3/1/2017  © 0091-2196 [employer]. [address] [place] [address], [place] [address]. All rights reserved. This  information is not intended as a substitute for professional medical care. Always follow your healthcare professional's instructions.

## 2020-01-02 LAB — FUNGUS SPEC CULT: NORMAL

## 2020-01-13 ENCOUNTER — OFFICE VISIT (OUTPATIENT)
Dept: NEUROLOGY | Facility: CLINIC | Age: 60
End: 2020-01-13
Payer: MEDICARE

## 2020-01-13 VITALS
HEART RATE: 77 BPM | BODY MASS INDEX: 25.13 KG/M2 | DIASTOLIC BLOOD PRESSURE: 81 MMHG | HEIGHT: 61 IN | SYSTOLIC BLOOD PRESSURE: 131 MMHG

## 2020-01-13 DIAGNOSIS — I10 ESSENTIAL HYPERTENSION: ICD-10-CM

## 2020-01-13 DIAGNOSIS — E78.00 HYPERCHOLESTEREMIA: ICD-10-CM

## 2020-01-13 DIAGNOSIS — M54.9 CHRONIC NECK AND BACK PAIN: Primary | ICD-10-CM

## 2020-01-13 DIAGNOSIS — G89.29 CHRONIC NECK AND BACK PAIN: Primary | ICD-10-CM

## 2020-01-13 DIAGNOSIS — M54.2 CHRONIC NECK AND BACK PAIN: Primary | ICD-10-CM

## 2020-01-13 PROCEDURE — 99999 PR PBB SHADOW E&M-EST. PATIENT-LVL III: CPT | Mod: PBBFAC,HCNC,, | Performed by: PSYCHIATRY & NEUROLOGY

## 2020-01-13 PROCEDURE — 3079F DIAST BP 80-89 MM HG: CPT | Mod: HCNC,CPTII,S$GLB, | Performed by: PSYCHIATRY & NEUROLOGY

## 2020-01-13 PROCEDURE — 3075F SYST BP GE 130 - 139MM HG: CPT | Mod: HCNC,CPTII,S$GLB, | Performed by: PSYCHIATRY & NEUROLOGY

## 2020-01-13 PROCEDURE — 3008F PR BODY MASS INDEX (BMI) DOCUMENTED: ICD-10-PCS | Mod: HCNC,CPTII,S$GLB, | Performed by: PSYCHIATRY & NEUROLOGY

## 2020-01-13 PROCEDURE — 99214 OFFICE O/P EST MOD 30 MIN: CPT | Mod: HCNC,S$GLB,, | Performed by: PSYCHIATRY & NEUROLOGY

## 2020-01-13 PROCEDURE — 99999 PR PBB SHADOW E&M-EST. PATIENT-LVL III: ICD-10-PCS | Mod: PBBFAC,HCNC,, | Performed by: PSYCHIATRY & NEUROLOGY

## 2020-01-13 PROCEDURE — 3008F BODY MASS INDEX DOCD: CPT | Mod: HCNC,CPTII,S$GLB, | Performed by: PSYCHIATRY & NEUROLOGY

## 2020-01-13 PROCEDURE — 3075F PR MOST RECENT SYSTOLIC BLOOD PRESS GE 130-139MM HG: ICD-10-PCS | Mod: HCNC,CPTII,S$GLB, | Performed by: PSYCHIATRY & NEUROLOGY

## 2020-01-13 PROCEDURE — 99214 PR OFFICE/OUTPT VISIT, EST, LEVL IV, 30-39 MIN: ICD-10-PCS | Mod: HCNC,S$GLB,, | Performed by: PSYCHIATRY & NEUROLOGY

## 2020-01-13 PROCEDURE — 3079F PR MOST RECENT DIASTOLIC BLOOD PRESSURE 80-89 MM HG: ICD-10-PCS | Mod: HCNC,CPTII,S$GLB, | Performed by: PSYCHIATRY & NEUROLOGY
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
## 2020-01-19 NOTE — ASSESSMENT & PLAN NOTE
On appropriate medications and managed by PCP. We discussed the importance of managing all secondary stroke risk factors, including hyperlipidemia.

## 2020-02-04 ENCOUNTER — LAB VISIT (OUTPATIENT)
Dept: LAB | Facility: HOSPITAL | Age: 60
End: 2020-02-04
Attending: FAMILY MEDICINE
Payer: MEDICARE

## 2020-02-04 DIAGNOSIS — F33.9 MAJOR DEPRESSION, RECURRENT, CHRONIC: ICD-10-CM

## 2020-02-04 DIAGNOSIS — Z00.00 ROUTINE GENERAL MEDICAL EXAMINATION AT A HEALTH CARE FACILITY: ICD-10-CM

## 2020-02-04 DIAGNOSIS — E78.00 HYPERCHOLESTEREMIA: ICD-10-CM

## 2020-02-04 DIAGNOSIS — I10 ESSENTIAL HYPERTENSION: ICD-10-CM

## 2020-02-04 LAB
25(OH)D3+25(OH)D2 SERPL-MCNC: 27 NG/ML (ref 30–96)
ALBUMIN SERPL BCP-MCNC: 4 G/DL (ref 3.5–5.2)
ALP SERPL-CCNC: 82 U/L (ref 55–135)
ALT SERPL W/O P-5'-P-CCNC: 38 U/L (ref 10–44)
ANION GAP SERPL CALC-SCNC: 13 MMOL/L (ref 8–16)
AST SERPL-CCNC: 24 U/L (ref 10–40)
BASOPHILS # BLD AUTO: 0.03 K/UL (ref 0–0.2)
BASOPHILS NFR BLD: 0.5 % (ref 0–1.9)
BILIRUB SERPL-MCNC: 0.4 MG/DL (ref 0.1–1)
BUN SERPL-MCNC: 15 MG/DL (ref 6–20)
CALCIUM SERPL-MCNC: 9.8 MG/DL (ref 8.7–10.5)
CHLORIDE SERPL-SCNC: 106 MMOL/L (ref 95–110)
CHOLEST SERPL-MCNC: 220 MG/DL (ref 120–199)
CHOLEST/HDLC SERPL: 4.7 {RATIO} (ref 2–5)
CO2 SERPL-SCNC: 26 MMOL/L (ref 23–29)
CREAT SERPL-MCNC: 0.8 MG/DL (ref 0.5–1.4)
DIFFERENTIAL METHOD: ABNORMAL
EOSINOPHIL # BLD AUTO: 0.2 K/UL (ref 0–0.5)
EOSINOPHIL NFR BLD: 3.1 % (ref 0–8)
ERYTHROCYTE [DISTWIDTH] IN BLOOD BY AUTOMATED COUNT: 13.9 % (ref 11.5–14.5)
EST. GFR  (AFRICAN AMERICAN): >60 ML/MIN/1.73 M^2
EST. GFR  (NON AFRICAN AMERICAN): >60 ML/MIN/1.73 M^2
GLUCOSE SERPL-MCNC: 87 MG/DL (ref 70–110)
HCT VFR BLD AUTO: 43.3 % (ref 37–48.5)
HDLC SERPL-MCNC: 47 MG/DL (ref 40–75)
HDLC SERPL: 21.4 % (ref 20–50)
HGB BLD-MCNC: 13.1 G/DL (ref 12–16)
IMM GRANULOCYTES # BLD AUTO: 0.02 K/UL (ref 0–0.04)
IMM GRANULOCYTES NFR BLD AUTO: 0.3 % (ref 0–0.5)
LDLC SERPL CALC-MCNC: 149.8 MG/DL (ref 63–159)
LYMPHOCYTES # BLD AUTO: 1.9 K/UL (ref 1–4.8)
LYMPHOCYTES NFR BLD: 30.6 % (ref 18–48)
MCH RBC QN AUTO: 32 PG (ref 27–31)
MCHC RBC AUTO-ENTMCNC: 30.3 G/DL (ref 32–36)
MCV RBC AUTO: 106 FL (ref 82–98)
MONOCYTES # BLD AUTO: 0.6 K/UL (ref 0.3–1)
MONOCYTES NFR BLD: 9.2 % (ref 4–15)
NEUTROPHILS # BLD AUTO: 3.5 K/UL (ref 1.8–7.7)
NEUTROPHILS NFR BLD: 56.3 % (ref 38–73)
NONHDLC SERPL-MCNC: 173 MG/DL
NRBC BLD-RTO: 0 /100 WBC
PLATELET # BLD AUTO: 363 K/UL (ref 150–350)
PMV BLD AUTO: 9.5 FL (ref 9.2–12.9)
POTASSIUM SERPL-SCNC: 3.9 MMOL/L (ref 3.5–5.1)
PROT SERPL-MCNC: 7.7 G/DL (ref 6–8.4)
RBC # BLD AUTO: 4.09 M/UL (ref 4–5.4)
SODIUM SERPL-SCNC: 145 MMOL/L (ref 136–145)
TRIGL SERPL-MCNC: 116 MG/DL (ref 30–150)
TSH SERPL DL<=0.005 MIU/L-ACNC: 2.81 UIU/ML (ref 0.4–4)
WBC # BLD AUTO: 6.21 K/UL (ref 3.9–12.7)

## 2020-02-04 PROCEDURE — 80061 LIPID PANEL: CPT | Mod: HCNC

## 2020-02-04 PROCEDURE — 80053 COMPREHEN METABOLIC PANEL: CPT | Mod: HCNC

## 2020-02-04 PROCEDURE — 84443 ASSAY THYROID STIM HORMONE: CPT | Mod: HCNC

## 2020-02-04 PROCEDURE — 82306 VITAMIN D 25 HYDROXY: CPT | Mod: HCNC

## 2020-02-04 PROCEDURE — 36415 COLL VENOUS BLD VENIPUNCTURE: CPT | Mod: HCNC,PO

## 2020-02-04 PROCEDURE — 85025 COMPLETE CBC W/AUTO DIFF WBC: CPT | Mod: HCNC

## 2020-02-11 ENCOUNTER — OFFICE VISIT (OUTPATIENT)
Dept: FAMILY MEDICINE | Facility: CLINIC | Age: 60
End: 2020-02-11
Attending: FAMILY MEDICINE
Payer: MEDICARE

## 2020-02-11 VITALS
HEART RATE: 95 BPM | OXYGEN SATURATION: 99 % | BODY MASS INDEX: 25.1 KG/M2 | DIASTOLIC BLOOD PRESSURE: 82 MMHG | HEIGHT: 61 IN | WEIGHT: 132.94 LBS | SYSTOLIC BLOOD PRESSURE: 124 MMHG

## 2020-02-11 DIAGNOSIS — D86.0 SARCOIDOSIS OF LUNG: ICD-10-CM

## 2020-02-11 DIAGNOSIS — M06.9 RHEUMATOID ARTHRITIS INVOLVING BOTH HANDS, UNSPECIFIED RHEUMATOID FACTOR PRESENCE: ICD-10-CM

## 2020-02-11 DIAGNOSIS — F33.9 MAJOR DEPRESSION, RECURRENT, CHRONIC: ICD-10-CM

## 2020-02-11 DIAGNOSIS — E55.9 VITAMIN D DEFICIENCY: ICD-10-CM

## 2020-02-11 DIAGNOSIS — Z00.00 ROUTINE GENERAL MEDICAL EXAMINATION AT A HEALTH CARE FACILITY: Primary | ICD-10-CM

## 2020-02-11 DIAGNOSIS — S12.100A CLOSED DISPLACED FRACTURE OF SECOND CERVICAL VERTEBRA, UNSPECIFIED FRACTURE MORPHOLOGY, INITIAL ENCOUNTER: ICD-10-CM

## 2020-02-11 DIAGNOSIS — E78.2 MIXED HYPERLIPIDEMIA: ICD-10-CM

## 2020-02-11 DIAGNOSIS — I10 ESSENTIAL HYPERTENSION: ICD-10-CM

## 2020-02-11 DIAGNOSIS — D84.9 IMMUNOCOMPROMISED: ICD-10-CM

## 2020-02-11 DIAGNOSIS — S72.141A CLOSED DISPLACED INTERTROCHANTERIC FRACTURE OF RIGHT FEMUR, INITIAL ENCOUNTER: ICD-10-CM

## 2020-02-11 DIAGNOSIS — S72.001A CLOSED FRACTURE OF NECK OF RIGHT FEMUR, INITIAL ENCOUNTER: ICD-10-CM

## 2020-02-11 DIAGNOSIS — N89.1 VAIN II (VAGINAL INTRAEPITHELIAL NEOPLASIA GRADE II): ICD-10-CM

## 2020-02-11 DIAGNOSIS — S72.354S: ICD-10-CM

## 2020-02-11 DIAGNOSIS — J95.821: ICD-10-CM

## 2020-02-11 PROCEDURE — 3074F PR MOST RECENT SYSTOLIC BLOOD PRESSURE < 130 MM HG: ICD-10-PCS | Mod: HCNC,CPTII,S$GLB, | Performed by: FAMILY MEDICINE

## 2020-02-11 PROCEDURE — 99396 PR PREVENTIVE VISIT,EST,40-64: ICD-10-PCS | Mod: HCNC,S$GLB,, | Performed by: FAMILY MEDICINE

## 2020-02-11 PROCEDURE — 99396 PREV VISIT EST AGE 40-64: CPT | Mod: HCNC,S$GLB,, | Performed by: FAMILY MEDICINE

## 2020-02-11 PROCEDURE — 3074F SYST BP LT 130 MM HG: CPT | Mod: HCNC,CPTII,S$GLB, | Performed by: FAMILY MEDICINE

## 2020-02-11 PROCEDURE — 3079F PR MOST RECENT DIASTOLIC BLOOD PRESSURE 80-89 MM HG: ICD-10-PCS | Mod: HCNC,CPTII,S$GLB, | Performed by: FAMILY MEDICINE

## 2020-02-11 PROCEDURE — 99499 UNLISTED E&M SERVICE: CPT | Mod: HCNC,S$GLB,, | Performed by: FAMILY MEDICINE

## 2020-02-11 PROCEDURE — 99999 PR PBB SHADOW E&M-EST. PATIENT-LVL III: ICD-10-PCS | Mod: PBBFAC,HCNC,, | Performed by: FAMILY MEDICINE

## 2020-02-11 PROCEDURE — 99499 RISK ADDL DX/OHS AUDIT: ICD-10-PCS | Mod: HCNC,S$GLB,, | Performed by: FAMILY MEDICINE

## 2020-02-11 PROCEDURE — 3079F DIAST BP 80-89 MM HG: CPT | Mod: HCNC,CPTII,S$GLB, | Performed by: FAMILY MEDICINE

## 2020-02-11 PROCEDURE — 99999 PR PBB SHADOW E&M-EST. PATIENT-LVL III: CPT | Mod: PBBFAC,HCNC,, | Performed by: FAMILY MEDICINE

## 2020-02-11 RX ORDER — AMLODIPINE BESYLATE 5 MG/1
5 TABLET ORAL DAILY
Qty: 90 TABLET | Refills: 3 | Status: SHIPPED | OUTPATIENT
Start: 2020-02-11 | End: 2020-08-11 | Stop reason: SDUPTHER

## 2020-02-11 RX ORDER — ERGOCALCIFEROL 1.25 MG/1
50000 CAPSULE ORAL
Qty: 12 CAPSULE | Refills: 3 | Status: SHIPPED | OUTPATIENT
Start: 2020-02-11 | End: 2021-03-15 | Stop reason: SDUPTHER

## 2020-02-11 NOTE — PROGRESS NOTES
Subjective:       Patient ID: Silvia Cervantes is a 59 y.o. female.    Chief Complaint: Annual Exam    59 yr old pleasant  female with depression, allergies, vitiligo, HTN, HLD, Rheumatoid arthritis, Erosive gastritis, and other co morbidities presents today for her annual wellness check, lab review and 3 month follow up.           Chronic low back pain. Onset 6 months ago and gradually worsening - left lower back- does not radiate and no neurological symptoms. No saddle anesthesia or bladder/bowel problems. Details as follows -      HTN - controlled - on Losartan-HCT and Norvasc 5 - compliant - no side effects       Major depression - uncontrolled - not on meds - would like to start one - given zoloft - she will start from today - - no SI/HI    Vertigo - much better - on vertin as needed       HLD - diet controlled -  LDLCALC                  106.6               02/04/2019                   RA - on Enbrel shots - follows Rheumatology - stable      Gastritis - stable - on PPI as needed - no side effects      History as below - reviewed       HM  -labs UTD  -vaccines UTD    Hypertension   This is a chronic problem. The current episode started more than 1 year ago. The problem has been gradually improving since onset. The problem is controlled. Associated symptoms include neck pain. Pertinent negatives include no chest pain, headaches, malaise/fatigue, palpitations or shortness of breath. There are no associated agents to hypertension. Risk factors for coronary artery disease include dyslipidemia and post-menopausal state. Past treatments include angiotensin blockers and diuretics. The current treatment provides significant improvement. There are no compliance problems.  There is no history of angina, CAD/MI, CVA, left ventricular hypertrophy, PVD or retinopathy. There is no history of chronic renal disease, coarctation of the aorta, hypercortisolism, hyperparathyroidism, a hypertension causing med or sleep  apnea.   Hyperlipidemia   This is a chronic problem. The current episode started more than 1 year ago. The problem is controlled. Recent lipid tests were reviewed and are normal. She has no history of chronic renal disease, diabetes, hypothyroidism or liver disease. There are no known factors aggravating her hyperlipidemia. Associated symptoms include myalgias. Pertinent negatives include no chest pain, focal sensory loss, focal weakness, leg pain or shortness of breath. She is currently on no antihyperlipidemic treatment. The current treatment provides mild improvement of lipids. There are no compliance problems.  Risk factors for coronary artery disease include dyslipidemia, hypertension and post-menopausal.   Back Pain   This is a chronic problem. The current episode started more than 1 month ago. The problem occurs constantly. The problem is unchanged. The pain is present in the sacro-iliac and lumbar spine. The quality of the pain is described as aching. The pain does not radiate. The symptoms are aggravated by bending, sitting and twisting. Associated symptoms include weakness. Pertinent negatives include no chest pain, dysuria, headaches, leg pain, perianal numbness or weight loss. She has tried nothing for the symptoms. The treatment provided no relief.     Review of Systems   Constitutional: Negative.  Negative for activity change, diaphoresis, malaise/fatigue, unexpected weight change and weight loss.   HENT: Negative.  Negative for congestion, ear discharge, hearing loss, rhinorrhea, sore throat and voice change.    Eyes: Negative.  Negative for pain, discharge and visual disturbance.   Respiratory: Negative.  Negative for chest tightness, shortness of breath and wheezing.    Cardiovascular: Negative.  Negative for chest pain and palpitations.   Gastrointestinal: Negative.  Negative for abdominal distention, anal bleeding, constipation and nausea.   Endocrine: Negative.  Negative for cold intolerance,  polydipsia and polyuria.   Genitourinary: Negative.  Negative for decreased urine volume, difficulty urinating, dysuria, frequency, menstrual problem and vaginal pain.   Musculoskeletal: Positive for back pain, myalgias and neck pain. Negative for arthralgias and gait problem.   Skin: Negative.  Negative for color change, pallor and wound.   Allergic/Immunologic: Negative.  Negative for environmental allergies and immunocompromised state.   Neurological: Positive for weakness. Negative for dizziness, tremors, focal weakness, seizures, speech difficulty and headaches.   Hematological: Negative.  Negative for adenopathy. Does not bruise/bleed easily.   Psychiatric/Behavioral: Negative.  Negative for agitation, confusion, decreased concentration, hallucinations, self-injury and suicidal ideas. The patient is not nervous/anxious.        Objective:      Physical Exam   Constitutional: She is oriented to person, place, and time. She appears well-developed and well-nourished. No distress.   HENT:   Head: Normocephalic and atraumatic.   Right Ear: External ear normal.   Left Ear: External ear normal.   Nose: Nose normal.   Mouth/Throat: Oropharynx is clear and moist. No oropharyngeal exudate.   Eyes: Pupils are equal, round, and reactive to light. Conjunctivae and EOM are normal. Right eye exhibits no discharge. Left eye exhibits no discharge. No scleral icterus.   Neck: Normal range of motion. Neck supple. No JVD present. No tracheal deviation present. No thyromegaly present.   Cardiovascular: Normal rate, regular rhythm, normal heart sounds and intact distal pulses. Exam reveals no gallop and no friction rub.   No murmur heard.  Pulmonary/Chest: Effort normal and breath sounds normal. No stridor. She has no wheezes. She has no rales. She exhibits no tenderness.   Abdominal: Soft. Bowel sounds are normal. She exhibits no distension and no mass. There is no tenderness. There is no rebound and no guarding. No hernia.    Musculoskeletal: Normal range of motion. She exhibits no edema or tenderness.   Lymphadenopathy:     She has no cervical adenopathy.   Neurological: She is alert and oriented to person, place, and time. She has normal reflexes. She displays normal reflexes. No cranial nerve deficit. She exhibits normal muscle tone. Coordination normal.   Skin: Skin is warm and dry. No rash noted. She is not diaphoretic. No erythema. No pallor.   Psychiatric: She has a normal mood and affect. Her behavior is normal. Judgment and thought content normal.       Assessment:       1. Routine general medical examination at a health care facility    2. Rheumatoid arthritis involving both hands, unspecified rheumatoid factor presence    3. Immunocompromised    4. Major depression, recurrent, chronic    5. Acute respiratory failure following trauma and surgery    6. Sarcoidosis of lung    7. Closed nondisplaced comminuted fracture of shaft of right femur, sequela    8. Mixed hyperlipidemia    9. 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 Closed displaced intertrochanteric fracture of right femur, initial encounter    11. Closed fracture of neck of right femur, initial encounter    12. Closed displaced fracture of second cervical vertebra, unspecified fracture morphology, initial encounter    13. Vitamin D deficiency    14. Essential hypertension        Plan:         Silvia was seen today for annual exam.    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    Essential hypertension  -     amLODIPine (NORVASC) 5 MG tablet; Take 1 tablet (5 mg total) by mouth once daily.      Wellness check  -normal exam  -labs    HLD  -diet control  -start statin      HTN  -controlled    RA  -stable  -follow rheumatology    Vertigo  -meclizine prn    Depression  -controlled  -continue zoloft 25 mg daily  -SI/ER precautions given      Spent adequate time in obtaining history and explaining differentials      RTC 3 m or prn

## 2020-03-02 ENCOUNTER — OFFICE VISIT (OUTPATIENT)
Dept: PODIATRY | Facility: CLINIC | Age: 60
End: 2020-03-02
Payer: MEDICARE

## 2020-03-02 VITALS
SYSTOLIC BLOOD PRESSURE: 142 MMHG | BODY MASS INDEX: 24.92 KG/M2 | HEIGHT: 61 IN | DIASTOLIC BLOOD PRESSURE: 85 MMHG | WEIGHT: 132 LBS | HEART RATE: 80 BPM

## 2020-03-02 DIAGNOSIS — B35.1 ONYCHOMYCOSIS: Primary | ICD-10-CM

## 2020-03-02 DIAGNOSIS — L60.0 ONYCHOCRYPTOSIS: ICD-10-CM

## 2020-03-02 PROCEDURE — 3008F BODY MASS INDEX DOCD: CPT | Mod: HCNC,CPTII,S$GLB, | Performed by: PODIATRIST

## 2020-03-02 PROCEDURE — 99213 PR OFFICE/OUTPT VISIT, EST, LEVL III, 20-29 MIN: ICD-10-PCS | Mod: HCNC,S$GLB,, | Performed by: PODIATRIST

## 2020-03-02 PROCEDURE — 3008F PR BODY MASS INDEX (BMI) DOCUMENTED: ICD-10-PCS | Mod: HCNC,CPTII,S$GLB, | Performed by: PODIATRIST

## 2020-03-02 PROCEDURE — 99213 OFFICE O/P EST LOW 20 MIN: CPT | Mod: HCNC,S$GLB,, | Performed by: PODIATRIST

## 2020-03-02 PROCEDURE — 99999 PR PBB SHADOW E&M-EST. PATIENT-LVL V: CPT | Mod: PBBFAC,HCNC,, | Performed by: PODIATRIST

## 2020-03-02 PROCEDURE — 99999 PR PBB SHADOW E&M-EST. PATIENT-LVL V: ICD-10-PCS | Mod: PBBFAC,HCNC,, | Performed by: PODIATRIST

## 2020-03-02 PROCEDURE — 3079F DIAST BP 80-89 MM HG: CPT | Mod: HCNC,CPTII,S$GLB, | Performed by: PODIATRIST

## 2020-03-02 PROCEDURE — 3079F PR MOST RECENT DIASTOLIC BLOOD PRESSURE 80-89 MM HG: ICD-10-PCS | Mod: HCNC,CPTII,S$GLB, | Performed by: PODIATRIST

## 2020-03-02 PROCEDURE — 3077F PR MOST RECENT SYSTOLIC BLOOD PRESSURE >= 140 MM HG: ICD-10-PCS | Mod: HCNC,CPTII,S$GLB, | Performed by: PODIATRIST

## 2020-03-02 PROCEDURE — 3077F SYST BP >= 140 MM HG: CPT | Mod: HCNC,CPTII,S$GLB, | Performed by: PODIATRIST
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
## 2020-03-03 ENCOUNTER — TELEPHONE (OUTPATIENT)
Dept: FAMILY MEDICINE | Facility: CLINIC | Age: 60
End: 2020-03-03

## 2020-03-03 NOTE — TELEPHONE ENCOUNTER
----- Message from aLtisha Purvis sent at 3/3/2020 10:41 AM CST -----  Contact:  Florinda @ Mgaaqgwc-[phone] ext [phone]   Florinda @ Jucbbmnm-[phone] ext [phone] is requesting a call back regarding the pt Long Term Disability paperwork. Please call

## 2020-03-04 ENCOUNTER — TELEPHONE (OUTPATIENT)
Dept: FAMILY MEDICINE | Facility: CLINIC | Age: 60
End: 2020-03-04

## 2020-03-05 ENCOUNTER — LAB VISIT (OUTPATIENT)
Dept: LAB | Facility: HOSPITAL | Age: 60
End: 2020-03-05
Attending: INTERNAL MEDICINE
Payer: MEDICARE

## 2020-03-05 DIAGNOSIS — D86.9 SARCOIDOSIS: ICD-10-CM

## 2020-03-05 LAB
ALBUMIN SERPL BCP-MCNC: 3.8 G/DL (ref 3.5–5.2)
ALP SERPL-CCNC: 83 U/L (ref 55–135)
ALT SERPL W/O P-5'-P-CCNC: 30 U/L (ref 10–44)
ANION GAP SERPL CALC-SCNC: 8 MMOL/L (ref 8–16)
AST SERPL-CCNC: 20 U/L (ref 10–40)
BASOPHILS # BLD AUTO: 0.04 K/UL (ref 0–0.2)
BASOPHILS NFR BLD: 0.5 % (ref 0–1.9)
BILIRUB SERPL-MCNC: 0.3 MG/DL (ref 0.1–1)
BUN SERPL-MCNC: 11 MG/DL (ref 6–20)
CALCIUM SERPL-MCNC: 9.8 MG/DL (ref 8.7–10.5)
CHLORIDE SERPL-SCNC: 107 MMOL/L (ref 95–110)
CO2 SERPL-SCNC: 28 MMOL/L (ref 23–29)
CREAT SERPL-MCNC: 0.7 MG/DL (ref 0.5–1.4)
CRP SERPL-MCNC: 9.7 MG/L (ref 0–8.2)
DIFFERENTIAL METHOD: ABNORMAL
EOSINOPHIL # BLD AUTO: 0.3 K/UL (ref 0–0.5)
EOSINOPHIL NFR BLD: 3.7 % (ref 0–8)
ERYTHROCYTE [DISTWIDTH] IN BLOOD BY AUTOMATED COUNT: 13.6 % (ref 11.5–14.5)
ERYTHROCYTE [SEDIMENTATION RATE] IN BLOOD BY WESTERGREN METHOD: 36 MM/HR (ref 0–36)
EST. GFR  (AFRICAN AMERICAN): >60 ML/MIN/1.73 M^2
EST. GFR  (NON AFRICAN AMERICAN): >60 ML/MIN/1.73 M^2
GLUCOSE SERPL-MCNC: 97 MG/DL (ref 70–110)
HBV CORE AB SERPL QL IA: NEGATIVE
HCT VFR BLD AUTO: 41.7 % (ref 37–48.5)
HGB BLD-MCNC: 13.1 G/DL (ref 12–16)
IMM GRANULOCYTES # BLD AUTO: 0.02 K/UL (ref 0–0.04)
IMM GRANULOCYTES NFR BLD AUTO: 0.3 % (ref 0–0.5)
LYMPHOCYTES # BLD AUTO: 1.8 K/UL (ref 1–4.8)
LYMPHOCYTES NFR BLD: 24.9 % (ref 18–48)
MCH RBC QN AUTO: 32.7 PG (ref 27–31)
MCHC RBC AUTO-ENTMCNC: 31.4 G/DL (ref 32–36)
MCV RBC AUTO: 104 FL (ref 82–98)
MONOCYTES # BLD AUTO: 0.7 K/UL (ref 0.3–1)
MONOCYTES NFR BLD: 9 % (ref 4–15)
NEUTROPHILS # BLD AUTO: 4.5 K/UL (ref 1.8–7.7)
NEUTROPHILS NFR BLD: 61.6 % (ref 38–73)
NRBC BLD-RTO: 0 /100 WBC
PLATELET # BLD AUTO: 343 K/UL (ref 150–350)
PMV BLD AUTO: 9.6 FL (ref 9.2–12.9)
POTASSIUM SERPL-SCNC: 4 MMOL/L (ref 3.5–5.1)
PROT SERPL-MCNC: 7.4 G/DL (ref 6–8.4)
RBC # BLD AUTO: 4.01 M/UL (ref 4–5.4)
SODIUM SERPL-SCNC: 143 MMOL/L (ref 136–145)
WBC # BLD AUTO: 7.34 K/UL (ref 3.9–12.7)

## 2020-03-05 PROCEDURE — 36415 COLL VENOUS BLD VENIPUNCTURE: CPT | Mod: HCNC,PO

## 2020-03-05 PROCEDURE — 86704 HEP B CORE ANTIBODY TOTAL: CPT | Mod: HCNC

## 2020-03-05 PROCEDURE — 80053 COMPREHEN METABOLIC PANEL: CPT | Mod: HCNC

## 2020-03-05 PROCEDURE — 86140 C-REACTIVE PROTEIN: CPT | Mod: HCNC

## 2020-03-05 PROCEDURE — 85025 COMPLETE CBC W/AUTO DIFF WBC: CPT | Mod: HCNC

## 2020-03-05 PROCEDURE — 85652 RBC SED RATE AUTOMATED: CPT | Mod: HCNC

## 2020-03-09 ENCOUNTER — PATIENT MESSAGE (OUTPATIENT)
Dept: RHEUMATOLOGY | Facility: CLINIC | Age: 60
End: 2020-03-09

## 2020-03-16 ENCOUNTER — TELEPHONE (OUTPATIENT)
Dept: FAMILY MEDICINE | Facility: CLINIC | Age: 60
End: 2020-03-16

## 2020-03-16 RX ORDER — IRBESARTAN 150 MG/1
150 TABLET ORAL NIGHTLY
Qty: 90 TABLET | Refills: 3 | Status: SHIPPED | OUTPATIENT
Start: 2020-03-16 | End: 2022-09-21 | Stop reason: SDUPTHER

## 2020-03-16 NOTE — TELEPHONE ENCOUNTER
----- Message from Erna Kolb sent at 3/16/2020  9:34 AM CDT -----  Contact: Gustavo [phone]  Hello losartan 100mg and 50mg are both on back order and patient will be out of her medication in less than 10 days. Is there another medication you would like to try? Thank you, Albert

## 2020-03-16 NOTE — TELEPHONE ENCOUNTER
[facility], Pharmacist, Erna stated that the Losartan 100mg and 50mg are both on back order. Erna requesting separate medications. Pls advise.

## 2020-03-16 NOTE — TELEPHONE ENCOUNTER
----- Message from Erna Kolb sent at 3/16/2020  9:34 AM CDT -----  Contact: Gustavo [phone]  Hello losartan 100mg and 50mg are both on back order and patient will be out of her medication in less than 10 days. Is there another medication you would like to try? Thank you, Albert

## 2020-03-18 ENCOUNTER — OFFICE VISIT (OUTPATIENT)
Dept: RHEUMATOLOGY | Facility: CLINIC | Age: 60
End: 2020-03-18
Payer: MEDICARE

## 2020-03-18 VITALS
DIASTOLIC BLOOD PRESSURE: 79 MMHG | TEMPERATURE: 98 F | SYSTOLIC BLOOD PRESSURE: 133 MMHG | BODY MASS INDEX: 25.7 KG/M2 | HEART RATE: 83 BPM | WEIGHT: 136.13 LBS | HEIGHT: 61 IN

## 2020-03-18 DIAGNOSIS — Z79.631 ENCOUNTER FOR METHOTREXATE MONITORING: ICD-10-CM

## 2020-03-18 DIAGNOSIS — Z51.81 ENCOUNTER FOR METHOTREXATE MONITORING: ICD-10-CM

## 2020-03-18 DIAGNOSIS — M06.9 RHEUMATOID ARTHRITIS FLARE: Primary | ICD-10-CM

## 2020-03-18 PROCEDURE — 99215 PR OFFICE/OUTPT VISIT, EST, LEVL V, 40-54 MIN: ICD-10-PCS | Mod: HCNC,S$GLB,, | Performed by: INTERNAL MEDICINE

## 2020-03-18 PROCEDURE — 99999 PR PBB SHADOW E&M-EST. PATIENT-LVL III: ICD-10-PCS | Mod: PBBFAC,HCNC,, | Performed by: INTERNAL MEDICINE

## 2020-03-18 PROCEDURE — 3008F PR BODY MASS INDEX (BMI) DOCUMENTED: ICD-10-PCS | Mod: HCNC,CPTII,S$GLB, | Performed by: INTERNAL MEDICINE

## 2020-03-18 PROCEDURE — 3078F PR MOST RECENT DIASTOLIC BLOOD PRESSURE < 80 MM HG: ICD-10-PCS | Mod: HCNC,CPTII,S$GLB, | Performed by: INTERNAL MEDICINE

## 2020-03-18 PROCEDURE — 3075F PR MOST RECENT SYSTOLIC BLOOD PRESS GE 130-139MM HG: ICD-10-PCS | Mod: HCNC,CPTII,S$GLB, | Performed by: INTERNAL MEDICINE

## 2020-03-18 PROCEDURE — 99999 PR PBB SHADOW E&M-EST. PATIENT-LVL III: CPT | Mod: PBBFAC,HCNC,, | Performed by: INTERNAL MEDICINE

## 2020-03-18 PROCEDURE — 99215 OFFICE O/P EST HI 40 MIN: CPT | Mod: HCNC,S$GLB,, | Performed by: INTERNAL MEDICINE

## 2020-03-18 PROCEDURE — 3078F DIAST BP <80 MM HG: CPT | Mod: HCNC,CPTII,S$GLB, | Performed by: INTERNAL MEDICINE

## 2020-03-18 PROCEDURE — 3075F SYST BP GE 130 - 139MM HG: CPT | Mod: HCNC,CPTII,S$GLB, | Performed by: INTERNAL MEDICINE

## 2020-03-18 PROCEDURE — 3008F BODY MASS INDEX DOCD: CPT | Mod: HCNC,CPTII,S$GLB, | Performed by: INTERNAL MEDICINE

## 2020-03-18 RX ORDER — METHOTREXATE 2.5 MG/1
20 TABLET ORAL
Qty: 96 TABLET | Refills: 0 | Status: SHIPPED | OUTPATIENT
Start: 2020-03-18 | End: 2020-06-03

## 2020-03-18 RX ORDER — FOLIC ACID 1 MG/1
2 TABLET ORAL DAILY
Qty: 60 TABLET | Refills: 11 | Status: SHIPPED | OUTPATIENT
Start: 2020-03-18 | End: 2020-08-05 | Stop reason: SDUPTHER

## 2020-03-18 NOTE — PROGRESS NOTES
Subjective:       Patient ID: Silvia Cervantes is a 54 y.o. female.    Chief Complaint:     HPI 55 yo F with PMH of HTN, abnormal pap (2013 but recent negative, no cancer), RA (+CCP, RF),erosive here for evaluation. She was diagnosed in 2011 with hand and feet with swelling and pain. She was initially treated with MTX but it gave her nausea which gave her nausea.  She was changed to leflunomide 20 mg 10/2014 from mtx due to nausea on the mtx. Then I added etanercept January 2015 due to incomplete control of her arthritis on leflunomide.  No am stiffness.  She reports mild aching in hands (3/10). Pain is aching.  Reports mild swelling in hands but better than usual.  She reports left upper quadrant pain (4/10) , non-radiating with possible nausea which has been present for months. Sometimes she has sour taste in mouth.       Interval history:  Patient is here for follow up.  She is taking MTX 6 pills once a week She is taking folic acid1 mg po qday.instead of 2mg a day.  She is having hair thinning.  Reports she has diffuse morning stiffness for a few minutes. She is not taking the celebrex. She has pain and swelling in left foot for a few days.  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   HENT: Negative for congestion, ear discharge, ear pain, facial swelling, mouth sores, sinus pressure, sneezing, sore throat, tinnitus and trouble swallowing.    Eyes: Negative for photophobia, pain, discharge, redness, itching and visual disturbance.   Respiratory: Negative for apnea, chest tightness, shortness of breath, wheezing and stridor.     Cardiovascular: Negative for leg swelling.   Gastrointestinal: Negative for nausea, abdominal pain, diarrhea, constipation, blood in stool, abdominal distention and anal bleeding.   Endocrine: Negative for cold intolerance and heat intolerance.   Genitourinary: Negative for dysuria and difficulty urinating.   Musculoskeletal: Positive for arthralgias. Negative for myalgias, back pain, joint swelling, gait problem, neck pain and neck stiffness.   Skin: Negative for color change, pallor, rash and wound.   Neurological: Negative for dizziness, seizures, light-headedness and numbness.   Hematological: Negative for adenopathy. Does not bruise/bleed easily.   Psychiatric/Behavioral: Negative for sleep disturbance. The patient is not nervous/anxious.       Objective:        Physical Exam   Constitutional: She is oriented to person, place, and time.   HENT:   Head: Normocephalic and atraumatic.   Right Ear: External ear normal.   Left Ear: External ear normal.   Nose: Nose normal.   Mouth/Throat: Oropharynx is clear and moist. No oropharyngeal exudate.   Eyes: Conjunctivae and EOM are normal. Pupils are equal, round, and reactive to light. Right eye exhibits no discharge. Left eye exhibits no discharge. No scleral icterus.   Neck: Neck supple. No JVD present. No thyromegaly present.   Cardiovascular: Normal rate, regular rhythm, normal heart sounds and intact distal pulses.  Exam reveals no gallop and no friction rub.    No murmur heard.  Pulmonary/Chest: Effort normal and breath sounds normal. No respiratory distress. She has no wheezes. She has no rales. She exhibits no tenderness.   Abdominal: Soft. Bowel sounds are normal. She exhibits no distension and no mass. There is no tenderness. There is no rebound and no guarding.   Lymphadenopathy:     She has no cervical adenopathy.   Neurological: She is alert and oriented to person, place, and time. No cranial nerve deficit. Gait normal. Coordination normal.   Skin:diffuse  "hypopigmented lesions in arms, hands, legs, chest, back   Psychiatric: Affect and judgment normal.   Musculoskeletal: She exhibits no  tenderness. She exhibits no edema.   FROM of all joints including neck, shoulders, spine, ankles, wrists, knees, and ankles; no joint deformities noted or effusions, erythema or warmth; no tophi or nodules noted; no crepitus; no nail pitting or onycholysis          Labs:  Esr-25  ccp-93  rf-36  Flor-negative    Imaging:  MRI (7/22/2015)   (Stable enhancing marginal erosions in the second and third metacarpal heads, lunate, triquetrum, and capitate)        dexa scan:(2017):  Osteopenia of the femoral neck. FRAX calculation does not support treatment for osteoporosis.Total hip and lumbar spine BMD unchanged since 2013.    Recommendations:  1) Adequate calcium and Vitamin D therapy  2) Appropriate exercise  3) Consider repeat BMD in 2- 4 years    Assessment:     58 yo F with PMH of HTN, abnormal pap (2013 but recent negative, no cancer), RA (+CCP, RF),erosive here , H. Pylori for follow up of seropositive RA and sarcoid.   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  She has abnormal brain MRI  AMYLOID ANGIOPATHY and IS SEEING NEURO.  Patient was previously doing well on enbrel but she would prefer not to injections. She hasn't been taking folic acid correctly and has been having hair thinning.  Given that she is still having episodes of joint swelling, will increase  MTX.    Plan:    - continue MTX 8 pills  A week  increase folic acid from 1 mg po qday to 2mg a day    -follow up  EMG  Continue celebrex 200mg po BID PRN  rtc in  4 months*    Increase MTX from 6 pills once a week to 8 pills once a week  Labs in a month and in 3 months  "

## 2020-03-23 ENCOUNTER — DOCUMENTATION ONLY (OUTPATIENT)
Dept: REHABILITATION | Facility: OTHER | Age: 60
End: 2020-03-23

## 2020-03-23 NOTE — PROGRESS NOTES
Outpatient Physical Therapy Discharge Summary    Date: 03/23/2020      Date of PT eval: 10/9/2019  Number of PT visits: 11  Date of last visit: 11/21/2019    Assessment:  Unable to assess if goals met secondary to lack of attendance.     Plan: Discharge from outpatient physical therapy due to lack of attendance

## 2020-04-07 ENCOUNTER — TELEPHONE (OUTPATIENT)
Dept: SPINE | Facility: CLINIC | Age: 60
End: 2020-04-07

## 2020-04-07 NOTE — TELEPHONE ENCOUNTER
Pt declined a virtual visit at this time and would like to be rescheduled for a later date. Pt will be reschedule once OV resume. No further questions or concerns.

## 2020-04-07 NOTE — TELEPHONE ENCOUNTER
----- Message from Martha Thurston sent at 4/7/2020 11:13 AM CDT -----  Contact: Self      Name of Who is Calling: DARYN VENEGAS [[id number]]      What is the request in detail: Pt would like to reschedule her appt.Please contact to further discuss and advise.        Can the clinic reply by [facility]:Y       What Number [facility] Back [facility] [phone]

## 2020-04-17 ENCOUNTER — LAB VISIT (OUTPATIENT)
Dept: LAB | Facility: HOSPITAL | Age: 60
End: 2020-04-17
Attending: INTERNAL MEDICINE
Payer: MEDICARE

## 2020-04-17 DIAGNOSIS — M06.9 RHEUMATOID ARTHRITIS FLARE: ICD-10-CM

## 2020-04-17 LAB
ALBUMIN SERPL BCP-MCNC: 3.9 G/DL (ref 3.5–5.2)
ALP SERPL-CCNC: 82 U/L (ref 55–135)
ALT SERPL W/O P-5'-P-CCNC: 20 U/L (ref 10–44)
ANION GAP SERPL CALC-SCNC: 10 MMOL/L (ref 8–16)
AST SERPL-CCNC: 18 U/L (ref 10–40)
BASOPHILS # BLD AUTO: 0.03 K/UL (ref 0–0.2)
BASOPHILS NFR BLD: 0.5 % (ref 0–1.9)
BILIRUB SERPL-MCNC: 0.3 MG/DL (ref 0.1–1)
BUN SERPL-MCNC: 14 MG/DL (ref 6–20)
CALCIUM SERPL-MCNC: 9.4 MG/DL (ref 8.7–10.5)
CHLORIDE SERPL-SCNC: 107 MMOL/L (ref 95–110)
CO2 SERPL-SCNC: 26 MMOL/L (ref 23–29)
CREAT SERPL-MCNC: 0.8 MG/DL (ref 0.5–1.4)
CRP SERPL-MCNC: 2.8 MG/L (ref 0–8.2)
DIFFERENTIAL METHOD: ABNORMAL
EOSINOPHIL # BLD AUTO: 0.2 K/UL (ref 0–0.5)
EOSINOPHIL NFR BLD: 2.9 % (ref 0–8)
ERYTHROCYTE [DISTWIDTH] IN BLOOD BY AUTOMATED COUNT: 13.1 % (ref 11.5–14.5)
ERYTHROCYTE [SEDIMENTATION RATE] IN BLOOD BY WESTERGREN METHOD: 27 MM/HR (ref 0–36)
EST. GFR  (AFRICAN AMERICAN): >60 ML/MIN/1.73 M^2
EST. GFR  (NON AFRICAN AMERICAN): >60 ML/MIN/1.73 M^2
GLUCOSE SERPL-MCNC: 115 MG/DL (ref 70–110)
HCT VFR BLD AUTO: 43.4 % (ref 37–48.5)
HGB BLD-MCNC: 13.4 G/DL (ref 12–16)
IMM GRANULOCYTES # BLD AUTO: 0.01 K/UL (ref 0–0.04)
IMM GRANULOCYTES NFR BLD AUTO: 0.2 % (ref 0–0.5)
LYMPHOCYTES # BLD AUTO: 1.8 K/UL (ref 1–4.8)
LYMPHOCYTES NFR BLD: 29.4 % (ref 18–48)
MCH RBC QN AUTO: 32.6 PG (ref 27–31)
MCHC RBC AUTO-ENTMCNC: 30.9 G/DL (ref 32–36)
MCV RBC AUTO: 106 FL (ref 82–98)
MONOCYTES # BLD AUTO: 0.5 K/UL (ref 0.3–1)
MONOCYTES NFR BLD: 8.2 % (ref 4–15)
NEUTROPHILS # BLD AUTO: 3.7 K/UL (ref 1.8–7.7)
NEUTROPHILS NFR BLD: 58.8 % (ref 38–73)
NRBC BLD-RTO: 0 /100 WBC
PLATELET # BLD AUTO: 324 K/UL (ref 150–350)
PMV BLD AUTO: 9.8 FL (ref 9.2–12.9)
POTASSIUM SERPL-SCNC: 3.6 MMOL/L (ref 3.5–5.1)
PROT SERPL-MCNC: 7.6 G/DL (ref 6–8.4)
RBC # BLD AUTO: 4.11 M/UL (ref 4–5.4)
SODIUM SERPL-SCNC: 143 MMOL/L (ref 136–145)
WBC # BLD AUTO: 6.22 K/UL (ref 3.9–12.7)

## 2020-04-17 PROCEDURE — 86140 C-REACTIVE PROTEIN: CPT | Mod: HCNC

## 2020-04-17 PROCEDURE — 36415 COLL VENOUS BLD VENIPUNCTURE: CPT | Mod: HCNC,PO

## 2020-04-17 PROCEDURE — 80053 COMPREHEN METABOLIC PANEL: CPT | Mod: HCNC

## 2020-04-17 PROCEDURE — 85025 COMPLETE CBC W/AUTO DIFF WBC: CPT | Mod: HCNC

## 2020-04-17 PROCEDURE — 85652 RBC SED RATE AUTOMATED: CPT | Mod: HCNC

## 2020-04-23 ENCOUNTER — PATIENT MESSAGE (OUTPATIENT)
Dept: RHEUMATOLOGY | Facility: CLINIC | Age: 60
End: 2020-04-23

## 2020-04-23 RX ORDER — FOLIC ACID 1 MG/1
3 TABLET ORAL DAILY
Qty: 90 TABLET | Refills: 4 | Status: SHIPPED | OUTPATIENT
Start: 2020-04-23 | End: 2020-05-11 | Stop reason: SDUPTHER

## 2020-05-11 ENCOUNTER — OFFICE VISIT (OUTPATIENT)
Dept: FAMILY MEDICINE | Facility: CLINIC | Age: 60
End: 2020-05-11
Attending: FAMILY MEDICINE
Payer: MEDICARE

## 2020-05-11 ENCOUNTER — TELEPHONE (OUTPATIENT)
Dept: FAMILY MEDICINE | Facility: CLINIC | Age: 60
End: 2020-05-11

## 2020-05-11 VITALS
TEMPERATURE: 99 F | HEIGHT: 61 IN | WEIGHT: 134.69 LBS | OXYGEN SATURATION: 96 % | DIASTOLIC BLOOD PRESSURE: 82 MMHG | HEART RATE: 78 BPM | SYSTOLIC BLOOD PRESSURE: 132 MMHG | BODY MASS INDEX: 25.43 KG/M2

## 2020-05-11 DIAGNOSIS — F33.9 MAJOR DEPRESSION, RECURRENT, CHRONIC: ICD-10-CM

## 2020-05-11 DIAGNOSIS — R20.2 NUMBNESS AND TINGLING OF LEFT LEG: ICD-10-CM

## 2020-05-11 DIAGNOSIS — I10 ESSENTIAL HYPERTENSION: ICD-10-CM

## 2020-05-11 DIAGNOSIS — R20.0 NUMBNESS AND TINGLING OF LEFT LEG: ICD-10-CM

## 2020-05-11 DIAGNOSIS — E78.00 HYPERCHOLESTEREMIA: ICD-10-CM

## 2020-05-11 DIAGNOSIS — G56.92 NEUROPATHY, ARM, LEFT: Primary | ICD-10-CM

## 2020-05-11 PROCEDURE — 3008F BODY MASS INDEX DOCD: CPT | Mod: HCNC,CPTII,S$GLB, | Performed by: FAMILY MEDICINE

## 2020-05-11 PROCEDURE — 3079F PR MOST RECENT DIASTOLIC BLOOD PRESSURE 80-89 MM HG: ICD-10-PCS | Mod: HCNC,CPTII,S$GLB, | Performed by: FAMILY MEDICINE

## 2020-05-11 PROCEDURE — 99214 PR OFFICE/OUTPT VISIT, EST, LEVL IV, 30-39 MIN: ICD-10-PCS | Mod: HCNC,S$GLB,, | Performed by: FAMILY MEDICINE

## 2020-05-11 PROCEDURE — 99214 OFFICE O/P EST MOD 30 MIN: CPT | Mod: HCNC,S$GLB,, | Performed by: FAMILY MEDICINE

## 2020-05-11 PROCEDURE — 99499 UNLISTED E&M SERVICE: CPT | Mod: S$GLB,,, | Performed by: FAMILY MEDICINE

## 2020-05-11 PROCEDURE — 3008F PR BODY MASS INDEX (BMI) DOCUMENTED: ICD-10-PCS | Mod: HCNC,CPTII,S$GLB, | Performed by: FAMILY MEDICINE

## 2020-05-11 PROCEDURE — 99999 PR PBB SHADOW E&M-EST. PATIENT-LVL IV: ICD-10-PCS | Mod: PBBFAC,HCNC,, | Performed by: FAMILY MEDICINE

## 2020-05-11 PROCEDURE — 3075F SYST BP GE 130 - 139MM HG: CPT | Mod: HCNC,CPTII,S$GLB, | Performed by: FAMILY MEDICINE

## 2020-05-11 PROCEDURE — 3075F PR MOST RECENT SYSTOLIC BLOOD PRESS GE 130-139MM HG: ICD-10-PCS | Mod: HCNC,CPTII,S$GLB, | Performed by: FAMILY MEDICINE

## 2020-05-11 PROCEDURE — 99499 RISK ADDL DX/OHS AUDIT: ICD-10-PCS | Mod: S$GLB,,, | Performed by: FAMILY MEDICINE

## 2020-05-11 PROCEDURE — 99999 PR PBB SHADOW E&M-EST. PATIENT-LVL IV: CPT | Mod: PBBFAC,HCNC,, | Performed by: FAMILY MEDICINE

## 2020-05-11 PROCEDURE — 3079F DIAST BP 80-89 MM HG: CPT | Mod: HCNC,CPTII,S$GLB, | Performed by: FAMILY MEDICINE

## 2020-05-11 RX ORDER — LOSARTAN POTASSIUM 100 MG/1
100 TABLET ORAL DAILY
Qty: 90 TABLET | Refills: 3 | Status: SHIPPED | OUTPATIENT
Start: 2020-05-11 | End: 2021-02-25 | Stop reason: SDUPTHER

## 2020-05-11 RX ORDER — ATORVASTATIN CALCIUM 10 MG/1
10 TABLET, FILM COATED ORAL DAILY
Qty: 90 TABLET | Refills: 3 | Status: SHIPPED | OUTPATIENT
Start: 2020-05-11 | End: 2021-06-15 | Stop reason: SDUPTHER

## 2020-05-11 RX ORDER — SERTRALINE HYDROCHLORIDE 25 MG/1
25 TABLET, FILM COATED ORAL DAILY
Qty: 90 TABLET | Refills: 3 | Status: SHIPPED | OUTPATIENT
Start: 2020-05-11 | End: 2020-11-12

## 2020-05-11 NOTE — PROGRESS NOTES
Subjective:       Patient ID: Silvia Cervantes is a 59 y.o. female.    Chief Complaint: Follow-up    59 yr old pleasant  female with depression, allergies, vitiligo, HTN, HLD, Rheumatoid arthritis, Erosive gastritis, and other co morbidities presents today for her lab review and 3 month follow up. Worsening left leg numbness and would like new neurology.          Chronic low back pain. Onset 6 months ago and gradually worsening - left lower back- does not radiate and no neurological symptoms. No saddle anesthesia or bladder/bowel problems. Details as follows -      HTN - controlled - on Losartan-HCT and Norvasc 5 - compliant - no side effects       Major depression - uncontrolled - not on meds - would like to start one - given zoloft - she will start from today - - no SI/HI    Vertigo - much better - on vertin as needed       HLD - diet controlled -  LDLCALC                  106.6               02/04/2019                   RA - on Enbrel shots - follows Rheumatology - stable      Gastritis - stable - on PPI as needed - no side effects      History as below - reviewed       HM  -labs UTD  -vaccines UTD    Hypertension   This is a chronic problem. The current episode started more than 1 year ago. The problem has been gradually improving since onset. The problem is controlled. Associated symptoms include neck pain. Pertinent negatives include no chest pain, headaches, malaise/fatigue, palpitations or shortness of breath. There are no associated agents to hypertension. Risk factors for coronary artery disease include dyslipidemia and post-menopausal state. Past treatments include angiotensin blockers and diuretics. The current treatment provides significant improvement. There are no compliance problems.  There is no history of angina, CAD/MI, CVA, left ventricular hypertrophy, PVD or retinopathy. There is no history of chronic renal disease, coarctation of the aorta, hypercortisolism, hyperparathyroidism, a  hypertension causing med or sleep apnea.   Hyperlipidemia   This is a chronic problem. The current episode started more than 1 year ago. The problem is controlled. Recent lipid tests were reviewed and are normal. She has no history of chronic renal disease, diabetes, hypothyroidism or liver disease. There are no known factors aggravating her hyperlipidemia. Associated symptoms include myalgias. Pertinent negatives include no chest pain, focal sensory loss, focal weakness, leg pain or shortness of breath. She is currently on no antihyperlipidemic treatment. The current treatment provides mild improvement of lipids. There are no compliance problems.  Risk factors for coronary artery disease include dyslipidemia, hypertension and post-menopausal.   Back Pain   This is a chronic problem. The current episode started more than 1 month ago. The problem occurs constantly. The problem is unchanged. The pain is present in the sacro-iliac and lumbar spine. The quality of the pain is described as aching. The pain does not radiate. The symptoms are aggravated by bending, sitting and twisting. Associated symptoms include numbness and weakness. Pertinent negatives include no chest pain, dysuria, headaches, leg pain, perianal numbness or weight loss. She has tried nothing for the symptoms. The treatment provided no relief.     Review of Systems   Constitutional: Negative.  Negative for activity change, diaphoresis, malaise/fatigue, unexpected weight change and weight loss.   HENT: Negative.  Negative for congestion, ear discharge, hearing loss, rhinorrhea, sore throat and voice change.    Eyes: Negative.  Negative for pain, discharge and visual disturbance.   Respiratory: Negative.  Negative for chest tightness, shortness of breath and wheezing.    Cardiovascular: Negative.  Negative for chest pain and palpitations.   Gastrointestinal: Negative.  Negative for abdominal distention, anal bleeding, constipation and nausea.    Endocrine: Negative.  Negative for cold intolerance, polydipsia and polyuria.   Genitourinary: Negative.  Negative for decreased urine volume, difficulty urinating, dysuria, frequency, menstrual problem and vaginal pain.   Musculoskeletal: Positive for back pain, myalgias and neck pain. Negative for arthralgias and gait problem.   Skin: Negative.  Negative for color change, pallor and wound.   Allergic/Immunologic: Negative.  Negative for environmental allergies and immunocompromised state.   Neurological: Positive for weakness and numbness. Negative for dizziness, tremors, focal weakness, seizures, speech difficulty and headaches.   Hematological: Negative.  Negative for adenopathy. Does not bruise/bleed easily.   Psychiatric/Behavioral: Negative.  Negative for agitation, confusion, decreased concentration, hallucinations, self-injury and suicidal ideas. The patient is not nervous/anxious.        PMH/PSH/FH/SH/MED/ALLERGY reviewed    Objective:       Vitals:    05/11/20 0959   BP: 132/82   Pulse: 78   Temp: 98.5 °F (36.9 °C)       Physical Exam   Constitutional: She is oriented to person, place, and time. She appears well-developed and well-nourished. No distress.   HENT:   Head: Normocephalic and atraumatic.   Right Ear: External ear normal.   Left Ear: External ear normal.   Nose: Nose normal.   Mouth/Throat: Oropharynx is clear and moist. No oropharyngeal exudate.   Eyes: Pupils are equal, round, and reactive to light. Conjunctivae and EOM are normal. Right eye exhibits no discharge. Left eye exhibits no discharge. No scleral icterus.   Neck: Normal range of motion. Neck supple. No JVD present. No tracheal deviation present. No thyromegaly present.   Cardiovascular: Normal rate, regular rhythm, normal heart sounds and intact distal pulses. Exam reveals no gallop and no friction rub.   No murmur heard.  Pulmonary/Chest: Effort normal and breath sounds normal. No stridor. She has no wheezes. She has no rales.  She exhibits no tenderness.   Abdominal: Soft. Bowel sounds are normal. She exhibits no distension and no mass. There is no tenderness. There is no rebound and no guarding. No hernia.   Musculoskeletal: Normal range of motion. She exhibits no edema or tenderness.   Lymphadenopathy:     She has no cervical adenopathy.   Neurological: She is alert and oriented to person, place, and time. She has normal reflexes. She displays normal reflexes. No cranial nerve deficit. She exhibits normal muscle tone. Coordination normal.   Skin: Skin is warm and dry. No rash noted. She is not diaphoretic. No erythema. No pallor.   Psychiatric: She has a normal mood and affect. Her behavior is normal. Judgment and thought content normal.       Assessment:       1. Neuropathy, arm, left    2. Numbness and tingling of left leg    3. Essential hypertension    4. Major depression, recurrent, chronic    5. Hypercholesteremia        Plan:           Silvia was seen today for follow-up.    Diagnoses and all orders for this visit:    Neuropathy, arm, left    Numbness and tingling of left leg  -     Ambulatory referral/consult to Neurology; Future    Essential hypertension  -     losartan (COZAAR) 100 MG tablet; Take 1 tablet (100 mg total) by mouth once daily.    Major depression, recurrent, chronic  -     sertraline (ZOLOFT) 25 MG tablet; Take 1 tablet (25 mg total) by mouth once daily.    Hypercholesteremia  -     atorvastatin (LIPITOR) 10 MG tablet; Take 1 tablet (10 mg total) by mouth once daily.      Left leg numbness  -refer neurology    HLD  -diet control  -start statin      HTN  -controlled    RA  -stable  -follow rheumatology    Vertigo  -meclizine prn    Depression  -controlled  -continue zoloft 25 mg daily  -SI/ER precautions given      Spent adequate time in obtaining history and explaining differentials      25 minutes spent during this visit of which greater than 50% devoted to face-face counseling and coordination of care regarding  diagnosis and management plan    Follow up in about 3 months (around 8/11/2020), or if symptoms worsen or fail to improve.

## 2020-05-11 NOTE — TELEPHONE ENCOUNTER
----- Message from Tay Almaraz sent at 5/11/2020  8:28 AM CDT -----  Contact: [phone]/ self   Patient called in returning your call.  Please advise.

## 2020-06-03 DIAGNOSIS — M06.9 RHEUMATOID ARTHRITIS FLARE: ICD-10-CM

## 2020-06-03 RX ORDER — METHOTREXATE 2.5 MG/1
20 TABLET ORAL
Qty: 96 TABLET | Refills: 0 | Status: SHIPPED | OUTPATIENT
Start: 2020-06-03 | End: 2020-11-12

## 2020-07-23 ENCOUNTER — TELEPHONE (OUTPATIENT)
Dept: PODIATRY | Facility: CLINIC | Age: 60
End: 2020-07-23

## 2020-07-23 ENCOUNTER — PATIENT MESSAGE (OUTPATIENT)
Dept: NEUROLOGY | Facility: CLINIC | Age: 60
End: 2020-07-23

## 2020-07-23 NOTE — TELEPHONE ENCOUNTER
----- Message from Huber Zamorano sent at 7/23/2020  9:27 AM CDT -----  Type:  Needs Medical Advice    Who Called: self  Reason:needs to ask a question before her appointment tomorrow   Would the patient rather a call back or a response via [facility]? callback  Best Call Back Number: [phone]  Additional Information: none

## 2020-07-23 NOTE — TELEPHONE ENCOUNTER
Spoke with Mrs. Cervantes- she was concerned with the weather for her upcoming appointment on 07/24/20. Patient was concerned about cancelling and driving. Mrs. Cervantes was informed of MD recommendations- per my chart message. Also, if she has to cancel -due weather, we can r/s if need to. Patient expressed v/u   DISPLAY PLAN FREE TEXT

## 2020-07-24 ENCOUNTER — OFFICE VISIT (OUTPATIENT)
Dept: PODIATRY | Facility: CLINIC | Age: 60
End: 2020-07-24
Payer: MEDICARE

## 2020-07-24 VITALS
DIASTOLIC BLOOD PRESSURE: 88 MMHG | SYSTOLIC BLOOD PRESSURE: 148 MMHG | HEIGHT: 61 IN | BODY MASS INDEX: 25.3 KG/M2 | HEART RATE: 89 BPM | WEIGHT: 134 LBS

## 2020-07-24 DIAGNOSIS — L60.0 ONYCHOCRYPTOSIS: Primary | ICD-10-CM

## 2020-07-24 DIAGNOSIS — M06.30 RHEUMATOID NODULE: ICD-10-CM

## 2020-07-24 PROCEDURE — 3008F PR BODY MASS INDEX (BMI) DOCUMENTED: ICD-10-PCS | Mod: CPTII,S$GLB,, | Performed by: PODIATRIST

## 2020-07-24 PROCEDURE — 3077F SYST BP >= 140 MM HG: CPT | Mod: CPTII,S$GLB,, | Performed by: PODIATRIST

## 2020-07-24 PROCEDURE — 3079F PR MOST RECENT DIASTOLIC BLOOD PRESSURE 80-89 MM HG: ICD-10-PCS | Mod: CPTII,S$GLB,, | Performed by: PODIATRIST

## 2020-07-24 PROCEDURE — 99999 PR PBB SHADOW E&M-EST. PATIENT-LVL IV: ICD-10-PCS | Mod: PBBFAC,,, | Performed by: PODIATRIST

## 2020-07-24 PROCEDURE — 3079F DIAST BP 80-89 MM HG: CPT | Mod: CPTII,S$GLB,, | Performed by: PODIATRIST

## 2020-07-24 PROCEDURE — 11750 EXCISION NAIL&NAIL MATRIX: CPT | Mod: T5,S$GLB,, | Performed by: PODIATRIST

## 2020-07-24 PROCEDURE — 3077F PR MOST RECENT SYSTOLIC BLOOD PRESSURE >= 140 MM HG: ICD-10-PCS | Mod: CPTII,S$GLB,, | Performed by: PODIATRIST

## 2020-07-24 PROCEDURE — 11750 PR REMOVAL OF NAIL BED: ICD-10-PCS | Mod: T5,S$GLB,, | Performed by: PODIATRIST

## 2020-07-24 PROCEDURE — 99213 OFFICE O/P EST LOW 20 MIN: CPT | Mod: 25,S$GLB,, | Performed by: PODIATRIST

## 2020-07-24 PROCEDURE — 3008F BODY MASS INDEX DOCD: CPT | Mod: CPTII,S$GLB,, | Performed by: PODIATRIST

## 2020-07-24 PROCEDURE — 99213 PR OFFICE/OUTPT VISIT, EST, LEVL III, 20-29 MIN: ICD-10-PCS | Mod: 25,S$GLB,, | Performed by: PODIATRIST

## 2020-07-24 PROCEDURE — 99999 PR PBB SHADOW E&M-EST. PATIENT-LVL IV: CPT | Mod: PBBFAC,,, | Performed by: PODIATRIST

## 2020-07-24 RX ORDER — CEPHALEXIN 500 MG/1
500 CAPSULE ORAL 4 TIMES DAILY
Qty: 28 CAPSULE | Refills: 0 | Status: SHIPPED | OUTPATIENT
Start: 2020-07-24 | End: 2020-08-13

## 2020-07-24 RX ORDER — TOBRAMYCIN 3 MG/ML
SOLUTION/ DROPS OPHTHALMIC
Qty: 5 ML | Refills: 0 | Status: SHIPPED | OUTPATIENT
Start: 2020-07-24 | End: 2021-04-22
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
## 2020-07-31 ENCOUNTER — OFFICE VISIT (OUTPATIENT)
Dept: DERMATOLOGY | Facility: CLINIC | Age: 60
End: 2020-07-31
Payer: MEDICARE

## 2020-07-31 ENCOUNTER — TELEPHONE (OUTPATIENT)
Dept: DERMATOLOGY | Facility: CLINIC | Age: 60
End: 2020-07-31

## 2020-07-31 DIAGNOSIS — L65.9 ALOPECIA: ICD-10-CM

## 2020-07-31 DIAGNOSIS — L80 VITILIGO: Primary | ICD-10-CM

## 2020-07-31 DIAGNOSIS — D18.01 CHERRY ANGIOMA: ICD-10-CM

## 2020-07-31 DIAGNOSIS — Z12.83 SCREENING EXAM FOR SKIN CANCER: ICD-10-CM

## 2020-07-31 DIAGNOSIS — D22.9 MULTIPLE BENIGN NEVI: ICD-10-CM

## 2020-07-31 DIAGNOSIS — Z12.83 SKIN EXAM, SCREENING FOR CANCER: ICD-10-CM

## 2020-07-31 PROCEDURE — 99203 PR OFFICE/OUTPT VISIT, NEW, LEVL III, 30-44 MIN: ICD-10-PCS | Mod: S$GLB,,, | Performed by: DERMATOLOGY

## 2020-07-31 PROCEDURE — 99999 PR PBB SHADOW E&M-EST. PATIENT-LVL III: CPT | Mod: PBBFAC,,, | Performed by: DERMATOLOGY

## 2020-07-31 PROCEDURE — 99999 PR PBB SHADOW E&M-EST. PATIENT-LVL III: ICD-10-PCS | Mod: PBBFAC,,, | Performed by: DERMATOLOGY

## 2020-07-31 PROCEDURE — 99203 OFFICE O/P NEW LOW 30 MIN: CPT | Mod: S$GLB,,, | Performed by: DERMATOLOGY
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
## 2020-08-05 ENCOUNTER — CLINICAL SUPPORT (OUTPATIENT)
Dept: DERMATOLOGY | Facility: CLINIC | Age: 60
End: 2020-08-05
Payer: MEDICARE

## 2020-08-05 ENCOUNTER — OFFICE VISIT (OUTPATIENT)
Dept: RHEUMATOLOGY | Facility: CLINIC | Age: 60
End: 2020-08-05
Payer: MEDICARE

## 2020-08-05 DIAGNOSIS — Z51.81 ENCOUNTER FOR METHOTREXATE MONITORING: ICD-10-CM

## 2020-08-05 DIAGNOSIS — Z79.631 ENCOUNTER FOR METHOTREXATE MONITORING: ICD-10-CM

## 2020-08-05 DIAGNOSIS — M06.321 RHEUMATOID NODULE OF RIGHT ELBOW: Primary | ICD-10-CM

## 2020-08-05 DIAGNOSIS — D86.9 SARCOIDOSIS: Primary | ICD-10-CM

## 2020-08-05 DIAGNOSIS — M06.9 RHEUMATOID ARTHRITIS FLARE: Primary | ICD-10-CM

## 2020-08-05 DIAGNOSIS — L80 VITILIGO: ICD-10-CM

## 2020-08-05 DIAGNOSIS — M06.9 RHEUMATOID ARTHRITIS INVOLVING MULTIPLE JOINTS: ICD-10-CM

## 2020-08-05 PROCEDURE — 96900 ACTINOTHERAPY UV LIGHT: CPT | Mod: S$GLB,,, | Performed by: DERMATOLOGY

## 2020-08-05 PROCEDURE — 96900 PR ULTRAVIOLET LIGHT THERAPY: ICD-10-PCS | Mod: S$GLB,,, | Performed by: DERMATOLOGY

## 2020-08-05 PROCEDURE — 99214 OFFICE O/P EST MOD 30 MIN: CPT | Mod: 95,,, | Performed by: INTERNAL MEDICINE

## 2020-08-05 PROCEDURE — 99214 PR OFFICE/OUTPT VISIT, EST, LEVL IV, 30-39 MIN: ICD-10-PCS | Mod: 95,,, | Performed by: INTERNAL MEDICINE

## 2020-08-05 RX ORDER — FOLIC ACID 1 MG/1
4 TABLET ORAL DAILY
Qty: 120 TABLET | Refills: 11 | Status: SHIPPED | OUTPATIENT
Start: 2020-08-05 | End: 2022-01-20

## 2020-08-05 NOTE — PROGRESS NOTES
Light tx 1 for vitiligo. NB=UVB to body at 200 mj with eyes shielded. Goggles issued and unit safety discussed.

## 2020-08-05 NOTE — PROGRESS NOTES
Subjective:       Patient ID: Silvia Cervantes is a 54 y.o. female.    Chief Complaint:     HPI 55 yo F with PMH of HTN, abnormal pap (2013 but recent negative, no cancer), RA (+CCP, RF),erosive here for evaluation. She was diagnosed in 2011 with hand and feet with swelling and pain. She was initially treated with MTX but it gave her nausea which gave her nausea.  She was changed to leflunomide 20 mg 10/2014 from mtx due to nausea on the mtx. Then I added etanercept January 2015 due to incomplete control of her arthritis on leflunomide.  No am stiffness.  She reports mild aching in hands (3/10). Pain is aching.  Reports mild swelling in hands but better than usual.  She reports left upper quadrant pain (4/10) , non-radiating with possible nausea which has been present for months. Sometimes she has sour taste in mouth.       Interval history:  Patient is here for follow up.  She is taking MTX 8 pills once a week .She is taking folic acid 3 mg po qday.  She is having hair thinning.  Reports she has diffuse morning stiffness for a few minutes. She is not taking the celebrex. She has pain and swelling in left foot for a few days.  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   HENT: Negative for congestion, ear discharge, ear pain, facial swelling, mouth sores, sinus pressure, sneezing, sore throat, tinnitus and trouble swallowing.    Eyes: Negative for photophobia, pain, discharge, redness, itching and visual disturbance.   Respiratory: Negative for apnea, chest tightness, shortness of breath, wheezing and stridor.    Cardiovascular: Negative  for leg swelling.   Gastrointestinal: Negative for nausea, abdominal pain, diarrhea, constipation, blood in stool, abdominal distention and anal bleeding.   Endocrine: Negative for cold intolerance and heat intolerance.   Genitourinary: Negative for dysuria and difficulty urinating.   Musculoskeletal: Positive for arthralgias. Negative for myalgias, back pain, joint swelling, gait problem, neck pain and neck stiffness.   Skin: Negative for color change, pallor, rash and wound.   Neurological: Negative for dizziness, seizures, light-headedness and numbness.   Hematological: Negative for adenopathy. Does not bruise/bleed easily.   Psychiatric/Behavioral: Negative for sleep disturbance. The patient is not nervous/anxious.       Objective:        Physical Exam   Constitutional: She is oriented to person, place, and time.   HENT:   Head: Normocephalic and atraumatic.   Right Ear: External ear normal.   Left Ear: External ear normal.   Nose: Nose normal.   Mouth/Throat: Oropharynx is clear and moist. No oropharyngeal exudate.   Eyes: Conjunctivae and EOM are normal. Pupils are equal, round, and reactive to light. Right eye exhibits no discharge. Left eye exhibits no discharge. No scleral icterus.   Neck: Neck supple. No JVD present. No thyromegaly present.   Cardiovascular: Normal rate, regular rhythm, normal heart sounds and intact distal pulses.  Exam reveals no gallop and no friction rub.    No murmur heard.  Pulmonary/Chest: Effort normal and breath sounds normal. No respiratory distress. She has no wheezes. She has no rales. She exhibits no tenderness.   Abdominal: Soft. Bowel sounds are normal. She exhibits no distension and no mass. There is no tenderness. There is no rebound and no guarding.   Lymphadenopathy:     She has no cervical adenopathy.   Neurological: She is alert and oriented to person, place, and time. No cranial nerve deficit. Gait normal. Coordination normal.   Skin:diffuse hypopigmented lesions in  "arms, hands, legs, chest, back   Psychiatric: Affect and judgment normal.   Musculoskeletal: She exhibits no  tenderness. She exhibits no edema.   FROM of all joints including neck, shoulders, spine, ankles, wrists, knees, and ankles; no joint deformities noted or effusions, erythema or warmth; no tophi or nodules noted; no crepitus; no nail pitting or onycholysis          Labs:  Esr-25  ccp-93  rf-36  Flor-negative    Imaging:  MRI (7/22/2015)   (Stable enhancing marginal erosions in the second and third metacarpal heads, lunate, triquetrum, and capitate)        dexa scan:(2017):  Osteopenia of the femoral neck. FRAX calculation does not support treatment for osteoporosis.Total hip and lumbar spine BMD unchanged since 2013.    Recommendations:  1) Adequate calcium and Vitamin D therapy  2) Appropriate exercise  3) Consider repeat BMD in 2- 4 years    Assessment:     60 yo F with PMH of HTN, abnormal pap (2013 but recent negative, no cancer), RA (+CCP, RF),erosive here , H. Pylori for follow up of seropositive RA and sarcoid.   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  She has abnormal brain MRI  AMYLOID ANGIOPATHY and IS SEEING NEURO.  Patient was previously doing well on enbrel but she would prefer not to injections. She hasn't been taking folic acid correctly and has been having hair thinning.  She is still concerned about her hair thinning so will increase folic acid.    She is concerned about nodules in right elbow.  Told her it could be from either the mtx or ra.  Will get right elbow xray for better evaluation.     Plan:    - continue MTX 8 pills  A week  increase folic acid from 3 mg po qday to 4 mg a day  -follow up  EMG  Continue celebrex 200mg po BID PRN  Right elbow xray  rtc in  4 months*    Labs     The patient location is: home  The chief complaint leading to consultation is:joint pain    Visit type: audiovisual    Face " to Face time with patient: 45 minutes  minutes of total time spent on the encounter, which includes face to face time and non-face to face time preparing to see the patient (eg, review of tests), Obtaining and/or reviewing separately obtained history, Documenting clinical information in the electronic or other health record, Independently interpreting results (not separately reported) and communicating results to the patient/family/caregiver, or Care coordination (not separately reported).         Each patient to whom he or she provides medical services by telemedicine is:  (1) informed of the relationship between the physician and patient and the respective role of any other health care provider with respect to management of the patient; and (2) notified that he or she may decline to receive medical services by telemedicine and may withdraw from such care at any time.    Notes:

## 2020-08-05 NOTE — ED PROVIDER NOTES
Encounter Date: 7/25/2018    SCRIBE #1 NOTE: I, Naomi Colon, am scribing for, and in the presence of,  Celina Goldberg PA-C. I have scribed the entire note.       History     Chief Complaint   Patient presents with    Chest Pain     c/o midsternal chest pain that radiates to her back since last night. Had a CT scan about 2 months ago that showed nodules, and is scheduled to follow up with pulmonology in September     Silvia Cervantes is a 57 y.o. 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    The patient presents to the ED due to chest pain. She reports onset of symptoms was about 12 hrs ago. The patient states prior to going to bed she noted pain in the center of the chest. She reports the pain has been intermittent since onset. The patient describes the pain as tearing and non-radiating. She notes the pain worsens with deep breaths. The patient denies any associated leg swelling, shortness of breath, nausea, vomiting, fever or chills.She denies any recent surgery or long periods of travel or prolonged sitting/bedrest. Of note the patient was found to have nodules on CT scan 2 months ago. The patient has an upcoming appointment with Pulmonary in September.  Treatments tried include ingestion of aspirin PTA.        The history is provided by the patient.     Review of patient's allergies indicates:  No Known Allergies  Past Medical History:   Diagnosis Date    Abnormal Pap smear     VAIN 2    DEMENTIA     Dry eyes     Fever blister     History of bronchitis     History of headache     Hypercholesteremia 3/6/2015    Hypertension     Major depression, recurrent, chronic 10/5/2017    Rheumatoid arthritis(714.0)     VAIN II (vaginal  Pt returning Kathleen call back regarding echo results     # [phone]   intraepithelial neoplasia grade II)      Past Surgical History:   Procedure Laterality Date     SECTION      HYSTERECTOMY      TUBAL LIGATION       Family History   Problem Relation Age of Onset    Diabetes Father     Hypertension Father     Cataracts Father     Heart disease Father     Hypertension Mother     Cataracts Mother     Stroke Brother     Hypertension Brother     Melanoma Neg Hx     Breast cancer Neg Hx     Colon cancer Neg Hx     Ovarian cancer Neg Hx     Blindness Neg Hx     Glaucoma Neg Hx      Social History   Substance Use Topics    Smoking status: Never Smoker    Smokeless tobacco: Never Used    Alcohol use Yes      Comment: Rarely     Review of Systems   Constitutional: Negative for chills, diaphoresis and fever.   Respiratory: Negative for cough and shortness of breath.    Cardiovascular: Positive for chest pain. Negative for leg swelling.   Gastrointestinal: Negative for nausea and vomiting.   Musculoskeletal: Negative for back pain.   Skin: Negative for color change.   Psychiatric/Behavioral: Negative for agitation.   All other systems reviewed and are negative.      Physical Exam     Initial Vitals [18 0827]   BP Pulse Resp Temp SpO2   (!) 159/96 109 20 98.9 °F (37.2 °C) 97 %      MAP       --         Physical Exam    Nursing note and vitals reviewed.  Constitutional: She appears well-developed and well-nourished. She is not diaphoretic. No distress.   HENT:   Head: Normocephalic and atraumatic.   Mouth/Throat: Oropharynx is clear and moist.   Eyes: Conjunctivae and EOM are normal.   Neck: Normal range of motion. Neck supple.   Cardiovascular: Regular rhythm and normal heart sounds. Tachycardia present.    Pulmonary/Chest: Effort normal and breath sounds normal. No respiratory distress. She has no wheezes. She has no rhonchi. She has no rales. She exhibits no tenderness and no bony tenderness.   Abdominal: Soft. Bowel sounds are normal. There is no tenderness.  There is no rebound and no guarding.   Musculoskeletal: Normal range of motion. She exhibits no edema or tenderness.   Lymphadenopathy:     She has no cervical adenopathy.   Neurological: She is alert and oriented to person, place, and time. She has normal strength.   Skin: Skin is warm and dry. No rash noted.         ED Course   Procedures  Labs Reviewed   CBC W/ AUTO DIFFERENTIAL - Abnormal; Notable for the following:        Result Value    MCH 31.7 (*)     Lymph% 17.6 (*)     All other components within normal limits   D DIMER, QUANTITATIVE - Abnormal; Notable for the following:     D-Dimer 0.67 (*)     All other components within normal limits   AFB CULTURE & SMEAR   COMPREHENSIVE METABOLIC PANEL   TROPONIN I   B-TYPE NATRIURETIC PEPTIDE   TROPONIN I     EKG Readings: (Independently Interpreted)   Sinus tachycardia with a rate of 104. No ST or T wave changes. No STEMI       Imaging Results          CTA Chest Non-Coronary (PE Study) (Final result)     Abnormal  Result time 07/25/18 11:29:08    Final result by Diana Morataya MD (07/25/18 11:29:08)                 Impression:      There is no evidence pulmonary embolus.  There is a continued micronodular infiltrative pattern seen throughout the pulmonary parenchyma in addition to mediastinal lymphadenopathy.  These findings are similar to prior exam and may be seen with infectious or neoplastic processes, it should be noted that to brachial oasis is in this differential.    This report was flagged in Epic as abnormal.      Electronically signed by: Diana Morataya MD  Date:    07/25/2018  Time:    11:29             Narrative:    EXAMINATION:  CTA CHEST NON CORONARY    CLINICAL HISTORY:  elevated d-dimer;    TECHNIQUE:  Low dose axial images, sagittal and coronal reformations were obtained from the thoracic inlet to the lung bases following the IV administration of 100 mL of Omnipaque 350.  Contrast timing was optimized to evaluate the pulmonary arteries.  MIP  images were performed.    COMPARISON:  None    FINDINGS:  There is no evidence aneurysmal dilatation or dissection of the thoracic aorta.    There are no pulmonary arterial filling defects.    There are innumerable pulmonary nodules similar to prior exam in predominantly a micro nodular configuration.    There is right hilar and mediastinal lymphadenopathy similar to prior exam.    There is no evidence pericardial effusion.  Visualized portions of the superior abdomen are unremarkable.    There are degenerative changes of the spine.                               X-Ray Chest AP Portable (Final result)     Abnormal  Result time 07/25/18 09:47:28    Final result by Diana Morataya MD (07/25/18 09:47:28)                 Impression:      Again seen is the micronodular pattern throughout the pulmonary parenchyma which may be seen with neoplastic or infectious processes.  Tuberculosis is in the differential for this process.    This report was flagged in Epic as abnormal.      Electronically signed by: Diana Morataya MD  Date:    07/25/2018  Time:    09:47             Narrative:    EXAMINATION:  XR CHEST AP PORTABLE    CLINICAL HISTORY:  Chest pain, unspecified    TECHNIQUE:  Single frontal view of the chest was performed.    COMPARISON:  Ct 6/21/18    FINDINGS:  There is a micronodular pattern seen throughout CT from 2018.  There are no new regions of consolidation.                                 Medical Decision Making:   Initial Assessment:   Chest pain, worse with inpiration, tachycardia   Differential Diagnosis:   PE, ACS, arrythmia, chostocondritis   Clinical Tests:   Lab Tests: Ordered and Reviewed  The following lab test(s) were unremarkable: CBC, CMP and Troponin  Radiological Study: Ordered and Reviewed  ED Management:  Patient presents to ED for evaluation of mid-sternal CP that started last night.  Mild tachycardia present, otherwise benign exam.  CBC, CMP, troponin, BNP show no acute abnormalities.  CXR  shows micronodular pattern throughout the pulmonary parenchyma which may be seen with neoplastic or infectious processes.  Tuberculosis is in the differential for this process.   Increased D-Dimer.  CTA shows no evidence pulmonary embolus.  There is a continued micronodular infiltrative pattern seen throughout the pulmonary parenchyma in addition to mediastinal lymphadenopathy.  These findings are similar to prior exam and may be seen with infectious or neoplastic processes,    1:22 PM - Dr. Dsouza was consulted and feels that the patient needs to be placed on contact precautions and admitted for bronchoscopy and rule out tuberculosis.  Dr. Choi was consulted and accepted the patient.                      Clinical Impression:     1. Pneumonitis    2. Chest pain           Scribe attestation                    Celina Goldberg PA-C  07/25/18 [id number]

## 2020-08-06 ENCOUNTER — HOSPITAL ENCOUNTER (OUTPATIENT)
Dept: RADIOLOGY | Facility: HOSPITAL | Age: 60
Discharge: HOME | End: 2020-08-06
Attending: INTERNAL MEDICINE
Payer: MEDICARE

## 2020-08-06 DIAGNOSIS — M06.321 RHEUMATOID NODULE OF RIGHT ELBOW: ICD-10-CM

## 2020-08-06 PROCEDURE — 73070 X-RAY EXAM OF ELBOW: CPT | Mod: TC,FY,PO,RT

## 2020-08-06 PROCEDURE — 73070 XR ELBOW 2 VIEWS RIGHT: ICD-10-PCS | Mod: 26,RT,, | Performed by: RADIOLOGY

## 2020-08-06 PROCEDURE — 73070 X-RAY EXAM OF ELBOW: CPT | Mod: 26,RT,, | Performed by: RADIOLOGY

## 2020-08-11 ENCOUNTER — OFFICE VISIT (OUTPATIENT)
Dept: FAMILY MEDICINE | Facility: CLINIC | Age: 60
End: 2020-08-11
Attending: FAMILY MEDICINE
Payer: MEDICARE

## 2020-08-11 VITALS
OXYGEN SATURATION: 98 % | WEIGHT: 138 LBS | DIASTOLIC BLOOD PRESSURE: 82 MMHG | BODY MASS INDEX: 26.06 KG/M2 | HEIGHT: 61 IN | SYSTOLIC BLOOD PRESSURE: 128 MMHG | HEART RATE: 87 BPM | TEMPERATURE: 99 F

## 2020-08-11 DIAGNOSIS — E78.2 MIXED HYPERLIPIDEMIA: ICD-10-CM

## 2020-08-11 DIAGNOSIS — M06.9 RHEUMATOID ARTHRITIS INVOLVING BOTH HANDS, UNSPECIFIED RHEUMATOID FACTOR PRESENCE: ICD-10-CM

## 2020-08-11 DIAGNOSIS — F33.9 MAJOR DEPRESSION, RECURRENT, CHRONIC: ICD-10-CM

## 2020-08-11 DIAGNOSIS — I10 ESSENTIAL HYPERTENSION: Primary | ICD-10-CM

## 2020-08-11 DIAGNOSIS — L80 VITILIGO: ICD-10-CM

## 2020-08-11 DIAGNOSIS — Z12.31 ENCOUNTER FOR SCREENING MAMMOGRAM FOR BREAST CANCER: ICD-10-CM

## 2020-08-11 PROCEDURE — 99999 PR PBB SHADOW E&M-EST. PATIENT-LVL V: ICD-10-PCS | Mod: PBBFAC,,, | Performed by: FAMILY MEDICINE

## 2020-08-11 PROCEDURE — 3008F BODY MASS INDEX DOCD: CPT | Mod: CPTII,S$GLB,, | Performed by: FAMILY MEDICINE

## 2020-08-11 PROCEDURE — 99214 PR OFFICE/OUTPT VISIT, EST, LEVL IV, 30-39 MIN: ICD-10-PCS | Mod: S$GLB,,, | Performed by: FAMILY MEDICINE

## 2020-08-11 PROCEDURE — 3079F DIAST BP 80-89 MM HG: CPT | Mod: CPTII,S$GLB,, | Performed by: FAMILY MEDICINE

## 2020-08-11 PROCEDURE — 99214 OFFICE O/P EST MOD 30 MIN: CPT | Mod: S$GLB,,, | Performed by: FAMILY MEDICINE

## 2020-08-11 PROCEDURE — 99999 PR PBB SHADOW E&M-EST. PATIENT-LVL V: CPT | Mod: PBBFAC,,, | Performed by: FAMILY MEDICINE

## 2020-08-11 PROCEDURE — 3074F SYST BP LT 130 MM HG: CPT | Mod: CPTII,S$GLB,, | Performed by: FAMILY MEDICINE

## 2020-08-11 PROCEDURE — 3008F PR BODY MASS INDEX (BMI) DOCUMENTED: ICD-10-PCS | Mod: CPTII,S$GLB,, | Performed by: FAMILY MEDICINE

## 2020-08-11 PROCEDURE — 3074F PR MOST RECENT SYSTOLIC BLOOD PRESSURE < 130 MM HG: ICD-10-PCS | Mod: CPTII,S$GLB,, | Performed by: FAMILY MEDICINE

## 2020-08-11 PROCEDURE — 3079F PR MOST RECENT DIASTOLIC BLOOD PRESSURE 80-89 MM HG: ICD-10-PCS | Mod: CPTII,S$GLB,, | Performed by: FAMILY MEDICINE

## 2020-08-11 RX ORDER — AMLODIPINE BESYLATE 5 MG/1
5 TABLET ORAL DAILY
Qty: 90 TABLET | Refills: 3 | Status: SHIPPED | OUTPATIENT
Start: 2020-08-11 | End: 2021-02-25 | Stop reason: SDUPTHER

## 2020-08-11 NOTE — PROGRESS NOTES
Subjective:       Patient ID: Silvia Cervantes is a 59 y.o. female.    Chief Complaint: Follow-up    59 yr old pleasant  female with depression, allergies, vitiligo, HTN, HLD, Rheumatoid arthritis, Erosive gastritis, and other co morbidities presents today for her lab review and 3 month follow up. Worsening left leg numbness and would like new neurology.          Chronic low back pain. Onset 6 months ago and gradually worsening - left lower back- does not radiate and no neurological symptoms. No saddle anesthesia or bladder/bowel problems. Details as follows -      HTN - controlled - on Losartan-HCT and Norvasc 5 - compliant - no side effects       Major depression - uncontrolled - not on meds - would like to start one - given zoloft - she will start from today - - no SI/HI    Vertigo - much better - on vertin as needed       HLD - diet controlled -  LDLCALC                  106.6               02/04/2019                   RA - on Enbrel shots - follows Rheumatology - stable      Gastritis - stable - on PPI as needed - no side effects      History as below - reviewed       HM  -labs UTD  -vaccines UTD    Follow-up  Associated symptoms include myalgias, neck pain, numbness and weakness. Pertinent negatives include no arthralgias, chest pain, congestion, diaphoresis, headaches, nausea or sore throat.   Hypertension  This is a chronic problem. The current episode started more than 1 year ago. The problem has been gradually improving since onset. The problem is controlled. Associated symptoms include neck pain. Pertinent negatives include no chest pain, headaches, malaise/fatigue, palpitations or shortness of breath. There are no associated agents to hypertension. Risk factors for coronary artery disease include dyslipidemia and post-menopausal state. Past treatments include angiotensin blockers and diuretics. The current treatment provides significant improvement. There are no compliance problems.  There is no  history of angina, CAD/MI, CVA, left ventricular hypertrophy, PVD or retinopathy. There is no history of chronic renal disease, coarctation of the aorta, hypercortisolism, hyperparathyroidism, a hypertension causing med or sleep apnea.   Hyperlipidemia  This is a chronic problem. The current episode started more than 1 year ago. The problem is controlled. Recent lipid tests were reviewed and are normal. She has no history of chronic renal disease, diabetes, hypothyroidism or liver disease. There are no known factors aggravating her hyperlipidemia. Associated symptoms include myalgias. Pertinent negatives include no chest pain, focal sensory loss, focal weakness, leg pain or shortness of breath. She is currently on no antihyperlipidemic treatment. The current treatment provides mild improvement of lipids. There are no compliance problems.  Risk factors for coronary artery disease include dyslipidemia, hypertension and post-menopausal.   Back Pain  This is a chronic problem. The current episode started more than 1 month ago. The problem occurs constantly. The problem is unchanged. The pain is present in the sacro-iliac and lumbar spine. The quality of the pain is described as aching. The pain does not radiate. The symptoms are aggravated by bending, sitting and twisting. Associated symptoms include numbness and weakness. Pertinent negatives include no chest pain, dysuria, headaches, leg pain, perianal numbness or weight loss. She has tried nothing for the symptoms. The treatment provided no relief.     Review of Systems   Constitutional: Negative.  Negative for activity change, diaphoresis, malaise/fatigue, unexpected weight change and weight loss.   HENT: Negative.  Negative for nasal congestion, ear discharge, hearing loss, rhinorrhea, sore throat and voice change.    Eyes: Negative.  Negative for pain, discharge and visual disturbance.   Respiratory: Negative.  Negative for chest tightness, shortness of breath and  wheezing.    Cardiovascular: Negative.  Negative for chest pain and palpitations.   Gastrointestinal: Negative.  Negative for abdominal distention, anal bleeding, constipation and nausea.   Endocrine: Negative.  Negative for cold intolerance, polydipsia and polyuria.   Genitourinary: Negative.  Negative for decreased urine volume, difficulty urinating, dysuria, frequency, menstrual problem and vaginal pain.   Musculoskeletal: Positive for back pain, myalgias and neck pain. Negative for arthralgias, gait problem and leg pain.   Integumentary:  Negative for color change, pallor and wound. Negative.   Allergic/Immunologic: Negative.  Negative for environmental allergies and immunocompromised state.   Neurological: Positive for weakness and numbness. Negative for dizziness, tremors, focal weakness, seizures, speech difficulty and headaches.   Hematological: Negative.  Negative for adenopathy. Does not bruise/bleed easily.   Psychiatric/Behavioral: Negative.  Negative for agitation, confusion, decreased concentration, hallucinations, self-injury and suicidal ideas. The patient is not nervous/anxious.          PMH/PSH/FH/SH/MED/ALLERGY reviewed    Objective:       Vitals:    08/11/20 0819   BP: 128/82   Pulse: 87   Temp: 98.5 °F (36.9 °C)       Physical Exam  Constitutional:       General: She is not in acute distress.     Appearance: She is well-developed. She is not diaphoretic.   HENT:      Head: Normocephalic and atraumatic.      Right Ear: External ear normal.      Left Ear: External ear normal.      Nose: Nose normal.      Mouth/Throat:      Pharynx: No oropharyngeal exudate.   Eyes:      General: No scleral icterus.        Right eye: No discharge.         Left eye: No discharge.      Conjunctiva/sclera: Conjunctivae normal.      Pupils: Pupils are equal, round, and reactive to light.   Neck:      Musculoskeletal: Normal range of motion and neck supple.      Thyroid: No thyromegaly.      Vascular: No JVD.       Trachea: No tracheal deviation.   Cardiovascular:      Rate and Rhythm: Normal rate and regular rhythm.      Heart sounds: Normal heart sounds. No murmur. No friction rub. No gallop.    Pulmonary:      Effort: Pulmonary effort is normal.      Breath sounds: Normal breath sounds. No stridor. No wheezing or rales.   Chest:      Chest wall: No tenderness.   Abdominal:      General: Bowel sounds are normal. There is no distension.      Palpations: Abdomen is soft. There is no mass.      Tenderness: There is no abdominal tenderness. There is no guarding or rebound.      Hernia: No hernia is present.   Musculoskeletal: Normal range of motion.         General: No tenderness.   Lymphadenopathy:      Cervical: No cervical adenopathy.   Skin:     General: Skin is warm and dry.      Coloration: Skin is not pale.      Findings: No erythema or rash.   Neurological:      Mental Status: She is alert and oriented to person, place, and time.      Cranial Nerves: No cranial nerve deficit.      Motor: No abnormal muscle tone.      Coordination: Coordination normal.      Deep Tendon Reflexes: Reflexes are normal and symmetric. Reflexes normal.   Psychiatric:         Behavior: Behavior normal.         Thought Content: Thought content normal.         Judgment: Judgment normal.         Assessment:       1. Essential hypertension    2. Major depression, recurrent, chronic    3. Mixed hyperlipidemia    4. Rheumatoid arthritis involving both hands, unspecified rheumatoid factor presence    5. Vitiligo    6. Encounter for screening mammogram for breast cancer        Plan:           Silvia was seen today for follow-up.    Diagnoses and all orders for this visit:    Essential hypertension  -     amLODIPine (NORVASC) 5 MG tablet; Take 1 tablet (5 mg total) by mouth once daily.    Major depression, recurrent, chronic    Mixed hyperlipidemia    Rheumatoid arthritis involving both hands, unspecified rheumatoid factor  presence    Vitiligo    Encounter for screening mammogram for breast cancer  -     Mammo Digital Screening Bilat w/ [facility]; Future    HLD  -diet control  -start statin      HTN  -controlled    RA  -stable  -follow rheumatology    Vertigo  -meclizine prn    Depression  -controlled  -continue zoloft 25 mg daily  -SI/ER precautions given      Spent adequate time in obtaining history and explaining differentials      25 minutes spent during this visit of which greater than 50% devoted to face-face counseling and coordination of care regarding diagnosis and management plan    Follow up in about 6 months (around 2/11/2021), or if symptoms worsen or fail to improve.

## 2020-08-12 ENCOUNTER — CLINICAL SUPPORT (OUTPATIENT)
Dept: DERMATOLOGY | Facility: CLINIC | Age: 60
End: 2020-08-12
Payer: MEDICARE

## 2020-08-12 PROCEDURE — 96900 ACTINOTHERAPY UV LIGHT: CPT | Mod: S$GLB,,, | Performed by: DERMATOLOGY

## 2020-08-12 PROCEDURE — 96900 PR ULTRAVIOLET LIGHT THERAPY: ICD-10-PCS | Mod: S$GLB,,, | Performed by: DERMATOLOGY

## 2020-08-12 NOTE — PROGRESS NOTES
Light tx 2 for vitiligo. NB-UVB to body at 250 mj with eyes shielded. Denied any erythema with last dose.

## 2020-08-13 ENCOUNTER — OFFICE VISIT (OUTPATIENT)
Dept: PODIATRY | Facility: CLINIC | Age: 60
End: 2020-08-13
Payer: MEDICARE

## 2020-08-13 VITALS
HEIGHT: 61 IN | WEIGHT: 138 LBS | SYSTOLIC BLOOD PRESSURE: 144 MMHG | DIASTOLIC BLOOD PRESSURE: 92 MMHG | HEART RATE: 96 BPM | BODY MASS INDEX: 26.06 KG/M2

## 2020-08-13 DIAGNOSIS — B35.1 ONYCHOMYCOSIS: Primary | ICD-10-CM

## 2020-08-13 PROCEDURE — 3080F DIAST BP >= 90 MM HG: CPT | Mod: CPTII,S$GLB,, | Performed by: PODIATRIST

## 2020-08-13 PROCEDURE — 88302 TISSUE EXAM BY PATHOLOGIST: CPT | Performed by: PATHOLOGY

## 2020-08-13 PROCEDURE — 3008F PR BODY MASS INDEX (BMI) DOCUMENTED: ICD-10-PCS | Mod: CPTII,S$GLB,, | Performed by: PODIATRIST

## 2020-08-13 PROCEDURE — 87102 FUNGUS ISOLATION CULTURE: CPT

## 2020-08-13 PROCEDURE — 88302 PR  SURG PATH,LEVEL II: ICD-10-PCS | Mod: 26,,, | Performed by: PATHOLOGY

## 2020-08-13 PROCEDURE — 88312 SPECIAL STAINS GROUP 1: CPT | Mod: 26,,, | Performed by: PATHOLOGY

## 2020-08-13 PROCEDURE — 99999 PR PBB SHADOW E&M-EST. PATIENT-LVL V: ICD-10-PCS | Mod: PBBFAC,,, | Performed by: PODIATRIST

## 2020-08-13 PROCEDURE — 99999 PR PBB SHADOW E&M-EST. PATIENT-LVL V: CPT | Mod: PBBFAC,,, | Performed by: PODIATRIST

## 2020-08-13 PROCEDURE — 88312 SPECIAL STAINS GROUP 1: CPT | Performed by: PATHOLOGY

## 2020-08-13 PROCEDURE — 99213 PR OFFICE/OUTPT VISIT, EST, LEVL III, 20-29 MIN: ICD-10-PCS | Mod: S$GLB,,, | Performed by: PODIATRIST

## 2020-08-13 PROCEDURE — 87107 FUNGI IDENTIFICATION MOLD: CPT

## 2020-08-13 PROCEDURE — 3077F PR MOST RECENT SYSTOLIC BLOOD PRESSURE >= 140 MM HG: ICD-10-PCS | Mod: CPTII,S$GLB,, | Performed by: PODIATRIST

## 2020-08-13 PROCEDURE — 3008F BODY MASS INDEX DOCD: CPT | Mod: CPTII,S$GLB,, | Performed by: PODIATRIST

## 2020-08-13 PROCEDURE — 88312 PR  SPECIAL STAINS,GROUP I: ICD-10-PCS | Mod: 26,,, | Performed by: PATHOLOGY

## 2020-08-13 PROCEDURE — 3080F PR MOST RECENT DIASTOLIC BLOOD PRESSURE >= 90 MM HG: ICD-10-PCS | Mod: CPTII,S$GLB,, | Performed by: PODIATRIST

## 2020-08-13 PROCEDURE — 99213 OFFICE O/P EST LOW 20 MIN: CPT | Mod: S$GLB,,, | Performed by: PODIATRIST

## 2020-08-13 PROCEDURE — 88302 TISSUE EXAM BY PATHOLOGIST: CPT | Mod: 26,,, | Performed by: PATHOLOGY

## 2020-08-13 PROCEDURE — 3077F SYST BP >= 140 MM HG: CPT | Mod: CPTII,S$GLB,, | Performed by: PODIATRIST
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
## 2020-08-14 ENCOUNTER — CLINICAL SUPPORT (OUTPATIENT)
Dept: DERMATOLOGY | Facility: CLINIC | Age: 60
End: 2020-08-14
Payer: MEDICARE

## 2020-08-14 PROCEDURE — 99999 PR PBB SHADOW E&M-EST. PATIENT-LVL III: ICD-10-PCS | Mod: PBBFAC,,,

## 2020-08-14 PROCEDURE — 99999 PR PBB SHADOW E&M-EST. PATIENT-LVL III: CPT | Mod: PBBFAC,,,

## 2020-08-14 PROCEDURE — 96900 PR ULTRAVIOLET LIGHT THERAPY: ICD-10-PCS | Mod: S$GLB,,, | Performed by: DERMATOLOGY

## 2020-08-14 PROCEDURE — 96900 ACTINOTHERAPY UV LIGHT: CPT | Mod: S$GLB,,, | Performed by: DERMATOLOGY

## 2020-08-18 ENCOUNTER — CLINICAL SUPPORT (OUTPATIENT)
Dept: DERMATOLOGY | Facility: CLINIC | Age: 60
End: 2020-08-18
Payer: MEDICARE

## 2020-08-18 DIAGNOSIS — L80 VITILIGO: ICD-10-CM

## 2020-08-18 PROCEDURE — 99999 PR PBB SHADOW E&M-EST. PATIENT-LVL III: ICD-10-PCS | Mod: PBBFAC,,,

## 2020-08-18 PROCEDURE — 96900 ACTINOTHERAPY UV LIGHT: CPT | Mod: S$GLB,,, | Performed by: DERMATOLOGY

## 2020-08-18 PROCEDURE — 99999 PR PBB SHADOW E&M-EST. PATIENT-LVL III: CPT | Mod: PBBFAC,,,

## 2020-08-18 PROCEDURE — 96900 PR ULTRAVIOLET LIGHT THERAPY: ICD-10-PCS | Mod: S$GLB,,, | Performed by: DERMATOLOGY

## 2020-08-19 ENCOUNTER — HOSPITAL ENCOUNTER (OUTPATIENT)
Dept: RADIOLOGY | Facility: HOSPITAL | Age: 60
Discharge: HOME | End: 2020-08-19
Attending: FAMILY MEDICINE
Payer: MEDICARE

## 2020-08-19 DIAGNOSIS — Z12.31 ENCOUNTER FOR SCREENING MAMMOGRAM FOR BREAST CANCER: ICD-10-CM

## 2020-08-19 PROCEDURE — 77063 BREAST TOMOSYNTHESIS BI: CPT | Mod: 26,,, | Performed by: RADIOLOGY

## 2020-08-19 PROCEDURE — 77067 SCR MAMMO BI INCL CAD: CPT | Mod: 26,,, | Performed by: RADIOLOGY

## 2020-08-19 PROCEDURE — 77067 SCR MAMMO BI INCL CAD: CPT | Mod: TC

## 2020-08-19 PROCEDURE — 77063 MAMMO DIGITAL SCREENING BILAT WITH TOMOSYNTHESIS_CAD: ICD-10-PCS | Mod: 26,,, | Performed by: RADIOLOGY

## 2020-08-19 PROCEDURE — 77067 MAMMO DIGITAL SCREENING BILAT WITH TOMOSYNTHESIS_CAD: ICD-10-PCS | Mod: 26,,, | Performed by: RADIOLOGY

## 2020-08-20 ENCOUNTER — TELEPHONE (OUTPATIENT)
Dept: NEUROLOGY | Facility: CLINIC | Age: 60
End: 2020-08-20

## 2020-08-20 NOTE — TELEPHONE ENCOUNTER
----- Message from Mira Hernandez sent at 8/20/2020  9:53 AM CDT -----  Contact: [phone]  Pt is returning the Office call.    Please call and advise

## 2020-08-22 LAB
FINAL PATHOLOGIC DIAGNOSIS: NORMAL
GROSS: NORMAL

## 2020-08-24 ENCOUNTER — OFFICE VISIT (OUTPATIENT)
Dept: NEUROLOGY | Facility: CLINIC | Age: 60
End: 2020-08-24
Attending: FAMILY MEDICINE
Payer: MEDICARE

## 2020-08-24 DIAGNOSIS — M54.9 DORSALGIA, UNSPECIFIED: ICD-10-CM

## 2020-08-24 DIAGNOSIS — R20.0 NUMBNESS AND TINGLING OF LEFT LEG: ICD-10-CM

## 2020-08-24 DIAGNOSIS — R20.2 NUMBNESS AND TINGLING OF LEFT LEG: ICD-10-CM

## 2020-08-24 DIAGNOSIS — M54.12 RADICULOPATHY, CERVICAL REGION: ICD-10-CM

## 2020-08-24 PROCEDURE — 99214 PR OFFICE/OUTPT VISIT, EST, LEVL IV, 30-39 MIN: ICD-10-PCS | Mod: 95,,, | Performed by: PSYCHIATRY & NEUROLOGY

## 2020-08-24 PROCEDURE — 99214 OFFICE O/P EST MOD 30 MIN: CPT | Mod: 95,,, | Performed by: PSYCHIATRY & NEUROLOGY

## 2020-08-24 NOTE — LETTER
August 24, 2020      Adrián Cade MD  [address]  [address]  [facility] [address]           [facility]  [address]  [place] [address]  Phone: [phone]  Fax: [phone]          Patient: Silvia Cervantes   MR Number: [id number]   YOB: 1960   Date of Visit: 8/24/2020       Dear Dr. Adrián Cade:    Thank you for referring Silvia Cervantes to me for evaluation. Attached you will find relevant portions of my assessment and plan of care.    If you have questions, please do not hesitate to call me. I look forward to following Silvia Cervantes along with you.    Sincerely,    Ulysses Burkett MD    Enclosure  CC:  No Recipients    If you would like to receive this communication electronically, please contact [email or web address] or [phone] to request more information on [employer] access.    For providers and/or their staff who would like to refer a patient to [facility], please contact us through our one-stop-shop provider referral line, [facility], at [phone].    If you feel you have received this communication in error or would no longer like to receive these types of communications, please e-mail [email or web address]

## 2020-08-25 ENCOUNTER — CLINICAL SUPPORT (OUTPATIENT)
Dept: DERMATOLOGY | Facility: CLINIC | Age: 60
End: 2020-08-25
Payer: MEDICARE

## 2020-08-25 DIAGNOSIS — L80 VITILIGO: ICD-10-CM

## 2020-08-25 PROCEDURE — 96900 PR ULTRAVIOLET LIGHT THERAPY: ICD-10-PCS | Mod: S$GLB,,, | Performed by: DERMATOLOGY

## 2020-08-25 PROCEDURE — 96900 ACTINOTHERAPY UV LIGHT: CPT | Mod: S$GLB,,, | Performed by: DERMATOLOGY

## 2020-08-25 PROCEDURE — 99999 PR PBB SHADOW E&M-EST. PATIENT-LVL III: CPT | Mod: PBBFAC,,,

## 2020-08-25 PROCEDURE — 99999 PR PBB SHADOW E&M-EST. PATIENT-LVL III: ICD-10-PCS | Mod: PBBFAC,,,
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
## 2020-08-31 ENCOUNTER — CLINICAL SUPPORT (OUTPATIENT)
Dept: REHABILITATION | Facility: HOSPITAL | Age: 60
End: 2020-08-31
Attending: PSYCHIATRY & NEUROLOGY
Payer: MEDICARE

## 2020-08-31 ENCOUNTER — TELEPHONE (OUTPATIENT)
Dept: NEUROLOGY | Facility: CLINIC | Age: 60
End: 2020-08-31

## 2020-08-31 DIAGNOSIS — M54.9 DORSALGIA, UNSPECIFIED: ICD-10-CM

## 2020-08-31 DIAGNOSIS — M54.12 RADICULOPATHY, CERVICAL REGION: ICD-10-CM

## 2020-08-31 DIAGNOSIS — R26.89 IMPAIRMENT OF BALANCE: ICD-10-CM

## 2020-08-31 DIAGNOSIS — H81.8X9 DEFICIT OF VESTIBULO-OCULAR REFLEX: ICD-10-CM

## 2020-08-31 DIAGNOSIS — R20.0 NUMBNESS AND TINGLING OF LEFT LEG: ICD-10-CM

## 2020-08-31 DIAGNOSIS — R42 DIZZINESS: ICD-10-CM

## 2020-08-31 DIAGNOSIS — H55.81 SACCADIC EYE MOVEMENT DEFICIENCY: ICD-10-CM

## 2020-08-31 DIAGNOSIS — R20.2 NUMBNESS AND TINGLING OF LEFT LEG: ICD-10-CM

## 2020-08-31 PROCEDURE — 97162 PT EVAL MOD COMPLEX 30 MIN: CPT | Mod: PO
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
## 2020-08-31 NOTE — TELEPHONE ENCOUNTER
I called and left a message to get the patient scheduled for the MRI of the cervical and lumber spine . I had to leave a message , I also left the number for the diagnostic center.

## 2020-09-01 RX ORDER — LORAZEPAM 1 MG/1
TABLET ORAL
Qty: 2 TABLET | Refills: 0 | Status: SHIPPED | OUTPATIENT
Start: 2020-09-01 | End: 2021-04-20

## 2020-09-02 ENCOUNTER — CLINICAL SUPPORT (OUTPATIENT)
Dept: DERMATOLOGY | Facility: CLINIC | Age: 60
End: 2020-09-02
Payer: MEDICARE

## 2020-09-02 ENCOUNTER — IMMUNIZATION (OUTPATIENT)
Dept: PHARMACY | Facility: CLINIC | Age: 60
End: 2020-09-02
Payer: MEDICARE

## 2020-09-02 DIAGNOSIS — L80 VITILIGO: ICD-10-CM

## 2020-09-02 PROCEDURE — 96900 ACTINOTHERAPY UV LIGHT: CPT | Mod: S$GLB,,, | Performed by: DERMATOLOGY

## 2020-09-02 PROCEDURE — 96900 PR ULTRAVIOLET LIGHT THERAPY: ICD-10-PCS | Mod: S$GLB,,, | Performed by: DERMATOLOGY

## 2020-09-08 ENCOUNTER — CLINICAL SUPPORT (OUTPATIENT)
Dept: DERMATOLOGY | Facility: CLINIC | Age: 60
End: 2020-09-08
Payer: MEDICARE

## 2020-09-08 DIAGNOSIS — L80 VITILIGO: ICD-10-CM

## 2020-09-08 PROCEDURE — 96900 PR ULTRAVIOLET LIGHT THERAPY: ICD-10-PCS | Mod: S$GLB,,, | Performed by: DERMATOLOGY

## 2020-09-08 PROCEDURE — 96900 ACTINOTHERAPY UV LIGHT: CPT | Mod: S$GLB,,, | Performed by: DERMATOLOGY

## 2020-09-11 ENCOUNTER — CLINICAL SUPPORT (OUTPATIENT)
Dept: REHABILITATION | Facility: HOSPITAL | Age: 60
End: 2020-09-11
Attending: PSYCHIATRY & NEUROLOGY
Payer: MEDICARE

## 2020-09-11 ENCOUNTER — CLINICAL SUPPORT (OUTPATIENT)
Dept: DERMATOLOGY | Facility: CLINIC | Age: 60
End: 2020-09-11
Payer: MEDICARE

## 2020-09-11 DIAGNOSIS — L80 VITILIGO: ICD-10-CM

## 2020-09-11 DIAGNOSIS — H81.8X9 DEFICIT OF VESTIBULO-OCULAR REFLEX: ICD-10-CM

## 2020-09-11 DIAGNOSIS — R42 DIZZINESS: ICD-10-CM

## 2020-09-11 DIAGNOSIS — H55.81 SACCADIC EYE MOVEMENT DEFICIENCY: ICD-10-CM

## 2020-09-11 DIAGNOSIS — R26.89 IMPAIRMENT OF BALANCE: ICD-10-CM

## 2020-09-11 PROCEDURE — 97112 NEUROMUSCULAR REEDUCATION: CPT | Mod: PO

## 2020-09-11 PROCEDURE — 96900 PR ULTRAVIOLET LIGHT THERAPY: ICD-10-PCS | Mod: S$GLB,,, | Performed by: DERMATOLOGY

## 2020-09-11 PROCEDURE — 96900 ACTINOTHERAPY UV LIGHT: CPT | Mod: S$GLB,,, | Performed by: DERMATOLOGY

## 2020-09-11 PROCEDURE — 95992 CANALITH REPOSITIONING PROC: CPT | Mod: PO

## 2020-09-11 NOTE — PROGRESS NOTES
Physical Therapy Treatment Note     Name: Silvia Cervantes  Clinic Number: [id number]    Therapy Diagnosis:   Encounter Diagnoses   Name Primary?    Dizziness     Impairment of balance     Saccadic eye movement deficiency     Deficit of vestibulo-ocular reflex      Physician: Ulysses Burkett MD    Visit Date: 9/11/2020    Physician Orders: PT Eval and Treat   Medical Diagnosis from Referral:   M54.9 (ICD-10-CM) - Dorsalgia, unspecified   M54.12 (ICD-10-CM) - Radiculopathy, cervical region   R20.0,R20.2 (ICD-10-CM) - Numbness and tingling of left leg      Evaluation Date: 8/31/2020  Authorization Period Expiration: 09/11/20 to 09/25/20  Plan of Care Expiration: 10/30/20  Visit # / Visits authorized: 01/ 06 (total visits)     Time In: 11:30  Time Out: 12:15  Total Billable Time: 45 minutes     Precautions: Standard, h/o emotional deficits    Subjective     Pt reports: that her dizziness is OK but she is still having dizziness mainly when lying down.   No HEP provided at this time.   Response to previous treatment: no adverse effects  Functional change: ongoing    Pain: 0/10  Location: NA    Objective     Silvia received canalith repositioning procedures to assess for BPPV for 20 minutes including:    POSITIONAL CANAL TESTING  Looking for nystagmus (slow phase followed by quick phase to the affected side for BPPV)    Thomas Hallpike (posterior / CL anterior)-- head fully supported on mat   Right : (+) vertigo, (+) nystagmus lasting ~10 seconds   Left: (-) vertigo, (-) nystagmus  Horizontal Canals   Right: NT this date due to significant anxious behavior   Left: NT this date due to significant anxious behavior  Treatment Performed: unable to perform due to significant anxious response from patient in testing positions    Silvia participated in neuromuscular re-education activities to improve: Balance, Coordination and Sense for 25 minutes. The following activities were included:    Functional Gait Assessment:   1. Gait on  "level surface =  3  2. Change in Gait Speed = 2  3. Gait with horizontal head turns  = 1, dizziness  4. Gait with vertical head turns = 2, dizziness  5. Gait with pivot turns = 2  6. Step over obstacle = 1  7. Gait with Narrow CLIFFORD = 1  8. Gait with eyes closed = 2  9. Ambulating Backwards = 2  10. Steps = 1    Score 17/30     Saccades: horizontal, 3 x 30", no dizziness, decreased speed; seated    3 x 30" static stance on foam pad with vision eliminated, CGA, no UE support      Home Exercises Provided and Patient Education Provided     Education provided:   - POC  -Extensive education provided regarding BPPV pathology and treatment. Since canalith repositioning testing, elicited intense symptoms of vertigo, PT strongly recommended that patient bring family member with her to next session for safety when driving home in the event that patient feels dizzy post session. Patient somewhat argumentative with patient, reporting that she does not want to bother her family to bring her. PT explained to patient that it is patient's decision whether or not to participate in therapy interventions. However, vestibular therapy can not proceed until BPPV episode is cleared. If patient does not feel that she can safely get home post session, then canalith repositioning maneuvers will not be performed and therapy interventions will be limited. Pt verbalized good understanding.     Written Home Exercises Provided: educational form regarding BPPV diagnosis and treatment provided for patient    Assessment   Silvia tolerated therapy session fair this morning. PT initiated session with canalith repositioning testing (CRT) for BPPV assessment. During R [place] Hallpike test position, patient (+) for significant vertigo and nystagmus. However, due to intensity of symptoms, patient moved out of test position. Test position repeated again with decreased cervical extension due to patient's emotional response during first test. Patient (-) for both " "vertigo and nystagmus in L [place] Hallpike position. Patient declined to participate in BPPV treatment due to feeling nauseated and "very anxious" after testing. PT spent extensive portion of therapy session educating patient on BPPV pathology and treatment. Due to patient's intense symptoms elicited during CRTs, PT recommended that patient bring family member to next session for safety when leaving clinic if canalith repositioning maneuver is performed. Patient somewhat argumentative with this recommendation so PT to further assess symptom management next session prior to performing canalith repositioning maneuver. During FGA performance, dizziness elicited with ambulation with head turns. FGA score places patient in elevated fall risk category. Improved balance noted with vision eliminated stance on foam pad. No dizziness elicited during saccades. Continue POC as tolerated; however, if patient unwilling to tolerate canalith repositioning testing and maneuvers, uncertain what other benefits vestibular rehab can provide at this time.     Silvia is progressing well towards her goals.   Pt prognosis is Guarded.     Pt will continue to benefit from skilled outpatient physical therapy to address the deficits listed in the problem list box on initial evaluation, provide pt/family education and to maximize pt's level of independence in the home and community environment.     Pt's spiritual, cultural and educational needs considered and pt agreeable to plan of care and goals.     Anticipated Barriers for therapy: extensive co-morbidities, anxious behavior with therapy interventions    Goals:  Short Term Goals: 4 weeks   1. Patient to be (I) with established HEP. Ongoing  2. PT to formally perform canalith repositioning testing to assess for BPPV and establish appropriate goals if indicated. Ongoing  3. PT to formally perform FGA to further assess dynamic balance and establish appropriate goals. MET 09/11/20  4. Patient to " perform saccades at 80 bpm WFL for improved oculomotor endurance. Ongoing  5. Patient to perform VOR 1 at 70 bpm WFL for improved oculomotor endurance. Ongoing  6. Patient to improve GST condition 4 to at least 6 seconds for improved balance in low vision environments. Ongoing  7. Patient to improve GST condition 6 to at least 30 seconds for improved balance in low vision environments. Ongoing     Long Term Goals: 8 weeks   1. Patient to be (I) with advanced HEP. Ongoing  2. Patient to perform saccades at 100 bpm WFL for improved oculomotor endurance. Ongoing  3. Patient to perform VOR 1 at 90 bpm WFL for improved oculomotor endurance. Ongoing  4. Patient to improve GST condition 4 to at least 10 seconds for improved balance in low vision environments. Ongoing  5. Updated 09/11/20: Patient to improve FGA score to at least 22/30 for improved balance in community settings. Ongoing    Plan     [facility]. Balance training as tolerated.     Rosi Macias, PT

## 2020-09-16 ENCOUNTER — PATIENT MESSAGE (OUTPATIENT)
Dept: PODIATRY | Facility: CLINIC | Age: 60
End: 2020-09-16

## 2020-09-16 LAB — FUNGUS SPEC CULT: NORMAL

## 2020-09-17 ENCOUNTER — CLINICAL SUPPORT (OUTPATIENT)
Dept: DERMATOLOGY | Facility: CLINIC | Age: 60
End: 2020-09-17
Payer: MEDICARE

## 2020-09-17 DIAGNOSIS — L80 VITILIGO: ICD-10-CM

## 2020-09-17 PROCEDURE — 96900 PR ULTRAVIOLET LIGHT THERAPY: ICD-10-PCS | Mod: S$GLB,,, | Performed by: DERMATOLOGY

## 2020-09-17 PROCEDURE — 96900 ACTINOTHERAPY UV LIGHT: CPT | Mod: S$GLB,,, | Performed by: DERMATOLOGY

## 2020-09-18 ENCOUNTER — CLINICAL SUPPORT (OUTPATIENT)
Dept: REHABILITATION | Facility: HOSPITAL | Age: 60
End: 2020-09-18
Attending: PSYCHIATRY & NEUROLOGY
Payer: MEDICARE

## 2020-09-18 DIAGNOSIS — R42 DIZZINESS: ICD-10-CM

## 2020-09-18 DIAGNOSIS — H81.8X9 DEFICIT OF VESTIBULO-OCULAR REFLEX: ICD-10-CM

## 2020-09-18 DIAGNOSIS — R26.89 IMPAIRMENT OF BALANCE: ICD-10-CM

## 2020-09-18 DIAGNOSIS — H55.81 SACCADIC EYE MOVEMENT DEFICIENCY: ICD-10-CM

## 2020-09-18 PROCEDURE — 97112 NEUROMUSCULAR REEDUCATION: CPT | Mod: PO

## 2020-09-18 NOTE — PROGRESS NOTES
"  Physical Therapy Treatment Note     Name: Silvia Cervantes  Clinic Number: [id number]    Therapy Diagnosis:   Encounter Diagnoses   Name Primary?    Dizziness     Impairment of balance     Saccadic eye movement deficiency     Deficit of vestibulo-ocular reflex      Physician: Ulysses Burkett MD    Visit Date: 9/18/2020    Physician Orders: PT Eval and Treat   Medical Diagnosis from Referral:   M54.9 (ICD-10-CM) - Dorsalgia, unspecified   M54.12 (ICD-10-CM) - Radiculopathy, cervical region   R20.0,R20.2 (ICD-10-CM) - Numbness and tingling of left leg      Evaluation Date: 8/31/2020  Authorization Period Expiration: 09/11/20 to 09/25/20  Plan of Care Expiration: 10/30/20  Visit # / Visits authorized: 02/ 06 (3 total visits)     Time In: 10:45  Time Out: 11:30  Total Billable Time: 45 minutes     Precautions: Standard, h/o emotional deficits    Subjective     Pt reports: that she felt terrible after her last session. She was dizzy and unsteady for 4 days straight. She messaged her MD who encouraged her to stick with therapy. She is supposed to get an MRI next week.   No dizziness to end session.      No HEP provided at this time.   Response to previous treatment: no adverse effects  Functional change: ongoing    Pain: 0/10  Location: NA    Objective     Patient declined to participate in canalith repositioning testing.     Silvia participated in neuromuscular re-education activities to improve: Balance, Coordination and Sense for 45 minutes. The following activities were included:    Saccades: horizontal, 3 x 30", no dizziness, decreased speed; seated    3 x 60" static stance on foam pad with vision eliminated, CGA, no UE support    2 x 30" each LE in front, tandem stance, eyes open, CGA occ touchdown support  2 x 30" each LE in front, tandem stance, eyes closed, CGA to occ Min A, occ touchdown support    2 x 30" each LE SLS with alternate LE on BOSU, soft side down, eyes open, CGA, no UE support  2 x 30" each LE SLS " with alternate LE on BOSU, soft side down, eyes closed, CGA to occ Min A, no UE support    Home Exercises Provided and Patient Education Provided     Education provided:   - POC  - Significant portion of therapy session again spent educating patient on purpose of canalith repositioning procedures. PT is unable to modify El[place] Zapien pike positioning to avoid minimal neck extension since test position needs to place test canal is most dependent position. However, it is patient's decision whether she would like to participate in this procedure. Patient declined to perform canalith repositioning procedures. Therefore, PT explained that PT can not proceed with motion tolerance exercises until BPPV has been cleared or definitively ruled out. Therefore, therapy session could only focus on vision eliminated balance at this time. If patient does not undergo VNG testing or canalith repositioning testing, vestibular therapy interventions will be limited and PT will have to d/c patient once balance goals are achieved. Patient verbalized good understanding.    Written Home Exercises Provided: educational form regarding BPPV diagnosis and treatment provided for patient    Assessment   Silvia tolerated therapy session fair this morning. Activities limited due to patient declining to participate in canalith repositioning procedures. Therapy session focused on vision eliminated balance. Patient required constant VCs to maintain eyes closed throughout. Patient denies any dizziness post intervention. Continue POC as tolerated.     Silvia is progressing well towards her goals.   Pt prognosis is Guarded.     Pt will continue to benefit from skilled outpatient physical therapy to address the deficits listed in the problem list box on initial evaluation, provide pt/family education and to maximize pt's level of independence in the home and community environment.     Pt's spiritual, cultural and educational needs considered and pt agreeable to  plan of care and goals.     Anticipated Barriers for therapy: extensive co-morbidities, anxious behavior with therapy interventions    Goals:  Short Term Goals: 4 weeks   1. Patient to be (I) with established HEP. Ongoing  2. PT to formally perform canalith repositioning testing to assess for BPPV and establish appropriate goals if indicated. Ongoing  3. PT to formally perform FGA to further assess dynamic balance and establish appropriate goals. MET 09/11/20  4. Patient to perform saccades at 80 bpm WFL for improved oculomotor endurance. Ongoing  5. Patient to perform VOR 1 at 70 bpm WFL for improved oculomotor endurance. Ongoing  6. Patient to improve GST condition 4 to at least 6 seconds for improved balance in low vision environments. Ongoing  7. Patient to improve GST condition 6 to at least 30 seconds for improved balance in low vision environments. Ongoing     Long Term Goals: 8 weeks   1. Patient to be (I) with advanced HEP. Ongoing  2. Patient to perform saccades at 100 bpm WFL for improved oculomotor endurance. Ongoing  3. Patient to perform VOR 1 at 90 bpm WFL for improved oculomotor endurance. Ongoing  4. Patient to improve GST condition 4 to at least 10 seconds for improved balance in low vision environments. Ongoing  5. Updated 09/11/20: Patient to improve FGA score to at least 22/30 for improved balance in community settings. Ongoing    Plan     Treat R [facility] BBPV if able. Balance training as tolerated.     Rosi Macias, PT

## 2020-09-21 ENCOUNTER — CLINICAL SUPPORT (OUTPATIENT)
Dept: REHABILITATION | Facility: HOSPITAL | Age: 60
End: 2020-09-21
Attending: PSYCHIATRY & NEUROLOGY
Payer: MEDICARE

## 2020-09-21 ENCOUNTER — CLINICAL SUPPORT (OUTPATIENT)
Dept: DERMATOLOGY | Facility: CLINIC | Age: 60
End: 2020-09-21
Payer: MEDICARE

## 2020-09-21 DIAGNOSIS — L80 VITILIGO: ICD-10-CM

## 2020-09-21 DIAGNOSIS — H81.8X9 DEFICIT OF VESTIBULO-OCULAR REFLEX: ICD-10-CM

## 2020-09-21 DIAGNOSIS — R26.89 IMPAIRMENT OF BALANCE: ICD-10-CM

## 2020-09-21 DIAGNOSIS — H55.81 SACCADIC EYE MOVEMENT DEFICIENCY: ICD-10-CM

## 2020-09-21 DIAGNOSIS — R42 DIZZINESS: ICD-10-CM

## 2020-09-21 PROCEDURE — 96900 ACTINOTHERAPY UV LIGHT: CPT | Mod: S$GLB,,, | Performed by: DERMATOLOGY

## 2020-09-21 PROCEDURE — 96900 PR ULTRAVIOLET LIGHT THERAPY: ICD-10-PCS | Mod: S$GLB,,, | Performed by: DERMATOLOGY

## 2020-09-21 PROCEDURE — 97110 THERAPEUTIC EXERCISES: CPT | Mod: PO

## 2020-09-21 NOTE — PROGRESS NOTES
"  Physical Therapy Treatment Note     Name: Silvia Cervantes  Clinic Number: [id number]    Therapy Diagnosis:   Encounter Diagnoses   Name Primary?    Dizziness     Impairment of balance     Saccadic eye movement deficiency     Deficit of vestibulo-ocular reflex      Physician: Ulysses Burkett MD    Visit Date: 9/21/2020    Physician Orders: PT Eval and Treat   Medical Diagnosis from Referral:   M54.9 (ICD-10-CM) - Dorsalgia, unspecified   M54.12 (ICD-10-CM) - Radiculopathy, cervical region   R20.0,R20.2 (ICD-10-CM) - Numbness and tingling of left leg      Evaluation Date: 8/31/2020  Authorization Period Expiration: 09/11/20 to 09/25/20  Plan of Care Expiration: 10/30/20  Visit # / Visits authorized: 03/ 06 (4 total visits)     Time In: 10:45  Time Out: 11:30  Total Billable Time: 45 minutes     Precautions: Standard, h/o emotional deficits    Subjective     Pt reports: that she continues to experience dizziness when lying down to go to sleep. She does not want to participate in canalith repositioning procedures today because she is alone and it is raining outside.   No dizziness to end session.      No HEP provided at this time.   Response to previous treatment: no adverse effects  Functional change: ongoing    Pain: 0/10  Location: NA    Objective     Patient declined to participate in canalith repositioning testing.     Silvia participated in neuromuscular re-education activities to improve: Balance, Coordination and Sense for 45 minutes. The following activities were included:    Saccades: horizontal, 3 x 30", no dizziness, self selected speed; seated    4 x 60" static stance on foam pad with vision eliminated, CGA, no UE support    X 4 laps tandem ambulation in // bars, CGA, occ touchdown    2 x 30" each LE in front, tandem stance, eyes open, CGA occ touchdown support  2 x 30" each LE in front, tandem stance, eyes closed, CGA to occ Min A, occ touchdown support    1 x 30" each LE SLS with alternate LE on BOSU, " "soft side down, eyes open, CGA, no UE support  2 x 30" each LE SLS with alternate LE on BOSU, soft side down, eyes closed, CGA to occ Min A, no UE support    2 point rocker board:  X 60" R<>L weight shifting, BUE support, CGA  X 60" static stance, preventing board from moving R<>L, intermittent UE support, CGA  X 60" A<>P weight shifting, BUE support, CGA  2X 60" static stance, preventing board from moving A<>P, intermittent UE support, CGA to Min A    X 4 laps x 30 feet, forward ambulation with eyes closed, CGA    Home Exercises Provided and Patient Education Provided     Education provided:   - POC  - 09/18/20: Significant portion of therapy session again spent educating patient on purpose of canalith repositioning procedures. PT is unable to modify El[place] Zapien pike positioning to avoid minimal neck extension since test position needs to place test canal is most dependent position. However, it is patient's decision whether she would like to participate in this procedure. Patient declined to perform canalith repositioning procedures. Therefore, PT explained that PT can not proceed with motion tolerance exercises until BPPV has been cleared or definitively ruled out. Therefore, therapy session could only focus on vision eliminated balance at this time. If patient does not undergo VNG testing or canalith repositioning testing, vestibular therapy interventions will be limited and PT will have to d/c patient once balance goals are achieved. Patient verbalized good understanding.    Written Home Exercises Provided: educational form regarding BPPV diagnosis and treatment provided for patient    Assessment   Silvia tolerated therapy session better this morning compared to prior sessions. Patient again declined to participate in canalith repositioning procedures due to having to drive by herself in the rain.  Therapy session again focused on vision eliminated balance. Patient denies any dizziness post intervention. Continue " POC as tolerated.     Silvia is progressing well towards her goals.   Pt prognosis is Guarded.     Pt will continue to benefit from skilled outpatient physical therapy to address the deficits listed in the problem list box on initial evaluation, provide pt/family education and to maximize pt's level of independence in the home and community environment.     Pt's spiritual, cultural and educational needs considered and pt agreeable to plan of care and goals.     Anticipated Barriers for therapy: extensive co-morbidities, anxious behavior with therapy interventions    Goals:  Short Term Goals: 4 weeks   1. Patient to be (I) with established HEP. Ongoing  2. PT to formally perform canalith repositioning testing to assess for BPPV and establish appropriate goals if indicated. Ongoing  3. PT to formally perform FGA to further assess dynamic balance and establish appropriate goals. MET 09/11/20  4. Patient to perform saccades at 80 bpm WFL for improved oculomotor endurance. Ongoing  5. Patient to perform VOR 1 at 70 bpm WFL for improved oculomotor endurance. Ongoing  6. Patient to improve GST condition 4 to at least 6 seconds for improved balance in low vision environments. Ongoing  7. Patient to improve GST condition 6 to at least 30 seconds for improved balance in low vision environments. Ongoing     Long Term Goals: 8 weeks   1. Patient to be (I) with advanced HEP. Ongoing  2. Patient to perform saccades at 100 bpm WFL for improved oculomotor endurance. Ongoing  3. Patient to perform VOR 1 at 90 bpm WFL for improved oculomotor endurance. Ongoing  4. Patient to improve GST condition 4 to at least 10 seconds for improved balance in low vision environments. Ongoing  5. Updated 09/11/20: Patient to improve FGA score to at least 22/30 for improved balance in community settings. Ongoing    Plan     Treat R [facility] BBPV if able. Balance training as tolerated.     Rosi Macias, PT

## 2020-09-24 ENCOUNTER — TELEPHONE (OUTPATIENT)
Dept: NEUROLOGY | Facility: CLINIC | Age: 60
End: 2020-09-24

## 2020-09-24 ENCOUNTER — HOSPITAL ENCOUNTER (OUTPATIENT)
Dept: RADIOLOGY | Facility: HOSPITAL | Age: 60
Discharge: HOME | End: 2020-09-24
Attending: PSYCHIATRY & NEUROLOGY
Payer: MEDICARE

## 2020-09-24 ENCOUNTER — PATIENT MESSAGE (OUTPATIENT)
Dept: NEUROLOGY | Facility: CLINIC | Age: 60
End: 2020-09-24

## 2020-09-24 DIAGNOSIS — M54.9 DORSALGIA, UNSPECIFIED: ICD-10-CM

## 2020-09-24 DIAGNOSIS — M54.12 RADICULOPATHY, CERVICAL REGION: ICD-10-CM

## 2020-09-24 PROCEDURE — 72141 MRI NECK SPINE W/O DYE: CPT | Mod: TC

## 2020-09-24 PROCEDURE — 72141 MRI NECK SPINE W/O DYE: CPT | Mod: 26,,, | Performed by: RADIOLOGY

## 2020-09-24 PROCEDURE — 72148 MRI LUMBAR SPINE W/O DYE: CPT | Mod: 26,,, | Performed by: RADIOLOGY

## 2020-09-24 PROCEDURE — 72148 MRI LUMBAR SPINE W/O DYE: CPT | Mod: TC

## 2020-09-24 PROCEDURE — 72141 MRI CERVICAL SPINE WITHOUT CONTRAST: ICD-10-PCS | Mod: 26,,, | Performed by: RADIOLOGY

## 2020-09-24 PROCEDURE — 72148 MRI LUMBAR SPINE WITHOUT CONTRAST: ICD-10-PCS | Mod: 26,,, | Performed by: RADIOLOGY

## 2020-09-24 NOTE — TELEPHONE ENCOUNTER
----- Message from April Rushing sent at 9/24/2020  2:20 PM CDT -----  Contact: patient//  [phone]  Patient returning your call regarding test results   Please advise

## 2020-09-25 ENCOUNTER — OFFICE VISIT (OUTPATIENT)
Dept: OTOLARYNGOLOGY | Facility: CLINIC | Age: 60
End: 2020-09-25
Payer: MEDICARE

## 2020-09-25 ENCOUNTER — CLINICAL SUPPORT (OUTPATIENT)
Dept: OTOLARYNGOLOGY | Facility: CLINIC | Age: 60
End: 2020-09-25
Payer: MEDICARE

## 2020-09-25 VITALS
BODY MASS INDEX: 25.81 KG/M2 | HEIGHT: 61 IN | HEART RATE: 83 BPM | SYSTOLIC BLOOD PRESSURE: 147 MMHG | DIASTOLIC BLOOD PRESSURE: 94 MMHG | WEIGHT: 136.69 LBS

## 2020-09-25 DIAGNOSIS — H93.12 TINNITUS OF LEFT EAR: ICD-10-CM

## 2020-09-25 DIAGNOSIS — G56.92 NEUROPATHY, ARM, LEFT: ICD-10-CM

## 2020-09-25 DIAGNOSIS — M54.16 LUMBAR RADICULAR PAIN: ICD-10-CM

## 2020-09-25 DIAGNOSIS — M54.16 LUMBAR RADICULOPATHY: Chronic | ICD-10-CM

## 2020-09-25 DIAGNOSIS — R42 VERTIGO: Primary | ICD-10-CM

## 2020-09-25 DIAGNOSIS — H90.72 MIXED CONDUCTIVE AND SENSORINEURAL HEARING LOSS OF LEFT EAR WITH UNRESTRICTED HEARING OF RIGHT EAR: ICD-10-CM

## 2020-09-25 PROCEDURE — 3077F SYST BP >= 140 MM HG: CPT | Mod: CPTII,S$GLB,, | Performed by: OTOLARYNGOLOGY

## 2020-09-25 PROCEDURE — 92557 COMPREHENSIVE HEARING TEST: CPT | Mod: S$GLB,,, | Performed by: AUDIOLOGIST-HEARING AID FITTER

## 2020-09-25 PROCEDURE — 99999 PR PBB SHADOW E&M-EST. PATIENT-LVL III: CPT | Mod: PBBFAC,,, | Performed by: OTOLARYNGOLOGY

## 2020-09-25 PROCEDURE — 92567 TYMPANOMETRY: CPT | Mod: S$GLB,,, | Performed by: AUDIOLOGIST-HEARING AID FITTER

## 2020-09-25 PROCEDURE — 3080F PR MOST RECENT DIASTOLIC BLOOD PRESSURE >= 90 MM HG: ICD-10-PCS | Mod: CPTII,S$GLB,, | Performed by: OTOLARYNGOLOGY

## 2020-09-25 PROCEDURE — 99999 PR PBB SHADOW E&M-EST. PATIENT-LVL III: ICD-10-PCS | Mod: PBBFAC,,, | Performed by: OTOLARYNGOLOGY

## 2020-09-25 PROCEDURE — 99999 PR PBB SHADOW E&M-EST. PATIENT-LVL I: CPT | Mod: PBBFAC,,, | Performed by: AUDIOLOGIST-HEARING AID FITTER

## 2020-09-25 PROCEDURE — 99999 PR PBB SHADOW E&M-EST. PATIENT-LVL I: ICD-10-PCS | Mod: PBBFAC,,, | Performed by: AUDIOLOGIST-HEARING AID FITTER

## 2020-09-25 PROCEDURE — 99204 PR OFFICE/OUTPT VISIT, NEW, LEVL IV, 45-59 MIN: ICD-10-PCS | Mod: S$GLB,,, | Performed by: OTOLARYNGOLOGY

## 2020-09-25 PROCEDURE — 3008F PR BODY MASS INDEX (BMI) DOCUMENTED: ICD-10-PCS | Mod: CPTII,S$GLB,, | Performed by: OTOLARYNGOLOGY

## 2020-09-25 PROCEDURE — 3080F DIAST BP >= 90 MM HG: CPT | Mod: CPTII,S$GLB,, | Performed by: OTOLARYNGOLOGY

## 2020-09-25 PROCEDURE — 92567 PR TYMPA2METRY: ICD-10-PCS | Mod: S$GLB,,, | Performed by: AUDIOLOGIST-HEARING AID FITTER

## 2020-09-25 PROCEDURE — 3077F PR MOST RECENT SYSTOLIC BLOOD PRESSURE >= 140 MM HG: ICD-10-PCS | Mod: CPTII,S$GLB,, | Performed by: OTOLARYNGOLOGY

## 2020-09-25 PROCEDURE — 3008F BODY MASS INDEX DOCD: CPT | Mod: CPTII,S$GLB,, | Performed by: OTOLARYNGOLOGY

## 2020-09-25 PROCEDURE — 92557 PR COMPREHENSIVE HEARING TEST: ICD-10-PCS | Mod: S$GLB,,, | Performed by: AUDIOLOGIST-HEARING AID FITTER

## 2020-09-25 PROCEDURE — 99204 OFFICE O/P NEW MOD 45 MIN: CPT | Mod: S$GLB,,, | Performed by: OTOLARYNGOLOGY

## 2020-09-25 RX ORDER — GABAPENTIN 300 MG/1
300 CAPSULE ORAL 3 TIMES DAILY
Qty: 270 CAPSULE | Refills: 3 | Status: SHIPPED | OUTPATIENT
Start: 2020-09-25 | End: 2021-04-20 | Stop reason: DRUGHIGH

## 2020-09-25 NOTE — PATIENT INSTRUCTIONS
Dizziness and Vertigo    Dizziness is a feeling that may be hard to describe, but often includes a feeling that you are spinning or tilting, or that you are about to fall or pass out. Dizziness can also cause you to feel lightheaded or giddy, or have difficulty walking straight.    Many people who feel dizzy have vertigo, a specific type of dizziness. Vertigo is a sense of spinning dizziness, swaying, or tilting. You may feel that you are moving or that the room is moving around you. Vertigo can be caused by a number of different problems involving the inner ear or brain. Some of these problems are not serious while others can be life threatening.    Causes of Vertigo    Benign paroxysmal positional vertigo (BPPV) - BPPV, sometimes called benign positional vertigo, positional vertigo, postural vertigo, or simply vertigo, is a type of vertigo that develops due to collections of calcium in the inner ear. These collections are called canaliths. Moving the canaliths (called canalith repositioning) is a common treatment for BPPV.    Vertigo is typically brief in people with BPPV, lasting seconds to minutes. Vertigo can be triggered by moving the head in certain ways. Many forms of vertigo are worsened by head movements. In BPPV it is CAUSED by movement.    Meniere disease - Meniere disease is a condition that causes repeated spells of vertigo, hearing loss, and ringing in the ears. Spells can last several minutes or hours. It is probably caused by a buildup of fluid in the inner ear.     Vestibular neuritis - Vestibular neuritis, or labyrinthitis, is probably caused by a virus that causes swelling around the balance nerve. People with vestibular neuritis develop sudden, severe vertigo, nausea, vomiting, and difficulty walking or standing up; these problems can last several days. Some people also develop difficulty hearing in one ear (labyrinthitis).    Head injury - Head injuries can affect the vestibular system in a  variety of ways, and lead to vertigo.     Medications - Other medications can affect the function of the inner ear or brain and lead to vertigo. Rarely, some medications can actually damage the inner ear.    Migraines - In a condition called vestibular migraine or migrainous vertigo, vertigo can be caused by a migraine. This type of vertigo usually happens along with a headache, but some patients have migraines without headache.     Other brain problems - Stroke or TIA (transient ischemic attack), bleeding in the brain, or multiple sclerosis can also cause vertigo. There are usually other symptoms, besides vertigo, that happen with these brain problems.    Non-vestibular causes of dizziness and imbalance    Around one in four people experience dizziness. Although it becomes more common with age, it can occur for a variety of reasons. People with a vestibular disorder may also experience dizziness due to other causes. Below is a list of some of the possible non-vestibular causes of dizziness. If you are experiencing symptoms of dizziness it is important to see a qualified health professional to find out the cause of your symptoms and for advice, treatment and referral to a specialist if necessary.    Side effects of medications  Some medications list dizziness as a side effect. If you take more than four different types of medication, this may also cause dizziness and increase your risk of falling.    Stress, tiredness and concentration  If you feel particularly stressed, anxious, angry or fearful, your heart rate increases and your breathing gets faster as blood is pumped to your muscles. A side effect of this is that you may feel sick or dizzy, since breathing too fast (hyperventilation) causes you to take in too much oxygen, which can make you dizzy. If you are tired or doing things you have to think about, this can affect balance.    Aging  Around one in five people over 60 are affected by dizziness and  imbalance. As people get older, the parts of the body which help maintain balance, e.g. the muscles, eyes and vestibular system in the inner ear, begin to function less well. Seeing in bad light may become difficult and there may be a permanent feeling of unsteadiness. Dizziness, however, does not tend to result from age-related changes alone and it is more likely to occur in people with a disorder or as a side-effect to medication.    Restricted blood flow to the brain  Dizziness and unsteadiness can also be the result of conditions that cause a temporary decrease in the blood flow to your brain. These conditions include:    Low blood pressure after standing up  Postural or orthostatic hypotension is a condition in which your blood pressure briefly drops when you get up quickly from sitting or lying down, causing nausea, dizziness, unsteadiness or blackouts. This is common and can be caused by dehydration, standing still for too long, becoming too hot and not eating enough or regularly enough (hypoglycaemia).    Low blood pressure after eating  Postprandial hypotension is a condition in which after eating (particularly after a large, high carbohydrate meal), your body does not compensate for the increased blood flow to your digestive system, causing dizziness, unsteadiness, or blackouts.    Osteoarthritis  A condition that causes damage and swelling of the joints. If the neck joints are affected, the blood flow to the brain can become restricted, causing temporary dizziness when you turn your head.    Arteriosclerosis  A build up of fatty deposits and cholesterol makes arteries become hard and narrow, reducing blood flow to the brain, which can cause dizziness. Arteriosclerosis can be caused by high blood pressure, diabetes, and an unhealthy lifestyle, and can lead to heart disease, stroke and blood clots.    Neurological diseases  Dizziness and unsteadiness can also be the result of damage to the areas of the  brain that coordinate balance, e.g. in people who have MS, stroke, Parkinsons disease or brain tumours.    Lack of exercise  Regular exercise is important for maintaining flexibility and strength, which can help balance. The stronger and more flexible the muscles and joints are, the better your body will be able to deal with different surfaces and keep you balanced                    HOME TREATMENT OF BPPV:    The Brown-Daroff Exercises are a method of treating BPPV, usually used when the office treatment fails. They succeed in 95% of cases but are more arduous than the office treatments. These exercises are performed in three sets per day for two weeks. In each set, one performs the maneuver as shown five times.     1 repetition = maneuver done to each side in turn (takes 2 minutes)     Suggested Schedule for Brown-Daroff exercises   Time Exercise Duration   Morning 5 repetitions 10 minutes   Noon 5 repetitions 10 minutes   Evening 5 repetitions 10 minutes     Start sitting upright (position 1). Then move into the side-lying position (position 2), with the head angled upward about longterm. An easy way to remember this is to imagine someone standing about 6 feet in front of you, and just keep looking at their head at all times. Stay in the side-lying position for 30 seconds, or until the dizziness subsides if this is longer, then go back to the sitting position (position 3). Stay there for 30 seconds, and then go to the opposite side (position 4) and follow the same routine..    These exercises should be performed for two weeks, three times per day, or for three weeks, twice per day. This adds up to 52 sets in total. In most persons, complete relief from symptoms is obtained after 30 sets, or about 10 days. In approximately 30 percent of patients, BPPV will recur within one year. If BPPV recurs, you may wish to add one 10-minute exercise to your daily routine.     Can Medication Help Me Feel Better?    The use of  medication in treating vestibular disorders depends on whether the vestibular system dysfunction is in an initial or acute phase (lasting up to 5 days) or chronic phase (ongoing).    During the acute phase, and when other illnesses have been ruled out, medications that may be prescribed include vestibular suppressants to reduce motion sickness or anti-emetics to reduce nausea. Vestibular suppressants include three general drug classes: anticholinergics, antihistamines, and benzodiazepines. Examples of vestibular suppressants are meclizine and dimenhydinate (antihistamine-anticholinergics) and lorazepam and diazepam (benzodiazepines).    Other medications that may be prescribed are steroids (e.g., prednisone), antiviral drugs (e.g., acyclovir), or antibiotics (e.g., amoxicillin) if a middle ear infection is present. If nausea has been severe enough to cause excessive dehydration, intravenous fluids may be given.    During the chronic phase, symptoms must be actively experienced without interference in order for the brain to adjust, a process called vestibular compensation. Any medication that makes the brain sleepy, including all vestibular suppressants, can slow down or stop the process of compensation. Therefore, they are often not appropriate for long-term use. Physicians generally find that most patients who fail to compensate are either strictly avoiding certain movements, using vestibular suppressants daily, or both.    It was a pleasure seeing you today. Please don't hesitate to contact our office if you have any questions or additional concerns after your visit. You may reach us through the patient portal or by calling [phone].    Dr. Elizabeth G. de Lauréal [facility]   [address], [address]  [place] [address]
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
## 2020-09-28 ENCOUNTER — CLINICAL SUPPORT (OUTPATIENT)
Dept: DERMATOLOGY | Facility: CLINIC | Age: 60
End: 2020-09-28
Payer: MEDICARE

## 2020-09-28 ENCOUNTER — CLINICAL SUPPORT (OUTPATIENT)
Dept: REHABILITATION | Facility: HOSPITAL | Age: 60
End: 2020-09-28
Payer: MEDICARE

## 2020-09-28 DIAGNOSIS — H55.81 SACCADIC EYE MOVEMENT DEFICIENCY: ICD-10-CM

## 2020-09-28 DIAGNOSIS — R42 DIZZINESS: ICD-10-CM

## 2020-09-28 DIAGNOSIS — H81.8X9 DEFICIT OF VESTIBULO-OCULAR REFLEX: ICD-10-CM

## 2020-09-28 DIAGNOSIS — L80 VITILIGO: ICD-10-CM

## 2020-09-28 DIAGNOSIS — R26.89 IMPAIRMENT OF BALANCE: ICD-10-CM

## 2020-09-28 PROCEDURE — 97112 NEUROMUSCULAR REEDUCATION: CPT | Mod: PO

## 2020-09-28 PROCEDURE — 96900 PR ULTRAVIOLET LIGHT THERAPY: ICD-10-PCS | Mod: S$GLB,,, | Performed by: DERMATOLOGY

## 2020-09-28 PROCEDURE — 96900 ACTINOTHERAPY UV LIGHT: CPT | Mod: S$GLB,,, | Performed by: DERMATOLOGY

## 2020-09-28 NOTE — PROGRESS NOTES
"  Physical Therapy Treatment Note     Name: Silvia Cervantes  Clinic Number: [id number]    Therapy Diagnosis:   Encounter Diagnoses   Name Primary?    Dizziness     Impairment of balance     Saccadic eye movement deficiency     Deficit of vestibulo-ocular reflex      Physician: Ulysses Burkett MD    Visit Date: 9/28/2020    Physician Orders: PT Eval and Treat   Medical Diagnosis from Referral:   M54.9 (ICD-10-CM) - Dorsalgia, unspecified   M54.12 (ICD-10-CM) - Radiculopathy, cervical region   R20.0,R20.2 (ICD-10-CM) - Numbness and tingling of left leg      Evaluation Date: 8/31/2020  Authorization Period Expiration: 09/11/20 to 09/25/20  Plan of Care Expiration: 10/30/20  Visit # / Visits authorized: 04/ 06 (5 total visits)     Time In: 10:45  Time Out: 11:30  Total Billable Time: 45 minutes     Precautions: Standard, h/o emotional deficits    Subjective     Pt reports: that she had an MRI of her cervical spine last week and results came back normal. She saw an ENT on Friday. ENT attempted to place patient in supine for canalith repositioning testing, but patient could not tolerate test position. ENT then placed patient in supine test position. Patient reports that she had severe dizziness and nausea in these positions. ENT ordered a VNG test but patient is supposed to call to make appointment.      No HEP provided at this time.   Response to previous treatment: no adverse effects  Functional change: ongoing    Pain: 0/10  Location: NA    Objective     Patient declined to participate in canalith repositioning testing.     Silvia participated in neuromuscular re-education activities to improve: Balance, Coordination and Sense for 45 minutes. The following activities were included:    3 x 60" static stance on foam pad with vision eliminated, CGA, no UE support    X 4 laps tandem ambulation in // bars, CGA, occ touchdown    1 x 30" each LE in front, tandem stance, eyes open, CGA occ touchdown support  2 x 30" each LE in " "front, tandem stance, eyes closed, CGA to occ Min A, occ touchdown support    1 x 30" each LE SLS with alternate LE on BOSU, soft side down, eyes open, CGA, no UE support  2 x 30" each LE SLS with alternate LE on BOSU, soft side down, eyes closed, CGA to occ Min A, no UE support    X 4 laps x 30 feet, forward ambulation with eyes closed, CGA    Home Exercises Provided and Patient Education Provided     Education provided:   - POC  - 09/18/20: Significant portion of therapy session again spent educating patient on purpose of canalith repositioning procedures. PT is unable to modify [place] Zapien pike positioning to avoid minimal neck extension since test position needs to place test canal is most dependent position. However, it is patient's decision whether she would like to participate in this procedure. Patient declined to perform canalith repositioning procedures. Therefore, PT explained that PT can not proceed with motion tolerance exercises until BPPV has been cleared or definitively ruled out. Therefore, therapy session could only focus on vision eliminated balance at this time. If patient does not undergo VNG testing or canalith repositioning testing, vestibular therapy interventions will be limited and PT will have to d/c patient once balance goals are achieved. Patient verbalized good understanding.  09/28/20: PT again educated patient that therapy POC can not progress without completing canalith repositioning maneuvers. Even if test comes back (-) for BPPV, PT would still need to take patient through motion tolerance exercises which would likely also cause dizziness and nausea. Patient reports that she is unsure if she is going to get the VNG test and she does not want to perform canalith repositioning or motion tolerance exercises if they are going to make her dizzy and nauseated. PT educated patient that regardless of VNG results, therapy POC would still need to proceed similarly with motion tolerance vs " canalith repositioning exercises. Patient declined to perform these activities. PT then educated patient that PT understands patient's decision, but since POC can not progress next session with be d/c.     Written Home Exercises Provided: educational form regarding BPPV diagnosis and treatment provided for patient    Assessment   Silvia tolerated therapy session fair this morning. Since patient recently underwent MRI of cervical spine and evaluation by ENT, significant portion of therapy session spent discussing PT POC moving forward. Patient again declined to participate in canalith repositioning procedures due to not wanting to feel  Therapy session again focused on vision eliminated balance. However, since patient does not want to participate in more complex vestibular interventions, plan to d/c next session.     Silvia is progressing well towards her goals.   Pt prognosis is Guarded.     Pt will continue to benefit from skilled outpatient physical therapy to address the deficits listed in the problem list box on initial evaluation, provide pt/family education and to maximize pt's level of independence in the home and community environment.     Pt's spiritual, cultural and educational needs considered and pt agreeable to plan of care and goals.     Anticipated Barriers for therapy: extensive co-morbidities, anxious behavior with therapy interventions    Goals:  Short Term Goals: 4 weeks   1. Patient to be (I) with established HEP. Ongoing  2. PT to formally perform canalith repositioning testing to assess for BPPV and establish appropriate goals if indicated. Ongoing  3. PT to formally perform FGA to further assess dynamic balance and establish appropriate goals. MET 09/11/20  4. Patient to perform saccades at 80 bpm WFL for improved oculomotor endurance. Ongoing  5. Patient to perform VOR 1 at 70 bpm WFL for improved oculomotor endurance. Ongoing  6. Patient to improve GST condition 4 to at least 6 seconds for  improved balance in low vision environments. Ongoing  7. Patient to improve GST condition 6 to at least 30 seconds for improved balance in low vision environments. Ongoing     Long Term Goals: 8 weeks   1. Patient to be (I) with advanced HEP. Ongoing  2. Patient to perform saccades at 100 bpm WFL for improved oculomotor endurance. Ongoing  3. Patient to perform VOR 1 at 90 bpm WFL for improved oculomotor endurance. Ongoing  4. Patient to improve GST condition 4 to at least 10 seconds for improved balance in low vision environments. Ongoing  5. Updated 09/11/20: Patient to improve FGA score to at least 22/30 for improved balance in community settings. Ongoing    Plan     D/c next session.     Rosi Macias, PT

## 2020-09-30 ENCOUNTER — CLINICAL SUPPORT (OUTPATIENT)
Dept: DERMATOLOGY | Facility: CLINIC | Age: 60
End: 2020-09-30
Payer: MEDICARE

## 2020-09-30 DIAGNOSIS — L80 VITILIGO: ICD-10-CM

## 2020-09-30 PROCEDURE — 96900 ACTINOTHERAPY UV LIGHT: CPT | Mod: S$GLB,,, | Performed by: DERMATOLOGY

## 2020-09-30 PROCEDURE — 96900 PR ULTRAVIOLET LIGHT THERAPY: ICD-10-PCS | Mod: S$GLB,,, | Performed by: DERMATOLOGY

## 2020-10-02 ENCOUNTER — CLINICAL SUPPORT (OUTPATIENT)
Dept: REHABILITATION | Facility: HOSPITAL | Age: 60
End: 2020-10-02
Attending: PSYCHIATRY & NEUROLOGY
Payer: MEDICARE

## 2020-10-02 DIAGNOSIS — R26.89 IMPAIRMENT OF BALANCE: ICD-10-CM

## 2020-10-02 DIAGNOSIS — H81.8X9 DEFICIT OF VESTIBULO-OCULAR REFLEX: ICD-10-CM

## 2020-10-02 DIAGNOSIS — R42 DIZZINESS: ICD-10-CM

## 2020-10-02 DIAGNOSIS — H55.81 SACCADIC EYE MOVEMENT DEFICIENCY: ICD-10-CM

## 2020-10-02 PROCEDURE — 97110 THERAPEUTIC EXERCISES: CPT | Mod: PO

## 2020-10-02 NOTE — PROGRESS NOTES
"  Physical Therapy Treatment Note     Name: Silvia Cervantes  Clinic Number: [id number]    Therapy Diagnosis:   Encounter Diagnoses   Name Primary?    Dizziness     Impairment of balance     Saccadic eye movement deficiency     Deficit of vestibulo-ocular reflex      Physician: Ulysses Burkett MD    Visit Date: 10/2/2020    Physician Orders: PT Eval and Treat   Medical Diagnosis from Referral:   M54.9 (ICD-10-CM) - Dorsalgia, unspecified   M54.12 (ICD-10-CM) - Radiculopathy, cervical region   R20.0,R20.2 (ICD-10-CM) - Numbness and tingling of left leg      Evaluation Date: 8/31/2020  Authorization Period Expiration: 09/11/20 to 09/25/20  Plan of Care Expiration: 10/30/20  Visit # / Visits authorized: 05/ 06 (6 total visits)     Time In: 10:45  Time Out: 11:15  Total Billable Time: 30 minutes     Precautions: Standard, h/o emotional deficits    Subjective     Pt reports: that she still has dizziness with lying down. She does not want to participate in canalith repositioning testing due to not wanting to trigger bad dizziness. She is not ready to set up her VNG testing.      No HEP provided at this time.   Response to previous treatment: no adverse effects  Functional change: ongoing    Pain: 0/10  Location: NA    Objective     Patient declined to participate in canalith repositioning testing.     Silvia participated in neuromuscular re-education activities to improve: Balance, Coordination and Sense for 30 minutes. The following activities were included:    [facility] SENSORY TESTING:  (P= Pass, F= Fail; note any sway; hold each position for 30")  Condition 1: (firm surface/feet together/eyes open) P  Condition 2: (firm surface/feet together/eyes closed) P  Condition 3: (firm surface/feet in tandem/eyes open) P  Condition 4: (firm surface/feet in tandem/eyes closed) P  Condition 5: (soft surface/feet together/eyes open) P  Condition 6: (soft surface/feet together/eyes closed) P  Condition 7: (Fukuda step test), measure " "distance varied from center starting position, > 30 deg deviation to either side indicates hypofunction of biased side NT this date     Functional Gait Assessment:   1. Gait on level surface =  3  2. Change in Gait Speed = 2  3. Gait with horizontal head turns  = 2  4. Gait with vertical head turns = 2  5. Gait with pivot turns = 2  6. Step over obstacle = 2  7. Gait with Narrow CLIFFORD = 2  8. Gait with eyes closed = 2  9. Ambulating Backwards = 3  10. Steps = 2     Score 22/30 (17/30 on 09/11/20)     Saccades: horizontal, 3 x 30", no dizziness  Attempted 3 trials at 80 bpm, patient becomes distracted throughout and unable to maintain beat      Home Exercises Provided and Patient Education Provided     Education provided:   - POC  - 09/18/20: Significant portion of therapy session again spent educating patient on purpose of canalith repositioning procedures. PT is unable to modify [place] Zapien pike positioning to avoid minimal neck extension since test position needs to place test canal is most dependent position. However, it is patient's decision whether she would like to participate in this procedure. Patient declined to perform canalith repositioning procedures. Therefore, PT explained that PT can not proceed with motion tolerance exercises until BPPV has been cleared or definitively ruled out. Therefore, therapy session could only focus on vision eliminated balance at this time. If patient does not undergo VNG testing or canalith repositioning testing, vestibular therapy interventions will be limited and PT will have to d/c patient once balance goals are achieved. Patient verbalized good understanding.  09/28/20: PT again educated patient that therapy POC can not progress without completing canalith repositioning maneuvers. Even if test comes back (-) for BPPV, PT would still need to take patient through motion tolerance exercises which would likely also cause dizziness and nausea. Patient reports that she is unsure if " she is going to get the VNG test and she does not want to perform canalith repositioning or motion tolerance exercises if they are going to make her dizzy and nauseated. PT educated patient that regardless of VNG results, therapy POC would still need to proceed similarly with motion tolerance vs canalith repositioning exercises. Patient declined to perform these activities. PT then educated patient that PT understands patient's decision, but since POC can not progress next session with be d/c.     Written Home Exercises Provided: educational form regarding BPPV diagnosis and treatment provided for patient    PHYSICAL THERAPY DISCHARGE SUMMARY   Total Visits: 6  Missed Visits: 0  Cancelled Visits: 1    Status Towards Goals Met: Patient made fair progress with therapy, meeting 3/7 STGs and 2/5 LTGs. Improvements noted mainly with static and dynamic balance. Although patient no longer experiences dizziness with saccades, she was unable to maintain metronome beat due to easy distraction throughout exercise. Since patient declined to perform canalith repositioning maneuvers or motion tolerance exercises due to fear of them triggering her dizziness, therapy interventions significantly limited to balance interventions only. PT educated patient extensively on purpose of vestibular therapy and vestibular interventions, but patient does not feel ready at this time to progress with vestibular interventions. Since patient has met her balance goals, next step in therapy would be motion tolerance training. Since patient is not agreeable to participating in these interventions, no further vestibular therapy is warranted at this time.     Goals:  Short Term Goals: 4 weeks   1. Patient to be (I) with established HEP. Not Met  2. PT to formally perform canalith repositioning testing to assess for BPPV and establish appropriate goals if indicated. Not Met  3. PT to formally perform FGA to further assess dynamic balance and establish  appropriate goals. MET 09/11/20  4. Patient to perform saccades at 80 bpm WFL for improved oculomotor endurance. Not Met  5. Patient to perform VOR 1 at 70 bpm WFL for improved oculomotor endurance. Not Met  6. Patient to improve GST condition 4 to at least 6 seconds for improved balance in low vision environments. MET 10/02/20  7. Patient to improve GST condition 6 to at least 30 seconds for improved balance in low vision environments. MET 10/02/20     Long Term Goals: 8 weeks   1. Patient to be (I) with advanced HEP. Not Met  2. Patient to perform saccades at 100 bpm WFL for improved oculomotor endurance. Not Met  3. Patient to perform VOR 1 at 90 bpm WFL for improved oculomotor endurance. Not Met   4. Patient to improve GST condition 4 to at least 10 seconds for improved balance in low vision environments. MET 10/02/20  5. Updated 09/11/20: Patient to improve FGA score to at least 22/30 for improved balance in community settings. MET 10/02/20    Discharge reason : Patient has maximized benefit from PT at this time    PLAN   This patient is discharged from [facility].     Rosi Macias, PT  10/02/2020

## 2020-10-05 ENCOUNTER — CLINICAL SUPPORT (OUTPATIENT)
Dept: DERMATOLOGY | Facility: CLINIC | Age: 60
End: 2020-10-05
Payer: MEDICARE

## 2020-10-05 DIAGNOSIS — L80 VITILIGO: ICD-10-CM

## 2020-10-05 PROCEDURE — 96900 PR ULTRAVIOLET LIGHT THERAPY: ICD-10-PCS | Mod: S$GLB,,, | Performed by: DERMATOLOGY

## 2020-10-05 PROCEDURE — 96900 ACTINOTHERAPY UV LIGHT: CPT | Mod: S$GLB,,, | Performed by: DERMATOLOGY

## 2020-10-07 ENCOUNTER — CLINICAL SUPPORT (OUTPATIENT)
Dept: DERMATOLOGY | Facility: CLINIC | Age: 60
End: 2020-10-07
Payer: MEDICARE

## 2020-10-07 DIAGNOSIS — L80 VITILIGO: ICD-10-CM

## 2020-10-07 PROCEDURE — 96900 PR ULTRAVIOLET LIGHT THERAPY: ICD-10-PCS | Mod: S$GLB,,, | Performed by: DERMATOLOGY

## 2020-10-07 PROCEDURE — 96900 ACTINOTHERAPY UV LIGHT: CPT | Mod: S$GLB,,, | Performed by: DERMATOLOGY

## 2020-10-13 ENCOUNTER — CLINICAL SUPPORT (OUTPATIENT)
Dept: DERMATOLOGY | Facility: CLINIC | Age: 60
End: 2020-10-13
Payer: MEDICARE

## 2020-10-13 DIAGNOSIS — L80 VITILIGO: ICD-10-CM

## 2020-10-13 PROCEDURE — 96900 PR ULTRAVIOLET LIGHT THERAPY: ICD-10-PCS | Mod: S$GLB,,, | Performed by: DERMATOLOGY

## 2020-10-13 PROCEDURE — 96900 ACTINOTHERAPY UV LIGHT: CPT | Mod: S$GLB,,, | Performed by: DERMATOLOGY

## 2020-10-14 ENCOUNTER — OFFICE VISIT (OUTPATIENT)
Dept: RHEUMATOLOGY | Facility: CLINIC | Age: 60
End: 2020-10-14
Payer: MEDICARE

## 2020-10-14 VITALS
HEART RATE: 81 BPM | BODY MASS INDEX: 26.2 KG/M2 | SYSTOLIC BLOOD PRESSURE: 121 MMHG | DIASTOLIC BLOOD PRESSURE: 77 MMHG | WEIGHT: 138.75 LBS | HEIGHT: 61 IN

## 2020-10-14 DIAGNOSIS — Z51.81 ENCOUNTER FOR METHOTREXATE MONITORING: Primary | ICD-10-CM

## 2020-10-14 DIAGNOSIS — M06.321 RHEUMATOID NODULE OF RIGHT ELBOW: Primary | ICD-10-CM

## 2020-10-14 DIAGNOSIS — M06.9 RHEUMATOID ARTHRITIS FLARE: ICD-10-CM

## 2020-10-14 DIAGNOSIS — Z51.81 ENCOUNTER FOR METHOTREXATE MONITORING: ICD-10-CM

## 2020-10-14 DIAGNOSIS — Z79.631 ENCOUNTER FOR METHOTREXATE MONITORING: Primary | ICD-10-CM

## 2020-10-14 DIAGNOSIS — Z79.631 ENCOUNTER FOR METHOTREXATE MONITORING: ICD-10-CM

## 2020-10-14 PROCEDURE — 99999 PR PBB SHADOW E&M-EST. PATIENT-LVL III: CPT | Mod: PBBFAC,,, | Performed by: INTERNAL MEDICINE

## 2020-10-14 PROCEDURE — 3074F PR MOST RECENT SYSTOLIC BLOOD PRESSURE < 130 MM HG: ICD-10-PCS | Mod: CPTII,S$GLB,, | Performed by: INTERNAL MEDICINE

## 2020-10-14 PROCEDURE — 3078F DIAST BP <80 MM HG: CPT | Mod: CPTII,S$GLB,, | Performed by: INTERNAL MEDICINE

## 2020-10-14 PROCEDURE — 3078F PR MOST RECENT DIASTOLIC BLOOD PRESSURE < 80 MM HG: ICD-10-PCS | Mod: CPTII,S$GLB,, | Performed by: INTERNAL MEDICINE

## 2020-10-14 PROCEDURE — 3074F SYST BP LT 130 MM HG: CPT | Mod: CPTII,S$GLB,, | Performed by: INTERNAL MEDICINE

## 2020-10-14 PROCEDURE — 99999 PR PBB SHADOW E&M-EST. PATIENT-LVL III: ICD-10-PCS | Mod: PBBFAC,,, | Performed by: INTERNAL MEDICINE

## 2020-10-14 PROCEDURE — 3008F PR BODY MASS INDEX (BMI) DOCUMENTED: ICD-10-PCS | Mod: CPTII,S$GLB,, | Performed by: INTERNAL MEDICINE

## 2020-10-14 PROCEDURE — 99215 OFFICE O/P EST HI 40 MIN: CPT | Mod: S$GLB,,, | Performed by: INTERNAL MEDICINE

## 2020-10-14 PROCEDURE — 3008F BODY MASS INDEX DOCD: CPT | Mod: CPTII,S$GLB,, | Performed by: INTERNAL MEDICINE

## 2020-10-14 PROCEDURE — 99215 PR OFFICE/OUTPT VISIT, EST, LEVL V, 40-54 MIN: ICD-10-PCS | Mod: S$GLB,,, | Performed by: INTERNAL MEDICINE

## 2020-10-14 RX ORDER — LEFLUNOMIDE 20 MG/1
20 TABLET ORAL DAILY
Qty: 30 TABLET | Refills: 0 | Status: SHIPPED | OUTPATIENT
Start: 2020-10-14 | End: 2021-02-03 | Stop reason: SDUPTHER

## 2020-10-14 NOTE — PROGRESS NOTES
Subjective:       Patient ID: Silvia Cervantes is a 54 y.o. female.    Chief Complaint:     HPI 55 yo F with PMH of HTN, abnormal pap (2013 but recent negative, no cancer), RA (+CCP, RF),erosive here for evaluation. She was diagnosed in 2011 with hand and feet with swelling and pain. She was initially treated with MTX but it gave her nausea which gave her nausea.  She was changed to leflunomide 20 mg 10/2014 from mtx due to nausea on the mtx. Then I added etanercept January 2015 due to incomplete control of her arthritis on leflunomide.  No am stiffness.  She reports mild aching in hands (3/10). Pain is aching.  Reports mild swelling in hands but better than usual.  She reports left upper quadrant pain (4/10) , non-radiating with possible nausea which has been present for months. Sometimes she has sour taste in mouth.       Interval history:  Patient is here for follow up.  She is taking MTX 8 pills once a week .She is taking folic acid 3 mg po qday.  She is having hair thinning.  She has worsening of the right nodules.   Reports she has diffuse morning stiffness for a few minutes. She is not taking the celebrex. She has pain and swelling in left foot for a few days.  Denies history of diverticulitis, PUD, or blood clot.   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   HENT: Negative for congestion, ear discharge, ear pain, facial swelling, mouth sores, sinus pressure, sneezing, sore throat, tinnitus and trouble swallowing.    Eyes: Negative for photophobia, pain, discharge, redness, itching and visual disturbance.   Respiratory: Negative  for apnea, chest tightness, shortness of breath, wheezing and stridor.    Cardiovascular: Negative for leg swelling.   Gastrointestinal: Negative for nausea, abdominal pain, diarrhea, constipation, blood in stool, abdominal distention and anal bleeding.   Endocrine: Negative for cold intolerance and heat intolerance.   Genitourinary: Negative for dysuria and difficulty urinating.   Musculoskeletal: Positive for arthralgias. Negative for myalgias, back pain, joint swelling, gait problem, neck pain and neck stiffness.   Skin: Negative for color change, pallor, rash and wound.   Neurological: Negative for dizziness, seizures, light-headedness and numbness.   Hematological: Negative for adenopathy. Does not bruise/bleed easily.   Psychiatric/Behavioral: Negative for sleep disturbance. The patient is not nervous/anxious.       Objective:        Physical Exam   Constitutional: She is oriented to person, place, and time.   HENT:   Head: Normocephalic and atraumatic.   Right Ear: External ear normal.   Left Ear: External ear normal.   Nose: Nose normal.   Mouth/Throat: Oropharynx is clear and moist. No oropharyngeal exudate.   Eyes: Conjunctivae and EOM are normal. Pupils are equal, round, and reactive to light. Right eye exhibits no discharge. Left eye exhibits no discharge. No scleral icterus.   Neck: Neck supple. No JVD present. No thyromegaly present.   Cardiovascular: Normal rate, regular rhythm, normal heart sounds and intact distal pulses.  Exam reveals no gallop and no friction rub.    No murmur heard.  Pulmonary/Chest: Effort normal and breath sounds normal. No respiratory distress. She has no wheezes. She has no rales. She exhibits no tenderness.   Abdominal: Soft. Bowel sounds are normal. She exhibits no distension and no mass. There is no tenderness. There is no rebound and no guarding.   Lymphadenopathy:     She has no cervical adenopathy.   Neurological: She is alert and oriented to person, place, and time. No  "cranial nerve deficit. Gait normal. Coordination normal.   Skin:diffuse hypopigmented lesions in arms, hands, legs, chest, back   Psychiatric: Affect and judgment normal.   Musculoskeletal: She exhibits no  tenderness. She exhibits no edema.   FROM of all joints including neck, shoulders, spine, ankles, wrists, knees, and ankles; no joint deformities noted or effusions, erythema or warmth; no tophi or nodules noted; no crepitus; no nail pitting or onycholysis          Labs:  Esr-25  ccp-93  rf-36  Flor-negative    Imaging:  MRI (7/22/2015)   (Stable enhancing marginal erosions in the second and third metacarpal heads, lunate, triquetrum, and capitate)      Right elbow xray (2020): I personally reviewed      dexa scan:(2017):  Osteopenia of the femoral neck. FRAX calculation does not support treatment for osteoporosis.Total hip and lumbar spine BMD unchanged since 2013.    Recommendations:  1) Adequate calcium and Vitamin D therapy  2) Appropriate exercise  3) Consider repeat BMD in 2- 4 years    Assessment:     58 yo F with PMH of HTN, abnormal pap (2013 but recent negative, no cancer), RA (+CCP, RF),erosive here , H. Pylori for follow up of seropositive RA and sarcoid.   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  She has abnormal brain MRI  AMYLOID ANGIOPATHY and IS SEEING NEURO.  She has worsening of nodules in right elbow, so will stop MTX,  She is concerned about nodules in right elbow.  Told her it could be from RA or MTX.    Discussed switching to enbrel or rinvoq but patient is afraid of side effects and would like to retry arava.  She is having worsening swelling in right wrist so told her she will likely need a biologic.          - stop MTX  -Continue folic acid 4 mg a day  Start arava 20mg po qday  (Risks and benefits of initiating discussed. Patient aware that risks include and not limited to lung toxicity, liver " abnormalities, neuropathy, cell count abnormalities, malignancy, and allergic  reaction to medication. Patient agrees with starting medication.  Labs in a month  -follow up  EMG  Continue celebrex 200mg po BID PRN

## 2020-10-15 ENCOUNTER — CLINICAL SUPPORT (OUTPATIENT)
Dept: DERMATOLOGY | Facility: CLINIC | Age: 60
End: 2020-10-15
Payer: MEDICARE

## 2020-10-15 DIAGNOSIS — L80 VITILIGO: ICD-10-CM

## 2020-10-15 PROCEDURE — 96900 PR ULTRAVIOLET LIGHT THERAPY: ICD-10-PCS | Mod: S$GLB,,, | Performed by: DERMATOLOGY

## 2020-10-15 PROCEDURE — 96900 ACTINOTHERAPY UV LIGHT: CPT | Mod: S$GLB,,, | Performed by: DERMATOLOGY

## 2020-10-20 ENCOUNTER — CLINICAL SUPPORT (OUTPATIENT)
Dept: DERMATOLOGY | Facility: CLINIC | Age: 60
End: 2020-10-20
Payer: MEDICARE

## 2020-10-20 DIAGNOSIS — L80 VITILIGO: ICD-10-CM

## 2020-10-20 PROCEDURE — 99999 PR PBB SHADOW E&M-EST. PATIENT-LVL III: ICD-10-PCS | Mod: PBBFAC,,,

## 2020-10-20 PROCEDURE — 96900 PR ULTRAVIOLET LIGHT THERAPY: ICD-10-PCS | Mod: S$GLB,,, | Performed by: DERMATOLOGY

## 2020-10-20 PROCEDURE — 96900 ACTINOTHERAPY UV LIGHT: CPT | Mod: S$GLB,,, | Performed by: DERMATOLOGY

## 2020-10-20 PROCEDURE — 99999 PR PBB SHADOW E&M-EST. PATIENT-LVL III: CPT | Mod: PBBFAC,,,

## 2020-10-20 NOTE — PROGRESS NOTES
Light tx 17 for vitiligo. NB-UVB to body at 1000 mj with eyes shielded. Pt will start Leflunomide in future and will adjust dose of light.

## 2020-10-27 ENCOUNTER — CLINICAL SUPPORT (OUTPATIENT)
Dept: DERMATOLOGY | Facility: CLINIC | Age: 60
End: 2020-10-27
Payer: MEDICARE

## 2020-10-27 DIAGNOSIS — L80 VITILIGO: ICD-10-CM

## 2020-10-27 PROCEDURE — 96900 ACTINOTHERAPY UV LIGHT: CPT | Mod: S$GLB,,, | Performed by: DERMATOLOGY

## 2020-10-27 PROCEDURE — 99999 PR PBB SHADOW E&M-EST. PATIENT-LVL III: CPT | Mod: PBBFAC,,,

## 2020-10-27 PROCEDURE — 96900 PR ULTRAVIOLET LIGHT THERAPY: ICD-10-PCS | Mod: S$GLB,,, | Performed by: DERMATOLOGY

## 2020-10-27 PROCEDURE — 99999 PR PBB SHADOW E&M-EST. PATIENT-LVL III: ICD-10-PCS | Mod: PBBFAC,,,

## 2020-10-30 ENCOUNTER — PATIENT OUTREACH (OUTPATIENT)
Dept: ADMINISTRATIVE | Facility: HOSPITAL | Age: 60
End: 2020-10-30

## 2020-11-03 ENCOUNTER — CLINICAL SUPPORT (OUTPATIENT)
Dept: DERMATOLOGY | Facility: CLINIC | Age: 60
End: 2020-11-03
Payer: MEDICARE

## 2020-11-03 DIAGNOSIS — L80 VITILIGO: ICD-10-CM

## 2020-11-03 PROCEDURE — 99999 PR PBB SHADOW E&M-EST. PATIENT-LVL III: ICD-10-PCS | Mod: PBBFAC,,,

## 2020-11-03 PROCEDURE — 96900 PR ULTRAVIOLET LIGHT THERAPY: ICD-10-PCS | Mod: S$GLB,,, | Performed by: DERMATOLOGY

## 2020-11-03 PROCEDURE — 96900 ACTINOTHERAPY UV LIGHT: CPT | Mod: S$GLB,,, | Performed by: DERMATOLOGY

## 2020-11-03 PROCEDURE — 99999 PR PBB SHADOW E&M-EST. PATIENT-LVL III: CPT | Mod: PBBFAC,,,

## 2020-11-05 ENCOUNTER — CLINICAL SUPPORT (OUTPATIENT)
Dept: DERMATOLOGY | Facility: CLINIC | Age: 60
End: 2020-11-05
Payer: MEDICARE

## 2020-11-05 DIAGNOSIS — L80 VITILIGO: ICD-10-CM

## 2020-11-05 PROCEDURE — 96900 ACTINOTHERAPY UV LIGHT: CPT | Mod: S$GLB,,, | Performed by: DERMATOLOGY

## 2020-11-05 PROCEDURE — 99999 PR PBB SHADOW E&M-EST. PATIENT-LVL III: ICD-10-PCS | Mod: PBBFAC,,,

## 2020-11-05 PROCEDURE — 99999 PR PBB SHADOW E&M-EST. PATIENT-LVL III: CPT | Mod: PBBFAC,,,

## 2020-11-05 PROCEDURE — 96900 PR ULTRAVIOLET LIGHT THERAPY: ICD-10-PCS | Mod: S$GLB,,, | Performed by: DERMATOLOGY

## 2020-11-09 ENCOUNTER — CLINICAL SUPPORT (OUTPATIENT)
Dept: DERMATOLOGY | Facility: CLINIC | Age: 60
End: 2020-11-09
Payer: MEDICARE

## 2020-11-09 ENCOUNTER — PATIENT MESSAGE (OUTPATIENT)
Dept: SPINE | Facility: CLINIC | Age: 60
End: 2020-11-09

## 2020-11-09 DIAGNOSIS — L80 VITILIGO: ICD-10-CM

## 2020-11-09 PROCEDURE — 96900 ACTINOTHERAPY UV LIGHT: CPT | Mod: S$GLB,,, | Performed by: DERMATOLOGY

## 2020-11-09 PROCEDURE — 96900 PR ULTRAVIOLET LIGHT THERAPY: ICD-10-PCS | Mod: S$GLB,,, | Performed by: DERMATOLOGY

## 2020-11-11 ENCOUNTER — TELEPHONE (OUTPATIENT)
Dept: SPINE | Facility: CLINIC | Age: 60
End: 2020-11-11

## 2020-11-11 ENCOUNTER — OFFICE VISIT (OUTPATIENT)
Dept: DERMATOLOGY | Facility: CLINIC | Age: 60
End: 2020-11-11
Payer: MEDICARE

## 2020-11-11 ENCOUNTER — LAB VISIT (OUTPATIENT)
Dept: LAB | Facility: HOSPITAL | Age: 60
End: 2020-11-11
Attending: DERMATOLOGY
Payer: MEDICARE

## 2020-11-11 DIAGNOSIS — L80 VITILIGO: ICD-10-CM

## 2020-11-11 DIAGNOSIS — D22.9 NEVUS: Primary | ICD-10-CM

## 2020-11-11 DIAGNOSIS — L82.1 SK (SEBORRHEIC KERATOSIS): ICD-10-CM

## 2020-11-11 DIAGNOSIS — Z51.81 ENCOUNTER FOR METHOTREXATE MONITORING: ICD-10-CM

## 2020-11-11 DIAGNOSIS — L81.4 LENTIGO: ICD-10-CM

## 2020-11-11 DIAGNOSIS — R20.0 ANESTHESIA OF SKIN: ICD-10-CM

## 2020-11-11 DIAGNOSIS — Z79.631 ENCOUNTER FOR METHOTREXATE MONITORING: ICD-10-CM

## 2020-11-11 LAB
ALBUMIN SERPL BCP-MCNC: 4.2 G/DL (ref 3.5–5.2)
ALP SERPL-CCNC: 91 U/L (ref 55–135)
ALT SERPL W/O P-5'-P-CCNC: 23 U/L (ref 10–44)
ANION GAP SERPL CALC-SCNC: 12 MMOL/L (ref 8–16)
AST SERPL-CCNC: 23 U/L (ref 10–40)
BASOPHILS # BLD AUTO: 0.05 K/UL (ref 0–0.2)
BASOPHILS NFR BLD: 0.6 % (ref 0–1.9)
BILIRUB SERPL-MCNC: 0.3 MG/DL (ref 0.1–1)
BUN SERPL-MCNC: 13 MG/DL (ref 6–20)
CALCIUM SERPL-MCNC: 9.9 MG/DL (ref 8.7–10.5)
CHLORIDE SERPL-SCNC: 105 MMOL/L (ref 95–110)
CO2 SERPL-SCNC: 26 MMOL/L (ref 23–29)
CREAT SERPL-MCNC: 0.7 MG/DL (ref 0.5–1.4)
CRP SERPL-MCNC: 7 MG/L (ref 0–8.2)
DIFFERENTIAL METHOD: ABNORMAL
EOSINOPHIL # BLD AUTO: 0.2 K/UL (ref 0–0.5)
EOSINOPHIL NFR BLD: 2.3 % (ref 0–8)
ERYTHROCYTE [DISTWIDTH] IN BLOOD BY AUTOMATED COUNT: 13.1 % (ref 11.5–14.5)
ERYTHROCYTE [SEDIMENTATION RATE] IN BLOOD BY WESTERGREN METHOD: 34 MM/HR (ref 0–36)
EST. GFR  (AFRICAN AMERICAN): >60 ML/MIN/1.73 M^2
EST. GFR  (NON AFRICAN AMERICAN): >60 ML/MIN/1.73 M^2
GLUCOSE SERPL-MCNC: 100 MG/DL (ref 70–110)
HCT VFR BLD AUTO: 43.8 % (ref 37–48.5)
HGB BLD-MCNC: 13.7 G/DL (ref 12–16)
IMM GRANULOCYTES # BLD AUTO: 0.03 K/UL (ref 0–0.04)
IMM GRANULOCYTES NFR BLD AUTO: 0.4 % (ref 0–0.5)
LYMPHOCYTES # BLD AUTO: 1.9 K/UL (ref 1–4.8)
LYMPHOCYTES NFR BLD: 22.9 % (ref 18–48)
MCH RBC QN AUTO: 32.6 PG (ref 27–31)
MCHC RBC AUTO-ENTMCNC: 31.3 G/DL (ref 32–36)
MCV RBC AUTO: 104 FL (ref 82–98)
MONOCYTES # BLD AUTO: 0.7 K/UL (ref 0.3–1)
MONOCYTES NFR BLD: 8.1 % (ref 4–15)
NEUTROPHILS # BLD AUTO: 5.5 K/UL (ref 1.8–7.7)
NEUTROPHILS NFR BLD: 65.7 % (ref 38–73)
NRBC BLD-RTO: 0 /100 WBC
PLATELET # BLD AUTO: 346 K/UL (ref 150–350)
PMV BLD AUTO: 9.8 FL (ref 9.2–12.9)
POTASSIUM SERPL-SCNC: 3.8 MMOL/L (ref 3.5–5.1)
PROT SERPL-MCNC: 8.2 G/DL (ref 6–8.4)
RBC # BLD AUTO: 4.2 M/UL (ref 4–5.4)
SODIUM SERPL-SCNC: 143 MMOL/L (ref 136–145)
TSH SERPL DL<=0.005 MIU/L-ACNC: 2.23 UIU/ML (ref 0.4–4)
VIT B12 SERPL-MCNC: 411 PG/ML (ref 210–950)
WBC # BLD AUTO: 8.29 K/UL (ref 3.9–12.7)

## 2020-11-11 PROCEDURE — 36415 COLL VENOUS BLD VENIPUNCTURE: CPT | Mod: PO

## 2020-11-11 PROCEDURE — 84443 ASSAY THYROID STIM HORMONE: CPT

## 2020-11-11 PROCEDURE — 86140 C-REACTIVE PROTEIN: CPT

## 2020-11-11 PROCEDURE — 99214 PR OFFICE/OUTPT VISIT, EST, LEVL IV, 30-39 MIN: ICD-10-PCS | Mod: S$GLB,,, | Performed by: DERMATOLOGY

## 2020-11-11 PROCEDURE — 80053 COMPREHEN METABOLIC PANEL: CPT

## 2020-11-11 PROCEDURE — 99214 OFFICE O/P EST MOD 30 MIN: CPT | Mod: S$GLB,,, | Performed by: DERMATOLOGY

## 2020-11-11 PROCEDURE — 85025 COMPLETE CBC W/AUTO DIFF WBC: CPT

## 2020-11-11 PROCEDURE — 99999 PR PBB SHADOW E&M-EST. PATIENT-LVL IV: CPT | Mod: PBBFAC,,, | Performed by: DERMATOLOGY

## 2020-11-11 PROCEDURE — 85652 RBC SED RATE AUTOMATED: CPT

## 2020-11-11 PROCEDURE — 82607 VITAMIN B-12: CPT

## 2020-11-11 PROCEDURE — 99999 PR PBB SHADOW E&M-EST. PATIENT-LVL IV: ICD-10-PCS | Mod: PBBFAC,,, | Performed by: DERMATOLOGY

## 2020-11-11 NOTE — Clinical Note
Maybe reduce light time by a little?? Pt states she was red under breasts after last treatment. We discussed trying for 3more months and if no response then will d/c
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
## 2020-11-11 NOTE — TELEPHONE ENCOUNTER
Spoke with patient and confirmed appointment with Dr. Connelly.  Date, time and location addressed with patient.  Patient instructed to arrive 15 minutes prior to appointment.  No questions or concerns from patient.

## 2020-11-12 ENCOUNTER — OFFICE VISIT (OUTPATIENT)
Dept: SPINE | Facility: CLINIC | Age: 60
End: 2020-11-12
Attending: PHYSICAL MEDICINE & REHABILITATION
Payer: MEDICARE

## 2020-11-12 VITALS
HEIGHT: 61 IN | WEIGHT: 138.69 LBS | DIASTOLIC BLOOD PRESSURE: 62 MMHG | SYSTOLIC BLOOD PRESSURE: 133 MMHG | HEART RATE: 94 BPM | BODY MASS INDEX: 26.19 KG/M2

## 2020-11-12 DIAGNOSIS — G89.29 CHRONIC NECK AND BACK PAIN: ICD-10-CM

## 2020-11-12 DIAGNOSIS — S32.810S MULTIPLE CLOSED FRACTURES OF PELVIS WITH STABLE DISRUPTION OF PELVIC RING, SEQUELA: ICD-10-CM

## 2020-11-12 DIAGNOSIS — T14.90XA TRAUMA: ICD-10-CM

## 2020-11-12 DIAGNOSIS — M54.2 CHRONIC NECK AND BACK PAIN: ICD-10-CM

## 2020-11-12 DIAGNOSIS — M79.2 NEURALGIA: ICD-10-CM

## 2020-11-12 DIAGNOSIS — R42 DIZZINESS: ICD-10-CM

## 2020-11-12 DIAGNOSIS — M54.9 CHRONIC NECK AND BACK PAIN: ICD-10-CM

## 2020-11-12 DIAGNOSIS — R20.0 LEFT SIDED NUMBNESS: Primary | ICD-10-CM

## 2020-11-12 PROCEDURE — 3078F PR MOST RECENT DIASTOLIC BLOOD PRESSURE < 80 MM HG: ICD-10-PCS | Mod: CPTII,S$GLB,, | Performed by: PHYSICAL MEDICINE & REHABILITATION

## 2020-11-12 PROCEDURE — 99499 UNLISTED E&M SERVICE: CPT | Mod: S$GLB,,, | Performed by: PHYSICAL MEDICINE & REHABILITATION

## 2020-11-12 PROCEDURE — 3078F DIAST BP <80 MM HG: CPT | Mod: CPTII,S$GLB,, | Performed by: PHYSICAL MEDICINE & REHABILITATION

## 2020-11-12 PROCEDURE — 3075F SYST BP GE 130 - 139MM HG: CPT | Mod: CPTII,S$GLB,, | Performed by: PHYSICAL MEDICINE & REHABILITATION

## 2020-11-12 PROCEDURE — 99999 PR PBB SHADOW E&M-EST. PATIENT-LVL IV: ICD-10-PCS | Mod: PBBFAC,,, | Performed by: PHYSICAL MEDICINE & REHABILITATION

## 2020-11-12 PROCEDURE — 3008F PR BODY MASS INDEX (BMI) DOCUMENTED: ICD-10-PCS | Mod: CPTII,S$GLB,, | Performed by: PHYSICAL MEDICINE & REHABILITATION

## 2020-11-12 PROCEDURE — 3008F BODY MASS INDEX DOCD: CPT | Mod: CPTII,S$GLB,, | Performed by: PHYSICAL MEDICINE & REHABILITATION

## 2020-11-12 PROCEDURE — 99214 OFFICE O/P EST MOD 30 MIN: CPT | Mod: S$GLB,,, | Performed by: PHYSICAL MEDICINE & REHABILITATION

## 2020-11-12 PROCEDURE — 1125F AMNT PAIN NOTED PAIN PRSNT: CPT | Mod: S$GLB,,, | Performed by: PHYSICAL MEDICINE & REHABILITATION

## 2020-11-12 PROCEDURE — 99999 PR PBB SHADOW E&M-EST. PATIENT-LVL IV: CPT | Mod: PBBFAC,,, | Performed by: PHYSICAL MEDICINE & REHABILITATION

## 2020-11-12 PROCEDURE — 99214 PR OFFICE/OUTPT VISIT, EST, LEVL IV, 30-39 MIN: ICD-10-PCS | Mod: S$GLB,,, | Performed by: PHYSICAL MEDICINE & REHABILITATION

## 2020-11-12 PROCEDURE — 1125F PR PAIN SEVERITY QUANTIFIED, PAIN PRESENT: ICD-10-PCS | Mod: S$GLB,,, | Performed by: PHYSICAL MEDICINE & REHABILITATION

## 2020-11-12 PROCEDURE — 3075F PR MOST RECENT SYSTOLIC BLOOD PRESS GE 130-139MM HG: ICD-10-PCS | Mod: CPTII,S$GLB,, | Performed by: PHYSICAL MEDICINE & REHABILITATION

## 2020-11-12 PROCEDURE — 99499 RISK ADDL DX/OHS AUDIT: ICD-10-PCS | Mod: S$GLB,,, | Performed by: PHYSICAL MEDICINE & REHABILITATION

## 2020-11-13 ENCOUNTER — PATIENT MESSAGE (OUTPATIENT)
Dept: DERMATOLOGY | Facility: CLINIC | Age: 60
End: 2020-11-13

## 2020-11-13 ENCOUNTER — HOSPITAL ENCOUNTER (OUTPATIENT)
Dept: RADIOLOGY | Facility: HOSPITAL | Age: 60
Discharge: HOME | End: 2020-11-13
Attending: PODIATRIST
Payer: MEDICARE

## 2020-11-13 ENCOUNTER — OFFICE VISIT (OUTPATIENT)
Dept: PODIATRY | Facility: CLINIC | Age: 60
End: 2020-11-13
Payer: MEDICARE

## 2020-11-13 VITALS — BODY MASS INDEX: 26.19 KG/M2 | HEIGHT: 61 IN | WEIGHT: 138.69 LBS

## 2020-11-13 DIAGNOSIS — M06.379: ICD-10-CM

## 2020-11-13 DIAGNOSIS — M79.671 BILATERAL FOOT PAIN: ICD-10-CM

## 2020-11-13 DIAGNOSIS — M79.672 BILATERAL FOOT PAIN: ICD-10-CM

## 2020-11-13 DIAGNOSIS — M06.379: Primary | ICD-10-CM

## 2020-11-13 DIAGNOSIS — B35.1 ONYCHOMYCOSIS: ICD-10-CM

## 2020-11-13 PROCEDURE — 73630 XR FOOT COMPLETE 3 VIEW BILATERAL: ICD-10-PCS | Mod: 26,50,, | Performed by: RADIOLOGY

## 2020-11-13 PROCEDURE — 3008F BODY MASS INDEX DOCD: CPT | Mod: CPTII,S$GLB,, | Performed by: PODIATRIST

## 2020-11-13 PROCEDURE — 99213 OFFICE O/P EST LOW 20 MIN: CPT | Mod: 25,S$GLB,, | Performed by: PODIATRIST

## 2020-11-13 PROCEDURE — 20600 DRAIN/INJ JOINT/BURSA W/O US: CPT | Mod: RT,S$GLB,, | Performed by: PODIATRIST

## 2020-11-13 PROCEDURE — 3008F PR BODY MASS INDEX (BMI) DOCUMENTED: ICD-10-PCS | Mod: CPTII,S$GLB,, | Performed by: PODIATRIST

## 2020-11-13 PROCEDURE — 99999 PR PBB SHADOW E&M-EST. PATIENT-LVL V: CPT | Mod: PBBFAC,,, | Performed by: PODIATRIST

## 2020-11-13 PROCEDURE — 1125F AMNT PAIN NOTED PAIN PRSNT: CPT | Mod: S$GLB,,, | Performed by: PODIATRIST

## 2020-11-13 PROCEDURE — 99999 PR PBB SHADOW E&M-EST. PATIENT-LVL V: ICD-10-PCS | Mod: PBBFAC,,, | Performed by: PODIATRIST

## 2020-11-13 PROCEDURE — 99213 PR OFFICE/OUTPT VISIT, EST, LEVL III, 20-29 MIN: ICD-10-PCS | Mod: 25,S$GLB,, | Performed by: PODIATRIST

## 2020-11-13 PROCEDURE — 73630 X-RAY EXAM OF FOOT: CPT | Mod: TC,50,PN

## 2020-11-13 PROCEDURE — 73630 X-RAY EXAM OF FOOT: CPT | Mod: 26,50,, | Performed by: RADIOLOGY

## 2020-11-13 PROCEDURE — 1125F PR PAIN SEVERITY QUANTIFIED, PAIN PRESENT: ICD-10-PCS | Mod: S$GLB,,, | Performed by: PODIATRIST

## 2020-11-13 PROCEDURE — 20600 PR DRAIN/INJECT SMALL JOINT/BURSA: ICD-10-PCS | Mod: RT,S$GLB,, | Performed by: PODIATRIST

## 2020-11-13 RX ORDER — METHYLPREDNISOLONE ACETATE 40 MG/ML
80 INJECTION, SUSPENSION INTRA-ARTICULAR; INTRALESIONAL; INTRAMUSCULAR; SOFT TISSUE
Status: COMPLETED | OUTPATIENT
Start: 2020-11-13 | End: 2020-11-13

## 2020-11-13 RX ADMIN — METHYLPREDNISOLONE ACETATE 80 MG: 40 INJECTION, SUSPENSION INTRA-ARTICULAR; INTRALESIONAL; INTRAMUSCULAR; SOFT TISSUE at 12:11
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
## 2020-11-15 ENCOUNTER — PATIENT MESSAGE (OUTPATIENT)
Dept: DERMATOLOGY | Facility: CLINIC | Age: 60
End: 2020-11-15

## 2020-11-17 ENCOUNTER — CLINICAL SUPPORT (OUTPATIENT)
Dept: DERMATOLOGY | Facility: CLINIC | Age: 60
End: 2020-11-17
Payer: MEDICARE

## 2020-11-17 DIAGNOSIS — L80 VITILIGO: ICD-10-CM

## 2020-11-17 PROCEDURE — 99999 PR PBB SHADOW E&M-EST. PATIENT-LVL III: CPT | Mod: PBBFAC,,,

## 2020-11-17 PROCEDURE — 96900 ACTINOTHERAPY UV LIGHT: CPT | Mod: S$GLB,,, | Performed by: DERMATOLOGY

## 2020-11-17 PROCEDURE — 96900 PR ULTRAVIOLET LIGHT THERAPY: ICD-10-PCS | Mod: S$GLB,,, | Performed by: DERMATOLOGY

## 2020-11-17 PROCEDURE — 99999 PR PBB SHADOW E&M-EST. PATIENT-LVL III: ICD-10-PCS | Mod: PBBFAC,,,

## 2020-11-17 NOTE — PROGRESS NOTES
Light tx 22 for vitiligo. NB-UVB to body at 1150 mj with eyes shielded. Reduced dose due to mild erythema  On breast and chest after last dose.

## 2020-11-20 ENCOUNTER — PATIENT MESSAGE (OUTPATIENT)
Dept: PODIATRY | Facility: CLINIC | Age: 60
End: 2020-11-20

## 2020-11-23 ENCOUNTER — PATIENT MESSAGE (OUTPATIENT)
Dept: RHEUMATOLOGY | Facility: CLINIC | Age: 60
End: 2020-11-23

## 2020-12-01 ENCOUNTER — CLINICAL SUPPORT (OUTPATIENT)
Dept: DERMATOLOGY | Facility: CLINIC | Age: 60
End: 2020-12-01
Payer: MEDICARE

## 2020-12-01 DIAGNOSIS — L80 VITILIGO: ICD-10-CM

## 2020-12-01 PROCEDURE — 99999 PR PBB SHADOW E&M-EST. PATIENT-LVL III: ICD-10-PCS | Mod: PBBFAC,,,

## 2020-12-01 PROCEDURE — 99999 PR PBB SHADOW E&M-EST. PATIENT-LVL III: CPT | Mod: PBBFAC,,,

## 2020-12-01 PROCEDURE — 96900 PR ULTRAVIOLET LIGHT THERAPY: ICD-10-PCS | Mod: S$GLB,,, | Performed by: DERMATOLOGY

## 2020-12-01 PROCEDURE — 96900 ACTINOTHERAPY UV LIGHT: CPT | Mod: S$GLB,,, | Performed by: DERMATOLOGY

## 2020-12-08 ENCOUNTER — CLINICAL SUPPORT (OUTPATIENT)
Dept: DERMATOLOGY | Facility: CLINIC | Age: 60
End: 2020-12-08
Payer: MEDICARE

## 2020-12-08 DIAGNOSIS — L80 VITILIGO: ICD-10-CM

## 2020-12-08 PROCEDURE — 96900 PR ULTRAVIOLET LIGHT THERAPY: ICD-10-PCS | Mod: S$GLB,,, | Performed by: DERMATOLOGY

## 2020-12-08 PROCEDURE — 99999 PR PBB SHADOW E&M-EST. PATIENT-LVL III: CPT | Mod: PBBFAC,,,

## 2020-12-08 PROCEDURE — 99999 PR PBB SHADOW E&M-EST. PATIENT-LVL III: ICD-10-PCS | Mod: PBBFAC,,,

## 2020-12-08 PROCEDURE — 96900 ACTINOTHERAPY UV LIGHT: CPT | Mod: S$GLB,,, | Performed by: DERMATOLOGY

## 2021-01-07 ENCOUNTER — CLINICAL SUPPORT (OUTPATIENT)
Dept: DERMATOLOGY | Facility: CLINIC | Age: 61
End: 2021-01-07
Payer: MEDICARE

## 2021-01-07 DIAGNOSIS — L80 VITILIGO: ICD-10-CM

## 2021-01-07 PROCEDURE — 96900 ACTINOTHERAPY UV LIGHT: CPT | Mod: S$GLB,,, | Performed by: DERMATOLOGY

## 2021-01-07 PROCEDURE — 96900 PR ULTRAVIOLET LIGHT THERAPY: ICD-10-PCS | Mod: S$GLB,,, | Performed by: DERMATOLOGY

## 2021-01-13 ENCOUNTER — CLINICAL SUPPORT (OUTPATIENT)
Dept: DERMATOLOGY | Facility: CLINIC | Age: 61
End: 2021-01-13
Payer: MEDICARE

## 2021-01-13 DIAGNOSIS — L80 VITILIGO: ICD-10-CM

## 2021-01-13 PROCEDURE — 99999 PR PBB SHADOW E&M-EST. PATIENT-LVL III: ICD-10-PCS | Mod: PBBFAC,,,

## 2021-01-13 PROCEDURE — 99999 PR PBB SHADOW E&M-EST. PATIENT-LVL III: CPT | Mod: PBBFAC,,,

## 2021-01-13 PROCEDURE — 96900 ACTINOTHERAPY UV LIGHT: CPT | Mod: S$GLB,,, | Performed by: DERMATOLOGY

## 2021-01-13 PROCEDURE — 96900 PR ULTRAVIOLET LIGHT THERAPY: ICD-10-PCS | Mod: S$GLB,,, | Performed by: DERMATOLOGY

## 2021-01-14 ENCOUNTER — PATIENT MESSAGE (OUTPATIENT)
Dept: FAMILY MEDICINE | Facility: CLINIC | Age: 61
End: 2021-01-14

## 2021-01-19 ENCOUNTER — PATIENT MESSAGE (OUTPATIENT)
Dept: RHEUMATOLOGY | Facility: CLINIC | Age: 61
End: 2021-01-19

## 2021-01-20 ENCOUNTER — CLINICAL SUPPORT (OUTPATIENT)
Dept: DERMATOLOGY | Facility: CLINIC | Age: 61
End: 2021-01-20
Payer: MEDICARE

## 2021-01-20 DIAGNOSIS — L80 VITILIGO: ICD-10-CM

## 2021-01-20 PROCEDURE — 96900 PR ULTRAVIOLET LIGHT THERAPY: ICD-10-PCS | Mod: S$GLB,,, | Performed by: DERMATOLOGY

## 2021-01-20 PROCEDURE — 96900 ACTINOTHERAPY UV LIGHT: CPT | Mod: S$GLB,,, | Performed by: DERMATOLOGY

## 2021-01-20 PROCEDURE — 99999 PR PBB SHADOW E&M-EST. PATIENT-LVL III: ICD-10-PCS | Mod: PBBFAC,,,

## 2021-01-20 PROCEDURE — 99999 PR PBB SHADOW E&M-EST. PATIENT-LVL III: CPT | Mod: PBBFAC,,,

## 2021-01-26 ENCOUNTER — OFFICE VISIT (OUTPATIENT)
Dept: PULMONOLOGY | Facility: CLINIC | Age: 61
End: 2021-01-26
Payer: MEDICARE

## 2021-01-26 VITALS
HEART RATE: 96 BPM | WEIGHT: 135.63 LBS | HEIGHT: 61 IN | DIASTOLIC BLOOD PRESSURE: 88 MMHG | RESPIRATION RATE: 18 BRPM | OXYGEN SATURATION: 96 % | BODY MASS INDEX: 25.61 KG/M2 | SYSTOLIC BLOOD PRESSURE: 144 MMHG

## 2021-01-26 DIAGNOSIS — R91.1 SOLITARY PULMONARY NODULE: ICD-10-CM

## 2021-01-26 DIAGNOSIS — J30.1 NON-SEASONAL ALLERGIC RHINITIS DUE TO POLLEN: ICD-10-CM

## 2021-01-26 DIAGNOSIS — R05.9 COUGH IN ADULT: ICD-10-CM

## 2021-01-26 DIAGNOSIS — D86.0 SARCOIDOSIS OF LUNG: Primary | ICD-10-CM

## 2021-01-26 PROCEDURE — 3077F PR MOST RECENT SYSTOLIC BLOOD PRESSURE >= 140 MM HG: ICD-10-PCS | Mod: CPTII,S$GLB,, | Performed by: INTERNAL MEDICINE

## 2021-01-26 PROCEDURE — 3008F PR BODY MASS INDEX (BMI) DOCUMENTED: ICD-10-PCS | Mod: CPTII,S$GLB,, | Performed by: INTERNAL MEDICINE

## 2021-01-26 PROCEDURE — 1126F PR PAIN SEVERITY QUANTIFIED, NO PAIN PRESENT: ICD-10-PCS | Mod: S$GLB,,, | Performed by: INTERNAL MEDICINE

## 2021-01-26 PROCEDURE — 99213 OFFICE O/P EST LOW 20 MIN: CPT | Mod: S$GLB,,, | Performed by: INTERNAL MEDICINE

## 2021-01-26 PROCEDURE — 99213 PR OFFICE/OUTPT VISIT, EST, LEVL III, 20-29 MIN: ICD-10-PCS | Mod: S$GLB,,, | Performed by: INTERNAL MEDICINE

## 2021-01-26 PROCEDURE — 3079F PR MOST RECENT DIASTOLIC BLOOD PRESSURE 80-89 MM HG: ICD-10-PCS | Mod: CPTII,S$GLB,, | Performed by: INTERNAL MEDICINE

## 2021-01-26 PROCEDURE — 3079F DIAST BP 80-89 MM HG: CPT | Mod: CPTII,S$GLB,, | Performed by: INTERNAL MEDICINE

## 2021-01-26 PROCEDURE — 3077F SYST BP >= 140 MM HG: CPT | Mod: CPTII,S$GLB,, | Performed by: INTERNAL MEDICINE

## 2021-01-26 PROCEDURE — 99999 PR PBB SHADOW E&M-EST. PATIENT-LVL V: CPT | Mod: PBBFAC,,, | Performed by: INTERNAL MEDICINE

## 2021-01-26 PROCEDURE — 1126F AMNT PAIN NOTED NONE PRSNT: CPT | Mod: S$GLB,,, | Performed by: INTERNAL MEDICINE

## 2021-01-26 PROCEDURE — 3008F BODY MASS INDEX DOCD: CPT | Mod: CPTII,S$GLB,, | Performed by: INTERNAL MEDICINE

## 2021-01-26 PROCEDURE — 99999 PR PBB SHADOW E&M-EST. PATIENT-LVL V: ICD-10-PCS | Mod: PBBFAC,,, | Performed by: INTERNAL MEDICINE

## 2021-01-26 RX ORDER — AZELASTINE 1 MG/ML
1 SPRAY, METERED NASAL 2 TIMES DAILY
Qty: 30 ML | Refills: 3 | Status: SHIPPED | OUTPATIENT
Start: 2021-01-26 | End: 2021-03-23 | Stop reason: SDUPTHER

## 2021-01-27 ENCOUNTER — CLINICAL SUPPORT (OUTPATIENT)
Dept: DERMATOLOGY | Facility: CLINIC | Age: 61
End: 2021-01-27
Payer: MEDICARE

## 2021-01-27 DIAGNOSIS — L80 VITILIGO: ICD-10-CM

## 2021-01-27 PROBLEM — R05.9 COUGH IN ADULT: Status: ACTIVE | Noted: 2021-01-27

## 2021-01-27 PROCEDURE — 96900 PR ULTRAVIOLET LIGHT THERAPY: ICD-10-PCS | Mod: S$GLB,,, | Performed by: DERMATOLOGY

## 2021-01-27 PROCEDURE — 96900 ACTINOTHERAPY UV LIGHT: CPT | Mod: S$GLB,,, | Performed by: DERMATOLOGY

## 2021-01-27 PROCEDURE — 99999 PR PBB SHADOW E&M-EST. PATIENT-LVL III: ICD-10-PCS | Mod: PBBFAC,,,

## 2021-01-27 PROCEDURE — 99999 PR PBB SHADOW E&M-EST. PATIENT-LVL III: CPT | Mod: PBBFAC,,,

## 2021-02-02 ENCOUNTER — CLINICAL SUPPORT (OUTPATIENT)
Dept: DERMATOLOGY | Facility: CLINIC | Age: 61
End: 2021-02-02
Payer: MEDICARE

## 2021-02-02 DIAGNOSIS — L80 VITILIGO: ICD-10-CM

## 2021-02-02 PROCEDURE — 96900 PR ULTRAVIOLET LIGHT THERAPY: ICD-10-PCS | Mod: S$GLB,,, | Performed by: DERMATOLOGY

## 2021-02-02 PROCEDURE — 99999 PR PBB SHADOW E&M-EST. PATIENT-LVL III: ICD-10-PCS | Mod: PBBFAC,,,

## 2021-02-02 PROCEDURE — 99999 PR PBB SHADOW E&M-EST. PATIENT-LVL III: CPT | Mod: PBBFAC,,,

## 2021-02-02 PROCEDURE — 96900 ACTINOTHERAPY UV LIGHT: CPT | Mod: S$GLB,,, | Performed by: DERMATOLOGY

## 2021-02-03 ENCOUNTER — OFFICE VISIT (OUTPATIENT)
Dept: RHEUMATOLOGY | Facility: CLINIC | Age: 61
End: 2021-02-03
Payer: MEDICARE

## 2021-02-03 VITALS
BODY MASS INDEX: 25.26 KG/M2 | TEMPERATURE: 97 F | WEIGHT: 133.69 LBS | HEART RATE: 80 BPM | DIASTOLIC BLOOD PRESSURE: 93 MMHG | SYSTOLIC BLOOD PRESSURE: 151 MMHG

## 2021-02-03 DIAGNOSIS — Z51.81 ENCOUNTER FOR MONITORING LEFLUNOMIDE THERAPY: ICD-10-CM

## 2021-02-03 DIAGNOSIS — M06.9 RHEUMATOID ARTHRITIS FLARE: ICD-10-CM

## 2021-02-03 DIAGNOSIS — D86.9 SARCOIDOSIS: Primary | ICD-10-CM

## 2021-02-03 DIAGNOSIS — Z79.899 ENCOUNTER FOR MONITORING LEFLUNOMIDE THERAPY: ICD-10-CM

## 2021-02-03 DIAGNOSIS — M06.9 RHEUMATOID ARTHRITIS FLARE: Primary | ICD-10-CM

## 2021-02-03 PROCEDURE — 99999 PR PBB SHADOW E&M-EST. PATIENT-LVL V: CPT | Mod: PBBFAC,,, | Performed by: INTERNAL MEDICINE

## 2021-02-03 PROCEDURE — 3077F PR MOST RECENT SYSTOLIC BLOOD PRESSURE >= 140 MM HG: ICD-10-PCS | Mod: CPTII,S$GLB,, | Performed by: INTERNAL MEDICINE

## 2021-02-03 PROCEDURE — 99215 OFFICE O/P EST HI 40 MIN: CPT | Mod: S$GLB,,, | Performed by: INTERNAL MEDICINE

## 2021-02-03 PROCEDURE — 3080F DIAST BP >= 90 MM HG: CPT | Mod: CPTII,S$GLB,, | Performed by: INTERNAL MEDICINE

## 2021-02-03 PROCEDURE — 3077F SYST BP >= 140 MM HG: CPT | Mod: CPTII,S$GLB,, | Performed by: INTERNAL MEDICINE

## 2021-02-03 PROCEDURE — 1125F AMNT PAIN NOTED PAIN PRSNT: CPT | Mod: S$GLB,,, | Performed by: INTERNAL MEDICINE

## 2021-02-03 PROCEDURE — 99215 PR OFFICE/OUTPT VISIT, EST, LEVL V, 40-54 MIN: ICD-10-PCS | Mod: S$GLB,,, | Performed by: INTERNAL MEDICINE

## 2021-02-03 PROCEDURE — 99999 PR PBB SHADOW E&M-EST. PATIENT-LVL V: ICD-10-PCS | Mod: PBBFAC,,, | Performed by: INTERNAL MEDICINE

## 2021-02-03 PROCEDURE — 1125F PR PAIN SEVERITY QUANTIFIED, PAIN PRESENT: ICD-10-PCS | Mod: S$GLB,,, | Performed by: INTERNAL MEDICINE

## 2021-02-03 PROCEDURE — 3008F PR BODY MASS INDEX (BMI) DOCUMENTED: ICD-10-PCS | Mod: CPTII,S$GLB,, | Performed by: INTERNAL MEDICINE

## 2021-02-03 PROCEDURE — 3008F BODY MASS INDEX DOCD: CPT | Mod: CPTII,S$GLB,, | Performed by: INTERNAL MEDICINE

## 2021-02-03 PROCEDURE — 3080F PR MOST RECENT DIASTOLIC BLOOD PRESSURE >= 90 MM HG: ICD-10-PCS | Mod: CPTII,S$GLB,, | Performed by: INTERNAL MEDICINE

## 2021-02-03 RX ORDER — LEFLUNOMIDE 20 MG/1
20 TABLET ORAL DAILY
Qty: 90 TABLET | Refills: 3 | Status: SHIPPED | OUTPATIENT
Start: 2021-02-03 | End: 2023-07-21

## 2021-02-03 RX ORDER — MELOXICAM 15 MG/1
15 TABLET ORAL DAILY
Qty: 90 TABLET | Refills: 0 | Status: SHIPPED | OUTPATIENT
Start: 2021-02-03 | End: 2021-07-08

## 2021-02-04 ENCOUNTER — CLINICAL SUPPORT (OUTPATIENT)
Dept: DERMATOLOGY | Facility: CLINIC | Age: 61
End: 2021-02-04
Payer: MEDICARE

## 2021-02-04 DIAGNOSIS — L80 VITILIGO: ICD-10-CM

## 2021-02-04 PROCEDURE — 96900 PR ULTRAVIOLET LIGHT THERAPY: ICD-10-PCS | Mod: S$GLB,,, | Performed by: DERMATOLOGY

## 2021-02-04 PROCEDURE — 96900 ACTINOTHERAPY UV LIGHT: CPT | Mod: S$GLB,,, | Performed by: DERMATOLOGY

## 2021-02-04 PROCEDURE — 99999 PR PBB SHADOW E&M-EST. PATIENT-LVL III: ICD-10-PCS | Mod: PBBFAC,,,

## 2021-02-04 PROCEDURE — 99999 PR PBB SHADOW E&M-EST. PATIENT-LVL III: CPT | Mod: PBBFAC,,,

## 2021-02-08 ENCOUNTER — CLINICAL SUPPORT (OUTPATIENT)
Dept: DERMATOLOGY | Facility: CLINIC | Age: 61
End: 2021-02-08
Payer: MEDICARE

## 2021-02-08 ENCOUNTER — OFFICE VISIT (OUTPATIENT)
Dept: DERMATOLOGY | Facility: CLINIC | Age: 61
End: 2021-02-08
Payer: MEDICARE

## 2021-02-08 DIAGNOSIS — L80 VITILIGO: ICD-10-CM

## 2021-02-08 DIAGNOSIS — L30.9 DERMATITIS: ICD-10-CM

## 2021-02-08 DIAGNOSIS — L72.0 MILIA: ICD-10-CM

## 2021-02-08 DIAGNOSIS — L30.4 INTERTRIGO: Primary | ICD-10-CM

## 2021-02-08 PROCEDURE — 96900 ACTINOTHERAPY UV LIGHT: CPT | Mod: S$GLB,,, | Performed by: DERMATOLOGY

## 2021-02-08 PROCEDURE — 99999 PR PBB SHADOW E&M-EST. PATIENT-LVL III: CPT | Mod: PBBFAC,,,

## 2021-02-08 PROCEDURE — 99999 PR PBB SHADOW E&M-EST. PATIENT-LVL III: CPT | Mod: PBBFAC,,, | Performed by: DERMATOLOGY

## 2021-02-08 PROCEDURE — 99999 PR PBB SHADOW E&M-EST. PATIENT-LVL III: ICD-10-PCS | Mod: PBBFAC,,,

## 2021-02-08 PROCEDURE — 96900 PR ULTRAVIOLET LIGHT THERAPY: ICD-10-PCS | Mod: S$GLB,,, | Performed by: DERMATOLOGY

## 2021-02-08 PROCEDURE — 1126F PR PAIN SEVERITY QUANTIFIED, NO PAIN PRESENT: ICD-10-PCS | Mod: S$GLB,,, | Performed by: DERMATOLOGY

## 2021-02-08 PROCEDURE — 99214 OFFICE O/P EST MOD 30 MIN: CPT | Mod: S$GLB,,, | Performed by: DERMATOLOGY

## 2021-02-08 PROCEDURE — 1126F AMNT PAIN NOTED NONE PRSNT: CPT | Mod: S$GLB,,, | Performed by: DERMATOLOGY

## 2021-02-08 PROCEDURE — 99999 PR PBB SHADOW E&M-EST. PATIENT-LVL III: ICD-10-PCS | Mod: PBBFAC,,, | Performed by: DERMATOLOGY

## 2021-02-08 PROCEDURE — 99214 PR OFFICE/OUTPT VISIT, EST, LEVL IV, 30-39 MIN: ICD-10-PCS | Mod: S$GLB,,, | Performed by: DERMATOLOGY

## 2021-02-08 RX ORDER — NYSTATIN 100000 [USP'U]/G
POWDER TOPICAL
Qty: 120 G | Refills: 6 | Status: SHIPPED | OUTPATIENT
Start: 2021-02-08 | End: 2023-10-06 | Stop reason: SDUPTHER

## 2021-02-08 RX ORDER — TRIAMCINOLONE ACETONIDE 0.25 MG/G
CREAM TOPICAL
Qty: 80 G | Refills: 1 | Status: SHIPPED | OUTPATIENT
Start: 2021-02-08

## 2021-02-10 ENCOUNTER — CLINICAL SUPPORT (OUTPATIENT)
Dept: DERMATOLOGY | Facility: CLINIC | Age: 61
End: 2021-02-10
Payer: MEDICARE

## 2021-02-10 ENCOUNTER — TELEPHONE (OUTPATIENT)
Dept: FAMILY MEDICINE | Facility: CLINIC | Age: 61
End: 2021-02-10

## 2021-02-10 DIAGNOSIS — L80 VITILIGO: ICD-10-CM

## 2021-02-10 PROCEDURE — 96900 ACTINOTHERAPY UV LIGHT: CPT | Mod: S$GLB,,, | Performed by: DERMATOLOGY

## 2021-02-10 PROCEDURE — 96900 PR ULTRAVIOLET LIGHT THERAPY: ICD-10-PCS | Mod: S$GLB,,, | Performed by: DERMATOLOGY

## 2021-02-11 ENCOUNTER — LAB VISIT (OUTPATIENT)
Dept: LAB | Facility: HOSPITAL | Age: 61
End: 2021-02-11
Attending: INTERNAL MEDICINE
Payer: MEDICARE

## 2021-02-11 DIAGNOSIS — M06.9 RHEUMATOID ARTHRITIS FLARE: ICD-10-CM

## 2021-02-11 LAB
ALBUMIN SERPL BCP-MCNC: 4.1 G/DL (ref 3.5–5.2)
ALP SERPL-CCNC: 92 U/L (ref 55–135)
ALT SERPL W/O P-5'-P-CCNC: 45 U/L (ref 10–44)
ANION GAP SERPL CALC-SCNC: 11 MMOL/L (ref 8–16)
AST SERPL-CCNC: 30 U/L (ref 10–40)
BASOPHILS # BLD AUTO: 0.05 K/UL (ref 0–0.2)
BASOPHILS NFR BLD: 0.6 % (ref 0–1.9)
BILIRUB SERPL-MCNC: 0.4 MG/DL (ref 0.1–1)
BUN SERPL-MCNC: 14 MG/DL (ref 8–23)
CALCIUM SERPL-MCNC: 9.6 MG/DL (ref 8.7–10.5)
CHLORIDE SERPL-SCNC: 104 MMOL/L (ref 95–110)
CO2 SERPL-SCNC: 28 MMOL/L (ref 23–29)
CREAT SERPL-MCNC: 0.7 MG/DL (ref 0.5–1.4)
CRP SERPL-MCNC: 6.4 MG/L (ref 0–8.2)
DIFFERENTIAL METHOD: ABNORMAL
EOSINOPHIL # BLD AUTO: 0.3 K/UL (ref 0–0.5)
EOSINOPHIL NFR BLD: 3.5 % (ref 0–8)
ERYTHROCYTE [DISTWIDTH] IN BLOOD BY AUTOMATED COUNT: 12.8 % (ref 11.5–14.5)
ERYTHROCYTE [SEDIMENTATION RATE] IN BLOOD BY WESTERGREN METHOD: 23 MM/HR (ref 0–36)
EST. GFR  (AFRICAN AMERICAN): >60 ML/MIN/1.73 M^2
EST. GFR  (NON AFRICAN AMERICAN): >60 ML/MIN/1.73 M^2
GLUCOSE SERPL-MCNC: 112 MG/DL (ref 70–110)
HCT VFR BLD AUTO: 42.4 % (ref 37–48.5)
HGB BLD-MCNC: 13.2 G/DL (ref 12–16)
IMM GRANULOCYTES # BLD AUTO: 0.02 K/UL (ref 0–0.04)
IMM GRANULOCYTES NFR BLD AUTO: 0.3 % (ref 0–0.5)
LYMPHOCYTES # BLD AUTO: 1.5 K/UL (ref 1–4.8)
LYMPHOCYTES NFR BLD: 18.9 % (ref 18–48)
MCH RBC QN AUTO: 31.1 PG (ref 27–31)
MCHC RBC AUTO-ENTMCNC: 31.1 G/DL (ref 32–36)
MCV RBC AUTO: 100 FL (ref 82–98)
MONOCYTES # BLD AUTO: 0.7 K/UL (ref 0.3–1)
MONOCYTES NFR BLD: 8.6 % (ref 4–15)
NEUTROPHILS # BLD AUTO: 5.3 K/UL (ref 1.8–7.7)
NEUTROPHILS NFR BLD: 68.1 % (ref 38–73)
NRBC BLD-RTO: 0 /100 WBC
PLATELET # BLD AUTO: 320 K/UL (ref 150–350)
PMV BLD AUTO: 10.4 FL (ref 9.2–12.9)
POTASSIUM SERPL-SCNC: 4.5 MMOL/L (ref 3.5–5.1)
PROT SERPL-MCNC: 7.9 G/DL (ref 6–8.4)
RBC # BLD AUTO: 4.25 M/UL (ref 4–5.4)
SODIUM SERPL-SCNC: 143 MMOL/L (ref 136–145)
WBC # BLD AUTO: 7.76 K/UL (ref 3.9–12.7)

## 2021-02-11 PROCEDURE — 36415 COLL VENOUS BLD VENIPUNCTURE: CPT | Mod: PO

## 2021-02-11 PROCEDURE — 85652 RBC SED RATE AUTOMATED: CPT

## 2021-02-11 PROCEDURE — 80053 COMPREHEN METABOLIC PANEL: CPT

## 2021-02-11 PROCEDURE — 86140 C-REACTIVE PROTEIN: CPT

## 2021-02-11 PROCEDURE — 85025 COMPLETE CBC W/AUTO DIFF WBC: CPT

## 2021-02-18 ENCOUNTER — CLINICAL SUPPORT (OUTPATIENT)
Dept: DERMATOLOGY | Facility: CLINIC | Age: 61
End: 2021-02-18
Payer: MEDICARE

## 2021-02-18 DIAGNOSIS — L80 VITILIGO: ICD-10-CM

## 2021-02-18 PROCEDURE — 99499 UNLISTED E&M SERVICE: CPT | Mod: S$GLB,,, | Performed by: DERMATOLOGY

## 2021-02-18 PROCEDURE — 99499 NO LOS: ICD-10-PCS | Mod: S$GLB,,, | Performed by: DERMATOLOGY

## 2021-02-23 ENCOUNTER — PATIENT MESSAGE (OUTPATIENT)
Dept: RHEUMATOLOGY | Facility: CLINIC | Age: 61
End: 2021-02-23

## 2021-02-23 ENCOUNTER — HOSPITAL ENCOUNTER (OUTPATIENT)
Dept: RADIOLOGY | Facility: HOSPITAL | Age: 61
Discharge: HOME | End: 2021-02-23
Attending: INTERNAL MEDICINE
Payer: MEDICARE

## 2021-02-23 DIAGNOSIS — R91.1 SOLITARY PULMONARY NODULE: ICD-10-CM

## 2021-02-23 PROCEDURE — 71250 CT CHEST WITHOUT CONTRAST: ICD-10-PCS | Mod: 26,,, | Performed by: RADIOLOGY

## 2021-02-23 PROCEDURE — 71250 CT THORAX DX C-: CPT | Mod: 26,,, | Performed by: RADIOLOGY

## 2021-02-23 PROCEDURE — 71250 CT THORAX DX C-: CPT | Mod: TC

## 2021-02-24 ENCOUNTER — PATIENT MESSAGE (OUTPATIENT)
Dept: RHEUMATOLOGY | Facility: CLINIC | Age: 61
End: 2021-02-24

## 2021-02-24 ENCOUNTER — TELEPHONE (OUTPATIENT)
Dept: RHEUMATOLOGY | Facility: CLINIC | Age: 61
End: 2021-02-24

## 2021-02-24 ENCOUNTER — CLINICAL SUPPORT (OUTPATIENT)
Dept: DERMATOLOGY | Facility: CLINIC | Age: 61
End: 2021-02-24
Payer: MEDICARE

## 2021-02-24 DIAGNOSIS — Z51.81 ENCOUNTER FOR MONITORING LEFLUNOMIDE THERAPY: ICD-10-CM

## 2021-02-24 DIAGNOSIS — L80 VITILIGO: ICD-10-CM

## 2021-02-24 DIAGNOSIS — Z79.899 ENCOUNTER FOR MONITORING LEFLUNOMIDE THERAPY: ICD-10-CM

## 2021-02-24 DIAGNOSIS — M19.90 INFLAMMATORY ARTHRITIS: Primary | ICD-10-CM

## 2021-02-24 PROCEDURE — 96900 PR ULTRAVIOLET LIGHT THERAPY: ICD-10-PCS | Mod: S$GLB,,, | Performed by: DERMATOLOGY

## 2021-02-24 PROCEDURE — 96900 ACTINOTHERAPY UV LIGHT: CPT | Mod: S$GLB,,, | Performed by: DERMATOLOGY

## 2021-02-25 ENCOUNTER — OFFICE VISIT (OUTPATIENT)
Dept: FAMILY MEDICINE | Facility: CLINIC | Age: 61
End: 2021-02-25
Attending: FAMILY MEDICINE
Payer: MEDICARE

## 2021-02-25 VITALS
SYSTOLIC BLOOD PRESSURE: 118 MMHG | HEIGHT: 61 IN | HEART RATE: 101 BPM | OXYGEN SATURATION: 95 % | BODY MASS INDEX: 25.84 KG/M2 | WEIGHT: 136.88 LBS | DIASTOLIC BLOOD PRESSURE: 76 MMHG

## 2021-02-25 DIAGNOSIS — R73.02 IMPAIRED GLUCOSE TOLERANCE: ICD-10-CM

## 2021-02-25 DIAGNOSIS — E78.2 MIXED HYPERLIPIDEMIA: ICD-10-CM

## 2021-02-25 DIAGNOSIS — I10 ESSENTIAL HYPERTENSION: ICD-10-CM

## 2021-02-25 DIAGNOSIS — M54.16 LUMBAR RADICULOPATHY: ICD-10-CM

## 2021-02-25 DIAGNOSIS — D84.9 IMMUNOCOMPROMISED: ICD-10-CM

## 2021-02-25 DIAGNOSIS — Z00.00 ROUTINE GENERAL MEDICAL EXAMINATION AT HEALTH CARE FACILITY: Primary | ICD-10-CM

## 2021-02-25 DIAGNOSIS — G56.92 NEUROPATHY, ARM, LEFT: ICD-10-CM

## 2021-02-25 DIAGNOSIS — F33.9 MAJOR DEPRESSION, RECURRENT, CHRONIC: ICD-10-CM

## 2021-02-25 DIAGNOSIS — M54.2 NECK PAIN, MUSCULOSKELETAL: ICD-10-CM

## 2021-02-25 DIAGNOSIS — E55.9 VITAMIN D DEFICIENCY: ICD-10-CM

## 2021-02-25 PROCEDURE — 99396 PREV VISIT EST AGE 40-64: CPT | Mod: S$GLB,,, | Performed by: FAMILY MEDICINE

## 2021-02-25 PROCEDURE — 99499 RISK ADDL DX/OHS AUDIT: ICD-10-PCS | Mod: S$GLB,,, | Performed by: FAMILY MEDICINE

## 2021-02-25 PROCEDURE — 3008F PR BODY MASS INDEX (BMI) DOCUMENTED: ICD-10-PCS | Mod: CPTII,S$GLB,, | Performed by: FAMILY MEDICINE

## 2021-02-25 PROCEDURE — 3074F SYST BP LT 130 MM HG: CPT | Mod: CPTII,S$GLB,, | Performed by: FAMILY MEDICINE

## 2021-02-25 PROCEDURE — 99396 PR PREVENTIVE VISIT,EST,40-64: ICD-10-PCS | Mod: S$GLB,,, | Performed by: FAMILY MEDICINE

## 2021-02-25 PROCEDURE — 3078F DIAST BP <80 MM HG: CPT | Mod: CPTII,S$GLB,, | Performed by: FAMILY MEDICINE

## 2021-02-25 PROCEDURE — 99499 UNLISTED E&M SERVICE: CPT | Mod: S$GLB,,, | Performed by: FAMILY MEDICINE

## 2021-02-25 PROCEDURE — 3008F BODY MASS INDEX DOCD: CPT | Mod: CPTII,S$GLB,, | Performed by: FAMILY MEDICINE

## 2021-02-25 PROCEDURE — 3074F PR MOST RECENT SYSTOLIC BLOOD PRESSURE < 130 MM HG: ICD-10-PCS | Mod: CPTII,S$GLB,, | Performed by: FAMILY MEDICINE

## 2021-02-25 PROCEDURE — 99999 PR PBB SHADOW E&M-EST. PATIENT-LVL V: ICD-10-PCS | Mod: PBBFAC,,, | Performed by: FAMILY MEDICINE

## 2021-02-25 PROCEDURE — 99999 PR PBB SHADOW E&M-EST. PATIENT-LVL V: CPT | Mod: PBBFAC,,, | Performed by: FAMILY MEDICINE

## 2021-02-25 PROCEDURE — 3078F PR MOST RECENT DIASTOLIC BLOOD PRESSURE < 80 MM HG: ICD-10-PCS | Mod: CPTII,S$GLB,, | Performed by: FAMILY MEDICINE

## 2021-02-25 RX ORDER — AMLODIPINE BESYLATE 5 MG/1
5 TABLET ORAL DAILY
Qty: 90 TABLET | Refills: 3 | Status: SHIPPED | OUTPATIENT
Start: 2021-02-25 | End: 2021-07-07

## 2021-02-25 RX ORDER — LOSARTAN POTASSIUM 100 MG/1
100 TABLET ORAL DAILY
Qty: 90 TABLET | Refills: 3 | Status: SHIPPED | OUTPATIENT
Start: 2021-02-25 | End: 2022-02-17 | Stop reason: SDUPTHER

## 2021-02-27 ENCOUNTER — IMMUNIZATION (OUTPATIENT)
Dept: INTERNAL MEDICINE | Facility: CLINIC | Age: 61
End: 2021-02-27
Payer: MEDICARE

## 2021-02-27 DIAGNOSIS — Z23 NEED FOR VACCINATION: Primary | ICD-10-CM

## 2021-02-27 PROCEDURE — 91300 COVID-19, MRNA, LNP-S, PF, 30 MCG/0.3 ML DOSE VACCINE: CPT | Mod: PBBFAC | Performed by: FAMILY MEDICINE

## 2021-03-01 ENCOUNTER — LAB VISIT (OUTPATIENT)
Dept: LAB | Facility: HOSPITAL | Age: 61
End: 2021-03-01
Attending: FAMILY MEDICINE
Payer: MEDICARE

## 2021-03-01 DIAGNOSIS — R73.02 IMPAIRED GLUCOSE TOLERANCE: ICD-10-CM

## 2021-03-01 DIAGNOSIS — E78.2 MIXED HYPERLIPIDEMIA: ICD-10-CM

## 2021-03-01 DIAGNOSIS — I10 ESSENTIAL HYPERTENSION: ICD-10-CM

## 2021-03-01 DIAGNOSIS — E55.9 VITAMIN D DEFICIENCY: ICD-10-CM

## 2021-03-01 DIAGNOSIS — Z00.00 ROUTINE GENERAL MEDICAL EXAMINATION AT HEALTH CARE FACILITY: ICD-10-CM

## 2021-03-01 LAB
25(OH)D3+25(OH)D2 SERPL-MCNC: 39 NG/ML (ref 30–96)
CHOLEST SERPL-MCNC: 203 MG/DL (ref 120–199)
CHOLEST/HDLC SERPL: 4.2 {RATIO} (ref 2–5)
ESTIMATED AVG GLUCOSE: 108 MG/DL (ref 68–131)
HBA1C MFR BLD: 5.4 % (ref 4–5.6)
HDLC SERPL-MCNC: 48 MG/DL (ref 40–75)
HDLC SERPL: 23.6 % (ref 20–50)
LDLC SERPL CALC-MCNC: 131 MG/DL (ref 63–159)
NONHDLC SERPL-MCNC: 155 MG/DL
TRIGL SERPL-MCNC: 120 MG/DL (ref 30–150)

## 2021-03-01 PROCEDURE — 82306 VITAMIN D 25 HYDROXY: CPT

## 2021-03-01 PROCEDURE — 36415 COLL VENOUS BLD VENIPUNCTURE: CPT | Mod: PO

## 2021-03-01 PROCEDURE — 83036 HEMOGLOBIN GLYCOSYLATED A1C: CPT

## 2021-03-01 PROCEDURE — 80061 LIPID PANEL: CPT

## 2021-03-02 ENCOUNTER — PATIENT MESSAGE (OUTPATIENT)
Dept: FAMILY MEDICINE | Facility: CLINIC | Age: 61
End: 2021-03-02

## 2021-03-02 ENCOUNTER — CLINICAL SUPPORT (OUTPATIENT)
Dept: DERMATOLOGY | Facility: CLINIC | Age: 61
End: 2021-03-02
Payer: MEDICARE

## 2021-03-02 DIAGNOSIS — L80 VITILIGO: ICD-10-CM

## 2021-03-02 PROCEDURE — 96900 PR ULTRAVIOLET LIGHT THERAPY: ICD-10-PCS | Mod: S$GLB,,, | Performed by: DERMATOLOGY

## 2021-03-02 PROCEDURE — 96900 ACTINOTHERAPY UV LIGHT: CPT | Mod: S$GLB,,, | Performed by: DERMATOLOGY

## 2021-03-04 ENCOUNTER — CLINICAL SUPPORT (OUTPATIENT)
Dept: DERMATOLOGY | Facility: CLINIC | Age: 61
End: 2021-03-04
Payer: MEDICARE

## 2021-03-04 DIAGNOSIS — L80 VITILIGO: ICD-10-CM

## 2021-03-04 PROCEDURE — 96900 ACTINOTHERAPY UV LIGHT: CPT | Mod: S$GLB,,, | Performed by: DERMATOLOGY

## 2021-03-04 PROCEDURE — 96900 PR ULTRAVIOLET LIGHT THERAPY: ICD-10-PCS | Mod: S$GLB,,, | Performed by: DERMATOLOGY

## 2021-03-10 ENCOUNTER — CLINICAL SUPPORT (OUTPATIENT)
Dept: DERMATOLOGY | Facility: CLINIC | Age: 61
End: 2021-03-10
Payer: MEDICARE

## 2021-03-10 DIAGNOSIS — L80 VITILIGO: ICD-10-CM

## 2021-03-10 PROCEDURE — 96900 ACTINOTHERAPY UV LIGHT: CPT | Mod: S$GLB,,, | Performed by: DERMATOLOGY

## 2021-03-10 PROCEDURE — 96900 PR ULTRAVIOLET LIGHT THERAPY: ICD-10-PCS | Mod: S$GLB,,, | Performed by: DERMATOLOGY

## 2021-03-14 ENCOUNTER — PATIENT MESSAGE (OUTPATIENT)
Dept: FAMILY MEDICINE | Facility: CLINIC | Age: 61
End: 2021-03-14

## 2021-03-15 ENCOUNTER — PATIENT MESSAGE (OUTPATIENT)
Dept: FAMILY MEDICINE | Facility: CLINIC | Age: 61
End: 2021-03-15

## 2021-03-15 DIAGNOSIS — E55.9 VITAMIN D DEFICIENCY: ICD-10-CM

## 2021-03-15 RX ORDER — ERGOCALCIFEROL 1.25 MG/1
50000 CAPSULE ORAL
Qty: 12 CAPSULE | Refills: 2 | Status: SHIPPED | OUTPATIENT
Start: 2021-03-15 | End: 2022-03-24 | Stop reason: SDUPTHER

## 2021-03-16 ENCOUNTER — CLINICAL SUPPORT (OUTPATIENT)
Dept: DERMATOLOGY | Facility: CLINIC | Age: 61
End: 2021-03-16
Payer: MEDICARE

## 2021-03-16 DIAGNOSIS — L80 VITILIGO: ICD-10-CM

## 2021-03-16 PROCEDURE — 96900 PR ULTRAVIOLET LIGHT THERAPY: ICD-10-PCS | Mod: S$GLB,,, | Performed by: DERMATOLOGY

## 2021-03-16 PROCEDURE — 99999 PR PBB SHADOW E&M-EST. PATIENT-LVL III: CPT | Mod: PBBFAC,,,

## 2021-03-16 PROCEDURE — 99999 PR PBB SHADOW E&M-EST. PATIENT-LVL III: ICD-10-PCS | Mod: PBBFAC,,,

## 2021-03-16 PROCEDURE — 96900 ACTINOTHERAPY UV LIGHT: CPT | Mod: S$GLB,,, | Performed by: DERMATOLOGY

## 2021-03-18 ENCOUNTER — CLINICAL SUPPORT (OUTPATIENT)
Dept: DERMATOLOGY | Facility: CLINIC | Age: 61
End: 2021-03-18
Payer: MEDICARE

## 2021-03-18 DIAGNOSIS — L80 VITILIGO: ICD-10-CM

## 2021-03-18 PROCEDURE — 96900 PR ULTRAVIOLET LIGHT THERAPY: ICD-10-PCS | Mod: S$GLB,,, | Performed by: DERMATOLOGY

## 2021-03-18 PROCEDURE — 96900 ACTINOTHERAPY UV LIGHT: CPT | Mod: S$GLB,,, | Performed by: DERMATOLOGY

## 2021-03-20 ENCOUNTER — IMMUNIZATION (OUTPATIENT)
Dept: INTERNAL MEDICINE | Facility: CLINIC | Age: 61
End: 2021-03-20
Payer: MEDICARE

## 2021-03-20 DIAGNOSIS — Z23 NEED FOR VACCINATION: Primary | ICD-10-CM

## 2021-03-20 PROCEDURE — 0002A COVID-19, MRNA, LNP-S, PF, 30 MCG/0.3 ML DOSE VACCINE: CPT | Mod: PBBFAC | Performed by: FAMILY MEDICINE

## 2021-03-20 PROCEDURE — 91300 COVID-19, MRNA, LNP-S, PF, 30 MCG/0.3 ML DOSE VACCINE: CPT | Mod: PBBFAC | Performed by: FAMILY MEDICINE

## 2021-03-23 ENCOUNTER — OFFICE VISIT (OUTPATIENT)
Dept: PULMONOLOGY | Facility: CLINIC | Age: 61
End: 2021-03-23
Payer: MEDICARE

## 2021-03-23 VITALS
HEART RATE: 92 BPM | RESPIRATION RATE: 16 BRPM | HEIGHT: 61 IN | BODY MASS INDEX: 26.02 KG/M2 | DIASTOLIC BLOOD PRESSURE: 98 MMHG | SYSTOLIC BLOOD PRESSURE: 147 MMHG | OXYGEN SATURATION: 96 % | WEIGHT: 137.81 LBS

## 2021-03-23 DIAGNOSIS — J30.1 NON-SEASONAL ALLERGIC RHINITIS DUE TO POLLEN: ICD-10-CM

## 2021-03-23 DIAGNOSIS — R05.9 COUGH IN ADULT: ICD-10-CM

## 2021-03-23 DIAGNOSIS — D86.9 SARCOIDOSIS: Primary | ICD-10-CM

## 2021-03-23 PROCEDURE — 3080F DIAST BP >= 90 MM HG: CPT | Mod: CPTII,S$GLB,, | Performed by: INTERNAL MEDICINE

## 2021-03-23 PROCEDURE — 3008F BODY MASS INDEX DOCD: CPT | Mod: CPTII,S$GLB,, | Performed by: INTERNAL MEDICINE

## 2021-03-23 PROCEDURE — 3077F SYST BP >= 140 MM HG: CPT | Mod: CPTII,S$GLB,, | Performed by: INTERNAL MEDICINE

## 2021-03-23 PROCEDURE — 99999 PR PBB SHADOW E&M-EST. PATIENT-LVL V: CPT | Mod: PBBFAC,,, | Performed by: INTERNAL MEDICINE

## 2021-03-23 PROCEDURE — 99214 OFFICE O/P EST MOD 30 MIN: CPT | Mod: S$GLB,,, | Performed by: INTERNAL MEDICINE

## 2021-03-23 PROCEDURE — 3080F PR MOST RECENT DIASTOLIC BLOOD PRESSURE >= 90 MM HG: ICD-10-PCS | Mod: CPTII,S$GLB,, | Performed by: INTERNAL MEDICINE

## 2021-03-23 PROCEDURE — 99214 PR OFFICE/OUTPT VISIT, EST, LEVL IV, 30-39 MIN: ICD-10-PCS | Mod: S$GLB,,, | Performed by: INTERNAL MEDICINE

## 2021-03-23 PROCEDURE — 1126F AMNT PAIN NOTED NONE PRSNT: CPT | Mod: S$GLB,,, | Performed by: INTERNAL MEDICINE

## 2021-03-23 PROCEDURE — 99999 PR PBB SHADOW E&M-EST. PATIENT-LVL V: ICD-10-PCS | Mod: PBBFAC,,, | Performed by: INTERNAL MEDICINE

## 2021-03-23 PROCEDURE — 3008F PR BODY MASS INDEX (BMI) DOCUMENTED: ICD-10-PCS | Mod: CPTII,S$GLB,, | Performed by: INTERNAL MEDICINE

## 2021-03-23 PROCEDURE — 1126F PR PAIN SEVERITY QUANTIFIED, NO PAIN PRESENT: ICD-10-PCS | Mod: S$GLB,,, | Performed by: INTERNAL MEDICINE

## 2021-03-23 PROCEDURE — 3077F PR MOST RECENT SYSTOLIC BLOOD PRESSURE >= 140 MM HG: ICD-10-PCS | Mod: CPTII,S$GLB,, | Performed by: INTERNAL MEDICINE

## 2021-03-23 RX ORDER — FLUTICASONE PROPIONATE AND SALMETEROL XINAFOATE 115; 21 UG/1; UG/1
2 AEROSOL, METERED RESPIRATORY (INHALATION) EVERY 12 HOURS
Qty: 12 G | Refills: 11 | Status: SHIPPED | OUTPATIENT
Start: 2021-03-23 | End: 2023-07-21

## 2021-03-23 RX ORDER — AZELASTINE 1 MG/ML
1 SPRAY, METERED NASAL 2 TIMES DAILY
Qty: 30 ML | Refills: 3 | Status: SHIPPED | OUTPATIENT
Start: 2021-03-23 | End: 2023-05-16

## 2021-04-07 ENCOUNTER — CLINICAL SUPPORT (OUTPATIENT)
Dept: DERMATOLOGY | Facility: CLINIC | Age: 61
End: 2021-04-07
Payer: MEDICARE

## 2021-04-07 DIAGNOSIS — L80 VITILIGO: ICD-10-CM

## 2021-04-07 PROCEDURE — 96900 PR ULTRAVIOLET LIGHT THERAPY: ICD-10-PCS | Mod: S$GLB,,, | Performed by: DERMATOLOGY

## 2021-04-07 PROCEDURE — 96900 ACTINOTHERAPY UV LIGHT: CPT | Mod: S$GLB,,, | Performed by: DERMATOLOGY

## 2021-04-13 ENCOUNTER — CLINICAL SUPPORT (OUTPATIENT)
Dept: DERMATOLOGY | Facility: CLINIC | Age: 61
End: 2021-04-13
Payer: MEDICARE

## 2021-04-13 DIAGNOSIS — L80 VITILIGO: ICD-10-CM

## 2021-04-13 PROCEDURE — 96900 ACTINOTHERAPY UV LIGHT: CPT | Mod: S$GLB,,, | Performed by: DERMATOLOGY

## 2021-04-13 PROCEDURE — 96900 PR ULTRAVIOLET LIGHT THERAPY: ICD-10-PCS | Mod: S$GLB,,, | Performed by: DERMATOLOGY

## 2021-04-20 ENCOUNTER — OFFICE VISIT (OUTPATIENT)
Dept: NEUROLOGY | Facility: CLINIC | Age: 61
End: 2021-04-20
Attending: FAMILY MEDICINE
Payer: MEDICARE

## 2021-04-20 VITALS
HEART RATE: 85 BPM | HEIGHT: 61 IN | DIASTOLIC BLOOD PRESSURE: 88 MMHG | BODY MASS INDEX: 25.14 KG/M2 | SYSTOLIC BLOOD PRESSURE: 152 MMHG | WEIGHT: 133.19 LBS

## 2021-04-20 DIAGNOSIS — G56.92 NEUROPATHY, ARM, LEFT: ICD-10-CM

## 2021-04-20 DIAGNOSIS — R20.2 NUMBNESS AND TINGLING OF LEFT LEG: ICD-10-CM

## 2021-04-20 DIAGNOSIS — R29.818 OTHER SYMPTOMS AND SIGNS INVOLVING THE NERVOUS SYSTEM: ICD-10-CM

## 2021-04-20 DIAGNOSIS — R20.0 NUMBNESS AND TINGLING OF LEFT LEG: ICD-10-CM

## 2021-04-20 DIAGNOSIS — M54.2 NECK PAIN, MUSCULOSKELETAL: ICD-10-CM

## 2021-04-20 DIAGNOSIS — Z87.81 HISTORY OF CERVICAL FRACTURE: Primary | ICD-10-CM

## 2021-04-20 PROCEDURE — 1125F AMNT PAIN NOTED PAIN PRSNT: CPT | Mod: S$GLB,,, | Performed by: PSYCHIATRY & NEUROLOGY

## 2021-04-20 PROCEDURE — 99999 PR PBB SHADOW E&M-EST. PATIENT-LVL V: ICD-10-PCS | Mod: PBBFAC,,, | Performed by: PSYCHIATRY & NEUROLOGY

## 2021-04-20 PROCEDURE — 1125F PR PAIN SEVERITY QUANTIFIED, PAIN PRESENT: ICD-10-PCS | Mod: S$GLB,,, | Performed by: PSYCHIATRY & NEUROLOGY

## 2021-04-20 PROCEDURE — 99214 PR OFFICE/OUTPT VISIT, EST, LEVL IV, 30-39 MIN: ICD-10-PCS | Mod: S$GLB,,, | Performed by: PSYCHIATRY & NEUROLOGY

## 2021-04-20 PROCEDURE — 3008F BODY MASS INDEX DOCD: CPT | Mod: CPTII,S$GLB,, | Performed by: PSYCHIATRY & NEUROLOGY

## 2021-04-20 PROCEDURE — 99214 OFFICE O/P EST MOD 30 MIN: CPT | Mod: S$GLB,,, | Performed by: PSYCHIATRY & NEUROLOGY

## 2021-04-20 PROCEDURE — 99999 PR PBB SHADOW E&M-EST. PATIENT-LVL V: CPT | Mod: PBBFAC,,, | Performed by: PSYCHIATRY & NEUROLOGY

## 2021-04-20 PROCEDURE — 3008F PR BODY MASS INDEX (BMI) DOCUMENTED: ICD-10-PCS | Mod: CPTII,S$GLB,, | Performed by: PSYCHIATRY & NEUROLOGY

## 2021-04-20 RX ORDER — GABAPENTIN 100 MG/1
CAPSULE ORAL
Qty: 210 CAPSULE | Refills: 2 | Status: SHIPPED | OUTPATIENT
Start: 2021-04-20 | End: 2021-11-04 | Stop reason: DRUGHIGH

## 2021-04-20 RX ORDER — LORAZEPAM 1 MG/1
TABLET ORAL
Qty: 1 TABLET | Refills: 0 | Status: SHIPPED | OUTPATIENT
Start: 2021-04-20 | End: 2022-02-21

## 2021-04-22 ENCOUNTER — CLINICAL SUPPORT (OUTPATIENT)
Dept: DERMATOLOGY | Facility: CLINIC | Age: 61
End: 2021-04-22
Payer: MEDICARE

## 2021-04-22 DIAGNOSIS — L80 VITILIGO: ICD-10-CM

## 2021-04-22 PROCEDURE — 96900 ACTINOTHERAPY UV LIGHT: CPT | Mod: S$GLB,,, | Performed by: DERMATOLOGY

## 2021-04-22 PROCEDURE — 96900 PR ULTRAVIOLET LIGHT THERAPY: ICD-10-PCS | Mod: S$GLB,,, | Performed by: DERMATOLOGY

## 2021-05-04 ENCOUNTER — CLINICAL SUPPORT (OUTPATIENT)
Dept: DERMATOLOGY | Facility: CLINIC | Age: 61
End: 2021-05-04
Payer: MEDICARE

## 2021-05-04 DIAGNOSIS — L80 VITILIGO: ICD-10-CM

## 2021-05-04 PROCEDURE — 96900 PR ULTRAVIOLET LIGHT THERAPY: ICD-10-PCS | Mod: S$GLB,,, | Performed by: DERMATOLOGY

## 2021-05-04 PROCEDURE — 96900 ACTINOTHERAPY UV LIGHT: CPT | Mod: S$GLB,,, | Performed by: DERMATOLOGY

## 2021-05-04 PROCEDURE — 99999 PR PBB SHADOW E&M-EST. PATIENT-LVL III: ICD-10-PCS | Mod: PBBFAC,,,

## 2021-05-04 PROCEDURE — 99999 PR PBB SHADOW E&M-EST. PATIENT-LVL III: CPT | Mod: PBBFAC,,,

## 2021-05-05 ENCOUNTER — PATIENT MESSAGE (OUTPATIENT)
Dept: NEUROLOGY | Facility: CLINIC | Age: 61
End: 2021-05-05

## 2021-05-06 ENCOUNTER — HOSPITAL ENCOUNTER (OUTPATIENT)
Dept: RADIOLOGY | Facility: HOSPITAL | Age: 61
Discharge: HOME | End: 2021-05-06
Attending: PSYCHIATRY & NEUROLOGY
Payer: MEDICARE

## 2021-05-06 DIAGNOSIS — R29.818 OTHER SYMPTOMS AND SIGNS INVOLVING THE NERVOUS SYSTEM: ICD-10-CM

## 2021-05-06 PROCEDURE — 70551 MRI BRAIN STEM W/O DYE: CPT | Mod: TC

## 2021-05-06 PROCEDURE — 70551 MRI BRAIN WITHOUT CONTRAST: ICD-10-PCS | Mod: 26,,, | Performed by: RADIOLOGY

## 2021-05-06 PROCEDURE — 70551 MRI BRAIN STEM W/O DYE: CPT | Mod: 26,,, | Performed by: RADIOLOGY

## 2021-05-10 ENCOUNTER — CLINICAL SUPPORT (OUTPATIENT)
Dept: DERMATOLOGY | Facility: CLINIC | Age: 61
End: 2021-05-10
Payer: MEDICARE

## 2021-05-10 ENCOUNTER — OFFICE VISIT (OUTPATIENT)
Dept: DERMATOLOGY | Facility: CLINIC | Age: 61
End: 2021-05-10
Payer: MEDICARE

## 2021-05-10 ENCOUNTER — LAB VISIT (OUTPATIENT)
Dept: LAB | Facility: HOSPITAL | Age: 61
End: 2021-05-10
Attending: INTERNAL MEDICINE
Payer: MEDICARE

## 2021-05-10 DIAGNOSIS — Z51.81 ENCOUNTER FOR MONITORING LEFLUNOMIDE THERAPY: ICD-10-CM

## 2021-05-10 DIAGNOSIS — L81.4 LENTIGO: ICD-10-CM

## 2021-05-10 DIAGNOSIS — Z79.899 ENCOUNTER FOR MONITORING LEFLUNOMIDE THERAPY: ICD-10-CM

## 2021-05-10 DIAGNOSIS — M19.90 INFLAMMATORY ARTHRITIS: ICD-10-CM

## 2021-05-10 DIAGNOSIS — L80 VITILIGO: ICD-10-CM

## 2021-05-10 DIAGNOSIS — L82.1 SK (SEBORRHEIC KERATOSIS): ICD-10-CM

## 2021-05-10 DIAGNOSIS — L80 VITILIGO: Primary | ICD-10-CM

## 2021-05-10 LAB
ALBUMIN SERPL BCP-MCNC: 3.9 G/DL (ref 3.5–5.2)
ALBUMIN SERPL BCP-MCNC: 3.9 G/DL (ref 3.5–5.2)
ALP SERPL-CCNC: 84 U/L (ref 55–135)
ALP SERPL-CCNC: 84 U/L (ref 55–135)
ALT SERPL W/O P-5'-P-CCNC: 26 U/L (ref 10–44)
ALT SERPL W/O P-5'-P-CCNC: 26 U/L (ref 10–44)
ANION GAP SERPL CALC-SCNC: 13 MMOL/L (ref 8–16)
ANION GAP SERPL CALC-SCNC: 13 MMOL/L (ref 8–16)
AST SERPL-CCNC: 20 U/L (ref 10–40)
AST SERPL-CCNC: 20 U/L (ref 10–40)
BASOPHILS # BLD AUTO: 0.04 K/UL (ref 0–0.2)
BASOPHILS # BLD AUTO: 0.04 K/UL (ref 0–0.2)
BASOPHILS NFR BLD: 0.6 % (ref 0–1.9)
BASOPHILS NFR BLD: 0.6 % (ref 0–1.9)
BILIRUB SERPL-MCNC: 0.4 MG/DL (ref 0.1–1)
BILIRUB SERPL-MCNC: 0.4 MG/DL (ref 0.1–1)
BUN SERPL-MCNC: 13 MG/DL (ref 8–23)
BUN SERPL-MCNC: 13 MG/DL (ref 8–23)
CALCIUM SERPL-MCNC: 9.8 MG/DL (ref 8.7–10.5)
CALCIUM SERPL-MCNC: 9.8 MG/DL (ref 8.7–10.5)
CHLORIDE SERPL-SCNC: 106 MMOL/L (ref 95–110)
CHLORIDE SERPL-SCNC: 106 MMOL/L (ref 95–110)
CO2 SERPL-SCNC: 22 MMOL/L (ref 23–29)
CO2 SERPL-SCNC: 22 MMOL/L (ref 23–29)
CREAT SERPL-MCNC: 0.7 MG/DL (ref 0.5–1.4)
CREAT SERPL-MCNC: 0.7 MG/DL (ref 0.5–1.4)
CRP SERPL-MCNC: 4.8 MG/L (ref 0–8.2)
CRP SERPL-MCNC: 4.8 MG/L (ref 0–8.2)
DIFFERENTIAL METHOD: ABNORMAL
DIFFERENTIAL METHOD: ABNORMAL
EOSINOPHIL # BLD AUTO: 0.2 K/UL (ref 0–0.5)
EOSINOPHIL # BLD AUTO: 0.2 K/UL (ref 0–0.5)
EOSINOPHIL NFR BLD: 2.9 % (ref 0–8)
EOSINOPHIL NFR BLD: 2.9 % (ref 0–8)
ERYTHROCYTE [DISTWIDTH] IN BLOOD BY AUTOMATED COUNT: 13.2 % (ref 11.5–14.5)
ERYTHROCYTE [DISTWIDTH] IN BLOOD BY AUTOMATED COUNT: 13.2 % (ref 11.5–14.5)
ERYTHROCYTE [SEDIMENTATION RATE] IN BLOOD BY WESTERGREN METHOD: 68 MM/HR (ref 0–36)
ERYTHROCYTE [SEDIMENTATION RATE] IN BLOOD BY WESTERGREN METHOD: 68 MM/HR (ref 0–36)
EST. GFR  (AFRICAN AMERICAN): >60 ML/MIN/1.73 M^2
EST. GFR  (AFRICAN AMERICAN): >60 ML/MIN/1.73 M^2
EST. GFR  (NON AFRICAN AMERICAN): >60 ML/MIN/1.73 M^2
EST. GFR  (NON AFRICAN AMERICAN): >60 ML/MIN/1.73 M^2
GLUCOSE SERPL-MCNC: 129 MG/DL (ref 70–110)
GLUCOSE SERPL-MCNC: 129 MG/DL (ref 70–110)
HCT VFR BLD AUTO: 43.9 % (ref 37–48.5)
HCT VFR BLD AUTO: 43.9 % (ref 37–48.5)
HGB BLD-MCNC: 14 G/DL (ref 12–16)
HGB BLD-MCNC: 14 G/DL (ref 12–16)
IMM GRANULOCYTES # BLD AUTO: 0.01 K/UL (ref 0–0.04)
IMM GRANULOCYTES # BLD AUTO: 0.01 K/UL (ref 0–0.04)
IMM GRANULOCYTES NFR BLD AUTO: 0.1 % (ref 0–0.5)
IMM GRANULOCYTES NFR BLD AUTO: 0.1 % (ref 0–0.5)
LYMPHOCYTES # BLD AUTO: 1.6 K/UL (ref 1–4.8)
LYMPHOCYTES # BLD AUTO: 1.6 K/UL (ref 1–4.8)
LYMPHOCYTES NFR BLD: 23.4 % (ref 18–48)
LYMPHOCYTES NFR BLD: 23.4 % (ref 18–48)
MCH RBC QN AUTO: 31.4 PG (ref 27–31)
MCH RBC QN AUTO: 31.4 PG (ref 27–31)
MCHC RBC AUTO-ENTMCNC: 31.9 G/DL (ref 32–36)
MCHC RBC AUTO-ENTMCNC: 31.9 G/DL (ref 32–36)
MCV RBC AUTO: 98 FL (ref 82–98)
MCV RBC AUTO: 98 FL (ref 82–98)
MONOCYTES # BLD AUTO: 0.6 K/UL (ref 0.3–1)
MONOCYTES # BLD AUTO: 0.6 K/UL (ref 0.3–1)
MONOCYTES NFR BLD: 8.6 % (ref 4–15)
MONOCYTES NFR BLD: 8.6 % (ref 4–15)
NEUTROPHILS # BLD AUTO: 4.4 K/UL (ref 1.8–7.7)
NEUTROPHILS # BLD AUTO: 4.4 K/UL (ref 1.8–7.7)
NEUTROPHILS NFR BLD: 64.4 % (ref 38–73)
NEUTROPHILS NFR BLD: 64.4 % (ref 38–73)
NRBC BLD-RTO: 0 /100 WBC
NRBC BLD-RTO: 0 /100 WBC
PLATELET # BLD AUTO: 368 K/UL (ref 150–450)
PLATELET # BLD AUTO: 368 K/UL (ref 150–450)
PMV BLD AUTO: 10.4 FL (ref 9.2–12.9)
PMV BLD AUTO: 10.4 FL (ref 9.2–12.9)
POTASSIUM SERPL-SCNC: 3.6 MMOL/L (ref 3.5–5.1)
POTASSIUM SERPL-SCNC: 3.6 MMOL/L (ref 3.5–5.1)
PROT SERPL-MCNC: 7.9 G/DL (ref 6–8.4)
PROT SERPL-MCNC: 7.9 G/DL (ref 6–8.4)
RBC # BLD AUTO: 4.46 M/UL (ref 4–5.4)
RBC # BLD AUTO: 4.46 M/UL (ref 4–5.4)
SODIUM SERPL-SCNC: 141 MMOL/L (ref 136–145)
SODIUM SERPL-SCNC: 141 MMOL/L (ref 136–145)
WBC # BLD AUTO: 6.87 K/UL (ref 3.9–12.7)
WBC # BLD AUTO: 6.87 K/UL (ref 3.9–12.7)

## 2021-05-10 PROCEDURE — 99214 OFFICE O/P EST MOD 30 MIN: CPT | Mod: S$GLB,,, | Performed by: DERMATOLOGY

## 2021-05-10 PROCEDURE — 85652 RBC SED RATE AUTOMATED: CPT | Performed by: INTERNAL MEDICINE

## 2021-05-10 PROCEDURE — 85025 COMPLETE CBC W/AUTO DIFF WBC: CPT | Performed by: INTERNAL MEDICINE

## 2021-05-10 PROCEDURE — 96900 ACTINOTHERAPY UV LIGHT: CPT | Mod: S$GLB,,, | Performed by: DERMATOLOGY

## 2021-05-10 PROCEDURE — 86140 C-REACTIVE PROTEIN: CPT | Performed by: INTERNAL MEDICINE

## 2021-05-10 PROCEDURE — 99999 PR PBB SHADOW E&M-EST. PATIENT-LVL III: ICD-10-PCS | Mod: PBBFAC,,,

## 2021-05-10 PROCEDURE — 36415 COLL VENOUS BLD VENIPUNCTURE: CPT | Mod: PO | Performed by: INTERNAL MEDICINE

## 2021-05-10 PROCEDURE — 99999 PR PBB SHADOW E&M-EST. PATIENT-LVL III: CPT | Mod: PBBFAC,,,

## 2021-05-10 PROCEDURE — 96900 PR ULTRAVIOLET LIGHT THERAPY: ICD-10-PCS | Mod: S$GLB,,, | Performed by: DERMATOLOGY

## 2021-05-10 PROCEDURE — 99214 PR OFFICE/OUTPT VISIT, EST, LEVL IV, 30-39 MIN: ICD-10-PCS | Mod: S$GLB,,, | Performed by: DERMATOLOGY

## 2021-05-10 PROCEDURE — 1126F PR PAIN SEVERITY QUANTIFIED, NO PAIN PRESENT: ICD-10-PCS | Mod: S$GLB,,, | Performed by: DERMATOLOGY

## 2021-05-10 PROCEDURE — 1126F AMNT PAIN NOTED NONE PRSNT: CPT | Mod: S$GLB,,, | Performed by: DERMATOLOGY

## 2021-05-10 PROCEDURE — 87340 HEPATITIS B SURFACE AG IA: CPT | Performed by: INTERNAL MEDICINE

## 2021-05-10 PROCEDURE — 99999 PR PBB SHADOW E&M-EST. PATIENT-LVL IV: ICD-10-PCS | Mod: PBBFAC,,, | Performed by: DERMATOLOGY

## 2021-05-10 PROCEDURE — 99999 PR PBB SHADOW E&M-EST. PATIENT-LVL IV: CPT | Mod: PBBFAC,,, | Performed by: DERMATOLOGY

## 2021-05-10 PROCEDURE — 80053 COMPREHEN METABOLIC PANEL: CPT | Performed by: INTERNAL MEDICINE

## 2021-05-10 RX ORDER — TACROLIMUS 1 MG/G
OINTMENT TOPICAL
Qty: 100 G | Refills: 2 | Status: SHIPPED | OUTPATIENT
Start: 2021-05-10

## 2021-05-11 LAB — HBV SURFACE AG SERPL QL IA: NEGATIVE

## 2021-05-12 ENCOUNTER — SPECIALTY PHARMACY (OUTPATIENT)
Dept: PHARMACY | Facility: CLINIC | Age: 61
End: 2021-05-12

## 2021-05-12 ENCOUNTER — OFFICE VISIT (OUTPATIENT)
Dept: RHEUMATOLOGY | Facility: CLINIC | Age: 61
End: 2021-05-12
Payer: MEDICARE

## 2021-05-12 VITALS
BODY MASS INDEX: 24.94 KG/M2 | HEART RATE: 77 BPM | DIASTOLIC BLOOD PRESSURE: 85 MMHG | HEIGHT: 61 IN | SYSTOLIC BLOOD PRESSURE: 139 MMHG | WEIGHT: 132.13 LBS

## 2021-05-12 DIAGNOSIS — Z79.899 ENCOUNTER FOR MONITORING LEFLUNOMIDE THERAPY: ICD-10-CM

## 2021-05-12 DIAGNOSIS — Z51.81 ENCOUNTER FOR MONITORING LEFLUNOMIDE THERAPY: ICD-10-CM

## 2021-05-12 DIAGNOSIS — D86.9 SARCOIDOSIS: Primary | ICD-10-CM

## 2021-05-12 DIAGNOSIS — M06.9 RHEUMATOID ARTHRITIS FLARE: ICD-10-CM

## 2021-05-12 PROCEDURE — 3008F PR BODY MASS INDEX (BMI) DOCUMENTED: ICD-10-PCS | Mod: CPTII,S$GLB,, | Performed by: INTERNAL MEDICINE

## 2021-05-12 PROCEDURE — 99215 OFFICE O/P EST HI 40 MIN: CPT | Mod: S$GLB,,, | Performed by: INTERNAL MEDICINE

## 2021-05-12 PROCEDURE — 1125F AMNT PAIN NOTED PAIN PRSNT: CPT | Mod: S$GLB,,, | Performed by: INTERNAL MEDICINE

## 2021-05-12 PROCEDURE — 99999 PR PBB SHADOW E&M-EST. PATIENT-LVL V: CPT | Mod: PBBFAC,,, | Performed by: INTERNAL MEDICINE

## 2021-05-12 PROCEDURE — 99999 PR PBB SHADOW E&M-EST. PATIENT-LVL V: ICD-10-PCS | Mod: PBBFAC,,, | Performed by: INTERNAL MEDICINE

## 2021-05-12 PROCEDURE — 1125F PR PAIN SEVERITY QUANTIFIED, PAIN PRESENT: ICD-10-PCS | Mod: S$GLB,,, | Performed by: INTERNAL MEDICINE

## 2021-05-12 PROCEDURE — 3008F BODY MASS INDEX DOCD: CPT | Mod: CPTII,S$GLB,, | Performed by: INTERNAL MEDICINE

## 2021-05-12 PROCEDURE — 99215 PR OFFICE/OUTPT VISIT, EST, LEVL V, 40-54 MIN: ICD-10-PCS | Mod: S$GLB,,, | Performed by: INTERNAL MEDICINE

## 2021-05-12 RX ORDER — ETANERCEPT 50 MG/ML
50 SOLUTION SUBCUTANEOUS WEEKLY
Qty: 4 ML | Refills: 11 | Status: SHIPPED | OUTPATIENT
Start: 2021-05-12 | End: 2022-04-18 | Stop reason: SDUPTHER

## 2021-05-13 ENCOUNTER — SPECIALTY PHARMACY (OUTPATIENT)
Dept: PHARMACY | Facility: CLINIC | Age: 61
End: 2021-05-13

## 2021-05-13 ENCOUNTER — PATIENT MESSAGE (OUTPATIENT)
Dept: RHEUMATOLOGY | Facility: CLINIC | Age: 61
End: 2021-05-13

## 2021-05-14 ENCOUNTER — TELEPHONE (OUTPATIENT)
Dept: RHEUMATOLOGY | Facility: CLINIC | Age: 61
End: 2021-05-14

## 2021-05-17 ENCOUNTER — CLINICAL SUPPORT (OUTPATIENT)
Dept: DERMATOLOGY | Facility: CLINIC | Age: 61
End: 2021-05-17
Payer: MEDICARE

## 2021-05-17 DIAGNOSIS — L80 VITILIGO: ICD-10-CM

## 2021-05-17 PROCEDURE — 96900 ACTINOTHERAPY UV LIGHT: CPT | Mod: S$GLB,,, | Performed by: DERMATOLOGY

## 2021-05-17 PROCEDURE — 99999 PR PBB SHADOW E&M-EST. PATIENT-LVL III: CPT | Mod: PBBFAC,,,

## 2021-05-17 PROCEDURE — 99999 PR PBB SHADOW E&M-EST. PATIENT-LVL III: ICD-10-PCS | Mod: PBBFAC,,,

## 2021-05-17 PROCEDURE — 96900 PR ULTRAVIOLET LIGHT THERAPY: ICD-10-PCS | Mod: S$GLB,,, | Performed by: DERMATOLOGY

## 2021-05-19 ENCOUNTER — PROCEDURE VISIT (OUTPATIENT)
Dept: NEUROLOGY | Facility: CLINIC | Age: 61
End: 2021-05-19
Payer: MEDICARE

## 2021-05-19 ENCOUNTER — OFFICE VISIT (OUTPATIENT)
Dept: NEUROLOGY | Facility: CLINIC | Age: 61
End: 2021-05-19
Payer: MEDICARE

## 2021-05-19 VITALS
HEIGHT: 61 IN | WEIGHT: 133.81 LBS | SYSTOLIC BLOOD PRESSURE: 139 MMHG | DIASTOLIC BLOOD PRESSURE: 85 MMHG | DIASTOLIC BLOOD PRESSURE: 85 MMHG | BODY MASS INDEX: 25.27 KG/M2 | HEART RATE: 90 BPM | HEIGHT: 61 IN | SYSTOLIC BLOOD PRESSURE: 139 MMHG | WEIGHT: 133.81 LBS | HEART RATE: 90 BPM | BODY MASS INDEX: 25.27 KG/M2

## 2021-05-19 DIAGNOSIS — R20.0 NUMBNESS OF UPPER LIMB: ICD-10-CM

## 2021-05-19 DIAGNOSIS — R20.0 NUMBNESS AND TINGLING OF LEFT LEG: ICD-10-CM

## 2021-05-19 DIAGNOSIS — R20.0 NUMBNESS OF LOWER LIMB: ICD-10-CM

## 2021-05-19 DIAGNOSIS — M79.605 PAIN IN BOTH LOWER EXTREMITIES: ICD-10-CM

## 2021-05-19 DIAGNOSIS — M79.602 PAIN OF LEFT UPPER EXTREMITY: Primary | ICD-10-CM

## 2021-05-19 DIAGNOSIS — M79.604 PAIN IN BOTH LOWER EXTREMITIES: ICD-10-CM

## 2021-05-19 DIAGNOSIS — R20.2 NUMBNESS AND TINGLING OF LEFT LEG: ICD-10-CM

## 2021-05-19 PROCEDURE — 99999 PR PBB SHADOW E&M-EST. PATIENT-LVL IV: CPT | Mod: PBBFAC,,, | Performed by: PSYCHIATRY & NEUROLOGY

## 2021-05-19 PROCEDURE — 3008F BODY MASS INDEX DOCD: CPT | Mod: CPTII,S$GLB,, | Performed by: PSYCHIATRY & NEUROLOGY

## 2021-05-19 PROCEDURE — 1126F PR PAIN SEVERITY QUANTIFIED, NO PAIN PRESENT: ICD-10-PCS | Mod: S$GLB,,, | Performed by: PSYCHIATRY & NEUROLOGY

## 2021-05-19 PROCEDURE — 99213 OFFICE O/P EST LOW 20 MIN: CPT | Mod: 25,S$GLB,, | Performed by: PSYCHIATRY & NEUROLOGY

## 2021-05-19 PROCEDURE — 95913 PR NERVE CONDUCTION STUDY; 13 OR MORE STUDIES: ICD-10-PCS | Mod: S$GLB,,, | Performed by: PSYCHIATRY & NEUROLOGY

## 2021-05-19 PROCEDURE — 99213 PR OFFICE/OUTPT VISIT, EST, LEVL III, 20-29 MIN: ICD-10-PCS | Mod: 25,S$GLB,, | Performed by: PSYCHIATRY & NEUROLOGY

## 2021-05-19 PROCEDURE — 95886 PR EMG COMPLETE, W/ NERVE CONDUCTION STUDIES, 5+ MUSCLES: ICD-10-PCS | Mod: S$GLB,,, | Performed by: PSYCHIATRY & NEUROLOGY

## 2021-05-19 PROCEDURE — 3008F PR BODY MASS INDEX (BMI) DOCUMENTED: ICD-10-PCS | Mod: CPTII,S$GLB,, | Performed by: PSYCHIATRY & NEUROLOGY

## 2021-05-19 PROCEDURE — 95913 NRV CNDJ TEST 13/> STUDIES: CPT | Mod: S$GLB,,, | Performed by: PSYCHIATRY & NEUROLOGY

## 2021-05-19 PROCEDURE — 95886 MUSC TEST DONE W/N TEST COMP: CPT | Mod: S$GLB,,, | Performed by: PSYCHIATRY & NEUROLOGY

## 2021-05-19 PROCEDURE — 99999 PR PBB SHADOW E&M-EST. PATIENT-LVL IV: ICD-10-PCS | Mod: PBBFAC,,, | Performed by: PSYCHIATRY & NEUROLOGY

## 2021-05-19 PROCEDURE — 1126F AMNT PAIN NOTED NONE PRSNT: CPT | Mod: S$GLB,,, | Performed by: PSYCHIATRY & NEUROLOGY

## 2021-05-25 ENCOUNTER — CLINICAL SUPPORT (OUTPATIENT)
Dept: DERMATOLOGY | Facility: CLINIC | Age: 61
End: 2021-05-25
Payer: MEDICARE

## 2021-05-25 DIAGNOSIS — L80 VITILIGO: ICD-10-CM

## 2021-05-25 PROCEDURE — 99999 PR PBB SHADOW E&M-EST. PATIENT-LVL III: ICD-10-PCS | Mod: PBBFAC,,,

## 2021-05-25 PROCEDURE — 96900 ACTINOTHERAPY UV LIGHT: CPT | Mod: S$GLB,,, | Performed by: DERMATOLOGY

## 2021-05-25 PROCEDURE — 96900 PR ULTRAVIOLET LIGHT THERAPY: ICD-10-PCS | Mod: S$GLB,,, | Performed by: DERMATOLOGY

## 2021-05-25 PROCEDURE — 99999 PR PBB SHADOW E&M-EST. PATIENT-LVL III: CPT | Mod: PBBFAC,,,

## 2021-05-27 ENCOUNTER — CLINICAL SUPPORT (OUTPATIENT)
Dept: DERMATOLOGY | Facility: CLINIC | Age: 61
End: 2021-05-27
Payer: MEDICARE

## 2021-05-27 DIAGNOSIS — L80 VITILIGO: ICD-10-CM

## 2021-05-27 PROCEDURE — 99999 PR PBB SHADOW E&M-EST. PATIENT-LVL III: ICD-10-PCS | Mod: PBBFAC,,,

## 2021-05-27 PROCEDURE — 96900 PR ULTRAVIOLET LIGHT THERAPY: ICD-10-PCS | Mod: S$GLB,,, | Performed by: DERMATOLOGY

## 2021-05-27 PROCEDURE — 99999 PR PBB SHADOW E&M-EST. PATIENT-LVL III: CPT | Mod: PBBFAC,,,

## 2021-05-27 PROCEDURE — 96900 ACTINOTHERAPY UV LIGHT: CPT | Mod: S$GLB,,, | Performed by: DERMATOLOGY

## 2021-06-01 ENCOUNTER — CLINICAL SUPPORT (OUTPATIENT)
Dept: DERMATOLOGY | Facility: CLINIC | Age: 61
End: 2021-06-01
Payer: MEDICARE

## 2021-06-01 DIAGNOSIS — L80 VITILIGO: ICD-10-CM

## 2021-06-01 PROCEDURE — 99999 PR PBB SHADOW E&M-EST. PATIENT-LVL III: CPT | Mod: PBBFAC,,,

## 2021-06-01 PROCEDURE — 99999 PR PBB SHADOW E&M-EST. PATIENT-LVL III: ICD-10-PCS | Mod: PBBFAC,,,

## 2021-06-01 PROCEDURE — 96900 PR ULTRAVIOLET LIGHT THERAPY: ICD-10-PCS | Mod: S$GLB,,, | Performed by: DERMATOLOGY

## 2021-06-01 PROCEDURE — 96900 ACTINOTHERAPY UV LIGHT: CPT | Mod: S$GLB,,, | Performed by: DERMATOLOGY

## 2021-06-03 ENCOUNTER — SPECIALTY PHARMACY (OUTPATIENT)
Dept: PHARMACY | Facility: CLINIC | Age: 61
End: 2021-06-03

## 2021-06-08 ENCOUNTER — CLINICAL SUPPORT (OUTPATIENT)
Dept: DERMATOLOGY | Facility: CLINIC | Age: 61
End: 2021-06-08
Payer: MEDICARE

## 2021-06-08 DIAGNOSIS — L80 VITILIGO: ICD-10-CM

## 2021-06-08 PROCEDURE — 96900 PR ULTRAVIOLET LIGHT THERAPY: ICD-10-PCS | Mod: S$GLB,,, | Performed by: DERMATOLOGY

## 2021-06-08 PROCEDURE — 99999 PR PBB SHADOW E&M-EST. PATIENT-LVL III: CPT | Mod: PBBFAC,,,

## 2021-06-08 PROCEDURE — 99999 PR PBB SHADOW E&M-EST. PATIENT-LVL III: ICD-10-PCS | Mod: PBBFAC,,,

## 2021-06-08 PROCEDURE — 96900 ACTINOTHERAPY UV LIGHT: CPT | Mod: S$GLB,,, | Performed by: DERMATOLOGY

## 2021-06-10 ENCOUNTER — CLINICAL SUPPORT (OUTPATIENT)
Dept: DERMATOLOGY | Facility: CLINIC | Age: 61
End: 2021-06-10
Payer: MEDICARE

## 2021-06-10 DIAGNOSIS — L80 VITILIGO: ICD-10-CM

## 2021-06-10 PROCEDURE — 96900 ACTINOTHERAPY UV LIGHT: CPT | Mod: S$GLB,,, | Performed by: DERMATOLOGY

## 2021-06-10 PROCEDURE — 96900 PR ULTRAVIOLET LIGHT THERAPY: ICD-10-PCS | Mod: S$GLB,,, | Performed by: DERMATOLOGY

## 2021-06-10 PROCEDURE — 99999 PR PBB SHADOW E&M-EST. PATIENT-LVL III: ICD-10-PCS | Mod: PBBFAC,,,

## 2021-06-10 PROCEDURE — 99999 PR PBB SHADOW E&M-EST. PATIENT-LVL III: CPT | Mod: PBBFAC,,,

## 2021-06-14 ENCOUNTER — CLINICAL SUPPORT (OUTPATIENT)
Dept: REHABILITATION | Facility: HOSPITAL | Age: 61
End: 2021-06-14
Attending: PSYCHIATRY & NEUROLOGY
Payer: MEDICARE

## 2021-06-14 DIAGNOSIS — R26.89 IMPAIRMENT OF BALANCE: Primary | ICD-10-CM

## 2021-06-14 DIAGNOSIS — R20.9 SENSATION DISORDER: ICD-10-CM

## 2021-06-14 DIAGNOSIS — M54.2 NECK PAIN, MUSCULOSKELETAL: ICD-10-CM

## 2021-06-14 PROCEDURE — 97163 PT EVAL HIGH COMPLEX 45 MIN: CPT | Mod: PN

## 2021-06-15 DIAGNOSIS — E78.00 HYPERCHOLESTEREMIA: ICD-10-CM

## 2021-06-15 RX ORDER — ATORVASTATIN CALCIUM 10 MG/1
10 TABLET, FILM COATED ORAL DAILY
Qty: 90 TABLET | Refills: 1 | Status: SHIPPED | OUTPATIENT
Start: 2021-06-15 | End: 2022-02-17 | Stop reason: SDUPTHER

## 2021-06-17 ENCOUNTER — CLINICAL SUPPORT (OUTPATIENT)
Dept: DERMATOLOGY | Facility: CLINIC | Age: 61
End: 2021-06-17
Attending: FAMILY MEDICINE
Payer: MEDICARE

## 2021-06-17 DIAGNOSIS — L80 VITILIGO: ICD-10-CM

## 2021-06-17 PROCEDURE — 99999 PR PBB SHADOW E&M-EST. PATIENT-LVL III: CPT | Mod: PBBFAC,,,

## 2021-06-17 PROCEDURE — 96900 ACTINOTHERAPY UV LIGHT: CPT | Mod: S$GLB,,, | Performed by: DERMATOLOGY

## 2021-06-17 PROCEDURE — 99999 PR PBB SHADOW E&M-EST. PATIENT-LVL III: ICD-10-PCS | Mod: PBBFAC,,,

## 2021-06-17 PROCEDURE — 96900 PR ULTRAVIOLET LIGHT THERAPY: ICD-10-PCS | Mod: S$GLB,,, | Performed by: DERMATOLOGY

## 2021-06-22 ENCOUNTER — CLINICAL SUPPORT (OUTPATIENT)
Dept: DERMATOLOGY | Facility: CLINIC | Age: 61
End: 2021-06-22
Payer: MEDICARE

## 2021-06-22 DIAGNOSIS — L80 VITILIGO: ICD-10-CM

## 2021-06-22 PROCEDURE — 99999 PR PBB SHADOW E&M-EST. PATIENT-LVL III: ICD-10-PCS | Mod: PBBFAC,,,

## 2021-06-22 PROCEDURE — 96900 PR ULTRAVIOLET LIGHT THERAPY: ICD-10-PCS | Mod: S$GLB,,, | Performed by: DERMATOLOGY

## 2021-06-22 PROCEDURE — 99999 PR PBB SHADOW E&M-EST. PATIENT-LVL III: CPT | Mod: PBBFAC,,,

## 2021-06-22 PROCEDURE — 96900 ACTINOTHERAPY UV LIGHT: CPT | Mod: S$GLB,,, | Performed by: DERMATOLOGY

## 2021-06-24 ENCOUNTER — PATIENT MESSAGE (OUTPATIENT)
Dept: RHEUMATOLOGY | Facility: CLINIC | Age: 61
End: 2021-06-24

## 2021-06-24 ENCOUNTER — OFFICE VISIT (OUTPATIENT)
Dept: NEUROLOGY | Facility: CLINIC | Age: 61
End: 2021-06-24
Payer: MEDICARE

## 2021-06-24 VITALS
BODY MASS INDEX: 25.81 KG/M2 | WEIGHT: 136.69 LBS | HEIGHT: 61 IN | DIASTOLIC BLOOD PRESSURE: 84 MMHG | HEART RATE: 94 BPM | SYSTOLIC BLOOD PRESSURE: 157 MMHG

## 2021-06-24 DIAGNOSIS — R42 VERTIGO: ICD-10-CM

## 2021-06-24 DIAGNOSIS — Z87.81 HISTORY OF CERVICAL FRACTURE: Primary | ICD-10-CM

## 2021-06-24 DIAGNOSIS — I62.9 INTRACRANIAL BLEED: ICD-10-CM

## 2021-06-24 DIAGNOSIS — R26.89 POOR BALANCE: ICD-10-CM

## 2021-06-24 PROCEDURE — 1125F AMNT PAIN NOTED PAIN PRSNT: CPT | Mod: S$GLB,,, | Performed by: PSYCHIATRY & NEUROLOGY

## 2021-06-24 PROCEDURE — 99999 PR PBB SHADOW E&M-EST. PATIENT-LVL V: CPT | Mod: PBBFAC,,, | Performed by: PSYCHIATRY & NEUROLOGY

## 2021-06-24 PROCEDURE — 1125F PR PAIN SEVERITY QUANTIFIED, PAIN PRESENT: ICD-10-PCS | Mod: S$GLB,,, | Performed by: PSYCHIATRY & NEUROLOGY

## 2021-06-24 PROCEDURE — 3008F PR BODY MASS INDEX (BMI) DOCUMENTED: ICD-10-PCS | Mod: CPTII,S$GLB,, | Performed by: PSYCHIATRY & NEUROLOGY

## 2021-06-24 PROCEDURE — 3008F BODY MASS INDEX DOCD: CPT | Mod: CPTII,S$GLB,, | Performed by: PSYCHIATRY & NEUROLOGY

## 2021-06-24 PROCEDURE — 99214 PR OFFICE/OUTPT VISIT, EST, LEVL IV, 30-39 MIN: ICD-10-PCS | Mod: S$GLB,,, | Performed by: PSYCHIATRY & NEUROLOGY

## 2021-06-24 PROCEDURE — 99214 OFFICE O/P EST MOD 30 MIN: CPT | Mod: S$GLB,,, | Performed by: PSYCHIATRY & NEUROLOGY

## 2021-06-24 PROCEDURE — 99999 PR PBB SHADOW E&M-EST. PATIENT-LVL V: ICD-10-PCS | Mod: PBBFAC,,, | Performed by: PSYCHIATRY & NEUROLOGY

## 2021-06-28 ENCOUNTER — LAB VISIT (OUTPATIENT)
Dept: LAB | Facility: HOSPITAL | Age: 61
End: 2021-06-28
Attending: INTERNAL MEDICINE
Payer: MEDICARE

## 2021-06-28 DIAGNOSIS — D86.9 SARCOIDOSIS: ICD-10-CM

## 2021-06-28 LAB
ALBUMIN SERPL BCP-MCNC: 3.9 G/DL (ref 3.5–5.2)
ALP SERPL-CCNC: 70 U/L (ref 55–135)
ALT SERPL W/O P-5'-P-CCNC: 20 U/L (ref 10–44)
ANION GAP SERPL CALC-SCNC: 11 MMOL/L (ref 8–16)
AST SERPL-CCNC: 18 U/L (ref 10–40)
BASOPHILS # BLD AUTO: 0.05 K/UL (ref 0–0.2)
BASOPHILS NFR BLD: 0.8 % (ref 0–1.9)
BILIRUB SERPL-MCNC: 0.2 MG/DL (ref 0.1–1)
BUN SERPL-MCNC: 14 MG/DL (ref 8–23)
CALCIUM SERPL-MCNC: 9.7 MG/DL (ref 8.7–10.5)
CHLORIDE SERPL-SCNC: 105 MMOL/L (ref 95–110)
CO2 SERPL-SCNC: 26 MMOL/L (ref 23–29)
CREAT SERPL-MCNC: 0.8 MG/DL (ref 0.5–1.4)
CRP SERPL-MCNC: 1.9 MG/L (ref 0–8.2)
DIFFERENTIAL METHOD: ABNORMAL
EOSINOPHIL # BLD AUTO: 0.3 K/UL (ref 0–0.5)
EOSINOPHIL NFR BLD: 3.8 % (ref 0–8)
ERYTHROCYTE [DISTWIDTH] IN BLOOD BY AUTOMATED COUNT: 13.9 % (ref 11.5–14.5)
ERYTHROCYTE [SEDIMENTATION RATE] IN BLOOD BY WESTERGREN METHOD: 17 MM/HR (ref 0–36)
EST. GFR  (AFRICAN AMERICAN): >60 ML/MIN/1.73 M^2
EST. GFR  (NON AFRICAN AMERICAN): >60 ML/MIN/1.73 M^2
GLUCOSE SERPL-MCNC: 81 MG/DL (ref 70–110)
HCT VFR BLD AUTO: 40.9 % (ref 37–48.5)
HGB BLD-MCNC: 12.9 G/DL (ref 12–16)
IMM GRANULOCYTES # BLD AUTO: 0.01 K/UL (ref 0–0.04)
IMM GRANULOCYTES NFR BLD AUTO: 0.2 % (ref 0–0.5)
LYMPHOCYTES # BLD AUTO: 2.2 K/UL (ref 1–4.8)
LYMPHOCYTES NFR BLD: 33.2 % (ref 18–48)
MCH RBC QN AUTO: 31.4 PG (ref 27–31)
MCHC RBC AUTO-ENTMCNC: 31.5 G/DL (ref 32–36)
MCV RBC AUTO: 100 FL (ref 82–98)
MONOCYTES # BLD AUTO: 0.6 K/UL (ref 0.3–1)
MONOCYTES NFR BLD: 9.8 % (ref 4–15)
NEUTROPHILS # BLD AUTO: 3.4 K/UL (ref 1.8–7.7)
NEUTROPHILS NFR BLD: 52.2 % (ref 38–73)
NRBC BLD-RTO: 0 /100 WBC
PLATELET # BLD AUTO: 333 K/UL (ref 150–450)
PMV BLD AUTO: 10.6 FL (ref 9.2–12.9)
POTASSIUM SERPL-SCNC: 4.1 MMOL/L (ref 3.5–5.1)
PROT SERPL-MCNC: 7.6 G/DL (ref 6–8.4)
RBC # BLD AUTO: 4.11 M/UL (ref 4–5.4)
SODIUM SERPL-SCNC: 142 MMOL/L (ref 136–145)
WBC # BLD AUTO: 6.5 K/UL (ref 3.9–12.7)

## 2021-06-28 PROCEDURE — 85025 COMPLETE CBC W/AUTO DIFF WBC: CPT | Performed by: INTERNAL MEDICINE

## 2021-06-28 PROCEDURE — 36415 COLL VENOUS BLD VENIPUNCTURE: CPT | Mod: PO | Performed by: INTERNAL MEDICINE

## 2021-06-28 PROCEDURE — 80053 COMPREHEN METABOLIC PANEL: CPT | Performed by: INTERNAL MEDICINE

## 2021-06-28 PROCEDURE — 86140 C-REACTIVE PROTEIN: CPT | Performed by: INTERNAL MEDICINE

## 2021-06-28 PROCEDURE — 85652 RBC SED RATE AUTOMATED: CPT | Performed by: INTERNAL MEDICINE

## 2021-06-29 ENCOUNTER — CLINICAL SUPPORT (OUTPATIENT)
Dept: DERMATOLOGY | Facility: CLINIC | Age: 61
End: 2021-06-29
Payer: MEDICARE

## 2021-06-29 DIAGNOSIS — L80 VITILIGO: ICD-10-CM

## 2021-06-29 PROCEDURE — 99999 PR PBB SHADOW E&M-EST. PATIENT-LVL III: ICD-10-PCS | Mod: PBBFAC,,,

## 2021-06-29 PROCEDURE — 99499 UNLISTED E&M SERVICE: CPT | Mod: S$GLB,,, | Performed by: DERMATOLOGY

## 2021-06-29 PROCEDURE — 99499 NO LOS: ICD-10-PCS | Mod: S$GLB,,, | Performed by: DERMATOLOGY

## 2021-06-29 PROCEDURE — 99999 PR PBB SHADOW E&M-EST. PATIENT-LVL III: CPT | Mod: PBBFAC,,,

## 2021-06-30 ENCOUNTER — OFFICE VISIT (OUTPATIENT)
Dept: RHEUMATOLOGY | Facility: CLINIC | Age: 61
End: 2021-06-30
Payer: MEDICARE

## 2021-06-30 VITALS
SYSTOLIC BLOOD PRESSURE: 147 MMHG | HEIGHT: 61 IN | BODY MASS INDEX: 25.84 KG/M2 | HEART RATE: 79 BPM | WEIGHT: 136.88 LBS | DIASTOLIC BLOOD PRESSURE: 93 MMHG

## 2021-06-30 DIAGNOSIS — F41.9 ANXIETY: ICD-10-CM

## 2021-06-30 DIAGNOSIS — D86.9 SARCOIDOSIS: ICD-10-CM

## 2021-06-30 DIAGNOSIS — Z51.81 ENCOUNTER FOR METHOTREXATE MONITORING: ICD-10-CM

## 2021-06-30 DIAGNOSIS — Z79.631 ENCOUNTER FOR METHOTREXATE MONITORING: ICD-10-CM

## 2021-06-30 DIAGNOSIS — M06.9 RHEUMATOID ARTHRITIS FLARE: Primary | ICD-10-CM

## 2021-06-30 PROCEDURE — 3008F PR BODY MASS INDEX (BMI) DOCUMENTED: ICD-10-PCS | Mod: CPTII,S$GLB,, | Performed by: INTERNAL MEDICINE

## 2021-06-30 PROCEDURE — 99999 PR PBB SHADOW E&M-EST. PATIENT-LVL V: ICD-10-PCS | Mod: PBBFAC,,, | Performed by: INTERNAL MEDICINE

## 2021-06-30 PROCEDURE — 99215 PR OFFICE/OUTPT VISIT, EST, LEVL V, 40-54 MIN: ICD-10-PCS | Mod: S$GLB,,, | Performed by: INTERNAL MEDICINE

## 2021-06-30 PROCEDURE — 1125F AMNT PAIN NOTED PAIN PRSNT: CPT | Mod: S$GLB,,, | Performed by: INTERNAL MEDICINE

## 2021-06-30 PROCEDURE — 99215 OFFICE O/P EST HI 40 MIN: CPT | Mod: S$GLB,,, | Performed by: INTERNAL MEDICINE

## 2021-06-30 PROCEDURE — 99999 PR PBB SHADOW E&M-EST. PATIENT-LVL V: CPT | Mod: PBBFAC,,, | Performed by: INTERNAL MEDICINE

## 2021-06-30 PROCEDURE — 1125F PR PAIN SEVERITY QUANTIFIED, PAIN PRESENT: ICD-10-PCS | Mod: S$GLB,,, | Performed by: INTERNAL MEDICINE

## 2021-06-30 PROCEDURE — 3008F BODY MASS INDEX DOCD: CPT | Mod: CPTII,S$GLB,, | Performed by: INTERNAL MEDICINE

## 2021-07-01 ENCOUNTER — SPECIALTY PHARMACY (OUTPATIENT)
Dept: PHARMACY | Facility: CLINIC | Age: 61
End: 2021-07-01

## 2021-07-07 ENCOUNTER — HOSPITAL ENCOUNTER (OUTPATIENT)
Dept: CARDIOLOGY | Facility: HOSPITAL | Age: 61
Discharge: HOME | End: 2021-07-07
Attending: INTERNAL MEDICINE
Payer: MEDICARE

## 2021-07-07 ENCOUNTER — OFFICE VISIT (OUTPATIENT)
Dept: CARDIOLOGY | Facility: CLINIC | Age: 61
End: 2021-07-07
Payer: MEDICARE

## 2021-07-07 ENCOUNTER — SPECIALTY PHARMACY (OUTPATIENT)
Dept: PHARMACY | Facility: CLINIC | Age: 61
End: 2021-07-07

## 2021-07-07 VITALS
OXYGEN SATURATION: 94 % | HEART RATE: 80 BPM | HEIGHT: 61 IN | DIASTOLIC BLOOD PRESSURE: 88 MMHG | BODY MASS INDEX: 26.14 KG/M2 | WEIGHT: 138.44 LBS | SYSTOLIC BLOOD PRESSURE: 153 MMHG

## 2021-07-07 DIAGNOSIS — R00.2 PALPITATIONS: ICD-10-CM

## 2021-07-07 DIAGNOSIS — R00.2 PALPITATIONS: Primary | ICD-10-CM

## 2021-07-07 DIAGNOSIS — I10 ESSENTIAL HYPERTENSION: ICD-10-CM

## 2021-07-07 DIAGNOSIS — E78.2 MIXED HYPERLIPIDEMIA: ICD-10-CM

## 2021-07-07 PROCEDURE — 3008F PR BODY MASS INDEX (BMI) DOCUMENTED: ICD-10-PCS | Mod: CPTII,S$GLB,, | Performed by: INTERNAL MEDICINE

## 2021-07-07 PROCEDURE — 93227 HOLTER MONITOR - 48 HOUR (CUPID ONLY): ICD-10-PCS | Mod: ,,, | Performed by: INTERNAL MEDICINE

## 2021-07-07 PROCEDURE — 99999 PR PBB SHADOW E&M-EST. PATIENT-LVL IV: CPT | Mod: PBBFAC,,, | Performed by: INTERNAL MEDICINE

## 2021-07-07 PROCEDURE — 99999 PR PBB SHADOW E&M-EST. PATIENT-LVL IV: ICD-10-PCS | Mod: PBBFAC,,, | Performed by: INTERNAL MEDICINE

## 2021-07-07 PROCEDURE — 93225 XTRNL ECG REC<48 HRS REC: CPT

## 2021-07-07 PROCEDURE — 93227 XTRNL ECG REC<48 HR R&I: CPT | Mod: ,,, | Performed by: INTERNAL MEDICINE

## 2021-07-07 PROCEDURE — 99204 OFFICE O/P NEW MOD 45 MIN: CPT | Mod: 25,S$GLB,ICN, | Performed by: INTERNAL MEDICINE

## 2021-07-07 PROCEDURE — 93000 EKG 12-LEAD: ICD-10-PCS | Mod: S$GLB,,, | Performed by: INTERNAL MEDICINE

## 2021-07-07 PROCEDURE — 93000 ELECTROCARDIOGRAM COMPLETE: CPT | Mod: S$GLB,,, | Performed by: INTERNAL MEDICINE

## 2021-07-07 PROCEDURE — 99499 RISK ADDL DX/OHS AUDIT: ICD-10-PCS | Mod: S$GLB,,, | Performed by: INTERNAL MEDICINE

## 2021-07-07 PROCEDURE — 99499 UNLISTED E&M SERVICE: CPT | Mod: S$GLB,,, | Performed by: INTERNAL MEDICINE

## 2021-07-07 PROCEDURE — 3008F BODY MASS INDEX DOCD: CPT | Mod: CPTII,S$GLB,, | Performed by: INTERNAL MEDICINE

## 2021-07-07 PROCEDURE — 99204 PR OFFICE/OUTPT VISIT, NEW, LEVL IV, 45-59 MIN: ICD-10-PCS | Mod: 25,S$GLB,ICN, | Performed by: INTERNAL MEDICINE

## 2021-07-07 RX ORDER — AMLODIPINE BESYLATE 10 MG/1
10 TABLET ORAL DAILY
Qty: 4 TABLET | Refills: 90 | Status: SHIPPED | OUTPATIENT
Start: 2021-07-07 | End: 2021-10-05

## 2021-07-08 PROBLEM — R00.2 PALPITATIONS: Status: ACTIVE | Noted: 2021-07-08

## 2021-07-09 ENCOUNTER — TELEPHONE (OUTPATIENT)
Dept: CARDIOLOGY | Facility: CLINIC | Age: 61
End: 2021-07-09

## 2021-07-09 DIAGNOSIS — R00.2 PALPITATIONS: Primary | ICD-10-CM

## 2021-07-14 LAB
OHS CV EVENT MONITOR DAY: 0
OHS CV HOLTER LENGTH DECIMAL HOURS: 47.98
OHS CV HOLTER LENGTH HOURS: 47
OHS CV HOLTER LENGTH MINUTES: 59

## 2021-07-16 ENCOUNTER — CLINICAL SUPPORT (OUTPATIENT)
Dept: DERMATOLOGY | Facility: CLINIC | Age: 61
End: 2021-07-16
Payer: MEDICARE

## 2021-07-16 DIAGNOSIS — L80 VITILIGO: ICD-10-CM

## 2021-07-16 PROCEDURE — 99999 PR PBB SHADOW E&M-EST. PATIENT-LVL III: CPT | Mod: PBBFAC,,,

## 2021-07-16 PROCEDURE — 99999 PR PBB SHADOW E&M-EST. PATIENT-LVL III: ICD-10-PCS | Mod: PBBFAC,,,

## 2021-07-16 PROCEDURE — 96900 ACTINOTHERAPY UV LIGHT: CPT | Mod: S$GLB,,, | Performed by: DERMATOLOGY

## 2021-07-16 PROCEDURE — 96900 PR ULTRAVIOLET LIGHT THERAPY: ICD-10-PCS | Mod: S$GLB,,, | Performed by: DERMATOLOGY

## 2021-07-19 ENCOUNTER — PATIENT OUTREACH (OUTPATIENT)
Dept: ADMINISTRATIVE | Facility: OTHER | Age: 61
End: 2021-07-19

## 2021-07-21 ENCOUNTER — OFFICE VISIT (OUTPATIENT)
Dept: CARDIOLOGY | Facility: CLINIC | Age: 61
End: 2021-07-21
Payer: MEDICARE

## 2021-07-21 VITALS
WEIGHT: 138.69 LBS | SYSTOLIC BLOOD PRESSURE: 130 MMHG | HEART RATE: 83 BPM | HEIGHT: 61 IN | OXYGEN SATURATION: 98 % | DIASTOLIC BLOOD PRESSURE: 83 MMHG | BODY MASS INDEX: 26.19 KG/M2

## 2021-07-21 DIAGNOSIS — M05.771 RHEUMATOID ARTHRITIS INVOLVING BOTH FEET WITH POSITIVE RHEUMATOID FACTOR: ICD-10-CM

## 2021-07-21 DIAGNOSIS — I10 ESSENTIAL HYPERTENSION: ICD-10-CM

## 2021-07-21 DIAGNOSIS — M05.772 RHEUMATOID ARTHRITIS INVOLVING BOTH FEET WITH POSITIVE RHEUMATOID FACTOR: ICD-10-CM

## 2021-07-21 DIAGNOSIS — E78.2 MIXED HYPERLIPIDEMIA: ICD-10-CM

## 2021-07-21 DIAGNOSIS — R00.2 PALPITATIONS: Primary | ICD-10-CM

## 2021-07-21 PROCEDURE — 1126F PR PAIN SEVERITY QUANTIFIED, NO PAIN PRESENT: ICD-10-PCS | Mod: CPTII,S$GLB,, | Performed by: INTERNAL MEDICINE

## 2021-07-21 PROCEDURE — 3079F DIAST BP 80-89 MM HG: CPT | Mod: CPTII,S$GLB,, | Performed by: INTERNAL MEDICINE

## 2021-07-21 PROCEDURE — 99999 PR PBB SHADOW E&M-EST. PATIENT-LVL V: ICD-10-PCS | Mod: PBBFAC,,, | Performed by: INTERNAL MEDICINE

## 2021-07-21 PROCEDURE — 3008F BODY MASS INDEX DOCD: CPT | Mod: CPTII,S$GLB,, | Performed by: INTERNAL MEDICINE

## 2021-07-21 PROCEDURE — 3079F PR MOST RECENT DIASTOLIC BLOOD PRESSURE 80-89 MM HG: ICD-10-PCS | Mod: CPTII,S$GLB,, | Performed by: INTERNAL MEDICINE

## 2021-07-21 PROCEDURE — 99214 OFFICE O/P EST MOD 30 MIN: CPT | Mod: S$GLB,,, | Performed by: INTERNAL MEDICINE

## 2021-07-21 PROCEDURE — 3008F PR BODY MASS INDEX (BMI) DOCUMENTED: ICD-10-PCS | Mod: CPTII,S$GLB,, | Performed by: INTERNAL MEDICINE

## 2021-07-21 PROCEDURE — 99499 UNLISTED E&M SERVICE: CPT | Mod: S$GLB,,, | Performed by: INTERNAL MEDICINE

## 2021-07-21 PROCEDURE — 1126F AMNT PAIN NOTED NONE PRSNT: CPT | Mod: CPTII,S$GLB,, | Performed by: INTERNAL MEDICINE

## 2021-07-21 PROCEDURE — 3075F PR MOST RECENT SYSTOLIC BLOOD PRESS GE 130-139MM HG: ICD-10-PCS | Mod: CPTII,S$GLB,, | Performed by: INTERNAL MEDICINE

## 2021-07-21 PROCEDURE — 99214 PR OFFICE/OUTPT VISIT, EST, LEVL IV, 30-39 MIN: ICD-10-PCS | Mod: S$GLB,,, | Performed by: INTERNAL MEDICINE

## 2021-07-21 PROCEDURE — 99499 RISK ADDL DX/OHS AUDIT: ICD-10-PCS | Mod: S$GLB,,, | Performed by: INTERNAL MEDICINE

## 2021-07-21 PROCEDURE — 99999 PR PBB SHADOW E&M-EST. PATIENT-LVL V: CPT | Mod: PBBFAC,,, | Performed by: INTERNAL MEDICINE

## 2021-07-21 PROCEDURE — 3075F SYST BP GE 130 - 139MM HG: CPT | Mod: CPTII,S$GLB,, | Performed by: INTERNAL MEDICINE

## 2021-07-21 RX ORDER — METOPROLOL SUCCINATE 25 MG/1
12.5 TABLET, EXTENDED RELEASE ORAL NIGHTLY
Qty: 90 TABLET | Refills: 4 | Status: SHIPPED | OUTPATIENT
Start: 2021-07-21 | End: 2021-12-06 | Stop reason: SDUPTHER

## 2021-07-22 ENCOUNTER — CLINICAL SUPPORT (OUTPATIENT)
Dept: DERMATOLOGY | Facility: CLINIC | Age: 61
End: 2021-07-22
Payer: MEDICARE

## 2021-07-22 ENCOUNTER — PATIENT MESSAGE (OUTPATIENT)
Dept: CARDIOLOGY | Facility: CLINIC | Age: 61
End: 2021-07-22

## 2021-07-22 DIAGNOSIS — L80 VITILIGO: ICD-10-CM

## 2021-07-22 PROCEDURE — 96900 ACTINOTHERAPY UV LIGHT: CPT | Mod: S$GLB,,, | Performed by: DERMATOLOGY

## 2021-07-22 PROCEDURE — 99999 PR PBB SHADOW E&M-EST. PATIENT-LVL III: ICD-10-PCS | Mod: PBBFAC,,,

## 2021-07-22 PROCEDURE — 96900 PR ULTRAVIOLET LIGHT THERAPY: ICD-10-PCS | Mod: S$GLB,,, | Performed by: DERMATOLOGY

## 2021-07-22 PROCEDURE — 99999 PR PBB SHADOW E&M-EST. PATIENT-LVL III: CPT | Mod: PBBFAC,,,

## 2021-07-23 PROBLEM — M05.9 RHEUMATOID ARTHRITIS WITH POSITIVE RHEUMATOID FACTOR: Status: ACTIVE | Noted: 2021-07-23

## 2021-07-25 ENCOUNTER — PATIENT MESSAGE (OUTPATIENT)
Dept: OBSTETRICS AND GYNECOLOGY | Facility: CLINIC | Age: 61
End: 2021-07-25

## 2021-07-26 ENCOUNTER — PATIENT MESSAGE (OUTPATIENT)
Dept: OBSTETRICS AND GYNECOLOGY | Facility: CLINIC | Age: 61
End: 2021-07-26

## 2021-07-26 ENCOUNTER — LAB VISIT (OUTPATIENT)
Dept: LAB | Facility: HOSPITAL | Age: 61
End: 2021-07-26
Attending: INTERNAL MEDICINE
Payer: MEDICARE

## 2021-07-26 ENCOUNTER — TELEPHONE (OUTPATIENT)
Dept: ADMINISTRATIVE | Facility: OTHER | Age: 61
End: 2021-07-26

## 2021-07-26 ENCOUNTER — OFFICE VISIT (OUTPATIENT)
Dept: PULMONOLOGY | Facility: CLINIC | Age: 61
End: 2021-07-26
Payer: MEDICARE

## 2021-07-26 VITALS
DIASTOLIC BLOOD PRESSURE: 90 MMHG | HEART RATE: 86 BPM | BODY MASS INDEX: 26.62 KG/M2 | SYSTOLIC BLOOD PRESSURE: 135 MMHG | HEIGHT: 61 IN | WEIGHT: 141 LBS

## 2021-07-26 DIAGNOSIS — D86.9 SARCOIDOSIS: ICD-10-CM

## 2021-07-26 PROCEDURE — 3008F PR BODY MASS INDEX (BMI) DOCUMENTED: ICD-10-PCS | Mod: CPTII,S$GLB,, | Performed by: INTERNAL MEDICINE

## 2021-07-26 PROCEDURE — 36415 COLL VENOUS BLD VENIPUNCTURE: CPT | Performed by: INTERNAL MEDICINE

## 2021-07-26 PROCEDURE — 99999 PR PBB SHADOW E&M-EST. PATIENT-LVL V: CPT | Mod: PBBFAC,,, | Performed by: INTERNAL MEDICINE

## 2021-07-26 PROCEDURE — 99999 PR PBB SHADOW E&M-EST. PATIENT-LVL V: ICD-10-PCS | Mod: PBBFAC,,, | Performed by: INTERNAL MEDICINE

## 2021-07-26 PROCEDURE — 1126F PR PAIN SEVERITY QUANTIFIED, NO PAIN PRESENT: ICD-10-PCS | Mod: CPTII,S$GLB,, | Performed by: INTERNAL MEDICINE

## 2021-07-26 PROCEDURE — 3075F PR MOST RECENT SYSTOLIC BLOOD PRESS GE 130-139MM HG: ICD-10-PCS | Mod: CPTII,S$GLB,, | Performed by: INTERNAL MEDICINE

## 2021-07-26 PROCEDURE — 99213 PR OFFICE/OUTPT VISIT, EST, LEVL III, 20-29 MIN: ICD-10-PCS | Mod: S$GLB,,, | Performed by: INTERNAL MEDICINE

## 2021-07-26 PROCEDURE — 3080F PR MOST RECENT DIASTOLIC BLOOD PRESSURE >= 90 MM HG: ICD-10-PCS | Mod: CPTII,S$GLB,, | Performed by: INTERNAL MEDICINE

## 2021-07-26 PROCEDURE — 1160F PR REVIEW ALL MEDS BY PRESCRIBER/CLIN PHARMACIST DOCUMENTED: ICD-10-PCS | Mod: CPTII,S$GLB,, | Performed by: INTERNAL MEDICINE

## 2021-07-26 PROCEDURE — 1126F AMNT PAIN NOTED NONE PRSNT: CPT | Mod: CPTII,S$GLB,, | Performed by: INTERNAL MEDICINE

## 2021-07-26 PROCEDURE — 1160F RVW MEDS BY RX/DR IN RCRD: CPT | Mod: CPTII,S$GLB,, | Performed by: INTERNAL MEDICINE

## 2021-07-26 PROCEDURE — 86480 TB TEST CELL IMMUN MEASURE: CPT | Performed by: INTERNAL MEDICINE

## 2021-07-26 PROCEDURE — 1159F PR MEDICATION LIST DOCUMENTED IN MEDICAL RECORD: ICD-10-PCS | Mod: CPTII,S$GLB,, | Performed by: INTERNAL MEDICINE

## 2021-07-26 PROCEDURE — 3008F BODY MASS INDEX DOCD: CPT | Mod: CPTII,S$GLB,, | Performed by: INTERNAL MEDICINE

## 2021-07-26 PROCEDURE — 99213 OFFICE O/P EST LOW 20 MIN: CPT | Mod: S$GLB,,, | Performed by: INTERNAL MEDICINE

## 2021-07-26 PROCEDURE — 3080F DIAST BP >= 90 MM HG: CPT | Mod: CPTII,S$GLB,, | Performed by: INTERNAL MEDICINE

## 2021-07-26 PROCEDURE — 3075F SYST BP GE 130 - 139MM HG: CPT | Mod: CPTII,S$GLB,, | Performed by: INTERNAL MEDICINE

## 2021-07-26 PROCEDURE — 1159F MED LIST DOCD IN RCRD: CPT | Mod: CPTII,S$GLB,, | Performed by: INTERNAL MEDICINE

## 2021-07-26 RX ORDER — CLOTRIMAZOLE AND BETAMETHASONE DIPROPIONATE 10; .64 MG/G; MG/G
CREAM TOPICAL
Qty: 15 G | Refills: 1 | Status: SHIPPED | OUTPATIENT
Start: 2021-07-26 | End: 2022-07-26

## 2021-07-27 ENCOUNTER — CLINICAL SUPPORT (OUTPATIENT)
Dept: DERMATOLOGY | Facility: CLINIC | Age: 61
End: 2021-07-27
Payer: MEDICARE

## 2021-07-27 ENCOUNTER — TELEPHONE (OUTPATIENT)
Dept: PULMONOLOGY | Facility: CLINIC | Age: 61
End: 2021-07-27

## 2021-07-27 DIAGNOSIS — L80 VITILIGO: ICD-10-CM

## 2021-07-27 PROCEDURE — 96900 PR ULTRAVIOLET LIGHT THERAPY: ICD-10-PCS | Mod: S$GLB,,, | Performed by: DERMATOLOGY

## 2021-07-27 PROCEDURE — 96900 ACTINOTHERAPY UV LIGHT: CPT | Mod: S$GLB,,, | Performed by: DERMATOLOGY

## 2021-07-27 PROCEDURE — 99999 PR PBB SHADOW E&M-EST. PATIENT-LVL III: ICD-10-PCS | Mod: PBBFAC,,,

## 2021-07-27 PROCEDURE — 99999 PR PBB SHADOW E&M-EST. PATIENT-LVL III: CPT | Mod: PBBFAC,,,

## 2021-07-29 ENCOUNTER — CLINICAL SUPPORT (OUTPATIENT)
Dept: DERMATOLOGY | Facility: CLINIC | Age: 61
End: 2021-07-29
Payer: MEDICARE

## 2021-07-29 ENCOUNTER — SPECIALTY PHARMACY (OUTPATIENT)
Dept: PHARMACY | Facility: CLINIC | Age: 61
End: 2021-07-29

## 2021-07-29 DIAGNOSIS — L80 VITILIGO: ICD-10-CM

## 2021-07-29 PROCEDURE — 96900 PR ULTRAVIOLET LIGHT THERAPY: ICD-10-PCS | Mod: S$GLB,,, | Performed by: DERMATOLOGY

## 2021-07-29 PROCEDURE — 99999 PR PBB SHADOW E&M-EST. PATIENT-LVL III: ICD-10-PCS | Mod: PBBFAC,,,

## 2021-07-29 PROCEDURE — 96900 ACTINOTHERAPY UV LIGHT: CPT | Mod: S$GLB,,, | Performed by: DERMATOLOGY

## 2021-07-29 PROCEDURE — 99999 PR PBB SHADOW E&M-EST. PATIENT-LVL III: CPT | Mod: PBBFAC,,,

## 2021-07-31 LAB
GAMMA INTERFERON BACKGROUND BLD IA-ACNC: 0.07 IU/ML
M TB IFN-G CD4+ BCKGRND COR BLD-ACNC: 0.03 IU/ML
MITOGEN IGNF BCKGRD COR BLD-ACNC: >10 IU/ML
TB GOLD PLUS: NEGATIVE
TB2 - NIL: -0.02 IU/ML

## 2021-08-02 ENCOUNTER — OFFICE VISIT (OUTPATIENT)
Dept: OBSTETRICS AND GYNECOLOGY | Facility: CLINIC | Age: 61
End: 2021-08-02
Payer: MEDICARE

## 2021-08-02 VITALS
BODY MASS INDEX: 26.58 KG/M2 | HEIGHT: 61 IN | WEIGHT: 140.81 LBS | SYSTOLIC BLOOD PRESSURE: 118 MMHG | DIASTOLIC BLOOD PRESSURE: 76 MMHG

## 2021-08-02 DIAGNOSIS — Z12.31 ENCOUNTER FOR SCREENING MAMMOGRAM FOR MALIGNANT NEOPLASM OF BREAST: ICD-10-CM

## 2021-08-02 DIAGNOSIS — Z01.411 ENCOUNTER FOR GYNECOLOGICAL EXAMINATION (GENERAL) (ROUTINE) WITH ABNORMAL FINDINGS: ICD-10-CM

## 2021-08-02 DIAGNOSIS — Z01.419 ROUTINE GYNECOLOGICAL EXAMINATION: Primary | ICD-10-CM

## 2021-08-02 DIAGNOSIS — Z12.4 ROUTINE CERVICAL SMEAR: ICD-10-CM

## 2021-08-02 PROCEDURE — 3008F BODY MASS INDEX DOCD: CPT | Mod: CPTII,S$GLB,, | Performed by: OBSTETRICS & GYNECOLOGY

## 2021-08-02 PROCEDURE — 3008F PR BODY MASS INDEX (BMI) DOCUMENTED: ICD-10-PCS | Mod: CPTII,S$GLB,, | Performed by: OBSTETRICS & GYNECOLOGY

## 2021-08-02 PROCEDURE — 1126F AMNT PAIN NOTED NONE PRSNT: CPT | Mod: CPTII,S$GLB,, | Performed by: OBSTETRICS & GYNECOLOGY

## 2021-08-02 PROCEDURE — 3074F PR MOST RECENT SYSTOLIC BLOOD PRESSURE < 130 MM HG: ICD-10-PCS | Mod: CPTII,S$GLB,, | Performed by: OBSTETRICS & GYNECOLOGY

## 2021-08-02 PROCEDURE — 3044F HG A1C LEVEL LT 7.0%: CPT | Mod: CPTII,S$GLB,, | Performed by: OBSTETRICS & GYNECOLOGY

## 2021-08-02 PROCEDURE — 3074F SYST BP LT 130 MM HG: CPT | Mod: CPTII,S$GLB,, | Performed by: OBSTETRICS & GYNECOLOGY

## 2021-08-02 PROCEDURE — 1160F PR REVIEW ALL MEDS BY PRESCRIBER/CLIN PHARMACIST DOCUMENTED: ICD-10-PCS | Mod: CPTII,S$GLB,, | Performed by: OBSTETRICS & GYNECOLOGY

## 2021-08-02 PROCEDURE — 1159F PR MEDICATION LIST DOCUMENTED IN MEDICAL RECORD: ICD-10-PCS | Mod: CPTII,S$GLB,, | Performed by: OBSTETRICS & GYNECOLOGY

## 2021-08-02 PROCEDURE — G0101 CA SCREEN;PELVIC/BREAST EXAM: HCPCS | Mod: S$GLB,,, | Performed by: OBSTETRICS & GYNECOLOGY

## 2021-08-02 PROCEDURE — 1159F MED LIST DOCD IN RCRD: CPT | Mod: CPTII,S$GLB,, | Performed by: OBSTETRICS & GYNECOLOGY

## 2021-08-02 PROCEDURE — 99999 PR PBB SHADOW E&M-EST. PATIENT-LVL IV: ICD-10-PCS | Mod: PBBFAC,,, | Performed by: OBSTETRICS & GYNECOLOGY

## 2021-08-02 PROCEDURE — 99999 PR PBB SHADOW E&M-EST. PATIENT-LVL IV: CPT | Mod: PBBFAC,,, | Performed by: OBSTETRICS & GYNECOLOGY

## 2021-08-02 PROCEDURE — 3078F PR MOST RECENT DIASTOLIC BLOOD PRESSURE < 80 MM HG: ICD-10-PCS | Mod: CPTII,S$GLB,, | Performed by: OBSTETRICS & GYNECOLOGY

## 2021-08-02 PROCEDURE — 1126F PR PAIN SEVERITY QUANTIFIED, NO PAIN PRESENT: ICD-10-PCS | Mod: CPTII,S$GLB,, | Performed by: OBSTETRICS & GYNECOLOGY

## 2021-08-02 PROCEDURE — 1160F RVW MEDS BY RX/DR IN RCRD: CPT | Mod: CPTII,S$GLB,, | Performed by: OBSTETRICS & GYNECOLOGY

## 2021-08-02 PROCEDURE — 88175 CYTOPATH C/V AUTO FLUID REDO: CPT | Performed by: OBSTETRICS & GYNECOLOGY

## 2021-08-02 PROCEDURE — G0101 PR CA SCREEN;PELVIC/BREAST EXAM: ICD-10-PCS | Mod: S$GLB,,, | Performed by: OBSTETRICS & GYNECOLOGY

## 2021-08-02 PROCEDURE — 3078F DIAST BP <80 MM HG: CPT | Mod: CPTII,S$GLB,, | Performed by: OBSTETRICS & GYNECOLOGY

## 2021-08-02 PROCEDURE — 3044F PR MOST RECENT HEMOGLOBIN A1C LEVEL <7.0%: ICD-10-PCS | Mod: CPTII,S$GLB,, | Performed by: OBSTETRICS & GYNECOLOGY

## 2021-08-02 RX ORDER — TRIAMCINOLONE ACETONIDE 1 MG/G
OINTMENT TOPICAL 2 TIMES DAILY
Qty: 15 G | Refills: 1 | Status: SHIPPED | OUTPATIENT
Start: 2021-08-02

## 2021-08-04 DIAGNOSIS — L80 VITILIGO: Primary | ICD-10-CM

## 2021-08-05 ENCOUNTER — CLINICAL SUPPORT (OUTPATIENT)
Dept: DERMATOLOGY | Facility: CLINIC | Age: 61
End: 2021-08-05
Payer: MEDICARE

## 2021-08-05 DIAGNOSIS — L80 VITILIGO: ICD-10-CM

## 2021-08-05 PROCEDURE — 96900 ACTINOTHERAPY UV LIGHT: CPT | Mod: S$GLB,,, | Performed by: DERMATOLOGY

## 2021-08-05 PROCEDURE — 96900 PR ULTRAVIOLET LIGHT THERAPY: ICD-10-PCS | Mod: S$GLB,,, | Performed by: DERMATOLOGY

## 2021-08-05 PROCEDURE — 99999 PR PBB SHADOW E&M-EST. PATIENT-LVL III: CPT | Mod: PBBFAC,,,

## 2021-08-05 PROCEDURE — 99999 PR PBB SHADOW E&M-EST. PATIENT-LVL III: ICD-10-PCS | Mod: PBBFAC,,,

## 2021-08-09 ENCOUNTER — PATIENT MESSAGE (OUTPATIENT)
Dept: OBSTETRICS AND GYNECOLOGY | Facility: CLINIC | Age: 61
End: 2021-08-09

## 2021-08-09 RX ORDER — HYDROXYZINE HYDROCHLORIDE 25 MG/1
25 TABLET, FILM COATED ORAL NIGHTLY
Qty: 30 TABLET | Refills: 1 | Status: SHIPPED | OUTPATIENT
Start: 2021-08-09

## 2021-08-10 ENCOUNTER — CLINICAL SUPPORT (OUTPATIENT)
Dept: DERMATOLOGY | Facility: CLINIC | Age: 61
End: 2021-08-10
Payer: MEDICARE

## 2021-08-10 ENCOUNTER — PATIENT MESSAGE (OUTPATIENT)
Dept: OBSTETRICS AND GYNECOLOGY | Facility: CLINIC | Age: 61
End: 2021-08-10

## 2021-08-10 ENCOUNTER — TELEPHONE (OUTPATIENT)
Dept: OBSTETRICS AND GYNECOLOGY | Facility: CLINIC | Age: 61
End: 2021-08-10

## 2021-08-10 DIAGNOSIS — L80 VITILIGO: ICD-10-CM

## 2021-08-10 PROCEDURE — 99999 PR PBB SHADOW E&M-EST. PATIENT-LVL III: ICD-10-PCS | Mod: PBBFAC,,,

## 2021-08-10 PROCEDURE — 96900 PR ULTRAVIOLET LIGHT THERAPY: ICD-10-PCS | Mod: S$GLB,,, | Performed by: DERMATOLOGY

## 2021-08-10 PROCEDURE — 96900 ACTINOTHERAPY UV LIGHT: CPT | Mod: S$GLB,,, | Performed by: DERMATOLOGY

## 2021-08-10 PROCEDURE — 99999 PR PBB SHADOW E&M-EST. PATIENT-LVL III: CPT | Mod: PBBFAC,,,

## 2021-08-11 ENCOUNTER — OFFICE VISIT (OUTPATIENT)
Dept: DERMATOLOGY | Facility: CLINIC | Age: 61
End: 2021-08-11
Payer: MEDICARE

## 2021-08-11 DIAGNOSIS — L80 VITILIGO: Primary | ICD-10-CM

## 2021-08-11 PROCEDURE — 1160F PR REVIEW ALL MEDS BY PRESCRIBER/CLIN PHARMACIST DOCUMENTED: ICD-10-PCS | Mod: CPTII,S$GLB,, | Performed by: DERMATOLOGY

## 2021-08-11 PROCEDURE — 1126F AMNT PAIN NOTED NONE PRSNT: CPT | Mod: CPTII,S$GLB,, | Performed by: DERMATOLOGY

## 2021-08-11 PROCEDURE — 1160F RVW MEDS BY RX/DR IN RCRD: CPT | Mod: CPTII,S$GLB,, | Performed by: DERMATOLOGY

## 2021-08-11 PROCEDURE — 1159F MED LIST DOCD IN RCRD: CPT | Mod: CPTII,S$GLB,, | Performed by: DERMATOLOGY

## 2021-08-11 PROCEDURE — 99999 PR PBB SHADOW E&M-EST. PATIENT-LVL III: CPT | Mod: PBBFAC,,, | Performed by: DERMATOLOGY

## 2021-08-11 PROCEDURE — 99999 PR PBB SHADOW E&M-EST. PATIENT-LVL III: ICD-10-PCS | Mod: PBBFAC,,, | Performed by: DERMATOLOGY

## 2021-08-11 PROCEDURE — 99213 PR OFFICE/OUTPT VISIT, EST, LEVL III, 20-29 MIN: ICD-10-PCS | Mod: S$GLB,,, | Performed by: DERMATOLOGY

## 2021-08-11 PROCEDURE — 1159F PR MEDICATION LIST DOCUMENTED IN MEDICAL RECORD: ICD-10-PCS | Mod: CPTII,S$GLB,, | Performed by: DERMATOLOGY

## 2021-08-11 PROCEDURE — 99213 OFFICE O/P EST LOW 20 MIN: CPT | Mod: S$GLB,,, | Performed by: DERMATOLOGY

## 2021-08-11 PROCEDURE — 3044F HG A1C LEVEL LT 7.0%: CPT | Mod: CPTII,S$GLB,, | Performed by: DERMATOLOGY

## 2021-08-11 PROCEDURE — 3044F PR MOST RECENT HEMOGLOBIN A1C LEVEL <7.0%: ICD-10-PCS | Mod: CPTII,S$GLB,, | Performed by: DERMATOLOGY

## 2021-08-11 PROCEDURE — 1126F PR PAIN SEVERITY QUANTIFIED, NO PAIN PRESENT: ICD-10-PCS | Mod: CPTII,S$GLB,, | Performed by: DERMATOLOGY

## 2021-08-13 ENCOUNTER — PATIENT MESSAGE (OUTPATIENT)
Dept: PULMONOLOGY | Facility: CLINIC | Age: 61
End: 2021-08-13

## 2021-08-16 ENCOUNTER — PATIENT MESSAGE (OUTPATIENT)
Dept: RHEUMATOLOGY | Facility: CLINIC | Age: 61
End: 2021-08-16

## 2021-08-16 ENCOUNTER — TELEPHONE (OUTPATIENT)
Dept: FAMILY MEDICINE | Facility: CLINIC | Age: 61
End: 2021-08-16

## 2021-08-19 ENCOUNTER — OFFICE VISIT (OUTPATIENT)
Dept: FAMILY MEDICINE | Facility: CLINIC | Age: 61
End: 2021-08-19
Attending: FAMILY MEDICINE
Payer: MEDICARE

## 2021-08-19 VITALS
HEART RATE: 98 BPM | DIASTOLIC BLOOD PRESSURE: 80 MMHG | HEIGHT: 61 IN | OXYGEN SATURATION: 95 % | TEMPERATURE: 98 F | SYSTOLIC BLOOD PRESSURE: 130 MMHG | BODY MASS INDEX: 26.73 KG/M2 | WEIGHT: 141.56 LBS

## 2021-08-19 DIAGNOSIS — E55.9 VITAMIN D DEFICIENCY: ICD-10-CM

## 2021-08-19 DIAGNOSIS — E78.2 MIXED HYPERLIPIDEMIA: ICD-10-CM

## 2021-08-19 DIAGNOSIS — D84.9 IMMUNOCOMPROMISED: ICD-10-CM

## 2021-08-19 DIAGNOSIS — F33.9 MAJOR DEPRESSION, RECURRENT, CHRONIC: ICD-10-CM

## 2021-08-19 DIAGNOSIS — E78.00 HYPERCHOLESTEREMIA: Primary | ICD-10-CM

## 2021-08-19 DIAGNOSIS — I10 ESSENTIAL HYPERTENSION: ICD-10-CM

## 2021-08-19 PROCEDURE — 1160F RVW MEDS BY RX/DR IN RCRD: CPT | Mod: CPTII,S$GLB,, | Performed by: FAMILY MEDICINE

## 2021-08-19 PROCEDURE — 99499 RISK ADDL DX/OHS AUDIT: ICD-10-PCS | Mod: S$GLB,,, | Performed by: FAMILY MEDICINE

## 2021-08-19 PROCEDURE — 99999 PR PBB SHADOW E&M-EST. PATIENT-LVL III: ICD-10-PCS | Mod: PBBFAC,,, | Performed by: FAMILY MEDICINE

## 2021-08-19 PROCEDURE — 3008F PR BODY MASS INDEX (BMI) DOCUMENTED: ICD-10-PCS | Mod: CPTII,S$GLB,, | Performed by: FAMILY MEDICINE

## 2021-08-19 PROCEDURE — 99214 PR OFFICE/OUTPT VISIT, EST, LEVL IV, 30-39 MIN: ICD-10-PCS | Mod: S$GLB,,, | Performed by: FAMILY MEDICINE

## 2021-08-19 PROCEDURE — 3075F PR MOST RECENT SYSTOLIC BLOOD PRESS GE 130-139MM HG: ICD-10-PCS | Mod: CPTII,S$GLB,, | Performed by: FAMILY MEDICINE

## 2021-08-19 PROCEDURE — 1159F MED LIST DOCD IN RCRD: CPT | Mod: CPTII,S$GLB,, | Performed by: FAMILY MEDICINE

## 2021-08-19 PROCEDURE — 3008F BODY MASS INDEX DOCD: CPT | Mod: CPTII,S$GLB,, | Performed by: FAMILY MEDICINE

## 2021-08-19 PROCEDURE — 3079F PR MOST RECENT DIASTOLIC BLOOD PRESSURE 80-89 MM HG: ICD-10-PCS | Mod: CPTII,S$GLB,, | Performed by: FAMILY MEDICINE

## 2021-08-19 PROCEDURE — 3044F HG A1C LEVEL LT 7.0%: CPT | Mod: CPTII,S$GLB,, | Performed by: FAMILY MEDICINE

## 2021-08-19 PROCEDURE — 3075F SYST BP GE 130 - 139MM HG: CPT | Mod: CPTII,S$GLB,, | Performed by: FAMILY MEDICINE

## 2021-08-19 PROCEDURE — 3044F PR MOST RECENT HEMOGLOBIN A1C LEVEL <7.0%: ICD-10-PCS | Mod: CPTII,S$GLB,, | Performed by: FAMILY MEDICINE

## 2021-08-19 PROCEDURE — 99499 UNLISTED E&M SERVICE: CPT | Mod: S$GLB,,, | Performed by: FAMILY MEDICINE

## 2021-08-19 PROCEDURE — 1159F PR MEDICATION LIST DOCUMENTED IN MEDICAL RECORD: ICD-10-PCS | Mod: CPTII,S$GLB,, | Performed by: FAMILY MEDICINE

## 2021-08-19 PROCEDURE — 3079F DIAST BP 80-89 MM HG: CPT | Mod: CPTII,S$GLB,, | Performed by: FAMILY MEDICINE

## 2021-08-19 PROCEDURE — 99214 OFFICE O/P EST MOD 30 MIN: CPT | Mod: S$GLB,,, | Performed by: FAMILY MEDICINE

## 2021-08-19 PROCEDURE — 99999 PR PBB SHADOW E&M-EST. PATIENT-LVL III: CPT | Mod: PBBFAC,,, | Performed by: FAMILY MEDICINE

## 2021-08-19 PROCEDURE — 1160F PR REVIEW ALL MEDS BY PRESCRIBER/CLIN PHARMACIST DOCUMENTED: ICD-10-PCS | Mod: CPTII,S$GLB,, | Performed by: FAMILY MEDICINE

## 2021-08-26 ENCOUNTER — SPECIALTY PHARMACY (OUTPATIENT)
Dept: PHARMACY | Facility: CLINIC | Age: 61
End: 2021-08-26

## 2021-08-26 ENCOUNTER — IMMUNIZATION (OUTPATIENT)
Dept: INTERNAL MEDICINE | Facility: CLINIC | Age: 61
End: 2021-08-26

## 2021-08-26 DIAGNOSIS — Z23 NEED FOR VACCINATION: Primary | ICD-10-CM

## 2021-08-26 PROCEDURE — 0003A COVID-19, MRNA, LNP-S, PF, 30 MCG/0.3 ML DOSE VACCINE: CPT | Mod: CV19,S$GLB,, | Performed by: FAMILY MEDICINE

## 2021-08-26 PROCEDURE — 0003A COVID-19, MRNA, LNP-S, PF, 30 MCG/0.3 ML DOSE VACCINE: ICD-10-PCS | Mod: CV19,S$GLB,, | Performed by: FAMILY MEDICINE

## 2021-08-26 PROCEDURE — 91300 COVID-19, MRNA, LNP-S, PF, 30 MCG/0.3 ML DOSE VACCINE: CPT | Mod: S$GLB,,, | Performed by: FAMILY MEDICINE

## 2021-08-26 PROCEDURE — 91300 COVID-19, MRNA, LNP-S, PF, 30 MCG/0.3 ML DOSE VACCINE: ICD-10-PCS | Mod: S$GLB,,, | Performed by: FAMILY MEDICINE

## 2021-09-24 ENCOUNTER — HOSPITAL ENCOUNTER (OUTPATIENT)
Dept: PULMONOLOGY | Facility: HOSPITAL | Age: 61
Discharge: HOME | End: 2021-09-24
Attending: INTERNAL MEDICINE
Payer: MEDICARE

## 2021-09-24 DIAGNOSIS — D86.9 SARCOIDOSIS: ICD-10-CM

## 2021-09-24 PROCEDURE — 94729 DIFFUSING CAPACITY: CPT

## 2021-09-24 PROCEDURE — 94010 BREATHING CAPACITY TEST: CPT

## 2021-09-24 PROCEDURE — 94727 GAS DIL/WSHOT DETER LNG VOL: CPT

## 2021-09-27 ENCOUNTER — HOSPITAL ENCOUNTER (OUTPATIENT)
Dept: RADIOLOGY | Facility: HOSPITAL | Age: 61
Discharge: HOME | End: 2021-09-27
Attending: OBSTETRICS & GYNECOLOGY
Payer: MEDICARE

## 2021-09-27 DIAGNOSIS — Z12.31 ENCOUNTER FOR SCREENING MAMMOGRAM FOR MALIGNANT NEOPLASM OF BREAST: ICD-10-CM

## 2021-09-27 PROCEDURE — 77067 MAMMO DIGITAL SCREENING BILAT WITH TOMO: ICD-10-PCS | Mod: 26,,, | Performed by: RADIOLOGY

## 2021-09-27 PROCEDURE — 77067 SCR MAMMO BI INCL CAD: CPT | Mod: 26,,, | Performed by: RADIOLOGY

## 2021-09-27 PROCEDURE — 77067 SCR MAMMO BI INCL CAD: CPT | Mod: TC

## 2021-09-27 PROCEDURE — 77063 BREAST TOMOSYNTHESIS BI: CPT | Mod: 26,,, | Performed by: RADIOLOGY

## 2021-09-27 PROCEDURE — 77063 MAMMO DIGITAL SCREENING BILAT WITH TOMO: ICD-10-PCS | Mod: 26,,, | Performed by: RADIOLOGY

## 2021-09-28 ENCOUNTER — SPECIALTY PHARMACY (OUTPATIENT)
Dept: PHARMACY | Facility: CLINIC | Age: 61
End: 2021-09-28

## 2021-09-28 ENCOUNTER — LAB VISIT (OUTPATIENT)
Dept: LAB | Facility: HOSPITAL | Age: 61
End: 2021-09-28
Attending: INTERNAL MEDICINE
Payer: MEDICARE

## 2021-09-28 DIAGNOSIS — D86.9 SARCOIDOSIS: ICD-10-CM

## 2021-09-28 LAB
ALBUMIN SERPL BCP-MCNC: 4.2 G/DL (ref 3.5–5.2)
ALP SERPL-CCNC: 72 U/L (ref 55–135)
ALT SERPL W/O P-5'-P-CCNC: 19 U/L (ref 10–44)
ANION GAP SERPL CALC-SCNC: 10 MMOL/L (ref 8–16)
AST SERPL-CCNC: 16 U/L (ref 10–40)
BASOPHILS # BLD AUTO: 0.04 K/UL (ref 0–0.2)
BASOPHILS NFR BLD: 0.6 % (ref 0–1.9)
BILIRUB SERPL-MCNC: 0.4 MG/DL (ref 0.1–1)
BUN SERPL-MCNC: 16 MG/DL (ref 8–23)
CALCIUM SERPL-MCNC: 10.1 MG/DL (ref 8.7–10.5)
CHLORIDE SERPL-SCNC: 105 MMOL/L (ref 95–110)
CO2 SERPL-SCNC: 27 MMOL/L (ref 23–29)
CREAT SERPL-MCNC: 0.8 MG/DL (ref 0.5–1.4)
CRP SERPL-MCNC: 1.8 MG/L (ref 0–8.2)
DIFFERENTIAL METHOD: ABNORMAL
EOSINOPHIL # BLD AUTO: 0.3 K/UL (ref 0–0.5)
EOSINOPHIL NFR BLD: 4.9 % (ref 0–8)
ERYTHROCYTE [DISTWIDTH] IN BLOOD BY AUTOMATED COUNT: 13.2 % (ref 11.5–14.5)
ERYTHROCYTE [SEDIMENTATION RATE] IN BLOOD BY WESTERGREN METHOD: 20 MM/HR (ref 0–20)
EST. GFR  (AFRICAN AMERICAN): >60 ML/MIN/1.73 M^2
EST. GFR  (NON AFRICAN AMERICAN): >60 ML/MIN/1.73 M^2
GLUCOSE SERPL-MCNC: 93 MG/DL (ref 70–110)
HCT VFR BLD AUTO: 42 % (ref 37–48.5)
HGB BLD-MCNC: 13.8 G/DL (ref 12–16)
IMM GRANULOCYTES # BLD AUTO: 0.03 K/UL (ref 0–0.04)
IMM GRANULOCYTES NFR BLD AUTO: 0.5 % (ref 0–0.5)
LYMPHOCYTES # BLD AUTO: 2 K/UL (ref 1–4.8)
LYMPHOCYTES NFR BLD: 30 % (ref 18–48)
MCH RBC QN AUTO: 32.5 PG (ref 27–31)
MCHC RBC AUTO-ENTMCNC: 32.9 G/DL (ref 32–36)
MCV RBC AUTO: 99 FL (ref 82–98)
MONOCYTES # BLD AUTO: 0.7 K/UL (ref 0.3–1)
MONOCYTES NFR BLD: 10.8 % (ref 4–15)
NEUTROPHILS # BLD AUTO: 3.5 K/UL (ref 1.8–7.7)
NEUTROPHILS NFR BLD: 53.2 % (ref 38–73)
NRBC BLD-RTO: 0 /100 WBC
PLATELET # BLD AUTO: 276 K/UL (ref 150–450)
PMV BLD AUTO: 9.7 FL (ref 9.2–12.9)
POTASSIUM SERPL-SCNC: 3.8 MMOL/L (ref 3.5–5.1)
PROT SERPL-MCNC: 8 G/DL (ref 6–8.4)
RBC # BLD AUTO: 4.24 M/UL (ref 4–5.4)
SODIUM SERPL-SCNC: 142 MMOL/L (ref 136–145)
WBC # BLD AUTO: 6.49 K/UL (ref 3.9–12.7)

## 2021-09-28 PROCEDURE — 85652 RBC SED RATE AUTOMATED: CPT | Performed by: INTERNAL MEDICINE

## 2021-09-28 PROCEDURE — 85025 COMPLETE CBC W/AUTO DIFF WBC: CPT | Performed by: INTERNAL MEDICINE

## 2021-09-28 PROCEDURE — 36415 COLL VENOUS BLD VENIPUNCTURE: CPT | Performed by: INTERNAL MEDICINE

## 2021-09-28 PROCEDURE — 80053 COMPREHEN METABOLIC PANEL: CPT | Performed by: INTERNAL MEDICINE

## 2021-09-28 PROCEDURE — 87340 HEPATITIS B SURFACE AG IA: CPT | Performed by: INTERNAL MEDICINE

## 2021-09-28 PROCEDURE — 86140 C-REACTIVE PROTEIN: CPT | Performed by: INTERNAL MEDICINE

## 2021-09-29 LAB — HBV SURFACE AG SERPL QL IA: NEGATIVE

## 2021-09-30 ENCOUNTER — OFFICE VISIT (OUTPATIENT)
Dept: RHEUMATOLOGY | Facility: CLINIC | Age: 61
End: 2021-09-30
Payer: MEDICARE

## 2021-09-30 VITALS
HEIGHT: 61 IN | HEART RATE: 74 BPM | SYSTOLIC BLOOD PRESSURE: 134 MMHG | BODY MASS INDEX: 27.52 KG/M2 | WEIGHT: 145.75 LBS | DIASTOLIC BLOOD PRESSURE: 80 MMHG

## 2021-09-30 DIAGNOSIS — M06.9 RHEUMATOID ARTHRITIS FLARE: Primary | ICD-10-CM

## 2021-09-30 PROCEDURE — 3075F PR MOST RECENT SYSTOLIC BLOOD PRESS GE 130-139MM HG: ICD-10-PCS | Mod: CPTII,S$GLB,, | Performed by: INTERNAL MEDICINE

## 2021-09-30 PROCEDURE — 3079F PR MOST RECENT DIASTOLIC BLOOD PRESSURE 80-89 MM HG: ICD-10-PCS | Mod: CPTII,S$GLB,, | Performed by: INTERNAL MEDICINE

## 2021-09-30 PROCEDURE — 99214 PR OFFICE/OUTPT VISIT, EST, LEVL IV, 30-39 MIN: ICD-10-PCS | Mod: S$GLB,,, | Performed by: INTERNAL MEDICINE

## 2021-09-30 PROCEDURE — 99214 OFFICE O/P EST MOD 30 MIN: CPT | Mod: S$GLB,,, | Performed by: INTERNAL MEDICINE

## 2021-09-30 PROCEDURE — 4010F ACE/ARB THERAPY RXD/TAKEN: CPT | Mod: CPTII,S$GLB,, | Performed by: INTERNAL MEDICINE

## 2021-09-30 PROCEDURE — 3044F HG A1C LEVEL LT 7.0%: CPT | Mod: CPTII,S$GLB,, | Performed by: INTERNAL MEDICINE

## 2021-09-30 PROCEDURE — 3008F PR BODY MASS INDEX (BMI) DOCUMENTED: ICD-10-PCS | Mod: CPTII,S$GLB,, | Performed by: INTERNAL MEDICINE

## 2021-09-30 PROCEDURE — 3075F SYST BP GE 130 - 139MM HG: CPT | Mod: CPTII,S$GLB,, | Performed by: INTERNAL MEDICINE

## 2021-09-30 PROCEDURE — 99999 PR PBB SHADOW E&M-EST. PATIENT-LVL IV: ICD-10-PCS | Mod: PBBFAC,,, | Performed by: INTERNAL MEDICINE

## 2021-09-30 PROCEDURE — 1159F PR MEDICATION LIST DOCUMENTED IN MEDICAL RECORD: ICD-10-PCS | Mod: CPTII,S$GLB,, | Performed by: INTERNAL MEDICINE

## 2021-09-30 PROCEDURE — 3079F DIAST BP 80-89 MM HG: CPT | Mod: CPTII,S$GLB,, | Performed by: INTERNAL MEDICINE

## 2021-09-30 PROCEDURE — 4010F PR ACE/ARB THEARPY RXD/TAKEN: ICD-10-PCS | Mod: CPTII,S$GLB,, | Performed by: INTERNAL MEDICINE

## 2021-09-30 PROCEDURE — 1159F MED LIST DOCD IN RCRD: CPT | Mod: CPTII,S$GLB,, | Performed by: INTERNAL MEDICINE

## 2021-09-30 PROCEDURE — 99999 PR PBB SHADOW E&M-EST. PATIENT-LVL IV: CPT | Mod: PBBFAC,,, | Performed by: INTERNAL MEDICINE

## 2021-09-30 PROCEDURE — 3008F BODY MASS INDEX DOCD: CPT | Mod: CPTII,S$GLB,, | Performed by: INTERNAL MEDICINE

## 2021-09-30 PROCEDURE — 3044F PR MOST RECENT HEMOGLOBIN A1C LEVEL <7.0%: ICD-10-PCS | Mod: CPTII,S$GLB,, | Performed by: INTERNAL MEDICINE

## 2021-09-30 ASSESSMENT — ROUTINE ASSESSMENT OF PATIENT INDEX DATA (RAPID3)
FATIGUE SCORE: 4
PAIN SCORE: 4.5
PSYCHOLOGICAL DISTRESS SCORE: 3.3
MDHAQ FUNCTION SCORE: 0.4
TOTAL RAPID3 SCORE: 3.61
PATIENT GLOBAL ASSESSMENT SCORE: 5
AM STIFFNESS SCORE: 1, YES

## 2021-10-03 LAB
BRPFT: ABNORMAL
DLCO ADJ PRE: 17.29 ML/(MIN*MMHG) (ref 15.02–26.49)
DLCO SINGLE BREATH LLN: 15.02
DLCO SINGLE BREATH PRE REF: 83.3 %
DLCO SINGLE BREATH REF: 20.75
DLCOC SBVA LLN: 3.01
DLCOC SBVA PRE REF: 109.2 %
DLCOC SBVA REF: 4.67
DLCOC SINGLE BREATH LLN: 15.02
DLCOC SINGLE BREATH PRE REF: 83.3 %
DLCOC SINGLE BREATH REF: 20.75
DLCOVA LLN: 3.01
DLCOVA PRE REF: 109.2 %
DLCOVA PRE: 5.1 ML/(MIN*MMHG*L) (ref 3.01–6.33)
DLCOVA REF: 4.67
DLVAADJ PRE: 5.1 ML/(MIN*MMHG*L) (ref 3.01–6.33)
ERVN2 LLN: -16449.25
ERVN2 PRE REF: 67.3 %
ERVN2 PRE: 0.51 L (ref -16449.25–16450.75)
ERVN2 REF: 0.75
FEF 25 75 LLN: 1.03
FEF 25 75 PRE REF: 75 %
FEF 25 75 REF: 2.05
FEV1 FVC LLN: 67
FEV1 FVC PRE REF: 95.3 %
FEV1 FVC REF: 80
FEV1 LLN: 1.66
FEV1 PRE REF: 84.7 %
FEV1 REF: 2.21
FRCN2 LLN: 1.71
FRCN2 PRE REF: 57.8 %
FRCN2 REF: 2.53
FVC LLN: 2.1
FVC PRE REF: 88.4 %
FVC REF: 2.79
IVC PRE: 2.26 L (ref 2.1–3.48)
IVC SINGLE BREATH LLN: 2.1
IVC SINGLE BREATH PRE REF: 81 %
IVC SINGLE BREATH REF: 2.79
PEF LLN: 4.24
PEF PRE REF: 85.4 %
PEF REF: 5.79
PRE DLCO: 17.29 ML/(MIN*MMHG) (ref 15.02–26.49)
PRE FEF 25 75: 1.54 L/S (ref 1.03–3.07)
PRE FET 100: 7.85 SEC
PRE FEV1 FVC: 75.84 % (ref 67.26–91.83)
PRE FEV1: 1.87 L (ref 1.66–2.76)
PRE FRC N2: 1.46 L
PRE FVC: 2.47 L (ref 2.1–3.48)
PRE PEF: 4.94 L/S (ref 4.24–7.35)
RVN2 LLN: 1.21
RVN2 PRE REF: 44.3 %
RVN2 PRE: 0.79 L (ref 1.21–2.36)
RVN2 REF: 1.78
RVN2TLCN2 LLN: 30.11
RVN2TLCN2 PRE REF: 61 %
RVN2TLCN2 PRE: 24.2 % (ref 30.11–49.29)
RVN2TLCN2 REF: 39.7
TLCN2 LLN: 3.45
TLCN2 PRE REF: 73.4 %
TLCN2 PRE: 3.26 L (ref 3.45–5.43)
TLCN2 REF: 4.44
VA PRE: 3.39 L (ref 4.29–4.29)
VA SINGLE BREATH LLN: 4.29
VA SINGLE BREATH PRE REF: 79 %
VA SINGLE BREATH REF: 4.29
VCMAXN2 LLN: 2.1
VCMAXN2 PRE REF: 88.4 %
VCMAXN2 PRE: 2.47 L (ref 2.1–3.48)
VCMAXN2 REF: 2.79

## 2021-10-04 ENCOUNTER — PATIENT OUTREACH (OUTPATIENT)
Dept: ADMINISTRATIVE | Facility: OTHER | Age: 61
End: 2021-10-04

## 2021-10-05 ENCOUNTER — OFFICE VISIT (OUTPATIENT)
Dept: CARDIOLOGY | Facility: CLINIC | Age: 61
End: 2021-10-05
Payer: MEDICARE

## 2021-10-05 VITALS
BODY MASS INDEX: 27.01 KG/M2 | OXYGEN SATURATION: 96 % | HEART RATE: 82 BPM | WEIGHT: 143.06 LBS | SYSTOLIC BLOOD PRESSURE: 140 MMHG | DIASTOLIC BLOOD PRESSURE: 93 MMHG | HEIGHT: 61 IN

## 2021-10-05 DIAGNOSIS — I10 PRIMARY HYPERTENSION: ICD-10-CM

## 2021-10-05 DIAGNOSIS — I10 ESSENTIAL HYPERTENSION: ICD-10-CM

## 2021-10-05 DIAGNOSIS — E78.2 MIXED HYPERLIPIDEMIA: Primary | ICD-10-CM

## 2021-10-05 DIAGNOSIS — R00.2 PALPITATIONS: ICD-10-CM

## 2021-10-05 PROCEDURE — 4010F PR ACE/ARB THEARPY RXD/TAKEN: ICD-10-PCS | Mod: CPTII,S$GLB,, | Performed by: INTERNAL MEDICINE

## 2021-10-05 PROCEDURE — 3044F HG A1C LEVEL LT 7.0%: CPT | Mod: CPTII,S$GLB,, | Performed by: INTERNAL MEDICINE

## 2021-10-05 PROCEDURE — 99499 RISK ADDL DX/OHS AUDIT: ICD-10-PCS | Mod: S$GLB,,, | Performed by: INTERNAL MEDICINE

## 2021-10-05 PROCEDURE — 99213 PR OFFICE/OUTPT VISIT, EST, LEVL III, 20-29 MIN: ICD-10-PCS | Mod: S$GLB,,, | Performed by: INTERNAL MEDICINE

## 2021-10-05 PROCEDURE — 3008F BODY MASS INDEX DOCD: CPT | Mod: CPTII,S$GLB,, | Performed by: INTERNAL MEDICINE

## 2021-10-05 PROCEDURE — 3080F DIAST BP >= 90 MM HG: CPT | Mod: CPTII,S$GLB,, | Performed by: INTERNAL MEDICINE

## 2021-10-05 PROCEDURE — 99499 UNLISTED E&M SERVICE: CPT | Mod: S$GLB,,, | Performed by: INTERNAL MEDICINE

## 2021-10-05 PROCEDURE — 4010F ACE/ARB THERAPY RXD/TAKEN: CPT | Mod: CPTII,S$GLB,, | Performed by: INTERNAL MEDICINE

## 2021-10-05 PROCEDURE — 3077F SYST BP >= 140 MM HG: CPT | Mod: CPTII,S$GLB,, | Performed by: INTERNAL MEDICINE

## 2021-10-05 PROCEDURE — 3008F PR BODY MASS INDEX (BMI) DOCUMENTED: ICD-10-PCS | Mod: CPTII,S$GLB,, | Performed by: INTERNAL MEDICINE

## 2021-10-05 PROCEDURE — 99213 OFFICE O/P EST LOW 20 MIN: CPT | Mod: S$GLB,,, | Performed by: INTERNAL MEDICINE

## 2021-10-05 PROCEDURE — 3044F PR MOST RECENT HEMOGLOBIN A1C LEVEL <7.0%: ICD-10-PCS | Mod: CPTII,S$GLB,, | Performed by: INTERNAL MEDICINE

## 2021-10-05 PROCEDURE — 3077F PR MOST RECENT SYSTOLIC BLOOD PRESSURE >= 140 MM HG: ICD-10-PCS | Mod: CPTII,S$GLB,, | Performed by: INTERNAL MEDICINE

## 2021-10-05 PROCEDURE — 3080F PR MOST RECENT DIASTOLIC BLOOD PRESSURE >= 90 MM HG: ICD-10-PCS | Mod: CPTII,S$GLB,, | Performed by: INTERNAL MEDICINE

## 2021-10-05 PROCEDURE — 99999 PR PBB SHADOW E&M-EST. PATIENT-LVL III: ICD-10-PCS | Mod: PBBFAC,,, | Performed by: INTERNAL MEDICINE

## 2021-10-05 PROCEDURE — 99999 PR PBB SHADOW E&M-EST. PATIENT-LVL III: CPT | Mod: PBBFAC,,, | Performed by: INTERNAL MEDICINE

## 2021-10-05 RX ORDER — AMLODIPINE BESYLATE 5 MG/1
5 TABLET ORAL DAILY
Qty: 90 TABLET | Refills: 4 | Status: SHIPPED | OUTPATIENT
Start: 2021-10-05 | End: 2022-02-17 | Stop reason: SDUPTHER

## 2021-10-07 ENCOUNTER — IMMUNIZATION (OUTPATIENT)
Dept: PHARMACY | Facility: CLINIC | Age: 61
End: 2021-10-07
Payer: MEDICARE

## 2021-10-11 ENCOUNTER — OFFICE VISIT (OUTPATIENT)
Dept: PULMONOLOGY | Facility: CLINIC | Age: 61
End: 2021-10-11
Payer: MEDICARE

## 2021-10-11 VITALS
HEART RATE: 96 BPM | SYSTOLIC BLOOD PRESSURE: 136 MMHG | OXYGEN SATURATION: 96 % | WEIGHT: 142.5 LBS | RESPIRATION RATE: 18 BRPM | DIASTOLIC BLOOD PRESSURE: 86 MMHG | HEIGHT: 61 IN | BODY MASS INDEX: 26.91 KG/M2

## 2021-10-11 DIAGNOSIS — J30.1 NON-SEASONAL ALLERGIC RHINITIS DUE TO POLLEN: Primary | ICD-10-CM

## 2021-10-11 PROCEDURE — 3075F PR MOST RECENT SYSTOLIC BLOOD PRESS GE 130-139MM HG: ICD-10-PCS | Mod: CPTII,S$GLB,, | Performed by: INTERNAL MEDICINE

## 2021-10-11 PROCEDURE — 3079F PR MOST RECENT DIASTOLIC BLOOD PRESSURE 80-89 MM HG: ICD-10-PCS | Mod: CPTII,S$GLB,, | Performed by: INTERNAL MEDICINE

## 2021-10-11 PROCEDURE — 4010F ACE/ARB THERAPY RXD/TAKEN: CPT | Mod: CPTII,S$GLB,, | Performed by: INTERNAL MEDICINE

## 2021-10-11 PROCEDURE — 99213 PR OFFICE/OUTPT VISIT, EST, LEVL III, 20-29 MIN: ICD-10-PCS | Mod: S$GLB,,, | Performed by: INTERNAL MEDICINE

## 2021-10-11 PROCEDURE — 99999 PR PBB SHADOW E&M-EST. PATIENT-LVL V: ICD-10-PCS | Mod: PBBFAC,,, | Performed by: INTERNAL MEDICINE

## 2021-10-11 PROCEDURE — 99213 OFFICE O/P EST LOW 20 MIN: CPT | Mod: S$GLB,,, | Performed by: INTERNAL MEDICINE

## 2021-10-11 PROCEDURE — 3075F SYST BP GE 130 - 139MM HG: CPT | Mod: CPTII,S$GLB,, | Performed by: INTERNAL MEDICINE

## 2021-10-11 PROCEDURE — 99999 PR PBB SHADOW E&M-EST. PATIENT-LVL V: CPT | Mod: PBBFAC,,, | Performed by: INTERNAL MEDICINE

## 2021-10-11 PROCEDURE — 3044F PR MOST RECENT HEMOGLOBIN A1C LEVEL <7.0%: ICD-10-PCS | Mod: CPTII,S$GLB,, | Performed by: INTERNAL MEDICINE

## 2021-10-11 PROCEDURE — 1159F PR MEDICATION LIST DOCUMENTED IN MEDICAL RECORD: ICD-10-PCS | Mod: CPTII,S$GLB,, | Performed by: INTERNAL MEDICINE

## 2021-10-11 PROCEDURE — 3044F HG A1C LEVEL LT 7.0%: CPT | Mod: CPTII,S$GLB,, | Performed by: INTERNAL MEDICINE

## 2021-10-11 PROCEDURE — 3008F BODY MASS INDEX DOCD: CPT | Mod: CPTII,S$GLB,, | Performed by: INTERNAL MEDICINE

## 2021-10-11 PROCEDURE — 1159F MED LIST DOCD IN RCRD: CPT | Mod: CPTII,S$GLB,, | Performed by: INTERNAL MEDICINE

## 2021-10-11 PROCEDURE — 3008F PR BODY MASS INDEX (BMI) DOCUMENTED: ICD-10-PCS | Mod: CPTII,S$GLB,, | Performed by: INTERNAL MEDICINE

## 2021-10-11 PROCEDURE — 4010F PR ACE/ARB THEARPY RXD/TAKEN: ICD-10-PCS | Mod: CPTII,S$GLB,, | Performed by: INTERNAL MEDICINE

## 2021-10-11 PROCEDURE — 3079F DIAST BP 80-89 MM HG: CPT | Mod: CPTII,S$GLB,, | Performed by: INTERNAL MEDICINE

## 2021-10-11 RX ORDER — FLUTICASONE PROPIONATE 50 MCG
1 SPRAY, SUSPENSION (ML) NASAL DAILY
Qty: 16 G | Refills: 5 | Status: SHIPPED | OUTPATIENT
Start: 2021-10-11 | End: 2023-05-16

## 2021-10-27 ENCOUNTER — PATIENT MESSAGE (OUTPATIENT)
Dept: PHARMACY | Facility: CLINIC | Age: 61
End: 2021-10-27
Payer: MEDICARE

## 2021-11-02 ENCOUNTER — SPECIALTY PHARMACY (OUTPATIENT)
Dept: PHARMACY | Facility: CLINIC | Age: 61
End: 2021-11-02
Payer: MEDICARE

## 2021-11-04 ENCOUNTER — OFFICE VISIT (OUTPATIENT)
Dept: NEUROLOGY | Facility: CLINIC | Age: 61
End: 2021-11-04
Payer: MEDICARE

## 2021-11-04 VITALS
HEIGHT: 61 IN | BODY MASS INDEX: 27.08 KG/M2 | DIASTOLIC BLOOD PRESSURE: 85 MMHG | WEIGHT: 143.44 LBS | SYSTOLIC BLOOD PRESSURE: 139 MMHG | HEART RATE: 75 BPM

## 2021-11-04 DIAGNOSIS — M54.2 NECK PAIN, MUSCULOSKELETAL: ICD-10-CM

## 2021-11-04 DIAGNOSIS — S06.9X5S TRAUMATIC BRAIN INJURY, WITH LOSS OF CONSCIOUSNESS GREATER THAN 24 HOURS WITH RETURN TO PRE-EXISTING CONSCIOUS LEVEL, SEQUELA: Primary | ICD-10-CM

## 2021-11-04 PROCEDURE — 1159F PR MEDICATION LIST DOCUMENTED IN MEDICAL RECORD: ICD-10-PCS | Mod: CPTII,S$GLB,, | Performed by: PSYCHIATRY & NEUROLOGY

## 2021-11-04 PROCEDURE — 3044F HG A1C LEVEL LT 7.0%: CPT | Mod: CPTII,S$GLB,, | Performed by: PSYCHIATRY & NEUROLOGY

## 2021-11-04 PROCEDURE — 3075F SYST BP GE 130 - 139MM HG: CPT | Mod: CPTII,S$GLB,, | Performed by: PSYCHIATRY & NEUROLOGY

## 2021-11-04 PROCEDURE — 3079F PR MOST RECENT DIASTOLIC BLOOD PRESSURE 80-89 MM HG: ICD-10-PCS | Mod: CPTII,S$GLB,, | Performed by: PSYCHIATRY & NEUROLOGY

## 2021-11-04 PROCEDURE — 99999 PR PBB SHADOW E&M-EST. PATIENT-LVL IV: CPT | Mod: PBBFAC,,, | Performed by: PSYCHIATRY & NEUROLOGY

## 2021-11-04 PROCEDURE — 4010F PR ACE/ARB THEARPY RXD/TAKEN: ICD-10-PCS | Mod: CPTII,S$GLB,, | Performed by: PSYCHIATRY & NEUROLOGY

## 2021-11-04 PROCEDURE — 99999 PR PBB SHADOW E&M-EST. PATIENT-LVL IV: ICD-10-PCS | Mod: PBBFAC,,, | Performed by: PSYCHIATRY & NEUROLOGY

## 2021-11-04 PROCEDURE — 3008F BODY MASS INDEX DOCD: CPT | Mod: CPTII,S$GLB,, | Performed by: PSYCHIATRY & NEUROLOGY

## 2021-11-04 PROCEDURE — 3079F DIAST BP 80-89 MM HG: CPT | Mod: CPTII,S$GLB,, | Performed by: PSYCHIATRY & NEUROLOGY

## 2021-11-04 PROCEDURE — 4010F ACE/ARB THERAPY RXD/TAKEN: CPT | Mod: CPTII,S$GLB,, | Performed by: PSYCHIATRY & NEUROLOGY

## 2021-11-04 PROCEDURE — 3044F PR MOST RECENT HEMOGLOBIN A1C LEVEL <7.0%: ICD-10-PCS | Mod: CPTII,S$GLB,, | Performed by: PSYCHIATRY & NEUROLOGY

## 2021-11-04 PROCEDURE — 3075F PR MOST RECENT SYSTOLIC BLOOD PRESS GE 130-139MM HG: ICD-10-PCS | Mod: CPTII,S$GLB,, | Performed by: PSYCHIATRY & NEUROLOGY

## 2021-11-04 PROCEDURE — 1159F MED LIST DOCD IN RCRD: CPT | Mod: CPTII,S$GLB,, | Performed by: PSYCHIATRY & NEUROLOGY

## 2021-11-04 PROCEDURE — 99213 OFFICE O/P EST LOW 20 MIN: CPT | Mod: S$GLB,,, | Performed by: PSYCHIATRY & NEUROLOGY

## 2021-11-04 PROCEDURE — 3008F PR BODY MASS INDEX (BMI) DOCUMENTED: ICD-10-PCS | Mod: CPTII,S$GLB,, | Performed by: PSYCHIATRY & NEUROLOGY

## 2021-11-04 PROCEDURE — 99213 PR OFFICE/OUTPT VISIT, EST, LEVL III, 20-29 MIN: ICD-10-PCS | Mod: S$GLB,,, | Performed by: PSYCHIATRY & NEUROLOGY

## 2021-11-04 RX ORDER — GABAPENTIN 300 MG/1
300 CAPSULE ORAL 2 TIMES DAILY
Qty: 180 CAPSULE | Refills: 1 | Status: SHIPPED | OUTPATIENT
Start: 2021-11-04 | End: 2022-03-24 | Stop reason: SDUPTHER

## 2021-11-27 ENCOUNTER — PATIENT MESSAGE (OUTPATIENT)
Dept: PHARMACY | Facility: CLINIC | Age: 61
End: 2021-11-27
Payer: MEDICARE

## 2021-12-01 ENCOUNTER — SPECIALTY PHARMACY (OUTPATIENT)
Dept: PHARMACY | Facility: CLINIC | Age: 61
End: 2021-12-01
Payer: MEDICARE

## 2021-12-01 DIAGNOSIS — M05.771 RHEUMATOID ARTHRITIS INVOLVING BOTH FEET WITH POSITIVE RHEUMATOID FACTOR: Primary | ICD-10-CM

## 2021-12-01 DIAGNOSIS — M05.772 RHEUMATOID ARTHRITIS INVOLVING BOTH FEET WITH POSITIVE RHEUMATOID FACTOR: Primary | ICD-10-CM

## 2021-12-06 DIAGNOSIS — R00.2 PALPITATIONS: ICD-10-CM

## 2021-12-06 RX ORDER — METOPROLOL SUCCINATE 25 MG/1
25 TABLET, EXTENDED RELEASE ORAL NIGHTLY
Qty: 90 TABLET | Refills: 4 | Status: SHIPPED | OUTPATIENT
Start: 2021-12-06 | End: 2022-02-17 | Stop reason: SDUPTHER

## 2021-12-22 ENCOUNTER — LAB VISIT (OUTPATIENT)
Dept: LAB | Facility: HOSPITAL | Age: 61
End: 2021-12-22
Attending: INTERNAL MEDICINE
Payer: MEDICARE

## 2021-12-22 DIAGNOSIS — M06.9 RHEUMATOID ARTHRITIS FLARE: ICD-10-CM

## 2021-12-22 LAB
ALBUMIN SERPL BCP-MCNC: 4.1 G/DL (ref 3.5–5.2)
ALP SERPL-CCNC: 80 U/L (ref 55–135)
ALT SERPL W/O P-5'-P-CCNC: 29 U/L (ref 10–44)
ANION GAP SERPL CALC-SCNC: 9 MMOL/L (ref 8–16)
AST SERPL-CCNC: 24 U/L (ref 10–40)
BASOPHILS # BLD AUTO: 0.04 K/UL (ref 0–0.2)
BASOPHILS NFR BLD: 0.6 % (ref 0–1.9)
BILIRUB SERPL-MCNC: 0.4 MG/DL (ref 0.1–1)
BUN SERPL-MCNC: 12 MG/DL (ref 8–23)
CALCIUM SERPL-MCNC: 9.6 MG/DL (ref 8.7–10.5)
CHLORIDE SERPL-SCNC: 105 MMOL/L (ref 95–110)
CO2 SERPL-SCNC: 27 MMOL/L (ref 23–29)
CREAT SERPL-MCNC: 0.8 MG/DL (ref 0.5–1.4)
CRP SERPL-MCNC: 3.2 MG/L (ref 0–8.2)
DIFFERENTIAL METHOD: ABNORMAL
EOSINOPHIL # BLD AUTO: 0.4 K/UL (ref 0–0.5)
EOSINOPHIL NFR BLD: 5.7 % (ref 0–8)
ERYTHROCYTE [DISTWIDTH] IN BLOOD BY AUTOMATED COUNT: 13.4 % (ref 11.5–14.5)
ERYTHROCYTE [SEDIMENTATION RATE] IN BLOOD BY WESTERGREN METHOD: 13 MM/HR (ref 0–36)
EST. GFR  (AFRICAN AMERICAN): >60 ML/MIN/1.73 M^2
EST. GFR  (NON AFRICAN AMERICAN): >60 ML/MIN/1.73 M^2
GLUCOSE SERPL-MCNC: 86 MG/DL (ref 70–110)
HCT VFR BLD AUTO: 42.8 % (ref 37–48.5)
HGB BLD-MCNC: 13.2 G/DL (ref 12–16)
IMM GRANULOCYTES # BLD AUTO: 0.01 K/UL (ref 0–0.04)
IMM GRANULOCYTES NFR BLD AUTO: 0.2 % (ref 0–0.5)
LYMPHOCYTES # BLD AUTO: 2.1 K/UL (ref 1–4.8)
LYMPHOCYTES NFR BLD: 32.3 % (ref 18–48)
MCH RBC QN AUTO: 31.8 PG (ref 27–31)
MCHC RBC AUTO-ENTMCNC: 30.8 G/DL (ref 32–36)
MCV RBC AUTO: 103 FL (ref 82–98)
MONOCYTES # BLD AUTO: 0.6 K/UL (ref 0.3–1)
MONOCYTES NFR BLD: 9.8 % (ref 4–15)
NEUTROPHILS # BLD AUTO: 3.3 K/UL (ref 1.8–7.7)
NEUTROPHILS NFR BLD: 51.4 % (ref 38–73)
NRBC BLD-RTO: 0 /100 WBC
PLATELET # BLD AUTO: 330 K/UL (ref 150–450)
PMV BLD AUTO: 9.7 FL (ref 9.2–12.9)
POTASSIUM SERPL-SCNC: 3.8 MMOL/L (ref 3.5–5.1)
PROT SERPL-MCNC: 8.1 G/DL (ref 6–8.4)
RBC # BLD AUTO: 4.15 M/UL (ref 4–5.4)
SODIUM SERPL-SCNC: 141 MMOL/L (ref 136–145)
WBC # BLD AUTO: 6.35 K/UL (ref 3.9–12.7)

## 2021-12-22 PROCEDURE — 85652 RBC SED RATE AUTOMATED: CPT | Performed by: INTERNAL MEDICINE

## 2021-12-22 PROCEDURE — 85025 COMPLETE CBC W/AUTO DIFF WBC: CPT | Performed by: INTERNAL MEDICINE

## 2021-12-22 PROCEDURE — 80053 COMPREHEN METABOLIC PANEL: CPT | Performed by: INTERNAL MEDICINE

## 2021-12-22 PROCEDURE — 86140 C-REACTIVE PROTEIN: CPT | Performed by: INTERNAL MEDICINE

## 2021-12-27 ENCOUNTER — SPECIALTY PHARMACY (OUTPATIENT)
Dept: PHARMACY | Facility: CLINIC | Age: 61
End: 2021-12-27
Payer: MEDICARE

## 2022-01-08 ENCOUNTER — SPECIALTY PHARMACY (OUTPATIENT)
Dept: PHARMACY | Facility: CLINIC | Age: 62
End: 2022-01-08
Payer: MEDICARE

## 2022-01-08 NOTE — TELEPHONE ENCOUNTER
Incoming call from patient about Enbrel dosing. Patient injected last Sunday instead of Saturday. Patient wants to know if she should change days or keep at regular dosing schedule. Advised patient that 1 day late is okay to keep at regular dosing schedule of Saturday injection.

## 2022-01-24 ENCOUNTER — SPECIALTY PHARMACY (OUTPATIENT)
Dept: PHARMACY | Facility: CLINIC | Age: 62
End: 2022-01-24
Payer: MEDICARE

## 2022-01-24 NOTE — TELEPHONE ENCOUNTER
Specialty Pharmacy - Refill Coordination    Specialty Medication Orders Linked to Encounter    Flowsheet Row Most Recent Value   Medication #1 etanercept (ENBREL SURECLICK) 50 mg/mL (1 mL) (Order#[id number], Rx#[id number])          Refill Questions - Documented Responses    Flowsheet Row Most Recent Value   Patient Availability and HIPAA Verification    Does patient want to proceed with activity? Yes   HIPAA/medical authority confirmed? Yes   Relationship to patient of person spoken to? Self   Refill Screening Questions    Changes to allergies? No   Changes to medications? No   New conditions since last clinic visit? No   Unplanned office visit, urgent care, ED, or hospital admission in the last 4 weeks? No   How does patient/caregiver feel medication is working? Good   Financial problems or insurance changes? No   How many doses of your specialty medications were missed in the last 4 weeks? 1   Why were doses missed? Simply forgot   Would patient like to speak to a pharmacist? No   When does the patient need to receive the medication? 01/29/22   Refill Delivery Questions    How will the patient receive the medication? Pickup   When does the patient need to receive the medication? 01/29/22   Shipping Address Home   Address in [facility] confirmed and updated if neccessary? Yes   Expected Copay ($) 0   Is the patient able to afford the medication copay? Yes   Payment Method zero copay   Days supply of Refill 28   Supplies needed? No supplies needed   Refill activity completed? Yes   Refill activity plan Refill scheduled   Shipment/Pickup Date: 01/27/22          Current Outpatient Medications   Medication Sig    amLODIPine (NORVASC) 5 MG tablet Take 1 tablet (5 mg total) by mouth once daily.    aspirin (ECOTRIN) 81 MG EC tablet Take 81 mg by mouth once daily.    atorvastatin (LIPITOR) 10 MG tablet Take 1 tablet (10 mg total) by mouth once daily.    azelastine (ASTELIN) 137 mcg (0.1 %) nasal spray spray 1  spray (137 mcg total) by Nasal route 2 (two) times daily.    chlorhexidine (PERIDEX) 0.12 % solution Use 15-20 mL to rinse mouth twice daily for 2 minutes, spit out and do not rinse mouth with water after.    clotrimazole-betamethasone 1-0.05% (LOTRISONE) cream Apply to affected area 2 times daily    cyanocobalamin, vitamin B-12, (VITAMIN B-12 ORAL) Take by mouth.    cycloSPORINE (RESTASIS) 0.05 % ophthalmic emulsion Apply 1 drop into both eyes twice a day    ergocalciferol (ERGOCALCIFEROL) 50,000 unit Cap Take 1 capsule (50,000 Units total) by mouth every 7 days.    etanercept (ENBREL SURECLICK) 50 mg/mL (1 mL) Inject 1 mL (50 mg total) into the skin once a week.    fluticasone propion-salmeterol 115-21 mcg/dose (ADVAIR HFA) 115-21 mcg/actuation HFAA inhaler Inhale 2 puffs into the lungs every 12 (twelve) hours.    fluticasone propionate (FLONASE) 50 mcg/actuation nasal spray 1 spray (50 mcg total) by Each Nostril route once daily.    folic acid (FOLVITE) 1 MG tablet Take 4 tablets (4 mg total) by mouth once daily.    gabapentin (NEURONTIN) 300 MG capsule Take 1 capsule (300 mg total) by mouth 2 (two) times daily.    hydrOXYzine HCL (ATARAX) 25 MG tablet Take 1 tablet (25 mg total) by mouth every evening.    leflunomide (ARAVA) 20 MG Tab Take 1 tablet (20 mg total) by mouth once daily.    LORazepam (ATIVAN) 1 MG tablet TAKE 1 TABLET BY MOUTH ONE HOUR PRIOR TO MRI (Patient taking differently: TAKE 1 TABLET BY MOUTH ONE HOUR PRIOR TO MRI)    losartan (COZAAR) 100 MG tablet Take 1 tablet (100 mg total) by mouth once daily.    meclizine (ANTIVERT) 25 mg tablet Take 1 tablet (25 mg total) by mouth 3 (three) times daily as needed for Dizziness.    metoprolol succinate (TOPROL-XL) 25 MG 24 hr tablet Take 1 tablet (25 mg total) by mouth every evening.    nystatin (MYCOSTATIN) powder Apply to affected area every morning as needed for flare under breasts (Patient taking differently: Apply to affected area  every morning as needed for flare under breasts)    polyethylene glycol (GLYCOLAX) 17 gram/dose powder Take 17 g by mouth once daily.    PROTOPIC 0.1 % ointment AAA bid    triamcinolone acetonide 0.025% (KENALOG) 0.025 % cream Apply to affected area every day to twice a day to neck . Not more than 1-2 weeks straight in same location (Patient taking differently: Apply to affected area every day to twice a day to neck . Not more than 1-2 weeks straight in same location)    triamcinolone acetonide 0.1% (KENALOG) 0.1 % ointment Apply topically 2 (two) times daily. For 2 weeks    ZINC ORAL Take by mouth.   Last reviewed on 11/4/2021 10:53 AM by Arianna Kaba MA    Review of patient's allergies indicates:  No Known Allergies Last reviewed on  11/4/2021 10:53 AM by Arianna Kaba      Tasks added this encounter   No tasks added.   Tasks due within next 3 months   1/22/2022 - Refill Call (Auto Added)     Nura Camarena  [facility]  [address] [facility] [address] [address]  Phone: [phone]  Fax: [phone]

## 2022-01-25 ENCOUNTER — PES CALL (OUTPATIENT)
Dept: ADMINISTRATIVE | Facility: CLINIC | Age: 62
End: 2022-01-25
Payer: MEDICARE

## 2022-01-27 NOTE — TELEPHONE ENCOUNTER
"Incoming call from Mrs. Cevrantes. She inquired into how long she should leave out Enbrel Sureclick injection prior to administration. Per package insert, "Leave the autoinjector at room temperature for at least 30 minutes before injecting."    She denied any questions or concerns.  "

## 2022-02-02 ENCOUNTER — PATIENT MESSAGE (OUTPATIENT)
Dept: CARDIOLOGY | Facility: CLINIC | Age: 62
End: 2022-02-02
Payer: MEDICARE

## 2022-02-16 ENCOUNTER — OFFICE VISIT (OUTPATIENT)
Dept: RHEUMATOLOGY | Facility: CLINIC | Age: 62
End: 2022-02-16
Payer: MEDICARE

## 2022-02-16 VITALS
SYSTOLIC BLOOD PRESSURE: 127 MMHG | BODY MASS INDEX: 27.94 KG/M2 | WEIGHT: 148 LBS | HEIGHT: 61 IN | HEART RATE: 89 BPM | DIASTOLIC BLOOD PRESSURE: 79 MMHG

## 2022-02-16 DIAGNOSIS — M06.321 RHEUMATOID NODULE OF RIGHT ELBOW: ICD-10-CM

## 2022-02-16 DIAGNOSIS — Z79.899 ENCOUNTER FOR LONG-TERM CURRENT USE OF HIGH RISK MEDICATION: Primary | ICD-10-CM

## 2022-02-16 DIAGNOSIS — D86.9 SARCOIDOSIS: ICD-10-CM

## 2022-02-16 PROCEDURE — 99214 PR OFFICE/OUTPT VISIT, EST, LEVL IV, 30-39 MIN: ICD-10-PCS | Mod: S$GLB,,, | Performed by: INTERNAL MEDICINE

## 2022-02-16 PROCEDURE — 99999 PR PBB SHADOW E&M-EST. PATIENT-LVL V: CPT | Mod: PBBFAC,,, | Performed by: INTERNAL MEDICINE

## 2022-02-16 PROCEDURE — 1159F PR MEDICATION LIST DOCUMENTED IN MEDICAL RECORD: ICD-10-PCS | Mod: CPTII,S$GLB,, | Performed by: INTERNAL MEDICINE

## 2022-02-16 PROCEDURE — 3074F PR MOST RECENT SYSTOLIC BLOOD PRESSURE < 130 MM HG: ICD-10-PCS | Mod: CPTII,S$GLB,, | Performed by: INTERNAL MEDICINE

## 2022-02-16 PROCEDURE — 3008F BODY MASS INDEX DOCD: CPT | Mod: CPTII,S$GLB,, | Performed by: INTERNAL MEDICINE

## 2022-02-16 PROCEDURE — 3008F PR BODY MASS INDEX (BMI) DOCUMENTED: ICD-10-PCS | Mod: CPTII,S$GLB,, | Performed by: INTERNAL MEDICINE

## 2022-02-16 PROCEDURE — 99999 PR PBB SHADOW E&M-EST. PATIENT-LVL V: ICD-10-PCS | Mod: PBBFAC,,, | Performed by: INTERNAL MEDICINE

## 2022-02-16 PROCEDURE — 1159F MED LIST DOCD IN RCRD: CPT | Mod: CPTII,S$GLB,, | Performed by: INTERNAL MEDICINE

## 2022-02-16 PROCEDURE — 99214 OFFICE O/P EST MOD 30 MIN: CPT | Mod: S$GLB,,, | Performed by: INTERNAL MEDICINE

## 2022-02-16 PROCEDURE — 3078F PR MOST RECENT DIASTOLIC BLOOD PRESSURE < 80 MM HG: ICD-10-PCS | Mod: CPTII,S$GLB,, | Performed by: INTERNAL MEDICINE

## 2022-02-16 PROCEDURE — 3078F DIAST BP <80 MM HG: CPT | Mod: CPTII,S$GLB,, | Performed by: INTERNAL MEDICINE

## 2022-02-16 PROCEDURE — 3074F SYST BP LT 130 MM HG: CPT | Mod: CPTII,S$GLB,, | Performed by: INTERNAL MEDICINE

## 2022-02-16 NOTE — PROGRESS NOTES
Subjective:       Patient ID: Silvia Cervantes is a 54 y.o. female.    Chief Complaint:     HPI 55 yo F with PMH of HTN, abnormal pap (2013 but recent negative, no cancer), RA (+CCP, RF),erosive here for evaluation. She was diagnosed in 2011 with hand and feet with swelling and pain. She was initially treated with MTX but it gave her nausea which gave her nausea.  She was changed to leflunomide 20 mg 10/2014 from mtx due to nausea on the mtx. Then I added etanercept January 2015 due to incomplete control of her arthritis on leflunomide.  No am stiffness.  She reports mild aching in hands (3/10). Pain is aching.  Reports mild swelling in hands but better than usual.  She reports left upper quadrant pain (4/10) , non-radiating with possible nausea which has been present for months. Sometimes she has sour taste in mouth.       Interval history:  Patient is here for follow up.  She has not taking arava 20 mg a day.  She has been on enbrel for 2 months.   She is taking folic acid 3 mg po qday. Pain level is 5/10 in hands. Reports some improvement in strength in her hands.  Denies history of diverticulitis, PUD, or blood clot.         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   HENT: Negative for congestion, ear discharge, ear pain, facial swelling, mouth sores, sinus pressure, sneezing, sore throat, tinnitus and trouble swallowing.    Eyes: Negative for photophobia, pain, discharge, redness, itching and visual disturbance.   Respiratory: Negative for apnea, chest tightness, shortness of breath, wheezing and stridor.    Cardiovascular:  Negative for leg swelling.   Gastrointestinal: Negative for nausea, abdominal pain, diarrhea, constipation, blood in stool, abdominal distention and anal bleeding.   Endocrine: Negative for cold intolerance and heat intolerance.   Genitourinary: Negative for dysuria and difficulty urinating.   Musculoskeletal: Positive for arthralgias. Negative for myalgias, back pain, joint swelling, gait problem, neck pain and neck stiffness.   Skin: Negative for color change, pallor, rash and wound.   Neurological: Negative for dizziness, seizures, light-headedness and numbness.   Hematological: Negative for adenopathy. Does not bruise/bleed easily.   Psychiatric/Behavioral: Negative for sleep disturbance. The patient is not nervous/anxious.       Objective:        Physical Exam   Constitutional: She is oriented to person, place, and time.   HENT:   Head: Normocephalic and atraumatic.   Right Ear: External ear normal.   Left Ear: External ear normal.   Nose: Nose normal.   Mouth/Throat: Oropharynx is clear and moist. No oropharyngeal exudate.   Eyes: Conjunctivae and EOM are normal. Pupils are equal, round, and reactive to light. Right eye exhibits no discharge. Left eye exhibits no discharge. No scleral icterus.   Neck: Neck supple. No JVD present. No thyromegaly present.   Cardiovascular: Normal rate, regular rhythm, normal heart sounds and intact distal pulses.  Exam reveals no gallop and no friction rub.    No murmur heard.  Pulmonary/Chest: Effort normal and breath sounds normal. No respiratory distress. She has no wheezes. She has no rales. She exhibits no tenderness.   Abdominal: Soft. Bowel sounds are normal. She exhibits no distension and no mass. There is no tenderness. There is no rebound and no guarding.   Lymphadenopathy:     She has no cervical adenopathy.   Neurological: She is alert and oriented to person, place, and time. No cranial nerve deficit. Gait normal. Coordination normal.   Skin:diffuse hypopigmented  "lesions in arms, hands, legs, chest, back   Psychiatric: Affect and judgment normal.   Musculoskeletal: She exhibits no  tenderness. She exhibits no edema.   FROM of all joints including neck, shoulders, spine, ankles, wrists, knees, and ankles; no joint deformities noted or effusions, erythema or warmth; no tophi or nodules noted; no crepitus; no nail pitting or onycholysis          Labs:  Esr-25  ccp-93  rf-36  Flor-negative    Imaging:    MRI brain (2021):   Impression:     Scattered areas of prior hemorrhage including parenchymal, leptomeningeal and intraventricular involvement.  Moderate supratentorial leukoencephalopathy.  Small focus of right anterior temporal encephalomalacia.  Although not entirely specific, the constellation of findings is most suggestive of prior traumatic brain injury/diffuse axonal injury.  Clinical correlation required.     No evidence of new hemorrhage or major vascular distribution infarct.  MRI (7/22/2015)   (Stable enhancing marginal erosions in the second and third metacarpal heads, lunate, triquetrum, and capitate)      Right elbow xray (2020): I personally reviewed      dexa scan:(2017):  Osteopenia of the femoral neck. FRAX calculation does not support treatment for osteoporosis.Total hip and lumbar spine BMD unchanged since 2013.    Recommendations:  1) Adequate calcium and Vitamin D therapy  2) Appropriate exercise  3) Consider repeat BMD in 2- 4 years          Assessment:     62 yo F with PMH of HTN, abnormal pap (2013 but recent negative, no cancer), RA (+CCP, RF),erosive here , H. Pyl[facility] for follow up of seropositive RA and sarcoid.   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  She has abnormal brain MRI  AMYLOID ANGIOPATHY and IS SEEING NEURO.  She had worsening of nodules in right elbow and hair thinning, so  MTX was stopped.She is concerned about nodules in right elbow so will send her to " ortho.  Her arthritis is controlled right now.    She is doing well on enbrel . She self stopped arava.  We can consider putting her on arava or SSZ  At next visit if needed            -continue enbrel 50mg sub q once a week  (Risks of TNF inhibitor discussed with patient and not limited to cell count abnormalities, malignancy, allergic  reaction to medication, and increased risk of infection. Patient agrees with starting medication.)   -Continue folic acid 4 mg a day  -ortho consult    30 * minutes of total time spent on the encounter, which includes face to face time and non-face to face time preparing to see the patient (eg, review of tests), Obtaining and/or reviewing separately obtained history, Documenting clinical information in the electronic or other health record, Independently interpreting results (not separately reported) and communicating results to the patient/family/caregiver, or Care coordination (not separately reported).

## 2022-02-17 ENCOUNTER — OFFICE VISIT (OUTPATIENT)
Dept: FAMILY MEDICINE | Facility: CLINIC | Age: 62
End: 2022-02-17
Attending: FAMILY MEDICINE
Payer: MEDICARE

## 2022-02-17 ENCOUNTER — HOSPITAL ENCOUNTER (OUTPATIENT)
Dept: RADIOLOGY | Facility: HOSPITAL | Age: 62
Discharge: HOME | End: 2022-02-17
Attending: FAMILY MEDICINE
Payer: MEDICARE

## 2022-02-17 VITALS
HEART RATE: 94 BPM | HEIGHT: 61 IN | OXYGEN SATURATION: 95 % | DIASTOLIC BLOOD PRESSURE: 80 MMHG | WEIGHT: 147.69 LBS | BODY MASS INDEX: 27.88 KG/M2 | SYSTOLIC BLOOD PRESSURE: 130 MMHG | TEMPERATURE: 98 F

## 2022-02-17 DIAGNOSIS — L80 VITILIGO: ICD-10-CM

## 2022-02-17 DIAGNOSIS — M79.605 LEFT LEG PAIN: ICD-10-CM

## 2022-02-17 DIAGNOSIS — M54.42 CHRONIC BILATERAL LOW BACK PAIN WITH LEFT-SIDED SCIATICA: ICD-10-CM

## 2022-02-17 DIAGNOSIS — E55.9 VITAMIN D DEFICIENCY: ICD-10-CM

## 2022-02-17 DIAGNOSIS — D84.9 IMMUNOCOMPROMISED: ICD-10-CM

## 2022-02-17 DIAGNOSIS — G89.29 CHRONIC BILATERAL LOW BACK PAIN WITH LEFT-SIDED SCIATICA: ICD-10-CM

## 2022-02-17 DIAGNOSIS — M54.9 CHRONIC NECK AND BACK PAIN: ICD-10-CM

## 2022-02-17 DIAGNOSIS — F33.9 MAJOR DEPRESSION, RECURRENT, CHRONIC: ICD-10-CM

## 2022-02-17 DIAGNOSIS — I10 ESSENTIAL HYPERTENSION: ICD-10-CM

## 2022-02-17 DIAGNOSIS — R73.02 IMPAIRED GLUCOSE TOLERANCE: ICD-10-CM

## 2022-02-17 DIAGNOSIS — M05.772 RHEUMATOID ARTHRITIS INVOLVING BOTH FEET WITH POSITIVE RHEUMATOID FACTOR: ICD-10-CM

## 2022-02-17 DIAGNOSIS — G89.29 CHRONIC NECK AND BACK PAIN: ICD-10-CM

## 2022-02-17 DIAGNOSIS — M54.2 CHRONIC NECK AND BACK PAIN: ICD-10-CM

## 2022-02-17 DIAGNOSIS — R00.2 PALPITATIONS: ICD-10-CM

## 2022-02-17 DIAGNOSIS — M05.771 RHEUMATOID ARTHRITIS INVOLVING BOTH FEET WITH POSITIVE RHEUMATOID FACTOR: ICD-10-CM

## 2022-02-17 DIAGNOSIS — E78.00 HYPERCHOLESTEREMIA: ICD-10-CM

## 2022-02-17 DIAGNOSIS — E78.2 MIXED HYPERLIPIDEMIA: ICD-10-CM

## 2022-02-17 DIAGNOSIS — Z00.00 ROUTINE GENERAL MEDICAL EXAMINATION AT HEALTH CARE FACILITY: Primary | ICD-10-CM

## 2022-02-17 PROCEDURE — 1160F PR REVIEW ALL MEDS BY PRESCRIBER/CLIN PHARMACIST DOCUMENTED: ICD-10-PCS | Mod: CPTII,S$GLB,, | Performed by: FAMILY MEDICINE

## 2022-02-17 PROCEDURE — 99214 PR OFFICE/OUTPT VISIT, EST, LEVL IV, 30-39 MIN: ICD-10-PCS | Mod: S$GLB,,, | Performed by: FAMILY MEDICINE

## 2022-02-17 PROCEDURE — 4010F PR ACE/ARB THEARPY RXD/TAKEN: ICD-10-PCS | Mod: CPTII,S$GLB,, | Performed by: FAMILY MEDICINE

## 2022-02-17 PROCEDURE — 99214 OFFICE O/P EST MOD 30 MIN: CPT | Mod: S$GLB,,, | Performed by: FAMILY MEDICINE

## 2022-02-17 PROCEDURE — 3079F PR MOST RECENT DIASTOLIC BLOOD PRESSURE 80-89 MM HG: ICD-10-PCS | Mod: CPTII,S$GLB,, | Performed by: FAMILY MEDICINE

## 2022-02-17 PROCEDURE — 99999 PR PBB SHADOW E&M-EST. PATIENT-LVL III: CPT | Mod: PBBFAC,,, | Performed by: FAMILY MEDICINE

## 2022-02-17 PROCEDURE — 73552 X-RAY EXAM OF FEMUR 2/>: CPT | Mod: TC,FY,LT

## 2022-02-17 PROCEDURE — 73502 XR HIP WITH PELVIS WHEN PERFORMED, 2 OR 3 VIEWS LEFT: ICD-10-PCS | Mod: 26,LT,, | Performed by: RADIOLOGY

## 2022-02-17 PROCEDURE — 73590 XR TIBIA FIBULA 2 VIEW LEFT: ICD-10-PCS | Mod: 26,LT,, | Performed by: RADIOLOGY

## 2022-02-17 PROCEDURE — 3075F PR MOST RECENT SYSTOLIC BLOOD PRESS GE 130-139MM HG: ICD-10-PCS | Mod: CPTII,S$GLB,, | Performed by: FAMILY MEDICINE

## 2022-02-17 PROCEDURE — 3075F SYST BP GE 130 - 139MM HG: CPT | Mod: CPTII,S$GLB,, | Performed by: FAMILY MEDICINE

## 2022-02-17 PROCEDURE — 73552 XR FEMUR 2 VIEW LEFT: ICD-10-PCS | Mod: 26,LT,, | Performed by: RADIOLOGY

## 2022-02-17 PROCEDURE — 73552 X-RAY EXAM OF FEMUR 2/>: CPT | Mod: 26,LT,, | Performed by: RADIOLOGY

## 2022-02-17 PROCEDURE — 4010F ACE/ARB THERAPY RXD/TAKEN: CPT | Mod: CPTII,S$GLB,, | Performed by: FAMILY MEDICINE

## 2022-02-17 PROCEDURE — 73502 X-RAY EXAM HIP UNI 2-3 VIEWS: CPT | Mod: TC,FY,LT

## 2022-02-17 PROCEDURE — 73502 X-RAY EXAM HIP UNI 2-3 VIEWS: CPT | Mod: 26,LT,, | Performed by: RADIOLOGY

## 2022-02-17 PROCEDURE — 1159F MED LIST DOCD IN RCRD: CPT | Mod: CPTII,S$GLB,, | Performed by: FAMILY MEDICINE

## 2022-02-17 PROCEDURE — 3079F DIAST BP 80-89 MM HG: CPT | Mod: CPTII,S$GLB,, | Performed by: FAMILY MEDICINE

## 2022-02-17 PROCEDURE — 73590 X-RAY EXAM OF LOWER LEG: CPT | Mod: TC,FY,LT

## 2022-02-17 PROCEDURE — 3008F PR BODY MASS INDEX (BMI) DOCUMENTED: ICD-10-PCS | Mod: CPTII,S$GLB,, | Performed by: FAMILY MEDICINE

## 2022-02-17 PROCEDURE — 3008F BODY MASS INDEX DOCD: CPT | Mod: CPTII,S$GLB,, | Performed by: FAMILY MEDICINE

## 2022-02-17 PROCEDURE — 1159F PR MEDICATION LIST DOCUMENTED IN MEDICAL RECORD: ICD-10-PCS | Mod: CPTII,S$GLB,, | Performed by: FAMILY MEDICINE

## 2022-02-17 PROCEDURE — 1160F RVW MEDS BY RX/DR IN RCRD: CPT | Mod: CPTII,S$GLB,, | Performed by: FAMILY MEDICINE

## 2022-02-17 PROCEDURE — 99999 PR PBB SHADOW E&M-EST. PATIENT-LVL III: ICD-10-PCS | Mod: PBBFAC,,, | Performed by: FAMILY MEDICINE

## 2022-02-17 PROCEDURE — 73590 X-RAY EXAM OF LOWER LEG: CPT | Mod: 26,LT,, | Performed by: RADIOLOGY

## 2022-02-17 RX ORDER — LOSARTAN POTASSIUM 100 MG/1
100 TABLET ORAL DAILY
Qty: 90 TABLET | Refills: 3 | Status: SHIPPED | OUTPATIENT
Start: 2022-02-17 | End: 2022-06-21 | Stop reason: SDUPTHER

## 2022-02-17 RX ORDER — METOPROLOL SUCCINATE 25 MG/1
25 TABLET, EXTENDED RELEASE ORAL NIGHTLY
Qty: 90 TABLET | Refills: 4 | Status: SHIPPED | OUTPATIENT
Start: 2022-02-17 | End: 2022-06-21 | Stop reason: SDUPTHER

## 2022-02-17 RX ORDER — AMLODIPINE BESYLATE 5 MG/1
5 TABLET ORAL DAILY
Qty: 90 TABLET | Refills: 4 | Status: SHIPPED | OUTPATIENT
Start: 2022-02-17 | End: 2023-03-06 | Stop reason: SDUPTHER

## 2022-02-17 RX ORDER — ATORVASTATIN CALCIUM 10 MG/1
10 TABLET, FILM COATED ORAL DAILY
Qty: 90 TABLET | Refills: 1 | Status: SHIPPED | OUTPATIENT
Start: 2022-02-17 | End: 2022-06-21 | Stop reason: SDUPTHER
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
## 2022-02-18 ENCOUNTER — TELEPHONE (OUTPATIENT)
Dept: INTERNAL MEDICINE | Facility: CLINIC | Age: 62
End: 2022-02-18
Payer: MEDICARE

## 2022-02-19 ENCOUNTER — SPECIALTY PHARMACY (OUTPATIENT)
Dept: PHARMACY | Facility: CLINIC | Age: 62
End: 2022-02-19
Payer: MEDICARE

## 2022-02-19 DIAGNOSIS — M05.772 RHEUMATOID ARTHRITIS INVOLVING BOTH FEET WITH POSITIVE RHEUMATOID FACTOR: Primary | ICD-10-CM

## 2022-02-19 DIAGNOSIS — M05.771 RHEUMATOID ARTHRITIS INVOLVING BOTH FEET WITH POSITIVE RHEUMATOID FACTOR: Primary | ICD-10-CM

## 2022-02-19 NOTE — TELEPHONE ENCOUNTER
Specialty Pharmacy - Refill Coordination    Specialty Medication Orders Linked to Encounter    Flowsheet Row Most Recent Value   Medication #1 etanercept (ENBREL SURECLICK) 50 mg/mL (1 mL) (Order#[id number], Rx#[id number])      All questions and concerns addressed. Pt will try mouth rinse for dry mouth and lozenges to see if it helps with her dry cough. She reports her PCP is already aware of cough and suspected allergies and is the one that advised she try solution for dry mouth. Pt advised to continue Enbrel injections or RA at this time dry cough is not likely due to Enbrel. Pt voiced understanding, no further intervention necessary.      Refill Questions - Documented Responses    Flowsheet Row Most Recent Value   Patient Availability and HIPAA Verification    Does patient want to proceed with activity? Yes   HIPAA/medical authority confirmed? Yes   Relationship to patient of person spoken to? Self   Refill Screening Questions    Changes to allergies? No   Changes to medications? No   New conditions since last clinic visit? No   Unplanned office visit, urgent care, ED, or hospital admission in the last 4 weeks? No   How does patient/caregiver feel medication is working? Good   Financial problems or insurance changes? No   How many doses of your specialty medications were missed in the last 4 weeks? 0   Would patient like to speak to a pharmacist? No   When does the patient need to receive the medication? 02/26/22   Refill Delivery Questions    How will the patient receive the medication? Delivery Tiffany   When does the patient need to receive the medication? 02/26/22   Shipping Address Home   Address in [facility] confirmed and updated if neccessary? Yes   Expected Copay ($) 0   Is the patient able to afford the medication copay? Yes   Payment Method zero copay   Days supply of Refill 28   Supplies needed? No supplies needed   Refill activity completed? Yes   Refill activity plan Refill scheduled    Shipment/Pickup Date: 02/23/22          Current Outpatient Medications   Medication Sig    amLODIPine (NORVASC) 5 MG tablet Take 1 tablet (5 mg total) by mouth once daily.    aspirin (ECOTRIN) 81 MG EC tablet Take 81 mg by mouth once daily.    atorvastatin (LIPITOR) 10 MG tablet Take 1 tablet (10 mg total) by mouth once daily.    azelastine (ASTELIN) 137 mcg (0.1 %) nasal spray spray 1 spray (137 mcg total) by Nasal route 2 (two) times daily.    chlorhexidine (PERIDEX) 0.12 % solution Use 15-20 mL to rinse mouth twice daily for 2 minutes, spit out and do not rinse mouth with water after.    clotrimazole-betamethasone 1-0.05% (LOTRISONE) cream Apply to affected area 2 times daily    cyanocobalamin, vitamin B-12, (VITAMIN B-12 ORAL) Take by mouth.    cycloSPORINE (RESTASIS) 0.05 % ophthalmic emulsion Apply 1 drop into both eyes twice a day    ergocalciferol (ERGOCALCIFEROL) 50,000 unit Cap Take 1 capsule (50,000 Units total) by mouth every 7 days.    etanercept (ENBREL SURECLICK) 50 mg/mL (1 mL) Inject 1 mL (50 mg total) into the skin once a week.    fluticasone propion-salmeterol 115-21 mcg/dose (ADVAIR HFA) 115-21 mcg/actuation HFAA inhaler Inhale 2 puffs into the lungs every 12 (twelve) hours.    fluticasone propionate (FLONASE) 50 mcg/actuation nasal spray 1 spray (50 mcg total) by Each Nostril route once daily.    folic acid (FOLVITE) 1 MG tablet Take 4 tablets (4 mg total) by mouth once daily.    gabapentin (NEURONTIN) 300 MG capsule Take 1 capsule (300 mg total) by mouth 2 (two) times daily.    hydrOXYzine HCL (ATARAX) 25 MG tablet Take 1 tablet (25 mg total) by mouth every evening.    leflunomide (ARAVA) 20 MG Tab Take 1 tablet (20 mg total) by mouth once daily.    LORazepam (ATIVAN) 1 MG tablet TAKE 1 TABLET BY MOUTH ONE HOUR PRIOR TO MRI (Patient taking differently: TAKE 1 TABLET BY MOUTH ONE HOUR PRIOR TO MRI)    losartan (COZAAR) 100 MG tablet Take 1 tablet (100 mg total) by mouth once  daily.    meclizine (ANTIVERT) 25 mg tablet Take 1 tablet (25 mg total) by mouth 3 (three) times daily as needed for Dizziness.    metoprolol succinate (TOPROL-XL) 25 MG 24 hr tablet Take 1 tablet (25 mg total) by mouth every evening.    nystatin (MYCOSTATIN) powder Apply to affected area every morning as needed for flare under breasts (Patient taking differently: Apply to affected area every morning as needed for flare under breasts)    polyethylene glycol (GLYCOLAX) 17 gram/dose powder Take 17 g by mouth once daily.    PROTOPIC 0.1 % ointment AAA bid    triamcinolone acetonide 0.025% (KENALOG) 0.025 % cream Apply to affected area every day to twice a day to neck . Not more than 1-2 weeks straight in same location (Patient taking differently: Apply to affected area every day to twice a day to neck . Not more than 1-2 weeks straight in same location)    triamcinolone acetonide 0.1% (KENALOG) 0.1 % ointment Apply topically 2 (two) times daily. For 2 weeks    ZINC ORAL Take by mouth.   Last reviewed on 2/17/2022 10:43 AM by Adrián Cade MD    Review of patient's allergies indicates:  No Known Allergies Last reviewed on  2/17/2022 10:43 AM by Adrián Cade      Tasks added this encounter   3/21/2022 - Refill Call (Auto Added)   Tasks due within next 3 months   No tasks due.     Ligia Ray, PharmD  [facility]  [address] [place] [id number]  Phone: [phone]  Fax: [phone]

## 2022-02-19 NOTE — TELEPHONE ENCOUNTER
During the refill call, the patient reported experiencing a tickle in her throat, and she was unsure if it was related to the enbrel. Patient's doctor instructed her to try thera-breath oral rinse and biotin. Patient wanted to discuss with a pharmaicst, so I transferred the patient to [facility].

## 2022-02-21 ENCOUNTER — OFFICE VISIT (OUTPATIENT)
Dept: FAMILY MEDICINE | Facility: CLINIC | Age: 62
End: 2022-02-21
Payer: MEDICARE

## 2022-02-21 VITALS
HEIGHT: 61 IN | WEIGHT: 148.56 LBS | OXYGEN SATURATION: 98 % | SYSTOLIC BLOOD PRESSURE: 130 MMHG | BODY MASS INDEX: 28.05 KG/M2 | DIASTOLIC BLOOD PRESSURE: 80 MMHG | HEART RATE: 74 BPM

## 2022-02-21 DIAGNOSIS — I10 PRIMARY HYPERTENSION: ICD-10-CM

## 2022-02-21 DIAGNOSIS — M54.16 LUMBAR RADICULOPATHY: ICD-10-CM

## 2022-02-21 DIAGNOSIS — Z86.79 HISTORY OF SUPRAVENTRICULAR TACHYCARDIA: ICD-10-CM

## 2022-02-21 DIAGNOSIS — D84.9 IMMUNOCOMPROMISED: ICD-10-CM

## 2022-02-21 DIAGNOSIS — Z00.00 ENCOUNTER FOR PREVENTIVE HEALTH EXAMINATION: Primary | ICD-10-CM

## 2022-02-21 DIAGNOSIS — Z87.820 HISTORY OF TRAUMATIC BRAIN INJURY: ICD-10-CM

## 2022-02-21 DIAGNOSIS — M05.9 RHEUMATOID ARTHRITIS WITH POSITIVE RHEUMATOID FACTOR, INVOLVING UNSPECIFIED SITE: ICD-10-CM

## 2022-02-21 DIAGNOSIS — D86.0 SARCOIDOSIS OF LUNG: ICD-10-CM

## 2022-02-21 DIAGNOSIS — I70.0 AORTIC ATHEROSCLEROSIS: ICD-10-CM

## 2022-02-21 DIAGNOSIS — E78.2 MIXED HYPERLIPIDEMIA: ICD-10-CM

## 2022-02-21 DIAGNOSIS — F33.9 MAJOR DEPRESSION, RECURRENT, CHRONIC: ICD-10-CM

## 2022-02-21 DIAGNOSIS — G56.92 NEUROPATHY, ARM, LEFT: ICD-10-CM

## 2022-02-21 PROCEDURE — 4010F PR ACE/ARB THEARPY RXD/TAKEN: ICD-10-PCS | Mod: CPTII,S$GLB,, | Performed by: NURSE PRACTITIONER

## 2022-02-21 PROCEDURE — 3079F PR MOST RECENT DIASTOLIC BLOOD PRESSURE 80-89 MM HG: ICD-10-PCS | Mod: CPTII,S$GLB,, | Performed by: NURSE PRACTITIONER

## 2022-02-21 PROCEDURE — 99999 PR PBB SHADOW E&M-EST. PATIENT-LVL V: ICD-10-PCS | Mod: PBBFAC,,, | Performed by: NURSE PRACTITIONER

## 2022-02-21 PROCEDURE — 99499 UNLISTED E&M SERVICE: CPT | Mod: S$GLB,,, | Performed by: NURSE PRACTITIONER

## 2022-02-21 PROCEDURE — G0439 PR MEDICARE ANNUAL WELLNESS SUBSEQUENT VISIT: ICD-10-PCS | Mod: S$GLB,,, | Performed by: NURSE PRACTITIONER

## 2022-02-21 PROCEDURE — 3075F SYST BP GE 130 - 139MM HG: CPT | Mod: CPTII,S$GLB,, | Performed by: NURSE PRACTITIONER

## 2022-02-21 PROCEDURE — G0439 PPPS, SUBSEQ VISIT: HCPCS | Mod: S$GLB,,, | Performed by: NURSE PRACTITIONER

## 2022-02-21 PROCEDURE — 1159F MED LIST DOCD IN RCRD: CPT | Mod: CPTII,S$GLB,, | Performed by: NURSE PRACTITIONER

## 2022-02-21 PROCEDURE — 1160F PR REVIEW ALL MEDS BY PRESCRIBER/CLIN PHARMACIST DOCUMENTED: ICD-10-PCS | Mod: CPTII,S$GLB,, | Performed by: NURSE PRACTITIONER

## 2022-02-21 PROCEDURE — 99999 PR PBB SHADOW E&M-EST. PATIENT-LVL V: CPT | Mod: PBBFAC,,, | Performed by: NURSE PRACTITIONER

## 2022-02-21 PROCEDURE — 3008F BODY MASS INDEX DOCD: CPT | Mod: CPTII,S$GLB,, | Performed by: NURSE PRACTITIONER

## 2022-02-21 PROCEDURE — 3075F PR MOST RECENT SYSTOLIC BLOOD PRESS GE 130-139MM HG: ICD-10-PCS | Mod: CPTII,S$GLB,, | Performed by: NURSE PRACTITIONER

## 2022-02-21 PROCEDURE — 3079F DIAST BP 80-89 MM HG: CPT | Mod: CPTII,S$GLB,, | Performed by: NURSE PRACTITIONER

## 2022-02-21 PROCEDURE — 3008F PR BODY MASS INDEX (BMI) DOCUMENTED: ICD-10-PCS | Mod: CPTII,S$GLB,, | Performed by: NURSE PRACTITIONER

## 2022-02-21 PROCEDURE — 4010F ACE/ARB THERAPY RXD/TAKEN: CPT | Mod: CPTII,S$GLB,, | Performed by: NURSE PRACTITIONER

## 2022-02-21 PROCEDURE — 1159F PR MEDICATION LIST DOCUMENTED IN MEDICAL RECORD: ICD-10-PCS | Mod: CPTII,S$GLB,, | Performed by: NURSE PRACTITIONER

## 2022-02-21 PROCEDURE — 1160F RVW MEDS BY RX/DR IN RCRD: CPT | Mod: CPTII,S$GLB,, | Performed by: NURSE PRACTITIONER

## 2022-02-21 PROCEDURE — 99499 RISK ADDL DX/OHS AUDIT: ICD-10-PCS | Mod: S$GLB,,, | Performed by: NURSE PRACTITIONER

## 2022-02-21 NOTE — PROGRESS NOTES
"  Silvia Cervantes presented for a  Medicare AWV and comprehensive Health Risk Assessment today. The following components were reviewed and updated:    · Medical history  · Family History  · Social history  · Allergies and Current Medications  · Health Risk Assessment  · Health Maintenance  · Care Team         ** See Completed Assessments for Annual Wellness Visit within the encounter summary.**         The following assessments were completed:  · Living Situation  · CAGE  · Depression Screening  · Timed Get Up and Go  · Whisper Test  · Cognitive Function Screening  · Nutrition Screening  · ADL Screening  · PAQ Screening        Vitals:    02/21/22 1059   BP: 130/80   BP Location: Right arm   Patient Position: Sitting   Pulse: 74   SpO2: 98%   Weight: 67.4 kg (148 lb 9.4 oz)   Height: 5' 1" (1.549 m)     Body mass index is 28.08 kg/m².  Physical Exam  Vitals and nursing note reviewed.   Constitutional:       General: She is not in acute distress.     Appearance: Normal appearance. She is not ill-appearing.   HENT:      Head: Normocephalic and atraumatic.      Mouth/Throat:      Mouth: Mucous membranes are moist.      Pharynx: Oropharynx is clear.   Eyes:      General: No scleral icterus.        Right eye: No discharge.         Left eye: No discharge.      Extraocular Movements: Extraocular movements intact.      Conjunctiva/sclera: Conjunctivae normal.   Cardiovascular:      Rate and Rhythm: Normal rate and regular rhythm.      Heart sounds: Normal heart sounds. No murmur heard.  Pulmonary:      Effort: Pulmonary effort is normal. No respiratory distress.      Breath sounds: Normal breath sounds. No wheezing, rhonchi or rales.   Musculoskeletal:      Cervical back: Normal range of motion.      Right lower leg: No edema.      Left lower leg: No edema.   Skin:     General: Skin is warm and dry.      Findings: No rash.   Neurological:      Mental Status: She is alert and oriented to person, place, and time.   Psychiatric:   "       Mood and Affect: Mood normal.         Behavior: Behavior normal. Behavior is cooperative.         Cognition and Memory: Cognition and memory normal.               Diagnoses and health risks identified today and associated recommendations/orders:    1. Encounter for preventive health examination  - Chart reviewed. Problem list updated. Discussed current medical diagnosis, current medications, medical/surgical/family/social history; updated provider list; documented vital signs; identified any cognitive impairment; and updated risk factor list. Addressed any outstanding health maintenance. Provided patient with personalized health advice. Continue to follow up with PCP and any specialists.   - we discussed shingles vaccination     2. Major depression, recurrent, chronic  Chronic; stable on current treatment plan; follow up with PCP  - related to vitiligo  - previously referred to psychology     3. Aortic atherosclerosis  Chronic; stable on current treatment plan; follow up with PCP  - on lipitor     4. Rheumatoid arthritis with positive rheumatoid factor, involving unspecified site  Chronic; stable on current treatment plan; follow up with PCP and Rheumatology   - taking enbrel    5. Sarcoidosis of lung  Chronic; stable on current treatment plan; follow up with PCP and Pulmonology      6. Immunocompromised  Chronic; stable on current treatment plan; follow up with PCP and Rheumatology   - taking enbrel    7. Primary hypertension  Chronic; stable on current treatment plan; follow up with PCP  - recommend avoiding NSAIDS, recommend low sodium diet     8. Mixed hyperlipidemia  Chronic; stable on current treatment plan; follow up with PCP  - on statin     9. Lumbar radiculopathy  Chronic; stable on current treatment plan; follow up with PCP    10. Neuropathy, arm, left  Chronic; stable on current treatment plan; follow up with PCP  - sees Neurology     11. History of traumatic brain injury  Chronic; stable on  current treatment plan; follow up with PCP  - sees Neurology     12. History of supraventricular tachycardia  Chronic; stable on current treatment plan; follow up with PCP  - sees Cardiology     13. BMI 28.0-28.9,adult  - Recommendation for healthy diet and increasing exercise as tolerated with goal of 150min/week . Recommend weight loss        Provided Silvia with a 5-10 year written screening schedule and personal prevention plan. Recommendations were developed using the USPSTF age appropriate recommendations. Education, counseling, and referrals were provided as needed. After Visit Summary printed and given to patient which includes a list of additional screenings\tests needed.    Follow up in about 1 year (around 2/21/2023) for your next annual wellness visit.    Solange Wells, FNP-C   Advance Care Planning         I offered to discuss advanced care planning, including how to pick a person who would make decisions for you if you were unable to make them for yourself, called a health care power of , and what kind of decisions you might make such as use of life sustaining treatments such as ventilators and tube feeding when faced with a life limiting illness recorded on a living will that they will need to know. (How you want to be cared for as you near the end of your natural life)     X Patient is interested in learning more about how to make advanced directives.  I provided them paperwork and offered to discuss this with them.

## 2022-02-21 NOTE — PATIENT INSTRUCTIONS
Counseling and Referral of Other Preventative  (Italic type indicates deductible and co-insurance are waived)    Patient Name: Silvia Cervantes  Today's Date: 2/21/2022    Health Maintenance       Date Due Completion Date    Shingles Vaccine (1 of 2) 02/21/2023 (Originally 12/10/2009) ---    COVID-19 Vaccine (4 - Booster for Pfizer series) 02/21/2023 (Originally 1/26/2022) 8/26/2021    Mammogram 09/27/2022 9/27/2021    Pneumococcal Vaccines (Age 0-64) (4 of 4 - PPSV23) 12/10/2024 8/6/2018    Lipid Panel 12/22/2026 12/22/2021    TETANUS VACCINE 02/01/2027 2/1/2017    Colorectal Cancer Screening 05/23/2029 5/23/2019        No orders of the defined types were placed in this encounter.    The following information is provided to all patients.  This information is to help you find resources for any of the problems found today that may be affecting your health:                Living healthy guide: www.[facility].[place].gov      Understanding Diabetes: www.diabetes.org      Eating healthy: www.cdc.gov/healthyweight      CDC home safety checklist: www.cdc.gov/steadi/patient.html      Agency on Aging: www.goea.[place].gov      Alcoholics anonymous (AA): www.aa.org      Physical Activity: www.[place].nih.gov/gu4assb      Tobacco use: www.quitwithusla.org

## 2022-02-23 DIAGNOSIS — D84.9 IMMUNOSUPPRESSED STATUS: ICD-10-CM

## 2022-03-09 ENCOUNTER — PATIENT MESSAGE (OUTPATIENT)
Dept: FAMILY MEDICINE | Facility: CLINIC | Age: 62
End: 2022-03-09
Payer: MEDICARE

## 2022-03-16 ENCOUNTER — LAB VISIT (OUTPATIENT)
Dept: LAB | Facility: HOSPITAL | Age: 62
End: 2022-03-16
Attending: INTERNAL MEDICINE
Payer: MEDICARE

## 2022-03-16 DIAGNOSIS — Z79.899 ENCOUNTER FOR LONG-TERM CURRENT USE OF HIGH RISK MEDICATION: ICD-10-CM

## 2022-03-16 LAB
ALBUMIN SERPL BCP-MCNC: 4.1 G/DL (ref 3.5–5.2)
ALBUMIN SERPL BCP-MCNC: 4.1 G/DL (ref 3.5–5.2)
ALP SERPL-CCNC: 87 U/L (ref 55–135)
ALP SERPL-CCNC: 87 U/L (ref 55–135)
ALT SERPL W/O P-5'-P-CCNC: 26 U/L (ref 10–44)
ALT SERPL W/O P-5'-P-CCNC: 26 U/L (ref 10–44)
ANION GAP SERPL CALC-SCNC: 10 MMOL/L (ref 8–16)
ANION GAP SERPL CALC-SCNC: 10 MMOL/L (ref 8–16)
AST SERPL-CCNC: 21 U/L (ref 10–40)
AST SERPL-CCNC: 21 U/L (ref 10–40)
BASOPHILS # BLD AUTO: 0.04 K/UL (ref 0–0.2)
BASOPHILS # BLD AUTO: 0.04 K/UL (ref 0–0.2)
BASOPHILS NFR BLD: 0.6 % (ref 0–1.9)
BASOPHILS NFR BLD: 0.6 % (ref 0–1.9)
BILIRUB SERPL-MCNC: 0.3 MG/DL (ref 0.1–1)
BILIRUB SERPL-MCNC: 0.3 MG/DL (ref 0.1–1)
BUN SERPL-MCNC: 14 MG/DL (ref 8–23)
BUN SERPL-MCNC: 14 MG/DL (ref 8–23)
CALCIUM SERPL-MCNC: 10.3 MG/DL (ref 8.7–10.5)
CALCIUM SERPL-MCNC: 10.3 MG/DL (ref 8.7–10.5)
CHLORIDE SERPL-SCNC: 106 MMOL/L (ref 95–110)
CHLORIDE SERPL-SCNC: 106 MMOL/L (ref 95–110)
CO2 SERPL-SCNC: 29 MMOL/L (ref 23–29)
CO2 SERPL-SCNC: 29 MMOL/L (ref 23–29)
CREAT SERPL-MCNC: 0.7 MG/DL (ref 0.5–1.4)
CREAT SERPL-MCNC: 0.7 MG/DL (ref 0.5–1.4)
CRP SERPL-MCNC: 4.2 MG/L (ref 0–8.2)
CRP SERPL-MCNC: 4.2 MG/L (ref 0–8.2)
DIFFERENTIAL METHOD: ABNORMAL
DIFFERENTIAL METHOD: ABNORMAL
EOSINOPHIL # BLD AUTO: 0.2 K/UL (ref 0–0.5)
EOSINOPHIL # BLD AUTO: 0.2 K/UL (ref 0–0.5)
EOSINOPHIL NFR BLD: 3.3 % (ref 0–8)
EOSINOPHIL NFR BLD: 3.3 % (ref 0–8)
ERYTHROCYTE [DISTWIDTH] IN BLOOD BY AUTOMATED COUNT: 13.2 % (ref 11.5–14.5)
ERYTHROCYTE [DISTWIDTH] IN BLOOD BY AUTOMATED COUNT: 13.2 % (ref 11.5–14.5)
ERYTHROCYTE [SEDIMENTATION RATE] IN BLOOD BY WESTERGREN METHOD: 33 MM/HR (ref 0–36)
ERYTHROCYTE [SEDIMENTATION RATE] IN BLOOD BY WESTERGREN METHOD: 33 MM/HR (ref 0–36)
EST. GFR  (AFRICAN AMERICAN): >60 ML/MIN/1.73 M^2
EST. GFR  (AFRICAN AMERICAN): >60 ML/MIN/1.73 M^2
EST. GFR  (NON AFRICAN AMERICAN): >60 ML/MIN/1.73 M^2
EST. GFR  (NON AFRICAN AMERICAN): >60 ML/MIN/1.73 M^2
GLUCOSE SERPL-MCNC: 99 MG/DL (ref 70–110)
GLUCOSE SERPL-MCNC: 99 MG/DL (ref 70–110)
HCT VFR BLD AUTO: 43.8 % (ref 37–48.5)
HCT VFR BLD AUTO: 43.8 % (ref 37–48.5)
HGB BLD-MCNC: 13.4 G/DL (ref 12–16)
HGB BLD-MCNC: 13.4 G/DL (ref 12–16)
IMM GRANULOCYTES # BLD AUTO: 0.02 K/UL (ref 0–0.04)
IMM GRANULOCYTES # BLD AUTO: 0.02 K/UL (ref 0–0.04)
IMM GRANULOCYTES NFR BLD AUTO: 0.3 % (ref 0–0.5)
IMM GRANULOCYTES NFR BLD AUTO: 0.3 % (ref 0–0.5)
LYMPHOCYTES # BLD AUTO: 2.5 K/UL (ref 1–4.8)
LYMPHOCYTES # BLD AUTO: 2.5 K/UL (ref 1–4.8)
LYMPHOCYTES NFR BLD: 35.7 % (ref 18–48)
LYMPHOCYTES NFR BLD: 35.7 % (ref 18–48)
MCH RBC QN AUTO: 31.8 PG (ref 27–31)
MCH RBC QN AUTO: 31.8 PG (ref 27–31)
MCHC RBC AUTO-ENTMCNC: 30.6 G/DL (ref 32–36)
MCHC RBC AUTO-ENTMCNC: 30.6 G/DL (ref 32–36)
MCV RBC AUTO: 104 FL (ref 82–98)
MCV RBC AUTO: 104 FL (ref 82–98)
MONOCYTES # BLD AUTO: 0.8 K/UL (ref 0.3–1)
MONOCYTES # BLD AUTO: 0.8 K/UL (ref 0.3–1)
MONOCYTES NFR BLD: 11.8 % (ref 4–15)
MONOCYTES NFR BLD: 11.8 % (ref 4–15)
NEUTROPHILS # BLD AUTO: 3.4 K/UL (ref 1.8–7.7)
NEUTROPHILS # BLD AUTO: 3.4 K/UL (ref 1.8–7.7)
NEUTROPHILS NFR BLD: 48.3 % (ref 38–73)
NEUTROPHILS NFR BLD: 48.3 % (ref 38–73)
NRBC BLD-RTO: 0 /100 WBC
NRBC BLD-RTO: 0 /100 WBC
PLATELET # BLD AUTO: 335 K/UL (ref 150–450)
PLATELET # BLD AUTO: 335 K/UL (ref 150–450)
PMV BLD AUTO: 9.8 FL (ref 9.2–12.9)
PMV BLD AUTO: 9.8 FL (ref 9.2–12.9)
POTASSIUM SERPL-SCNC: 5 MMOL/L (ref 3.5–5.1)
POTASSIUM SERPL-SCNC: 5 MMOL/L (ref 3.5–5.1)
PROT SERPL-MCNC: 8.1 G/DL (ref 6–8.4)
PROT SERPL-MCNC: 8.1 G/DL (ref 6–8.4)
RBC # BLD AUTO: 4.21 M/UL (ref 4–5.4)
RBC # BLD AUTO: 4.21 M/UL (ref 4–5.4)
SODIUM SERPL-SCNC: 145 MMOL/L (ref 136–145)
SODIUM SERPL-SCNC: 145 MMOL/L (ref 136–145)
WBC # BLD AUTO: 7.05 K/UL (ref 3.9–12.7)
WBC # BLD AUTO: 7.05 K/UL (ref 3.9–12.7)

## 2022-03-16 PROCEDURE — 80053 COMPREHEN METABOLIC PANEL: CPT | Performed by: INTERNAL MEDICINE

## 2022-03-16 PROCEDURE — 85025 COMPLETE CBC W/AUTO DIFF WBC: CPT | Performed by: INTERNAL MEDICINE

## 2022-03-16 PROCEDURE — 86140 C-REACTIVE PROTEIN: CPT | Performed by: INTERNAL MEDICINE

## 2022-03-16 PROCEDURE — 36415 COLL VENOUS BLD VENIPUNCTURE: CPT | Mod: PO | Performed by: INTERNAL MEDICINE

## 2022-03-16 PROCEDURE — 85652 RBC SED RATE AUTOMATED: CPT | Performed by: INTERNAL MEDICINE

## 2022-03-21 ENCOUNTER — SPECIALTY PHARMACY (OUTPATIENT)
Dept: PHARMACY | Facility: CLINIC | Age: 62
End: 2022-03-21
Payer: MEDICARE

## 2022-03-21 NOTE — TELEPHONE ENCOUNTER
Specialty Pharmacy - Refill Coordination    Specialty Medication Orders Linked to Encounter    Flowsheet Row Most Recent Value   Medication #1 etanercept (ENBREL SURECLICK) 50 mg/mL (1 mL) (Order#[id number], Rx#[id number])          Refill Questions - Documented Responses    Flowsheet Row Most Recent Value   Patient Availability and HIPAA Verification    Does patient want to proceed with activity? Yes   HIPAA/medical authority confirmed? Yes   Relationship to patient of person spoken to? Self   Refill Screening Questions    Changes to allergies? No   Changes to medications? No   New conditions since last clinic visit? No   Unplanned office visit, urgent care, ED, or hospital admission in the last 4 weeks? No   How does patient/caregiver feel medication is working? Good   Financial problems or insurance changes? No   How many doses of your specialty medications were missed in the last 4 weeks? 0   Would patient like to speak to a pharmacist? No   When does the patient need to receive the medication? 03/26/22   Refill Delivery Questions    How will the patient receive the medication? Pickup   When does the patient need to receive the medication? 03/26/22   Shipping Address Home   Address in [facility] confirmed and updated if neccessary? Yes   Expected Copay ($) 0   Is the patient able to afford the medication copay? Yes   Payment Method zero copay   Days supply of Refill 28   Supplies needed? No supplies needed   Refill activity completed? Yes   Refill activity plan Refill scheduled   Shipment/Pickup Date: 03/23/22          Current Outpatient Medications   Medication Sig    amLODIPine (NORVASC) 5 MG tablet Take 1 tablet (5 mg total) by mouth once daily.    aspirin (ECOTRIN) 81 MG EC tablet Take 81 mg by mouth once daily.    atorvastatin (LIPITOR) 10 MG tablet Take 1 tablet (10 mg total) by mouth once daily.    azelastine (ASTELIN) 137 mcg (0.1 %) nasal spray spray 1 spray (137 mcg total) by Nasal route 2  (two) times daily.    chlorhexidine (PERIDEX) 0.12 % solution Use 15-20 mL to rinse mouth twice daily for 2 minutes, spit out and do not rinse mouth with water after.    clotrimazole-betamethasone 1-0.05% (LOTRISONE) cream Apply to affected area 2 times daily    cyanocobalamin, vitamin B-12, (VITAMIN B-12 ORAL) Take by mouth.    cycloSPORINE (RESTASIS) 0.05 % ophthalmic emulsion Apply 1 drop into both eyes twice a day    ergocalciferol (ERGOCALCIFEROL) 50,000 unit Cap Take 1 capsule (50,000 Units total) by mouth every 7 days.    etanercept (ENBREL SURECLICK) 50 mg/mL (1 mL) Inject 1 mL (50 mg total) into the skin once a week.    fluticasone propion-salmeterol 115-21 mcg/dose (ADVAIR HFA) 115-21 mcg/actuation HFAA inhaler Inhale 2 puffs into the lungs every 12 (twelve) hours.    fluticasone propionate (FLONASE) 50 mcg/actuation nasal spray 1 spray (50 mcg total) by Each Nostril route once daily.    folic acid (FOLVITE) 1 MG tablet Take 4 tablets (4 mg total) by mouth once daily.    gabapentin (NEURONTIN) 300 MG capsule Take 1 capsule (300 mg total) by mouth 2 (two) times daily.    hydrOXYzine HCL (ATARAX) 25 MG tablet Take 1 tablet (25 mg total) by mouth every evening.    leflunomide (ARAVA) 20 MG Tab Take 1 tablet (20 mg total) by mouth once daily. (Patient not taking: Reported on 2/21/2022)    losartan (COZAAR) 100 MG tablet Take 1 tablet (100 mg total) by mouth once daily.    meclizine (ANTIVERT) 25 mg tablet Take 1 tablet (25 mg total) by mouth 3 (three) times daily as needed for Dizziness.    metoprolol succinate (TOPROL-XL) 25 MG 24 hr tablet Take 1 tablet (25 mg total) by mouth every evening.    nystatin (MYCOSTATIN) powder Apply to affected area every morning as needed for flare under breasts (Patient taking differently: Apply to affected area every morning as needed for flare under breasts)    polyethylene glycol (GLYCOLAX) 17 gram/dose powder Take 17 g by mouth once daily.    PROTOPIC 0.1  % ointment AAA bid    triamcinolone acetonide 0.025% (KENALOG) 0.025 % cream Apply to affected area every day to twice a day to neck . Not more than 1-2 weeks straight in same location (Patient taking differently: Apply to affected area every day to twice a day to neck . Not more than 1-2 weeks straight in same location)    triamcinolone acetonide 0.1% (KENALOG) 0.1 % ointment Apply topically 2 (two) times daily. For 2 weeks    ZINC ORAL Take by mouth.   Last reviewed on 2/21/2022 11:19 AM by DEE DEE Sawyer-SOL    Review of patient's allergies indicates:  No Known Allergies Last reviewed on  2/21/2022 11:16 AM by Solange Wells      Tasks added this encounter   4/16/2022 - Refill Call (Auto Added)  3/24/2022 - Pickup Reminder   Tasks due within next 3 months   No tasks due.     Jenelle Simpson, PharmD  [facility]  [address] [place] [id number]  Phone: [phone]  Fax: [phone]

## 2022-03-22 ENCOUNTER — TELEPHONE (OUTPATIENT)
Dept: ORTHOPEDICS | Facility: CLINIC | Age: 62
End: 2022-03-22
Payer: MEDICARE

## 2022-03-22 DIAGNOSIS — R52 PAIN: Primary | ICD-10-CM

## 2022-03-22 NOTE — TELEPHONE ENCOUNTER
"Pt reports nodule on left elbow and affirms pain in bilateral elbows, also reports hand "bumps" and pain bilaterally. Bilateral hands/elbow Xrays ordered.  "

## 2022-03-23 ENCOUNTER — OFFICE VISIT (OUTPATIENT)
Dept: ORTHOPEDICS | Facility: CLINIC | Age: 62
End: 2022-03-23
Payer: MEDICARE

## 2022-03-23 ENCOUNTER — HOSPITAL ENCOUNTER (OUTPATIENT)
Dept: RADIOLOGY | Facility: HOSPITAL | Age: 62
Discharge: HOME | End: 2022-03-23
Attending: ORTHOPAEDIC SURGERY
Payer: MEDICARE

## 2022-03-23 VITALS — WEIGHT: 147.69 LBS | HEIGHT: 61 IN | BODY MASS INDEX: 27.88 KG/M2

## 2022-03-23 DIAGNOSIS — M05.79 RHEUMATOID ARTHRITIS INVOLVING MULTIPLE SITES WITH POSITIVE RHEUMATOID FACTOR: ICD-10-CM

## 2022-03-23 DIAGNOSIS — R52 PAIN: ICD-10-CM

## 2022-03-23 DIAGNOSIS — M06.321 RHEUMATOID NODULE OF RIGHT ELBOW: ICD-10-CM

## 2022-03-23 DIAGNOSIS — M54.12 CERVICAL RADICULOPATHY: ICD-10-CM

## 2022-03-23 DIAGNOSIS — M65.351 TRIGGER LITTLE FINGER OF RIGHT HAND: ICD-10-CM

## 2022-03-23 DIAGNOSIS — M65.831 EXTENSOR TENOSYNOVITIS OF RIGHT WRIST: ICD-10-CM

## 2022-03-23 DIAGNOSIS — G56.21 CUBITAL TUNNEL SYNDROME ON RIGHT: Primary | ICD-10-CM

## 2022-03-23 PROCEDURE — 99999 PR PBB SHADOW E&M-EST. PATIENT-LVL V: CPT | Mod: PBBFAC,,, | Performed by: ORTHOPAEDIC SURGERY

## 2022-03-23 PROCEDURE — 99999 PR PBB SHADOW E&M-EST. PATIENT-LVL V: ICD-10-PCS | Mod: PBBFAC,,, | Performed by: ORTHOPAEDIC SURGERY

## 2022-03-23 PROCEDURE — 1160F RVW MEDS BY RX/DR IN RCRD: CPT | Mod: CPTII,S$GLB,, | Performed by: ORTHOPAEDIC SURGERY

## 2022-03-23 PROCEDURE — 20550 NJX 1 TENDON SHEATH/LIGAMENT: CPT | Mod: RT,S$GLB,, | Performed by: ORTHOPAEDIC SURGERY

## 2022-03-23 PROCEDURE — 99204 PR OFFICE/OUTPT VISIT, NEW, LEVL IV, 45-59 MIN: ICD-10-PCS | Mod: 25,S$GLB,, | Performed by: ORTHOPAEDIC SURGERY

## 2022-03-23 PROCEDURE — 20550 TENDON SHEATH: ICD-10-PCS | Mod: RT,S$GLB,, | Performed by: ORTHOPAEDIC SURGERY

## 2022-03-23 PROCEDURE — 1160F PR REVIEW ALL MEDS BY PRESCRIBER/CLIN PHARMACIST DOCUMENTED: ICD-10-PCS | Mod: CPTII,S$GLB,, | Performed by: ORTHOPAEDIC SURGERY

## 2022-03-23 PROCEDURE — 73130 XR HAND COMPLETE 3 VIEWS BILATERAL: ICD-10-PCS | Mod: 26,50,, | Performed by: RADIOLOGY

## 2022-03-23 PROCEDURE — 4010F ACE/ARB THERAPY RXD/TAKEN: CPT | Mod: CPTII,S$GLB,, | Performed by: ORTHOPAEDIC SURGERY

## 2022-03-23 PROCEDURE — 73130 X-RAY EXAM OF HAND: CPT | Mod: TC,50,PO

## 2022-03-23 PROCEDURE — 1159F MED LIST DOCD IN RCRD: CPT | Mod: CPTII,S$GLB,, | Performed by: ORTHOPAEDIC SURGERY

## 2022-03-23 PROCEDURE — 73080 X-RAY EXAM OF ELBOW: CPT | Mod: TC,50,PO

## 2022-03-23 PROCEDURE — 1159F PR MEDICATION LIST DOCUMENTED IN MEDICAL RECORD: ICD-10-PCS | Mod: CPTII,S$GLB,, | Performed by: ORTHOPAEDIC SURGERY

## 2022-03-23 PROCEDURE — 73080 XR ELBOW COMPLETE 3 VIEW BILATERAL: ICD-10-PCS | Mod: 26,50,, | Performed by: RADIOLOGY

## 2022-03-23 PROCEDURE — 73080 X-RAY EXAM OF ELBOW: CPT | Mod: 26,50,, | Performed by: RADIOLOGY

## 2022-03-23 PROCEDURE — 4010F PR ACE/ARB THEARPY RXD/TAKEN: ICD-10-PCS | Mod: CPTII,S$GLB,, | Performed by: ORTHOPAEDIC SURGERY

## 2022-03-23 PROCEDURE — 73130 X-RAY EXAM OF HAND: CPT | Mod: 26,50,, | Performed by: RADIOLOGY

## 2022-03-23 PROCEDURE — 3008F PR BODY MASS INDEX (BMI) DOCUMENTED: ICD-10-PCS | Mod: CPTII,S$GLB,, | Performed by: ORTHOPAEDIC SURGERY

## 2022-03-23 PROCEDURE — 99204 OFFICE O/P NEW MOD 45 MIN: CPT | Mod: 25,S$GLB,, | Performed by: ORTHOPAEDIC SURGERY

## 2022-03-23 PROCEDURE — 3008F BODY MASS INDEX DOCD: CPT | Mod: CPTII,S$GLB,, | Performed by: ORTHOPAEDIC SURGERY

## 2022-03-23 RX ADMIN — METHYLPREDNISOLONE ACETATE 40 MG: 40 INJECTION, SUSPENSION INTRA-ARTICULAR; INTRALESIONAL; INTRAMUSCULAR; SOFT TISSUE at 09:03

## 2022-03-23 NOTE — PROCEDURES
Tendon Sheath    Date/Time: 3/23/2022 9:45 AM  Performed by: Louise Smith MD  Authorized by: Louise Smith MD     Consent Done?:  Yes (Verbal)  Indications:  Pain  Timeout: prior to procedure the correct patient, procedure, and site was verified    Prep: patient was prepped and draped in usual sterile fashion      Local anesthesia used?: Yes    Anesthesia:  Local infiltration  Local anesthetic:  Lidocaine 1% without epinephrine  Anesthetic total (ml):  1    Location:  Small finger  Site:  R small flexor tendon sheath  Ultrasonic guidance for needle placement?: No    Needle size:  25 G  Approach:  Volar  Medications:  40 mg methylPREDNISolone acetate 40 mg/mL  Patient tolerance:  Patient tolerated the procedure well with no immediate complications

## 2022-03-24 ENCOUNTER — OFFICE VISIT (OUTPATIENT)
Dept: FAMILY MEDICINE | Facility: CLINIC | Age: 62
End: 2022-03-24
Attending: FAMILY MEDICINE
Payer: MEDICARE

## 2022-03-24 VITALS
SYSTOLIC BLOOD PRESSURE: 130 MMHG | OXYGEN SATURATION: 95 % | BODY MASS INDEX: 27.6 KG/M2 | DIASTOLIC BLOOD PRESSURE: 88 MMHG | HEIGHT: 61 IN | WEIGHT: 146.19 LBS | HEART RATE: 83 BPM

## 2022-03-24 DIAGNOSIS — I10 PRIMARY HYPERTENSION: Primary | ICD-10-CM

## 2022-03-24 DIAGNOSIS — F33.9 MAJOR DEPRESSION, RECURRENT, CHRONIC: ICD-10-CM

## 2022-03-24 DIAGNOSIS — G56.92 NEUROPATHY, ARM, LEFT: ICD-10-CM

## 2022-03-24 DIAGNOSIS — M05.9 RHEUMATOID ARTHRITIS WITH POSITIVE RHEUMATOID FACTOR, INVOLVING UNSPECIFIED SITE: ICD-10-CM

## 2022-03-24 DIAGNOSIS — E78.2 MIXED HYPERLIPIDEMIA: ICD-10-CM

## 2022-03-24 DIAGNOSIS — E55.9 VITAMIN D DEFICIENCY: ICD-10-CM

## 2022-03-24 PROCEDURE — 4010F PR ACE/ARB THEARPY RXD/TAKEN: ICD-10-PCS | Mod: CPTII,S$GLB,, | Performed by: FAMILY MEDICINE

## 2022-03-24 PROCEDURE — 3075F PR MOST RECENT SYSTOLIC BLOOD PRESS GE 130-139MM HG: ICD-10-PCS | Mod: CPTII,S$GLB,, | Performed by: FAMILY MEDICINE

## 2022-03-24 PROCEDURE — 3079F PR MOST RECENT DIASTOLIC BLOOD PRESSURE 80-89 MM HG: ICD-10-PCS | Mod: CPTII,S$GLB,, | Performed by: FAMILY MEDICINE

## 2022-03-24 PROCEDURE — 3008F BODY MASS INDEX DOCD: CPT | Mod: CPTII,S$GLB,, | Performed by: FAMILY MEDICINE

## 2022-03-24 PROCEDURE — 3075F SYST BP GE 130 - 139MM HG: CPT | Mod: CPTII,S$GLB,, | Performed by: FAMILY MEDICINE

## 2022-03-24 PROCEDURE — 4010F ACE/ARB THERAPY RXD/TAKEN: CPT | Mod: CPTII,S$GLB,, | Performed by: FAMILY MEDICINE

## 2022-03-24 PROCEDURE — 99999 PR PBB SHADOW E&M-EST. PATIENT-LVL V: ICD-10-PCS | Mod: PBBFAC,,, | Performed by: FAMILY MEDICINE

## 2022-03-24 PROCEDURE — 1159F PR MEDICATION LIST DOCUMENTED IN MEDICAL RECORD: ICD-10-PCS | Mod: CPTII,S$GLB,, | Performed by: FAMILY MEDICINE

## 2022-03-24 PROCEDURE — 3079F DIAST BP 80-89 MM HG: CPT | Mod: CPTII,S$GLB,, | Performed by: FAMILY MEDICINE

## 2022-03-24 PROCEDURE — 3008F PR BODY MASS INDEX (BMI) DOCUMENTED: ICD-10-PCS | Mod: CPTII,S$GLB,, | Performed by: FAMILY MEDICINE

## 2022-03-24 PROCEDURE — 99214 OFFICE O/P EST MOD 30 MIN: CPT | Mod: S$GLB,,, | Performed by: FAMILY MEDICINE

## 2022-03-24 PROCEDURE — 1159F MED LIST DOCD IN RCRD: CPT | Mod: CPTII,S$GLB,, | Performed by: FAMILY MEDICINE

## 2022-03-24 PROCEDURE — 99214 PR OFFICE/OUTPT VISIT, EST, LEVL IV, 30-39 MIN: ICD-10-PCS | Mod: S$GLB,,, | Performed by: FAMILY MEDICINE

## 2022-03-24 PROCEDURE — 1160F PR REVIEW ALL MEDS BY PRESCRIBER/CLIN PHARMACIST DOCUMENTED: ICD-10-PCS | Mod: CPTII,S$GLB,, | Performed by: FAMILY MEDICINE

## 2022-03-24 PROCEDURE — 99999 PR PBB SHADOW E&M-EST. PATIENT-LVL V: CPT | Mod: PBBFAC,,, | Performed by: FAMILY MEDICINE

## 2022-03-24 PROCEDURE — 1160F RVW MEDS BY RX/DR IN RCRD: CPT | Mod: CPTII,S$GLB,, | Performed by: FAMILY MEDICINE

## 2022-03-24 RX ORDER — ERGOCALCIFEROL 1.25 MG/1
50000 CAPSULE ORAL
Qty: 12 CAPSULE | Refills: 2 | Status: SHIPPED | OUTPATIENT
Start: 2022-03-24 | End: 2023-10-06 | Stop reason: SDUPTHER

## 2022-03-24 RX ORDER — GABAPENTIN 300 MG/1
300 CAPSULE ORAL 2 TIMES DAILY
Qty: 180 CAPSULE | Refills: 0 | Status: SHIPPED | OUTPATIENT
Start: 2022-03-24 | End: 2022-07-28 | Stop reason: SDUPTHER

## 2022-03-24 RX ORDER — GABAPENTIN 300 MG/1
300 CAPSULE ORAL 2 TIMES DAILY
Qty: 180 CAPSULE | Refills: 3 | Status: SHIPPED | OUTPATIENT
Start: 2022-03-24 | End: 2022-07-28 | Stop reason: SDUPTHER

## 2022-03-24 NOTE — TELEPHONE ENCOUNTER
No new care gaps identified.  Powered by [employer]. Reference number: [id number].   3/24/2022 1:29:54 PM CDT

## 2022-03-24 NOTE — PROGRESS NOTES
Subjective:       Patient ID: Silvia Cervantes is a 61 y.o. female.    Chief Complaint: Follow-up    61 yr old pleasant  female with depression, allergies, vitiligo, HTN, HLD, Rheumatoid arthritis, Erosive gastritis, and other co morbidities presents today for her 3 month follow up.    Chronic low back pain. Onset 6 months ago and gradually worsening - left lower back- does not radiate and no neurological symptoms. No saddle anesthesia or bladder/bowel problems. Details as follows -      HTN - controlled - on Losartan-HCT and Norvasc 5 - compliant - no side effects       Major depression - uncontrolled - not on meds - would like to start one - given zoloft - she will start from today - - no SI/HI    Vertigo - much better - on vertin as needed       HLD - diet controlled -    LDLCALC                  149.8               02/04/2020                  RA - on Enbrel shots - follows Rheumatology - stable      Gastritis - stable - on PPI as needed - no side effects      History as below - reviewed       HM  -labs UTD  -vaccines UTD    Follow-up  Associated symptoms include myalgias, neck pain, numbness and weakness. Pertinent negatives include no arthralgias, chest pain, congestion, diaphoresis, headaches, nausea or sore throat.   Hypertension  This is a chronic problem. The current episode started more than 1 year ago. The problem has been gradually improving since onset. The problem is controlled. Associated symptoms include neck pain. Pertinent negatives include no chest pain, headaches, malaise/fatigue, palpitations or shortness of breath. There are no associated agents to hypertension. Risk factors for coronary artery disease include dyslipidemia and post-menopausal state. Past treatments include angiotensin blockers and diuretics. The current treatment provides significant improvement. There are no compliance problems.  There is no history of angina, CAD/MI, CVA, left ventricular hypertrophy, PVD or  retinopathy. There is no history of chronic renal disease, coarctation of the aorta, hypercortisolism, hyperparathyroidism, a hypertension causing med or sleep apnea.   Hyperlipidemia  This is a chronic problem. The current episode started more than 1 year ago. The problem is controlled. Recent lipid tests were reviewed and are normal. She has no history of chronic renal disease, diabetes, hypothyroidism or liver disease. There are no known factors aggravating her hyperlipidemia. Associated symptoms include myalgias. Pertinent negatives include no chest pain, focal sensory loss, focal weakness, leg pain or shortness of breath. She is currently on no antihyperlipidemic treatment. The current treatment provides mild improvement of lipids. There are no compliance problems.  Risk factors for coronary artery disease include dyslipidemia, hypertension and post-menopausal.   Back Pain  This is a chronic problem. The current episode started more than 1 month ago. The problem occurs constantly. The problem is unchanged. The pain is present in the sacro-iliac and lumbar spine. The quality of the pain is described as aching. The pain does not radiate. The symptoms are aggravated by bending, sitting and twisting. Associated symptoms include numbness and weakness. Pertinent negatives include no chest pain, dysuria, headaches, leg pain, perianal numbness or weight loss. She has tried nothing for the symptoms. The treatment provided no relief.     Review of Systems   Constitutional: Negative.  Negative for activity change, diaphoresis, malaise/fatigue, unexpected weight change and weight loss.   HENT: Negative.  Negative for nasal congestion, ear discharge, hearing loss, rhinorrhea, sore throat and voice change.    Eyes: Negative.  Negative for pain, discharge and visual disturbance.   Respiratory: Negative.  Negative for chest tightness, shortness of breath and wheezing.    Cardiovascular: Negative.  Negative for chest pain and  palpitations.   Gastrointestinal: Negative.  Negative for abdominal distention, anal bleeding, constipation and nausea.   Endocrine: Negative.  Negative for cold intolerance, polydipsia and polyuria.   Genitourinary: Negative.  Negative for decreased urine volume, difficulty urinating, dysuria, frequency, menstrual problem and vaginal pain.   Musculoskeletal: Positive for back pain, myalgias and neck pain. Negative for arthralgias, gait problem and leg pain.   Integumentary:  Negative for color change, pallor and wound. Negative.   Allergic/Immunologic: Negative.  Negative for environmental allergies and immunocompromised state.   Neurological: Positive for weakness and numbness. Negative for dizziness, tremors, focal weakness, seizures, speech difficulty and headaches.   Hematological: Negative.  Negative for adenopathy. Does not bruise/bleed easily.   Psychiatric/Behavioral: Negative.  Negative for agitation, confusion, decreased concentration, hallucinations, self-injury and suicidal ideas. The patient is not nervous/anxious.          PMH/PSH/FH/SH/MED/ALLERGY reviewed    Objective:       Vitals:    03/24/22 1350   BP: 130/88   Pulse: 83       Physical Exam  Constitutional:       General: She is not in acute distress.     Appearance: She is well-developed. She is not diaphoretic.   HENT:      Head: Normocephalic and atraumatic.      Right Ear: External ear normal.      Left Ear: External ear normal.      Nose: Nose normal.      Mouth/Throat:      Pharynx: No oropharyngeal exudate.   Eyes:      General: No scleral icterus.        Right eye: No discharge.         Left eye: No discharge.      Conjunctiva/sclera: Conjunctivae normal.      Pupils: Pupils are equal, round, and reactive to light.   Neck:      Thyroid: No thyromegaly.      Vascular: No JVD.      Trachea: No tracheal deviation.   Cardiovascular:      Rate and Rhythm: Normal rate and regular rhythm.      Heart sounds: Normal heart sounds. No murmur  heard.    No friction rub. No gallop.   Pulmonary:      Effort: Pulmonary effort is normal.      Breath sounds: Normal breath sounds. No stridor. No wheezing or rales.   Chest:      Chest wall: No tenderness.   Abdominal:      General: Bowel sounds are normal. There is no distension.      Palpations: Abdomen is soft. There is no mass.      Tenderness: There is no abdominal tenderness. There is no guarding or rebound.      Hernia: No hernia is present.   Musculoskeletal:         General: No tenderness. Normal range of motion.      Cervical back: Normal range of motion and neck supple.   Lymphadenopathy:      Cervical: No cervical adenopathy.   Skin:     General: Skin is warm and dry.      Coloration: Skin is not pale.      Findings: No erythema or rash.   Neurological:      Mental Status: She is alert and oriented to person, place, and time.      Cranial Nerves: No cranial nerve deficit.      Motor: No abnormal muscle tone.      Coordination: Coordination normal.      Deep Tendon Reflexes: Reflexes are normal and symmetric. Reflexes normal.   Psychiatric:         Behavior: Behavior normal.         Thought Content: Thought content normal.         Judgment: Judgment normal.         Assessment:       Problem List Items Addressed This Visit     Rheumatoid arthritis with positive rheumatoid factor    Neuropathy, arm, left    Relevant Medications    gabapentin (NEURONTIN) 300 MG capsule    Mixed hyperlipidemia    Major depression, recurrent, chronic    Hypertension - Primary      Other Visit Diagnoses     Vitamin D deficiency        Relevant Medications    ergocalciferol (ERGOCALCIFEROL) 50,000 unit Cap          Plan:           Silvia was seen today for follow-up.    Diagnoses and all orders for this visit:    Primary hypertension    Rheumatoid arthritis with positive rheumatoid factor, involving unspecified site    Mixed hyperlipidemia    Major depression, recurrent, chronic    Neuropathy, arm, left  -     gabapentin  (NEURONTIN) 300 MG capsule; Take 1 capsule (300 mg total) by mouth 2 (two) times daily.    Vitamin D deficiency  -     ergocalciferol (ERGOCALCIFEROL) 50,000 unit Cap; Take 1 capsule (50,000 Units total) by mouth every 7 days.         HLD  -diet control  -start statin    Left leg pain  -likely chronic injuries  -x rays  -reassurance      HTN  -controlled    RA  -stable  -follow rheumatology    Vertigo  -meclizine prn    Depression  -controlled  -continue zoloft 25 mg daily  -SI/ER precautions given      Spent adequate time in obtaining history and explaining differentials      25 minutes spent during this visit of which greater than 50% devoted to face-face counseling and coordination of care regarding diagnosis and management plan    Follow up in about 3 months (around 6/24/2022), or if symptoms worsen or fail to improve.

## 2022-04-06 ENCOUNTER — OFFICE VISIT (OUTPATIENT)
Dept: CARDIOLOGY | Facility: CLINIC | Age: 62
End: 2022-04-06
Payer: MEDICARE

## 2022-04-06 ENCOUNTER — PATIENT MESSAGE (OUTPATIENT)
Dept: PULMONOLOGY | Facility: CLINIC | Age: 62
End: 2022-04-06
Payer: MEDICARE

## 2022-04-06 VITALS
BODY MASS INDEX: 27.49 KG/M2 | HEART RATE: 75 BPM | WEIGHT: 145.5 LBS | SYSTOLIC BLOOD PRESSURE: 139 MMHG | DIASTOLIC BLOOD PRESSURE: 91 MMHG | OXYGEN SATURATION: 95 %

## 2022-04-06 DIAGNOSIS — I10 PRIMARY HYPERTENSION: ICD-10-CM

## 2022-04-06 DIAGNOSIS — M05.9 RHEUMATOID ARTHRITIS WITH POSITIVE RHEUMATOID FACTOR, INVOLVING UNSPECIFIED SITE: ICD-10-CM

## 2022-04-06 DIAGNOSIS — R00.2 PALPITATIONS: ICD-10-CM

## 2022-04-06 DIAGNOSIS — I70.0 AORTIC ATHEROSCLEROSIS: ICD-10-CM

## 2022-04-06 DIAGNOSIS — E78.2 MIXED HYPERLIPIDEMIA: ICD-10-CM

## 2022-04-06 PROCEDURE — 99999 PR PBB SHADOW E&M-EST. PATIENT-LVL V: ICD-10-PCS | Mod: PBBFAC,,, | Performed by: INTERNAL MEDICINE

## 2022-04-06 PROCEDURE — 99499 UNLISTED E&M SERVICE: CPT | Mod: S$GLB,,, | Performed by: INTERNAL MEDICINE

## 2022-04-06 PROCEDURE — 1159F MED LIST DOCD IN RCRD: CPT | Mod: CPTII,S$GLB,, | Performed by: INTERNAL MEDICINE

## 2022-04-06 PROCEDURE — 3080F PR MOST RECENT DIASTOLIC BLOOD PRESSURE >= 90 MM HG: ICD-10-PCS | Mod: CPTII,S$GLB,, | Performed by: INTERNAL MEDICINE

## 2022-04-06 PROCEDURE — 3075F SYST BP GE 130 - 139MM HG: CPT | Mod: CPTII,S$GLB,, | Performed by: INTERNAL MEDICINE

## 2022-04-06 PROCEDURE — 1160F RVW MEDS BY RX/DR IN RCRD: CPT | Mod: CPTII,S$GLB,, | Performed by: INTERNAL MEDICINE

## 2022-04-06 PROCEDURE — 3080F DIAST BP >= 90 MM HG: CPT | Mod: CPTII,S$GLB,, | Performed by: INTERNAL MEDICINE

## 2022-04-06 PROCEDURE — 3008F PR BODY MASS INDEX (BMI) DOCUMENTED: ICD-10-PCS | Mod: CPTII,S$GLB,, | Performed by: INTERNAL MEDICINE

## 2022-04-06 PROCEDURE — 3008F BODY MASS INDEX DOCD: CPT | Mod: CPTII,S$GLB,, | Performed by: INTERNAL MEDICINE

## 2022-04-06 PROCEDURE — 1159F PR MEDICATION LIST DOCUMENTED IN MEDICAL RECORD: ICD-10-PCS | Mod: CPTII,S$GLB,, | Performed by: INTERNAL MEDICINE

## 2022-04-06 PROCEDURE — 4010F ACE/ARB THERAPY RXD/TAKEN: CPT | Mod: CPTII,S$GLB,, | Performed by: INTERNAL MEDICINE

## 2022-04-06 PROCEDURE — 99214 PR OFFICE/OUTPT VISIT, EST, LEVL IV, 30-39 MIN: ICD-10-PCS | Mod: S$GLB,,, | Performed by: INTERNAL MEDICINE

## 2022-04-06 PROCEDURE — 1160F PR REVIEW ALL MEDS BY PRESCRIBER/CLIN PHARMACIST DOCUMENTED: ICD-10-PCS | Mod: CPTII,S$GLB,, | Performed by: INTERNAL MEDICINE

## 2022-04-06 PROCEDURE — 99499 RISK ADDL DX/OHS AUDIT: ICD-10-PCS | Mod: S$GLB,,, | Performed by: INTERNAL MEDICINE

## 2022-04-06 PROCEDURE — 4010F PR ACE/ARB THEARPY RXD/TAKEN: ICD-10-PCS | Mod: CPTII,S$GLB,, | Performed by: INTERNAL MEDICINE

## 2022-04-06 PROCEDURE — 3075F PR MOST RECENT SYSTOLIC BLOOD PRESS GE 130-139MM HG: ICD-10-PCS | Mod: CPTII,S$GLB,, | Performed by: INTERNAL MEDICINE

## 2022-04-06 PROCEDURE — 99214 OFFICE O/P EST MOD 30 MIN: CPT | Mod: S$GLB,,, | Performed by: INTERNAL MEDICINE

## 2022-04-06 PROCEDURE — 99999 PR PBB SHADOW E&M-EST. PATIENT-LVL V: CPT | Mod: PBBFAC,,, | Performed by: INTERNAL MEDICINE

## 2022-04-06 NOTE — PROGRESS NOTES
[facility] Cardiology     Subjective:    Patient ID:  Silvia Cervantes is a 61 y.o. female who presents for follow-up of Palpitations, Hypertension, and Hyperlipidemia    Review of patient's allergies indicates:  No Known Allergies   She has a history of hypertension.  Her blood pressure is elevated today 140/90.  This morning it was 120 systolic.  She monitors regularly and sees good readings.  It is generally high when she goes to see the doctor.  She is compliant with medications.  Atorvastatin was started.  Her LDL felt a 86. She does have aortic atherosclerosis.  She has not had any palpitations, she is on low-dose metoprolol.  She has history of SVT.  She has a lot of joint stiffness.  She does walk regularly for exercise.  She has gained weight.        Review of Systems   Constitutional: Positive for weight gain. Negative for chills, decreased appetite, diaphoresis, fever, malaise/fatigue, night sweats and weight loss.   HENT: Negative for congestion, ear discharge, ear pain, hearing loss, hoarse voice, nosebleeds, odynophagia, sore throat, stridor and tinnitus.    Eyes: Negative for blurred vision, discharge, double vision, pain, photophobia, redness, vision loss in left eye, vision loss in right eye, visual disturbance and visual halos.   Cardiovascular: Negative for chest pain, claudication, cyanosis, dyspnea on exertion, irregular heartbeat, leg swelling, near-syncope, orthopnea, palpitations, paroxysmal nocturnal dyspnea and syncope.   Respiratory: Negative for cough, hemoptysis, shortness of breath, sleep disturbances due to breathing, snoring, sputum production and wheezing.    Endocrine: Negative for cold intolerance, heat intolerance, polydipsia, polyphagia and polyuria.   Hematologic/Lymphatic: Negative for adenopathy and bleeding problem. Does not bruise/bleed easily.   Skin: Negative for color change, dry skin, flushing, itching,  nail changes, poor wound healing, rash, skin cancer, suspicious lesions and unusual hair distribution.   Musculoskeletal: Positive for joint pain. Negative for arthritis, back pain, falls, gout, joint swelling, muscle cramps, muscle weakness, myalgias, neck pain and stiffness.   Gastrointestinal: Negative for bloating, abdominal pain, anorexia, change in bowel habit, bowel incontinence, constipation, diarrhea, dysphagia, excessive appetite, flatus, heartburn, hematemesis, hematochezia, hemorrhoids, jaundice, melena, nausea and vomiting.   Genitourinary: Negative for bladder incontinence, decreased libido, dysuria, flank pain, frequency, genital sores, hematuria, hesitancy, incomplete emptying, nocturia and urgency.   Neurological: Positive for numbness. Negative for aphonia, brief paralysis, difficulty with concentration, disturbances in coordination, excessive daytime sleepiness, dizziness, focal weakness, headaches, light-headedness, loss of balance, paresthesias, seizures, sensory change, tremors, vertigo and weakness.   Psychiatric/Behavioral: Negative for altered mental status, depression, hallucinations, memory loss, substance abuse, suicidal ideas and thoughts of violence. The patient is nervous/anxious. The patient does not have insomnia.    Allergic/Immunologic: Negative for hives and persistent infections.        Objective:       Vitals:    04/06/22 1003   BP: (!) 139/91   BP Location: Left arm   Pulse: 75   SpO2: 95%   Weight: 66 kg (145 lb 8.1 oz)    Physical Exam  Constitutional:       General: She is not in acute distress.     Appearance: She is well-developed. She is not diaphoretic.   HENT:      Head: Normocephalic and atraumatic.      Nose: Nose normal.   Eyes:      General: No scleral icterus.        Right eye: No discharge.      Conjunctiva/sclera: Conjunctivae normal.      Pupils: Pupils are equal, round, and reactive to light.   Neck:      Thyroid: No thyromegaly.      Vascular: No JVD.       Trachea: No tracheal deviation.   Cardiovascular:      Rate and Rhythm: Normal rate and regular rhythm.      Pulses:           Carotid pulses are 2+ on the right side and 2+ on the left side.       Radial pulses are 2+ on the right side and 2+ on the left side.        Dorsalis pedis pulses are 2+ on the right side and 2+ on the left side.        Posterior tibial pulses are 2+ on the right side and 2+ on the left side.      Heart sounds: Normal heart sounds. No murmur heard.    No friction rub. No gallop.   Pulmonary:      Effort: Pulmonary effort is normal. No respiratory distress.      Breath sounds: Normal breath sounds. No stridor. No wheezing or rales.   Chest:      Chest wall: No tenderness.   Abdominal:      General: Bowel sounds are normal. There is no distension.      Palpations: Abdomen is soft. There is no mass.      Tenderness: There is no abdominal tenderness. There is no guarding or rebound.   Musculoskeletal:         General: No tenderness. Normal range of motion.      Cervical back: Normal range of motion and neck supple.   Lymphadenopathy:      Cervical: No cervical adenopathy.   Skin:     General: Skin is warm and dry.      Coloration: Skin is not pale.      Findings: No erythema or rash.   Neurological:      Mental Status: She is alert and oriented to person, place, and time.      Cranial Nerves: No cranial nerve deficit.      Coordination: Coordination normal.   Psychiatric:         Behavior: Behavior normal.         Thought Content: Thought content normal.         Judgment: Judgment normal.           Assessment:       1. Palpitations    2. Primary hypertension    3. Mixed hyperlipidemia    4. Aortic atherosclerosis    5. Rheumatoid arthritis with positive rheumatoid factor, involving unspecified site      Results for orders placed or performed in visit on 03/16/22   C-Reactive Protein   Result Value Ref Range    CRP 4.2 0.0 - 8.2 mg/L   CBC Auto Differential   Result Value Ref Range    WBC 7.05  3.90 - 12.70 K/uL    RBC 4.21 4.00 - 5.40 M/uL    Hemoglobin 13.4 12.0 - 16.0 g/dL    Hematocrit 43.8 37.0 - 48.5 %     (H) 82 - 98 fL    MCH 31.8 (H) 27.0 - 31.0 pg    MCHC 30.6 (L) 32.0 - 36.0 g/dL    RDW 13.2 11.5 - 14.5 %    Platelets 335 150 - 450 K/uL    MPV 9.8 9.2 - 12.9 fL    Immature Granulocytes 0.3 0.0 - 0.5 %    Gran # (ANC) 3.4 1.8 - 7.7 K/uL    Immature Grans (Abs) 0.02 0.00 - 0.04 K/uL    Lymph # 2.5 1.0 - 4.8 K/uL    Mono # 0.8 0.3 - 1.0 K/uL    Eos # 0.2 0.0 - 0.5 K/uL    Baso # 0.04 0.00 - 0.20 K/uL    nRBC 0 0 /100 WBC    Gran % 48.3 38.0 - 73.0 %    Lymph % 35.7 18.0 - 48.0 %    Mono % 11.8 4.0 - 15.0 %    Eosinophil % 3.3 0.0 - 8.0 %    Basophil % 0.6 0.0 - 1.9 %    Differential Method Automated    Comprehensive Metabolic Panel   Result Value Ref Range    Sodium 145 136 - 145 mmol/L    Potassium 5.0 3.5 - 5.1 mmol/L    Chloride 106 95 - 110 mmol/L    CO2 29 23 - 29 mmol/L    Glucose 99 70 - 110 mg/dL    BUN 14 8 - 23 mg/dL    Creatinine 0.7 0.5 - 1.4 mg/dL    Calcium 10.3 8.7 - 10.5 mg/dL    Total Protein 8.1 6.0 - 8.4 g/dL    Albumin 4.1 3.5 - 5.2 g/dL    Total Bilirubin 0.3 0.1 - 1.0 mg/dL    Alkaline Phosphatase 87 55 - 135 U/L    AST 21 10 - 40 U/L    ALT 26 10 - 44 U/L    Anion Gap 10 8 - 16 mmol/L    eGFR if African American >60.0 >60 mL/min/1.73 m^2    eGFR if non African American >60.0 >60 mL/min/1.73 m^2   Sedimentation rate   Result Value Ref Range    Sed Rate 33 0 - 36 mm/Hr   CBC Auto Differential   Result Value Ref Range    WBC 7.05 3.90 - 12.70 K/uL    RBC 4.21 4.00 - 5.40 M/uL    Hemoglobin 13.4 12.0 - 16.0 g/dL    Hematocrit 43.8 37.0 - 48.5 %     (H) 82 - 98 fL    MCH 31.8 (H) 27.0 - 31.0 pg    MCHC 30.6 (L) 32.0 - 36.0 g/dL    RDW 13.2 11.5 - 14.5 %    Platelets 335 150 - 450 K/uL    MPV 9.8 9.2 - 12.9 fL    Immature Granulocytes 0.3 0.0 - 0.5 %    Gran # (ANC) 3.4 1.8 - 7.7 K/uL    Immature Grans (Abs) 0.02 0.00 - 0.04 K/uL    Lymph # 2.5 1.0 - 4.8 K/uL    Mono  # 0.8 0.3 - 1.0 K/uL    Eos # 0.2 0.0 - 0.5 K/uL    Baso # 0.04 0.00 - 0.20 K/uL    nRBC 0 0 /100 WBC    Gran % 48.3 38.0 - 73.0 %    Lymph % 35.7 18.0 - 48.0 %    Mono % 11.8 4.0 - 15.0 %    Eosinophil % 3.3 0.0 - 8.0 %    Basophil % 0.6 0.0 - 1.9 %    Differential Method Automated    C-Reactive Protein   Result Value Ref Range    CRP 4.2 0.0 - 8.2 mg/L   Comprehensive Metabolic Panel   Result Value Ref Range    Sodium 145 136 - 145 mmol/L    Potassium 5.0 3.5 - 5.1 mmol/L    Chloride 106 95 - 110 mmol/L    CO2 29 23 - 29 mmol/L    Glucose 99 70 - 110 mg/dL    BUN 14 8 - 23 mg/dL    Creatinine 0.7 0.5 - 1.4 mg/dL    Calcium 10.3 8.7 - 10.5 mg/dL    Total Protein 8.1 6.0 - 8.4 g/dL    Albumin 4.1 3.5 - 5.2 g/dL    Total Bilirubin 0.3 0.1 - 1.0 mg/dL    Alkaline Phosphatase 87 55 - 135 U/L    AST 21 10 - 40 U/L    ALT 26 10 - 44 U/L    Anion Gap 10 8 - 16 mmol/L    eGFR if African American >60.0 >60 mL/min/1.73 m^2    eGFR if non African American >60.0 >60 mL/min/1.73 m^2   Sedimentation rate   Result Value Ref Range    Sed Rate 33 0 - 36 mm/Hr     *Note: Due to a large number of results and/or encounters for the requested time period, some results have not been displayed. A complete set of results can be found in Results Review.         Current Outpatient Medications:     amLODIPine (NORVASC) 5 MG tablet, Take 1 tablet (5 mg total) by mouth once daily., Disp: 90 tablet, Rfl: 4    aspirin (ECOTRIN) 81 MG EC tablet, Take 81 mg by mouth once daily., Disp: , Rfl:     atorvastatin (LIPITOR) 10 MG tablet, Take 1 tablet (10 mg total) by mouth once daily., Disp: 90 tablet, Rfl: 1    chlorhexidine (PERIDEX) 0.12 % solution, Use 15-20 mL to rinse mouth twice daily for 2 minutes, spit out and do not rinse mouth with water after., Disp: 473 mL, Rfl: 3    clotrimazole-betamethasone 1-0.05% (LOTRISONE) cream, Apply to affected area 2 times daily, Disp: 15 g, Rfl: 1    cyanocobalamin, vitamin B-12, (VITAMIN B-12 ORAL), Take  by mouth., Disp: , Rfl:     cycloSPORINE (RESTASIS) 0.05 % ophthalmic emulsion, Apply 1 drop into both eyes twice a day, Disp: 180 vial, Rfl: 3    ergocalciferol (ERGOCALCIFEROL) 50,000 unit Cap, Take 1 capsule (50,000 Units total) by mouth every 7 days., Disp: 12 capsule, Rfl: 2    etanercept (ENBREL SURECLICK) 50 mg/mL (1 mL), Inject 1 mL (50 mg total) into the skin once a week., Disp: 4 mL, Rfl: 11    fluticasone propionate (FLONASE) 50 mcg/actuation nasal spray, 1 spray (50 mcg total) by Each Nostril route once daily., Disp: 16 g, Rfl: 5    folic acid (FOLVITE) 1 MG tablet, Take 4 tablets (4 mg total) by mouth once daily., Disp: 120 tablet, Rfl: 11    gabapentin (NEURONTIN) 300 MG capsule, Take 1 capsule (300 mg total) by mouth 2 (two) times daily., Disp: 180 capsule, Rfl: 0    gabapentin (NEURONTIN) 300 MG capsule, Take 1 capsule (300 mg total) by mouth 2 (two) times daily., Disp: 180 capsule, Rfl: 3    hydrOXYzine HCL (ATARAX) 25 MG tablet, Take 1 tablet (25 mg total) by mouth every evening., Disp: 30 tablet, Rfl: 1    losartan (COZAAR) 100 MG tablet, Take 1 tablet (100 mg total) by mouth once daily., Disp: 90 tablet, Rfl: 3    meclizine (ANTIVERT) 25 mg tablet, Take 1 tablet (25 mg total) by mouth 3 (three) times daily as needed for Dizziness., Disp: 20 tablet, Rfl: 0    metoprolol succinate (TOPROL-XL) 25 MG 24 hr tablet, Take 1 tablet (25 mg total) by mouth every evening., Disp: 90 tablet, Rfl: 4    nystatin (MYCOSTATIN) powder, Apply to affected area every morning as needed for flare under breasts (Patient taking differently: Apply to affected area every morning as needed for flare under breasts), Disp: 120 g, Rfl: 6    PROTOPIC 0.1 % ointment, AAA bid, Disp: 100 g, Rfl: 2    triamcinolone acetonide 0.025% (KENALOG) 0.025 % cream, Apply to affected area every day to twice a day to neck . Not more than 1-2 weeks straight in same location (Patient taking differently: Apply to affected area  every day to twice a day to neck . Not more than 1-2 weeks straight in same location), Disp: 80 g, Rfl: 1    triamcinolone acetonide 0.1% (KENALOG) 0.1 % ointment, Apply topically 2 (two) times daily. For 2 weeks, Disp: 15 g, Rfl: 1    azelastine (ASTELIN) 137 mcg (0.1 %) nasal spray, spray 1 spray (137 mcg total) by Nasal route 2 (two) times daily., Disp: 30 mL, Rfl: 3    fluticasone propion-salmeterol 115-21 mcg/dose (ADVAIR HFA) 115-21 mcg/actuation HFAA inhaler, Inhale 2 puffs into the lungs every 12 (twelve) hours., Disp: 12 g, Rfl: 11    leflunomide (ARAVA) 20 MG Tab, Take 1 tablet (20 mg total) by mouth once daily. (Patient not taking: Reported on 2/21/2022), Disp: 90 tablet, Rfl: 3    polyethylene glycol (GLYCOLAX) 17 gram/dose powder, Take 17 g by mouth once daily. (Patient not taking: Reported on 4/6/2022), Disp: 510 g, Rfl: 1    ZINC ORAL, Take by mouth., Disp: , Rfl:      Lab Results   Component Value Date    WBC 7.05 03/16/2022    WBC 7.05 03/16/2022    RBC 4.21 03/16/2022    RBC 4.21 03/16/2022    HGB 13.4 03/16/2022    HGB 13.4 03/16/2022    HCT 43.8 03/16/2022    HCT 43.8 03/16/2022     (H) 03/16/2022     (H) 03/16/2022    MCH 31.8 (H) 03/16/2022    MCH 31.8 (H) 03/16/2022    MCHC 30.6 (L) 03/16/2022    MCHC 30.6 (L) 03/16/2022    RDW 13.2 03/16/2022    RDW 13.2 03/16/2022     03/16/2022     03/16/2022    MPV 9.8 03/16/2022    MPV 9.8 03/16/2022    GRAN 3.4 03/16/2022    GRAN 48.3 03/16/2022    GRAN 3.4 03/16/2022    GRAN 48.3 03/16/2022    LYMPH 2.5 03/16/2022    LYMPH 35.7 03/16/2022    LYMPH 2.5 03/16/2022    LYMPH 35.7 03/16/2022    MONO 0.8 03/16/2022    MONO 11.8 03/16/2022    MONO 0.8 03/16/2022    MONO 11.8 03/16/2022    EOS 0.2 03/16/2022    EOS 0.2 03/16/2022    BASO 0.04 03/16/2022    BASO 0.04 03/16/2022    EOSINOPHIL 3.3 03/16/2022    EOSINOPHIL 3.3 03/16/2022    BASOPHIL 0.6 03/16/2022    BASOPHIL 0.6 03/16/2022    MG 1.9 08/06/2020        CMP  Lab  Results   Component Value Date     03/16/2022     03/16/2022    K 5.0 03/16/2022    K 5.0 03/16/2022     03/16/2022     03/16/2022    CO2 29 03/16/2022    CO2 29 03/16/2022    GLU 99 03/16/2022    GLU 99 03/16/2022    BUN 14 03/16/2022    BUN 14 03/16/2022    CREATININE 0.7 03/16/2022    CREATININE 0.7 03/16/2022    CALCIUM 10.3 03/16/2022    CALCIUM 10.3 03/16/2022    PROT 8.1 03/16/2022    PROT 8.1 03/16/2022    ALBUMIN 4.1 03/16/2022    ALBUMIN 4.1 03/16/2022    BILITOT 0.3 03/16/2022    BILITOT 0.3 03/16/2022    ALKPHOS 87 03/16/2022    ALKPHOS 87 03/16/2022    AST 21 03/16/2022    AST 21 03/16/2022    ALT 26 03/16/2022    ALT 26 03/16/2022    ANIONGAP 10 03/16/2022    ANIONGAP 10 03/16/2022    ESTGFRAFRICA >60.0 03/16/2022    ESTGFRAFRICA >60.0 03/16/2022    EGFRNONAA >60.0 03/16/2022    EGFRNONAA >60.0 03/16/2022        Lab Results   Component Value Date    LABURIN No significant growth 10/24/2019            Results for orders placed or performed in visit on 07/07/21   EKG 12-lead    Collection Time: 07/07/21 10:11 AM    Narrative    Test Reason : R00.2,    Vent. Rate : 081 BPM     Atrial Rate : 081 BPM     P-R Int : 152 ms          QRS Dur : 074 ms      QT Int : 366 ms       P-R-T Axes : 059 050 013 degrees     QTc Int : 425 ms    Normal sinus rhythm with sinus arrhythmia  Low voltage QRS  Abnormal ECG  When compared with ECG of 25-JUL-2018 08:28,  No significant change was found  Confirmed by Reginald Nolan MD ([id number] on 7/14/2021 12:55:10 PM    Referred By: [email or web address]   SELF           Confirmed By:Reginald Nolan MD                  Plan:       Problem List Items Addressed This Visit        Cardiac/Vascular    Hypertension     Her blood pressure is high in my office but it was normal at home today.  Based on what she tells me when her readings are checked at home, no change in therapy advised.  Condition stable.           Mixed hyperlipidemia     She started atorvastatin 10 mg. Her  LDL fell from 140-86 mg%.  Condition improved.           Palpitations     No palpitations reported.  She is on beta-blockers.  There is history of SVT.  She will continue Toprol 25 mg..  Condition stable.           Aortic atherosclerosis     Condition stable.  She is on aspirin and statin therapy.              Orthopedic    Rheumatoid arthritis with positive rheumatoid factor     She follows with rheumatology.  Condition unchanged.                        A 6 month follow-up visit was advised.         Reginald Nolan MD  04/07/2022   10:35 AM

## 2022-04-07 NOTE — ASSESSMENT & PLAN NOTE
Her blood pressure is high in my office but it was normal at home today.  Based on what she tells me when her readings are checked at home, no change in therapy advised.  Condition stable.

## 2022-04-08 NOTE — PROGRESS NOTES
"  Patient ID: Silvia Cervantes is a 60 y.o. female   Last visit:  10/11/21 (6 mo f/u)  Chief Complaint: Follow-up and Shortness of Breath     HPI Stable respiratory symptoms. Continues with dry cough. She is s/p transbronchial  biopsy that showed "Granulomatous lymphadenitis" and was diagnosed with sarcoid by [facility].  Followed by Rheumatology for seropositive RA.  On enbrel.  Off cyclosporine, and leflunamide, MT.    21-  Feels hand stiffness is better on enbrel.  No problems with COVID.  Had vaccine.  Cough persists when she is nervious.  Hasn't used advair because she is worried she will get addicted.  10/11/21-  Feels advair helped but instructed in use.  Wants flonase  21-  Last PFTs 21.  Last CT 21.  Still coughing (ticfkle in thorat) but doesn't bother her much.  No ALY.  Walking in park.  Using vicks on outside of nose nightly.  No fever, chills, wt.loss.  No Hx COVID infec.  Had vaccfine x 3.  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)                  Past Surgical History:   Procedure Laterality Date     SECTION        COLONOSCOPY N/A 2019     Procedure: COLONOSCOPY/melvin ;  Surgeon: Diams Woodall MD;  Location: [facility];  Service: Endoscopy;  Laterality: N/A;    HYSTERECTOMY        TUBAL LIGATION                    Family History   Problem Relation Age of Onset    Diabetes Father      Hypertension Father      Cataracts Father      Heart disease Father      Hypertension Mother   "    Cataracts Mother      Stroke Brother      Hypertension Brother      Melanoma Neg Hx      Breast cancer Neg Hx      Colon cancer Neg Hx      Ovarian cancer Neg Hx      Blindness Neg Hx      Glaucoma Neg Hx           Review of Systems   Constitutional: Negative for fever, weight loss, weight gain and fatigue.   HENT: Positive for rhinorrhea. Negative for sinus pressure.    Respiratory: Positive for cough. Negative for hemoptysis, sputum production, shortness of breath, wheezing and pleurisy.    Cardiovascular: Negative for chest pain, palpitations and leg swelling.      Vital Signs  Ht                    Wt       BP       HR      RR  PEF    SaO2 (RA)    97%, 96% 10/11  Physical Examination  General  Pt appears stated age, in no acute distress  HEENT-  Pupils equal and reactive to light.  Throat is free of erythema, exudate  Neck- Neck is supple.  Trachea midline.  No JVD, cervical lymphadenopathy.  Chest-  Chest is resonant to percussion.  Auscultation-  some insp crackles rt base  Heart-  Rhythm is regular without audible murmur or gallop  Abdomen-  Abdomen is soft and nontender without masses or organomegaly.  BS are normoactive.  Extremities-  Extremities show no clubbing.  Pedal edema is not present.       Radiology   2/23/21-  CT chest w/o contrast.        CT Chest Without Contrast     Impression     Persistent innumerable subcentimeter soft tissue density pulmonary nodules, not significantly changed when compared to prior study dated 06/21/2018.  Given the stability over this time interval, these are considered benign.        Electronically signed by:       Ariel Guardado MD  Date:                                                02/23/2021  Time:                                                13:28      Pulmonary Function Testing   9./24/21-  PFT.  FVC 2.47 (88% pred), FEv1 1.87 (85% pred), ratio 76%.  (No obstruction).  TLC 3.26 (73% pred), DLCO 83% pred.     Other Labs     CBC:                Recent  Labs   Lab 03/05/20  0814 04/17/20  0839 08/06/20  0900 11/11/20  1141 02/11/21  0906   WBC 7.34 6.22 7.33 8.29 7.76   RBC 4.01 4.11 4.13 4.20 4.25   Hemoglobin 13.1 13.4 13.5 13.7 13.2   Hematocrit 41.7 43.4 43.8 43.8 42.4   Platelets 343 324 390 H 346 320    H 106 H 106 H 104 H 100 H   MCH 32.7 H 32.6 H 32.7 H 32.6 H 31.1 H   MCHC 31.4 L 30.9 L 30.8 L 31.3 L 31.1 L      CMP:                Recent Labs   Lab 03/05/20  0814 04/17/20  0839 08/06/20  0900 11/11/20  1141 02/11/21  0906   Glucose 97 115 H 61 L 100 112 H   Calcium 9.8 9.4 10.1 9.9 9.6   Albumin 3.8 3.9 4.4 4.2 4.1   Total Protein 7.4 7.6 8.4 8.2 7.9   Sodium 143 143 141 143 143   Potassium 4.0 3.6 3.8 3.8 4.5   CO2 28 26 26 26 28   Chloride 107 107 104 105 104   BUN 11 14 12 13 14   Creatinine 0.7 0.8 0.7 0.7 0.7   Alkaline Phosphatase 83 82 89 91 92   ALT 30 20 39 23 45 H   AST 20 18 28 23 30   Total Bilirubin 0.3 0.3 0.3 0.3 0.4      TSH:            Recent Labs   Lab 05/10/18  0733 02/04/20  0715 11/11/20  1141   TSH 3.525 2.812 2.233   9/28/21-  Quantiferon neg     Social History                Socioeconomic History    Marital status:        Spouse name: Not on file    Number of children: Not on file    Years of education: Not on file    Highest education level: Not on file   Occupational History    Not on file   Tobacco Use    Smoking status: Never Smoker    Smokeless tobacco: Never Used   Substance and Sexual Activity    Alcohol use: Not Currently       Comment: Rarely    Drug use: Not Currently    Sexual activity: Yes       Partners: Male       Birth control/protection: Surgical       Comment: hyst   Other Topics Concern    Are you pregnant or think you may be? Not Asked    Breast-feeding Not Asked   Social History Narrative    Not on file      Social Determinants of Health            Financial Resource Strain:     Difficulty of Paying Living Expenses:    Food Insecurity:     Worried About Running Out of Food in the  Last Year:     Ran Out of Food in the Last Year:    Transportation Needs:     Lack of Transportation (Medical):     Lack of Transportation (Non-Medical):    Physical Activity:     Days of Exercise per Week:     Minutes of Exercise per Session:    Stress:     Feeling of Stress :    Social Connections:     Frequency of Communication with Friends and Family:     Frequency of Social Gatherings with Friends and Family:     Attends Anabaptism Services:     Active Member of Clubs or Organizations:     Attends Club or Organization Meetings:     Marital Status:       Current Medications               Current Outpatient Medications   Medication Sig Dispense Refill    amLODIPine (NORVASC) 5 MG tablet Take 1 tablet (5 mg total) by mouth once daily. 90 tablet 3    aspirin (ECOTRIN) 81 MG EC tablet Take 81 mg by mouth once daily.        atorvastatin (LIPITOR) 10 MG tablet Take 1 tablet (10 mg total) by mouth once daily. 90 tablet 3    azelastine (ASTELIN) 137 mcg (0.1 %) nasal spray spray 1 spray (137 mcg total) by Nasal route 2 (two) times daily. 30 mL 3    chlorhexidine (PERIDEX) 0.12 % solution Rinse with 1 capful for 2 minutes and spit 3 times a day. 473 mL 3    clotrimazole-betamethasone 1-0.05% (LOTRISONE) cream Apply 1 application topically 2 (two) times daily.   0    cycloSPORINE (RESTASIS) 0.05 % ophthalmic emulsion INSTILL ONE DROP INTO BOTH EYES TWICE A DAY 60 each 5    cycloSPORINE (RESTASIS) 0.05 % ophthalmic emulsion Apply 1 drop into both eyes twice a day 180 vial 3    ergocalciferol (ERGOCALCIFEROL) 50,000 unit Cap Take 1 capsule (50,000 Units total) by mouth every 7 days. 12 capsule 2    folic acid (FOLVITE) 1 MG tablet Take 4 tablets (4 mg total) by mouth once daily. 120 tablet 11    gabapentin (NEURONTIN) 300 MG capsule Take 1 capsule (300 mg total) by mouth 3 (three) times daily. 270 capsule 3    leflunomide (ARAVA) 20 MG Tab Take 1 tablet (20 mg total) by mouth once daily. 90 tablet 3     LORazepam (ATIVAN) 1 MG tablet 1 tab 30 min prior to MRI, may repeat x1. Do not drive while taking this medication. 2 tablet 0    losartan (COZAAR) 100 MG tablet Take 1 tablet (100 mg total) by mouth once daily. 90 tablet 3    meclizine (ANTIVERT) 25 mg tablet Take 1 tablet (25 mg total) by mouth 3 (three) times daily as needed for Dizziness. 20 tablet 0    meloxicam (MOBIC) 15 MG tablet Take 1 tablet (15 mg total) by mouth once daily. 90 tablet 0    nystatin (MYCOSTATIN) powder Apply to affected area every morning as needed for flare under breasts 120 g 6    polyethylene glycol (GLYCOLAX) 17 gram/dose powder Take 17 g by mouth once daily. 510 g 1    terbinafine HCl (LAMISIL) 250 mg tablet Take 250 mg by mouth once daily.   0    tobramycin sulfate 0.3% (TOBREX) 0.3 % ophthalmic solution Apply two drops to wound site on toe twice daily. 5 mL 0    triamcinolone acetonide 0.025% (KENALOG) 0.025 % cream Apply to affected area every day to twice a day to neck . Not more than 1-2 weeks straight in same location 80 g 1    triamcinolone acetonide 0.1% (KENALOG) 0.1 % cream Apply topically 2 (two) times daily. 15 g 1    fluticasone propion-salmeterol 115-21 mcg/dose (ADVAIR HFA) 115-21 mcg/actuation HFAA inhaler Inhale 2 puffs into the lungs every 12 (twelve) hours. 12 g 11    hydrocortisone 2.5 % cream Apply topically 2 (two) times daily for 7 days. 30 g 0    ketoconazole (NIZORAL) 2 % cream Apply topically once daily for 7 days. 15 g 0    loratadine (CLARITIN) 10 mg tablet Take 1 tablet (10 mg total) by mouth once daily. 30 tablet 2    valACYclovir (VALTREX) 1000 MG tablet Take 1 tablet (1,000 mg total) by mouth 3 (three) times daily. for 7 days 21 tablet 0      No current facility-administered medications for this visit.         Review of patient's allergies indicates:  No Known Allergies        Assessment  1. Sarcoidosis of lung       2. Non-seasonal allergic rhinitis due to pollen    3. Solitary  pulmonary nodule    4. Cough in adult   5.  Rheumatoid arthritis.  ? Whether pulmonary findings are all due to this rather than sarcoid.   Sarcoid appears stable - not active with residual scarring and no significant dyspnea.  Cough being addressed.  P: Will resume nasal spray (azelastine) to see if this helps with cough and also add Advair (low dose). Patient did not understand the need to use medication regularly for her cough and used nasal spray on a prn basis. I explained to her 3 different ways the rationale to use medication regularly and I believe she may have finally understood. The Advair is also a trial. She will f/u with another lung doctor since I will be leaving the state.  5.  Allegic rhinitis- Continue flonase  6.  Needs 2nd booster (age, immunosuppressive Rx)     Plan:  CXR-  routine              Encouraged to use advair appropriately qd or BID              RTC 6 mo

## 2022-04-11 ENCOUNTER — OFFICE VISIT (OUTPATIENT)
Dept: PULMONOLOGY | Facility: CLINIC | Age: 62
End: 2022-04-11
Payer: MEDICARE

## 2022-04-11 ENCOUNTER — HOSPITAL ENCOUNTER (OUTPATIENT)
Dept: RADIOLOGY | Facility: HOSPITAL | Age: 62
Discharge: HOME | End: 2022-04-11
Attending: INTERNAL MEDICINE
Payer: MEDICARE

## 2022-04-11 VITALS
HEIGHT: 61 IN | BODY MASS INDEX: 27.74 KG/M2 | RESPIRATION RATE: 18 BRPM | OXYGEN SATURATION: 95 % | DIASTOLIC BLOOD PRESSURE: 88 MMHG | HEART RATE: 87 BPM | TEMPERATURE: 98 F | SYSTOLIC BLOOD PRESSURE: 137 MMHG | WEIGHT: 146.94 LBS

## 2022-04-11 DIAGNOSIS — D86.0 SARCOIDOSIS OF LUNG: ICD-10-CM

## 2022-04-11 DIAGNOSIS — D86.9 SARCOIDOSIS: Primary | ICD-10-CM

## 2022-04-11 PROCEDURE — 4010F PR ACE/ARB THEARPY RXD/TAKEN: ICD-10-PCS | Mod: CPTII,S$GLB,, | Performed by: INTERNAL MEDICINE

## 2022-04-11 PROCEDURE — 71046 XR CHEST PA AND LATERAL: ICD-10-PCS | Mod: 26,,, | Performed by: RADIOLOGY

## 2022-04-11 PROCEDURE — 3079F PR MOST RECENT DIASTOLIC BLOOD PRESSURE 80-89 MM HG: ICD-10-PCS | Mod: CPTII,S$GLB,, | Performed by: INTERNAL MEDICINE

## 2022-04-11 PROCEDURE — 3075F SYST BP GE 130 - 139MM HG: CPT | Mod: CPTII,S$GLB,, | Performed by: INTERNAL MEDICINE

## 2022-04-11 PROCEDURE — 3008F PR BODY MASS INDEX (BMI) DOCUMENTED: ICD-10-PCS | Mod: CPTII,S$GLB,, | Performed by: INTERNAL MEDICINE

## 2022-04-11 PROCEDURE — 4010F ACE/ARB THERAPY RXD/TAKEN: CPT | Mod: CPTII,S$GLB,, | Performed by: INTERNAL MEDICINE

## 2022-04-11 PROCEDURE — 1159F MED LIST DOCD IN RCRD: CPT | Mod: CPTII,S$GLB,, | Performed by: INTERNAL MEDICINE

## 2022-04-11 PROCEDURE — 71046 X-RAY EXAM CHEST 2 VIEWS: CPT | Mod: 26,,, | Performed by: RADIOLOGY

## 2022-04-11 PROCEDURE — 99999 PR PBB SHADOW E&M-EST. PATIENT-LVL V: CPT | Mod: PBBFAC,,, | Performed by: INTERNAL MEDICINE

## 2022-04-11 PROCEDURE — 3075F PR MOST RECENT SYSTOLIC BLOOD PRESS GE 130-139MM HG: ICD-10-PCS | Mod: CPTII,S$GLB,, | Performed by: INTERNAL MEDICINE

## 2022-04-11 PROCEDURE — 99999 PR PBB SHADOW E&M-EST. PATIENT-LVL V: ICD-10-PCS | Mod: PBBFAC,,, | Performed by: INTERNAL MEDICINE

## 2022-04-11 PROCEDURE — 99214 PR OFFICE/OUTPT VISIT, EST, LEVL IV, 30-39 MIN: ICD-10-PCS | Mod: S$GLB,,, | Performed by: INTERNAL MEDICINE

## 2022-04-11 PROCEDURE — 71046 X-RAY EXAM CHEST 2 VIEWS: CPT | Mod: TC,FY

## 2022-04-11 PROCEDURE — 3079F DIAST BP 80-89 MM HG: CPT | Mod: CPTII,S$GLB,, | Performed by: INTERNAL MEDICINE

## 2022-04-11 PROCEDURE — 3008F BODY MASS INDEX DOCD: CPT | Mod: CPTII,S$GLB,, | Performed by: INTERNAL MEDICINE

## 2022-04-11 PROCEDURE — 1159F PR MEDICATION LIST DOCUMENTED IN MEDICAL RECORD: ICD-10-PCS | Mod: CPTII,S$GLB,, | Performed by: INTERNAL MEDICINE

## 2022-04-11 PROCEDURE — 99214 OFFICE O/P EST MOD 30 MIN: CPT | Mod: S$GLB,,, | Performed by: INTERNAL MEDICINE

## 2022-04-16 ENCOUNTER — PATIENT MESSAGE (OUTPATIENT)
Dept: PHARMACY | Facility: CLINIC | Age: 62
End: 2022-04-16
Payer: MEDICARE

## 2022-04-16 RX ORDER — METHYLPREDNISOLONE ACETATE 40 MG/ML
40 INJECTION, SUSPENSION INTRA-ARTICULAR; INTRALESIONAL; INTRAMUSCULAR; SOFT TISSUE
Status: DISCONTINUED | OUTPATIENT
Start: 2022-03-23 | End: 2022-04-16 | Stop reason: HOSPADM
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
## 2022-04-18 ENCOUNTER — PATIENT MESSAGE (OUTPATIENT)
Dept: ADMINISTRATIVE | Facility: OTHER | Age: 62
End: 2022-04-18
Payer: MEDICARE

## 2022-04-18 ENCOUNTER — PATIENT MESSAGE (OUTPATIENT)
Dept: RHEUMATOLOGY | Facility: CLINIC | Age: 62
End: 2022-04-18
Payer: MEDICARE

## 2022-04-19 RX ORDER — ETANERCEPT 50 MG/ML
50 SOLUTION SUBCUTANEOUS WEEKLY
Qty: 4 ML | Refills: 11 | Status: SHIPPED | OUTPATIENT
Start: 2022-04-19 | End: 2023-05-12 | Stop reason: SDUPTHER

## 2022-04-20 ENCOUNTER — PATIENT MESSAGE (OUTPATIENT)
Dept: PULMONOLOGY | Facility: CLINIC | Age: 62
End: 2022-04-20
Payer: MEDICARE

## 2022-04-21 ENCOUNTER — IMMUNIZATION (OUTPATIENT)
Dept: INTERNAL MEDICINE | Facility: CLINIC | Age: 62
End: 2022-04-21
Payer: MEDICARE

## 2022-04-21 ENCOUNTER — SPECIALTY PHARMACY (OUTPATIENT)
Dept: PHARMACY | Facility: CLINIC | Age: 62
End: 2022-04-21
Payer: MEDICARE

## 2022-04-21 DIAGNOSIS — Z23 NEED FOR VACCINATION: Primary | ICD-10-CM

## 2022-04-21 PROCEDURE — 91305 COVID-19, MRNA, LNP-S, PF, 30 MCG/0.3 ML DOSE VACCINE (PFIZER): CPT | Mod: PBBFAC | Performed by: FAMILY MEDICINE

## 2022-04-21 NOTE — TELEPHONE ENCOUNTER
Specialty Pharmacy - Refill Coordination    Specialty Medication Orders Linked to Encounter    Flowsheet Row Most Recent Value   Medication #1 etanercept (ENBREL SURECLICK) 50 mg/mL (1 mL) (Order#[id number], Rx#[id number])          Refill Questions - Documented Responses    Flowsheet Row Most Recent Value   Patient Availability and HIPAA Verification    Does patient want to proceed with activity? Yes   HIPAA/medical authority confirmed? Yes   Relationship to patient of person spoken to? Self   Refill Screening Questions    Changes to allergies? No   Changes to medications? No   New conditions since last clinic visit? No   Unplanned office visit, urgent care, ED, or hospital admission in the last 4 weeks? No   How does patient/caregiver feel medication is working? Good   Financial problems or insurance changes? No   How many doses of your specialty medications were missed in the last 4 weeks? 0   Would patient like to speak to a pharmacist? No   When does the patient need to receive the medication? 04/23/22   Refill Delivery Questions    How will the patient receive the medication? Pickup   When does the patient need to receive the medication? 04/23/22   Expected Copay ($) 0   Is the patient able to afford the medication copay? Yes   Payment Method zero copay   Days supply of Refill 28   Supplies needed? No supplies needed   Refill activity completed? Yes   Refill activity plan Refill scheduled   Shipment/Pickup Date: 04/21/22          Current Outpatient Medications   Medication Sig    amLODIPine (NORVASC) 5 MG tablet Take 1 tablet (5 mg total) by mouth once daily.    aspirin (ECOTRIN) 81 MG EC tablet Take 81 mg by mouth once daily.    atorvastatin (LIPITOR) 10 MG tablet Take 1 tablet (10 mg total) by mouth once daily.    azelastine (ASTELIN) 137 mcg (0.1 %) nasal spray spray 1 spray (137 mcg total) by Nasal route 2 (two) times daily.    chlorhexidine (PERIDEX) 0.12 % solution Use 15-20 mL to rinse mouth twice  daily for 2 minutes, spit out and do not rinse mouth with water after.    clotrimazole-betamethasone 1-0.05% (LOTRISONE) cream Apply to affected area 2 times daily    cyanocobalamin, vitamin B-12, (VITAMIN B-12 ORAL) Take by mouth.    cycloSPORINE (RESTASIS) 0.05 % ophthalmic emulsion Apply 1 drop into both eyes twice a day    ergocalciferol (ERGOCALCIFEROL) 50,000 unit Cap Take 1 capsule (50,000 Units total) by mouth every 7 days.    etanercept (ENBREL SURECLICK) 50 mg/mL (1 mL) Inject 1 mL (50 mg total) into the skin once a week.    fluticasone propion-salmeterol 115-21 mcg/dose (ADVAIR HFA) 115-21 mcg/actuation HFAA inhaler Inhale 2 puffs into the lungs every 12 (twelve) hours.    fluticasone propionate (FLONASE) 50 mcg/actuation nasal spray 1 spray (50 mcg total) by Each Nostril route once daily.    folic acid (FOLVITE) 1 MG tablet Take 4 tablets (4 mg total) by mouth once daily.    gabapentin (NEURONTIN) 300 MG capsule Take 1 capsule (300 mg total) by mouth 2 (two) times daily.    gabapentin (NEURONTIN) 300 MG capsule Take 1 capsule (300 mg total) by mouth 2 (two) times daily.    hydrOXYzine HCL (ATARAX) 25 MG tablet Take 1 tablet (25 mg total) by mouth every evening.    leflunomide (ARAVA) 20 MG Tab Take 1 tablet (20 mg total) by mouth once daily. (Patient not taking: Reported on 2/21/2022)    losartan (COZAAR) 100 MG tablet Take 1 tablet (100 mg total) by mouth once daily.    meclizine (ANTIVERT) 25 mg tablet Take 1 tablet (25 mg total) by mouth 3 (three) times daily as needed for Dizziness.    metoprolol succinate (TOPROL-XL) 25 MG 24 hr tablet Take 1 tablet (25 mg total) by mouth every evening.    nystatin (MYCOSTATIN) powder Apply to affected area every morning as needed for flare under breasts (Patient taking differently: Apply to affected area every morning as needed for flare under breasts)    polyethylene glycol (GLYCOLAX) 17 gram/dose powder Take 17 g by mouth once daily.    PROTOPIC  0.1 % ointment AAA bid    triamcinolone acetonide 0.025% (KENALOG) 0.025 % cream Apply to affected area every day to twice a day to neck . Not more than 1-2 weeks straight in same location (Patient taking differently: Apply to affected area every day to twice a day to neck . Not more than 1-2 weeks straight in same location)    triamcinolone acetonide 0.1% (KENALOG) 0.1 % ointment Apply topically 2 (two) times daily. For 2 weeks    ZINC ORAL Take by mouth.   Last reviewed on 4/16/2022  7:39 AM by Louise Smith MD    Review of patient's allergies indicates:  No Known Allergies Last reviewed on  4/19/2022 8:08 AM by Magaly Rizo      Tasks added this encounter   5/14/2022 - Refill Call (Auto Added)  4/22/2022 - Pickup Reminder   Tasks due within next 3 months   No tasks due.     Ofelia Camilo, PharmD  [facility]  [address] [place] [id number]  Phone: [phone]  Fax: [phone]

## 2022-04-23 ENCOUNTER — TELEPHONE (OUTPATIENT)
Dept: PHARMACY | Facility: CLINIC | Age: 62
End: 2022-04-23
Payer: MEDICARE

## 2022-04-23 NOTE — TELEPHONE ENCOUNTER
Patient asking if she can take Enbrel after getting first COVID vaccine. Advised patient that there is no recommendations to hold Enbrel with vaccine at this time. Patient voiced understanding.

## 2022-05-11 ENCOUNTER — PROCEDURE VISIT (OUTPATIENT)
Dept: NEUROLOGY | Facility: CLINIC | Age: 62
End: 2022-05-11
Payer: MEDICARE

## 2022-05-11 ENCOUNTER — OFFICE VISIT (OUTPATIENT)
Dept: DERMATOLOGY | Facility: CLINIC | Age: 62
End: 2022-05-11
Payer: MEDICARE

## 2022-05-11 DIAGNOSIS — L30.9 DERMATITIS: Primary | ICD-10-CM

## 2022-05-11 DIAGNOSIS — L81.4 LENTIGO: ICD-10-CM

## 2022-05-11 DIAGNOSIS — M54.12 CERVICAL RADICULOPATHY: ICD-10-CM

## 2022-05-11 DIAGNOSIS — L82.1 SK (SEBORRHEIC KERATOSIS): ICD-10-CM

## 2022-05-11 DIAGNOSIS — L80 VITILIGO: ICD-10-CM

## 2022-05-11 DIAGNOSIS — D22.9 NEVUS: ICD-10-CM

## 2022-05-11 DIAGNOSIS — G56.21 CUBITAL TUNNEL SYNDROME ON RIGHT: ICD-10-CM

## 2022-05-11 PROCEDURE — 1160F PR REVIEW ALL MEDS BY PRESCRIBER/CLIN PHARMACIST DOCUMENTED: ICD-10-PCS | Mod: CPTII,S$GLB,, | Performed by: DERMATOLOGY

## 2022-05-11 PROCEDURE — 95886 MUSC TEST DONE W/N TEST COMP: CPT | Mod: S$GLB,,, | Performed by: NEUROMUSCULOSKELETAL MEDICINE & OMM

## 2022-05-11 PROCEDURE — 95910 PR NERVE CONDUCTION STUDY; 7-8 STUDIES: ICD-10-PCS | Mod: S$GLB,,, | Performed by: NEUROMUSCULOSKELETAL MEDICINE & OMM

## 2022-05-11 PROCEDURE — 1159F PR MEDICATION LIST DOCUMENTED IN MEDICAL RECORD: ICD-10-PCS | Mod: CPTII,S$GLB,, | Performed by: DERMATOLOGY

## 2022-05-11 PROCEDURE — 4010F PR ACE/ARB THEARPY RXD/TAKEN: ICD-10-PCS | Mod: CPTII,S$GLB,, | Performed by: DERMATOLOGY

## 2022-05-11 PROCEDURE — 99999 PR PBB SHADOW E&M-EST. PATIENT-LVL IV: CPT | Mod: PBBFAC,,, | Performed by: DERMATOLOGY

## 2022-05-11 PROCEDURE — 1159F MED LIST DOCD IN RCRD: CPT | Mod: CPTII,S$GLB,, | Performed by: DERMATOLOGY

## 2022-05-11 PROCEDURE — 1160F RVW MEDS BY RX/DR IN RCRD: CPT | Mod: CPTII,S$GLB,, | Performed by: DERMATOLOGY

## 2022-05-11 PROCEDURE — 99214 PR OFFICE/OUTPT VISIT, EST, LEVL IV, 30-39 MIN: ICD-10-PCS | Mod: S$GLB,,, | Performed by: DERMATOLOGY

## 2022-05-11 PROCEDURE — 99214 OFFICE O/P EST MOD 30 MIN: CPT | Mod: S$GLB,,, | Performed by: DERMATOLOGY

## 2022-05-11 PROCEDURE — 95910 NRV CNDJ TEST 7-8 STUDIES: CPT | Mod: S$GLB,,, | Performed by: NEUROMUSCULOSKELETAL MEDICINE & OMM

## 2022-05-11 PROCEDURE — 95886 PR EMG COMPLETE, W/ NERVE CONDUCTION STUDIES, 5+ MUSCLES: ICD-10-PCS | Mod: S$GLB,,, | Performed by: NEUROMUSCULOSKELETAL MEDICINE & OMM

## 2022-05-11 PROCEDURE — 99999 PR PBB SHADOW E&M-EST. PATIENT-LVL IV: ICD-10-PCS | Mod: PBBFAC,,, | Performed by: DERMATOLOGY

## 2022-05-11 PROCEDURE — 4010F ACE/ARB THERAPY RXD/TAKEN: CPT | Mod: CPTII,S$GLB,, | Performed by: DERMATOLOGY

## 2022-05-11 RX ORDER — RUXOLITINIB 15 MG/G
CREAM TOPICAL
Qty: 60 G | Refills: 2 | Status: SHIPPED | OUTPATIENT
Start: 2022-05-11

## 2022-05-11 NOTE — Clinical Note
Hi, when pt has her next appt, is there any role with her RA for Xelganz? Just a thought as it may help her vitiligo which is very bothersome for her .  However if everything is stable from your stand point , ok to let it be. Thanks, Cony

## 2022-05-11 NOTE — PROCEDURES
EMG Needle exam performed by me.  Nerve conduction studies were also performed by me without the assistance of the technician    Right ulnar motor above and below the elbow-normal  Left ulnar motor-normal  Bilateral ulnar F waves-normal  Right median motor-normal  Right median sensory-normal  Right ulnar sensory-normal  Left ulnar sensory-normal    Right upper extremity EMG-normal limited due to pain no active denervation noted    Impression normal study as above with no evidence of ulnar neuropathy involvement at this time

## 2022-05-11 NOTE — PROGRESS NOTES
"  Subjective:       Patient ID:  Silvia Cervantes is a 61 y.o. female who presents for   Chief Complaint   Patient presents with    Skin Check    Vitiligo     Pt here today for a follow up on Vitiligo tx with protopic ointment with no improvement.   C/o itching rash on right inner ankle for months. Comes and goes. No tx. Also has similar condition on right knee  Pt also has hx of RA, sarcoid and is followed by Rheum.      Patient is here today for a "mole" check.   Pt has a history of  moderate sun exposure in the past.   Pt recalls several blistering sunburns in the past- yes  Pt has history of tanning bed use- yes  Pt has  had moles removed in the past- no  Pt has history of melanoma in first degree relatives-  no        Review of Systems   Skin: Positive for itching, rash, daily sunscreen use and activity-related sunscreen use. Negative for sensitivity to bandage adhesive and recent sunburn.   Hematologic/Lymphatic: Does not bruise/bleed easily.        Objective:    Physical Exam   Constitutional: She appears well-developed and well-nourished.   Neurological: She is alert and oriented to person, place, and time.   Psychiatric: She has a normal mood and affect.   Skin:   Areas Examined (abnormalities noted in diagram):   Head / Face Inspection Performed  Neck Inspection Performed  Chest / Axilla Inspection Performed  Abdomen Inspection Performed  Back Inspection Performed  RUE Inspected  LUE Inspection Performed  RLE Inspected  LLE Inspection Performed  Nails and Digits Inspection Performed                       Diagram Legend     Erythematous scaling macule/papule c/w actinic keratosis       Vascular papule c/w angioma      Pigmented verrucoid papule/plaque c/w seborrheic keratosis      Yellow umbilicated papule c/w sebaceous hyperplasia      Irregularly shaped tan macule c/w lentigo     1-2 mm smooth white papules consistent with Milia      Movable subcutaneous cyst with punctum c/w epidermal inclusion cyst      " Subcutaneous movable cyst c/w pilar cyst      Firm pink to brown papule c/w dermatofibroma      Pedunculated fleshy papule(s) c/w skin tag(s)      Evenly pigmented macule c/w junctional nevus     Mildly variegated pigmented, slightly irregular-bordered macule c/w mildly atypical nevus      Flesh colored to evenly pigmented papule c/w intradermal nevus       Pink pearly papule/plaque c/w basal cell carcinoma      Erythematous hyperkeratotic cursted plaque c/w SCC      Surgical scar with no sign of skin cancer recurrence      Open and closed comedones      Inflammatory papules and pustules      Verrucoid papule consistent consistent with wart     Erythematous eczematous patches and plaques     Dystrophic onycholytic nail with subungual debris c/w onychomycosis     Umbilicated papule    Erythematous-base heme-crusted tan verrucoid plaque consistent with inflamed seborrheic keratosis     Erythematous Silvery Scaling Plaque c/w Psoriasis     See annotation      Assessment / Plan:        Dermatitis  -     ruxolitinib (OPZELURA) 1.5 % Crea; aaa bid  Dispense: 60 g; Refill: 2  Opzelura - can use on itching areas on on vitiligo 2x per day  Use 2x per day until clear and then 2 more days then stop. Do not use on more than 20 % of your body at the same time    Vitiligo  Or can use protopic 0.3% bid on Vitiligo areas  Messaged Rheum regarding consideration of Xeljanz for RA and vitiligo  Lentigo  This is a benign hyperpigmented sun induced lesion. Recommend daily sun protection/avoidance and use of at least SPF 30, broad spectrum sunscreen (OTC drug) will reduce the number of new lesions. Treatment of these benign lesions are considered cosmetic.    The nature of sun-induced photo-aging and skin cancers is discussed.  Sun avoidance, protective clothing, and the use of 30-SPF sunscreens is advised. Observe closely for skin damage/changes, and call if such occurs.    SK (seborrheic keratosis)  These are benign inherited growths  without a malignant potential. Reassurance given to patient. No treatment is necessary.     Nevus  Discussed ABCDE's of nevi.  Monitor for new mole or moles that are becoming bigger, darker, irritated, or developing irregular borders. Brochure provided. Instructed patient to observe lesion(s) for changes and follow up in clinic if changes are noted. Patient to monitor skin at home for new or changing lesions.       Total body skin examination performed today including at least 12 points as noted in physical examination. No lesions suspicious for malignancy noted.    Recommend daily sun protection/avoidance, use of at least SPF 30, broad spectrum sunscreen (OTC drug), skin self examinations, and routine physician surveillance to optimize early detection             Follow up in about 6 months (around 11/11/2022).

## 2022-05-11 NOTE — PATIENT INSTRUCTIONS
Opzelura - can use on itching areas on on vitiligo 2x per day  Use 2x per day until clear and then 2 more days then stop. Do not use on more than 20 % of your body at the same time    Or can use protopic on Vitiligo areas

## 2022-05-12 ENCOUNTER — TELEPHONE (OUTPATIENT)
Dept: ORTHOPEDICS | Facility: CLINIC | Age: 62
End: 2022-05-12
Payer: MEDICARE

## 2022-05-12 NOTE — TELEPHONE ENCOUNTER
----- Message from Suellen Lanier sent at 5/12/2022  1:38 PM CDT -----  Contact: [phone]/self  Who Called: PT  Regarding: pt is requesting a earlier appt   Would the patient rather a call back or a response via [facility]? Call back  Best Call Back Number: [phone]  Additional Information: n/a

## 2022-05-16 ENCOUNTER — SPECIALTY PHARMACY (OUTPATIENT)
Dept: PHARMACY | Facility: CLINIC | Age: 62
End: 2022-05-16
Payer: MEDICARE

## 2022-05-16 ENCOUNTER — TELEPHONE (OUTPATIENT)
Dept: DERMATOLOGY | Facility: CLINIC | Age: 62
End: 2022-05-16
Payer: MEDICARE

## 2022-05-16 DIAGNOSIS — L80 VITILIGO: Primary | ICD-10-CM

## 2022-05-16 RX ORDER — DESOXIMETASONE 2.5 MG/G
CREAM TOPICAL
Qty: 60 G | Refills: 1 | Status: SHIPPED | OUTPATIENT
Start: 2022-05-16

## 2022-05-16 NOTE — TELEPHONE ENCOUNTER
----- Message from Marie Tsang MA sent at 5/13/2022  9:49 AM CDT -----    ----- Message -----  From: Margo Peterson  Sent: 5/13/2022   9:48 AM CDT  To: Eda [facility] Staff    Salvatore with [facility] calling to advise ruxolitinib (OPZELURA) 1.5 % Crea is no covered by pt insurance says t cost $2000 asking for somethng else to be sent          [facility] - [place] [facility] - [address] [facility]  [address] [place] [address]  Phone: [phone] Fax: [phone]

## 2022-05-16 NOTE — TELEPHONE ENCOUNTER
Pt said she spoke with said dentist confirmed bacterial infection.  Pt will start her week of Amoxil tomorrow (pt plans to start tomorrow once she brings hard copy Rx to pharmacy) .  She hold Enbrel dose due 6/21/22.  She agreed to [facility] F/U call next week to make sure she completed her abx and no signs/symptoms of infection.

## 2022-05-16 NOTE — TELEPHONE ENCOUNTER
Pt called and said dentist placed her on Amoxil. Pt is unsure if she has an infection.    Pt said she is not sure if Amoxil is for infection for infection ppx.  Based upon the sig (take 2 caps once then 1 cap 4x a day), told pt it sounds like she is probably being treated for an infection, but best to double check with dentist.  She asked how to handle her Enbrel. Advised pt that Enbrel is held in the setting of infection/ during treatment with abx for infection.  She will call [facility] back once she confirms the indication of use for her Amoxil.  She is due for Enbrel injection Saturday.

## 2022-05-18 ENCOUNTER — OFFICE VISIT (OUTPATIENT)
Dept: ORTHOPEDICS | Facility: CLINIC | Age: 62
End: 2022-05-18
Payer: MEDICARE

## 2022-05-18 DIAGNOSIS — M54.12 CERVICAL RADICULOPATHY: ICD-10-CM

## 2022-05-18 DIAGNOSIS — M05.79 RHEUMATOID ARTHRITIS INVOLVING MULTIPLE SITES WITH POSITIVE RHEUMATOID FACTOR: ICD-10-CM

## 2022-05-18 DIAGNOSIS — R29.898 WEAKNESS OF LEFT LEG: ICD-10-CM

## 2022-05-18 DIAGNOSIS — M25.842 CYST OF JOINT OF HAND, LEFT: ICD-10-CM

## 2022-05-18 DIAGNOSIS — M54.16 LUMBAR RADICULOPATHY: ICD-10-CM

## 2022-05-18 DIAGNOSIS — M65.831 EXTENSOR TENOSYNOVITIS OF RIGHT WRIST: Primary | ICD-10-CM

## 2022-05-18 DIAGNOSIS — M19.031 DRUJ (DISTAL RADIOULNAR JOINT) ARTHROSIS, PRIMARY, RIGHT: ICD-10-CM

## 2022-05-18 DIAGNOSIS — M06.321 RHEUMATOID NODULE OF RIGHT ELBOW: ICD-10-CM

## 2022-05-18 PROCEDURE — 1160F RVW MEDS BY RX/DR IN RCRD: CPT | Mod: CPTII,S$GLB,, | Performed by: ORTHOPAEDIC SURGERY

## 2022-05-18 PROCEDURE — 99214 PR OFFICE/OUTPT VISIT, EST, LEVL IV, 30-39 MIN: ICD-10-PCS | Mod: S$GLB,,, | Performed by: ORTHOPAEDIC SURGERY

## 2022-05-18 PROCEDURE — 99214 OFFICE O/P EST MOD 30 MIN: CPT | Mod: S$GLB,,, | Performed by: ORTHOPAEDIC SURGERY

## 2022-05-18 PROCEDURE — 99999 PR PBB SHADOW E&M-EST. PATIENT-LVL V: ICD-10-PCS | Mod: PBBFAC,,, | Performed by: ORTHOPAEDIC SURGERY

## 2022-05-18 PROCEDURE — 1159F PR MEDICATION LIST DOCUMENTED IN MEDICAL RECORD: ICD-10-PCS | Mod: CPTII,S$GLB,, | Performed by: ORTHOPAEDIC SURGERY

## 2022-05-18 PROCEDURE — 1159F MED LIST DOCD IN RCRD: CPT | Mod: CPTII,S$GLB,, | Performed by: ORTHOPAEDIC SURGERY

## 2022-05-18 PROCEDURE — 1160F PR REVIEW ALL MEDS BY PRESCRIBER/CLIN PHARMACIST DOCUMENTED: ICD-10-PCS | Mod: CPTII,S$GLB,, | Performed by: ORTHOPAEDIC SURGERY

## 2022-05-18 PROCEDURE — 4010F PR ACE/ARB THEARPY RXD/TAKEN: ICD-10-PCS | Mod: CPTII,S$GLB,, | Performed by: ORTHOPAEDIC SURGERY

## 2022-05-18 PROCEDURE — 4010F ACE/ARB THERAPY RXD/TAKEN: CPT | Mod: CPTII,S$GLB,, | Performed by: ORTHOPAEDIC SURGERY

## 2022-05-18 PROCEDURE — 99999 PR PBB SHADOW E&M-EST. PATIENT-LVL V: CPT | Mod: PBBFAC,,, | Performed by: ORTHOPAEDIC SURGERY

## 2022-05-20 NOTE — PROGRESS NOTES
Silvia Cervantes presents for follow up evaluation of   Encounter Diagnoses   Name Primary?    Rheumatoid nodule of right elbow Yes    Extensor tenosynovitis of right wrist     Cervical radiculopathy     Rheumatoid arthritis involving multiple sites with positive rheumatoid factor     Cyst of joint of hand, left    The patient has had an EMG/NCS which we reviewed together today and it showed: normal study.    She reports continued discomfort at the right elbow rheumatoid nodule as well as the right wrist extensor tenosynovitis, she would like to think about surgical intervention.  She also reports discomfort and swelling with cyst on the dorsum of the left thumb MP joint.    PE:    AA&O x 4.  NAD  HEENT:  NCAT, sclera nonicteric  Lungs:  Respirations are equal and unlabored.  CV:  2+ bilateral upper and lower extremity pulses.  MSK:   Observation/Inspection:  Swelling                       Left thumb MP joint dorsal masses                  Deformity                     none  Discoloration               none                  Scars                           none                  Atrophy                        none     HAND/WRIST EXAMINATION:  Finkelstein's Test                                Neg  WHAT Test                                         Neg  CMC grind                                           Neg  Circumduction test                              Neg     Right elbow mass 7x7 cm; ROM right elbow full flexion, -25 degrees extension; left elbow full flexion, -20 ext  Bilateral ROM wrists full flexion, ext 50 degrees. TTP right 6th dorsal extensor compartment, synovitis     Neurovascular Exam:  Digits WWP, brisk CR < 3s throughout  NVI motor/LTS to M/R/U nerves, radial pulse 2+  2+ biceps and brachioradialis reflexes  Tinel's Test - Carpal Tunnel                neg  Tinel's Test - Cubital Tunnel               Positive right  Phalen's Test                                      neg  Median Nerve Compression Test        neg    A/P:  Right wrist extensor tenosynovitis, DRUJ arthrosis, right elbow rheumatoid nodule, left thumb MP joint synovitis and cysts  We have discussed conservative vs. surgical treatment as well as risks, benefits and alternatives for Right wrist extensor tenosynovitis, DRUJ arthrosis, right elbow rheumatoid nodule, left thumb MP joint synovitis and cysts.  She has exhausted conservative measures and would like to proceed with surgery. Surgery would include right wrist radical extensor tenosynovectomy, DRUJ Olive procedure verses osteophyte excision, right elbow rheumatoid nodule excision, left thumb MP joint steroid injection.     We have discussed risks of hand surgery which include but are not limited to blood clots in the legs that can travel to the lungs (pulmonary embolism). Pulmonary embolism can cause shortness of breath, chest pain, and even shock. Other risks include urinary tract infection, nausea and vomiting (usually related to pain medication), chronic pain, bleeding, nerve damage, blood vessel injury, scarring and infection of the hand which can require re-operation. Furthermore, the risks of anesthesia include potential heart, lung, kidney, and liver damage.  Informed consent was obtained.  She understands and would like to proceed with surgery in the near future.    Call with any questions/concerns in the interim        Louise Smith MD     Please be aware that this note has been generated with the assistance of casper voice-to-text.  Please excuse any spelling or grammatical errors.

## 2022-05-26 NOTE — TELEPHONE ENCOUNTER
Specialty Pharmacy - Refill Coordination    Specialty Medication Orders Linked to Encounter    Flowsheet Row Most Recent Value   Medication #1 etanercept (ENBREL SURECLICK) 50 mg/mL (1 mL) (Order#[id number], Rx#[id number])        Spoke with pt- she has 2 days of Amoxil left.  Recommended for her to finish her abx and have resolution of signs/symtpoms of infection before injecting Enbrel.  Pt stated she will come  Monday (will be done her abx by then) and reach out to dentist if any remaining symptoms of gum infection for further advisement by then.    Missed dose instructions reviewed.      Pt requested [facility] send message to her provider to update her, which I have done.   Refill Questions - Documented Responses    Flowsheet Row Most Recent Value   Patient Availability and HIPAA Verification    Does patient want to proceed with activity? Yes   HIPAA/medical authority confirmed? Yes   Relationship to patient of person spoken to? Self   Refill Screening Questions    Changes to allergies? No   Changes to medications? Yes  [Amoxil]   New conditions since last clinic visit? Yes  [bacterial gum infection]   Unplanned office visit, urgent care, ED, or hospital admission in the last 4 weeks? No   How does patient/caregiver feel medication is working? Good   Financial problems or insurance changes? No   How many doses of your specialty medications were missed in the last 4 weeks? 1   Would patient like to speak to a pharmacist? No   When does the patient need to receive the medication? 05/30/22   Refill Delivery Questions    How will the patient receive the medication? Pickup   When does the patient need to receive the medication? 05/30/22   Expected Copay ($) 0   Is the patient able to afford the medication copay? Yes   Payment Method zero copay   Days supply of Refill 28   Supplies needed? No supplies needed   Refill activity completed? Yes   Refill activity plan Refill scheduled   Shipment/Pickup Date: 05/30/22           Current Outpatient Medications   Medication Sig    amLODIPine (NORVASC) 5 MG tablet Take 1 tablet (5 mg total) by mouth once daily.    amoxicillin (AMOXIL) 500 MG capsule take 2 capsules to start then take 1 capsule by mouth four times a day until all gone    aspirin (ECOTRIN) 81 MG EC tablet Take 81 mg by mouth once daily.    atorvastatin (LIPITOR) 10 MG tablet Take 1 tablet (10 mg total) by mouth once daily.    azelastine (ASTELIN) 137 mcg (0.1 %) nasal spray spray 1 spray (137 mcg total) by Nasal route 2 (two) times daily.    chlorhexidine (PERIDEX) 0.12 % solution Use 15-20 mL to rinse mouth twice daily for 2 minutes, spit out and do not rinse mouth with water after.    clotrimazole-betamethasone 1-0.05% (LOTRISONE) cream Apply to affected area 2 times daily    cyanocobalamin, vitamin B-12, (VITAMIN B-12 ORAL) Take by mouth.    cycloSPORINE (RESTASIS) 0.05 % ophthalmic emulsion Apply 1 drop into both eyes twice a day    desoximetasone (TOPICORT) 0.25 % cream Apply to affected area every other night x 1 month    ergocalciferol (ERGOCALCIFEROL) 50,000 unit Cap Take 1 capsule (50,000 Units total) by mouth every 7 days.    etanercept (ENBREL SURECLICK) 50 mg/mL (1 mL) Inject 1 mL (50 mg total) into the skin once a week.    fluticasone propion-salmeterol 115-21 mcg/dose (ADVAIR HFA) 115-21 mcg/actuation HFAA inhaler Inhale 2 puffs into the lungs every 12 (twelve) hours.    fluticasone propionate (FLONASE) 50 mcg/actuation nasal spray 1 spray (50 mcg total) by Each Nostril route once daily.    folic acid (FOLVITE) 1 MG tablet Take 4 tablets (4 mg total) by mouth once daily.    gabapentin (NEURONTIN) 300 MG capsule Take 1 capsule (300 mg total) by mouth 2 (two) times daily.    gabapentin (NEURONTIN) 300 MG capsule Take 1 capsule (300 mg total) by mouth 2 (two) times daily.    hydrOXYzine HCL (ATARAX) 25 MG tablet Take 1 tablet (25 mg total) by mouth every evening.    leflunomide (ARAVA) 20 MG  Tab Take 1 tablet (20 mg total) by mouth once daily. (Patient not taking: Reported on 2/21/2022)    losartan (COZAAR) 100 MG tablet Take 1 tablet (100 mg total) by mouth once daily.    meclizine (ANTIVERT) 25 mg tablet Take 1 tablet (25 mg total) by mouth 3 (three) times daily as needed for Dizziness.    metoprolol succinate (TOPROL-XL) 25 MG 24 hr tablet Take 1 tablet (25 mg total) by mouth every evening.    nystatin (MYCOSTATIN) powder Apply to affected area every morning as needed for flare under breasts (Patient taking differently: Apply to affected area every morning as needed for flare under breasts)    polyethylene glycol (GLYCOLAX) 17 gram/dose powder Take 17 g by mouth once daily.    PROTOPIC 0.1 % ointment AAA bid    ruxolitinib (OPZELURA) 1.5 % Crea aaa bid    triamcinolone acetonide 0.025% (KENALOG) 0.025 % cream Apply to affected area every day to twice a day to neck . Not more than 1-2 weeks straight in same location    triamcinolone acetonide 0.1% (KENALOG) 0.1 % ointment Apply topically 2 (two) times daily. For 2 weeks    ZINC ORAL Take by mouth.   Last reviewed on 5/18/2022  9:42 AM by Louise Smith MD    Review of patient's allergies indicates:  No Known Allergies Last reviewed on  5/18/2022 9:42 AM by Louise Smith    Interventions added this encounter   Open: [facility] Patient Intervention - Drug safety: etanercept (ENBREL SURECLICK) 50 mg/mL (1 mL)     Tasks added this encounter   6/20/2022 - Refill Call (Auto Added)  5/31/2022 - Pickup Reminder   Tasks due within next 3 months   No tasks due.     Leda Gould, PharmD  [facility]  [address] [place] [id number]  Phone: [phone]  Fax: [phone]

## 2022-05-27 ENCOUNTER — TELEPHONE (OUTPATIENT)
Dept: FAMILY MEDICINE | Facility: CLINIC | Age: 62
End: 2022-05-27
Payer: MEDICARE

## 2022-05-27 ENCOUNTER — PATIENT MESSAGE (OUTPATIENT)
Dept: FAMILY MEDICINE | Facility: CLINIC | Age: 62
End: 2022-05-27
Payer: MEDICARE

## 2022-05-27 NOTE — TELEPHONE ENCOUNTER
----- Message from Nelsy Lebron sent at 5/27/2022  3:37 PM CDT -----  Contact: rigoberto  Type:  Patient Returning Call    Who Called:rigoberto hartley/ [employer]   Would the patient rather a call back or a response via [facility]? Call   Best Call Back Number:  Fax #   Additional Information:     Requesting medical necessity form to be signed by provider

## 2022-05-27 NOTE — TELEPHONE ENCOUNTER
Spoke to Rosemarie at [employer] and stated that she has no notes in the computer regarding pt's needs. Spoke to pt and stated that she would like a Life Alert Bracelet, Life Alert Help Button and the Necklace Pendant GPS for [employer]. Pls advise.

## 2022-06-04 ENCOUNTER — TELEPHONE (OUTPATIENT)
Dept: ORTHOPEDICS | Facility: CLINIC | Age: 62
End: 2022-06-04
Payer: MEDICARE

## 2022-06-04 NOTE — TELEPHONE ENCOUNTER
CXR 4/11/22    COMPARISON:  10/15/2019.     FINDINGS:  The trachea is unremarkable.  The cardiomediastinal silhouette is within normal limits.  There is unchanged appearance of right hilar lymphadenopathy.  There are no pleural effusions.  There is no evidence of a pneumothorax.  There is no evidence of pneumomediastinum.  There are chronic appearing interstitial findings.  There is no focal consolidation.     There are postop changes in the left humerus.  The remainder of the osseous structures are unremarkable.     Impression:     Stable examination.    Call pt.  Reassured that x-ray does not indicate progression of sarcoidosis.    Del Garibay MD  [facility]

## 2022-06-13 ENCOUNTER — PATIENT MESSAGE (OUTPATIENT)
Dept: DERMATOLOGY | Facility: CLINIC | Age: 62
End: 2022-06-13
Payer: MEDICARE

## 2022-06-15 ENCOUNTER — SPECIALTY PHARMACY (OUTPATIENT)
Dept: PHARMACY | Facility: CLINIC | Age: 62
End: 2022-06-15
Payer: MEDICARE

## 2022-06-15 NOTE — TELEPHONE ENCOUNTER
Specialty Pharmacy - Clinical Reassessment    Specialty Medication Orders Linked to Encounter    Flowsheet Row Most Recent Value   Medication #1 etanercept (ENBREL SURECLICK) 50 mg/mL (1 mL) (Order#[id number], Rx#[id number])        Patient Diagnosis   M05.9 - Rheumatoid arthritis with positive rheumatoid factor    Specialty clinical pharmacist review completed for an annual review of reassessment. Reviewed the following areas: current med list, reports of adverse effects, adherence and progress towards therapeutic goals.    Recommendations: Patient having surgery on 6/30 and was advised to hold Enbrel for dose due 6/28. Pharmacist will follow up with patient for safe restart after surgery.     Tasks added this encounter   3/15/2023 - Clinical - Follow Up Assesement (Annual)   Tasks due within next 3 months   6/20/2022 - Refill Call (Auto Added)     Lorena Romero, PharmD  [facility]  [address] [address]  Phone: [phone]  Fax: [phone]

## 2022-06-16 ENCOUNTER — PATIENT MESSAGE (OUTPATIENT)
Dept: PHARMACY | Facility: CLINIC | Age: 62
End: 2022-06-16
Payer: MEDICARE

## 2022-06-16 ENCOUNTER — SPECIALTY PHARMACY (OUTPATIENT)
Dept: PHARMACY | Facility: CLINIC | Age: 62
End: 2022-06-16
Payer: MEDICARE

## 2022-06-16 ENCOUNTER — TELEPHONE (OUTPATIENT)
Dept: PHARMACY | Facility: CLINIC | Age: 62
End: 2022-06-16
Payer: MEDICARE

## 2022-06-16 NOTE — TELEPHONE ENCOUNTER
Incoming call from patient returning our call. I informed patient to hold doses of Enbrel on 06/21 and 06/28, due to surgery schedule for 6/30. Patient reports one pen on hand, pended refill until 07/05. Patient verbalized understanding. Routing assigned [facility] to alert.

## 2022-06-20 ENCOUNTER — ANESTHESIA EVENT (OUTPATIENT)
Dept: SURGERY | Facility: HOSPITAL | Age: 62
End: 2022-06-20
Payer: MEDICARE

## 2022-06-21 ENCOUNTER — OFFICE VISIT (OUTPATIENT)
Dept: FAMILY MEDICINE | Facility: CLINIC | Age: 62
End: 2022-06-21
Attending: FAMILY MEDICINE
Payer: MEDICARE

## 2022-06-21 ENCOUNTER — PATIENT MESSAGE (OUTPATIENT)
Dept: FAMILY MEDICINE | Facility: CLINIC | Age: 62
End: 2022-06-21

## 2022-06-21 VITALS
OXYGEN SATURATION: 95 % | BODY MASS INDEX: 27.3 KG/M2 | TEMPERATURE: 98 F | DIASTOLIC BLOOD PRESSURE: 80 MMHG | WEIGHT: 144.63 LBS | SYSTOLIC BLOOD PRESSURE: 130 MMHG | HEART RATE: 80 BPM | HEIGHT: 61 IN

## 2022-06-21 DIAGNOSIS — M54.12 CERVICAL RADICULOPATHY: Primary | ICD-10-CM

## 2022-06-21 DIAGNOSIS — F33.9 MAJOR DEPRESSION, RECURRENT, CHRONIC: ICD-10-CM

## 2022-06-21 DIAGNOSIS — R00.2 PALPITATIONS: ICD-10-CM

## 2022-06-21 DIAGNOSIS — R91.8 PULMONARY NODULES: ICD-10-CM

## 2022-06-21 DIAGNOSIS — L80 VITILIGO: ICD-10-CM

## 2022-06-21 DIAGNOSIS — I10 PRIMARY HYPERTENSION: ICD-10-CM

## 2022-06-21 DIAGNOSIS — D86.9 SARCOIDOSIS: ICD-10-CM

## 2022-06-21 DIAGNOSIS — E78.2 MIXED HYPERLIPIDEMIA: ICD-10-CM

## 2022-06-21 DIAGNOSIS — M05.79 RHEUMATOID ARTHRITIS INVOLVING MULTIPLE SITES WITH POSITIVE RHEUMATOID FACTOR: Primary | ICD-10-CM

## 2022-06-21 DIAGNOSIS — I10 ESSENTIAL HYPERTENSION: ICD-10-CM

## 2022-06-21 DIAGNOSIS — E78.00 HYPERCHOLESTEREMIA: ICD-10-CM

## 2022-06-21 PROCEDURE — 3008F PR BODY MASS INDEX (BMI) DOCUMENTED: ICD-10-PCS | Mod: CPTII,S$GLB,, | Performed by: FAMILY MEDICINE

## 2022-06-21 PROCEDURE — 4010F ACE/ARB THERAPY RXD/TAKEN: CPT | Mod: CPTII,S$GLB,, | Performed by: FAMILY MEDICINE

## 2022-06-21 PROCEDURE — 4010F PR ACE/ARB THEARPY RXD/TAKEN: ICD-10-PCS | Mod: CPTII,S$GLB,, | Performed by: FAMILY MEDICINE

## 2022-06-21 PROCEDURE — 99999 PR PBB SHADOW E&M-EST. PATIENT-LVL III: CPT | Mod: PBBFAC,,, | Performed by: FAMILY MEDICINE

## 2022-06-21 PROCEDURE — 3075F SYST BP GE 130 - 139MM HG: CPT | Mod: CPTII,S$GLB,, | Performed by: FAMILY MEDICINE

## 2022-06-21 PROCEDURE — 99214 PR OFFICE/OUTPT VISIT, EST, LEVL IV, 30-39 MIN: ICD-10-PCS | Mod: S$GLB,,, | Performed by: FAMILY MEDICINE

## 2022-06-21 PROCEDURE — 3079F PR MOST RECENT DIASTOLIC BLOOD PRESSURE 80-89 MM HG: ICD-10-PCS | Mod: CPTII,S$GLB,, | Performed by: FAMILY MEDICINE

## 2022-06-21 PROCEDURE — 1159F PR MEDICATION LIST DOCUMENTED IN MEDICAL RECORD: ICD-10-PCS | Mod: CPTII,S$GLB,, | Performed by: FAMILY MEDICINE

## 2022-06-21 PROCEDURE — 1160F PR REVIEW ALL MEDS BY PRESCRIBER/CLIN PHARMACIST DOCUMENTED: ICD-10-PCS | Mod: CPTII,S$GLB,, | Performed by: FAMILY MEDICINE

## 2022-06-21 PROCEDURE — 3079F DIAST BP 80-89 MM HG: CPT | Mod: CPTII,S$GLB,, | Performed by: FAMILY MEDICINE

## 2022-06-21 PROCEDURE — 1159F MED LIST DOCD IN RCRD: CPT | Mod: CPTII,S$GLB,, | Performed by: FAMILY MEDICINE

## 2022-06-21 PROCEDURE — 3075F PR MOST RECENT SYSTOLIC BLOOD PRESS GE 130-139MM HG: ICD-10-PCS | Mod: CPTII,S$GLB,, | Performed by: FAMILY MEDICINE

## 2022-06-21 PROCEDURE — 99999 PR PBB SHADOW E&M-EST. PATIENT-LVL III: ICD-10-PCS | Mod: PBBFAC,,, | Performed by: FAMILY MEDICINE

## 2022-06-21 PROCEDURE — 3008F BODY MASS INDEX DOCD: CPT | Mod: CPTII,S$GLB,, | Performed by: FAMILY MEDICINE

## 2022-06-21 PROCEDURE — 1160F RVW MEDS BY RX/DR IN RCRD: CPT | Mod: CPTII,S$GLB,, | Performed by: FAMILY MEDICINE

## 2022-06-21 PROCEDURE — 99214 OFFICE O/P EST MOD 30 MIN: CPT | Mod: S$GLB,,, | Performed by: FAMILY MEDICINE

## 2022-06-21 RX ORDER — METOPROLOL SUCCINATE 25 MG/1
25 TABLET, EXTENDED RELEASE ORAL NIGHTLY
Qty: 90 TABLET | Refills: 4 | Status: SHIPPED | OUTPATIENT
Start: 2022-06-21 | End: 2023-07-21 | Stop reason: SDUPTHER

## 2022-06-21 RX ORDER — LOSARTAN POTASSIUM 100 MG/1
100 TABLET ORAL DAILY
Qty: 90 TABLET | Refills: 3 | Status: SHIPPED | OUTPATIENT
Start: 2022-06-21 | End: 2023-08-29 | Stop reason: SDUPTHER

## 2022-06-21 RX ORDER — ATORVASTATIN CALCIUM 10 MG/1
10 TABLET, FILM COATED ORAL DAILY
Qty: 90 TABLET | Refills: 3 | Status: SHIPPED | OUTPATIENT
Start: 2022-06-21 | End: 2023-07-06 | Stop reason: SDUPTHER

## 2022-06-21 NOTE — PROGRESS NOTES
Subjective:       Patient ID: Silvia Cervantes is a 61 y.o. female.    Chief Complaint: No chief complaint on file.    61 yr old pleasant  female with depression, allergies, vitiligo, HTN, HLD, sarcoidosis, Rheumatoid arthritis, Erosive gastritis, and other co morbidities presents today for her 3 month follow up.    Chronic low back pain. Onset 6 months ago and gradually worsening - left lower back- does not radiate and no neurological symptoms. No saddle anesthesia or bladder/bowel problems. Details as follows -      HTN - controlled - on Losartan-HCT and Norvasc 5 - compliant - no side effects     Sarcoidosis and lung nodules - follwos pulmonology - need new one as her previous specialist is leaving [facility]      Major depression - uncontrolled - not on meds - would like to start one - given zoloft - she will start from today - - no SI/HI    Vertigo - much better - on vertin as needed       HLD - diet controlled -    LDLCALC                  86.2                12/22/2021                 RA - on Enbrel shots - follows Rheumatology - stable      Gastritis - stable - on PPI as needed - no side effects      History as below - reviewed       HM  -labs UTD  -vaccines UTD    Follow-up  Associated symptoms include myalgias, neck pain, numbness and weakness. Pertinent negatives include no arthralgias, chest pain, congestion, diaphoresis, headaches, nausea or sore throat.   Hypertension  This is a chronic problem. The current episode started more than 1 year ago. The problem has been gradually improving since onset. The problem is controlled. Associated symptoms include neck pain. Pertinent negatives include no chest pain, headaches, malaise/fatigue, palpitations or shortness of breath. There are no associated agents to hypertension. Risk factors for coronary artery disease include dyslipidemia and post-menopausal state. Past treatments include angiotensin blockers and diuretics. The current treatment provides  significant improvement. There are no compliance problems.  There is no history of angina, CAD/MI, CVA, left ventricular hypertrophy, PVD or retinopathy. There is no history of chronic renal disease, coarctation of the aorta, hypercortisolism, hyperparathyroidism, a hypertension causing med or sleep apnea.   Hyperlipidemia  This is a chronic problem. The current episode started more than 1 year ago. The problem is controlled. Recent lipid tests were reviewed and are normal. She has no history of chronic renal disease, diabetes, hypothyroidism or liver disease. There are no known factors aggravating her hyperlipidemia. Associated symptoms include myalgias. Pertinent negatives include no chest pain, focal sensory loss, focal weakness, leg pain or shortness of breath. She is currently on no antihyperlipidemic treatment. The current treatment provides mild improvement of lipids. There are no compliance problems.  Risk factors for coronary artery disease include dyslipidemia, hypertension and post-menopausal.   Back Pain  This is a chronic problem. The current episode started more than 1 month ago. The problem occurs constantly. The problem is unchanged. The pain is present in the sacro-iliac and lumbar spine. The quality of the pain is described as aching. The pain does not radiate. The symptoms are aggravated by bending, sitting and twisting. Associated symptoms include numbness and weakness. Pertinent negatives include no chest pain, dysuria, headaches, leg pain, perianal numbness or weight loss. She has tried nothing for the symptoms. The treatment provided no relief.     Review of Systems   Constitutional: Negative.  Negative for activity change, diaphoresis, malaise/fatigue, unexpected weight change and weight loss.   HENT: Negative.  Negative for nasal congestion, ear discharge, hearing loss, rhinorrhea, sore throat and voice change.    Eyes: Negative.  Negative for pain, discharge and visual disturbance.    Respiratory: Negative.  Negative for chest tightness, shortness of breath and wheezing.    Cardiovascular: Negative.  Negative for chest pain and palpitations.   Gastrointestinal: Negative.  Negative for abdominal distention, anal bleeding, constipation and nausea.   Endocrine: Negative.  Negative for cold intolerance, polydipsia and polyuria.   Genitourinary: Negative.  Negative for decreased urine volume, difficulty urinating, dysuria, frequency, menstrual problem and vaginal pain.   Musculoskeletal: Positive for back pain, myalgias and neck pain. Negative for arthralgias, gait problem and leg pain.   Integumentary:  Negative for color change, pallor and wound. Negative.   Allergic/Immunologic: Negative.  Negative for environmental allergies and immunocompromised state.   Neurological: Positive for weakness and numbness. Negative for dizziness, tremors, focal weakness, seizures, speech difficulty and headaches.   Hematological: Negative.  Negative for adenopathy. Does not bruise/bleed easily.   Psychiatric/Behavioral: Negative.  Negative for agitation, confusion, decreased concentration, hallucinations, self-injury and suicidal ideas. The patient is not nervous/anxious.          PMH/PSH/FH/SH/MED/ALLERGY reviewed    Objective:       Vitals:    06/21/22 1048   BP: 130/80   Pulse: 80   Temp: 97.7 °F (36.5 °C)       Physical Exam  Constitutional:       General: She is not in acute distress.     Appearance: She is well-developed. She is not diaphoretic.   HENT:      Head: Normocephalic and atraumatic.      Right Ear: External ear normal.      Left Ear: External ear normal.      Nose: Nose normal.      Mouth/Throat:      Pharynx: No oropharyngeal exudate.   Eyes:      General: No scleral icterus.        Right eye: No discharge.         Left eye: No discharge.      Conjunctiva/sclera: Conjunctivae normal.      Pupils: Pupils are equal, round, and reactive to light.   Neck:      Thyroid: No thyromegaly.      Vascular:  No JVD.      Trachea: No tracheal deviation.   Cardiovascular:      Rate and Rhythm: Normal rate and regular rhythm.      Heart sounds: Normal heart sounds. No murmur heard.    No friction rub. No gallop.   Pulmonary:      Effort: Pulmonary effort is normal.      Breath sounds: Normal breath sounds. No stridor. No wheezing or rales.   Chest:      Chest wall: No tenderness.   Abdominal:      General: Bowel sounds are normal. There is no distension.      Palpations: Abdomen is soft. There is no mass.      Tenderness: There is no abdominal tenderness. There is no guarding or rebound.      Hernia: No hernia is present.   Musculoskeletal:         General: No tenderness. Normal range of motion.      Cervical back: Normal range of motion and neck supple.   Lymphadenopathy:      Cervical: No cervical adenopathy.   Skin:     General: Skin is warm and dry.      Coloration: Skin is not pale.      Findings: No erythema or rash.   Neurological:      Mental Status: She is alert and oriented to person, place, and time.      Cranial Nerves: No cranial nerve deficit.      Motor: No abnormal muscle tone.      Coordination: Coordination normal.      Deep Tendon Reflexes: Reflexes are normal and symmetric. Reflexes normal.   Psychiatric:         Behavior: Behavior normal.         Thought Content: Thought content normal.         Judgment: Judgment normal.         Assessment:       Problem List Items Addressed This Visit     Vitiligo    Sarcoidosis    Relevant Orders    Ambulatory referral/consult to Pulmonology    Rheumatoid arthritis with positive rheumatoid factor - Primary    Pulmonary nodules    Relevant Orders    Ambulatory referral/consult to Pulmonology    Palpitations    Relevant Medications    metoprolol succinate (TOPROL-XL) 25 MG 24 hr tablet    Mixed hyperlipidemia    Relevant Medications    atorvastatin (LIPITOR) 10 MG tablet    Major depression, recurrent, chronic    Hypertension    Relevant Medications    losartan  (COZAAR) 100 MG tablet      Other Visit Diagnoses     Essential hypertension        Relevant Medications    losartan (COZAAR) 100 MG tablet    Hypercholesteremia        Relevant Medications    atorvastatin (LIPITOR) 10 MG tablet          Plan:       Diagnoses and all orders for this visit:    Rheumatoid arthritis involving multiple sites with positive rheumatoid factor    Pulmonary nodules  -     Ambulatory referral/consult to Pulmonology; Future    Major depression, recurrent, chronic    Mixed hyperlipidemia    Primary hypertension    Essential hypertension  -     losartan (COZAAR) 100 MG tablet; Take 1 tablet (100 mg total) by mouth once daily.    Hypercholesteremia  -     atorvastatin (LIPITOR) 10 MG tablet; Take 1 tablet (10 mg total) by mouth once daily.    Vitiligo    Sarcoidosis  -     Ambulatory referral/consult to Pulmonology; Future    Palpitations  -     metoprolol succinate (TOPROL-XL) 25 MG 24 hr tablet; Take 1 tablet (25 mg total) by mouth every evening.      HLD  -diet control  -start statin    Left leg pain  -likely chronic injuries  -x rays  -reassurance      HTN  -controlled    RA  -stable  -follow rheumatology    Vertigo  -meclizine prn    Depression  -controlled  -continue zoloft 25 mg daily  -SI/ER precautions given      Spent adequate time in obtaining history and explaining differentials      25 minutes spent during this visit of which greater than 50% devoted to face-face counseling and coordination of care regarding diagnosis and management plan    Follow up in about 3 months (around 9/21/2022), or if symptoms worsen or fail to improve.

## 2022-06-22 ENCOUNTER — TELEPHONE (OUTPATIENT)
Dept: ORTHOPEDICS | Facility: CLINIC | Age: 62
End: 2022-06-22
Payer: MEDICARE

## 2022-06-22 ENCOUNTER — PATIENT MESSAGE (OUTPATIENT)
Dept: SURGERY | Facility: HOSPITAL | Age: 62
End: 2022-06-22
Payer: MEDICARE

## 2022-06-22 NOTE — TELEPHONE ENCOUNTER
I called and spoke to the patient regarding her portal message. I informed the patient that she is seeing Anu Hyde tomorrow and the x-ray order has been put in. The patient verbalized understanding and has no further questions.

## 2022-06-22 NOTE — TELEPHONE ENCOUNTER
----- Message from Louise Smith MD sent at 6/21/2022  7:05 AM CDT -----  Regarding: FW: alvaro Velazco,    Dr. Gomes wants c-spine films, I have ordered them. Could you call the patient and ask her to get them today or tomorrow for anesthesia to review?     Thanks!  ----- Message -----  From: Jaja Gomes MD  Sent: 6/20/2022  11:13 AM CDT  To: Louise Smith MD  Subject: c spine                                          Louise,  This patient is scheduled next week ; I do not see a recent C spine film; can you order one please so we know her neck is stable if we would need to intubate her please? Or if she has had one recently that I cannot see, please pass it along; I have asked the preop center to help as well  LT

## 2022-06-22 NOTE — TELEPHONE ENCOUNTER
Spoke with pt.  Scheduled pt for c spine x-rays tomorrow in [facility] as requested by pt.

## 2022-06-23 ENCOUNTER — HOSPITAL ENCOUNTER (OUTPATIENT)
Dept: RADIOLOGY | Facility: HOSPITAL | Age: 62
Discharge: HOME | End: 2022-06-23
Attending: ORTHOPAEDIC SURGERY
Payer: MEDICARE

## 2022-06-23 ENCOUNTER — OFFICE VISIT (OUTPATIENT)
Dept: PAIN MEDICINE | Facility: CLINIC | Age: 62
End: 2022-06-23
Payer: MEDICARE

## 2022-06-23 VITALS
RESPIRATION RATE: 16 BRPM | HEART RATE: 72 BPM | DIASTOLIC BLOOD PRESSURE: 87 MMHG | SYSTOLIC BLOOD PRESSURE: 132 MMHG | WEIGHT: 148.13 LBS | HEIGHT: 61 IN | BODY MASS INDEX: 27.97 KG/M2

## 2022-06-23 DIAGNOSIS — Z87.820 HISTORY OF TRAUMATIC BRAIN INJURY: ICD-10-CM

## 2022-06-23 DIAGNOSIS — R26.9 ABNORMAL GAIT: Primary | ICD-10-CM

## 2022-06-23 DIAGNOSIS — M54.12 CERVICAL RADICULOPATHY: ICD-10-CM

## 2022-06-23 DIAGNOSIS — R20.0 LEFT LEG NUMBNESS: ICD-10-CM

## 2022-06-23 DIAGNOSIS — M21.70 LEG LENGTH DISCREPANCY: ICD-10-CM

## 2022-06-23 DIAGNOSIS — M54.16 LUMBAR RADICULOPATHY: ICD-10-CM

## 2022-06-23 PROCEDURE — 72050 X-RAY EXAM NECK SPINE 4/5VWS: CPT | Mod: TC,FY

## 2022-06-23 PROCEDURE — 4010F ACE/ARB THERAPY RXD/TAKEN: CPT | Mod: CPTII,S$GLB,, | Performed by: PHYSICAL MEDICINE & REHABILITATION

## 2022-06-23 PROCEDURE — 4010F PR ACE/ARB THEARPY RXD/TAKEN: ICD-10-PCS | Mod: CPTII,S$GLB,, | Performed by: PHYSICAL MEDICINE & REHABILITATION

## 2022-06-23 PROCEDURE — 99999 PR PBB SHADOW E&M-EST. PATIENT-LVL V: ICD-10-PCS | Mod: PBBFAC,,, | Performed by: PHYSICAL MEDICINE & REHABILITATION

## 2022-06-23 PROCEDURE — 72050 XR CERVICAL SPINE COMPLETE 5 VIEW: ICD-10-PCS | Mod: 26,,, | Performed by: RADIOLOGY

## 2022-06-23 PROCEDURE — 1160F PR REVIEW ALL MEDS BY PRESCRIBER/CLIN PHARMACIST DOCUMENTED: ICD-10-PCS | Mod: CPTII,S$GLB,, | Performed by: PHYSICAL MEDICINE & REHABILITATION

## 2022-06-23 PROCEDURE — 1159F MED LIST DOCD IN RCRD: CPT | Mod: CPTII,S$GLB,, | Performed by: PHYSICAL MEDICINE & REHABILITATION

## 2022-06-23 PROCEDURE — 3075F PR MOST RECENT SYSTOLIC BLOOD PRESS GE 130-139MM HG: ICD-10-PCS | Mod: CPTII,S$GLB,, | Performed by: PHYSICAL MEDICINE & REHABILITATION

## 2022-06-23 PROCEDURE — 99204 OFFICE O/P NEW MOD 45 MIN: CPT | Mod: S$GLB,,, | Performed by: PHYSICAL MEDICINE & REHABILITATION

## 2022-06-23 PROCEDURE — 99204 PR OFFICE/OUTPT VISIT, NEW, LEVL IV, 45-59 MIN: ICD-10-PCS | Mod: S$GLB,,, | Performed by: PHYSICAL MEDICINE & REHABILITATION

## 2022-06-23 PROCEDURE — 99499 UNLISTED E&M SERVICE: CPT | Mod: S$GLB,,, | Performed by: PHYSICAL MEDICINE & REHABILITATION

## 2022-06-23 PROCEDURE — 3008F PR BODY MASS INDEX (BMI) DOCUMENTED: ICD-10-PCS | Mod: CPTII,S$GLB,, | Performed by: PHYSICAL MEDICINE & REHABILITATION

## 2022-06-23 PROCEDURE — 99999 PR PBB SHADOW E&M-EST. PATIENT-LVL V: CPT | Mod: PBBFAC,,, | Performed by: PHYSICAL MEDICINE & REHABILITATION

## 2022-06-23 PROCEDURE — 3075F SYST BP GE 130 - 139MM HG: CPT | Mod: CPTII,S$GLB,, | Performed by: PHYSICAL MEDICINE & REHABILITATION

## 2022-06-23 PROCEDURE — 99499 RISK ADDL DX/OHS AUDIT: ICD-10-PCS | Mod: S$GLB,,, | Performed by: PHYSICAL MEDICINE & REHABILITATION

## 2022-06-23 PROCEDURE — 72050 X-RAY EXAM NECK SPINE 4/5VWS: CPT | Mod: 26,,, | Performed by: RADIOLOGY

## 2022-06-23 PROCEDURE — 1160F RVW MEDS BY RX/DR IN RCRD: CPT | Mod: CPTII,S$GLB,, | Performed by: PHYSICAL MEDICINE & REHABILITATION

## 2022-06-23 PROCEDURE — 3079F DIAST BP 80-89 MM HG: CPT | Mod: CPTII,S$GLB,, | Performed by: PHYSICAL MEDICINE & REHABILITATION

## 2022-06-23 PROCEDURE — 3008F BODY MASS INDEX DOCD: CPT | Mod: CPTII,S$GLB,, | Performed by: PHYSICAL MEDICINE & REHABILITATION

## 2022-06-23 PROCEDURE — 1159F PR MEDICATION LIST DOCUMENTED IN MEDICAL RECORD: ICD-10-PCS | Mod: CPTII,S$GLB,, | Performed by: PHYSICAL MEDICINE & REHABILITATION

## 2022-06-23 PROCEDURE — 3079F PR MOST RECENT DIASTOLIC BLOOD PRESSURE 80-89 MM HG: ICD-10-PCS | Mod: CPTII,S$GLB,, | Performed by: PHYSICAL MEDICINE & REHABILITATION
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
## 2022-06-27 NOTE — ANESTHESIA PREPROCEDURE EVALUATION
06/27/2022  Silvia Cervantes is a 61 y.o., female.    Chart review complete. Patient's medical history reviewed.  OK to proceed at [facility].    Ajay Bueno MD   6/27/2022          Pre-op Assessment    I have reviewed the Patient Summary Reports.     I have reviewed the Nursing Notes. I have reviewed the NPO Status.   I have reviewed the Medications.     Review of Systems  Anesthesia Hx:  No problems with previous Anesthesia  History of prior surgery of interest to airway management or planning:  Denies Personal Hx of Anesthesia complications.   Social:  Non-Smoker    Hematology/Oncology:     Oncology Normal     Cardiovascular:   Exercise tolerance: good Hypertension hyperlipidemia    Pulmonary:  Pulmonary Normal Denies Pneumonia  Denies COPD.    Musculoskeletal:   Arthritis (Rheumatoid)     Neurological:   Neuromuscular Disease, Headaches    Endocrine:   Rheumatoid arthritis   Psych:   Psychiatric History depression          Physical Exam  General: Well nourished and Cooperative    Airway:  Mallampati: II   Mouth Opening: Normal  Tongue: Normal  Neck ROM: Normal ROM    Chest/Lungs:  Clear to auscultation, Normal Respiratory Rate        Anesthesia Plan  Type of Anesthesia, risks & benefits discussed:    Anesthesia Type: Gen Natural Airway, MAC, Regional  Intra-op Monitoring Plan: Standard ASA Monitors  Post Op Pain Control Plan: multimodal analgesia, IV/PO Opioids PRN and peripheral nerve block  Induction:  IV  Informed Consent: Informed consent signed with the Patient and all parties understand the risks and agree with anesthesia plan.  All questions answered.   ASA Score: 3    Ready For Surgery From Anesthesia Perspective.     .

## 2022-06-29 ENCOUNTER — TELEPHONE (OUTPATIENT)
Dept: ORTHOPEDICS | Facility: CLINIC | Age: 62
End: 2022-06-29
Payer: MEDICARE

## 2022-06-29 ENCOUNTER — PATIENT MESSAGE (OUTPATIENT)
Dept: RHEUMATOLOGY | Facility: CLINIC | Age: 62
End: 2022-06-29

## 2022-06-29 RX ORDER — PROMETHAZINE HYDROCHLORIDE 25 MG/1
25 TABLET ORAL EVERY 6 HOURS PRN
Qty: 25 TABLET | Refills: 0 | Status: SHIPPED | OUTPATIENT
Start: 2022-06-29

## 2022-06-29 RX ORDER — HYDROCODONE BITARTRATE AND ACETAMINOPHEN 5; 325 MG/1; MG/1
1 TABLET ORAL EVERY 6 HOURS PRN
Qty: 25 TABLET | Refills: 0 | Status: SHIPPED | OUTPATIENT
Start: 2022-06-29 | End: 2022-10-13

## 2022-06-29 NOTE — TELEPHONE ENCOUNTER
Called patient and informed of arrival time for surgery of 8:30 AM at [facility]. Informed patient of pre-operative instructions. Patient verbalized understanding.

## 2022-06-29 NOTE — TELEPHONE ENCOUNTER
Called patient and informed her of arrival time for surgery of 8:30 AM, patient stated she is currently driving her mother to the hospital and requested a return call later to discuss pre-operative instructions.

## 2022-06-30 ENCOUNTER — ANESTHESIA (OUTPATIENT)
Dept: SURGERY | Facility: HOSPITAL | Age: 62
End: 2022-06-30
Payer: MEDICARE

## 2022-06-30 ENCOUNTER — HOSPITAL ENCOUNTER (OUTPATIENT)
Facility: HOSPITAL | Age: 62
Discharge: HOME | End: 2022-06-30
Attending: ORTHOPAEDIC SURGERY | Admitting: ORTHOPAEDIC SURGERY
Payer: MEDICARE

## 2022-06-30 VITALS
BODY MASS INDEX: 27.94 KG/M2 | SYSTOLIC BLOOD PRESSURE: 140 MMHG | HEART RATE: 78 BPM | HEIGHT: 61 IN | WEIGHT: 148 LBS | TEMPERATURE: 98 F | DIASTOLIC BLOOD PRESSURE: 67 MMHG | RESPIRATION RATE: 37 BRPM | OXYGEN SATURATION: 92 %

## 2022-06-30 DIAGNOSIS — M70.031: ICD-10-CM

## 2022-06-30 DIAGNOSIS — M70.031: Primary | ICD-10-CM

## 2022-06-30 DIAGNOSIS — M06.321 RHEUMATOID NODULE OF ELBOW, RIGHT: ICD-10-CM

## 2022-06-30 PROCEDURE — 63600175 PHARM REV CODE 636 W HCPCS: Performed by: ANESTHESIOLOGY

## 2022-06-30 PROCEDURE — 36000711: Performed by: ORTHOPAEDIC SURGERY

## 2022-06-30 PROCEDURE — D9220A PRA ANESTHESIA: ICD-10-PCS | Mod: ANES,,, | Performed by: ANESTHESIOLOGY

## 2022-06-30 PROCEDURE — 88312 SPECIAL STAINS GROUP 1: CPT | Mod: 59 | Performed by: STUDENT IN AN ORGANIZED HEALTH CARE EDUCATION/TRAINING PROGRAM

## 2022-06-30 PROCEDURE — 71000033 HC RECOVERY, INTIAL HOUR: Performed by: ORTHOPAEDIC SURGERY

## 2022-06-30 PROCEDURE — 64415 RIGHT INFRACLAVICULAR SINGLE INJECTION: ICD-10-PCS | Mod: 59,RT,, | Performed by: ANESTHESIOLOGY

## 2022-06-30 PROCEDURE — 20600 DRAIN/INJ JOINT/BURSA W/O US: CPT | Mod: 51,LT,, | Performed by: ORTHOPAEDIC SURGERY

## 2022-06-30 PROCEDURE — 88304 TISSUE EXAM BY PATHOLOGIST: CPT | Performed by: STUDENT IN AN ORGANIZED HEALTH CARE EDUCATION/TRAINING PROGRAM

## 2022-06-30 PROCEDURE — D9220A PRA ANESTHESIA: Mod: CRNA,,, | Performed by: NURSE ANESTHETIST, CERTIFIED REGISTERED

## 2022-06-30 PROCEDURE — 25000003 PHARM REV CODE 250: Performed by: NURSE ANESTHETIST, CERTIFIED REGISTERED

## 2022-06-30 PROCEDURE — 76942 ECHO GUIDE FOR BIOPSY: CPT | Mod: 59 | Performed by: ANESTHESIOLOGY

## 2022-06-30 PROCEDURE — 88305 TISSUE EXAM BY PATHOLOGIST: ICD-10-PCS | Mod: 26,,, | Performed by: STUDENT IN AN ORGANIZED HEALTH CARE EDUCATION/TRAINING PROGRAM

## 2022-06-30 PROCEDURE — 71000015 HC POSTOP RECOV 1ST HR: Performed by: ORTHOPAEDIC SURGERY

## 2022-06-30 PROCEDURE — 25116 REMOVE WRIST/FOREARM LESION: CPT | Mod: RT,,, | Performed by: ORTHOPAEDIC SURGERY

## 2022-06-30 PROCEDURE — D9220A PRA ANESTHESIA: ICD-10-PCS | Mod: CRNA,,, | Performed by: NURSE ANESTHETIST, CERTIFIED REGISTERED

## 2022-06-30 PROCEDURE — 63600175 PHARM REV CODE 636 W HCPCS: Performed by: NURSE ANESTHETIST, CERTIFIED REGISTERED

## 2022-06-30 PROCEDURE — 37000008 HC ANESTHESIA 1ST 15 MINUTES: Performed by: ORTHOPAEDIC SURGERY

## 2022-06-30 PROCEDURE — 25116 PR RAD EXCIS WRIST SYNOV/TENDON,EXTEN: ICD-10-PCS | Mod: RT,,, | Performed by: ORTHOPAEDIC SURGERY

## 2022-06-30 PROCEDURE — 88305 TISSUE EXAM BY PATHOLOGIST: CPT | Mod: 26,,, | Performed by: STUDENT IN AN ORGANIZED HEALTH CARE EDUCATION/TRAINING PROGRAM

## 2022-06-30 PROCEDURE — 88312 SPECIAL STAINS GROUP 1: CPT | Mod: 26,,, | Performed by: STUDENT IN AN ORGANIZED HEALTH CARE EDUCATION/TRAINING PROGRAM

## 2022-06-30 PROCEDURE — 94761 N-INVAS EAR/PLS OXIMETRY MLT: CPT

## 2022-06-30 PROCEDURE — 24071 PR EXC TUMOR SOFT TISSUE UPPER ARM/ELBOW SUBQ 3+CM: ICD-10-PCS | Mod: 51,RT,, | Performed by: ORTHOPAEDIC SURGERY

## 2022-06-30 PROCEDURE — 88312 PR  SPECIAL STAINS,GROUP I: ICD-10-PCS | Mod: 26,,, | Performed by: STUDENT IN AN ORGANIZED HEALTH CARE EDUCATION/TRAINING PROGRAM

## 2022-06-30 PROCEDURE — 76942 RIGHT INFRACLAVICULAR SINGLE INJECTION: ICD-10-PCS | Mod: 26,,, | Performed by: ANESTHESIOLOGY

## 2022-06-30 PROCEDURE — 25000003 PHARM REV CODE 250: Performed by: ANESTHESIOLOGY

## 2022-06-30 PROCEDURE — 37000009 HC ANESTHESIA EA ADD 15 MINS: Performed by: ORTHOPAEDIC SURGERY

## 2022-06-30 PROCEDURE — 24071 EXC ARM/ELBOW LES SC 3 CM/>: CPT | Mod: 51,RT,, | Performed by: ORTHOPAEDIC SURGERY

## 2022-06-30 PROCEDURE — 20600 PR DRAIN/INJECT SMALL JOINT/BURSA: ICD-10-PCS | Mod: 51,LT,, | Performed by: ORTHOPAEDIC SURGERY

## 2022-06-30 PROCEDURE — D9220A PRA ANESTHESIA: Mod: ANES,,, | Performed by: ANESTHESIOLOGY

## 2022-06-30 PROCEDURE — 88307 TISSUE EXAM BY PATHOLOGIST: CPT | Performed by: STUDENT IN AN ORGANIZED HEALTH CARE EDUCATION/TRAINING PROGRAM

## 2022-06-30 PROCEDURE — 99900035 HC TECH TIME PER 15 MIN (STAT)

## 2022-06-30 PROCEDURE — 27201423 OPTIME MED/SURG SUP & DEVICES STERILE SUPPLY: Performed by: ORTHOPAEDIC SURGERY

## 2022-06-30 PROCEDURE — 63600175 PHARM REV CODE 636 W HCPCS: Performed by: ORTHOPAEDIC SURGERY

## 2022-06-30 PROCEDURE — 36000710: Performed by: ORTHOPAEDIC SURGERY

## 2022-06-30 PROCEDURE — 25000003 PHARM REV CODE 250: Performed by: ORTHOPAEDIC SURGERY

## 2022-06-30 PROCEDURE — 64415 NJX AA&/STRD BRCH PLXS IMG: CPT | Mod: 59,RT,, | Performed by: ANESTHESIOLOGY

## 2022-06-30 RX ORDER — MUPIROCIN 20 MG/G
OINTMENT TOPICAL
Status: DISCONTINUED | OUTPATIENT
Start: 2022-06-30 | End: 2022-06-30 | Stop reason: HOSPADM

## 2022-06-30 RX ORDER — LIDOCAINE HYDROCHLORIDE 10 MG/ML
INJECTION, SOLUTION EPIDURAL; INFILTRATION; INTRACAUDAL; PERINEURAL
Status: DISCONTINUED | OUTPATIENT
Start: 2022-06-30 | End: 2022-06-30 | Stop reason: HOSPADM

## 2022-06-30 RX ORDER — DEXMEDETOMIDINE HYDROCHLORIDE 100 UG/ML
INJECTION, SOLUTION INTRAVENOUS
Status: DISCONTINUED | OUTPATIENT
Start: 2022-06-30 | End: 2022-06-30

## 2022-06-30 RX ORDER — LIDOCAINE HYDROCHLORIDE 20 MG/ML
INJECTION INTRAVENOUS
Status: DISCONTINUED | OUTPATIENT
Start: 2022-06-30 | End: 2022-06-30

## 2022-06-30 RX ORDER — BUPIVACAINE HYDROCHLORIDE 5 MG/ML
INJECTION, SOLUTION EPIDURAL; INTRACAUDAL
Status: COMPLETED | OUTPATIENT
Start: 2022-06-30 | End: 2022-06-30

## 2022-06-30 RX ORDER — OXYCODONE HYDROCHLORIDE 5 MG/1
5 TABLET ORAL
Status: DISCONTINUED | OUTPATIENT
Start: 2022-06-30 | End: 2022-06-30 | Stop reason: HOSPADM

## 2022-06-30 RX ORDER — HYDROCODONE BITARTRATE AND ACETAMINOPHEN 5; 325 MG/1; MG/1
1 TABLET ORAL EVERY 4 HOURS PRN
Status: DISCONTINUED | OUTPATIENT
Start: 2022-06-30 | End: 2022-06-30 | Stop reason: HOSPADM

## 2022-06-30 RX ORDER — CEFAZOLIN SODIUM/WATER 2 G/20 ML
2 SYRINGE (ML) INTRAVENOUS
Status: COMPLETED | OUTPATIENT
Start: 2022-06-30 | End: 2022-06-30

## 2022-06-30 RX ORDER — FENTANYL CITRATE 50 UG/ML
25-200 INJECTION, SOLUTION INTRAMUSCULAR; INTRAVENOUS
Status: DISCONTINUED | OUTPATIENT
Start: 2022-06-30 | End: 2022-06-30 | Stop reason: HOSPADM

## 2022-06-30 RX ORDER — MIDAZOLAM HYDROCHLORIDE 1 MG/ML
.5-4 INJECTION INTRAMUSCULAR; INTRAVENOUS
Status: DISCONTINUED | OUTPATIENT
Start: 2022-06-30 | End: 2022-06-30 | Stop reason: HOSPADM

## 2022-06-30 RX ORDER — ONDANSETRON 2 MG/ML
INJECTION INTRAMUSCULAR; INTRAVENOUS
Status: DISCONTINUED | OUTPATIENT
Start: 2022-06-30 | End: 2022-06-30

## 2022-06-30 RX ORDER — PROPOFOL 10 MG/ML
VIAL (ML) INTRAVENOUS
Status: DISCONTINUED | OUTPATIENT
Start: 2022-06-30 | End: 2022-06-30

## 2022-06-30 RX ORDER — SODIUM CHLORIDE 0.9 % (FLUSH) 0.9 %
10 SYRINGE (ML) INJECTION
Status: DISCONTINUED | OUTPATIENT
Start: 2022-06-30 | End: 2022-06-30 | Stop reason: HOSPADM

## 2022-06-30 RX ORDER — FENTANYL CITRATE 50 UG/ML
25 INJECTION, SOLUTION INTRAMUSCULAR; INTRAVENOUS EVERY 5 MIN PRN
Status: DISCONTINUED | OUTPATIENT
Start: 2022-06-30 | End: 2022-06-30 | Stop reason: HOSPADM

## 2022-06-30 RX ORDER — DEXAMETHASONE SODIUM PHOSPHATE 4 MG/ML
INJECTION, SOLUTION INTRA-ARTICULAR; INTRALESIONAL; INTRAMUSCULAR; INTRAVENOUS; SOFT TISSUE
Status: DISCONTINUED | OUTPATIENT
Start: 2022-06-30 | End: 2022-06-30

## 2022-06-30 RX ORDER — CELECOXIB 200 MG/1
400 CAPSULE ORAL ONCE
Status: COMPLETED | OUTPATIENT
Start: 2022-06-30 | End: 2022-06-30

## 2022-06-30 RX ORDER — MIDAZOLAM HYDROCHLORIDE 1 MG/ML
INJECTION, SOLUTION INTRAMUSCULAR; INTRAVENOUS
Status: DISCONTINUED | OUTPATIENT
Start: 2022-06-30 | End: 2022-06-30

## 2022-06-30 RX ORDER — METHYLPREDNISOLONE ACETATE 40 MG/ML
INJECTION, SUSPENSION INTRA-ARTICULAR; INTRALESIONAL; INTRAMUSCULAR; SOFT TISSUE
Status: DISCONTINUED | OUTPATIENT
Start: 2022-06-30 | End: 2022-06-30 | Stop reason: HOSPADM

## 2022-06-30 RX ORDER — ACETAMINOPHEN 500 MG
1000 TABLET ORAL ONCE
Status: COMPLETED | OUTPATIENT
Start: 2022-06-30 | End: 2022-06-30

## 2022-06-30 RX ORDER — HALOPERIDOL 5 MG/ML
0.5 INJECTION INTRAMUSCULAR EVERY 10 MIN PRN
Status: DISCONTINUED | OUTPATIENT
Start: 2022-06-30 | End: 2022-06-30 | Stop reason: HOSPADM

## 2022-06-30 RX ADMIN — Medication 2 G: at 01:06

## 2022-06-30 RX ADMIN — BUPIVACAINE HYDROCHLORIDE 30 ML: 5 INJECTION, SOLUTION EPIDURAL; INTRACAUDAL; PERINEURAL at 10:06

## 2022-06-30 RX ADMIN — PROPOFOL 75 MCG/KG/MIN: 10 INJECTION, EMULSION INTRAVENOUS at 01:06

## 2022-06-30 RX ADMIN — DEXMEDETOMIDINE HYDROCHLORIDE 8 MCG: 100 INJECTION, SOLUTION, CONCENTRATE INTRAVENOUS at 02:06

## 2022-06-30 RX ADMIN — DEXMEDETOMIDINE HYDROCHLORIDE 8 MCG: 100 INJECTION, SOLUTION, CONCENTRATE INTRAVENOUS at 01:06

## 2022-06-30 RX ADMIN — ONDANSETRON 4 MG: 2 INJECTION INTRAMUSCULAR; INTRAVENOUS at 02:06

## 2022-06-30 RX ADMIN — SODIUM CHLORIDE: 9 INJECTION, SOLUTION INTRAVENOUS at 09:06

## 2022-06-30 RX ADMIN — SODIUM CHLORIDE, SODIUM GLUCONATE, SODIUM ACETATE, POTASSIUM CHLORIDE, MAGNESIUM CHLORIDE, SODIUM PHOSPHATE, DIBASIC, AND POTASSIUM PHOSPHATE: .53; .5; .37; .037; .03; .012; .00082 INJECTION, SOLUTION INTRAVENOUS at 03:06

## 2022-06-30 RX ADMIN — ACETAMINOPHEN 1000 MG: 500 TABLET ORAL at 09:06

## 2022-06-30 RX ADMIN — MUPIROCIN: 20 OINTMENT TOPICAL at 09:06

## 2022-06-30 RX ADMIN — MIDAZOLAM HYDROCHLORIDE 2 MG: 1 INJECTION, SOLUTION INTRAMUSCULAR; INTRAVENOUS at 01:06

## 2022-06-30 RX ADMIN — PROPOFOL 40 MG: 10 INJECTION, EMULSION INTRAVENOUS at 01:06

## 2022-06-30 RX ADMIN — DEXAMETHASONE SODIUM PHOSPHATE 8 MG: 4 INJECTION, SOLUTION INTRAMUSCULAR; INTRAVENOUS at 01:06

## 2022-06-30 RX ADMIN — MIDAZOLAM 2 MG: 1 INJECTION INTRAMUSCULAR; INTRAVENOUS at 09:06

## 2022-06-30 RX ADMIN — CELECOXIB 400 MG: 200 CAPSULE ORAL at 09:06

## 2022-06-30 RX ADMIN — LIDOCAINE HYDROCHLORIDE 50 MG: 20 INJECTION, SOLUTION INTRAVENOUS at 01:06

## 2022-06-30 RX ADMIN — FENTANYL CITRATE 50 MCG: 50 INJECTION INTRAMUSCULAR; INTRAVENOUS at 09:06

## 2022-06-30 RX ADMIN — DEXMEDETOMIDINE HYDROCHLORIDE 4 MCG: 100 INJECTION, SOLUTION, CONCENTRATE INTRAVENOUS at 01:06

## 2022-06-30 NOTE — OP NOTE
[facility])  Surgery Department  Operative Note    SUMMARY     Date of Procedure: 6/30/2022     Procedure:   1. Right elbow posterior olecranon rheumatoid mass excision, cpt 46885  2. Right wrist radical synovectomy, cpt 92160  3. Right wrist radiocarpal joint arthrotomy with synovectomy, cpt 41332  4. Right distal ulnar osteophyte excision, cpt 13709  5. 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 Risks and benefits of the procedure were discussed with the patient and informed consent was obtained.     Description of the Findings of the Procedure: The patient was seen in the preoperative holding area and the right elbow and wrist were marked. After regional block was achieved in the preoperative holding area, the patient was taken to the OR, placed supine on the table, and a padded hand table was used. After monitored anesthesia care was admitted without difficulty, a time-out procedure was performed identifying the patient, the operative site and the procedure to be performed.  40 mg of methylprednisolone were sterilely injected into the left thumb metacarpal phalangeal joint.  A tourniquet was placed on the right arm and the upper  extremity was prepped and draped in standard sterile fashion. The upper extremity was elevated and exsanguinated with an Esmarch bandage, and the tourniquet was inflated to 250 mm Hg. A 6 cm incision was made on the posterior olecranon. Littler scissors were used to dissect down to the mass. Careful separation of the mass from the surrounding soft tissues with bipolar electrocautery was made.  The mass multilobed and firm, it was removed in its entirety, it measured 4x4 cm, care was taken not to violate the triceps tendon.    6 cm incision was made over the dorsal wrist.  Full-thickness skin flaps were elevated.  The 3rd dorsal extensor compartment was opened and there was a copious amount of serous fluid as well as rice bodies which were encountered.  The 2nd compartment was opened as well.  There was florid synovitis along the EPL tendon and a nodule within the tendon which had weakened it its structure by approximately 50%.  Radical synovectomy was performed of the 2nd and 3rd compartments and the synovitis was sent for permanent pathological specimen.  A radiocarpal joint capsulotomy was made and synovectomy was performed of the wrist at this level as well.  The 6th extensor compartment was approached, the extensor retinaculum was incised in a step-cut fashion for later closure.  There was a large rheumatoid mass within the ECU tendon which was carefully dissected free.  The ECU tendon fibers were  by this mass and it had lost approximately 50% of its structure.  Synovectomy was performed of the 6th extensor compartment. There was an osteophyte in the ECU tendon floor emanating from the dorsal distal ulna which was fraying the ECU tendon, this was sharply excised with a rongeur.  4-0 PDS was used to close the extensor retinaculum.     The neurovascular bundles were identified and protected throughout the case. The wounds were then irrigated with normal saline using a bulb syringe. 4-0 monocryl was used  to close the skin in a subcutaneous fashion, 4-0 monocryl was used for a running subcuticular closure of the incision. Dermabond was used on the skin. Adaptic, 4x4, cast padding, a posterior elbow and wrist splint and coban were used to dress the hand. The tourniquet was deflated and the hand and fingers were pink and well-perfused.  All instrument, sponge and needle counts were correct. The patient tolerated the procedure well.  She was taken awake, alert and in good condition to the recovery room.    Complications: No    Estimated Blood Loss (EBL): minimal           Specimens: right elbow mass, right wrist synovitis           Condition: Good    Disposition: PACU - hemodynamically stable.    Attestation: I was present and scrubbed for the entire procedure.

## 2022-06-30 NOTE — H&P
Silvia Cervantes presents today for surgery.     She was recently seen and evaluated in clinic.    For:    Encounter Diagnoses   Name Primary?    Rheumatoid nodule of right elbow Yes    Extensor tenosynovitis of right wrist     Cervical radiculopathy     Rheumatoid arthritis involving multiple sites with positive rheumatoid factor     Cyst of joint of hand, left    The patient has had an EMG/NCS which we reviewed together and it showed: normal study.    She reports continued discomfort at the right elbow rheumatoid nodule as well as the right wrist extensor tenosynovitis, she would like to think about surgical intervention.  She also reports discomfort and swelling with cyst on the dorsum of the left thumb MP joint.    PE:    AA&O x 4.  NAD  HEENT:  NCAT, sclera nonicteric  Lungs:  Respirations are equal and unlabored.  CV:  2+ bilateral upper and lower extremity pulses.  MSK:   Observation/Inspection:  Swelling                       Left thumb MP joint dorsal masses                  Deformity                     none  Discoloration               none                  Scars                           none                  Atrophy                        none     HAND/WRIST EXAMINATION:  Finkelstein's Test                                Neg  WHAT Test                                         Neg  CMC grind                                           Neg  Circumduction test                              Neg     Right elbow mass 7x7 cm; ROM right elbow full flexion, -25 degrees extension; left elbow full flexion, -20 ext  Bilateral ROM wrists full flexion, ext 50 degrees. TTP right 6th dorsal extensor compartment, synovitis     Neurovascular Exam:  Digits WWP, brisk CR < 3s throughout  NVI motor/LTS to M/R/U nerves, radial pulse 2+  2+ biceps and brachioradialis reflexes  Tinel's Test - Carpal Tunnel                neg  Tinel's Test - Cubital Tunnel               Positive right  Phalen's Test                                       neg  Median Nerve Compression Test       neg    A/P:  Right wrist extensor tenosynovitis, DRUJ arthrosis, right elbow rheumatoid nodule, left thumb MP joint synovitis and cysts  We have discussed conservative vs. surgical treatment as well as risks, benefits and alternatives for Right wrist extensor tenosynovitis, DRUJ arthrosis, right elbow rheumatoid nodule, left thumb MP joint synovitis and cysts.  She has exhausted conservative measures and would like to proceed with surgery. Surgery would include right wrist radical extensor tenosynovectomy, DRUJ Olive procedure verses osteophyte excision, right elbow rheumatoid nodule excision, left thumb MP joint steroid injection.     We have discussed risks of hand surgery which include but are not limited to blood clots in the legs that can travel to the lungs (pulmonary embolism). Pulmonary embolism can cause shortness of breath, chest pain, and even shock. Other risks include urinary tract infection, nausea and vomiting (usually related to pain medication), chronic pain, bleeding, nerve damage, blood vessel injury, scarring and infection of the hand which can require re-operation. Furthermore, the risks of anesthesia include potential heart, lung, kidney, and liver damage.  Informed consent was obtained.  She understands and would like to proceed with surgery in the near future.    Call with any questions/concerns in the interim        Louise Smith MD     Please be aware that this note has been generated with the assistance of casper voice-to-text.  Please excuse any spelling or grammatical errors.

## 2022-06-30 NOTE — PLAN OF CARE
Patient is AAO and VSS.  Tolerating PO and states pain is tolerable.  Dressing CDI.  Patient states they are ready for d/c.  IV removed.  Catheter tip intact.  Caregiver at bedside.  Discharge instructions reviewed and copy given to the patient and caregiver.  Questions answered.  Both verbalized understanding. Medication delivered to bedside. Sling in place from OR.no DME needed.  Patient wheeled to car by RN/PCT.

## 2022-06-30 NOTE — PLAN OF CARE
Pre Op Checklist complete. Pt resting in bed with call light in reach. Belongings sent with pt daughter. Daughter at bedside. Pt still needs site rocky/ updated H&p/ Anes consent.

## 2022-06-30 NOTE — TRANSFER OF CARE
"Anesthesia Transfer of Care Note    Patient: Silvia Cervantes    Procedure(s) Performed: Procedure(s) (LRB):  SYNOVECTOMY, WRIST (Right)  EXCISION, NODULE right elbow (Right)  EXCISION, ULNA, DISTAL (Right)  INJECTION, STEROID Thumb MP joint (Left)    Patient location: PACU    Anesthesia Type: general and regional    Transport from OR: Transported from OR on 100% O2 by closed face mask with adequate spontaneous ventilation    Post pain: adequate analgesia    Post assessment: no apparent anesthetic complications and tolerated procedure well    Post vital signs: stable    Level of consciousness: awake and alert    Nausea/Vomiting: no nausea/vomiting    Complications: none    Transfer of care protocol was followed      Last vitals:   Visit Vitals  BP (!) 145/79 (BP Location: Left arm, Patient Position: Lying)   Pulse 67   Temp 36.5 °C (97.7 °F) (Oral)   Resp (!) 31   Ht 5' 1" (1.549 m)   Wt 67.1 kg (148 lb)   LMP  (LMP Unknown)   SpO2 95%   Breastfeeding No   BMI 27.96 kg/m²     "

## 2022-06-30 NOTE — DISCHARGE SUMMARY
[facility] ([facility])  Discharge Note  Short Stay    Procedure(s) (LRB):  SYNOVECTOMY, WRIST (Right)  EXCISION, NODULE right elbow (Right)  EXCISION, ULNA, DISTAL (Right)  INJECTION, STEROID Thumb MP joint (Left)    OUTCOME: Patient tolerated treatment/procedure well without complication and is now ready for discharge.    DISPOSITION: Home or Self Care    FINAL DIAGNOSIS:  Right elbow rheumatoid nodule, right wrist extensor synovitis, right distal ulna osteophyte, left thumb MP synovitis    FOLLOWUP: In clinic    DISCHARGE INSTRUCTIONS:    Discharge Procedure Orders   Diet general     Keep surgical extremity elevated     Leave dressing on - Keep it clean, dry, and intact until clinic visit     Call MD for:  temperature >100.4     Call MD for:  persistent nausea and vomiting     Call MD for:  severe uncontrolled pain     Call MD for:  difficulty breathing, headache or visual disturbances     Call MD for:  redness, tenderness, or signs of infection (pain, swelling, redness, odor or green/yellow discharge around incision site)     Call MD for:  hives     Call MD for:  persistent dizziness or light-headedness     Call MD for:  extreme fatigue

## 2022-06-30 NOTE — BRIEF OP NOTE
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.031]    Procedure(s) (LRB):  SYNOVECTOMY, WRIST (Right)  EXCISION, NODULE right elbow (Right)  EXCISION, ULNA, DISTAL (Right)  INJECTION, STEROID Thumb MP joint (Left)    Anesthesia: Regional    Description of the findings of the procedure: see full op note    Findings/Key Components: See operative report    Estimated Blood Loss: * No values recorded between 6/30/2022  1:56 PM and 6/30/2022  4:56 PM *         Specimens:   Specimen (24h ago, onward)             Start     Ordered    06/30/22 1635  Specimen to Pathology, Surgery Orthopedics  Once        Comments: Pre-op Diagnosis: Rheumatoid nodule of right elbow [M06.321]Extensor tenosynovitis of right wrist [M65.831]DRUJ (distal radioulnar joint) arthrosis, primary, right [M19.031]Procedure(s):SYNOVECTOMY, WRISTEXCISION, NODULE right elbowEXCISION, ULNA, DISTALINJECTION, STEROID Thumb MP joint Number of specimens: 2Name of specimens: 1. Right Elbow Mass 2. Right wrist tensosynovium mass     References:    Click here for ordering Quick Tip   Question Answer Comment   Procedure Type: Orthopedics    Which provider would you like to cc? LOUISE MILLS    Release to patient Immediate        06/30/22 1636                Implants: * No implants in log *    Complications: None    Discharge Note    SUMMARY     Admit Date: 6/30/2022    Discharge Date and Time:  06/30/2022 5:15 PM    Hospital  Course: Patient was placed in observation status for the above procedure.  See above.  Postoperatively was discharged home from the PACU once criteria was met.    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     amLODIPine 5 MG tablet  Commonly known as: NORVASC  Take 1 tablet (5 mg total) by mouth once daily.     amoxicillin 500 MG capsule  Commonly known as: AMOXIL  take 2 capsules to start then take 1 capsule by mouth four times a day until all gone     aspirin 81 MG EC tablet  Commonly known as: ECOTRIN  Take 81 mg by mouth once daily.     atorvastatin 10 MG tablet  Commonly known as: LIPITOR  Take 1 tablet (10 mg total) by mouth once daily.     azelastine 137 mcg (0.1 %) nasal spray  Commonly known as: ASTELIN  spray 1 spray (137 mcg total) by Nasal route 2 (two) times daily.     chlorhexidine 0.12 % solution  Commonly known as: PERIDEX  Use 15-20 mL to rinse mouth twice daily for 2 minutes, spit out and do not rinse mouth with water after.     clotrimazole-betamethasone 1-0.05% cream  Commonly known as: LOTRISONE  Apply to affected area 2 times daily     desoximetasone 0.25 % cream  Commonly known as: TOPICORT  Apply to affected area every other night x 1 month     EnbreL SureClick 50 mg/mL (1 mL)  Generic drug: etanercept  Inject 1 mL (50 mg total) into the skin once a week.     fluticasone propionate 50 mcg/actuation nasal spray  Commonly known as: FLONASE  1 spray (50 mcg total) by Each Nostril route  once daily.     folic acid 1 MG tablet  Commonly known as: FOLVITE  Take 4 tablets (4 mg total) by mouth once daily.     * gabapentin 300 MG capsule  Commonly known as: NEURONTIN  Take 1 capsule (300 mg total) by mouth 2 (two) times daily.     * gabapentin 300 MG capsule  Commonly known as: NEURONTIN  Take 1 capsule (300 mg total) by mouth 2 (two) times daily.     HYDROcodone-acetaminophen 5-325 mg per tablet  Commonly known as: NORCO  Take 1 tablet by mouth every 6 (six) hours as needed for Pain.     hydrOXYzine HCL 25 MG tablet  Commonly known as: ATARAX  Take 1 tablet (25 mg total) by mouth every evening.     leflunomide 20 MG Tab  Commonly known as: ARAVA  Take 1 tablet (20 mg total) by mouth once daily.     losartan 100 MG tablet  Commonly known as: COZAAR  Take 1 tablet (100 mg total) by mouth once daily.     meclizine 25 mg tablet  Commonly known as: ANTIVERT  Take 1 tablet (25 mg total) by mouth 3 (three) times daily as needed for Dizziness.     metoprolol succinate 25 MG 24 hr tablet  Commonly known as: TOPROL-XL  Take 1 tablet (25 mg total) by mouth every evening.     NYAMYC powder  Generic drug: nystatin  Apply to affected area every morning as needed for flare under breasts     OPZELURA 1.5 % Crea  Generic drug: ruxolitinib  aaa bid     polyethylene glycol 17 gram/dose powder  Commonly known as: GLYCOLAX  Take 17 g by mouth once daily.     promethazine 25 MG tablet  Commonly known as: PHENERGAN  Take 1 tablet (25 mg total) by mouth every 6 (six) hours as needed for Nausea.     PROTOPIC 0.1 % ointment  Generic drug: tacrolimus  AAA bid     RESTASIS 0.05 % ophthalmic emulsion  Generic drug: cycloSPORINE  Apply 1 drop into both eyes twice a day     * triamcinolone acetonide 0.025% 0.025 % cream  Commonly known as: KENALOG  Apply to affected area every day to twice a day to neck . Not more than 1-2 weeks straight in same location     * triamcinolone acetonide 0.1% 0.1 % ointment  Commonly known as:  KENALOG  Apply topically 2 (two) times daily. For 2 weeks     VITAMIN B-12 ORAL  Take by mouth.     VITAMIN D2 50,000 unit Cap  Generic drug: ergocalciferol  Take 1 capsule (50,000 Units total) by mouth every 7 days.     ZINC ORAL  Take by mouth.         * This list has 4 medication(s) that are the same as other medications prescribed for you. Read the directions carefully, and ask your doctor or other care provider to review them with you.              Discharge Procedure Orders   Diet general     Keep surgical extremity elevated     Leave dressing on - Keep it clean, dry, and intact until clinic visit     Call MD for:  temperature >100.4     Call MD for:  persistent nausea and vomiting     Call MD for:  severe uncontrolled pain     Call MD for:  difficulty breathing, headache or visual disturbances     Call MD for:  redness, tenderness, or signs of infection (pain, swelling, redness, odor or green/yellow discharge around incision site)     Call MD for:  hives     Call MD for:  persistent dizziness or light-headedness     Call MD for:  extreme fatigue

## 2022-06-30 NOTE — ANESTHESIA PROCEDURE NOTES
Right infraclavicular single injection    Patient location during procedure: pre-op   Block not for primary anesthetic.  Reason for block: at surgeon's request and post-op pain management   Post-op Pain Location: Right wrist and elbow pain   Start time: 6/30/2022 9:58 AM  Timeout: 6/30/2022 9:57 AM   End time: 6/30/2022 10:02 AM    Staffing  Authorizing Provider: Pool Prater MD  Performing Provider: Pool Prater MD    Preanesthetic Checklist  Completed: patient identified, IV checked, site marked, risks and benefits discussed, surgical consent, monitors and equipment checked, pre-op evaluation and timeout performed  Peripheral Block  Patient position: sitting  Prep: ChloraPrep  Patient monitoring: heart rate, cardiac monitor, continuous pulse ox, continuous capnometry and frequent blood pressure checks  Block type: infraclavicular  Laterality: right  Injection technique: single shot  Needle  Needle type: Stimuplex   Needle gauge: 21 G  Needle length: 4 in  Needle localization: ultrasound guidance and anatomical landmarks   -ultrasound image captured on disc.  Assessment  Injection assessment: negative aspiration and negative parasthesia  Paresthesia pain: none  Heart rate change: no  Slow fractionated injection: yes  Pain Tolerance: comfortable throughout block and no complaints  Medications:    Medications: bupivacaine (pf) (MARCAINE) injection 0.5% - Perineural   30 mL - 6/30/2022 10:02:00 AM    Additional Notes  VSS.  [facility] RN monitoring vitals throughout procedure.  Patient tolerated procedure well.    30 mL bupivacaine 0.5% w/ epi 1:200k

## 2022-07-02 ENCOUNTER — NURSE TRIAGE (OUTPATIENT)
Dept: ADMINISTRATIVE | Facility: CLINIC | Age: 62
End: 2022-07-02
Payer: MEDICARE

## 2022-07-02 ENCOUNTER — PATIENT MESSAGE (OUTPATIENT)
Dept: ORTHOPEDICS | Facility: CLINIC | Age: 62
End: 2022-07-02
Payer: MEDICARE

## 2022-07-03 ENCOUNTER — HOSPITAL ENCOUNTER (EMERGENCY)
Facility: HOSPITAL | Age: 62
Discharge: HOME | End: 2022-07-03
Attending: EMERGENCY MEDICINE
Payer: MEDICARE

## 2022-07-03 ENCOUNTER — NURSE TRIAGE (OUTPATIENT)
Dept: ADMINISTRATIVE | Facility: CLINIC | Age: 62
End: 2022-07-03
Payer: MEDICARE

## 2022-07-03 VITALS
OXYGEN SATURATION: 100 % | SYSTOLIC BLOOD PRESSURE: 153 MMHG | HEART RATE: 61 BPM | TEMPERATURE: 98 F | RESPIRATION RATE: 16 BRPM | DIASTOLIC BLOOD PRESSURE: 81 MMHG

## 2022-07-03 DIAGNOSIS — Z09 POSTOP CHECK: Primary | ICD-10-CM

## 2022-07-03 PROBLEM — T14.8XXA BRUISING: Status: ACTIVE | Noted: 2022-07-03

## 2022-07-03 PROCEDURE — 99284 PR EMERGENCY DEPT VISIT,LEVEL IV: ICD-10-PCS | Mod: ,,, | Performed by: PHYSICIAN ASSISTANT

## 2022-07-03 PROCEDURE — 99283 EMERGENCY DEPT VISIT LOW MDM: CPT

## 2022-07-03 PROCEDURE — 99284 EMERGENCY DEPT VISIT MOD MDM: CPT | Mod: ,,, | Performed by: PHYSICIAN ASSISTANT

## 2022-07-03 NOTE — ANESTHESIA POSTPROCEDURE EVALUATION
Anesthesia Post Evaluation    Patient: Silvia Cervantes    Procedure(s) Performed: Procedure(s) (LRB):  SYNOVECTOMY, WRIST (Right)  EXCISION, NODULE right elbow (Right)  EXCISION, ULNA, DISTAL (Right)  INJECTION, STEROID Thumb MP joint (Left)    Final Anesthesia Type: general      Patient location during evaluation: PACU  Patient participation: Yes- Able to Participate  Level of consciousness: awake and alert and oriented  Post-procedure vital signs: reviewed and stable  Pain management: adequate  Airway patency: patent    PONV status at discharge: No PONV  Anesthetic complications: no      Cardiovascular status: blood pressure returned to baseline and hemodynamically stable  Respiratory status: unassisted, room air and spontaneous ventilation  Hydration status: euvolemic  Follow-up not needed.          Vitals Value Taken Time   /67 06/30/22 1746   Temp 36.4 °C (97.6 °F) 06/30/22 1745   Pulse 74 06/30/22 1748   Resp 33 06/30/22 1748   SpO2 92 % 06/30/22 1748   Vitals shown include unvalidated device data.      Event Time   Out of Recovery 17:46:20         Pain/Mita Score: No data recorded

## 2022-07-03 NOTE — ED NOTES
Silvia Cervantes, an 61 y.o. female presents to the ED with reports of Post-op Problem (Had wrist surgery last week, now has increased bruising and pain to hand. Fingers moving, but cool.     Review of patient's allergies indicates:  No Known Allergies  Chief Complaint   Patient presents with    Post-op Problem     Had wrist surgery last week, now has increased bruising and pain to hand. Fingers moving, but cool.      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 +2 pulses to all four extremities, no edema or deformities noted.   Physical Exam  Constitutional:       Appearance: He is not toxic-appearing.  HENT:     Head: Normocephalic and atraumatic.     Mouth/Throat:     Mouth: Mucous membranes are moist.  Eyes:     Extraocular Movements: Extraocular movements intact.     Conjunctiva/sclera: Conjunctivae normal.     Pupils: Pupils are equal, round, and reactive to light.  Neck:     Vascular: No JVD.  Cardiovascular:     Rate and Rhythm: Normal rate and regular rhythm.     Heart sounds: Normal heart sounds. No murmur. No friction rub. No gallop.    Pulmonary:     Effort: Pulmonary effort is normal. No respiratory distress.     Breath sounds: Normal breath sounds. No wheezing or rales.  Abdominal:     General: Bowel sounds are normal. There is no  distension.     Palpations: Abdomen is soft.     Tenderness: There is no tenderness reported. There is no guarding or rebound.     Hernia: No hernia is present.  Skin:     General: Skin is warm and dry.     Findings: No rash.  Neurological:     General: No focal deficit present.     Mental Status: He is alert and oriented to person, place, and time.     Cranial Nerves: No cranial nerve deficit.     Sensory: No sensory deficit.

## 2022-07-03 NOTE — ED NOTES
Silvia Cervantes, an 61 y.o. female presents to the ED with reports of Post-op Problem (Had wrist surgery last week, now has increased bruising and pain to hand. Fingers moving, cap refill <2    Review of patient's allergies indicates:  No Known Allergies  Chief Complaint   Patient presents with    Post-op Problem     Had wrist surgery last week, now has increased bruising and pain to hand. Fingers moving, but cool.      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 +2 pulses to all four extremities, no edema or deformities noted.   Physical Exam  Constitutional:       Appearance: He is not toxic-appearing.  HENT:     Head: Normocephalic and atraumatic.     Mouth/Throat:     Mouth: Mucous membranes are moist.  Eyes:     Extraocular Movements: Extraocular movements intact.     Conjunctiva/sclera: Conjunctivae normal.     Pupils: Pupils are equal, round, and reactive to light.  Neck:     Vascular: No JVD.  Cardiovascular:     Rate and Rhythm: Normal rate and regular rhythm.     Heart sounds: Normal heart sounds. No murmur. No friction rub. No gallop.    Pulmonary:     Effort: Pulmonary effort is normal. No respiratory distress.     Breath sounds: Normal breath sounds. No wheezing or rales.  Abdominal:     General: Bowel sounds are normal. There is no  distension.     Palpations: Abdomen is soft.     Tenderness: There is no tenderness reported. There is no guarding or rebound.     Hernia: No hernia is present.  Skin:     General: Skin is warm and dry.     Findings: No rash.  Neurological:     General: No focal deficit present.     Mental Status: He is alert and oriented to person, place, and time.     Cranial Nerves: No cranial nerve deficit.     Sensory: No sensory deficit.

## 2022-07-03 NOTE — TELEPHONE ENCOUNTER
Reason for Disposition   [1] Numbness (loss of sensation) of fingers or toes AND [2] persists > 1 hour after loosening elastic bandage or Velcro    Additional Information   Negative: Cast problems or questions   Negative: Chest pain   Negative: Difficulty breathing    Protocols used: SPLINT SYMPTOMS AND [id number]  pt called re had procedure on thurs. has ace bandage on . pt noticed yest middle finger fourth finger and pinky fingers are getting purple. able to wiggle. some tingling and numbness. + cap refill. Pt states she cant loose ace wrap. rec ED. Pt agrees. Call back with questions

## 2022-07-03 NOTE — TELEPHONE ENCOUNTER
On Thursday had hand wrist and elbow surgery and now her two of her fingers look purple on the top.Synovectomy of wrist on 6/30. On call provider paged at this time. Call back from on call provider Dr. Narayanan. Warm handoff to MD at this time    Reason for Disposition   Cast problems or questions   Splint or elastic bandage problems or questions   [1] Caller has URGENT question AND [2] triager unable to answer question    Additional Information   Negative: Sounds like a life-threatening emergency to the triager   Negative: Cast problems or questions   Negative: Chest pain   Negative: Difficulty breathing   Negative: [1] Numbness (loss of sensation) of fingers or toes AND [2] persists > 1 hour after loosening elastic bandage or Velcro   Negative: [1] Tingling (pins and needles sensation) of fingers or toes AND [2] persists > 1 hour after loosening elastic bandage or Velcro   Negative: [1] Blueness or pallor of fingers or toes (compared to noninjured side) AND [2] persists > 1 hour after loosening elastic bandage or Velcro   Negative: Patient sounds very sick or weak to the triager   Negative: [1] SEVERE pain AND [2] not improved one hour after pain medicine, elevation, and loosening elastic bandage or Velcro   Negative: [1] Increasing pain under splint AND [2] splint put on > 72 hours ago    Protocols used: POST-OP SYMPTOMS AND [id number], CAST SYMPTOMS AND [id number], SPLINT SYMPTOMS AND [id number]

## 2022-07-04 NOTE — ED PROVIDER NOTES
Encounter Date: 7/3/2022       History     Chief Complaint   Patient presents with    Post-op Problem     Had wrist surgery last week, now has increased bruising and pain to hand. Fingers moving, but cool.      61-year-old female with hypertension, rheumatoid arthritis, hyperlipidemia presents for discoloration of her right fingertips POD #3 after a synovectomy.  She noticed some bluish discoloration on her fingers and was instructed to come to the ED by the nurse triage line.  She denies any other complaints, her pain has been well controlled with ibuprofen, she has not been taking Norco because it makes her sleepy.  She notes some occasional tingling in her fingers but denies numbness, decreased range of motion of the fingers or pain.  She still has some discomfort in her wrist and elbow but they have not worsened.  Denies any other complaints.        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)      Past Surgical History:   Procedure Laterality Date     SECTION      COLONOSCOPY N/A 2019    Procedure: COLONOSCOPY/golytley ;  Surgeon: Dimas Woodall MD;  Location: [facility] ENDO;  Service: Endoscopy;  Laterality: N/A;    EXCISION OF NODULE Right 2022    Procedure: EXCISION, NODULE right elbow;  Surgeon: Louise Smith MD;  Location: [facility] OR;  Service: Orthopedics;  Laterality: Right;     HYSTERECTOMY      INJECTION OF STEROID Left 6/30/2022    Procedure: INJECTION, STEROID Thumb MP joint;  Surgeon: Louise Smith MD;  Location: [facility] OR;  Service: Orthopedics;  Laterality: Left;    SINUS SURGERY      SURGICAL REMOVAL OF DISTAL ULNA Right 6/30/2022    Procedure: EXCISION, ULNA, DISTAL;  Surgeon: Louise Smith MD;  Location: [facility] OR;  Service: Orthopedics;  Laterality: Right;    SYNOVECTOMY OF WRIST Right 6/30/2022    Procedure: SYNOVECTOMY, WRIST;  Surgeon: Louise Smith MD;  Location: [facility] OR;  Service: Orthopedics;  Laterality: Right;    TUBAL LIGATION       Family History   Problem Relation Age of Onset    Hypertension Mother     Cataracts Mother     Diabetes Father     Hypertension Father     Cataracts Father     Heart disease Father     Hyperlipidemia Father     Diabetes Sister     Stroke Brother     Hypertension Brother     No Known Problems Daughter     No Known Problems Son     Melanoma Neg Hx     Breast cancer Neg Hx     Colon cancer Neg Hx     Ovarian cancer Neg Hx     Blindness Neg Hx     Glaucoma Neg Hx     Aneurysm Neg Hx     Cancer Neg Hx     Clotting disorder Neg Hx     Dementia Neg Hx     Fainting Neg Hx     Kidney disease Neg Hx     Liver disease Neg Hx     Migraines Neg Hx     Neuropathy Neg Hx     Parkinsonism Neg Hx     Seizures Neg Hx     Tremor Neg Hx      Social History     Tobacco Use    Smoking status: Never Smoker    Smokeless tobacco: Never Used   Substance Use Topics    Alcohol use: Yes     Comment: Rarely    Drug use: Not Currently     Review of Systems   Constitutional: Negative for fever.   HENT: Negative for sore throat.    Respiratory: Negative for shortness of breath.    Cardiovascular: Negative for chest pain.   Gastrointestinal: Negative for nausea.   Genitourinary: Negative for dysuria.   Musculoskeletal: Positive for arthralgias. Negative for back pain.   Skin: Positive for color change. Negative for rash.    Neurological: Negative for weakness.   Hematological: Does not bruise/bleed easily.       Physical Exam     Initial Vitals [07/03/22 1623]   BP Pulse Resp Temp SpO2   (!) 183/94 85 16 98.3 °F (36.8 °C) 99 %      MAP       --         Physical Exam    Nursing note and vitals reviewed.  Constitutional: She appears well-developed and well-nourished.   HENT:   Head: Normocephalic and atraumatic.   Eyes: EOM are normal. Pupils are equal, round, and reactive to light.   Neck: Neck supple.   Normal range of motion.  Cardiovascular: Normal rate, regular rhythm, normal heart sounds and intact distal pulses. Exam reveals no gallop and no friction rub.    No murmur heard.  Pulmonary/Chest: Breath sounds normal. No respiratory distress. She has no wheezes. She has no rhonchi. She has no rales. She exhibits no tenderness.   Musculoskeletal:      Cervical back: Normal range of motion and neck supple.      Comments: Right arm is in splint extending from the hand to the elbow.  Not removed.  There is some bruising on the dorsum of the right hand.  Fingers are warm to the touch, brisk capillary refill, sensation intact     Neurological: She is alert and oriented to person, place, and time.   Skin: Skin is warm and dry.   Psychiatric: She has a normal mood and affect.             ED Course   Procedures  Labs Reviewed - No data to display       Imaging Results          X-Ray Hand 3 View Right (Final result)  Result time 07/03/22 17:59:34   Procedure changed from X-Ray Elbow Complete 3 views Right     Final result by Rashawn Walker MD (07/03/22 17:59:34)                 Impression:      As above.      Electronically signed by: Rashawn Walker MD  Date:    07/03/2022  Time:    17:59             Narrative:    EXAMINATION:  XR HAND COMPLETE 3 VIEW RIGHT    CLINICAL HISTORY:  pain;    TECHNIQUE:  PA, lateral, and oblique views of the right hand were performed.    COMPARISON:  Bilateral hand series 03/23/2022    FINDINGS:  Overlying cast  material limits evaluation and may obscure fine bony detail.    Overall alignment is within normal limits.  No displaced fracture, dislocation or destructive osseous process definitively seen allowing for limitations.  Minimal DJD.  Mild nonspecific soft tissue swelling about the dorsal aspect of the wrist, slightly improved from prior.  No subcutaneous emphysema or radiodense retained foreign body definitively seen allowing for limitations.                               X-Ray Elbow Complete Right (Final result)  Result time 07/03/22 17:54:06   Procedure changed from X-Ray Hand 3 view Right     Final result by Rashawn Walker MD (07/03/22 17:54:06)                 Impression:      No acute displaced fracture-dislocation identified.      Electronically signed by: Rashawn Walker MD  Date:    07/03/2022  Time:    17:54             Narrative:    EXAMINATION:  XR ELBOW COMPLETE 3 VIEW RIGHT    CLINICAL HISTORY:  pain;.    TECHNIQUE:  AP, lateral, and oblique views of the right elbow were performed.    COMPARISON:  Bilateral elbow series 03/23/2022, right elbow series 08/06/2020    FINDINGS:  Overlying cast/splint material limits evaluation and may obscure fine bony detail.  No displaced fracture, dislocation or destructive osseous process.  Mild spurring of the olecranon lip.  No large elbow joint effusion definitively seen.  No subcutaneous emphysema or radiodense retained foreign body identified.                                 Medications - No data to display  Medical Decision Making:   History:   Old Medical Records: I decided to obtain old medical records.  Old Records Summarized: records from previous admission(s).       <> Summary of Records: Patient underwent synovectomy with Dr. Epstein on 06/30/2022  Initial Assessment:   61-year-old female presenting for discoloration of her fingertips after wrist surgery.  She is hypertensive with otherwise normal vitals.  Neurovascularly intact.  Differential Diagnosis:   I did  the bruising on her hand is just dependent from her surgery.  Although it is bluish in color fingers are warm with brisk capillary refill, normal sensation and movement.  They do not appear to be vascularly compromised  She has no increased pain, erythema or induration to suggest infection  No concerning features for compartment syndrome  Independently Interpreted Test(s):   I have ordered and independently interpreted X-rays - see summary below.       <> Summary of X-Ray Reading(s): No acute fracture  Clinical Tests:   Radiological Study: Ordered and Reviewed  ED Management:  Will discuss with Orthopedic surgery.    Patient seen, evaluated and resplinted by Orthopedic surgery.  Stable for discharge.  Patient reassured that some bruising in color changes are normal after surgery.  Patient instructed on how to check her own capillary refill time.  I advised her to follow up with her orthopedist and return to the ED for worsening symptoms. Stressed the importance of follow-up, strict ED return precautions given.  Patient voiced understanding and is comfortable with discharge.  Other:   I have discussed this case with another health care provider.             ED Course as of 07/04/22 1150   Sun Jul 03, 2022   1716 I discussed this patient with Orthopedic surgery who will come evaluate the patient. [CC]      ED Course User Index  [CC] Maribell Graham PA-C             Clinical Impression:   Final diagnoses:  [Z09] Postop check (Primary)          ED Disposition Condition    Discharge Stable        ED Prescriptions     None        Follow-up Information     Follow up With Specialties Details Why Contact Info    Louise Smith MD Hand Surgery, Orthopedic Surgery Schedule an appointment as soon as possible for a visit in 1 week  [address] [facility] [address]  [phone]      [facility] Dept Emergency Medicine Go to  If symptoms worsen [id number]  [facility]  [id number]  [phone]           Maribell Graham PA-C  07/04/22 1151

## 2022-07-04 NOTE — CONSULTS
[facility] - Emergency Dept  Orthopedics  Consult Note    Patient Name: Silvia Cervantes  MRN: [id number]  Admission Date: 7/3/2022  Hospital Length of Stay: 0 days  Attending Provider: Akhil Whittaker Jr., MD  Primary Care Provider: Adrián Cade MD      Inpatient consult to Orthopedic Surgery  Consult performed by: Maco Dsouza MD  Consult ordered by: Maribell Graham PA-C        Subjective:     Principal Problem:Bruising    Chief Complaint:   Chief Complaint   Patient presents with    Post-op Problem     Had wrist surgery last week, now has increased bruising and pain to hand. Fingers moving, but cool.         HPI: Mrs. Cervantes is a 61 year old female s/p rheumatoid nodule excision of right elbow and right wrist synovectomy on 22 who presents to the ED with concerns for bruising of her right fingers and hand. She states she called the on-call nurse triage with her concerns and she was instructed to come to the ED. She denies any loss of sensation, ROM, or increase in pain.       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)        Past Surgical History:   Procedure Laterality Date     SECTION      COLONOSCOPY N/A 2019    Procedure: COLONOSCOPY/melvin ;  Surgeon: Dimas Woodall MD;  Location: [facility];  Service: Endoscopy;  Laterality: N/A;    EXCISION OF NODULE Right 2022    Procedure: EXCISION, NODULE right elbow;  Surgeon: Louise Smith,  MD;  Location: [facility] OR;  Service: Orthopedics;  Laterality: Right;    HYSTERECTOMY      INJECTION OF STEROID Left 6/30/2022    Procedure: INJECTION, STEROID Thumb MP joint;  Surgeon: Louise Smith MD;  Location: [facility] OR;  Service: Orthopedics;  Laterality: Left;    SINUS SURGERY      SURGICAL REMOVAL OF DISTAL ULNA Right 6/30/2022    Procedure: EXCISION, ULNA, DISTAL;  Surgeon: Louise Smith MD;  Location: [facility] OR;  Service: Orthopedics;  Laterality: Right;    SYNOVECTOMY OF WRIST Right 6/30/2022    Procedure: SYNOVECTOMY, WRIST;  Surgeon: Louise Smith MD;  Location: [facility] OR;  Service: Orthopedics;  Laterality: Right;    TUBAL LIGATION         Review of patient's allergies indicates:  No Known Allergies    No current facility-administered medications for this encounter.     Current Outpatient Medications   Medication Sig    amLODIPine (NORVASC) 5 MG tablet Take 1 tablet (5 mg total) by mouth once daily.    amoxicillin (AMOXIL) 500 MG capsule take 2 capsules to start then take 1 capsule by mouth four times a day until all gone    aspirin (ECOTRIN) 81 MG EC tablet Take 81 mg by mouth once daily.    atorvastatin (LIPITOR) 10 MG tablet Take 1 tablet (10 mg total) by mouth once daily.    azelastine (ASTELIN) 137 mcg (0.1 %) nasal spray spray 1 spray (137 mcg total) by Nasal route 2 (two) times daily.    chlorhexidine (PERIDEX) 0.12 % solution Use 15-20 mL to rinse mouth twice daily for 2 minutes, spit out and do not rinse mouth with water after.    clotrimazole-betamethasone 1-0.05% (LOTRISONE) cream Apply to affected area 2 times daily    cyanocobalamin, vitamin B-12, (VITAMIN B-12 ORAL) Take by mouth.    cycloSPORINE (RESTASIS) 0.05 % ophthalmic emulsion Apply 1 drop into both eyes twice a day    desoximetasone (TOPICORT) 0.25 % cream Apply to affected area every other night x 1 month    ergocalciferol (ERGOCALCIFEROL) 50,000 unit Cap Take 1 capsule (50,000 Units total) by mouth every 7 days.     etanercept (ENBREL SURECLICK) 50 mg/mL (1 mL) Inject 1 mL (50 mg total) into the skin once a week.    fluticasone propion-salmeterol 115-21 mcg/dose (ADVAIR HFA) 115-21 mcg/actuation HFAA inhaler Inhale 2 puffs into the lungs every 12 (twelve) hours.    fluticasone propionate (FLONASE) 50 mcg/actuation nasal spray 1 spray (50 mcg total) by Each Nostril route once daily.    folic acid (FOLVITE) 1 MG tablet Take 4 tablets (4 mg total) by mouth once daily.    gabapentin (NEURONTIN) 300 MG capsule Take 1 capsule (300 mg total) by mouth 2 (two) times daily.    gabapentin (NEURONTIN) 300 MG capsule Take 1 capsule (300 mg total) by mouth 2 (two) times daily.    HYDROcodone-acetaminophen (NORCO) 5-325 mg per tablet Take 1 tablet by mouth every 6 (six) hours as needed for Pain.    hydrOXYzine HCL (ATARAX) 25 MG tablet Take 1 tablet (25 mg total) by mouth every evening.    leflunomide (ARAVA) 20 MG Tab Take 1 tablet (20 mg total) by mouth once daily.    losartan (COZAAR) 100 MG tablet Take 1 tablet (100 mg total) by mouth once daily.    meclizine (ANTIVERT) 25 mg tablet Take 1 tablet (25 mg total) by mouth 3 (three) times daily as needed for Dizziness.    metoprolol succinate (TOPROL-XL) 25 MG 24 hr tablet Take 1 tablet (25 mg total) by mouth every evening.    nystatin (MYCOSTATIN) powder Apply to affected area every morning as needed for flare under breasts (Patient taking differently: Apply to affected area every morning as needed for flare under breasts)    polyethylene glycol (GLYCOLAX) 17 gram/dose powder Take 17 g by mouth once daily.    promethazine (PHENERGAN) 25 MG tablet Take 1 tablet (25 mg total) by mouth every 6 (six) hours as needed for Nausea.    PROTOPIC 0.1 % ointment AAA bid    ruxolitinib (OPZELURA) 1.5 % Crea aaa bid    triamcinolone acetonide 0.025% (KENALOG) 0.025 % cream Apply to affected area every day to twice a day to neck . Not more than 1-2 weeks straight in same location     triamcinolone acetonide 0.1% (KENALOG) 0.1 % ointment Apply topically 2 (two) times daily. For 2 weeks    ZINC ORAL Take by mouth.     Family History       Problem Relation (Age of Onset)    Cataracts Mother, Father    Diabetes Father, Sister    Heart disease Father    Hyperlipidemia Father    Hypertension Mother, Father, Brother    No Known Problems Daughter, Son    Stroke Brother          Tobacco Use    Smoking status: Never Smoker    Smokeless tobacco: Never Used   Substance and Sexual Activity    Alcohol use: Yes     Comment: Rarely    Drug use: Not Currently    Sexual activity: Yes     Partners: Male     Birth control/protection: Surgical     Comment: hyst     ROS  Constitutional: negative for fevers or chills  Eyes: negative visual changes or eye discharge  ENT: negative for ear pain or sore throat  Respiratory: negative for shortness of breath or cough  Cardiovascular: negative for chest pain or palpitations  Gastrointestinal: negative for abdominal pain, nausea, or vomiting  Genitourinary: negative for dysuria and flank pain  Neurological: negative for headaches or dizziness  Behavioral/Psych: negative for depression or anxiety  Musculoskeletal: positive for right hand ecchymosis    Objective:     Vital Signs (Most Recent):  Temp: 98.3 °F (36.8 °C) (07/03/22 1623)  Pulse: 61 (07/03/22 1830)  Resp: 16 (07/03/22 1830)  BP: (!) 153/81 (07/03/22 1830)  SpO2: 100 % (07/03/22 1830)   Vital Signs (24h Range):  Temp:  [98.3 °F (36.8 °C)] 98.3 °F (36.8 °C)  Pulse:  [61-85] 61  Resp:  [16] 16  SpO2:  [99 %-100 %] 100 %  BP: (153-183)/(81-94) 153/81           There is no height or weight on file to calculate BMI.    No intake or output data in the 24 hours ending 07/03/22 2026    Ortho/SPM Exam  Gen:  No acute distress, well-developed, well nourished.  CV:  Peripherally well-perfused. 2+ radial pulses, symmetric.  Respiratory:  Normal respiratory effort. No accessory muscle use.   Head/Neck:  Normocephalic.   "Atraumatic. Sclera anicteric. TM. Neck supple.  Neuro: CN 2-12 grossly intact. No FND. Awake. Alert. Oriented to person, place, time, and situation.  Abdomen: Soft, NTND.      MSK:  Right Upper extremity  Inspection  - Skin intact throughout, no open wounds  - No swelling  - Small area of ecchymosis at the dorsal aspect of the hand  - No erythema or signs of cellulitis  - Long arm posterior slab splint in place  Palpation  - NonTTP throughout, no palpable abnormality  Range of motion  - AROM and PROM of the hand intact without pain, minimal ROM of wrist and no ROM of elbow secondary to splint  Stability  - No evidence of joint dislocation or abnormal laxity  Neurovascular  - AIN/PIN/Radial/Median/Ulnar Nerves assessed in isolation without deficit  - Able to give thumbs up, make "OK" sign, cross IF/LF, abduct/adduct fingers, make fist  - SILT throughout  - Compartments soft  - Capillary Refill <3s, WWP  - Muscle tone normal    Significant Labs: All pertinent labs within the past 24 hours have been reviewed.    Significant Imaging: X-Ray: I have reviewed all pertinent results/findings and my personal findings are:  expected interval changes from surgery, no acute fracture or dislocation appreciated    Assessment/Plan:     * Bruising  Mrs. Cervantes is a 61 year old female who underwent surgery with Dr. Smith on 6/30/22 with post-op brusining.    - NVI, no pain with ROM, pain controlled  - No concern for acute processes that would merit admission or further investigation  - Continue postoperative protocol and pain regimen as previously prescribed  - Call with questions or concerns  - Clinic follow up scheduled        Maco Dsouza MD  Orthopedics  [facility] - Emergency Dept      "

## 2022-07-04 NOTE — ASSESSMENT & PLAN NOTE
Mrs. Cervantes is a 61 year old female who underwent surgery with Dr. Smith on 6/30/22 with post-op brusining.    - NVI, no pain with ROM, pain controlled  - No concern for acute processes that would merit admission or further investigation  - Continue postoperative protocol and pain regimen as previously prescribed  - Call with questions or concerns  - Clinic follow up scheduled

## 2022-07-04 NOTE — SUBJECTIVE & OBJECTIVE
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)        Past Surgical History:   Procedure Laterality Date     SECTION      COLONOSCOPY N/A 2019    Procedure: COLONOSCOPY/golytley ;  Surgeon: Dimas Woodall MD;  Location: [facility];  Service: Endoscopy;  Laterality: N/A;    EXCISION OF NODULE Right 2022    Procedure: EXCISION, NODULE right elbow;  Surgeon: Louise Smith MD;  Location: [facility] OR;  Service: Orthopedics;  Laterality: Right;    HYSTERECTOMY      INJECTION OF STEROID Left 2022    Procedure: INJECTION, STEROID Thumb MP joint;  Surgeon: Louise Smith MD;  Location: [facility] OR;  Service: Orthopedics;  Laterality: Left;    SINUS SURGERY      SURGICAL REMOVAL OF DISTAL ULNA Right 2022    Procedure: EXCISION, ULNA, DISTAL;  Surgeon: Louise Smith MD;  Location: [facility] OR;  Service: Orthopedics;  Laterality: Right;    SYNOVECTOMY OF WRIST Right 2022    Procedure: SYNOVECTOMY, WRIST;  Surgeon: Louise Smith MD;  Location: [facility] OR;  Service: Orthopedics;  Laterality: Right;    TUBAL LIGATION         Review of patient's allergies indicates:  No Known Allergies    No current facility-administered medications for this encounter.     Current Outpatient Medications   Medication Sig    amLODIPine (NORVASC) 5 MG tablet Take 1 tablet (5 mg total) by mouth once daily.    amoxicillin (AMOXIL) 500 MG capsule take 2 capsules to start then take 1 capsule by mouth four  times a day until all gone    aspirin (ECOTRIN) 81 MG EC tablet Take 81 mg by mouth once daily.    atorvastatin (LIPITOR) 10 MG tablet Take 1 tablet (10 mg total) by mouth once daily.    azelastine (ASTELIN) 137 mcg (0.1 %) nasal spray spray 1 spray (137 mcg total) by Nasal route 2 (two) times daily.    chlorhexidine (PERIDEX) 0.12 % solution Use 15-20 mL to rinse mouth twice daily for 2 minutes, spit out and do not rinse mouth with water after.    clotrimazole-betamethasone 1-0.05% (LOTRISONE) cream Apply to affected area 2 times daily    cyanocobalamin, vitamin B-12, (VITAMIN B-12 ORAL) Take by mouth.    cycloSPORINE (RESTASIS) 0.05 % ophthalmic emulsion Apply 1 drop into both eyes twice a day    desoximetasone (TOPICORT) 0.25 % cream Apply to affected area every other night x 1 month    ergocalciferol (ERGOCALCIFEROL) 50,000 unit Cap Take 1 capsule (50,000 Units total) by mouth every 7 days.    etanercept (ENBREL SURECLICK) 50 mg/mL (1 mL) Inject 1 mL (50 mg total) into the skin once a week.    fluticasone propion-salmeterol 115-21 mcg/dose (ADVAIR HFA) 115-21 mcg/actuation HFAA inhaler Inhale 2 puffs into the lungs every 12 (twelve) hours.    fluticasone propionate (FLONASE) 50 mcg/actuation nasal spray 1 spray (50 mcg total) by Each Nostril route once daily.    folic acid (FOLVITE) 1 MG tablet Take 4 tablets (4 mg total) by mouth once daily.    gabapentin (NEURONTIN) 300 MG capsule Take 1 capsule (300 mg total) by mouth 2 (two) times daily.    gabapentin (NEURONTIN) 300 MG capsule Take 1 capsule (300 mg total) by mouth 2 (two) times daily.    HYDROcodone-acetaminophen (NORCO) 5-325 mg per tablet Take 1 tablet by mouth every 6 (six) hours as needed for Pain.    hydrOXYzine HCL (ATARAX) 25 MG tablet Take 1 tablet (25 mg total) by mouth every evening.    leflunomide (ARAVA) 20 MG Tab Take 1 tablet (20 mg total) by mouth once daily.    losartan (COZAAR) 100 MG tablet Take 1 tablet (100 mg total) by mouth once  daily.    meclizine (ANTIVERT) 25 mg tablet Take 1 tablet (25 mg total) by mouth 3 (three) times daily as needed for Dizziness.    metoprolol succinate (TOPROL-XL) 25 MG 24 hr tablet Take 1 tablet (25 mg total) by mouth every evening.    nystatin (MYCOSTATIN) powder Apply to affected area every morning as needed for flare under breasts (Patient taking differently: Apply to affected area every morning as needed for flare under breasts)    polyethylene glycol (GLYCOLAX) 17 gram/dose powder Take 17 g by mouth once daily.    promethazine (PHENERGAN) 25 MG tablet Take 1 tablet (25 mg total) by mouth every 6 (six) hours as needed for Nausea.    PROTOPIC 0.1 % ointment AAA bid    ruxolitinib (OPZELURA) 1.5 % Crea aaa bid    triamcinolone acetonide 0.025% (KENALOG) 0.025 % cream Apply to affected area every day to twice a day to neck . Not more than 1-2 weeks straight in same location    triamcinolone acetonide 0.1% (KENALOG) 0.1 % ointment Apply topically 2 (two) times daily. For 2 weeks    ZINC ORAL Take by mouth.     Family History       Problem Relation (Age of Onset)    Cataracts Mother, Father    Diabetes Father, Sister    Heart disease Father    Hyperlipidemia Father    Hypertension Mother, Father, Brother    No Known Problems Daughter, Son    Stroke Brother          Tobacco Use    Smoking status: Never Smoker    Smokeless tobacco: Never Used   Substance and Sexual Activity    Alcohol use: Yes     Comment: Rarely    Drug use: Not Currently    Sexual activity: Yes     Partners: Male     Birth control/protection: Surgical     Comment: hyst     ROS  Constitutional: negative for fevers or chills  Eyes: negative visual changes or eye discharge  ENT: negative for ear pain or sore throat  Respiratory: negative for shortness of breath or cough  Cardiovascular: negative for chest pain or palpitations  Gastrointestinal: negative for abdominal pain, nausea, or vomiting  Genitourinary: negative for dysuria and flank  "pain  Neurological: negative for headaches or dizziness  Behavioral/Psych: negative for depression or anxiety  Musculoskeletal: positive for right hand ecchymosis    Objective:     Vital Signs (Most Recent):  Temp: 98.3 °F (36.8 °C) (07/03/22 1623)  Pulse: 61 (07/03/22 1830)  Resp: 16 (07/03/22 1830)  BP: (!) 153/81 (07/03/22 1830)  SpO2: 100 % (07/03/22 1830)   Vital Signs (24h Range):  Temp:  [98.3 °F (36.8 °C)] 98.3 °F (36.8 °C)  Pulse:  [61-85] 61  Resp:  [16] 16  SpO2:  [99 %-100 %] 100 %  BP: (153-183)/(81-94) 153/81           There is no height or weight on file to calculate BMI.    No intake or output data in the 24 hours ending 07/03/22 2026    Ortho/SPM Exam  Gen:  No acute distress, well-developed, well nourished.  CV:  Peripherally well-perfused. 2+ radial pulses, symmetric.  Respiratory:  Normal respiratory effort. No accessory muscle use.   Head/Neck:  Normocephalic.  Atraumatic. Sclera anicteric. TM. Neck supple.  Neuro: CN 2-12 grossly intact. No FND. Awake. Alert. Oriented to person, place, time, and situation.  Abdomen: Soft, NTND.      MSK:  Right Upper extremity  Inspection  - Skin intact throughout, no open wounds  - No swelling  - Small area of ecchymosis at the dorsal aspect of the hand  - No erythema or signs of cellulitis  - Long arm posterior slab splint in place  Palpation  - NonTTP throughout, no palpable abnormality  Range of motion  - AROM and PROM of the hand intact without pain, minimal ROM of wrist and no ROM of elbow secondary to splint  Stability  - No evidence of joint dislocation or abnormal laxity  Neurovascular  - AIN/PIN/Radial/Median/Ulnar Nerves assessed in isolation without deficit  - Able to give thumbs up, make "OK" sign, cross IF/LF, abduct/adduct fingers, make fist  - SILT throughout  - Compartments soft  - Capillary Refill <3s, WWP  - Muscle tone normal    Significant Labs: All pertinent labs within the past 24 hours have been reviewed.    Significant Imaging: X-Ray: I " have reviewed all pertinent results/findings and my personal findings are:  expected interval changes from surgery, no acute fracture or dislocation appreciated

## 2022-07-04 NOTE — HPI
Mrs. Cervantes is a 61 year old female s/p rheumatoid nodule excision of right elbow and right wrist synovectomy on 6/30/22 who presents to the ED with concerns for bruising of her right fingers and hand. She states she called the on-call nurse triage with her concerns and she was instructed to come to the ED. She denies any loss of sensation, ROM, or increase in pain.

## 2022-07-04 NOTE — DISCHARGE INSTRUCTIONS
Diagnosis:  Postop bruising    The bluish color on your hands is likely due to bruising.  Your x-rays do not show any concerning findings.  You were seen by our orthopedic surgery doctor in the emergency room.    Please follow-up with your hand surgeon.  If you start to have any worsening symptoms, please return to the emergency department.

## 2022-07-05 ENCOUNTER — TELEPHONE (OUTPATIENT)
Dept: ORTHOPEDICS | Facility: CLINIC | Age: 62
End: 2022-07-05
Payer: MEDICARE

## 2022-07-05 NOTE — TELEPHONE ENCOUNTER
Spoke with pt.   Advised pt that she is able to attend therapy tomorrow and to keep her appointment with Dr Smith on 7/11

## 2022-07-05 NOTE — TELEPHONE ENCOUNTER
----- Message from Linda Fabian sent at 7/5/2022 11:58 AM CDT -----  Contact: Pt [phone]  Pt called and stated that she would like a call regarding her Physical Therapy appt tomorrow. She stated that she still has her dressing on her hand to elbow. She stated that she is nervous to go to Therapy with the bandage on.     Thank you

## 2022-07-06 ENCOUNTER — CLINICAL SUPPORT (OUTPATIENT)
Dept: REHABILITATION | Facility: HOSPITAL | Age: 62
End: 2022-07-06
Payer: MEDICARE

## 2022-07-06 ENCOUNTER — PATIENT MESSAGE (OUTPATIENT)
Dept: REHABILITATION | Facility: HOSPITAL | Age: 62
End: 2022-07-06

## 2022-07-06 DIAGNOSIS — M65.831 EXTENSOR TENOSYNOVITIS OF RIGHT WRIST: ICD-10-CM

## 2022-07-06 DIAGNOSIS — M19.031 DRUJ (DISTAL RADIOULNAR JOINT) ARTHROSIS, PRIMARY, RIGHT: ICD-10-CM

## 2022-07-06 DIAGNOSIS — Z78.9 DECREASED INDEPENDENCE WITH ACTIVITIES OF DAILY LIVING: ICD-10-CM

## 2022-07-06 DIAGNOSIS — M25.531 PAIN IN RIGHT WRIST: ICD-10-CM

## 2022-07-06 DIAGNOSIS — M06.321 RHEUMATOID NODULE OF RIGHT ELBOW: ICD-10-CM

## 2022-07-06 DIAGNOSIS — M25.521 PAIN IN RIGHT ELBOW: ICD-10-CM

## 2022-07-06 PROCEDURE — L3906 WHO W/O JOINTS CF: HCPCS | Mod: PN

## 2022-07-06 PROCEDURE — 97110 THERAPEUTIC EXERCISES: CPT | Mod: PN

## 2022-07-06 NOTE — PATIENT INSTRUCTIONS
CUSTOM  ORTHOSIS (SPLINT) INSTRUCTIONS      A CUSTOM SPLINT HAS BEEN FABRICATED FOR YOUR Right Wrist.    THE PURPOSE OF YOUR SPLINT IS TO:  - To protect your surgery site.      PLEASE WEAR YOUR SPLINT/S AS FOLLOWS:      - Wear at all times (24/7) including while sleeping. Remove for gentle cleansing around the incision site and healed areas of the hand/wrist. Keep any remaining sutures dry. Remove for performing the home exercises prescribed.    REMOVE YOUR SPLINT TO PERFORM THE HOME EXERCISES IF INSTRUCTED.     BRING SPLINT/S TO EACH THERAPY SESSION SO THAT YOUR OCCUPATIONAL THERAPIST CAN PROVIDE MODIFICATIONS AS NEEDED TO:  -  Improve the fit and/or comfort. Reduce pressure areas or areas of abrasion. Reduce the size of the splint as your swelling decreases. Repair straps or velcro.    PLEASE WATCH FOR AND BE AWARE OF THE FOLLOWING:  - Signs of infection that may include increased swelling, change in color, increased drainage, increased pain, change in smell. Contact your MD or seek advice from an urgent care MD if after hours., Signs of pressure or abrasion that will cause skin irritation or blistering., and Broken straps or velcro. Use an ace wrap or tape as needed to secure the splint until it can be repaired.      Keep your orthosis away from any heat sources that may cause it to change shape. Do not leave your orthosis in, near or around the following sources:       Hot water       hand dryers       stove       clothes dryer         radiator     jacinta car    jacinta window sill     baggage stored under an airplane            microwave oven    DO NOT alter the SPLINT yourself. It may no longer fit or be effective if it is damaged.    Please contact your Occupational Therapist/Hand Therapist, Smita Thurston at [phone] if you need to request a splint check. If you have specific questions or concerns, you can message her through the [facility] portal at [email or web address].          [facility],  "OCCUPATIONAL THERAPY  HOME EXERCISE PROGRAM     PERFORM EXERCISES 1 TO 3 SETS OF 10, 4-6 TIMES PER DAY; ALWAYS PAIN-FREE. DON'T LET THE PICTURES PRESSURE YOU TO PUSH THROUGH PAINFUL RANGES.                      AROM: Isolated MCP Flexion / Extension ("Wave")   Bend only your large, bottom knuckles. Hold 3 seconds.   Keep the tips of your fingers straight. Straighten fingers.   AROM: MCP and PIP Flexion / Extension ("Straight Fist")  Bend your bottom and middle knuckles, keeping the tips of your fingers straight.   Try to touch the pads of your fingers on your palm. Hold 3 seconds.   Straighten your fingers.          AROM: Isolated IPJ Flexion / Extension ("Hook")   Bend only your middle and end knuckles. Hold 3 seconds.   Straighten your fingers.   AROM: Composite Flexion / Extension ("Full Fist")  Bend every joint in your hand into a fist. Hold 3 seconds.   Straighten your fingers.           AROM: Composte Extension ("Finger Lifts")  Place your hand palm down over a book or towel roll as needed, leaving your fingers hanging over the edge. Lift each finger up towards the ceiling one at a time. Hold 3 seconds.   Relax your finger. Then, lift all fingers and thumb up away from the table surface.     AROM: Abduction / Adduction  With hand above your heart (prop elbow up on a table surface), spread all fingers apart, then bring them together as close as possible. If your LF is mostly involved, wiggle it side to side while you have your other fingers in the spread position.        AROM: Composite Flesion ("Pinky Slides")  Touch thumb to tip of small finger. Slide thumb down   small finger into palm.      Complete 10 repetitions of each exercise 4 times a day:  "

## 2022-07-07 ENCOUNTER — CLINICAL SUPPORT (OUTPATIENT)
Dept: REHABILITATION | Facility: HOSPITAL | Age: 62
End: 2022-07-07
Payer: MEDICARE

## 2022-07-07 DIAGNOSIS — M25.431 SWELLING OF RIGHT WRIST: ICD-10-CM

## 2022-07-07 DIAGNOSIS — M25.641 DECREASED RANGE OF MOTION OF FINGER OF RIGHT HAND: ICD-10-CM

## 2022-07-07 DIAGNOSIS — M79.641 PAIN IN RIGHT HAND: ICD-10-CM

## 2022-07-07 DIAGNOSIS — M25.631 DECREASED RANGE OF MOTION OF RIGHT WRIST: ICD-10-CM

## 2022-07-07 DIAGNOSIS — M25.621 DECREASED RANGE OF MOTION OF RIGHT ELBOW: ICD-10-CM

## 2022-07-07 PROBLEM — Z78.9 DECREASED INDEPENDENCE WITH ACTIVITIES OF DAILY LIVING: Status: RESOLVED | Noted: 2022-07-07 | Resolved: 2022-07-07

## 2022-07-07 PROBLEM — Z78.9 DECREASED INDEPENDENCE WITH ACTIVITIES OF DAILY LIVING: Status: ACTIVE | Noted: 2022-07-07

## 2022-07-07 PROCEDURE — 97110 THERAPEUTIC EXERCISES: CPT | Mod: PN,59

## 2022-07-07 PROCEDURE — 97763 ORTHC/PROSTC MGMT SBSQ ENC: CPT | Mod: PN,59

## 2022-07-07 PROCEDURE — 97140 MANUAL THERAPY 1/> REGIONS: CPT | Mod: PN

## 2022-07-07 PROCEDURE — 97166 OT EVAL MOD COMPLEX 45 MIN: CPT | Mod: PN

## 2022-07-07 NOTE — PROGRESS NOTES
[facility] Occupational Therapy Wrist & Hand  Initial Evaluation       Date: 2022  Patient: Silvia Cervantes  Chart Number: [id number]  Referring Physician: Louise Smith MD  Therapy Diagnosis:   1. Decreased range of motion of right wrist     2. Decreased range of motion of right elbow     3. Swelling of right wrist     4. Decreased range of motion of finger of right hand     5. Pain in right hand       Medical Diagnosis:   M06.321 (ICD-10-CM) - Rheumatoid nodule of right elbow   M65.831 (ICD-10-CM) - Extensor tenosynovitis of right wrist   M19.031 (ICD-10-CM) - DRUJ (distal radioulnar joint) arthrosis, primary, right     Physician Orders:   Post Surgical? Yes   Eval and Treat Yes   Type of Therapy Hand Therapy (CHT) Comment - Patient needs post op visit 6-8 days s/p surgery 2022 for custom wrist splint fabrication and gentle ROM   V/O rec'd 22 for compression garment to elbow for preventing hematoma    Evaluation Date: 2022  Insurance Authorization Period Expiration: 8/3/22  Plan of Care from 2022 to 10/7/22   Date of Return to MD/PA: 22    Visit # / Visits authorized:   FOTO (DASH) at eval: 74.5%  Time In: 9:05 am  Time Out: 10:25 am  Total Appointment Time (timed & untimed codes): 80 minutes    Precautions: Standard and Multiple Trauma Injuries; Restrictions/precautions as per DAY 7  Date of Procedure: 2022   Procedure:   1. Right elbow posterior olecranon rheumatoid mass excision, cpt 38270  2. Right wrist radical synovectomy, cpt 10239  3. Right wrist radiocarpal joint arthrotomy with synovectomy, cpt 12112  4. Right distal ulnar osteophyte excision, cpt 49356  5. Left thumb MP joint steroid injection, cpt 78589    Subjective     Involved Side: Right  Dominant Side: Right  Date of Onset: see Sx specific  History of Current Condition/Mechanism of Injury: Pt has developed RA changes for many years  Imagin/3/22: X-ray Impression: Overall alignment is  within normal limits.  No displaced fracture, dislocation or destructive osseous process definitively seen allowing for limitations.  Minimal DJD.  Mild nonspecific soft tissue swelling about the dorsal aspect of the wrist, slightly improved from prior.  No subcutaneous emphysema or radiodense retained foreign body definitively seen allowing for limitations.     Previous Therapy: none for right UE    Patient's Goals for Therapy: Silvia desires to restore pain-free function of the Right UE for participating in all customary & necessary I/ADLs such as cooking and driving. Silvia desires to learn the pathology of the condition, how to manage the symptoms, and desires to learn adaptive strategies for improved function in daily life activities.  Pain:  Functional Pain Scale Rating 0-10:   5/10 on average    Location: Right Elbow & fingers  Description: post-op  Aggravating Factors: AROM  Easing Factors: pain medication    Occupation:  Retired    Functional Limitations/Social History:    Previous functional status includes: Mod Independent with all ADLs.     Current Level of Function    Home/Living environment : lives with their family   Limitation of Functional Status as follows:  ADLs/IADLs: needs assistance for setting up meals, Max (A) for household & community ADLs    - Driving: pt drove with only Left hand today   Leisure: Cooking     CMS Impairment/Limitation/Restriction for FOTO Hand Survey     Therapist reviewed FOTO scores for Silvia on 7/7/2022.   FOTO documents entered into [employer] - see Media section.     Limitation Score: 74.5% (DASH)        Objective     Observations: Pt arrives in the Custom wrist cock-up splint fabricated yesterday. The incision sites are intact. The elbow incision site had only old dry drainage. The wrist incision site had full glue coverage. No signs of infection. Pictures were uploaded to Media. Discoloration and random bruising more contrasting with areas of her skin that have no  pigment.    EDEMA (Girth in cm)   Right/Left   DATE IE-7/7/2022   Wrist 17.5/14.2   DPC 18.0/17.4   TransMC 18.6/18.0   IF P1 6.5/5.8   SF P1 5.1/4.6   Thumb P1 5.9/5.4   Elbow crease 23.2/22.6*   * Left incision scar with enlarged olecranon    Wrist AROM   Right/Left   DATE IE-7/7/22   EXT 52/74   FLX 12/78   RD 2/12   UD  15/29   JENKINS 81/193     ELBOW & FOREARM AROM   Right/Left   DATE IE-7/7/22   EXT -30/-22   /148   SUP 67/79   PRO 74/85     AROM (measured in centimeters)  Tip to DPC Right/Left   DATE IE-7/7/2022   Index  3.5/1.5   Long  2.5/0   Ring   1.5/0   Small   .5/0   Thumb 0/0        Treatment     Treatment Time In: 9:45 am  Treatment Time Out: 10:25 am  Total Treatment time (time-based codes) separate from Evaluation: 40 minutes    Silvia received the following supervised modalities after being cleared for contradictions for 0 minutes:   -defer    Silvia received the following direct contact modalities after being cleared for contraindications for 0 minutes:  - defer    Silvia received the following manual therapy techniques for 8 minutes:   - fit and issued size large 3/4 finger Isotoner glove for edema mgmt; nonadherent gauze placed over incision site  - check out fit of size E tubigrip for right elbow edema mgmt; size C tg  tubular net bandage donned to hold non-adherent gauze placed over incision site    Silvia received therapeutic exercises for 15 minutes including:  Elbow & forearm AROM 1x10 3 ways of elbow flexion/extension each:  - supination  - pronation  - neutral     1x10 Forear supination/pronation   Wrist AROM 1x10 each:  - flexion/extension  - Radial/Ulna Deviation   Digit and Thumb AROM 1x10 each:  - TGEs  - spreads  - lifts  - thumb opp targeting each digit tip       Objective Measures eval   HEP As initially set     Silvia participated in an in-depth and concurrent discussion of evaluation findings and specific home care, including:      Silvia participated in Self Care and Orthotic  Management Instructions and Training for 15 minutes, in addition to discussion of evaluation findings and specific home care, including:   - Current Protocol Restrictions and precautions  - Incision site care; pt to keep dry until MD follow up on 7/11/22  - instruction in activity modifications for goals of protecting healing tissues to include:  - delegating all lifting and gripping tasks to family.  - using left hand to close the Keurig  - Modified Custom Orthosis for reducing pressure areas.   - instructions for proper donning orthosis for reducing skin irritation and maceration; return to OT before the weekend if needing additional modification  - instructed strategy for ellen/doffing the orthosis for prevention of stress to the protected healing tissues  - edema mgmt using elevation, gentle AROM as per HEP and light compression sleeves worn beneath the splint as provided today.    Home Exercise Program and Home Care Resources/Education:  Issued HEP (see patient instructions in EMR) and educated on modality use for pain management . Exercises were reviewed and Silvia was able to demonstrate them prior to the end of the session.   Pt received a written copy of exercises to perform at home. Silvia demonstrated good  understanding of the education provided.  Pt was advised to perform these exercises free of pain, and to stop performing them if pain occurs.    Patient/Family Education: role of OT, goals for OT, scheduling/cancellations - pt verbalized understanding. Discussed insurance limitations with patient.      Assessment     Silvia Cervantes is a 61 y.o. female who presents with limitations as measured and/or defined in the charts above and below. Patient can benefit from Occupational Therapy services for addressing these problems.  The patient was in agreement with this plan of care after discussing.     The patient's rehab potential is Good.     Anticipated barriers to occupational therapy: RA  Pt has no  cultural, educational or language barriers to learning provided.    Profile and History Assessment of Occupational Performance Level of Clinical Decision Making Complexity Score   Occupational Profile:   Silvia Cervantes is a 61 y.o. female who is retired Silvia Cervantes has difficulty with  ADLs and IADLs as listed previously, which  affecting his/her daily functional abilities.      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    Medical and Therapy History Review:   Expanded               Performance Deficits    Physical:  Joint Mobility  Joint Stability  Skin Integrity/Scar Formation  Edema  Pain    Cognitive:  No Deficits    Psychosocial:    Anxiety     Clinical Decision Making:  moderate    Assessment Process:  Detailed Assessments    Modification/Need for Assistance:  Minimal-Moderate Modifications/Assistance    Intervention Selection:  Limited Treatment Options       low  Based on PMHX, co morbidities , data from assessments and functional level of assistance required with task and clinical presentation directly impacting function.       The following goals were discussed with the patient and patient is in agreement with them as to be addressed in the treatment plan.       Goals:   LTG's (10-12 weeks):  1)  Right  strength to be > 70% of the unaffected hand for safe grasp and stabilization tasks while cooking.  2)   Patient to score at 40% or less on FOTO or DASH to demonstrate improved perception of pain and functional Right hand use with or without assistive devices and/or  activity modifications.  3)   Pt to report comfortable and appropriate wear of positioning/protective orthoses or splints.  4)  Right elbow JENKINS to be >140 degrees for maximizing comfortable grasp position and  tolerance for maximizing safety with mobility and I/ADLs.  5)   Wrist JENKINS to be >80% of the unaffected side for reach to all body parts and for weight bearing during functional support.    STG's (6-8 weeks)  1)   Patient to be IND with HEP and modalities for pain/edema managment.  2)   Increase Right wrist and elbow ROM by 40-60 degrees to improve functional hand use for washing her hair.  3)   Patient to be IND with Orthotic use, wear and care precautions.   4)  Pt to report decreased episodes of pain in Right elbow and hand/wrist during daytime activities confirming follow through with at least 2 new effective arthritis management strategies.   5) Pt to tolerate advancing PREs and weight bearing with self-graded intensity and effective symptom monitoring.     Plan   Certification Period/Plan of care expiration: 7/7/22 to 10/7/22.    Outpatient Occupational Therapy 2 times weekly for 10-12 weeks to include the following interventions: Paraffin, Fluidotherapy, Manual therapy/joint mobilizations, Modalities for pain management, Therapeutic exercises/activities., Strengthening, Orthotic Fabrication/Fit/Training, Edema Control, Scar Management, Joint Protection and Energy Conservation.    GILL Cobb, IMMANUELT  Occupational Therapist, Certified Hand Therapist

## 2022-07-07 NOTE — PLAN OF CARE
[facility] Occupational Therapy Wrist & Hand  Initial Evaluation       Date: 2022  Patient: Silvia Cervantes  Chart Number: [id number]  Referring Physician: Louise Smith MD  Therapy Diagnosis:   1. Decreased range of motion of right wrist     2. Decreased range of motion of right elbow     3. Swelling of right wrist     4. Decreased range of motion of finger of right hand     5. Pain in right hand       Medical Diagnosis:   M06.321 (ICD-10-CM) - Rheumatoid nodule of right elbow   M65.831 (ICD-10-CM) - Extensor tenosynovitis of right wrist   M19.031 (ICD-10-CM) - DRUJ (distal radioulnar joint) arthrosis, primary, right     Physician Orders:   Post Surgical? Yes   Eval and Treat Yes   Type of Therapy Hand Therapy (CHT) Comment - Patient needs post op visit 6-8 days s/p surgery 2022 for custom wrist splint fabrication and gentle ROM   V/O rec'd 22 for compression garment to elbow for preventing hematoma    Evaluation Date: 2022  Insurance Authorization Period Expiration: 8/3/22  Plan of Care from 2022 to 10/7/22   Date of Return to MD/PA: 22    Visit # / Visits authorized:   FOTO (DASH) at eval: 74.5%  Time In: 9:05 am  Time Out: 10:25 am  Total Appointment Time (timed & untimed codes): 80 minutes    Precautions: Standard and Multiple Trauma Injuries; Restrictions/precautions as per DAY 7  Date of Procedure: 2022   Procedure:   1. Right elbow posterior olecranon rheumatoid mass excision, cpt 52031  2. Right wrist radical synovectomy, cpt 40897  3. Right wrist radiocarpal joint arthrotomy with synovectomy, cpt 47338  4. Right distal ulnar osteophyte excision, cpt 90040  5. Left thumb MP joint steroid injection, cpt 22232    Subjective     Involved Side: Right  Dominant Side: Right  Date of Onset: see Sx specific  History of Current Condition/Mechanism of Injury: Pt has developed RA changes for many years  Imagin/3/22: X-ray Impression: Overall alignment is  within normal limits.  No displaced fracture, dislocation or destructive osseous process definitively seen allowing for limitations.  Minimal DJD.  Mild nonspecific soft tissue swelling about the dorsal aspect of the wrist, slightly improved from prior.  No subcutaneous emphysema or radiodense retained foreign body definitively seen allowing for limitations.     Previous Therapy: none for right UE    Patient's Goals for Therapy: Silvia desires to restore pain-free function of the Right UE for participating in all customary & necessary I/ADLs such as cooking and driving. Silvia desires to learn the pathology of the condition, how to manage the symptoms, and desires to learn adaptive strategies for improved function in daily life activities.  Pain:  Functional Pain Scale Rating 0-10:   5/10 on average    Location: Right Elbow & fingers  Description: post-op  Aggravating Factors: AROM  Easing Factors: pain medication    Occupation:  Retired    Functional Limitations/Social History:    Previous functional status includes: Mod Independent with all ADLs.     Current Level of Function    Home/Living environment : lives with their family   Limitation of Functional Status as follows:  ADLs/IADLs: needs assistance for setting up meals, Max (A) for household & community ADLs    - Driving: pt drove with only Left hand today   Leisure: Cooking     CMS Impairment/Limitation/Restriction for FOTO Hand Survey     Therapist reviewed FOTO scores for Silvia on 7/7/2022.   FOTO documents entered into [employer] - see Media section.     Limitation Score: 74.5% (DASH)        Objective     Observations: Pt arrives in the Custom wrist cock-up splint fabricated yesterday. The incision sites are intact. The elbow incision site had only old dry drainage. The wrist incision site had full glue coverage. No signs of infection. Pictures were uploaded to Media. Discoloration and random bruising more contrasting with areas of her skin that have no  pigment.    EDEMA (Girth in cm)   Right/Left   DATE IE-7/7/2022   Wrist 17.5/14.2   DPC 18.0/17.4   TransMC 18.6/18.0   IF P1 6.5/5.8   SF P1 5.1/4.6   Thumb P1 5.9/5.4   Elbow crease 23.2/22.6*   * Left incision scar with enlarged olecranon    Wrist AROM   Right/Left   DATE IE-7/7/22   EXT 52/74   FLX 12/78   RD 2/12   UD  15/29   JENKINS 81/193     ELBOW & FOREARM AROM   Right/Left   DATE IE-7/7/22   EXT -30/-22   /148   SUP 67/79   PRO 74/85     AROM (measured in centimeters)  Tip to DPC Right/Left   DATE IE-7/7/2022   Index  3.5/1.5   Long  2.5/0   Ring   1.5/0   Small   .5/0   Thumb 0/0        Treatment     Treatment Time In: 9:45 am  Treatment Time Out: 10:25 am  Total Treatment time (time-based codes) separate from Evaluation: 40 minutes    Silvia received the following supervised modalities after being cleared for contradictions for 0 minutes:   -defer    Silvia received the following direct contact modalities after being cleared for contraindications for 0 minutes:  - defer    Silvia received the following manual therapy techniques for 8 minutes:   - fit and issued size large 3/4 finger Isotoner glove for edema mgmt; nonadherent gauze placed over incision site  - check out fit of size E tubigrip for right elbow edema mgmt; size C tg  tubular net bandage donned to hold non-adherent gauze placed over incision site    Silvia received therapeutic exercises for 15 minutes including:  Elbow & forearm AROM 1x10 3 ways of elbow flexion/extension each:  - supination  - pronation  - neutral     1x10 Forear supination/pronation   Wrist AROM 1x10 each:  - flexion/extension  - Radial/Ulna Deviation   Digit and Thumb AROM 1x10 each:  - TGEs  - spreads  - lifts  - thumb opp targeting each digit tip       Objective Measures eval   HEP As initially set     Silvia participated in an in-depth and concurrent discussion of evaluation findings and specific home care, including:      Silvia participated in Self Care and Orthotic  Management Instructions and Training for 15 minutes, in addition to discussion of evaluation findings and specific home care, including:   - Current Protocol Restrictions and precautions  - Incision site care; pt to keep dry until MD follow up on 7/11/22  - instruction in activity modifications for goals of protecting healing tissues to include:  - delegating all lifting and gripping tasks to family.  - using left hand to close the Keurig  - Modified Custom Orthosis for reducing pressure areas.   - instructions for proper donning orthosis for reducing skin irritation and maceration; return to OT before the weekend if needing additional modification  - instructed strategy for ellen/doffing the orthosis for prevention of stress to the protected healing tissues  - edema mgmt using elevation, gentle AROM as per HEP and light compression sleeves worn beneath the splint as provided today.    Home Exercise Program and Home Care Resources/Education:  Issued HEP (see patient instructions in EMR) and educated on modality use for pain management . Exercises were reviewed and Silvia was able to demonstrate them prior to the end of the session.   Pt received a written copy of exercises to perform at home. Silvia demonstrated good  understanding of the education provided.  Pt was advised to perform these exercises free of pain, and to stop performing them if pain occurs.    Patient/Family Education: role of OT, goals for OT, scheduling/cancellations - pt verbalized understanding. Discussed insurance limitations with patient.      Assessment     Silvia Cervantes is a 61 y.o. female who presents with limitations as measured and/or defined in the charts above and below. Patient can benefit from Occupational Therapy services for addressing these problems.  The patient was in agreement with this plan of care after discussing.     The patient's rehab potential is Good.     Anticipated barriers to occupational therapy: RA  Pt has no  cultural, educational or language barriers to learning provided.    Profile and History Assessment of Occupational Performance Level of Clinical Decision Making Complexity Score   Occupational Profile:   Silvia Cervantes is a 61 y.o. female who is retired Silvia Cervantes has difficulty with  ADLs and IADLs as listed previously, which  affecting his/her daily functional abilities.      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    Medical and Therapy History Review:   Expanded               Performance Deficits    Physical:  Joint Mobility  Joint Stability  Skin Integrity/Scar Formation  Edema  Pain    Cognitive:  No Deficits    Psychosocial:    Anxiety     Clinical Decision Making:  moderate    Assessment Process:  Detailed Assessments    Modification/Need for Assistance:  Minimal-Moderate Modifications/Assistance    Intervention Selection:  Limited Treatment Options       low  Based on PMHX, co morbidities , data from assessments and functional level of assistance required with task and clinical presentation directly impacting function.       The following goals were discussed with the patient and patient is in agreement with them as to be addressed in the treatment plan.       Goals:   LTG's (10-12 weeks):  1)  Right  strength to be > 70% of the unaffected hand for safe grasp and stabilization tasks while cooking.  2)   Patient to score at 40% or less on FOTO or DASH to demonstrate improved perception of pain and functional Right hand use with or without assistive devices and/or  activity modifications.  3)   Pt to report comfortable and appropriate wear of positioning/protective orthoses or splints.  4)  Right elbow JENKINS to be >140 degrees for maximizing comfortable grasp position and  tolerance for maximizing safety with mobility and I/ADLs.  5)   Wrist JENKINS to be >80% of the unaffected side for reach to all body parts and for weight bearing during functional support.    STG's (6-8 weeks)  1)   Patient to be IND with HEP and modalities for pain/edema managment.  2)   Increase Right wrist and elbow ROM by 40-60 degrees to improve functional hand use for washing her hair.  3)   Patient to be IND with Orthotic use, wear and care precautions.   4)  Pt to report decreased episodes of pain in Right elbow and hand/wrist during daytime activities confirming follow through with at least 2 new effective arthritis management strategies.   5) Pt to tolerate advancing PREs and weight bearing with self-graded intensity and effective symptom monitoring.     Plan   Certification Period/Plan of care expiration: 7/7/22 to 10/7/22.    Outpatient Occupational Therapy 2 times weekly for 10-12 weeks to include the following interventions: Paraffin, Fluidotherapy, Manual therapy/joint mobilizations, Modalities for pain management, Therapeutic exercises/activities., Strengthening, Orthotic Fabrication/Fit/Training, Edema Control, Scar Management, Joint Protection and Energy Conservation.    GILL Cobb, [facility]  Occupational Therapist, Certified Hand Therapist      I CERTIFY THE NEED FOR THESE SERVICES FURNISHED UNDER THIS PLAN OF TREATMENT AND WHILE UNDER MY CARE    Physician's comments:        Physician's Signature: ___________________________________________________

## 2022-07-11 ENCOUNTER — OFFICE VISIT (OUTPATIENT)
Dept: ORTHOPEDICS | Facility: CLINIC | Age: 62
End: 2022-07-11
Payer: MEDICARE

## 2022-07-11 VITALS — BODY MASS INDEX: 27.41 KG/M2 | HEIGHT: 61 IN | WEIGHT: 145.19 LBS

## 2022-07-11 DIAGNOSIS — M06.321 RHEUMATOID NODULE OF RIGHT ELBOW: Primary | ICD-10-CM

## 2022-07-11 DIAGNOSIS — M65.831 EXTENSOR TENOSYNOVITIS OF RIGHT WRIST: ICD-10-CM

## 2022-07-11 PROCEDURE — 99024 PR POST-OP FOLLOW-UP VISIT: ICD-10-PCS | Mod: S$GLB,,, | Performed by: ORTHOPAEDIC SURGERY

## 2022-07-11 PROCEDURE — 4010F PR ACE/ARB THEARPY RXD/TAKEN: ICD-10-PCS | Mod: CPTII,S$GLB,, | Performed by: ORTHOPAEDIC SURGERY

## 2022-07-11 PROCEDURE — 1159F PR MEDICATION LIST DOCUMENTED IN MEDICAL RECORD: ICD-10-PCS | Mod: CPTII,S$GLB,, | Performed by: ORTHOPAEDIC SURGERY

## 2022-07-11 PROCEDURE — 99999 PR PBB SHADOW E&M-EST. PATIENT-LVL IV: CPT | Mod: PBBFAC,,, | Performed by: ORTHOPAEDIC SURGERY

## 2022-07-11 PROCEDURE — 1159F MED LIST DOCD IN RCRD: CPT | Mod: CPTII,S$GLB,, | Performed by: ORTHOPAEDIC SURGERY

## 2022-07-11 PROCEDURE — 99024 POSTOP FOLLOW-UP VISIT: CPT | Mod: S$GLB,,, | Performed by: ORTHOPAEDIC SURGERY

## 2022-07-11 PROCEDURE — 1160F PR REVIEW ALL MEDS BY PRESCRIBER/CLIN PHARMACIST DOCUMENTED: ICD-10-PCS | Mod: CPTII,S$GLB,, | Performed by: ORTHOPAEDIC SURGERY

## 2022-07-11 PROCEDURE — 3008F BODY MASS INDEX DOCD: CPT | Mod: CPTII,S$GLB,, | Performed by: ORTHOPAEDIC SURGERY

## 2022-07-11 PROCEDURE — 4010F ACE/ARB THERAPY RXD/TAKEN: CPT | Mod: CPTII,S$GLB,, | Performed by: ORTHOPAEDIC SURGERY

## 2022-07-11 PROCEDURE — 3008F PR BODY MASS INDEX (BMI) DOCUMENTED: ICD-10-PCS | Mod: CPTII,S$GLB,, | Performed by: ORTHOPAEDIC SURGERY

## 2022-07-11 PROCEDURE — 99999 PR PBB SHADOW E&M-EST. PATIENT-LVL IV: ICD-10-PCS | Mod: PBBFAC,,, | Performed by: ORTHOPAEDIC SURGERY

## 2022-07-11 PROCEDURE — 1160F RVW MEDS BY RX/DR IN RCRD: CPT | Mod: CPTII,S$GLB,, | Performed by: ORTHOPAEDIC SURGERY

## 2022-07-12 ENCOUNTER — PATIENT MESSAGE (OUTPATIENT)
Dept: REHABILITATION | Facility: HOSPITAL | Age: 62
End: 2022-07-12
Payer: MEDICARE

## 2022-07-13 ENCOUNTER — CLINICAL SUPPORT (OUTPATIENT)
Dept: REHABILITATION | Facility: HOSPITAL | Age: 62
End: 2022-07-13
Payer: MEDICARE

## 2022-07-13 DIAGNOSIS — M25.521 PAIN IN RIGHT ELBOW: ICD-10-CM

## 2022-07-13 DIAGNOSIS — M25.631 DECREASED RANGE OF MOTION OF RIGHT WRIST: Primary | ICD-10-CM

## 2022-07-13 DIAGNOSIS — M25.431 SWELLING OF RIGHT WRIST: ICD-10-CM

## 2022-07-13 DIAGNOSIS — M79.641 PAIN IN RIGHT HAND: ICD-10-CM

## 2022-07-13 DIAGNOSIS — M25.621 DECREASED RANGE OF MOTION OF RIGHT ELBOW: ICD-10-CM

## 2022-07-13 DIAGNOSIS — M25.641 DECREASED RANGE OF MOTION OF FINGER OF RIGHT HAND: ICD-10-CM

## 2022-07-13 PROBLEM — M25.531 PAIN IN RIGHT WRIST: Status: RESOLVED | Noted: 2022-07-06 | Resolved: 2022-07-13

## 2022-07-13 LAB
FINAL PATHOLOGIC DIAGNOSIS: NORMAL
Lab: NORMAL

## 2022-07-13 PROCEDURE — 97110 THERAPEUTIC EXERCISES: CPT | Mod: PN

## 2022-07-13 PROCEDURE — 97140 MANUAL THERAPY 1/> REGIONS: CPT | Mod: PN
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
## 2022-07-14 NOTE — PROGRESS NOTES
Silvia Cervantes presents for post-operative evaluation of   Encounter Diagnoses   Name Primary?    Rheumatoid nodule of right elbow Yes    Extensor tenosynovitis of right wrist    The patient is now 11 days s/p right rheumatoid nodule excision elbow and wrist, extensor tenosynovectomy and osteophyte excision ulna.  Overall the patient reports doing well.  The patient reports appropriate postoperative soreness with well controlled overall pain.      PE:    AA&O x 4.  NAD  HEENT:  NCAT, sclera nonicteric  Lungs:  Respirations are equal and unlabored.  CV:  2+ bilateral upper and lower extremity pulses.  MSK: The incisions are healing well with no signs of erythema or warmth at the wrist or elbow.  There is no drainage. All sutures were removed today. Neurovascularly intact and has 5/5 thenar and intrinsic strength.    A/P: Status post above, doing well  1) Continue with light use of the hand and full time splint wear  2) F/U 4 weeks  3) Call with any questions/concerns in the interim        Louise Smtih MD     Please be aware that this note has been generated with the assistance of [facility] voice-to-text.  Please excuse any spelling or grammatical errors.

## 2022-07-15 ENCOUNTER — PATIENT MESSAGE (OUTPATIENT)
Dept: RHEUMATOLOGY | Facility: CLINIC | Age: 62
End: 2022-07-15
Payer: MEDICARE

## 2022-07-19 ENCOUNTER — CLINICAL SUPPORT (OUTPATIENT)
Dept: REHABILITATION | Facility: HOSPITAL | Age: 62
End: 2022-07-19
Payer: MEDICARE

## 2022-07-19 DIAGNOSIS — M25.521 PAIN IN RIGHT ELBOW: ICD-10-CM

## 2022-07-19 DIAGNOSIS — M25.631 DECREASED RANGE OF MOTION OF RIGHT WRIST: Primary | ICD-10-CM

## 2022-07-19 DIAGNOSIS — M25.431 SWELLING OF RIGHT WRIST: ICD-10-CM

## 2022-07-19 DIAGNOSIS — M25.621 DECREASED RANGE OF MOTION OF RIGHT ELBOW: ICD-10-CM

## 2022-07-19 DIAGNOSIS — M25.641 DECREASED RANGE OF MOTION OF FINGER OF RIGHT HAND: ICD-10-CM

## 2022-07-19 DIAGNOSIS — M79.641 PAIN IN RIGHT HAND: ICD-10-CM

## 2022-07-19 PROCEDURE — 97110 THERAPEUTIC EXERCISES: CPT | Mod: PN
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
## 2022-07-21 ENCOUNTER — CLINICAL SUPPORT (OUTPATIENT)
Dept: REHABILITATION | Facility: HOSPITAL | Age: 62
End: 2022-07-21
Payer: MEDICARE

## 2022-07-21 DIAGNOSIS — M25.631 DECREASED RANGE OF MOTION OF RIGHT WRIST: Primary | ICD-10-CM

## 2022-07-21 DIAGNOSIS — M25.521 PAIN IN RIGHT ELBOW: ICD-10-CM

## 2022-07-21 DIAGNOSIS — M25.641 DECREASED RANGE OF MOTION OF FINGER OF RIGHT HAND: ICD-10-CM

## 2022-07-21 DIAGNOSIS — M25.621 DECREASED RANGE OF MOTION OF RIGHT ELBOW: ICD-10-CM

## 2022-07-21 DIAGNOSIS — M79.641 PAIN IN RIGHT HAND: ICD-10-CM

## 2022-07-21 DIAGNOSIS — M25.431 SWELLING OF RIGHT WRIST: ICD-10-CM

## 2022-07-21 PROCEDURE — 97110 THERAPEUTIC EXERCISES: CPT | Mod: PN
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
## 2022-07-25 ENCOUNTER — SPECIALTY PHARMACY (OUTPATIENT)
Dept: PHARMACY | Facility: CLINIC | Age: 62
End: 2022-07-25
Payer: MEDICARE

## 2022-07-25 ENCOUNTER — CLINICAL SUPPORT (OUTPATIENT)
Dept: REHABILITATION | Facility: HOSPITAL | Age: 62
End: 2022-07-25
Payer: MEDICARE

## 2022-07-25 DIAGNOSIS — M25.631 DECREASED RANGE OF MOTION OF RIGHT WRIST: Primary | ICD-10-CM

## 2022-07-25 DIAGNOSIS — M25.641 DECREASED RANGE OF MOTION OF FINGER OF RIGHT HAND: ICD-10-CM

## 2022-07-25 DIAGNOSIS — M79.641 PAIN IN RIGHT HAND: ICD-10-CM

## 2022-07-25 DIAGNOSIS — M25.431 SWELLING OF RIGHT WRIST: ICD-10-CM

## 2022-07-25 DIAGNOSIS — M25.621 DECREASED RANGE OF MOTION OF RIGHT ELBOW: ICD-10-CM

## 2022-07-25 DIAGNOSIS — M25.521 PAIN IN RIGHT ELBOW: ICD-10-CM

## 2022-07-25 PROCEDURE — 97110 THERAPEUTIC EXERCISES: CPT | Mod: PN

## 2022-07-25 PROCEDURE — 97140 MANUAL THERAPY 1/> REGIONS: CPT | Mod: PN

## 2022-07-25 NOTE — TELEPHONE ENCOUNTER
Specialty Pharmacy - Refill Coordination    Specialty Medication Orders Linked to Encounter    Flowsheet Row Most Recent Value   Medication #1 etanercept (ENBREL SURECLICK) 50 mg/mL (1 mL) (Order#[id number], Rx#[id number])        Refill Questions - Documented Responses    Flowsheet Row Most Recent Value   Patient Availability and HIPAA Verification    Does patient want to proceed with activity? Unable to Reach        We have had multiple attempts to the patient and have been unsuccessful to reach the patient. We will stop reaching out to the patient but in the event that the patient needs the med and contacts us, we will communicate and begin dispensing for the patient. At your next visit with the patient, please review the importance of being in contact with our specialty pharmacy as a part of our care team.    Interventions added this encounter   Closed: [facility] Provider Intervention - Drug therapy adherence: etanercept (ENBREL SURECLICK) 50 mg/mL (1 mL)     Koko Aguilar, PharmD  [facility]  [address] [place] [id number]  Phone: [phone]  Fax: [phone]
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
## 2022-07-26 ENCOUNTER — TELEPHONE (OUTPATIENT)
Dept: FAMILY MEDICINE | Facility: CLINIC | Age: 62
End: 2022-07-26
Payer: MEDICARE

## 2022-07-26 NOTE — TELEPHONE ENCOUNTER
Spoke to pt and stated last night when she went lay down she had trouble breathing and felt like her throat was closing and she got up and moved around. She drink some water and she does feel better this morning. Pt states this has happened before. Pt thinks that she may have taken her medication wrong. Pt was scheduled for a follow-up with Dr. Cade.

## 2022-07-26 NOTE — TELEPHONE ENCOUNTER
----- Message from Latisha Purvis sent at 7/26/2022  7:56 AM CDT -----  Contact: [phone]  Type:  Same Day Appointment Request    Caller is requesting a same day appointment.  Caller declined first available appointment listed below.    Name of Caller:Pt  When is the first available appointment?8/10  Symptoms: pt states last night when she went lay down she had trouble breathing and felt like her throat was closing and she got up and moved around and she does feel better this morning. Pt states this has happened before  Best Call Back Number:[phone]

## 2022-07-27 ENCOUNTER — SPECIALTY PHARMACY (OUTPATIENT)
Dept: PHARMACY | Facility: CLINIC | Age: 62
End: 2022-07-27
Payer: MEDICARE

## 2022-07-27 NOTE — TELEPHONE ENCOUNTER
Incoming call from patient, she states her last injection 6/21 before her surgery. Enbrel still on hold. She has 1 pen on hand in the refrigerator.   Patient was evaluated 7/11 by Dr. Smith, who told her to hold Enbrel for 2 weeks. Clarifying with Dr. Smith if patient is cleared to re-start Enbrel.

## 2022-07-28 ENCOUNTER — TELEPHONE (OUTPATIENT)
Dept: RHEUMATOLOGY | Facility: CLINIC | Age: 62
End: 2022-07-28
Payer: MEDICARE

## 2022-07-28 ENCOUNTER — OFFICE VISIT (OUTPATIENT)
Dept: NEUROLOGY | Facility: CLINIC | Age: 62
End: 2022-07-28
Payer: MEDICARE

## 2022-07-28 VITALS
HEIGHT: 61 IN | WEIGHT: 143.75 LBS | BODY MASS INDEX: 27.14 KG/M2 | DIASTOLIC BLOOD PRESSURE: 87 MMHG | SYSTOLIC BLOOD PRESSURE: 148 MMHG | HEART RATE: 94 BPM

## 2022-07-28 DIAGNOSIS — R13.10 DYSPHAGIA, UNSPECIFIED TYPE: ICD-10-CM

## 2022-07-28 DIAGNOSIS — S06.9X5S TRAUMATIC BRAIN INJURY, WITH LOSS OF CONSCIOUSNESS GREATER THAN 24 HOURS WITH RETURN TO PRE-EXISTING CONSCIOUS LEVEL, SEQUELA: Primary | ICD-10-CM

## 2022-07-28 PROCEDURE — 3077F PR MOST RECENT SYSTOLIC BLOOD PRESSURE >= 140 MM HG: ICD-10-PCS | Mod: CPTII,S$GLB,, | Performed by: PSYCHIATRY & NEUROLOGY

## 2022-07-28 PROCEDURE — 3079F PR MOST RECENT DIASTOLIC BLOOD PRESSURE 80-89 MM HG: ICD-10-PCS | Mod: CPTII,S$GLB,, | Performed by: PSYCHIATRY & NEUROLOGY

## 2022-07-28 PROCEDURE — 4010F ACE/ARB THERAPY RXD/TAKEN: CPT | Mod: CPTII,S$GLB,, | Performed by: PSYCHIATRY & NEUROLOGY

## 2022-07-28 PROCEDURE — 99214 OFFICE O/P EST MOD 30 MIN: CPT | Mod: S$GLB,,, | Performed by: PSYCHIATRY & NEUROLOGY

## 2022-07-28 PROCEDURE — 99999 PR PBB SHADOW E&M-EST. PATIENT-LVL IV: ICD-10-PCS | Mod: PBBFAC,,, | Performed by: PSYCHIATRY & NEUROLOGY

## 2022-07-28 PROCEDURE — 3077F SYST BP >= 140 MM HG: CPT | Mod: CPTII,S$GLB,, | Performed by: PSYCHIATRY & NEUROLOGY

## 2022-07-28 PROCEDURE — 3079F DIAST BP 80-89 MM HG: CPT | Mod: CPTII,S$GLB,, | Performed by: PSYCHIATRY & NEUROLOGY

## 2022-07-28 PROCEDURE — 99214 PR OFFICE/OUTPT VISIT, EST, LEVL IV, 30-39 MIN: ICD-10-PCS | Mod: S$GLB,,, | Performed by: PSYCHIATRY & NEUROLOGY

## 2022-07-28 PROCEDURE — 3008F BODY MASS INDEX DOCD: CPT | Mod: CPTII,S$GLB,, | Performed by: PSYCHIATRY & NEUROLOGY

## 2022-07-28 PROCEDURE — 99999 PR PBB SHADOW E&M-EST. PATIENT-LVL IV: CPT | Mod: PBBFAC,,, | Performed by: PSYCHIATRY & NEUROLOGY

## 2022-07-28 PROCEDURE — 3008F PR BODY MASS INDEX (BMI) DOCUMENTED: ICD-10-PCS | Mod: CPTII,S$GLB,, | Performed by: PSYCHIATRY & NEUROLOGY

## 2022-07-28 PROCEDURE — 4010F PR ACE/ARB THEARPY RXD/TAKEN: ICD-10-PCS | Mod: CPTII,S$GLB,, | Performed by: PSYCHIATRY & NEUROLOGY

## 2022-07-28 RX ORDER — GABAPENTIN 300 MG/1
300 CAPSULE ORAL 2 TIMES DAILY
Qty: 180 CAPSULE | Refills: 1 | Status: SHIPPED | OUTPATIENT
Start: 2022-07-28 | End: 2023-01-31 | Stop reason: SDUPTHER

## 2022-07-28 NOTE — PATIENT INSTRUCTIONS
Try Biotene or Therabreath    Uses the mouthwash and also try the gum and the wetting sponges that go on the palate.

## 2022-07-28 NOTE — TELEPHONE ENCOUNTER
Per Dr. Smith, patient has been cleared to restart her Enbrel. Called to inform patient and schedule refill as she has 1 pen on hand. No answer, LVM.

## 2022-07-28 NOTE — PROGRESS NOTES
Subjective:       Patient ID: Silvia Cervantes is a 61 y.o. female.    Chief Complaint: No chief complaint on file.      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)       Past Surgical History:   Procedure Laterality Date     SECTION      COLONOSCOPY N/A 2019    Procedure: COLONOSCOPY/golytley ;  Surgeon: Dimas Woodall MD;  Location: [facility];  Service: Endoscopy;  Laterality: N/A;    EXCISION OF NODULE Right 2022    Procedure: EXCISION, NODULE right elbow;  Surgeon: Louise Smith MD;  Location: [facility] OR;  Service: Orthopedics;  Laterality: Right;    HYSTERECTOMY      INJECTION OF STEROID Left 2022    Procedure: INJECTION, STEROID Thumb MP joint;  Surgeon: Louise Smith MD;  Location: [facility] OR;  Service: Orthopedics;  Laterality: Left;    SINUS SURGERY      SURGICAL REMOVAL OF DISTAL ULNA Right 2022    Procedure: EXCISION, ULNA, DISTAL;  Surgeon: Louise Smith MD;  Location: [facility] OR;  Service: Orthopedics;  Laterality: Right;    SYNOVECTOMY OF WRIST Right 2022    Procedure: SYNOVECTOMY, WRIST;  Surgeon: Louise Smith MD;  Location: [facility];  Service: Orthopedics;  Laterality: Right;    TUBAL LIGATION          Current Outpatient Medications:     amLODIPine (NORVASC) 5 MG tablet, Take 1 tablet (5 mg total) by mouth once daily., Disp: 90 tablet, Rfl: 4    aspirin (ECOTRIN) 81 MG EC tablet, Take 81 mg by mouth once daily.,  Disp: , Rfl:     atorvastatin (LIPITOR) 10 MG tablet, Take 1 tablet (10 mg total) by mouth once daily., Disp: 90 tablet, Rfl: 3    chlorhexidine (PERIDEX) 0.12 % solution, Use 15-20 mL to rinse mouth twice daily for 2 minutes, spit out and do not rinse mouth with water after., Disp: 473 mL, Rfl: 3    cycloSPORINE (RESTASIS) 0.05 % ophthalmic emulsion, Apply 1 drop into both eyes twice a day, Disp: 180 vial, Rfl: 3    desoximetasone (TOPICORT) 0.25 % cream, Apply to affected area every other night x 1 month, Disp: 60 g, Rfl: 1    ergocalciferol (ERGOCALCIFEROL) 50,000 unit Cap, Take 1 capsule (50,000 Units total) by mouth every 7 days., Disp: 12 capsule, Rfl: 2    etanercept (ENBREL SURECLICK) 50 mg/mL (1 mL), Inject 1 mL (50 mg total) into the skin once a week., Disp: 4 mL, Rfl: 11    fluticasone propionate (FLONASE) 50 mcg/actuation nasal spray, 1 spray (50 mcg total) by Each Nostril route once daily., Disp: 16 g, Rfl: 5    folic acid (FOLVITE) 1 MG tablet, Take 4 tablets (4 mg total) by mouth once daily., Disp: 120 tablet, Rfl: 11    gabapentin (NEURONTIN) 300 MG capsule, Take 1 capsule (300 mg total) by mouth 2 (two) times daily., Disp: 180 capsule, Rfl: 0    HYDROcodone-acetaminophen (NORCO) 5-325 mg per tablet, Take 1 tablet by mouth every 6 (six) hours as needed for Pain., Disp: 25 tablet, Rfl: 0    hydrOXYzine HCL (ATARAX) 25 MG tablet, Take 1 tablet (25 mg total) by mouth every evening., Disp: 30 tablet, Rfl: 1    losartan (COZAAR) 100 MG tablet, Take 1 tablet (100 mg total) by mouth once daily., Disp: 90 tablet, Rfl: 3    metoprolol succinate (TOPROL-XL) 25 MG 24 hr tablet, Take 1 tablet (25 mg total) by mouth every evening., Disp: 90 tablet, Rfl: 4    nystatin (MYCOSTATIN) powder, Apply to affected area every morning as needed for flare under breasts (Patient taking differently: Apply to affected area every morning as needed for flare under breasts), Disp: 120 g, Rfl: 6    PROTOPIC 0.1 %  "ointment, AAA bid, Disp: 100 g, Rfl: 2    ruxolitinib (OPZELURA) 1.5 % Crea, aaa bid, Disp: 60 g, Rfl: 2    triamcinolone acetonide 0.025% (KENALOG) 0.025 % cream, Apply to affected area every day to twice a day to neck . Not more than 1-2 weeks straight in same location, Disp: 80 g, Rfl: 1    triamcinolone acetonide 0.1% (KENALOG) 0.1 % ointment, Apply topically 2 (two) times daily. For 2 weeks, Disp: 15 g, Rfl: 1    azelastine (ASTELIN) 137 mcg (0.1 %) nasal spray, spray 1 spray (137 mcg total) by Nasal route 2 (two) times daily., Disp: 30 mL, Rfl: 3    cyanocobalamin, vitamin B-12, (VITAMIN B-12 ORAL), Take by mouth., Disp: , Rfl:     fluticasone propion-salmeterol 115-21 mcg/dose (ADVAIR HFA) 115-21 mcg/actuation HFAA inhaler, Inhale 2 puffs into the lungs every 12 (twelve) hours., Disp: 12 g, Rfl: 11    leflunomide (ARAVA) 20 MG Tab, Take 1 tablet (20 mg total) by mouth once daily., Disp: 90 tablet, Rfl: 3    promethazine (PHENERGAN) 25 MG tablet, Take 1 tablet (25 mg total) by mouth every 6 (six) hours as needed for Nausea. (Patient not taking: Reported on 7/28/2022), Disp: 25 tablet, Rfl: 0    ZINC ORAL, Take by mouth., Disp: , Rfl:    Review of patient's allergies indicates:  No Known Allergies     Review of Systems        Objective:      Physical Exam      Assessment:       1. 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 Dysphagia, unspecified type        Plan:              Pt most concerned today with episodes of SOB, this happened before in 2019 and had a swallowing study then, which was normal. Has no trouble choking or coughing on liquids or solids when eating.   The spells are sterotypical in that they begin with a tickle on the left side of the throat, then feels like can't breathe ( but can swallow fine), Says does usually no noise but with her last spell 3 days ago her breathing "made noise" ( stridor?) " "Lips don't swell. Duration is "a  few seconds or minutes'  Gets better by drinking water or taking a cough drop, and pacing the floor.     Has chronic feeling of dry mouth, but her dentist recently told her there is no evidence of dry mouth on exam.  I do not think this is GP neuralgia, despite report of unilateral tickly sensation at the onset, as it is not painful and never has it more than once in a day.  Has no dysphagia and previous swallow study normal.  I feel her anxiety contributing, gets panicky when it happens.  Denies known reflux.  I defer to PCP who she sees tomorrow.   Consider referral to ENT.  She is not interested in med for anxiety.    Lt sided hemibody central pain due to TBI with ICB, well controlled with gabapentin. She is still frustrated with the feeling of heaviness in her LLE and I offered PT and she noted the pain dr referred her recently.           Chiqui Kenyon MD   07/28/2022   11:35 AM     "

## 2022-07-29 ENCOUNTER — OFFICE VISIT (OUTPATIENT)
Dept: FAMILY MEDICINE | Facility: CLINIC | Age: 62
End: 2022-07-29
Attending: FAMILY MEDICINE
Payer: MEDICARE

## 2022-07-29 VITALS
DIASTOLIC BLOOD PRESSURE: 74 MMHG | OXYGEN SATURATION: 98 % | WEIGHT: 142.44 LBS | BODY MASS INDEX: 26.89 KG/M2 | SYSTOLIC BLOOD PRESSURE: 128 MMHG | HEIGHT: 61 IN | HEART RATE: 75 BPM | TEMPERATURE: 98 F

## 2022-07-29 DIAGNOSIS — R13.10 DYSPHAGIA, UNSPECIFIED TYPE: Primary | ICD-10-CM

## 2022-07-29 PROCEDURE — 3078F DIAST BP <80 MM HG: CPT | Mod: CPTII,S$GLB,, | Performed by: FAMILY MEDICINE

## 2022-07-29 PROCEDURE — 3074F PR MOST RECENT SYSTOLIC BLOOD PRESSURE < 130 MM HG: ICD-10-PCS | Mod: CPTII,S$GLB,, | Performed by: FAMILY MEDICINE

## 2022-07-29 PROCEDURE — 3008F BODY MASS INDEX DOCD: CPT | Mod: CPTII,S$GLB,, | Performed by: FAMILY MEDICINE

## 2022-07-29 PROCEDURE — 3008F PR BODY MASS INDEX (BMI) DOCUMENTED: ICD-10-PCS | Mod: CPTII,S$GLB,, | Performed by: FAMILY MEDICINE

## 2022-07-29 PROCEDURE — 99999 PR PBB SHADOW E&M-EST. PATIENT-LVL IV: CPT | Mod: PBBFAC,,, | Performed by: FAMILY MEDICINE

## 2022-07-29 PROCEDURE — 99214 PR OFFICE/OUTPT VISIT, EST, LEVL IV, 30-39 MIN: ICD-10-PCS | Mod: S$GLB,,, | Performed by: FAMILY MEDICINE

## 2022-07-29 PROCEDURE — 1160F PR REVIEW ALL MEDS BY PRESCRIBER/CLIN PHARMACIST DOCUMENTED: ICD-10-PCS | Mod: CPTII,S$GLB,, | Performed by: FAMILY MEDICINE

## 2022-07-29 PROCEDURE — 3078F PR MOST RECENT DIASTOLIC BLOOD PRESSURE < 80 MM HG: ICD-10-PCS | Mod: CPTII,S$GLB,, | Performed by: FAMILY MEDICINE

## 2022-07-29 PROCEDURE — 1159F PR MEDICATION LIST DOCUMENTED IN MEDICAL RECORD: ICD-10-PCS | Mod: CPTII,S$GLB,, | Performed by: FAMILY MEDICINE

## 2022-07-29 PROCEDURE — 4010F PR ACE/ARB THEARPY RXD/TAKEN: ICD-10-PCS | Mod: CPTII,S$GLB,, | Performed by: FAMILY MEDICINE

## 2022-07-29 PROCEDURE — 99499 UNLISTED E&M SERVICE: CPT | Mod: S$GLB,,, | Performed by: FAMILY MEDICINE

## 2022-07-29 PROCEDURE — 4010F ACE/ARB THERAPY RXD/TAKEN: CPT | Mod: CPTII,S$GLB,, | Performed by: FAMILY MEDICINE

## 2022-07-29 PROCEDURE — 1159F MED LIST DOCD IN RCRD: CPT | Mod: CPTII,S$GLB,, | Performed by: FAMILY MEDICINE

## 2022-07-29 PROCEDURE — 3074F SYST BP LT 130 MM HG: CPT | Mod: CPTII,S$GLB,, | Performed by: FAMILY MEDICINE

## 2022-07-29 PROCEDURE — 1160F RVW MEDS BY RX/DR IN RCRD: CPT | Mod: CPTII,S$GLB,, | Performed by: FAMILY MEDICINE

## 2022-07-29 PROCEDURE — 99499 RISK ADDL DX/OHS AUDIT: ICD-10-PCS | Mod: S$GLB,,, | Performed by: FAMILY MEDICINE

## 2022-07-29 PROCEDURE — 99999 PR PBB SHADOW E&M-EST. PATIENT-LVL IV: ICD-10-PCS | Mod: PBBFAC,,, | Performed by: FAMILY MEDICINE

## 2022-07-29 PROCEDURE — 99214 OFFICE O/P EST MOD 30 MIN: CPT | Mod: S$GLB,,, | Performed by: FAMILY MEDICINE

## 2022-07-29 NOTE — PROGRESS NOTES
Subjective:       Patient ID: Silvia Cervantes is a 61 y.o. female.    Chief Complaint: No chief complaint on file.    61 yr old pleasant  female with depression, allergies, vitiligo, HTN, HLD, sarcoidosis, Rheumatoid arthritis, Erosive gastritis, and other co morbidities presents today for evaluation of dyaphagia. She had an episode where she felt like choking for brief period and she tried to drink some water and it wont go down. She felt food stuck in her throat. She got worried and she felt like hard to breathe. She feels a little better but want to see a specialist and get a test done. It happened few years ago when we did barium swallow which was normal. She knows that if appointments gets delayed she will go emergency and call 911. Details as follows -      GI Problem  Primary symptoms do not include nausea, dysuria, myalgias or arthralgias. Primary symptoms comment: dysphagia. The illness began 6 to 7 days ago.   The illness does not include chills, dysphagia, constipation, tenesmus or itching. Significant associated medical issues include GERD. Associated medical issues do not include inflammatory bowel disease, gallstones, PUD, gastric bypass, irritable bowel syndrome or diverticulitis.     Review of Systems   Constitutional: Negative.  Negative for activity change, chills, diaphoresis and unexpected weight change.   HENT: Negative.  Negative for nasal congestion, ear discharge, hearing loss, rhinorrhea, sore throat and voice change.    Eyes: Negative.  Negative for pain, discharge and visual disturbance.   Respiratory: Negative.  Negative for chest tightness, shortness of breath and wheezing.    Cardiovascular: Negative.  Negative for chest pain.   Gastrointestinal: Negative.  Negative for abdominal distention, anal bleeding, constipation, dysphagia and nausea.        Dysphagia+   Endocrine: Negative.  Negative for cold intolerance, polydipsia and polyuria.   Genitourinary: Negative.  Negative for  decreased urine volume, difficulty urinating, dysuria, frequency, menstrual problem and vaginal pain.   Musculoskeletal: Negative.  Negative for arthralgias, gait problem and myalgias.   Integumentary:  Negative for color change, itching, pallor and wound. Negative.   Allergic/Immunologic: Negative.  Negative for environmental allergies and immunocompromised state.   Neurological: Negative.  Negative for dizziness, tremors, seizures, speech difficulty and headaches.   Hematological: Negative.  Negative for adenopathy. Does not bruise/bleed easily.   Psychiatric/Behavioral: Negative.  Negative for agitation, confusion, decreased concentration, hallucinations, self-injury and suicidal ideas. The patient is not nervous/anxious.          Active Ambulatory Problems     Diagnosis Date Noted    Hypertension 09/29/2014    Mixed hyperlipidemia 03/06/2015    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) 03/18/2015    Vitiligo 03/06/2017    Major depression, recurrent, chronic 10/05/2017    Chronic neck and back pain 12/05/2017    Pulmonary nodules 07/25/2018    Closed displaced fracture of distal epiphysis of left femur 06/18/2017    Closed fracture of humerus 06/18/2017    Fracture, intertrochanteric, right femur 07/26/2018    Fracture of femur 07/26/2018    Fracture of C2 vertebra, closed 06/20/2017    Blunt trauma 06/18/2017    Injury of left vertebral artery 06/20/2017    Humerus head fracture 07/26/2018    Hypernatremia 06/25/2017    Dysphagia 06/18/2017    Neck pain, musculoskeletal 07/26/2018    Closed fracture of shaft of right femur 06/18/2017    Pelvic avulsion fracture 06/18/2017    Lumbar radiculopathy 10/15/2018    Neuropathy, arm, left 10/15/2018    Soft tissue mass 11/13/2018    Chronic bilateral low back pain with left-sided sciatica 11/29/2018    Decreased range of motion of neck 10/09/2019    Sarcoidosis     Immunocompromised 02/11/2020    Dizziness 08/31/2020     Impairment of balance 08/31/2020    Saccadic eye movement deficiency 08/31/2020    Deficit of vestibulo-ocular reflex 08/31/2020    Non-seasonal allergic rhinitis due to pollen 01/26/2021    Cough in adult 01/27/2021    Sensation disorder 06/14/2021    Palpitations 07/08/2021    Rheumatoid arthritis with positive rheumatoid factor 07/23/2021    Aortic atherosclerosis 02/21/2022    History of traumatic brain injury 02/21/2022    History of supraventricular tachycardia 02/21/2022    Bruising 07/03/2022    Pain in right wrist 07/06/2022    Pain in right elbow 07/06/2022    Decreased range of motion of right wrist 07/07/2022    Decreased range of motion of right elbow 07/07/2022    Swelling of right wrist 07/07/2022    Decreased range of motion of finger of right hand 07/07/2022    Pain in right hand 07/07/2022     Resolved Ambulatory Problems     Diagnosis Date Noted    Shortness of breath 03/27/2013    Chest pain on exertion 03/27/2013    Rheumatoid arthritis of hand 03/27/2013    High risk medication use-methotrexate 08/07/2013    Arthralgia 09/29/2014    Nausea 09/29/2014    Viral gastroenteritis 09/29/2014    Breast cancer screening 09/29/2014    Nail fungus 02/06/2015    Allergic reaction 02/06/2015    Routine general medical examination at a health care facility 02/06/2015    N&V (nausea and vomiting) 06/17/2015    Preventative health care 12/22/2015    Allergic rhinitis 12/22/2015    Low back pain without sciatica 12/22/2015    Heart palpitations 01/15/2016    Deviated septum 03/21/2016    URI (upper respiratory infection) 03/21/2016    Non-cardiac chest pain 05/26/2016    Thrombocytosis 08/01/2016    Skin depigmentation 08/01/2016    Chronic rhinosinusitis 09/12/2016    Decreased ROM of upper extremity 10/23/2017    Decreased ROM of lower extremity 10/23/2017    Decreased muscle strength 10/23/2017    Decreased mobility and endurance 10/23/2017    Decreased attention  Span 10/23/2017    Weakness of both lower extremities 2017    Chest pain 2018    Pneumonia 2017    Traumatic subarachnoid hemorrhage 2017    Reticulonodular infiltrate present on imaging of chest     Screening for malignant neoplasm of colon 2019    Acute respiratory failure following trauma and surgery 2020    Decreased independence with activities of daily living 2022     Past Medical History:   Diagnosis Date    Abnormal Pap smear     Abnormal Pap smear of cervix     Dementia     Dry eyes     Fever blister     History of bronchitis     History of headache     Hypercholesteremia 2015    Lumbar radicular pain 10/15/2018    Rheumatoid arthritis(714.0)     Tooth infection 2022     Past Surgical History:   Procedure Laterality Date     SECTION      COLONOSCOPY N/A 2019    Procedure: COLONOSCOPY/golytley ;  Surgeon: Dimas Woodall MD;  Location: [facility];  Service: Endoscopy;  Laterality: N/A;    EXCISION OF NODULE Right 2022    Procedure: EXCISION, NODULE right elbow;  Surgeon: Louise Smith MD;  Location: [facility];  Service: Orthopedics;  Laterality: Right;    HYSTERECTOMY      INJECTION OF STEROID Left 2022    Procedure: INJECTION, STEROID Thumb MP joint;  Surgeon: Louise Smith MD;  Location: [facility] OR;  Service: Orthopedics;  Laterality: Left;    SINUS SURGERY      SURGICAL REMOVAL OF DISTAL ULNA Right 2022    Procedure: EXCISION, ULNA, DISTAL;  Surgeon: Louise Smith MD;  Location: [facility] OR;  Service: Orthopedics;  Laterality: Right;    SYNOVECTOMY OF WRIST Right 2022    Procedure: SYNOVECTOMY, WRIST;  Surgeon: Louise Smith MD;  Location: [facility] OR;  Service: Orthopedics;  Laterality: Right;    TUBAL LIGATION       Family History   Problem Relation Age of Onset    Hypertension Mother     Cataracts Mother     Diabetes Father     Hypertension Father     Cataracts Father     Heart disease  Father     Hyperlipidemia Father     Diabetes Sister     Stroke Brother     Hypertension Brother     No Known Problems Daughter     No Known Problems Son     Melanoma Neg Hx     Breast cancer Neg Hx     Colon cancer Neg Hx     Ovarian cancer Neg Hx     Blindness Neg Hx     Glaucoma Neg Hx     Aneurysm Neg Hx     Cancer Neg Hx     Clotting disorder Neg Hx     Dementia Neg Hx     Fainting Neg Hx     Kidney disease Neg Hx     Liver disease Neg Hx     Migraines Neg Hx     Neuropathy Neg Hx     Parkinsonism Neg Hx     Seizures Neg Hx     Tremor Neg Hx      Social History     Socioeconomic History    Marital status:    Tobacco Use    Smoking status: Never Smoker    Smokeless tobacco: Never Used   Substance and Sexual Activity    Alcohol use: Yes     Comment: Rarely    Drug use: Not Currently    Sexual activity: Yes     Partners: Male     Birth control/protection: Surgical     Comment: [facility]     Social Determinants of Health     Financial Resource Strain: Low Risk     Difficulty of Paying Living Expenses: Not hard at all   Food Insecurity: No Food Insecurity    Worried About Running Out of Food in the Last Year: Never true    Ran Out of Food in the Last Year: Never true   Transportation Needs: No Transportation Needs    Lack of Transportation (Medical): No    Lack of Transportation (Non-Medical): No   Physical Activity: Sufficiently Active    Days of Exercise per Week: 7 days    Minutes of Exercise per Session: 120 min   Stress: No Stress Concern Present    Feeling of Stress : Only a little   Social Connections: Moderately Isolated    Frequency of Communication with Friends and Family: More than three times a week    Frequency of Social Gatherings with Friends and Family: More than three times a week    Attends Jewish Services: More than 4 times per year    Active Member of Clubs or Organizations: No    Attends Club or Organization Meetings: Never    Marital Status:     Housing Stability: Low Risk     Unable to Pay for Housing in the Last Year: No    Number of Places Lived in the Last Year: 1    Unstable Housing in the Last Year: No     Review of patient's allergies indicates:  No Known Allergies  Current Outpatient Medications on File Prior to Visit   Medication Sig Dispense Refill    amLODIPine (NORVASC) 5 MG tablet Take 1 tablet (5 mg total) by mouth once daily. 90 tablet 4    aspirin (ECOTRIN) 81 MG EC tablet Take 81 mg by mouth once daily.      atorvastatin (LIPITOR) 10 MG tablet Take 1 tablet (10 mg total) by mouth once daily. 90 tablet 3    chlorhexidine (PERIDEX) 0.12 % solution Use 15-20 mL to rinse mouth twice daily for 2 minutes, spit out and do not rinse mouth with water after. 473 mL 3    cyanocobalamin, vitamin B-12, (VITAMIN B-12 ORAL) Take by mouth.      cycloSPORINE (RESTASIS) 0.05 % ophthalmic emulsion Apply 1 drop into both eyes twice a day 180 vial 3    desoximetasone (TOPICORT) 0.25 % cream Apply to affected area every other night x 1 month 60 g 1    ergocalciferol (ERGOCALCIFEROL) 50,000 unit Cap Take 1 capsule (50,000 Units total) by mouth every 7 days. 12 capsule 2    etanercept (ENBREL SURECLICK) 50 mg/mL (1 mL) Inject 1 mL (50 mg total) into the skin once a week. 4 mL 11    fluticasone propionate (FLONASE) 50 mcg/actuation nasal spray 1 spray (50 mcg total) by Each Nostril route once daily. 16 g 5    folic acid (FOLVITE) 1 MG tablet Take 4 tablets (4 mg total) by mouth once daily. 120 tablet 11    gabapentin (NEURONTIN) 300 MG capsule Take 1 capsule (300 mg total) by mouth 2 (two) times daily. 180 capsule 1    HYDROcodone-acetaminophen (NORCO) 5-325 mg per tablet Take 1 tablet by mouth every 6 (six) hours as needed for Pain. 25 tablet 0    hydrOXYzine HCL (ATARAX) 25 MG tablet Take 1 tablet (25 mg total) by mouth every evening. 30 tablet 1    losartan (COZAAR) 100 MG tablet Take 1 tablet (100 mg total) by mouth once daily. 90  tablet 3    metoprolol succinate (TOPROL-XL) 25 MG 24 hr tablet Take 1 tablet (25 mg total) by mouth every evening. 90 tablet 4    nystatin (MYCOSTATIN) powder Apply to affected area every morning as needed for flare under breasts (Patient taking differently: Apply to affected area every morning as needed for flare under breasts) 120 g 6    promethazine (PHENERGAN) 25 MG tablet Take 1 tablet (25 mg total) by mouth every 6 (six) hours as needed for Nausea. 25 tablet 0    PROTOPIC 0.1 % ointment AAA bid 100 g 2    ruxolitinib (OPZELURA) 1.5 % Crea aaa bid 60 g 2    triamcinolone acetonide 0.025% (KENALOG) 0.025 % cream Apply to affected area every day to twice a day to neck . Not more than 1-2 weeks straight in same location 80 g 1    triamcinolone acetonide 0.1% (KENALOG) 0.1 % ointment Apply topically 2 (two) times daily. For 2 weeks 15 g 1    ZINC ORAL Take by mouth.      azelastine (ASTELIN) 137 mcg (0.1 %) nasal spray spray 1 spray (137 mcg total) by Nasal route 2 (two) times daily. 30 mL 3    fluticasone propion-salmeterol 115-21 mcg/dose (ADVAIR HFA) 115-21 mcg/actuation HFAA inhaler Inhale 2 puffs into the lungs every 12 (twelve) hours. 12 g 11    gabapentin (NEURONTIN) 300 MG capsule Take 1 capsule (300 mg total) by mouth 2 (two) times daily. (Patient not taking: Reported on 7/29/2022) 180 capsule 0    leflunomide (ARAVA) 20 MG Tab Take 1 tablet (20 mg total) by mouth once daily. 90 tablet 3    [DISCONTINUED] gabapentin (NEURONTIN) 300 MG capsule Take 1 capsule (300 mg total) by mouth 2 (two) times daily. 180 capsule 0    [DISCONTINUED] irbesartan (AVAPRO) 150 MG tablet Take 1 tablet (150 mg total) by mouth every evening. 90 tablet 3     No current facility-administered medications on file prior to visit.       Objective:       Vitals:    07/29/22 0919   BP: 128/74   Pulse: 75   Temp: 98.1 °F (36.7 °C)       Physical Exam  Constitutional:       General: She is not in acute distress.      Appearance: She is well-developed. She is not diaphoretic.   HENT:      Head: Normocephalic and atraumatic.      Right Ear: External ear normal.      Left Ear: External ear normal.      Nose: Nose normal.      Mouth/Throat:      Pharynx: No oropharyngeal exudate.   Eyes:      General: No scleral icterus.        Right eye: No discharge.         Left eye: No discharge.      Conjunctiva/sclera: Conjunctivae normal.      Pupils: Pupils are equal, round, and reactive to light.   Neck:      Thyroid: No thyromegaly.      Vascular: No JVD.      Trachea: No tracheal deviation.   Cardiovascular:      Rate and Rhythm: Normal rate and regular rhythm.      Heart sounds: Normal heart sounds. No murmur heard.    No friction rub. No gallop.   Pulmonary:      Effort: Pulmonary effort is normal.      Breath sounds: Normal breath sounds. No stridor. No wheezing or rales.   Chest:      Chest wall: No tenderness.   Abdominal:      General: Bowel sounds are normal. There is no distension.      Palpations: Abdomen is soft. There is no mass.      Tenderness: There is no abdominal tenderness. There is no guarding or rebound.      Hernia: No hernia is present.   Musculoskeletal:         General: No tenderness. Normal range of motion.      Cervical back: Normal range of motion and neck supple.   Lymphadenopathy:      Cervical: No cervical adenopathy.   Skin:     General: Skin is warm and dry.      Coloration: Skin is not pale.      Findings: No erythema or rash.   Neurological:      Mental Status: She is alert and oriented to person, place, and time.      Cranial Nerves: No cranial nerve deficit.      Motor: No abnormal muscle tone.      Coordination: Coordination normal.      Deep Tendon Reflexes: Reflexes are normal and symmetric. Reflexes normal.   Psychiatric:         Behavior: Behavior normal.         Thought Content: Thought content normal.         Judgment: Judgment normal.         Assessment:       Problem List Items Addressed This  Visit     Dysphagia - Primary    Relevant Orders    Case Request Endoscopy: ESOPHAGOGASTRODUODENOSCOPY (EGD) (Completed)    Ambulatory referral/consult to Gastroenterology          Plan:           Diagnoses and all orders for this visit:    Dysphagia, unspecified type  -     Case Request Endoscopy: ESOPHAGOGASTRODUODENOSCOPY (EGD)  -     Ambulatory referral/consult to Gastroenterology; Future      Dysphagia  -handout given  -ER/GI precautions given  -refer GI asap  -EGD    Spent adequate time in obtaining history and explaining differentials    25 minutes spent during this visit of which greater than 50% devoted to face-face counseling and coordination of care regarding diagnosis and management plan    Follow up in about 8 weeks (around 9/21/2022), or if symptoms worsen or fail to improve.

## 2022-08-01 ENCOUNTER — OFFICE VISIT (OUTPATIENT)
Dept: RHEUMATOLOGY | Facility: CLINIC | Age: 62
End: 2022-08-01
Payer: MEDICARE

## 2022-08-01 VITALS
HEART RATE: 98 BPM | HEIGHT: 61 IN | SYSTOLIC BLOOD PRESSURE: 127 MMHG | BODY MASS INDEX: 27.85 KG/M2 | DIASTOLIC BLOOD PRESSURE: 80 MMHG | WEIGHT: 147.5 LBS

## 2022-08-01 DIAGNOSIS — M06.9 RHEUMATOID ARTHRITIS FLARE: ICD-10-CM

## 2022-08-01 DIAGNOSIS — Z79.899 ENCOUNTER FOR LONG-TERM CURRENT USE OF HIGH RISK MEDICATION: Primary | ICD-10-CM

## 2022-08-01 DIAGNOSIS — D86.9 SARCOIDOSIS: ICD-10-CM

## 2022-08-01 PROCEDURE — 3008F BODY MASS INDEX DOCD: CPT | Mod: CPTII,S$GLB,, | Performed by: INTERNAL MEDICINE

## 2022-08-01 PROCEDURE — 99214 PR OFFICE/OUTPT VISIT, EST, LEVL IV, 30-39 MIN: ICD-10-PCS | Mod: S$GLB,,, | Performed by: INTERNAL MEDICINE

## 2022-08-01 PROCEDURE — 4010F PR ACE/ARB THEARPY RXD/TAKEN: ICD-10-PCS | Mod: CPTII,S$GLB,, | Performed by: INTERNAL MEDICINE

## 2022-08-01 PROCEDURE — 99999 PR PBB SHADOW E&M-EST. PATIENT-LVL IV: CPT | Mod: PBBFAC,,, | Performed by: INTERNAL MEDICINE

## 2022-08-01 PROCEDURE — 99999 PR PBB SHADOW E&M-EST. PATIENT-LVL IV: ICD-10-PCS | Mod: PBBFAC,,, | Performed by: INTERNAL MEDICINE

## 2022-08-01 PROCEDURE — 1159F PR MEDICATION LIST DOCUMENTED IN MEDICAL RECORD: ICD-10-PCS | Mod: CPTII,S$GLB,, | Performed by: INTERNAL MEDICINE

## 2022-08-01 PROCEDURE — 1159F MED LIST DOCD IN RCRD: CPT | Mod: CPTII,S$GLB,, | Performed by: INTERNAL MEDICINE

## 2022-08-01 PROCEDURE — 3008F PR BODY MASS INDEX (BMI) DOCUMENTED: ICD-10-PCS | Mod: CPTII,S$GLB,, | Performed by: INTERNAL MEDICINE

## 2022-08-01 PROCEDURE — 4010F ACE/ARB THERAPY RXD/TAKEN: CPT | Mod: CPTII,S$GLB,, | Performed by: INTERNAL MEDICINE

## 2022-08-01 PROCEDURE — 99214 OFFICE O/P EST MOD 30 MIN: CPT | Mod: S$GLB,,, | Performed by: INTERNAL MEDICINE

## 2022-08-01 NOTE — TELEPHONE ENCOUNTER
Specialty Pharmacy - Refill Coordination    Specialty Medication Orders Linked to Encounter    Flowsheet Row Most Recent Value   Medication #1 etanercept (ENBREL SURECLICK) 50 mg/mL (1 mL) (Order#[id number], Rx#[id number])          Refill Questions - Documented Responses    Flowsheet Row Most Recent Value   Patient Availability and HIPAA Verification    Does patient want to proceed with activity? Yes   HIPAA/medical authority confirmed? Yes   Relationship to patient of person spoken to? Self   Refill Screening Questions    Changes to allergies? No   Changes to medications? No   New conditions since last clinic visit? No   Unplanned office visit, urgent care, ED, or hospital admission in the last 4 weeks? No   How does patient/caregiver feel medication is working? Good   Financial problems or insurance changes? No   How many doses of your specialty medications were missed in the last 4 weeks? 1   Why were doses missed? Other (comment)  [Held for surgery]   Would patient like to speak to a pharmacist? No   When does the patient need to receive the medication? 08/01/22   Refill Delivery Questions    How will the patient receive the medication? Pickup   When does the patient need to receive the medication? 08/01/22   Expected Copay ($) 0   Is the patient able to afford the medication copay? Yes   Payment Method zero copay   Days supply of Refill 28   Supplies needed? No supplies needed   Refill activity completed? Yes   Refill activity plan Refill scheduled   Shipment/Pickup Date: 08/01/22          Current Outpatient Medications   Medication Sig    amLODIPine (NORVASC) 5 MG tablet Take 1 tablet (5 mg total) by mouth once daily.    aspirin (ECOTRIN) 81 MG EC tablet Take 81 mg by mouth once daily.    atorvastatin (LIPITOR) 10 MG tablet Take 1 tablet (10 mg total) by mouth once daily.    azelastine (ASTELIN) 137 mcg (0.1 %) nasal spray spray 1 spray (137 mcg total) by Nasal route 2 (two) times daily.    chlorhexidine  (PERIDEX) 0.12 % solution Use 15-20 mL to rinse mouth twice daily for 2 minutes, spit out and do not rinse mouth with water after.    cyanocobalamin, vitamin B-12, (VITAMIN B-12 ORAL) Take by mouth.    cycloSPORINE (RESTASIS) 0.05 % ophthalmic emulsion Apply 1 drop into both eyes twice a day    desoximetasone (TOPICORT) 0.25 % cream Apply to affected area every other night x 1 month    ergocalciferol (ERGOCALCIFEROL) 50,000 unit Cap Take 1 capsule (50,000 Units total) by mouth every 7 days.    etanercept (ENBREL SURECLICK) 50 mg/mL (1 mL) Inject 1 mL (50 mg total) into the skin once a week.    fluticasone propion-salmeterol 115-21 mcg/dose (ADVAIR HFA) 115-21 mcg/actuation HFAA inhaler Inhale 2 puffs into the lungs every 12 (twelve) hours.    fluticasone propionate (FLONASE) 50 mcg/actuation nasal spray 1 spray (50 mcg total) by Each Nostril route once daily.    folic acid (FOLVITE) 1 MG tablet Take 4 tablets (4 mg total) by mouth once daily.    gabapentin (NEURONTIN) 300 MG capsule Take 1 capsule (300 mg total) by mouth 2 (two) times daily.    HYDROcodone-acetaminophen (NORCO) 5-325 mg per tablet Take 1 tablet by mouth every 6 (six) hours as needed for Pain.    hydrOXYzine HCL (ATARAX) 25 MG tablet Take 1 tablet (25 mg total) by mouth every evening.    leflunomide (ARAVA) 20 MG Tab Take 1 tablet (20 mg total) by mouth once daily.    losartan (COZAAR) 100 MG tablet Take 1 tablet (100 mg total) by mouth once daily.    metoprolol succinate (TOPROL-XL) 25 MG 24 hr tablet Take 1 tablet (25 mg total) by mouth every evening.    nystatin (MYCOSTATIN) powder Apply to affected area every morning as needed for flare under breasts (Patient taking differently: Apply to affected area every morning as needed for flare under breasts)    promethazine (PHENERGAN) 25 MG tablet Take 1 tablet (25 mg total) by mouth every 6 (six) hours as needed for Nausea.    PROTOPIC 0.1 % ointment AAA bid    ruxolitinib (OPZELURA)  1.5 % Crea aaa bid    triamcinolone acetonide 0.025% (KENALOG) 0.025 % cream Apply to affected area every day to twice a day to neck . Not more than 1-2 weeks straight in same location    triamcinolone acetonide 0.1% (KENALOG) 0.1 % ointment Apply topically 2 (two) times daily. For 2 weeks    ZINC ORAL Take by mouth.   Last reviewed on 8/1/2022  2:11 PM by Dane Quinonez MA    Review of patient's allergies indicates:  No Known Allergies Last reviewed on  8/1/2022 2:10 PM by Araceli Quinonez      Tasks added this encounter   8/22/2022 - Refill Call (Auto Added)  8/2/2022 - Pickup Reminder   Tasks due within next 3 months   No tasks due.     Enedina Kelley, PharmD  [facility]  [address] [place] [id number]  Phone: [phone]  Fax: [phone]

## 2022-08-01 NOTE — TELEPHONE ENCOUNTER
Incoming call from patient asking if Dr. Smith cleared her to restart. Informed patient she can safely resume Enbrel. She states that she has an appt with Rheumatology today and would like to wait to set up refill after.

## 2022-08-02 ENCOUNTER — TELEPHONE (OUTPATIENT)
Dept: REHABILITATION | Facility: HOSPITAL | Age: 62
End: 2022-08-02

## 2022-08-02 ENCOUNTER — CLINICAL SUPPORT (OUTPATIENT)
Dept: REHABILITATION | Facility: HOSPITAL | Age: 62
End: 2022-08-02
Payer: MEDICARE

## 2022-08-02 ENCOUNTER — CLINICAL SUPPORT (OUTPATIENT)
Dept: REHABILITATION | Facility: HOSPITAL | Age: 62
End: 2022-08-02
Attending: PHYSICAL MEDICINE & REHABILITATION
Payer: MEDICARE

## 2022-08-02 DIAGNOSIS — M21.70 LEG LENGTH DISCREPANCY: ICD-10-CM

## 2022-08-02 DIAGNOSIS — M79.641 PAIN IN RIGHT HAND: ICD-10-CM

## 2022-08-02 DIAGNOSIS — M25.521 PAIN IN RIGHT ELBOW: ICD-10-CM

## 2022-08-02 DIAGNOSIS — M25.641 DECREASED RANGE OF MOTION OF FINGER OF RIGHT HAND: ICD-10-CM

## 2022-08-02 DIAGNOSIS — M25.631 DECREASED RANGE OF MOTION OF RIGHT WRIST: Primary | ICD-10-CM

## 2022-08-02 DIAGNOSIS — M25.621 DECREASED RANGE OF MOTION OF RIGHT ELBOW: ICD-10-CM

## 2022-08-02 DIAGNOSIS — R26.9 ABNORMAL GAIT: ICD-10-CM

## 2022-08-02 DIAGNOSIS — M25.431 SWELLING OF RIGHT WRIST: ICD-10-CM

## 2022-08-02 DIAGNOSIS — Z87.820 HISTORY OF TRAUMATIC BRAIN INJURY: ICD-10-CM

## 2022-08-02 PROCEDURE — 97161 PT EVAL LOW COMPLEX 20 MIN: CPT | Mod: PN

## 2022-08-02 PROCEDURE — 97110 THERAPEUTIC EXERCISES: CPT | Mod: PN

## 2022-08-02 PROCEDURE — 97140 MANUAL THERAPY 1/> REGIONS: CPT | Mod: PN

## 2022-08-02 NOTE — PLAN OF CARE
[facility]  Physical Therapy Neurological Rehabilitation Initial Evaluation    Name: Silvia Cervantes  Clinic Number: [id number]    Therapy Diagnosis:   Encounter Diagnoses   Name Primary?    Abnormal gait     Leg length discrepancy     History of traumatic brain injury      Physician: Anu Hyde MD    Physician Orders: PT Eval and Treat   Medical Diagnosis from Referral:   Diagnosis   R26.9 (ICD-10-CM) - Abnormal gait   M21.70 (ICD-10-CM) - Leg length discrepancy   Z87.820 (ICD-10-CM) - History of traumatic brain injury     Evaluation Date: 2022  Authorization Period Expiration: 2022  Plan of Care Expiration: 2022  Visit # / Visits authorized:     Time In: 1038  Time Out: 1138  Total Billable Time: 60 minutes    Precautions: Standard    Subjective   Date of onset:   History of current condition - Silvia reports: feels like her left side is dragging when she walks.  She stated that she tries to walk 2 miles in the park and her L leg feels heavy.      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    Medications:   Silvia has a current medication list which includes the following prescription(s): amlodipine, aspirin, atorvastatin, azelastine, chlorhexidine, cyanocobalamin (vitamin b-12), restasis, desoximetasone, ergocalciferol, enbrel sureclick, advair hfa, fluticasone propionate, folic acid, gabapentin, hydrocodone-acetaminophen, hydroxyzine hcl, leflunomide, losartan, metoprolol succinate, nystatin, promethazine, protopic, opzelura, triamcinolone acetonide 0.025%, triamcinolone acetonide 0.1%, zinc, and [DISCONTINUED] irbesartan.    Allergies:   Review of patient's allergies indicates:  No Known Allergies     Imaging, MRI studies: 4/2021  Impression:     Scattered areas of prior hemorrhage including parenchymal, leptomeningeal and intraventricular involvement.  Moderate supratentorial leukoencephalopathy.  Small focus of right anterior temporal encephalomalacia.  Although not entirely specific, the constellation of findings is most suggestive of prior traumatic brain injury/diffuse axonal injury.  Clinical correlation required.     No evidence of new hemorrhage or major vascular distribution infarct.    Prior Therapy: PT 2020 at [facility] for vestibular problems  Social History:  lives with their family (elderly mom)  Falls: 0 in last 12 months  DME: none   Home Environment: 1st floor apt   Exercise Routine / History: walk in park 3x/wk    Occupation: disabled  Prior Level of Function: indep  Current Level of Function: indep with decreased quality of gait    Pain:  Current 0/10  Patient does have history of arthritic pain and is seeing OT for hand and UE pain    Patient's goals: to feel better when walking and check my legs    Objective     Gait  Speed: SSWS: 0.8    FSW: 1.0   Pattern: decreased UE swing, decreased heel strike   Device: none   Assistance: Indep     Right Left    Strength Strength   Hip flexion 4-/5 4-/5   Extension 4-/5 3/5   Glute max NT NT   Abduction  5/5 5/5   Adduction 5/5 5/5   ER NT NT   IR NT NT   Knee ext 4/5 4+/5   Knee flexion 4/5 4-/5    Leg Length  82.5 cm L LE  80 5 cm R LE     Pelvic assessment:   L ASIS lower than R, R higher than L     Palpation: elevated right ASIS    Sensation: L side numbness and tingling since TBI in 2017, mild tingling in R foot intermittently post LE fx      FOTO will be issued next visit.        TREATMENT   Treatment Time In: 11:20  Treatment Time Out: 11:38  Total Treatment time separate from Evaluation: 18 minutes    Silvia participated in manual therapy to improve pelvic alignment for 10  minutes, including:  --MUSCLE ENERGY TECHNIQUE to correct potential pelvic rotation (mild improvement towards realignment noted)  -- Manual right hip distraction x 2' total   -- assessment of alignment in standing post manual therapy still showed LLD which is evident by leg measurements  -- PT placed small 1 cm heel lift in right shoe to externally lengthen the right side and prevent left LE drag.    Home Exercises and Patient Education Provided    Education provided:   - benefit for heel lift for gait  - need for resuming walking program  - POC     Written Home Exercises Provided: Patient instructed to cont prior HEP.  Exercises were reviewed and Silvia was able to demonstrate them prior to the end of the session.  Silvia demonstrated good  understanding of the education provided.     See EMR under Patient Instructions for exercises provided 8/2/2022.    Assessment   Silvia is a 61 y.o. female referred to outpatient Physical Therapy with a medical diagnosis of history of TBI. Patient presents with actual leg length discrepancy with right LE shorter than the left by 2.5 cm.  She responded fairly well and mildly to MUSCLE ENERGY TECHNIQUE to improve pelvic alignment but since she has a difference in leg length that is outside of the pelvic malalignment she responded best to the heel lift being placed in her right shoe to increase the length of  her right LE in her shoe.  During gait patient reported that it was improved from prior to intervention and she no longer felt as if her left leg was dragging.      Patient prognosis is Good.   Patient will benefit from skilled outpatient Physical Therapy to address the deficits stated above and in the chart below, provide patient/family education, and to maximize patient's level of independence.     Plan of care discussed with patient: Yes  Patient's spiritual, cultural and educational needs considered and patient is agreeable to the plan of care and goals as stated below:     Anticipated Barriers for therapy: h/o TBI, other orthopedic issues.     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)     Personal Factors:   medical history     moderate   Examination  Body Structures and Functions, activity limitations and participation restrictions that may impact the plan of care Body Regions:   lower extremities  trunk    Body Systems:    gross symmetry  ROM  strength  gross coordinated movement  balance  gait    Participation Restrictions:   Walking long distances for exercise    Activity limitations:   Learning and applying knowledge  no deficits    General Tasks and Commands  undertaking multiple tasks    Communication  communicating with/receiving spoken language    Mobility  walking    Self care  no deficits    Domestic Life  outdoor chores    Interactions/Relationships  no deficits    Life Areas  no deficits    Community and  Social Life  community life  recreation and leisure         moderate   Clinical Presentation stable and uncomplicated low   Decision Making/ Complexity Score: low     Goals:  Long Term Goals: 4 weeks   1. Patient will resume walking program as directed in patient instructions at least 3 times per week.  2. Patient deny discomfort with 2 weeks of walking with right heel lift placed in shoe.  3. Patient will have increased Bilateral hip strength to 4/5 or greater in bilateral lower extremities.  4. Patient's FWS will increase to 1.1 m/s to keep up with family members with 0 LOB.    Plan   Plan of care Certification: 8/2/2022 to 9/2/2022.    Outpatient Physical Therapy 1 times weekly for 4 weeks to include the following interventions: Gait Training, Manual Therapy, Moist Heat/ Ice, Neuromuscular Re-ed, Patient Education, Self Care, Therapeutic Activities and Therapeutic Exercise.     Jyotsna Garsia, PT
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
## 2022-08-04 ENCOUNTER — CLINICAL SUPPORT (OUTPATIENT)
Dept: REHABILITATION | Facility: HOSPITAL | Age: 62
End: 2022-08-04
Payer: MEDICARE

## 2022-08-04 DIAGNOSIS — M79.641 PAIN IN RIGHT HAND: ICD-10-CM

## 2022-08-04 DIAGNOSIS — M25.631 DECREASED RANGE OF MOTION OF RIGHT WRIST: Primary | ICD-10-CM

## 2022-08-04 DIAGNOSIS — M25.641 DECREASED RANGE OF MOTION OF FINGER OF RIGHT HAND: ICD-10-CM

## 2022-08-04 DIAGNOSIS — R42 DIZZINESS: Primary | ICD-10-CM

## 2022-08-04 DIAGNOSIS — M25.521 PAIN IN RIGHT ELBOW: ICD-10-CM

## 2022-08-04 DIAGNOSIS — M25.431 SWELLING OF RIGHT WRIST: ICD-10-CM

## 2022-08-04 DIAGNOSIS — R20.9 SENSATION DISORDER: ICD-10-CM

## 2022-08-04 DIAGNOSIS — M25.621 DECREASED RANGE OF MOTION OF RIGHT ELBOW: ICD-10-CM

## 2022-08-04 PROBLEM — R26.89 IMPAIRMENT OF BALANCE: Status: RESOLVED | Noted: 2020-08-31 | Resolved: 2022-08-04

## 2022-08-04 PROBLEM — H55.81 SACCADIC EYE MOVEMENT DEFICIENCY: Status: RESOLVED | Noted: 2020-08-31 | Resolved: 2022-08-04

## 2022-08-04 PROBLEM — H81.8X9 DEFICIT OF VESTIBULO-OCULAR REFLEX: Status: RESOLVED | Noted: 2020-08-31 | Resolved: 2022-08-04

## 2022-08-04 PROCEDURE — 97112 NEUROMUSCULAR REEDUCATION: CPT | Mod: PN

## 2022-08-04 PROCEDURE — 97110 THERAPEUTIC EXERCISES: CPT | Mod: PN

## 2022-08-04 PROCEDURE — 97140 MANUAL THERAPY 1/> REGIONS: CPT | Mod: PN

## 2022-08-04 NOTE — PROGRESS NOTES
"[facility]   Physical Therapy Treatment Note     Name: Silvia Cervantes  Clinic Number: [id number]    Therapy Diagnosis:   Encounter Diagnoses   Name Primary?    Dizziness Yes    Sensation disorder      Physician: Anu Hyde MD    Visit Date: 8/4/2022    Physician Orders: PT Eval and Treat   Medical Diagnosis from Referral:   Diagnosis   R26.9 (ICD-10-CM) - Abnormal gait   M21.70 (ICD-10-CM) - Leg length discrepancy   Z87.820 (ICD-10-CM) - History of traumatic brain injury      Evaluation Date: 8/2/2022  Authorization Period Expiration: 08/30/2022  Plan of Care Expiration: 9/2/2022  Visit # / Visits authorized: 2/ 12  FOTO: Intake FOTO not completed - complete next if able    PTA Visit #: 0/5     Time In: 11:00AM  Time Out: 11:45AM  Total Billable Time: 45 minutes (1TE, 2NMR)    Precautions: Standard    SUBJECTIVE     Patient reports: No major changes since initial evaluation. She says that heel lift felt like it helped on evaluation day but that she really doesn't notice it helping perception of leg length with community mobility. Perceives that she has "more sensation" in left lower extremity after exercise today.  She was compliant with home exercise program.  Response to previous treatment: Last session was initial evaluation  Functional change: Last session was initial evaluation    Pain: 0/10  Location: N/A - no bilateral lower extremity pain noted today just some left lower extremity numbness    OBJECTIVE     Objective Measures updated at progress report unless specified.     Treatment     Silvia received the treatments listed below:      therapeutic exercises to develop strength and endurance for 20 minutes including:  -- Treadmill walking x 5 minutes; speed setting 1.5  -- Standing hip abduction yellow theraband with stance leg on green foam disc 2x10  -- Gastroc stretch on Fitter 2x30"    neuromuscular re-education activities to improve: Balance, Coordination, Kinesthetic and " Sense for 25 minutes. The following activities were included:  -- Modified stand to half kneel x5B  -- Reciprocal step ups to Fitter soft board x15B  -- Sit to stands from chair height + airex with yellow theraband around distal thigh to discourage valgus collapse    Patient Education and Home Exercises     Home Exercises Provided and Patient Education Provided     Education provided:   - Continue home exercise   - Possible contribution of previous pelvic fracture in    Written Home Exercises Provided: Patient instructed to cont prior HEP. Exercises were reviewed and Silvia was able to demonstrate them prior to the end of the session.  Silvia demonstrated good  understanding of the education provided. See EMR under Patient Instructions for exercises provided during therapy sessions    ASSESSMENT     Patient tolerated session without adverse response. Able to increase walking speed with treadmill intervention compared to self-selected walking speed observed on initial evaluation. Patient expresses interest in learning how to perform floor transfer and agrees to perform modified half kneel transfer to gain confidence before full developmental sequencing initiated next week. She benefits from tactile cuing to minimize compensatory strategy during exercise, particularly those involving hip abductor strength. She would benefit from continued PT services to achieve goals established at evaluation.    Silvia Is progressing well towards her goals.   Patient prognosis is Good.     Pt will continue to benefit from skilled outpatient physical therapy to address the deficits listed in the problem list box on initial evaluation, provide pt/family education and to maximize pt's level of independence in the home and community environment.     Pt's spiritual, cultural and educational needs considered and pt agreeable to plan of care and goals.     Anticipated barriers to physical therapy: h/o TBI, other orthopedic  issues    Goals:  Long Term Goals: 4 weeks   1. Patient will resume walking program as directed in patient instructions at least 3 times per week. (progressing, not met)  2. Patient deny discomfort with 2 weeks of walking with right heel lift placed in shoe. (progressing, not met)  3. Patient will have increased Bilateral hip strength to 4/5 or greater in bilateral lower extremities. (progressing, not met)  4. Patient's FWS will increase to 1.1 m/s to keep up with family members with 0 LOB. (progressing, not met)     PLAN     Please give FOTO next; give strengthening HEP for B hips and ankles; full floor transfer if tolerated with developmental sequencing    KATHY VARGAS, PT
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
## 2022-08-04 NOTE — PROGRESS NOTES
"  [facility]   Physical Therapy Treatment Note     Name: Silvia Cervantes  Clinic Number: [id number]    Therapy Diagnosis: No diagnosis found.  Physician: Anu Hyde MD    Visit Date: 8/4/2022    [copy and paste header from claudio here]    [facility] Visit #: ***/5     Time In: ***  Time Out: ***  Total Billable Time: *** minutes    SUBJECTIVE     Pt reports: ***.  She {Actions; was/was not:83692} compliant with home exercise program.  Response to previous treatment: ***  Functional change: ***    Pain: {0-10:49206::"0"}/10  Location: {RIGHT/LEFT/BILATERAL:91519} {LOCATION ON BODY:99010}     OBJECTIVE     Objective Measures updated at progress report unless specified.     Treatment     Silvia received the treatments listed below:      therapeutic exercises to develop {AMB PT PROGRESS OBJECTIVE:14031} for *** minutes including:  ***    manual therapy techniques: {AMB PT PROGRESS MANUAL THERAPY:24781} were applied to the: *** for *** minutes, including:  ***    neuromuscular re-education activities to improve: {AMB PT PROGRESS NEURO RE-ED:07244} for *** minutes. The following activities were included:  ***    therapeutic activities to improve functional performance for ***  minutes, including:  ***    gait training to improve functional mobility and safety for ***  minutes, including:  ***    direct contact modalities after being cleared for contraindications: {AMB PT PROGRESS DIRECT CONTACT MODES:82247}    supervised modalities after being cleared for contradictions: {AMB PT SUPERVISED MODES:03899}    hot pack for *** minutes to ***.    cold pack for *** minutes to ***.        Patient Education and Home Exercises     Home Exercises Provided and Patient Education Provided     Education provided:   - ***    Written Home Exercises Provided: {Blank single:04728::"yes","Patient instructed to cont prior HEP"}. Exercises were reviewed and Silvia was able to demonstrate them prior to the end of the session. "  Silvia demonstrated {Desc; good/fair/poor:68283} understanding of the education provided. See EMR under Patient Instructions for exercises provided during therapy sessions    ASSESSMENT     ***    Silvia {IS/IS NOT:26663} progressing well towards her goals.   Pt prognosis is {REHAB PROGNOSIS OHS:55176}.     Pt will continue to benefit from skilled outpatient physical therapy to address the deficits listed in the problem list box on initial evaluation, provide pt/family education and to maximize pt's level of independence in the home and community environment.     Pt's spiritual, cultural and educational needs considered and pt agreeable to plan of care and goals.     Anticipated barriers to physical therapy: ***    Goals: ***    PLAN     ***    Nette Quijano, PTA

## 2022-08-08 ENCOUNTER — OFFICE VISIT (OUTPATIENT)
Dept: ORTHOPEDICS | Facility: CLINIC | Age: 62
End: 2022-08-08
Payer: MEDICARE

## 2022-08-08 VITALS — BODY MASS INDEX: 26.06 KG/M2 | WEIGHT: 138 LBS | HEIGHT: 61 IN

## 2022-08-08 DIAGNOSIS — M06.321 RHEUMATOID NODULE OF RIGHT ELBOW: Primary | ICD-10-CM

## 2022-08-08 DIAGNOSIS — M19.031 DRUJ (DISTAL RADIOULNAR JOINT) ARTHROSIS, PRIMARY, RIGHT: ICD-10-CM

## 2022-08-08 DIAGNOSIS — M25.842 CYST OF JOINT OF HAND, LEFT: ICD-10-CM

## 2022-08-08 DIAGNOSIS — M65.831 EXTENSOR TENOSYNOVITIS OF RIGHT WRIST: ICD-10-CM

## 2022-08-08 PROCEDURE — 99999 PR PBB SHADOW E&M-EST. PATIENT-LVL IV: ICD-10-PCS | Mod: PBBFAC,,, | Performed by: ORTHOPAEDIC SURGERY

## 2022-08-08 PROCEDURE — 4010F PR ACE/ARB THEARPY RXD/TAKEN: ICD-10-PCS | Mod: CPTII,S$GLB,, | Performed by: ORTHOPAEDIC SURGERY

## 2022-08-08 PROCEDURE — 3008F BODY MASS INDEX DOCD: CPT | Mod: CPTII,S$GLB,, | Performed by: ORTHOPAEDIC SURGERY

## 2022-08-08 PROCEDURE — 4010F ACE/ARB THERAPY RXD/TAKEN: CPT | Mod: CPTII,S$GLB,, | Performed by: ORTHOPAEDIC SURGERY

## 2022-08-08 PROCEDURE — 99024 POSTOP FOLLOW-UP VISIT: CPT | Mod: S$GLB,,, | Performed by: ORTHOPAEDIC SURGERY

## 2022-08-08 PROCEDURE — 3008F PR BODY MASS INDEX (BMI) DOCUMENTED: ICD-10-PCS | Mod: CPTII,S$GLB,, | Performed by: ORTHOPAEDIC SURGERY

## 2022-08-08 PROCEDURE — 99024 PR POST-OP FOLLOW-UP VISIT: ICD-10-PCS | Mod: S$GLB,,, | Performed by: ORTHOPAEDIC SURGERY

## 2022-08-08 PROCEDURE — 99999 PR PBB SHADOW E&M-EST. PATIENT-LVL IV: CPT | Mod: PBBFAC,,, | Performed by: ORTHOPAEDIC SURGERY

## 2022-08-08 NOTE — PROGRESS NOTES
Silvia Cervantes presents for post-operative evaluation of   Encounter Diagnoses   Name Primary?    Rheumatoid nodule of right elbow Yes    Extensor tenosynovitis of right wrist     Cyst of joint of hand, left     DRUJ (distal radioulnar joint) arthrosis, primary, right      The patient is now 5 weeks s/p right rheumatoid nodule excision elbow and wrist, extensor tenosynovectomy and osteophyte excision ulna.  The patient reports no pain.        PE:    AA&O x 4.  NAD  HEENT:  NCAT, sclera nonicteric  Lungs:  Respirations are equal and unlabored.  CV:  2+ bilateral upper and lower extremity pulses.  MSK: The incision is well healed.  Full wrist and finger motion.  Neurovascularly intact and has 5/5 thenar and intrinsic musculature strength.      A/P: Status post above, doing well  1) Continue with range of motion and strengthening in therapy, wean splint  2) F/U 6 weeks  3) Call with any questions/concerns in the interim        Louise Smith MD     Please be aware that this note has been generated with the assistance of odal voice-to-text.  Please excuse any spelling or grammatical errors.

## 2022-08-09 ENCOUNTER — OFFICE VISIT (OUTPATIENT)
Dept: PULMONOLOGY | Facility: CLINIC | Age: 62
End: 2022-08-09
Payer: MEDICARE

## 2022-08-09 ENCOUNTER — CLINICAL SUPPORT (OUTPATIENT)
Dept: REHABILITATION | Facility: HOSPITAL | Age: 62
End: 2022-08-09
Payer: MEDICARE

## 2022-08-09 VITALS
BODY MASS INDEX: 27.08 KG/M2 | OXYGEN SATURATION: 95 % | SYSTOLIC BLOOD PRESSURE: 138 MMHG | WEIGHT: 143.44 LBS | HEIGHT: 61 IN | RESPIRATION RATE: 18 BRPM | HEART RATE: 76 BPM | TEMPERATURE: 98 F | DIASTOLIC BLOOD PRESSURE: 86 MMHG

## 2022-08-09 DIAGNOSIS — M25.641 DECREASED RANGE OF MOTION OF FINGER OF RIGHT HAND: ICD-10-CM

## 2022-08-09 DIAGNOSIS — M25.621 DECREASED RANGE OF MOTION OF RIGHT ELBOW: ICD-10-CM

## 2022-08-09 DIAGNOSIS — D86.9 SARCOIDOSIS: Primary | ICD-10-CM

## 2022-08-09 DIAGNOSIS — M79.641 PAIN IN RIGHT HAND: ICD-10-CM

## 2022-08-09 DIAGNOSIS — M25.521 PAIN IN RIGHT ELBOW: ICD-10-CM

## 2022-08-09 DIAGNOSIS — M25.431 SWELLING OF RIGHT WRIST: ICD-10-CM

## 2022-08-09 DIAGNOSIS — M25.631 DECREASED RANGE OF MOTION OF RIGHT WRIST: Primary | ICD-10-CM

## 2022-08-09 DIAGNOSIS — R20.9 SENSATION DISORDER: ICD-10-CM

## 2022-08-09 DIAGNOSIS — R42 DIZZINESS: Primary | ICD-10-CM

## 2022-08-09 PROCEDURE — 4010F PR ACE/ARB THEARPY RXD/TAKEN: ICD-10-PCS | Mod: CPTII,S$GLB,, | Performed by: INTERNAL MEDICINE

## 2022-08-09 PROCEDURE — 3075F PR MOST RECENT SYSTOLIC BLOOD PRESS GE 130-139MM HG: ICD-10-PCS | Mod: CPTII,S$GLB,, | Performed by: INTERNAL MEDICINE

## 2022-08-09 PROCEDURE — 99214 OFFICE O/P EST MOD 30 MIN: CPT | Mod: S$GLB,,, | Performed by: INTERNAL MEDICINE

## 2022-08-09 PROCEDURE — 1160F RVW MEDS BY RX/DR IN RCRD: CPT | Mod: CPTII,S$GLB,, | Performed by: INTERNAL MEDICINE

## 2022-08-09 PROCEDURE — 3008F PR BODY MASS INDEX (BMI) DOCUMENTED: ICD-10-PCS | Mod: CPTII,S$GLB,, | Performed by: INTERNAL MEDICINE

## 2022-08-09 PROCEDURE — 3079F PR MOST RECENT DIASTOLIC BLOOD PRESSURE 80-89 MM HG: ICD-10-PCS | Mod: CPTII,S$GLB,, | Performed by: INTERNAL MEDICINE

## 2022-08-09 PROCEDURE — 3079F DIAST BP 80-89 MM HG: CPT | Mod: CPTII,S$GLB,, | Performed by: INTERNAL MEDICINE

## 2022-08-09 PROCEDURE — 99999 PR PBB SHADOW E&M-EST. PATIENT-LVL V: ICD-10-PCS | Mod: PBBFAC,,, | Performed by: INTERNAL MEDICINE

## 2022-08-09 PROCEDURE — 97140 MANUAL THERAPY 1/> REGIONS: CPT | Mod: PN

## 2022-08-09 PROCEDURE — 99999 PR PBB SHADOW E&M-EST. PATIENT-LVL V: CPT | Mod: PBBFAC,,, | Performed by: INTERNAL MEDICINE

## 2022-08-09 PROCEDURE — 1160F PR REVIEW ALL MEDS BY PRESCRIBER/CLIN PHARMACIST DOCUMENTED: ICD-10-PCS | Mod: CPTII,S$GLB,, | Performed by: INTERNAL MEDICINE

## 2022-08-09 PROCEDURE — 1159F PR MEDICATION LIST DOCUMENTED IN MEDICAL RECORD: ICD-10-PCS | Mod: CPTII,S$GLB,, | Performed by: INTERNAL MEDICINE

## 2022-08-09 PROCEDURE — 4010F ACE/ARB THERAPY RXD/TAKEN: CPT | Mod: CPTII,S$GLB,, | Performed by: INTERNAL MEDICINE

## 2022-08-09 PROCEDURE — 97112 NEUROMUSCULAR REEDUCATION: CPT | Mod: PN,CQ

## 2022-08-09 PROCEDURE — 99214 PR OFFICE/OUTPT VISIT, EST, LEVL IV, 30-39 MIN: ICD-10-PCS | Mod: S$GLB,,, | Performed by: INTERNAL MEDICINE

## 2022-08-09 PROCEDURE — 3008F BODY MASS INDEX DOCD: CPT | Mod: CPTII,S$GLB,, | Performed by: INTERNAL MEDICINE

## 2022-08-09 PROCEDURE — 97110 THERAPEUTIC EXERCISES: CPT | Mod: PN,CQ

## 2022-08-09 PROCEDURE — 1159F MED LIST DOCD IN RCRD: CPT | Mod: CPTII,S$GLB,, | Performed by: INTERNAL MEDICINE

## 2022-08-09 PROCEDURE — 97110 THERAPEUTIC EXERCISES: CPT | Mod: PN

## 2022-08-09 PROCEDURE — 3075F SYST BP GE 130 - 139MM HG: CPT | Mod: CPTII,S$GLB,, | Performed by: INTERNAL MEDICINE

## 2022-08-09 NOTE — PROGRESS NOTES
"Subjective:       Patient ID: Silvia Cervantes is a 61 y.o. female.    Chief Complaint: No chief complaint on file.    HPI Stable respiratory symptoms. Continues with dry cough. 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  Followed by Rheumatology for seropositive RA.  On enbrel.  Off cyclosporine, and leflunamide, MT.    7/26/21-  Feels hand stiffness is better on enbrel.  No problems with COVID.  Had vaccine.  Cough persists when she is nervious.  Hasn't used advair because she is worried she will get addicted.  10/11/21-  Feels advair helped but instructed in use.  Wants flonase  4/11/21-  Last PFTs 9/24/21.  Last CT 2/23/21.  Still coughing (tickle in thorat) but doesn't bother her much.  No ALY.  Walking in park.  Using vicks on outside of nose nightly.  No fever, chills, wt.loss.  No Hx COVID infec.  Had vaccfine x 3.  Wants to know about 2nd booster.    Interval hx: Initially diagnosed in 2002 at [facility].   Denies fever or chills.       Review of Systems   Respiratory: Negative for shortness of breath and dyspnea on extertion.        Objective:      Physical Exam   Constitutional: She is oriented to person, place, and time. She appears well-developed and well-nourished. No distress.   Cardiovascular: Normal rate and regular rhythm.   Pulmonary/Chest: Normal expansion and effort normal. She has no wheezes. She has no rales.   Musculoskeletal:      Cervical back: Normal range of motion and neck supple.   Neurological: She is alert and oriented to person, place, and time.   Skin: Skin is warm and dry. She is not diaphoretic.   Nursing note and vitals reviewed.    Personal Diagnostic Review  none pertinent  No flowsheet data found.      Assessment:       No diagnosis found.    Outpatient Encounter Medications as of 8/9/2022   Medication Sig Dispense Refill    fluticasone propionate (FLONASE) 50 mcg/actuation nasal spray 1 spray (50 mcg " total) by Each Nostril route once daily. 16 g 5    amLODIPine (NORVASC) 5 MG tablet Take 1 tablet (5 mg total) by mouth once daily. 90 tablet 4    aspirin (ECOTRIN) 81 MG EC tablet Take 81 mg by mouth once daily.      atorvastatin (LIPITOR) 10 MG tablet Take 1 tablet (10 mg total) by mouth once daily. 90 tablet 3    azelastine (ASTELIN) 137 mcg (0.1 %) nasal spray spray 1 spray (137 mcg total) by Nasal route 2 (two) times daily. (Patient not taking: Reported on 8/9/2022) 30 mL 3    chlorhexidine (PERIDEX) 0.12 % solution Use 15-20 mL to rinse mouth twice daily for 2 minutes, spit out and do not rinse mouth with water after. 473 mL 3    cyanocobalamin, vitamin B-12, (VITAMIN B-12 ORAL) Take by mouth.      cycloSPORINE (RESTASIS) 0.05 % ophthalmic emulsion Apply 1 drop into both eyes twice a day 180 vial 3    desoximetasone (TOPICORT) 0.25 % cream Apply to affected area every other night x 1 month 60 g 1    ergocalciferol (ERGOCALCIFEROL) 50,000 unit Cap Take 1 capsule (50,000 Units total) by mouth every 7 days. 12 capsule 2    etanercept (ENBREL SURECLICK) 50 mg/mL (1 mL) Inject 1 mL (50 mg total) into the skin once a week. 4 mL 11    fluticasone propion-salmeterol 115-21 mcg/dose (ADVAIR HFA) 115-21 mcg/actuation HFAA inhaler Inhale 2 puffs into the lungs every 12 (twelve) hours. 12 g 11    folic acid (FOLVITE) 1 MG tablet Take 4 tablets (4 mg total) by mouth once daily. 120 tablet 11    gabapentin (NEURONTIN) 300 MG capsule Take 1 capsule (300 mg total) by mouth 2 (two) times daily. 180 capsule 1    HYDROcodone-acetaminophen (NORCO) 5-325 mg per tablet Take 1 tablet by mouth every 6 (six) hours as needed for Pain. 25 tablet 0    hydrOXYzine HCL (ATARAX) 25 MG tablet Take 1 tablet (25 mg total) by mouth every evening. 30 tablet 1    leflunomide (ARAVA) 20 MG Tab Take 1 tablet (20 mg total) by mouth once daily. 90 tablet 3    losartan (COZAAR) 100 MG tablet Take 1 tablet (100 mg total) by mouth once  daily. 90 tablet 3    metoprolol succinate (TOPROL-XL) 25 MG 24 hr tablet Take 1 tablet (25 mg total) by mouth every evening. 90 tablet 4    nystatin (MYCOSTATIN) powder Apply to affected area every morning as needed for flare under breasts (Patient taking differently: Apply to affected area every morning as needed for flare under breasts) 120 g 6    promethazine (PHENERGAN) 25 MG tablet Take 1 tablet (25 mg total) by mouth every 6 (six) hours as needed for Nausea. 25 tablet 0    PROTOPIC 0.1 % ointment AAA bid 100 g 2    ruxolitinib (OPZELURA) 1.5 % Crea aaa bid 60 g 2    triamcinolone acetonide 0.025% (KENALOG) 0.025 % cream Apply to affected area every day to twice a day to neck . Not more than 1-2 weeks straight in same location 80 g 1    triamcinolone acetonide 0.1% (KENALOG) 0.1 % ointment Apply topically 2 (two) times daily. For 2 weeks 15 g 1    ZINC ORAL Take by mouth.      [DISCONTINUED] irbesartan (AVAPRO) 150 MG tablet Take 1 tablet (150 mg total) by mouth every evening. 90 tablet 3     No facility-administered encounter medications on file as of 8/9/2022.     No orders of the defined types were placed in this encounter.      Plan:        Sarcoidosis  Patient with normal PFTs in 2019. She denies any symptoms except occasional cough.   Repeat PFT and 6 minute walk.     Follow up in 6 months.    Milton Eli MD

## 2022-08-09 NOTE — ASSESSMENT & PLAN NOTE
Patient with normal PFTs in 2019. She denies any symptoms except occasional cough.   Repeat PFT and 6 minute walk.
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
## 2022-08-11 ENCOUNTER — PATIENT MESSAGE (OUTPATIENT)
Dept: PULMONOLOGY | Facility: CLINIC | Age: 62
End: 2022-08-11
Payer: MEDICARE

## 2022-08-12 ENCOUNTER — CLINICAL SUPPORT (OUTPATIENT)
Dept: REHABILITATION | Facility: HOSPITAL | Age: 62
End: 2022-08-12
Payer: MEDICARE

## 2022-08-12 DIAGNOSIS — M25.631 DECREASED RANGE OF MOTION OF RIGHT WRIST: Primary | ICD-10-CM

## 2022-08-12 DIAGNOSIS — M25.431 SWELLING OF RIGHT WRIST: ICD-10-CM

## 2022-08-12 DIAGNOSIS — M25.521 PAIN IN RIGHT ELBOW: ICD-10-CM

## 2022-08-12 DIAGNOSIS — M79.641 PAIN IN RIGHT HAND: ICD-10-CM

## 2022-08-12 DIAGNOSIS — M25.621 DECREASED RANGE OF MOTION OF RIGHT ELBOW: ICD-10-CM

## 2022-08-12 DIAGNOSIS — M25.641 DECREASED RANGE OF MOTION OF FINGER OF RIGHT HAND: ICD-10-CM

## 2022-08-12 PROCEDURE — 97140 MANUAL THERAPY 1/> REGIONS: CPT | Mod: PN

## 2022-08-12 PROCEDURE — 97110 THERAPEUTIC EXERCISES: CPT | Mod: PN
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
## 2022-08-16 ENCOUNTER — HOSPITAL ENCOUNTER (OUTPATIENT)
Dept: PULMONOLOGY | Facility: HOSPITAL | Age: 62
Discharge: HOME | End: 2022-08-16
Attending: INTERNAL MEDICINE
Payer: MEDICARE

## 2022-08-16 DIAGNOSIS — D86.9 SARCOIDOSIS: ICD-10-CM

## 2022-08-16 PROCEDURE — 94727 GAS DIL/WSHOT DETER LNG VOL: CPT

## 2022-08-16 PROCEDURE — 94010 BREATHING CAPACITY TEST: CPT

## 2022-08-16 PROCEDURE — 94729 DIFFUSING CAPACITY: CPT

## 2022-08-16 PROCEDURE — 94618 PULMONARY STRESS TESTING: CPT

## 2022-08-16 NOTE — PROCEDURES
Silvia Cervantes is a 61 y.o.   female patient, who presents for a 6 minute walk test ordered by  Dr Eli.  The diagnosis is  sarcososis.  The patient's BMI is  27 kg/m2.      Test Results:    The test was  done.  The patient stopped  0 times for a total of 0 seconds.  The total time walked was 360 seconds.  During walking, the patient reported:   mild sob.    08/16/2022---------Distance:   ( 454.8 m)     O2 Sat % Supplemental Oxygen Heart Rate Blood Pressure Fariha Scale   Pre-exercise  (Resting)  94 na 74 147/86 2   During Exercise 94 na 92 150/90 4   Post-exercise  (Recovery) 94 na 80 147/77 2     Recovery Time:  15 seconds    Performing nurse/tech:  Michelle Hopkins CRT

## 2022-08-17 ENCOUNTER — CLINICAL SUPPORT (OUTPATIENT)
Dept: REHABILITATION | Facility: HOSPITAL | Age: 62
End: 2022-08-17
Payer: MEDICARE

## 2022-08-17 DIAGNOSIS — M25.521 PAIN IN RIGHT ELBOW: ICD-10-CM

## 2022-08-17 DIAGNOSIS — M25.631 DECREASED RANGE OF MOTION OF RIGHT WRIST: Primary | ICD-10-CM

## 2022-08-17 DIAGNOSIS — M79.641 PAIN IN RIGHT HAND: ICD-10-CM

## 2022-08-17 DIAGNOSIS — M25.621 DECREASED RANGE OF MOTION OF RIGHT ELBOW: ICD-10-CM

## 2022-08-17 DIAGNOSIS — M25.431 SWELLING OF RIGHT WRIST: ICD-10-CM

## 2022-08-17 DIAGNOSIS — M25.641 DECREASED RANGE OF MOTION OF FINGER OF RIGHT HAND: ICD-10-CM

## 2022-08-17 DIAGNOSIS — R20.9 SENSATION DISORDER: ICD-10-CM

## 2022-08-17 DIAGNOSIS — R42 DIZZINESS: Primary | ICD-10-CM

## 2022-08-17 PROCEDURE — 97535 SELF CARE MNGMENT TRAINING: CPT | Mod: PN

## 2022-08-17 PROCEDURE — 97110 THERAPEUTIC EXERCISES: CPT | Mod: PN,CQ

## 2022-08-17 PROCEDURE — 97022 WHIRLPOOL THERAPY: CPT | Mod: 59,PN

## 2022-08-17 PROCEDURE — 97112 NEUROMUSCULAR REEDUCATION: CPT | Mod: PN,CQ

## 2022-08-17 PROCEDURE — 97530 THERAPEUTIC ACTIVITIES: CPT | Mod: PN

## 2022-08-17 PROCEDURE — 97110 THERAPEUTIC EXERCISES: CPT | Mod: PN
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
## 2022-08-19 ENCOUNTER — CLINICAL SUPPORT (OUTPATIENT)
Dept: REHABILITATION | Facility: HOSPITAL | Age: 62
End: 2022-08-19
Payer: MEDICARE

## 2022-08-19 ENCOUNTER — PATIENT MESSAGE (OUTPATIENT)
Dept: PULMONOLOGY | Facility: CLINIC | Age: 62
End: 2022-08-19
Payer: MEDICARE

## 2022-08-19 DIAGNOSIS — M79.641 PAIN IN RIGHT HAND: ICD-10-CM

## 2022-08-19 DIAGNOSIS — M25.431 SWELLING OF RIGHT WRIST: ICD-10-CM

## 2022-08-19 DIAGNOSIS — M25.641 DECREASED RANGE OF MOTION OF FINGER OF RIGHT HAND: ICD-10-CM

## 2022-08-19 DIAGNOSIS — M25.621 DECREASED RANGE OF MOTION OF RIGHT ELBOW: ICD-10-CM

## 2022-08-19 DIAGNOSIS — M25.631 DECREASED RANGE OF MOTION OF RIGHT WRIST: Primary | ICD-10-CM

## 2022-08-19 DIAGNOSIS — M25.521 PAIN IN RIGHT ELBOW: ICD-10-CM

## 2022-08-19 LAB
BRPFT: ABNORMAL
DLCO ADJ PRE: 14.35 ML/(MIN*MMHG) (ref 14.87–26.34)
DLCO SINGLE BREATH LLN: 14.87
DLCO SINGLE BREATH PRE REF: 69.6 %
DLCO SINGLE BREATH REF: 20.61
DLCOC SBVA LLN: 2.99
DLCOC SBVA PRE REF: 91.6 %
DLCOC SBVA REF: 4.64
DLCOC SINGLE BREATH LLN: 14.87
DLCOC SINGLE BREATH PRE REF: 69.6 %
DLCOC SINGLE BREATH REF: 20.61
DLCOVA LLN: 2.99
DLCOVA PRE REF: 91.6 %
DLCOVA PRE: 4.25 ML/(MIN*MMHG*L) (ref 2.99–6.3)
DLCOVA REF: 4.64
DLVAADJ PRE: 4.25 ML/(MIN*MMHG*L) (ref 2.99–6.3)
ERVN2 LLN: -16449.26
ERVN2 PRE REF: 73.3 %
ERVN2 PRE: 0.54 L (ref -16449.26–16450.74)
ERVN2 REF: 0.74
FEF 25 75 LLN: 1.01
FEF 25 75 PRE REF: 96.6 %
FEF 25 75 REF: 2.02
FEV1 FVC LLN: 67
FEV1 FVC PRE REF: 99.9 %
FEV1 FVC REF: 79
FEV1 LLN: 1.64
FEV1 PRE REF: 87.7 %
FEV1 REF: 2.19
FRCN2 LLN: 1.71
FRCN2 PRE REF: 52.5 %
FRCN2 REF: 2.53
FVC LLN: 2.08
FVC PRE REF: 87.4 %
FVC REF: 2.77
IVC PRE: 2.35 L (ref 2.08–3.46)
IVC SINGLE BREATH LLN: 2.08
IVC SINGLE BREATH PRE REF: 84.8 %
IVC SINGLE BREATH REF: 2.77
PEF LLN: 4.18
PEF PRE REF: 93.8 %
PEF REF: 5.73
PRE DLCO: 14.35 ML/(MIN*MMHG) (ref 14.87–26.34)
PRE FEF 25 75: 1.95 L/S (ref 1.01–3.03)
PRE FET 100: 7.6 SEC
PRE FEV1 FVC: 79.34 % (ref 67.04–91.83)
PRE FEV1: 1.92 L (ref 1.64–2.74)
PRE FRC N2: 1.33 L
PRE FVC: 2.42 L (ref 2.08–3.46)
PRE PEF: 5.38 L/S (ref 4.18–7.29)
RVN2 LLN: 1.22
RVN2 PRE REF: 35.6 %
RVN2 PRE: 0.64 L (ref 1.22–2.37)
RVN2 REF: 1.8
RVN2TLCN2 LLN: 30.45
RVN2TLCN2 PRE REF: 56.7 %
RVN2TLCN2 PRE: 22.7 % (ref 30.45–49.63)
RVN2TLCN2 REF: 40.04
TLCN2 LLN: 3.45
TLCN2 PRE REF: 63.5 %
TLCN2 PRE: 2.82 L (ref 3.45–5.43)
TLCN2 REF: 4.44
VA PRE: 3.38 L (ref 4.29–4.29)
VA SINGLE BREATH LLN: 4.29
VA SINGLE BREATH PRE REF: 78.7 %
VA SINGLE BREATH REF: 4.29
VCMAXN2 LLN: 2.08
VCMAXN2 PRE REF: 78.7 %
VCMAXN2 PRE: 2.18 L (ref 2.08–3.46)
VCMAXN2 REF: 2.77

## 2022-08-19 PROCEDURE — 97110 THERAPEUTIC EXERCISES: CPT | Mod: PN

## 2022-08-19 PROCEDURE — 97140 MANUAL THERAPY 1/> REGIONS: CPT | Mod: PN

## 2022-08-19 PROCEDURE — 97530 THERAPEUTIC ACTIVITIES: CPT | Mod: PN

## 2022-08-19 PROCEDURE — 97018 PARAFFIN BATH THERAPY: CPT | Mod: PN

## 2022-08-19 NOTE — PATIENT INSTRUCTIONS
[facility], [facility]  Arthritis Conservative Management  HOME CARE PROGRAM                                 Joint Protection:  Use larger joints and muscles when possible  Stop activities before the point of discomfort  Decrease activities that cause pain that lasts more than 2 hours  Avoid activities that put strain on painful or stiff joints  Avoid a tight or prolonged grasp on objects  Balance Rest and Activity:  Rest before exhaustion  Take frequent, short breaks  Avoid activities that cannot be stopped  Avoid staying in one position for a long time  Alternate heavy and light activities  Take more breaks when inflammation is active  Allow extra time for activities--avoid rushing  Plan your day ahead of time  Eliminate unnecessary activities  Reduce the effort:  Avoid excessive loads. Dont be afraid to ask for help. Use appliances to make tasks easier.  Keep items near (i.e. keeping commonly used appliances on the counter)  Use prepared foods as needed.  Avoid low chairs  Maintain a healthy body weight  Freeze leftovers for an easy meal later  Sit while working when possible  Avoid positions of deformity:  Rheumatoid Arthritis (RA): avoid the use of the hands that push the fingers ulnarly, such as wringing a cloth or opening tight jars.   Osteoarthritis (OA): Avoid stress to the CMC joint, such as sustained pinching.      Helpful Tips: slide objects; use your palms instead of fingers, keeps heavy items close to your body, use built up handles, look for lightweight options, use wheels to roll objects, use scissors to open packages, use lever type handles, pizza cutters for cutting, electrical toothbrush, avoid small buttons or use Velcro, use pumps for soaps/shampoos, etc.                                                         [facility]  HOME EXERCISE PROGRAM     PERFORM EXERCISES 1 TO 3 SETS OF 10, 4-6 TIMES PER DAY; ALWAYS PAIN-FREE. DON'T LET  THE PICTURES PRESSURE YOU TO PUSH THROUGH PAINFUL RANGES.                  PERFORM EXERCISES 3 SETS OF 10, 3 TIMES PER DAY; ALWAYS PAIN-FREE. DON'T LET THE PICTURES PRESSURE YOU TO PUSH THROUGH PAINFUL RANGES.    Tendon Glides and Joint Blocking          Start at position A and move through each position slowly attempting to achieve full glide.  A-E is ONE repetition.                                 PERFORM STRENGTHENING EXERCISES EVERY OTHER DAY; AND ALWAYS PAIN-FREE    FOR THE FOLLOWING.....PLACE THE LOOPS IN THE BAND ABOVE YOUR WRISTS IF PAIN IN WRIST/HANDS RESULT FROM HOLDING THE BAND IN YOUR HAND.
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
## 2022-08-22 ENCOUNTER — SPECIALTY PHARMACY (OUTPATIENT)
Dept: PHARMACY | Facility: CLINIC | Age: 62
End: 2022-08-22
Payer: MEDICARE

## 2022-08-22 NOTE — TELEPHONE ENCOUNTER
Outgoing refill call regarding ivent/refill of enbrel. Pt said she has one pen for tomorrow's injection. She was at [facility] and said she will call [facility] back later.

## 2022-08-23 ENCOUNTER — CLINICAL SUPPORT (OUTPATIENT)
Dept: REHABILITATION | Facility: HOSPITAL | Age: 62
End: 2022-08-23
Payer: MEDICARE

## 2022-08-23 DIAGNOSIS — M21.70 LEG LENGTH DISCREPANCY: ICD-10-CM

## 2022-08-23 DIAGNOSIS — R26.9 ABNORMAL GAIT: Primary | ICD-10-CM

## 2022-08-23 PROCEDURE — 97110 THERAPEUTIC EXERCISES: CPT | Mod: PN
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
## 2022-09-20 ENCOUNTER — OFFICE VISIT (OUTPATIENT)
Dept: GASTROENTEROLOGY | Facility: CLINIC | Age: 62
End: 2022-09-20
Attending: FAMILY MEDICINE
Payer: MEDICARE

## 2022-09-20 ENCOUNTER — SPECIALTY PHARMACY (OUTPATIENT)
Dept: PHARMACY | Facility: CLINIC | Age: 62
End: 2022-09-20
Payer: MEDICARE

## 2022-09-20 VITALS — WEIGHT: 143.06 LBS | HEIGHT: 61 IN | BODY MASS INDEX: 27.01 KG/M2

## 2022-09-20 DIAGNOSIS — R10.12 LEFT UPPER QUADRANT ABDOMINAL PAIN: Primary | ICD-10-CM

## 2022-09-20 DIAGNOSIS — R05.9 COUGH: ICD-10-CM

## 2022-09-20 DIAGNOSIS — R09.A2 GLOBUS SENSATION: ICD-10-CM

## 2022-09-20 PROCEDURE — 99999 PR PBB SHADOW E&M-EST. PATIENT-LVL III: CPT | Mod: PBBFAC,,, | Performed by: NURSE PRACTITIONER

## 2022-09-20 PROCEDURE — 3008F PR BODY MASS INDEX (BMI) DOCUMENTED: ICD-10-PCS | Mod: CPTII,S$GLB,, | Performed by: NURSE PRACTITIONER

## 2022-09-20 PROCEDURE — 3008F BODY MASS INDEX DOCD: CPT | Mod: CPTII,S$GLB,, | Performed by: NURSE PRACTITIONER

## 2022-09-20 PROCEDURE — 99214 PR OFFICE/OUTPT VISIT, EST, LEVL IV, 30-39 MIN: ICD-10-PCS | Mod: S$GLB,,, | Performed by: NURSE PRACTITIONER

## 2022-09-20 PROCEDURE — 99999 PR PBB SHADOW E&M-EST. PATIENT-LVL III: ICD-10-PCS | Mod: PBBFAC,,, | Performed by: NURSE PRACTITIONER

## 2022-09-20 PROCEDURE — 99214 OFFICE O/P EST MOD 30 MIN: CPT | Mod: S$GLB,,, | Performed by: NURSE PRACTITIONER

## 2022-09-20 PROCEDURE — 1159F MED LIST DOCD IN RCRD: CPT | Mod: CPTII,S$GLB,, | Performed by: NURSE PRACTITIONER

## 2022-09-20 PROCEDURE — 4010F PR ACE/ARB THEARPY RXD/TAKEN: ICD-10-PCS | Mod: CPTII,S$GLB,, | Performed by: NURSE PRACTITIONER

## 2022-09-20 PROCEDURE — 1159F PR MEDICATION LIST DOCUMENTED IN MEDICAL RECORD: ICD-10-PCS | Mod: CPTII,S$GLB,, | Performed by: NURSE PRACTITIONER

## 2022-09-20 PROCEDURE — 4010F ACE/ARB THERAPY RXD/TAKEN: CPT | Mod: CPTII,S$GLB,, | Performed by: NURSE PRACTITIONER

## 2022-09-20 RX ORDER — PANTOPRAZOLE SODIUM 40 MG/1
40 TABLET, DELAYED RELEASE ORAL DAILY
Qty: 30 TABLET | Refills: 3 | Status: SHIPPED | OUTPATIENT
Start: 2022-09-20 | End: 2023-05-16

## 2022-09-20 NOTE — PATIENT INSTRUCTIONS
Take pantoprazole once a day 30 minutes before first meal of the day.     Take Miralax as needed.         EGD Instructions    [facility] [address]  Clinic Office [phone]  Endoscopy Lab [phone]    You are scheduled for an EGD with Dr. St   on 11/29/2022  at [facility] in [place].    Check in at the Hospital -1st floor, Information desk.   Call [phone] to reschedule.    You cannot have anything to eat or drink after Midnight. You can brush your teeth with a sip of water.     An adult friend/family member must come with you to drive you home.  You cannot drive, take a taxi, Uber/Lyft or bus to leave the Endoscopy Center alone.  If you do not have someone to drive you home, your test will be cancelled.     Please follow the directions of your doctor if you take any pills that thin your blood. If you take these meds: Aggrenox, Brilinta, Effient, Eliquis, Lovenox, Plavix, Pletal, Pradaxa, Ticilid, Xarelto or Coumadin, let the doctor's office know.    DON'T: On the morning of the test do not take insulin or pills for diabetes.     DO: On the morning of the test, do take any pills for blood pressure, heart, anti-rejection and or seizures with a small sip of water. Bring any inhalers with you.    A Covid test is required 72 hours before the procedure. The test is not needed with proof of vaccination > 2 weeks or previous Covid infection.    Leave all valuables and jewelry at home. You will be at the hospital for 2-4 hours.    Call the Endoscopy department at [phone] with any questions about your procedure.    Thank you for choosing [facility].

## 2022-09-20 NOTE — TELEPHONE ENCOUNTER
Outgoing call regarding enbrel refill; pt hung up the phone without having the refill done; pending out 2 days

## 2022-09-20 NOTE — PROGRESS NOTES
GASTROENTEROLOGY CLINIC NOTE    Chief Complaint: The primary encounter diagnosis was Left upper quadrant abdominal pain. Diagnoses of Cough and Globus sensation were also pertinent to this visit.  Referring provider/PCP: Adrián Cade MD    HPI:  Silvia Cervantes is a 61 y.o. female who is a new patient to me with a PMH prep significant for H pylori, colon polyps, cough, sarcoidosis. She is here today to establish care for cough, abdominal pain, globus sensation.  These are not new problems as patient reports it has happened in the past but do not occur frequently.  Her primary complaint today is that she experienced an episode where she felt something was stuck in her throat and could not catch her breath.  States it improved over time.  Does not seem to be related to eating; was sitting up in bed.  Says this previously happened in 2017.  Also reports she will experience dryness in her throat and episodes coughing/tickle in her throat.  It does not seem to be triggered by anything but cough drops help.  Additionally she reports a left upper quadrant discomfort after eating with accompanied belching.  She denies dysphagia but does also report that crackers sometimes gets stuck in her throat.  No nocturnal symptoms. Denies pyrosis, reflux, coughing or choking with eating.  She is not currently taking a PPI.  Denies any changes in bowel habits but does have episodes of constipation and feeling of incomplete emptying.      Of note patient does have a history of H pylori and was treated with triple therapy (clarithromycin, amoxicillin, and omeprazole)    Treatments Tried:  Cough drops  NSAIDs: No  Anticoagulation or Antiplatelet: No      Prior Upper Endoscopy: 6/2015  Impression:           - Erosive gastritis. Biopsied.                         - Small hiatal hernia.     Recommendation:       - Await pathology results.                         - Discharge patient to home.                         - Continue Prilosec  (omeprazole) daily x 6 more                         weeks.     Pathology:  STOMACH, RANDOM (BIOPSY):   Severe chronic gastritis with mild activity   Helicobacter organisms present Mild oxyntic gland atrophy   No intestinal metaplasia, dysplasia, or malignancy    Prior Colonoscopy: 5/2019 with Dr. Woodall  Impression:           - One 5 mm polyp in the sigmoid colon, removed with                         a cold snare. Resected and retrieved.                         - The examination was otherwise normal on direct                         and retroflexion views.     Recommendation:       - Discharge patient to home (via wheelchair).                          - Resume previous diet.                         - Continue present medications.                         - Await pathology results.                         - Repeat colonoscopy date to be determined after                         pending pathology results are reviewed for                         surveillance.     Pathology:  Colon, sigmoid polyp (biopsy): Tubular adenoma     Family h/o Colon Cancer: No  Family h/o Crohn's Disease or Ulcerative Colitis: No  Family h/o Celiac Sprue: No  Abdominal Surgeries: No    Review of Systems   Constitutional:  Negative for weight loss.   HENT:  Negative for sore throat.    Eyes:  Negative for blurred vision.   Respiratory:  Positive for cough.    Cardiovascular:  Negative for chest pain.   Gastrointestinal:  Positive for abdominal pain (LUQ discomfort). Negative for blood in stool, constipation, diarrhea, heartburn, melena, nausea and vomiting.        Belching   Genitourinary:  Negative for dysuria.   Musculoskeletal:  Negative for myalgias.   Skin:  Negative for rash.   Neurological:  Negative for headaches.   Endo/Heme/Allergies:  Negative for environmental allergies.   Psychiatric/Behavioral:  Negative for suicidal ideas. The patient is not nervous/anxious.      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    Past Surgical History: has a past surgical history that includes  section; Tubal ligation; Hysterectomy; Colonoscopy (N/A, 2019); Sinus surgery; Synovectomy of wrist (Right, 2022); Excision of nodule (Right, 2022); Surgical removal of distal ulna (Right, 2022); and Injection of steroid (Left, 2022).    Family History:family history includes Cataracts in her father and mother; Diabetes in her father and sister; Heart disease in her father; Hyperlipidemia in her father; Hypertension in her brother, father, and mother; No Known Problems in her daughter and son; Stroke in her brother.    Allergies: Review of patient's allergies indicates:  No Known Allergies    Social History: reports that she has never smoked. She has never used smokeless tobacco. She reports current alcohol use. She reports that she does not currently use drugs.    Home medications:   Current Outpatient Medications on File Prior to Visit   Medication Sig Dispense Refill    amLODIPine (NORVASC) 5 MG tablet Take 1 tablet (5 mg total) by mouth once daily. 90 tablet 4    aspirin (ECOTRIN) 81 MG EC tablet Take 81 mg by mouth once daily.      atorvastatin (LIPITOR) 10 MG tablet Take 1 tablet (10 mg total) by mouth once daily. 90 tablet 3    chlorhexidine (PERIDEX) 0.12 % solution Use 15-20 mL to rinse mouth twice daily for 2 minutes, spit out and do not rinse mouth with water after. 473 mL 3    cyanocobalamin, vitamin B-12, (VITAMIN B-12 ORAL) Take by mouth.      cycloSPORINE (RESTASIS) 0.05 % ophthalmic  emulsion Apply 1 drop into both eyes twice a day 180 vial 3    cycloSPORINE (RESTASIS) 0.05 % ophthalmic emulsion Instill 1 drop Both Eyes twice a day 180 each 6    desoximetasone (TOPICORT) 0.25 % cream Apply to affected area every other night x 1 month 60 g 1    ergocalciferol (ERGOCALCIFEROL) 50,000 unit Cap Take 1 capsule (50,000 Units total) by mouth every 7 days. 12 capsule 2    etanercept (ENBREL SURECLICK) 50 mg/mL (1 mL) Inject 1 mL (50 mg total) into the skin once a week. 4 mL 11    fluticasone propionate (FLONASE) 50 mcg/actuation nasal spray 1 spray (50 mcg total) by Each Nostril route once daily. 16 g 5    folic acid (FOLVITE) 1 MG tablet Take 4 tablets (4 mg total) by mouth once daily. 120 tablet 11    gabapentin (NEURONTIN) 300 MG capsule Take 1 capsule (300 mg total) by mouth 2 (two) times daily. 180 capsule 1    HYDROcodone-acetaminophen (NORCO) 5-325 mg per tablet Take 1 tablet by mouth every 6 (six) hours as needed for Pain. 25 tablet 0    hydrOXYzine HCL (ATARAX) 25 MG tablet Take 1 tablet (25 mg total) by mouth every evening. 30 tablet 1    losartan (COZAAR) 100 MG tablet Take 1 tablet (100 mg total) by mouth once daily. 90 tablet 3    metoprolol succinate (TOPROL-XL) 25 MG 24 hr tablet Take 1 tablet (25 mg total) by mouth every evening. 90 tablet 4    nystatin (MYCOSTATIN) powder Apply to affected area every morning as needed for flare under breasts (Patient taking differently: Apply to affected area every morning as needed for flare under breasts) 120 g 6    promethazine (PHENERGAN) 25 MG tablet Take 1 tablet (25 mg total) by mouth every 6 (six) hours as needed for Nausea. 25 tablet 0    PROTOPIC 0.1 % ointment AAA bid 100 g 2    ruxolitinib (OPZELURA) 1.5 % Crea aaa bid 60 g 2    triamcinolone acetonide 0.025% (KENALOG) 0.025 % cream Apply to affected area every day to twice a day to neck . Not more than 1-2 weeks straight in same location 80 g 1    triamcinolone acetonide 0.1% (KENALOG) 0.1  "% ointment Apply topically 2 (two) times daily. For 2 weeks 15 g 1    ZINC ORAL Take by mouth.      azelastine (ASTELIN) 137 mcg (0.1 %) nasal spray spray 1 spray (137 mcg total) by Nasal route 2 (two) times daily. (Patient not taking: Reported on 8/9/2022) 30 mL 3    fluticasone propion-salmeterol 115-21 mcg/dose (ADVAIR HFA) 115-21 mcg/actuation HFAA inhaler Inhale 2 puffs into the lungs every 12 (twelve) hours. 12 g 11    leflunomide (ARAVA) 20 MG Tab Take 1 tablet (20 mg total) by mouth once daily. 90 tablet 3    [DISCONTINUED] irbesartan (AVAPRO) 150 MG tablet Take 1 tablet (150 mg total) by mouth every evening. 90 tablet 3     No current facility-administered medications on file prior to visit.       Vital signs:  Ht 5' 1" (1.549 m)   Wt 64.9 kg (143 lb 1.3 oz)   LMP  (LMP Unknown)   BMI 27.03 kg/m²     Physical Exam  Vitals reviewed.   Constitutional:       General: She is not in acute distress.     Appearance: Normal appearance. She is not ill-appearing.   HENT:      Head: Normocephalic.   Cardiovascular:      Rate and Rhythm: Normal rate and regular rhythm.      Heart sounds: Normal heart sounds. No murmur heard.  Pulmonary:      Effort: Pulmonary effort is normal. No respiratory distress.      Breath sounds: Normal breath sounds.   Chest:      Chest wall: No tenderness.   Abdominal:      General: Bowel sounds are normal. There is no distension.      Palpations: Abdomen is soft.      Tenderness: There is no abdominal tenderness. Negative signs include Munoz's sign.      Hernia: No hernia is present.   Skin:     General: Skin is warm.   Neurological:      Mental Status: She is alert and oriented to person, place, and time.   Psychiatric:         Mood and Affect: Mood normal.         Behavior: Behavior normal.       Routine labs:  Lab Results   Component Value Date    WBC 7.05 03/16/2022    WBC 7.05 03/16/2022    HGB 13.4 03/16/2022    HGB 13.4 03/16/2022    HCT 43.8 03/16/2022    HCT 43.8 03/16/2022    "  (H) 03/16/2022     (H) 03/16/2022     03/16/2022     03/16/2022     No results found for: INR  No results found for: IRON, FERRITIN, TIBC, FESATURATED  Lab Results   Component Value Date     03/16/2022     03/16/2022    K 5.0 03/16/2022    K 5.0 03/16/2022     03/16/2022     03/16/2022    CO2 29 03/16/2022    CO2 29 03/16/2022    BUN 14 03/16/2022    BUN 14 03/16/2022    CREATININE 0.7 03/16/2022    CREATININE 0.7 03/16/2022     Lab Results   Component Value Date    ALBUMIN 4.1 03/16/2022    ALBUMIN 4.1 03/16/2022    ALT 26 03/16/2022    ALT 26 03/16/2022    AST 21 03/16/2022    AST 21 03/16/2022    ALKPHOS 87 03/16/2022    ALKPHOS 87 03/16/2022    BILITOT 0.3 03/16/2022    BILITOT 0.3 03/16/2022     No results found for: GLUCOSE  Lab Results   Component Value Date    TSH 2.233 11/11/2020     Lab Results   Component Value Date    CALCIUM 10.3 03/16/2022    CALCIUM 10.3 03/16/2022       Imaging:      I have reviewed prior labs, imaging, and notes.      Assessment:  1. Left upper quadrant abdominal pain    2. Cough    3. Globus sensation        Plan:  Orders Placed This Encounter    pantoprazole (PROTONIX) 40 MG tablet    Case Request Endoscopy: EGD (ESOPHAGOGASTRODUODENOSCOPY)     EGD  Consider biopsy for h.pylori as patient with previous history  Trial Protonix 40mg daily for cough/abdominal discomfort.     Cough may be related to sarcoidosis. Continue to follow up with pulmonology as scheduled.     Plan of care discussed with patient who is in agreement and verbalized understanding.     I have explained the planned procedures to the patient.The risks, benefits and alternatives of the procedure were also explained in detail. Patient verbalized understanding, all questions were answered. The patient agrees to proceed as planned    Follow Up: As Needed Pending Above Workup          Ofelia Malik, APRN,FNP-BC  [facility]/  [place]

## 2022-09-21 ENCOUNTER — OFFICE VISIT (OUTPATIENT)
Dept: FAMILY MEDICINE | Facility: CLINIC | Age: 62
End: 2022-09-21
Attending: FAMILY MEDICINE
Payer: MEDICARE

## 2022-09-21 VITALS
TEMPERATURE: 98 F | HEIGHT: 61 IN | BODY MASS INDEX: 26.98 KG/M2 | OXYGEN SATURATION: 98 % | DIASTOLIC BLOOD PRESSURE: 87 MMHG | SYSTOLIC BLOOD PRESSURE: 139 MMHG | WEIGHT: 142.88 LBS | HEART RATE: 80 BPM

## 2022-09-21 DIAGNOSIS — M05.9 RHEUMATOID ARTHRITIS WITH POSITIVE RHEUMATOID FACTOR, INVOLVING UNSPECIFIED SITE: ICD-10-CM

## 2022-09-21 DIAGNOSIS — R79.9 ABNORMAL FINDING OF BLOOD CHEMISTRY, UNSPECIFIED: ICD-10-CM

## 2022-09-21 DIAGNOSIS — Z12.31 ENCOUNTER FOR SCREENING MAMMOGRAM FOR BREAST CANCER: ICD-10-CM

## 2022-09-21 DIAGNOSIS — E78.2 MIXED HYPERLIPIDEMIA: ICD-10-CM

## 2022-09-21 DIAGNOSIS — F33.9 MAJOR DEPRESSION, RECURRENT, CHRONIC: ICD-10-CM

## 2022-09-21 DIAGNOSIS — I10 PRIMARY HYPERTENSION: Primary | ICD-10-CM

## 2022-09-21 DIAGNOSIS — L80 VITILIGO: ICD-10-CM

## 2022-09-21 PROCEDURE — 99214 OFFICE O/P EST MOD 30 MIN: CPT | Mod: S$GLB,,, | Performed by: FAMILY MEDICINE

## 2022-09-21 PROCEDURE — 1159F PR MEDICATION LIST DOCUMENTED IN MEDICAL RECORD: ICD-10-PCS | Mod: CPTII,S$GLB,, | Performed by: FAMILY MEDICINE

## 2022-09-21 PROCEDURE — 3075F PR MOST RECENT SYSTOLIC BLOOD PRESS GE 130-139MM HG: ICD-10-PCS | Mod: CPTII,S$GLB,, | Performed by: FAMILY MEDICINE

## 2022-09-21 PROCEDURE — 1160F RVW MEDS BY RX/DR IN RCRD: CPT | Mod: CPTII,S$GLB,, | Performed by: FAMILY MEDICINE

## 2022-09-21 PROCEDURE — 3008F BODY MASS INDEX DOCD: CPT | Mod: CPTII,S$GLB,, | Performed by: FAMILY MEDICINE

## 2022-09-21 PROCEDURE — 3079F PR MOST RECENT DIASTOLIC BLOOD PRESSURE 80-89 MM HG: ICD-10-PCS | Mod: CPTII,S$GLB,, | Performed by: FAMILY MEDICINE

## 2022-09-21 PROCEDURE — 99999 PR PBB SHADOW E&M-EST. PATIENT-LVL III: ICD-10-PCS | Mod: PBBFAC,,, | Performed by: FAMILY MEDICINE

## 2022-09-21 PROCEDURE — 99999 PR PBB SHADOW E&M-EST. PATIENT-LVL III: CPT | Mod: PBBFAC,,, | Performed by: FAMILY MEDICINE

## 2022-09-21 PROCEDURE — 3008F PR BODY MASS INDEX (BMI) DOCUMENTED: ICD-10-PCS | Mod: CPTII,S$GLB,, | Performed by: FAMILY MEDICINE

## 2022-09-21 PROCEDURE — 1159F MED LIST DOCD IN RCRD: CPT | Mod: CPTII,S$GLB,, | Performed by: FAMILY MEDICINE

## 2022-09-21 PROCEDURE — 4010F ACE/ARB THERAPY RXD/TAKEN: CPT | Mod: CPTII,S$GLB,, | Performed by: FAMILY MEDICINE

## 2022-09-21 PROCEDURE — 99214 PR OFFICE/OUTPT VISIT, EST, LEVL IV, 30-39 MIN: ICD-10-PCS | Mod: S$GLB,,, | Performed by: FAMILY MEDICINE

## 2022-09-21 PROCEDURE — 3075F SYST BP GE 130 - 139MM HG: CPT | Mod: CPTII,S$GLB,, | Performed by: FAMILY MEDICINE

## 2022-09-21 PROCEDURE — 4010F PR ACE/ARB THEARPY RXD/TAKEN: ICD-10-PCS | Mod: CPTII,S$GLB,, | Performed by: FAMILY MEDICINE

## 2022-09-21 PROCEDURE — 3079F DIAST BP 80-89 MM HG: CPT | Mod: CPTII,S$GLB,, | Performed by: FAMILY MEDICINE

## 2022-09-21 PROCEDURE — 1160F PR REVIEW ALL MEDS BY PRESCRIBER/CLIN PHARMACIST DOCUMENTED: ICD-10-PCS | Mod: CPTII,S$GLB,, | Performed by: FAMILY MEDICINE

## 2022-09-21 NOTE — PROGRESS NOTES
Subjective:       Patient ID: Silvia Cervantes is a 61 y.o. female.    Chief Complaint: No chief complaint on file.    61 yr old pleasant  female with depression, allergies, vitiligo, HTN, HLD, sarcoidosis, Rheumatoid arthritis, Erosive gastritis, and other co morbidities presents today for her 3 month follow up.    Chronic low back pain. Onset 6 months ago and gradually worsening - left lower back- does not radiate and no neurological symptoms. No saddle anesthesia or bladder/bowel problems. Details as follows -      HTN - controlled - on Losartan-HCT and Norvasc 5 - compliant - no side effects     Sarcoidosis and lung nodules - follwos pulmonology - need new one as her previous specialist is leaving [facility]      Major depression - uncontrolled - not on meds - would like to start one - given zoloft - she will start from today - - no SI/HI    Vertigo - much better - on vertin as needed       HLD - diet controlled -    LDLCALC                  86.2                12/22/2021                 RA - on Enbrel shots - follows Rheumatology - stable      Gastritis - stable - on PPI as needed - no side effects      History as below - reviewed       HM  -labs UTD  -vaccines UTD    Follow-up  Associated symptoms include myalgias, neck pain, numbness and weakness. Pertinent negatives include no arthralgias, chest pain, congestion, diaphoresis, headaches, nausea or sore throat.   Hypertension  This is a chronic problem. The current episode started more than 1 year ago. The problem has been gradually improving since onset. The problem is controlled. Associated symptoms include neck pain. Pertinent negatives include no chest pain, headaches, malaise/fatigue, palpitations or shortness of breath. There are no associated agents to hypertension. Risk factors for coronary artery disease include dyslipidemia and post-menopausal state. Past treatments include angiotensin blockers and diuretics. The current treatment provides  significant improvement. There are no compliance problems.  There is no history of angina, CAD/MI, CVA, left ventricular hypertrophy, PVD or retinopathy. There is no history of chronic renal disease, coarctation of the aorta, hypercortisolism, hyperparathyroidism, a hypertension causing med or sleep apnea.   Hyperlipidemia  This is a chronic problem. The current episode started more than 1 year ago. The problem is controlled. Recent lipid tests were reviewed and are normal. She has no history of chronic renal disease, diabetes, hypothyroidism or liver disease. There are no known factors aggravating her hyperlipidemia. Associated symptoms include myalgias. Pertinent negatives include no chest pain, focal sensory loss, focal weakness, leg pain or shortness of breath. She is currently on no antihyperlipidemic treatment. The current treatment provides mild improvement of lipids. There are no compliance problems.  Risk factors for coronary artery disease include dyslipidemia, hypertension and post-menopausal.   Back Pain  This is a chronic problem. The current episode started more than 1 month ago. The problem occurs constantly. The problem is unchanged. The pain is present in the sacro-iliac and lumbar spine. The quality of the pain is described as aching. The pain does not radiate. The symptoms are aggravated by bending, sitting and twisting. Associated symptoms include numbness and weakness. Pertinent negatives include no chest pain, dysuria, headaches, leg pain, perianal numbness or weight loss. She has tried nothing for the symptoms. The treatment provided no relief.   Review of Systems   Constitutional: Negative.  Negative for activity change, diaphoresis, malaise/fatigue, unexpected weight change and weight loss.   HENT: Negative.  Negative for nasal congestion, ear discharge, hearing loss, rhinorrhea, sore throat and voice change.    Eyes: Negative.  Negative for pain, discharge and visual disturbance.    Respiratory: Negative.  Negative for chest tightness, shortness of breath and wheezing.    Cardiovascular: Negative.  Negative for chest pain and palpitations.   Gastrointestinal: Negative.  Negative for abdominal distention, anal bleeding, constipation and nausea.   Endocrine: Negative.  Negative for cold intolerance, polydipsia and polyuria.   Genitourinary: Negative.  Negative for decreased urine volume, difficulty urinating, dysuria, frequency, menstrual problem and vaginal pain.   Musculoskeletal:  Positive for back pain, myalgias and neck pain. Negative for arthralgias, gait problem and leg pain.   Integumentary:  Negative for color change, pallor and wound. Negative.   Allergic/Immunologic: Negative.  Negative for environmental allergies and immunocompromised state.   Neurological:  Positive for weakness and numbness. Negative for dizziness, tremors, focal weakness, seizures, speech difficulty and headaches.   Hematological: Negative.  Negative for adenopathy. Does not bruise/bleed easily.   Psychiatric/Behavioral: Negative.  Negative for agitation, confusion, decreased concentration, hallucinations, self-injury and suicidal ideas. The patient is not nervous/anxious.        PMH/PSH/FH/SH/MED/ALLERGY reviewed    Objective:       Vitals:    09/21/22 1045   BP: 139/87   Pulse: 80   Temp: 98 °F (36.7 °C)       Physical Exam  Constitutional:       General: She is not in acute distress.     Appearance: She is well-developed. She is not diaphoretic.   HENT:      Head: Normocephalic and atraumatic.      Right Ear: External ear normal.      Left Ear: External ear normal.      Nose: Nose normal.      Mouth/Throat:      Pharynx: No oropharyngeal exudate.   Eyes:      General: No scleral icterus.        Right eye: No discharge.         Left eye: No discharge.      Conjunctiva/sclera: Conjunctivae normal.      Pupils: Pupils are equal, round, and reactive to light.   Neck:      Thyroid: No thyromegaly.      Vascular: No  JVD.      Trachea: No tracheal deviation.   Cardiovascular:      Rate and Rhythm: Normal rate and regular rhythm.      Heart sounds: Normal heart sounds. No murmur heard.    No friction rub. No gallop.   Pulmonary:      Effort: Pulmonary effort is normal.      Breath sounds: Normal breath sounds. No stridor. No wheezing or rales.   Chest:      Chest wall: No tenderness.   Abdominal:      General: Bowel sounds are normal. There is no distension.      Palpations: Abdomen is soft. There is no mass.      Tenderness: There is no abdominal tenderness. There is no guarding or rebound.      Hernia: No hernia is present.   Musculoskeletal:         General: No tenderness. Normal range of motion.      Cervical back: Normal range of motion and neck supple.   Lymphadenopathy:      Cervical: No cervical adenopathy.   Skin:     General: Skin is warm and dry.      Coloration: Skin is not pale.      Findings: No erythema or rash.   Neurological:      Mental Status: She is alert and oriented to person, place, and time.      Cranial Nerves: No cranial nerve deficit.      Motor: No abnormal muscle tone.      Coordination: Coordination normal.      Deep Tendon Reflexes: Reflexes are normal and symmetric. Reflexes normal.   Psychiatric:         Behavior: Behavior normal.         Thought Content: Thought content normal.         Judgment: Judgment normal.       Assessment:       Problem List Items Addressed This Visit       Vitiligo    Relevant Orders    TSH    Hemoglobin A1C    Rheumatoid arthritis with positive rheumatoid factor    Relevant Orders    TSH    Hemoglobin A1C    Mixed hyperlipidemia    Relevant Orders    TSH    Hemoglobin A1C    Major depression, recurrent, chronic    Relevant Orders    TSH    Hemoglobin A1C    Hypertension - Primary    Relevant Orders    TSH    Hemoglobin A1C     Other Visit Diagnoses       Encounter for screening mammogram for breast cancer        Relevant Orders    Mammo Digital Screening Bilat w/ [facility]     Abnormal finding of blood chemistry, unspecified        Relevant Orders    Hemoglobin A1C            Plan:       Diagnoses and all orders for this visit:    Primary hypertension  -     TSH; Future  -     Hemoglobin A1C; Future    Encounter for screening mammogram for breast cancer  -     Mammo Digital Screening Bilat w/ [facility]; Future    Mixed hyperlipidemia  -     TSH; Future  -     Hemoglobin A1C; Future    Major depression, recurrent, chronic  -     TSH; Future  -     Hemoglobin A1C; Future    Rheumatoid arthritis with positive rheumatoid factor, involving unspecified site  -     TSH; Future  -     Hemoglobin A1C; Future    Vitiligo  -     TSH; Future  -     Hemoglobin A1C; Future    Abnormal finding of blood chemistry, unspecified  -     Hemoglobin A1C; Future    HLD  -diet control  -start statin    Left leg pain  -likely chronic injuries  -x rays  -reassurance      HTN  -controlled    RA  -stable  -follow rheumatology    Vertigo  -meclizine prn    Depression  -controlled  -continue zoloft 25 mg daily  -SI/ER precautions given      Spent adequate time in obtaining history and explaining differentials      25 minutes spent during this visit of which greater than 50% devoted to face-face counseling and coordination of care regarding diagnosis and management plan    Follow up in about 3 months (around 12/21/2022), or if symptoms worsen or fail to improve.

## 2022-09-21 NOTE — TELEPHONE ENCOUNTER
Specialty Pharmacy - Refill Coordination    Specialty Medication Orders Linked to Encounter      Flowsheet Row Most Recent Value   Medication #1 etanercept (ENBREL SURECLICK) 50 mg/mL (1 mL) (Order#[id number], Rx#[id number])     New medication reviewed (Protonix)- no significant DDI.  Appropriate to proceed with Enbrel refill.       Refill Questions - Documented Responses      Flowsheet Row Most Recent Value   Patient Availability and HIPAA Verification    Does patient want to proceed with activity? Yes   HIPAA/medical authority confirmed? Yes   Relationship to patient of person spoken to? Self   Refill Screening Questions    Changes to allergies? No   Changes to medications? Yes  [+ pantoprazole]   New conditions since last clinic visit? No   Unplanned office visit, urgent care, ED, or hospital admission in the last 4 weeks? No   How does patient/caregiver feel medication is working? Very good   Financial problems or insurance changes? No   How many doses of your specialty medications were missed in the last 4 weeks? 0   Would patient like to speak to a pharmacist? No   When does the patient need to receive the medication? 09/27/22   Refill Delivery Questions    How will the patient receive the medication? Pickup   When does the patient need to receive the medication? 09/27/22   Expected Copay ($) 0   Is the patient able to afford the medication copay? Yes   Payment Method zero copay   Days supply of Refill 28   Supplies needed? No supplies needed   Refill activity completed? Yes   Refill activity plan Refill scheduled   Shipment/Pickup Date: 09/23/22            Current Outpatient Medications   Medication Sig    amLODIPine (NORVASC) 5 MG tablet Take 1 tablet (5 mg total) by mouth once daily.    aspirin (ECOTRIN) 81 MG EC tablet Take 81 mg by mouth once daily.    atorvastatin (LIPITOR) 10 MG tablet Take 1 tablet (10 mg total) by mouth once daily.    azelastine (ASTELIN) 137 mcg (0.1 %) nasal spray spray 1 spray (137  mcg total) by Nasal route 2 (two) times daily.    chlorhexidine (PERIDEX) 0.12 % solution Use 15-20 mL to rinse mouth twice daily for 2 minutes, spit out and do not rinse mouth with water after.    cyanocobalamin, vitamin B-12, (VITAMIN B-12 ORAL) Take by mouth.    cycloSPORINE (RESTASIS) 0.05 % ophthalmic emulsion Apply 1 drop into both eyes twice a day    cycloSPORINE (RESTASIS) 0.05 % ophthalmic emulsion Instill 1 drop Both Eyes twice a day    desoximetasone (TOPICORT) 0.25 % cream Apply to affected area every other night x 1 month    ergocalciferol (ERGOCALCIFEROL) 50,000 unit Cap Take 1 capsule (50,000 Units total) by mouth every 7 days.    etanercept (ENBREL SURECLICK) 50 mg/mL (1 mL) Inject 1 mL (50 mg total) into the skin once a week.    fluticasone propion-salmeterol 115-21 mcg/dose (ADVAIR HFA) 115-21 mcg/actuation HFAA inhaler Inhale 2 puffs into the lungs every 12 (twelve) hours.    fluticasone propionate (FLONASE) 50 mcg/actuation nasal spray 1 spray (50 mcg total) by Each Nostril route once daily.    folic acid (FOLVITE) 1 MG tablet Take 4 tablets (4 mg total) by mouth once daily.    gabapentin (NEURONTIN) 300 MG capsule Take 1 capsule (300 mg total) by mouth 2 (two) times daily.    HYDROcodone-acetaminophen (NORCO) 5-325 mg per tablet Take 1 tablet by mouth every 6 (six) hours as needed for Pain.    hydrOXYzine HCL (ATARAX) 25 MG tablet Take 1 tablet (25 mg total) by mouth every evening.    leflunomide (ARAVA) 20 MG Tab Take 1 tablet (20 mg total) by mouth once daily.    losartan (COZAAR) 100 MG tablet Take 1 tablet (100 mg total) by mouth once daily.    metoprolol succinate (TOPROL-XL) 25 MG 24 hr tablet Take 1 tablet (25 mg total) by mouth every evening.    nystatin (MYCOSTATIN) powder Apply to affected area every morning as needed for flare under breasts (Patient taking differently: Apply to affected area every morning as needed for flare under breasts)    pantoprazole (PROTONIX) 40 MG tablet Take  1 tablet (40 mg total) by mouth once daily.    promethazine (PHENERGAN) 25 MG tablet Take 1 tablet (25 mg total) by mouth every 6 (six) hours as needed for Nausea.    PROTOPIC 0.1 % ointment AAA bid    ruxolitinib (OPZELURA) 1.5 % Crea aaa bid    triamcinolone acetonide 0.025% (KENALOG) 0.025 % cream Apply to affected area every day to twice a day to neck . Not more than 1-2 weeks straight in same location    triamcinolone acetonide 0.1% (KENALOG) 0.1 % ointment Apply topically 2 (two) times daily. For 2 weeks    ZINC ORAL Take by mouth.   Last reviewed on 9/21/2022 10:45 AM by Adrián Cade MD    Review of patient's allergies indicates:  No Known Allergies Last reviewed on  9/21/2022 10:45 AM by Adrián Cade      Tasks added this encounter   10/17/2022 - Refill Call (Auto Added)  9/24/2022 - Pickup Reminder   Tasks due within next 3 months   No tasks due.     Leda Gould, PharmD  [facility]  [address] [place] [id number]  Phone: [phone]  Fax: [phone]

## 2022-09-22 NOTE — TELEPHONE ENCOUNTER
Incoming call from pt- wants rx delivered instead of pickup. Informed fulfillment and updated in [facility].

## 2022-09-27 ENCOUNTER — TELEPHONE (OUTPATIENT)
Dept: ORTHOPEDICS | Facility: CLINIC | Age: 62
End: 2022-09-27
Payer: MEDICARE

## 2022-09-27 NOTE — TELEPHONE ENCOUNTER
Spoke with pt regarding her appointment with Dr Smith on 9/28.   Advised her that Dr Smith's clinic is over booked for 9/28 and she may be running behind tomorrow.   Pt rescheduled her follow up to 10/19 at 11:30 am    Pt reports she is doing well and will see Dr Smith in 3 weeks.   Pt was grateful to be notified that Dr Smith's clinic is overbooked for tomorrow and given the option to reschedule to another date

## 2022-10-10 ENCOUNTER — PATIENT MESSAGE (OUTPATIENT)
Dept: ADMINISTRATIVE | Facility: HOSPITAL | Age: 62
End: 2022-10-10
Payer: MEDICARE

## 2022-10-13 ENCOUNTER — OFFICE VISIT (OUTPATIENT)
Dept: PAIN MEDICINE | Facility: CLINIC | Age: 62
End: 2022-10-13
Payer: MEDICARE

## 2022-10-13 VITALS
WEIGHT: 143.5 LBS | DIASTOLIC BLOOD PRESSURE: 86 MMHG | RESPIRATION RATE: 16 BRPM | HEIGHT: 61 IN | HEART RATE: 77 BPM | BODY MASS INDEX: 27.09 KG/M2 | SYSTOLIC BLOOD PRESSURE: 148 MMHG

## 2022-10-13 DIAGNOSIS — Z87.820 HISTORY OF TRAUMATIC BRAIN INJURY: ICD-10-CM

## 2022-10-13 DIAGNOSIS — M21.70 LEG LENGTH DISCREPANCY: ICD-10-CM

## 2022-10-13 DIAGNOSIS — R20.0 LEFT LEG NUMBNESS: Primary | ICD-10-CM

## 2022-10-13 DIAGNOSIS — R26.9 ABNORMAL GAIT: ICD-10-CM

## 2022-10-13 PROCEDURE — 99213 PR OFFICE/OUTPT VISIT, EST, LEVL III, 20-29 MIN: ICD-10-PCS | Mod: S$GLB,,, | Performed by: PHYSICAL MEDICINE & REHABILITATION

## 2022-10-13 PROCEDURE — 1159F PR MEDICATION LIST DOCUMENTED IN MEDICAL RECORD: ICD-10-PCS | Mod: CPTII,S$GLB,, | Performed by: PHYSICAL MEDICINE & REHABILITATION

## 2022-10-13 PROCEDURE — 99499 UNLISTED E&M SERVICE: CPT | Mod: S$GLB,,, | Performed by: PHYSICAL MEDICINE & REHABILITATION

## 2022-10-13 PROCEDURE — 99499 RISK ADDL DX/OHS AUDIT: ICD-10-PCS | Mod: S$GLB,,, | Performed by: PHYSICAL MEDICINE & REHABILITATION

## 2022-10-13 PROCEDURE — 3077F PR MOST RECENT SYSTOLIC BLOOD PRESSURE >= 140 MM HG: ICD-10-PCS | Mod: CPTII,S$GLB,, | Performed by: PHYSICAL MEDICINE & REHABILITATION

## 2022-10-13 PROCEDURE — 99999 PR PBB SHADOW E&M-EST. PATIENT-LVL III: ICD-10-PCS | Mod: PBBFAC,,, | Performed by: PHYSICAL MEDICINE & REHABILITATION

## 2022-10-13 PROCEDURE — 99999 PR PBB SHADOW E&M-EST. PATIENT-LVL III: CPT | Mod: PBBFAC,,, | Performed by: PHYSICAL MEDICINE & REHABILITATION

## 2022-10-13 PROCEDURE — 1160F PR REVIEW ALL MEDS BY PRESCRIBER/CLIN PHARMACIST DOCUMENTED: ICD-10-PCS | Mod: CPTII,S$GLB,, | Performed by: PHYSICAL MEDICINE & REHABILITATION

## 2022-10-13 PROCEDURE — 4010F ACE/ARB THERAPY RXD/TAKEN: CPT | Mod: CPTII,S$GLB,, | Performed by: PHYSICAL MEDICINE & REHABILITATION

## 2022-10-13 PROCEDURE — 99213 OFFICE O/P EST LOW 20 MIN: CPT | Mod: S$GLB,,, | Performed by: PHYSICAL MEDICINE & REHABILITATION

## 2022-10-13 PROCEDURE — 1160F RVW MEDS BY RX/DR IN RCRD: CPT | Mod: CPTII,S$GLB,, | Performed by: PHYSICAL MEDICINE & REHABILITATION

## 2022-10-13 PROCEDURE — 3077F SYST BP >= 140 MM HG: CPT | Mod: CPTII,S$GLB,, | Performed by: PHYSICAL MEDICINE & REHABILITATION

## 2022-10-13 PROCEDURE — 3079F DIAST BP 80-89 MM HG: CPT | Mod: CPTII,S$GLB,, | Performed by: PHYSICAL MEDICINE & REHABILITATION

## 2022-10-13 PROCEDURE — 1159F MED LIST DOCD IN RCRD: CPT | Mod: CPTII,S$GLB,, | Performed by: PHYSICAL MEDICINE & REHABILITATION

## 2022-10-13 PROCEDURE — 4010F PR ACE/ARB THEARPY RXD/TAKEN: ICD-10-PCS | Mod: CPTII,S$GLB,, | Performed by: PHYSICAL MEDICINE & REHABILITATION

## 2022-10-13 PROCEDURE — 3079F PR MOST RECENT DIASTOLIC BLOOD PRESSURE 80-89 MM HG: ICD-10-PCS | Mod: CPTII,S$GLB,, | Performed by: PHYSICAL MEDICINE & REHABILITATION
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
## 2022-10-17 ENCOUNTER — PATIENT MESSAGE (OUTPATIENT)
Dept: PHARMACY | Facility: CLINIC | Age: 62
End: 2022-10-17
Payer: MEDICARE

## 2022-10-19 ENCOUNTER — OFFICE VISIT (OUTPATIENT)
Dept: ORTHOPEDICS | Facility: CLINIC | Age: 62
End: 2022-10-19
Payer: MEDICARE

## 2022-10-19 DIAGNOSIS — M06.321 RHEUMATOID NODULE OF RIGHT ELBOW: Primary | ICD-10-CM

## 2022-10-19 DIAGNOSIS — M65.831 EXTENSOR TENOSYNOVITIS OF RIGHT WRIST: ICD-10-CM

## 2022-10-19 PROCEDURE — 99213 OFFICE O/P EST LOW 20 MIN: CPT | Mod: S$GLB,,, | Performed by: ORTHOPAEDIC SURGERY

## 2022-10-19 PROCEDURE — 99999 PR PBB SHADOW E&M-EST. PATIENT-LVL II: ICD-10-PCS | Mod: PBBFAC,,, | Performed by: ORTHOPAEDIC SURGERY

## 2022-10-19 PROCEDURE — 1159F MED LIST DOCD IN RCRD: CPT | Mod: CPTII,S$GLB,, | Performed by: ORTHOPAEDIC SURGERY

## 2022-10-19 PROCEDURE — 1159F PR MEDICATION LIST DOCUMENTED IN MEDICAL RECORD: ICD-10-PCS | Mod: CPTII,S$GLB,, | Performed by: ORTHOPAEDIC SURGERY

## 2022-10-19 PROCEDURE — 99213 PR OFFICE/OUTPT VISIT, EST, LEVL III, 20-29 MIN: ICD-10-PCS | Mod: S$GLB,,, | Performed by: ORTHOPAEDIC SURGERY

## 2022-10-19 PROCEDURE — 1160F PR REVIEW ALL MEDS BY PRESCRIBER/CLIN PHARMACIST DOCUMENTED: ICD-10-PCS | Mod: CPTII,S$GLB,, | Performed by: ORTHOPAEDIC SURGERY

## 2022-10-19 PROCEDURE — 4010F ACE/ARB THERAPY RXD/TAKEN: CPT | Mod: CPTII,S$GLB,, | Performed by: ORTHOPAEDIC SURGERY

## 2022-10-19 PROCEDURE — 1160F RVW MEDS BY RX/DR IN RCRD: CPT | Mod: CPTII,S$GLB,, | Performed by: ORTHOPAEDIC SURGERY

## 2022-10-19 PROCEDURE — 99999 PR PBB SHADOW E&M-EST. PATIENT-LVL II: CPT | Mod: PBBFAC,,, | Performed by: ORTHOPAEDIC SURGERY

## 2022-10-19 PROCEDURE — 4010F PR ACE/ARB THEARPY RXD/TAKEN: ICD-10-PCS | Mod: CPTII,S$GLB,, | Performed by: ORTHOPAEDIC SURGERY

## 2022-10-19 NOTE — PROGRESS NOTES
Silvia Cervantes presents for post-operative evaluation of   Encounter Diagnoses   Name Primary?    Rheumatoid nodule of right elbow Yes    Extensor tenosynovitis of right wrist      The patient is s/p right rheumatoid nodule excision elbow and wrist, extensor tenosynovectomy and osteophyte excision ulna on 6/30/22.  The patient is doing well and has completed therapy for the arm.       PE:    AA&O x 4.  NAD  HEENT:  NCAT, sclera nonicteric  Lungs:  Respirations are equal and unlabored.  CV:  2+ bilateral upper and lower extremity pulses.  MSK: The incision is well healed.  Full wrist and finger motion.  Neurovascularly intact and has 5/5 thenar and intrinsic musculature strength.      A/P: Status post above, doing well  1) Continue with range of motion and strengthening   2) F/U PRN  3) Call with any questions/concerns in the interim        Louise Smith MD    Please be aware that this note has been generated with the assistance of [facility] voice-to-text.  Please excuse any spelling or grammatical errors.
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
## 2022-10-21 ENCOUNTER — SPECIALTY PHARMACY (OUTPATIENT)
Dept: PHARMACY | Facility: CLINIC | Age: 62
End: 2022-10-21
Payer: MEDICARE

## 2022-10-21 NOTE — TELEPHONE ENCOUNTER
Specialty Pharmacy - Refill Coordination    Specialty Medication Orders Linked to Encounter      Flowsheet Row Most Recent Value   Medication #1 etanercept (ENBREL SURECLICK) 50 mg/mL (1 mL) (Order#[id number], Rx#[id number])            Refill Questions - Documented Responses      Flowsheet Row Most Recent Value   Patient Availability and HIPAA Verification    Does patient want to proceed with activity? Yes   HIPAA/medical authority confirmed? Yes   Relationship to patient of person spoken to? Self   Refill Screening Questions    Changes to allergies? No   Changes to medications? No   New conditions since last clinic visit? No   Unplanned office visit, urgent care, ED, or hospital admission in the last 4 weeks? No   How does patient/caregiver feel medication is working? Good   Financial problems or insurance changes? No   How many doses of your specialty medications were missed in the last 4 weeks? 0   Would patient like to speak to a pharmacist? No   When does the patient need to receive the medication? 10/25/22   Refill Delivery Questions    How will the patient receive the medication? Pickup   When does the patient need to receive the medication? 10/25/22   Expected Copay ($) 0   Is the patient able to afford the medication copay? Yes   Payment Method zero copay   Days supply of Refill 28   Supplies needed? No supplies needed   Refill activity completed? Yes   Refill activity plan Refill scheduled   Shipment/Pickup Date: 10/24/22            Current Outpatient Medications   Medication Sig    amLODIPine (NORVASC) 5 MG tablet Take 1 tablet (5 mg total) by mouth once daily.    aspirin (ECOTRIN) 81 MG EC tablet Take 81 mg by mouth once daily.    atorvastatin (LIPITOR) 10 MG tablet Take 1 tablet (10 mg total) by mouth once daily.    azelastine (ASTELIN) 137 mcg (0.1 %) nasal spray spray 1 spray (137 mcg total) by Nasal route 2 (two) times daily.    chlorhexidine (PERIDEX) 0.12 % solution Use 15-20 mL to rinse mouth  twice daily for 2 minutes, spit out and do not rinse mouth with water after. (Patient not taking: Reported on 10/19/2022)    cyanocobalamin, vitamin B-12, (VITAMIN B-12 ORAL) Take by mouth.    cycloSPORINE (RESTASIS) 0.05 % ophthalmic emulsion Apply 1 drop into both eyes twice a day    cycloSPORINE (RESTASIS) 0.05 % ophthalmic emulsion Instill 1 drop Both Eyes twice a day    desoximetasone (TOPICORT) 0.25 % cream Apply to affected area every other night x 1 month    ergocalciferol (ERGOCALCIFEROL) 50,000 unit Cap Take 1 capsule (50,000 Units total) by mouth every 7 days. (Patient not taking: Reported on 10/19/2022)    etanercept (ENBREL SURECLICK) 50 mg/mL (1 mL) Inject 1 mL (50 mg total) into the skin once a week.    fluticasone propion-salmeterol 115-21 mcg/dose (ADVAIR HFA) 115-21 mcg/actuation HFAA inhaler Inhale 2 puffs into the lungs every 12 (twelve) hours.    fluticasone propionate (FLONASE) 50 mcg/actuation nasal spray 1 spray (50 mcg total) by Each Nostril route once daily.    folic acid (FOLVITE) 1 MG tablet Take 4 tablets (4 mg total) by mouth once daily.    gabapentin (NEURONTIN) 300 MG capsule Take 1 capsule (300 mg total) by mouth 2 (two) times daily.    hydrOXYzine HCL (ATARAX) 25 MG tablet Take 1 tablet (25 mg total) by mouth every evening. (Patient not taking: Reported on 10/19/2022)    leflunomide (ARAVA) 20 MG Tab Take 1 tablet (20 mg total) by mouth once daily.    losartan (COZAAR) 100 MG tablet Take 1 tablet (100 mg total) by mouth once daily.    metoprolol succinate (TOPROL-XL) 25 MG 24 hr tablet Take 1 tablet (25 mg total) by mouth every evening.    nystatin (MYCOSTATIN) powder Apply to affected area every morning as needed for flare under breasts (Patient taking differently: Apply to affected area every morning as needed for flare under breasts)    pantoprazole (PROTONIX) 40 MG tablet Take 1 tablet (40 mg total) by mouth once daily.    promethazine (PHENERGAN) 25 MG tablet Take 1 tablet (25  mg total) by mouth every 6 (six) hours as needed for Nausea.    PROTOPIC 0.1 % ointment AAA bid    ruxolitinib (OPZELURA) 1.5 % Crea aaa bid    triamcinolone acetonide 0.025% (KENALOG) 0.025 % cream Apply to affected area every day to twice a day to neck . Not more than 1-2 weeks straight in same location    triamcinolone acetonide 0.1% (KENALOG) 0.1 % ointment Apply topically 2 (two) times daily. For 2 weeks    ZINC ORAL Take by mouth.   Last reviewed on 10/19/2022 11:48 AM by Bhakti Arias MA    Review of patient's allergies indicates:  No Known Allergies Last reviewed on  10/19/2022 11:44 AM by Bhakti Arias      Tasks added this encounter   11/16/2022 - Refill Call (Auto Added)  10/25/2022 - Pickup Reminder   Tasks due within next 3 months   No tasks due.     Leda Meredith, PharmD  [facility]  [address] [place] [id number]  Phone: [phone]  Fax: [phone]

## 2022-10-25 NOTE — TELEPHONE ENCOUNTER
Spoke with pt- her car is broken so she elects to change to delivery tomorrow instead of p/u today.  Reviewed late dose instructions.

## 2022-11-10 ENCOUNTER — HOSPITAL ENCOUNTER (OUTPATIENT)
Dept: RADIOLOGY | Facility: HOSPITAL | Age: 62
Discharge: HOME | End: 2022-11-10
Attending: FAMILY MEDICINE
Payer: MEDICARE

## 2022-11-10 ENCOUNTER — LAB VISIT (OUTPATIENT)
Dept: LAB | Facility: HOSPITAL | Age: 62
End: 2022-11-10
Attending: FAMILY MEDICINE
Payer: MEDICARE

## 2022-11-10 DIAGNOSIS — Z79.899 ENCOUNTER FOR LONG-TERM CURRENT USE OF HIGH RISK MEDICATION: ICD-10-CM

## 2022-11-10 DIAGNOSIS — Z12.31 ENCOUNTER FOR SCREENING MAMMOGRAM FOR BREAST CANCER: ICD-10-CM

## 2022-11-10 PROCEDURE — 86480 TB TEST CELL IMMUN MEASURE: CPT | Performed by: INTERNAL MEDICINE

## 2022-11-12 LAB
GAMMA INTERFERON BACKGROUND BLD IA-ACNC: 0.06 IU/ML
M TB IFN-G CD4+ BCKGRND COR BLD-ACNC: -0.01 IU/ML
MITOGEN IGNF BCKGRD COR BLD-ACNC: 1.19 IU/ML
TB GOLD PLUS: NEGATIVE
TB2 - NIL: -0.02 IU/ML

## 2022-11-21 ENCOUNTER — TELEPHONE (OUTPATIENT)
Dept: ENDOSCOPY | Facility: HOSPITAL | Age: 62
End: 2022-11-21
Payer: MEDICARE

## 2022-11-21 ENCOUNTER — TELEPHONE (OUTPATIENT)
Dept: GASTROENTEROLOGY | Facility: CLINIC | Age: 62
End: 2022-11-21
Payer: MEDICARE

## 2022-11-21 NOTE — TELEPHONE ENCOUNTER
----- Message from Wendy Carbone RN sent at 11/21/2022  8:46 AM CST -----  Regarding: Reschedule  Patient called to reschedule EGD 11/29/2023 scheduled d/t ride cancellation. Daughter who is a nurse can't take off to bring her.  Will check with daughter for January schedule and call back, has number

## 2022-11-21 NOTE — TELEPHONE ENCOUNTER
Patient called to reschedule EGD 11/29/2023 scheduled d/t ride cancellation. Daughter who is a nurse can't take off to bring her.  Will check with daughter for January schedule and call back

## 2022-11-22 ENCOUNTER — SPECIALTY PHARMACY (OUTPATIENT)
Dept: PHARMACY | Facility: CLINIC | Age: 62
End: 2022-11-22
Payer: MEDICARE

## 2022-11-22 NOTE — TELEPHONE ENCOUNTER
Specialty Pharmacy - Refill Coordination    Specialty Medication Orders Linked to Encounter      Flowsheet Row Most Recent Value   Medication #1 etanercept (ENBREL SURECLICK) 50 mg/mL (1 mL) (Order#[id number], Rx#[id number])            Refill Questions - Documented Responses      Flowsheet Row Most Recent Value   Patient Availability and HIPAA Verification    Does patient want to proceed with activity? Yes   HIPAA/medical authority confirmed? Yes   Relationship to patient of person spoken to? Self   Refill Screening Questions    Changes to allergies? No   Changes to medications? No   New conditions since last clinic visit? No   Unplanned office visit, urgent care, ED, or hospital admission in the last 4 weeks? No   How does patient/caregiver feel medication is working? Good   Financial problems or insurance changes? No   How many doses of your specialty medications were missed in the last 4 weeks? 0   Would patient like to speak to a pharmacist? No   When does the patient need to receive the medication? 11/25/22   Refill Delivery Questions    How will the patient receive the medication? Pickup   When does the patient need to receive the medication? 11/25/22   Address in [facility] confirmed and updated if neccessary? No   Expected Copay ($) 0   Is the patient able to afford the medication copay? Yes   Payment Method zero copay   Days supply of Refill 28   Supplies needed? No supplies needed   Refill activity completed? Yes   Refill activity plan Refill scheduled   Shipment/Pickup Date: 11/23/22            Current Outpatient Medications   Medication Sig    amLODIPine (NORVASC) 5 MG tablet Take 1 tablet (5 mg total) by mouth once daily.    aspirin (ECOTRIN) 81 MG EC tablet Take 81 mg by mouth once daily.    atorvastatin (LIPITOR) 10 MG tablet Take 1 tablet (10 mg total) by mouth once daily.    azelastine (ASTELIN) 137 mcg (0.1 %) nasal spray spray 1 spray (137 mcg total) by Nasal route 2 (two) times daily.     chlorhexidine (PERIDEX) 0.12 % solution Use 15-20 mL to rinse mouth twice daily for 2 minutes, spit out and do not rinse mouth with water after. (Patient not taking: Reported on 10/19/2022)    cyanocobalamin, vitamin B-12, (VITAMIN B-12 ORAL) Take by mouth.    cycloSPORINE (RESTASIS) 0.05 % ophthalmic emulsion Apply 1 drop into both eyes twice a day    cycloSPORINE (RESTASIS) 0.05 % ophthalmic emulsion Instill 1 drop Both Eyes twice a day    desoximetasone (TOPICORT) 0.25 % cream Apply to affected area every other night x 1 month    ergocalciferol (ERGOCALCIFEROL) 50,000 unit Cap Take 1 capsule (50,000 Units total) by mouth every 7 days. (Patient not taking: Reported on 10/19/2022)    etanercept (ENBREL SURECLICK) 50 mg/mL (1 mL) Inject 1 mL (50 mg total) into the skin once a week.    flu vacc [id number] 6mos up,PF, (FLUARIX QUAD 0234-3971, PF,) 60 mcg (15 mcg x 4)/0.5 mL Syrg use as directed    fluticasone propion-salmeterol 115-21 mcg/dose (ADVAIR HFA) 115-21 mcg/actuation HFAA inhaler Inhale 2 puffs into the lungs every 12 (twelve) hours.    fluticasone propionate (FLONASE) 50 mcg/actuation nasal spray 1 spray (50 mcg total) by Each Nostril route once daily.    folic acid (FOLVITE) 1 MG tablet Take 4 tablets (4 mg total) by mouth once daily.    gabapentin (NEURONTIN) 300 MG capsule Take 1 capsule (300 mg total) by mouth 2 (two) times daily.    hydrOXYzine HCL (ATARAX) 25 MG tablet Take 1 tablet (25 mg total) by mouth every evening. (Patient not taking: Reported on 10/19/2022)    leflunomide (ARAVA) 20 MG Tab Take 1 tablet (20 mg total) by mouth once daily.    losartan (COZAAR) 100 MG tablet Take 1 tablet (100 mg total) by mouth once daily.    metoprolol succinate (TOPROL-XL) 25 MG 24 hr tablet Take 1 tablet (25 mg total) by mouth every evening.    nystatin (MYCOSTATIN) powder Apply to affected area every morning as needed for flare under breasts (Patient taking differently: Apply to affected area every morning as  needed for flare under breasts)    pantoprazole (PROTONIX) 40 MG tablet Take 1 tablet (40 mg total) by mouth once daily.    promethazine (PHENERGAN) 25 MG tablet Take 1 tablet (25 mg total) by mouth every 6 (six) hours as needed for Nausea.    PROTOPIC 0.1 % ointment AAA bid    ruxolitinib (OPZELURA) 1.5 % Crea aaa bid    triamcinolone acetonide 0.025% (KENALOG) 0.025 % cream Apply to affected area every day to twice a day to neck . Not more than 1-2 weeks straight in same location    triamcinolone acetonide 0.1% (KENALOG) 0.1 % ointment Apply topically 2 (two) times daily. For 2 weeks    ZINC ORAL Take by mouth.   Last reviewed on 10/22/2022  7:23 AM by Louise Smith MD    Review of patient's allergies indicates:  No Known Allergies Last reviewed on  10/22/2022 7:23 AM by Louise Smith      Tasks added this encounter   12/16/2022 - Refill Call (Auto Added)  11/24/2022 - Pickup Reminder   Tasks due within next 3 months   No tasks due.     Carlitos Melo, PharmD  [facility]  [address] [facility] [address]  Phone: [phone]  Fax: [phone]

## 2022-11-23 ENCOUNTER — PATIENT MESSAGE (OUTPATIENT)
Dept: GASTROENTEROLOGY | Facility: CLINIC | Age: 62
End: 2022-11-23
Payer: MEDICARE

## 2022-11-23 ENCOUNTER — TELEPHONE (OUTPATIENT)
Dept: GASTROENTEROLOGY | Facility: CLINIC | Age: 62
End: 2022-11-23
Payer: MEDICARE

## 2022-11-23 NOTE — TELEPHONE ENCOUNTER
Spoke to the pt and rescheduled her EGD with Dr. St 1/25/23. Went over instructions on the phone and sent via portal. Pt verbalized understanding. Also informed that I will forward her message about her bloating with Ofelia MORSE.

## 2022-12-01 ENCOUNTER — HOSPITAL ENCOUNTER (OUTPATIENT)
Dept: RADIOLOGY | Facility: HOSPITAL | Age: 62
Discharge: HOME | End: 2022-12-01
Attending: FAMILY MEDICINE
Payer: MEDICARE

## 2022-12-01 PROCEDURE — 77067 MAMMO DIGITAL SCREENING BILAT WITH TOMO: ICD-10-PCS | Mod: 26,,, | Performed by: RADIOLOGY

## 2022-12-01 PROCEDURE — 77063 MAMMO DIGITAL SCREENING BILAT WITH TOMO: ICD-10-PCS | Mod: 26,,, | Performed by: RADIOLOGY

## 2022-12-01 PROCEDURE — 77063 BREAST TOMOSYNTHESIS BI: CPT | Mod: 26,,, | Performed by: RADIOLOGY

## 2022-12-01 PROCEDURE — 77067 SCR MAMMO BI INCL CAD: CPT | Mod: 26,,, | Performed by: RADIOLOGY

## 2022-12-01 PROCEDURE — 77063 BREAST TOMOSYNTHESIS BI: CPT | Mod: TC

## 2022-12-16 ENCOUNTER — SPECIALTY PHARMACY (OUTPATIENT)
Dept: PHARMACY | Facility: CLINIC | Age: 62
End: 2022-12-16
Payer: MEDICARE

## 2022-12-16 NOTE — TELEPHONE ENCOUNTER
Outgoing call regarding enbrel refill; per pt, she's due to inject today but wasn't sure if she has a pen on hand; pt informed that she will call us back once she gets home

## 2022-12-16 NOTE — TELEPHONE ENCOUNTER
Specialty Pharmacy - Refill Coordination    Specialty Medication Orders Linked to Encounter      Flowsheet Row Most Recent Value   Medication #1 etanercept (ENBREL SURECLICK) 50 mg/mL (1 mL) (Order#[id number], Rx#[id number])            Refill Questions - Documented Responses      Flowsheet Row Most Recent Value   Patient Availability and HIPAA Verification    Does patient want to proceed with activity? Yes   HIPAA/medical authority confirmed? Yes   Relationship to patient of person spoken to? Self   Refill Screening Questions    Changes to allergies? No   Changes to medications? No   New conditions since last clinic visit? No   Unplanned office visit, urgent care, ED, or hospital admission in the last 4 weeks? No   How does patient/caregiver feel medication is working? Good   Financial problems or insurance changes? No   How many doses of your specialty medications were missed in the last 4 weeks? 0   Would patient like to speak to a pharmacist? No   When does the patient need to receive the medication? 12/23/22   Refill Delivery Questions    How will the patient receive the medication? MEDRx   When does the patient need to receive the medication? 12/23/22   Shipping Address Prescription   Address in [facility] confirmed and updated if neccessary? Yes   Expected Copay ($) 0   Is the patient able to afford the medication copay? Yes   Payment Method zero copay   Days supply of Refill 28   Supplies needed? No supplies needed   Refill activity completed? Yes   Refill activity plan Refill scheduled   Shipment/Pickup Date: 12/21/22            Current Outpatient Medications   Medication Sig    amLODIPine (NORVASC) 5 MG tablet Take 1 tablet (5 mg total) by mouth once daily.    aspirin (ECOTRIN) 81 MG EC tablet Take 81 mg by mouth once daily.    atorvastatin (LIPITOR) 10 MG tablet Take 1 tablet (10 mg total) by mouth once daily.    azelastine (ASTELIN) 137 mcg (0.1 %) nasal spray spray 1 spray (137 mcg total) by Nasal  route 2 (two) times daily.    chlorhexidine (PERIDEX) 0.12 % solution Use 15-20 mL to rinse mouth twice daily for 2 minutes, spit out and do not rinse mouth with water after. (Patient not taking: Reported on 10/19/2022)    cyanocobalamin, vitamin B-12, (VITAMIN B-12 ORAL) Take by mouth.    cycloSPORINE (RESTASIS) 0.05 % ophthalmic emulsion Apply 1 drop into both eyes twice a day    cycloSPORINE (RESTASIS) 0.05 % ophthalmic emulsion Instill 1 drop Both Eyes twice a day    desoximetasone (TOPICORT) 0.25 % cream Apply to affected area every other night x 1 month    ergocalciferol (ERGOCALCIFEROL) 50,000 unit Cap Take 1 capsule (50,000 Units total) by mouth every 7 days. (Patient not taking: Reported on 10/19/2022)    etanercept (ENBREL SURECLICK) 50 mg/mL (1 mL) Inject 1 mL (50 mg total) into the skin once a week.    flu vacc [id number] 6mos up,PF, (FLUARIX QUAD 1365-4635, PF,) 60 mcg (15 mcg x 4)/0.5 mL Syrg use as directed    fluticasone propion-salmeterol 115-21 mcg/dose (ADVAIR HFA) 115-21 mcg/actuation HFAA inhaler Inhale 2 puffs into the lungs every 12 (twelve) hours.    fluticasone propionate (FLONASE) 50 mcg/actuation nasal spray 1 spray (50 mcg total) by Each Nostril route once daily.    folic acid (FOLVITE) 1 MG tablet Take 4 tablets (4 mg total) by mouth once daily.    gabapentin (NEURONTIN) 300 MG capsule Take 1 capsule (300 mg total) by mouth 2 (two) times daily.    hydrOXYzine HCL (ATARAX) 25 MG tablet Take 1 tablet (25 mg total) by mouth every evening. (Patient not taking: Reported on 10/19/2022)    leflunomide (ARAVA) 20 MG Tab Take 1 tablet (20 mg total) by mouth once daily.    losartan (COZAAR) 100 MG tablet Take 1 tablet (100 mg total) by mouth once daily.    metoprolol succinate (TOPROL-XL) 25 MG 24 hr tablet Take 1 tablet (25 mg total) by mouth every evening.    nystatin (MYCOSTATIN) powder Apply to affected area every morning as needed for flare under breasts (Patient taking differently: Apply to  affected area every morning as needed for flare under breasts)    pantoprazole (PROTONIX) 40 MG tablet Take 1 tablet (40 mg total) by mouth once daily.    promethazine (PHENERGAN) 25 MG tablet Take 1 tablet (25 mg total) by mouth every 6 (six) hours as needed for Nausea.    PROTOPIC 0.1 % ointment AAA bid    ruxolitinib (OPZELURA) 1.5 % Crea aaa bid    triamcinolone acetonide 0.025% (KENALOG) 0.025 % cream Apply to affected area every day to twice a day to neck . Not more than 1-2 weeks straight in same location    triamcinolone acetonide 0.1% (KENALOG) 0.1 % ointment Apply topically 2 (two) times daily. For 2 weeks    ZINC ORAL Take by mouth.   Last reviewed on 10/22/2022  7:23 AM by Louise Smith MD    Review of patient's allergies indicates:  No Known Allergies Last reviewed on  10/22/2022 7:23 AM by Louise Smith      Tasks added this encounter   1/13/2023 - Refill Call (Auto Added)   Tasks due within next 3 months   3/15/2023 - Clinical - Follow Up Assesement (Annual)     [facility]  [facility] [address]  [facility] [address]  Phone: [phone]  Fax: [phone]

## 2022-12-21 ENCOUNTER — TELEPHONE (OUTPATIENT)
Dept: FAMILY MEDICINE | Facility: CLINIC | Age: 62
End: 2022-12-21
Payer: MEDICARE

## 2022-12-21 ENCOUNTER — OFFICE VISIT (OUTPATIENT)
Dept: FAMILY MEDICINE | Facility: CLINIC | Age: 62
End: 2022-12-21
Attending: FAMILY MEDICINE
Payer: MEDICARE

## 2022-12-21 VITALS
BODY MASS INDEX: 26.81 KG/M2 | OXYGEN SATURATION: 95 % | HEIGHT: 61 IN | WEIGHT: 142 LBS | DIASTOLIC BLOOD PRESSURE: 78 MMHG | HEART RATE: 86 BPM | SYSTOLIC BLOOD PRESSURE: 128 MMHG

## 2022-12-21 DIAGNOSIS — Z78.0 ASYMPTOMATIC MENOPAUSAL STATE: ICD-10-CM

## 2022-12-21 DIAGNOSIS — E78.2 MIXED HYPERLIPIDEMIA: ICD-10-CM

## 2022-12-21 DIAGNOSIS — L80 VITILIGO: ICD-10-CM

## 2022-12-21 DIAGNOSIS — I10 PRIMARY HYPERTENSION: Primary | ICD-10-CM

## 2022-12-21 DIAGNOSIS — M05.9 RHEUMATOID ARTHRITIS WITH POSITIVE RHEUMATOID FACTOR, INVOLVING UNSPECIFIED SITE: ICD-10-CM

## 2022-12-21 DIAGNOSIS — F33.9 MAJOR DEPRESSION, RECURRENT, CHRONIC: ICD-10-CM

## 2022-12-21 DIAGNOSIS — M85.80 OSTEOPENIA, UNSPECIFIED LOCATION: ICD-10-CM

## 2022-12-21 PROCEDURE — 99999 PR PBB SHADOW E&M-EST. PATIENT-LVL III: ICD-10-PCS | Mod: PBBFAC,,, | Performed by: FAMILY MEDICINE

## 2022-12-21 PROCEDURE — 99214 PR OFFICE/OUTPT VISIT, EST, LEVL IV, 30-39 MIN: ICD-10-PCS | Mod: S$GLB,,, | Performed by: FAMILY MEDICINE

## 2022-12-21 PROCEDURE — 1160F RVW MEDS BY RX/DR IN RCRD: CPT | Mod: CPTII,S$GLB,, | Performed by: FAMILY MEDICINE

## 2022-12-21 PROCEDURE — 1160F PR REVIEW ALL MEDS BY PRESCRIBER/CLIN PHARMACIST DOCUMENTED: ICD-10-PCS | Mod: CPTII,S$GLB,, | Performed by: FAMILY MEDICINE

## 2022-12-21 PROCEDURE — 3074F SYST BP LT 130 MM HG: CPT | Mod: CPTII,S$GLB,, | Performed by: FAMILY MEDICINE

## 2022-12-21 PROCEDURE — 3078F DIAST BP <80 MM HG: CPT | Mod: CPTII,S$GLB,, | Performed by: FAMILY MEDICINE

## 2022-12-21 PROCEDURE — 99214 OFFICE O/P EST MOD 30 MIN: CPT | Mod: S$GLB,,, | Performed by: FAMILY MEDICINE

## 2022-12-21 PROCEDURE — 4010F ACE/ARB THERAPY RXD/TAKEN: CPT | Mod: CPTII,S$GLB,, | Performed by: FAMILY MEDICINE

## 2022-12-21 PROCEDURE — 99999 PR PBB SHADOW E&M-EST. PATIENT-LVL III: CPT | Mod: PBBFAC,,, | Performed by: FAMILY MEDICINE

## 2022-12-21 PROCEDURE — 3044F PR MOST RECENT HEMOGLOBIN A1C LEVEL <7.0%: ICD-10-PCS | Mod: CPTII,S$GLB,, | Performed by: FAMILY MEDICINE

## 2022-12-21 PROCEDURE — 3044F HG A1C LEVEL LT 7.0%: CPT | Mod: CPTII,S$GLB,, | Performed by: FAMILY MEDICINE

## 2022-12-21 PROCEDURE — 3008F BODY MASS INDEX DOCD: CPT | Mod: CPTII,S$GLB,, | Performed by: FAMILY MEDICINE

## 2022-12-21 PROCEDURE — 1159F MED LIST DOCD IN RCRD: CPT | Mod: CPTII,S$GLB,, | Performed by: FAMILY MEDICINE

## 2022-12-21 PROCEDURE — 3074F PR MOST RECENT SYSTOLIC BLOOD PRESSURE < 130 MM HG: ICD-10-PCS | Mod: CPTII,S$GLB,, | Performed by: FAMILY MEDICINE

## 2022-12-21 PROCEDURE — 1159F PR MEDICATION LIST DOCUMENTED IN MEDICAL RECORD: ICD-10-PCS | Mod: CPTII,S$GLB,, | Performed by: FAMILY MEDICINE

## 2022-12-21 PROCEDURE — 4010F PR ACE/ARB THEARPY RXD/TAKEN: ICD-10-PCS | Mod: CPTII,S$GLB,, | Performed by: FAMILY MEDICINE

## 2022-12-21 PROCEDURE — 3078F PR MOST RECENT DIASTOLIC BLOOD PRESSURE < 80 MM HG: ICD-10-PCS | Mod: CPTII,S$GLB,, | Performed by: FAMILY MEDICINE

## 2022-12-21 PROCEDURE — 3008F PR BODY MASS INDEX (BMI) DOCUMENTED: ICD-10-PCS | Mod: CPTII,S$GLB,, | Performed by: FAMILY MEDICINE

## 2022-12-21 NOTE — TELEPHONE ENCOUNTER
----- Message from Samra Freeman sent at 12/21/2022  8:24 AM CST -----  Type:  Patient Returning Call    Who Called:pt  Who Left Message for Patient:office  Does the patient know what this is regarding?:n/a  Would the patient rather a call back or a response via [employer]? Call   Best Call Back Number:[phone]  Additional Information:

## 2022-12-21 NOTE — PROGRESS NOTES
Subjective:       Patient ID: Silvia Cervantes is a 62 y.o. female.    Chief Complaint: Follow-up    61 yr old pleasant  female with depression, allergies, vitiligo, HTN, HLD, sarcoidosis, Rheumatoid arthritis, Erosive gastritis, and other co morbidities presents today for her 3 month follow up.    Chronic low back pain. Onset 6 months ago and gradually worsening - left lower back- does not radiate and no neurological symptoms. No saddle anesthesia or bladder/bowel problems. Details as follows -      HTN - controlled - on Losartan-HCT and Norvasc 5 - compliant - no side effects     Sarcoidosis and lung nodules - follwos pulmonology - need new one as her previous specialist is leaving [facility]      Major depression - uncontrolled - not on meds - would like to start one - given zoloft - she will start from today - - no SI/HI    Vertigo - much better - on vertin as needed       HLD - diet controlled -    LDLCALC                  86.2                12/22/2021                 RA - on Enbrel shots - follows Rheumatology - stable      Gastritis - stable - on PPI as needed - no side effects      History as below - reviewed       HM  -labs UTD  -vaccines UTD    Follow-up  Associated symptoms include myalgias, neck pain, numbness and weakness. Pertinent negatives include no arthralgias, chest pain, congestion, diaphoresis, headaches, nausea or sore throat.   Hypertension  This is a chronic problem. The current episode started more than 1 year ago. The problem has been gradually improving since onset. The problem is controlled. Associated symptoms include neck pain. Pertinent negatives include no chest pain, headaches, malaise/fatigue, palpitations or shortness of breath. There are no associated agents to hypertension. Risk factors for coronary artery disease include dyslipidemia and post-menopausal state. Past treatments include angiotensin blockers and diuretics. The current treatment provides significant improvement.  There are no compliance problems.  There is no history of angina, CAD/MI, CVA, left ventricular hypertrophy, PVD or retinopathy. There is no history of chronic renal disease, coarctation of the aorta, hypercortisolism, hyperparathyroidism, a hypertension causing med or sleep apnea.   Hyperlipidemia  This is a chronic problem. The current episode started more than 1 year ago. The problem is controlled. Recent lipid tests were reviewed and are normal. She has no history of chronic renal disease, diabetes, hypothyroidism or liver disease. There are no known factors aggravating her hyperlipidemia. Associated symptoms include myalgias. Pertinent negatives include no chest pain, focal sensory loss, focal weakness, leg pain or shortness of breath. She is currently on no antihyperlipidemic treatment. The current treatment provides mild improvement of lipids. There are no compliance problems.  Risk factors for coronary artery disease include dyslipidemia, hypertension and post-menopausal.   Back Pain  This is a chronic problem. The current episode started more than 1 month ago. The problem occurs constantly. The problem is unchanged. The pain is present in the sacro-iliac and lumbar spine. The quality of the pain is described as aching. The pain does not radiate. The symptoms are aggravated by bending, sitting and twisting. Associated symptoms include numbness and weakness. Pertinent negatives include no chest pain, dysuria, headaches, leg pain, perianal numbness or weight loss. She has tried nothing for the symptoms. The treatment provided no relief.   Review of Systems   Constitutional: Negative.  Negative for activity change, diaphoresis, malaise/fatigue, unexpected weight change and weight loss.   HENT: Negative.  Negative for nasal congestion, ear discharge, hearing loss, rhinorrhea, sore throat and voice change.    Eyes: Negative.  Negative for pain, discharge and visual disturbance.   Respiratory: Negative.  Negative  for chest tightness, shortness of breath and wheezing.    Cardiovascular: Negative.  Negative for chest pain and palpitations.   Gastrointestinal: Negative.  Negative for abdominal distention, anal bleeding, constipation and nausea.   Endocrine: Negative.  Negative for cold intolerance, polydipsia and polyuria.   Genitourinary: Negative.  Negative for decreased urine volume, difficulty urinating, dysuria, frequency, menstrual problem and vaginal pain.   Musculoskeletal:  Positive for back pain, myalgias and neck pain. Negative for arthralgias, gait problem and leg pain.   Integumentary:  Negative for color change, pallor and wound. Negative.   Allergic/Immunologic: Negative.  Negative for environmental allergies and immunocompromised state.   Neurological:  Positive for weakness and numbness. Negative for dizziness, tremors, focal weakness, seizures, speech difficulty and headaches.   Hematological: Negative.  Negative for adenopathy. Does not bruise/bleed easily.   Psychiatric/Behavioral: Negative.  Negative for agitation, confusion, decreased concentration, hallucinations, self-injury and suicidal ideas. The patient is not nervous/anxious.        PMH/PSH/FH/SH/MED/ALLERGY reviewed    Objective:       Vitals:    12/21/22 0957   BP: 128/78   Pulse: 86       Physical Exam  Constitutional:       General: She is not in acute distress.     Appearance: She is well-developed. She is not diaphoretic.   HENT:      Head: Normocephalic and atraumatic.      Right Ear: External ear normal.      Left Ear: External ear normal.      Nose: Nose normal.      Mouth/Throat:      Pharynx: No oropharyngeal exudate.   Eyes:      General: No scleral icterus.        Right eye: No discharge.         Left eye: No discharge.      Conjunctiva/sclera: Conjunctivae normal.      Pupils: Pupils are equal, round, and reactive to light.   Neck:      Thyroid: No thyromegaly.      Vascular: No JVD.      Trachea: No tracheal deviation.    Cardiovascular:      Rate and Rhythm: Normal rate and regular rhythm.      Heart sounds: Normal heart sounds. No murmur heard.    No friction rub. No gallop.   Pulmonary:      Effort: Pulmonary effort is normal.      Breath sounds: Normal breath sounds. No stridor. No wheezing or rales.   Chest:      Chest wall: No tenderness.   Abdominal:      General: Bowel sounds are normal. There is no distension.      Palpations: Abdomen is soft. There is no mass.      Tenderness: There is no abdominal tenderness. There is no guarding or rebound.      Hernia: No hernia is present.   Musculoskeletal:         General: No tenderness. Normal range of motion.      Cervical back: Normal range of motion and neck supple.   Lymphadenopathy:      Cervical: No cervical adenopathy.   Skin:     General: Skin is warm and dry.      Coloration: Skin is not pale.      Findings: No erythema or rash.   Neurological:      Mental Status: She is alert and oriented to person, place, and time.      Cranial Nerves: No cranial nerve deficit.      Motor: No abnormal muscle tone.      Coordination: Coordination normal.      Deep Tendon Reflexes: Reflexes are normal and symmetric. Reflexes normal.   Psychiatric:         Behavior: Behavior normal.         Thought Content: Thought content normal.         Judgment: Judgment normal.       Assessment:       Problem List Items Addressed This Visit       Vitiligo    Rheumatoid arthritis with positive rheumatoid factor    Mixed hyperlipidemia    Major depression, recurrent, chronic    Hypertension - Primary     Other Visit Diagnoses       Osteopenia, unspecified location        Relevant Orders    DXA Bone Density Spine And Hip    Asymptomatic menopausal state        Relevant Orders    DXA Bone Density Spine And Hip            Plan:       Silvia was seen today for follow-up.    Diagnoses and all orders for this visit:    Primary hypertension    Mixed hyperlipidemia    Major depression, recurrent,  chronic    Rheumatoid arthritis with positive rheumatoid factor, involving unspecified site    Vitiligo    Osteopenia, unspecified location  -     DXA Bone Density Spine And Hip; Future    Asymptomatic menopausal state  -     DXA Bone Density Spine And Hip; Future      HLD  -diet control  -start statin          HTN  -controlled    RA  -stable  -follow rheumatology    Vertigo  -meclizine prn    Depression  -controlled  -continue zoloft 25 mg daily  -SI/ER precautions given      Spent adequate time in obtaining history and explaining differentials      25 minutes spent during this visit of which greater than 50% devoted to face-face counseling and coordination of care regarding diagnosis and management plan    Follow up in about 3 months (around 3/21/2023), or if symptoms worsen or fail to improve.

## 2022-12-22 ENCOUNTER — HOSPITAL ENCOUNTER (OUTPATIENT)
Dept: RADIOLOGY | Facility: HOSPITAL | Age: 62
Discharge: HOME | End: 2022-12-22
Attending: FAMILY MEDICINE
Payer: MEDICARE

## 2022-12-22 DIAGNOSIS — Z78.0 ASYMPTOMATIC MENOPAUSAL STATE: ICD-10-CM

## 2022-12-22 DIAGNOSIS — M85.80 OSTEOPENIA, UNSPECIFIED LOCATION: ICD-10-CM

## 2022-12-22 PROCEDURE — 77080 DEXA BONE DENSITY SPINE HIP: ICD-10-PCS | Mod: 26,,, | Performed by: RADIOLOGY

## 2022-12-22 PROCEDURE — 77080 DXA BONE DENSITY AXIAL: CPT | Mod: 26,,, | Performed by: RADIOLOGY

## 2022-12-22 PROCEDURE — 77080 DXA BONE DENSITY AXIAL: CPT | Mod: TC

## 2022-12-25 ENCOUNTER — PATIENT MESSAGE (OUTPATIENT)
Dept: FAMILY MEDICINE | Facility: CLINIC | Age: 62
End: 2022-12-25
Payer: MEDICARE

## 2022-12-25 NOTE — TELEPHONE ENCOUNTER
Plz call and inform osteoporosis in left forearm which is significant bone loss. Need to be on treatment - let me know if interested

## 2022-12-28 ENCOUNTER — PATIENT MESSAGE (OUTPATIENT)
Dept: PULMONOLOGY | Facility: CLINIC | Age: 62
End: 2022-12-28
Payer: MEDICARE

## 2023-01-03 ENCOUNTER — PATIENT MESSAGE (OUTPATIENT)
Dept: PULMONOLOGY | Facility: CLINIC | Age: 63
End: 2023-01-03
Payer: MEDICARE

## 2023-01-05 ENCOUNTER — IMMUNIZATION (OUTPATIENT)
Dept: INTERNAL MEDICINE | Facility: CLINIC | Age: 63
End: 2023-01-05
Payer: MEDICARE

## 2023-01-05 DIAGNOSIS — Z23 NEED FOR VACCINATION: Primary | ICD-10-CM

## 2023-01-05 PROCEDURE — 0124A COVID-19, MRNA, LNP-S, BIVALENT BOOSTER, PF, 30 MCG/0.3 ML DOSE: CPT | Mod: PBBFAC | Performed by: FAMILY MEDICINE

## 2023-01-05 PROCEDURE — 91312 COVID-19, MRNA, LNP-S, BIVALENT BOOSTER, PF, 30 MCG/0.3 ML DOSE: CPT | Mod: S$GLB,,, | Performed by: FAMILY MEDICINE

## 2023-01-05 PROCEDURE — 91312 COVID-19, MRNA, LNP-S, BIVALENT BOOSTER, PF, 30 MCG/0.3 ML DOSE: ICD-10-PCS | Mod: S$GLB,,, | Performed by: FAMILY MEDICINE

## 2023-01-06 ENCOUNTER — SPECIALTY PHARMACY (OUTPATIENT)
Dept: PHARMACY | Facility: CLINIC | Age: 63
End: 2023-01-06
Payer: MEDICARE

## 2023-01-10 ENCOUNTER — PATIENT MESSAGE (OUTPATIENT)
Dept: GASTROENTEROLOGY | Facility: CLINIC | Age: 63
End: 2023-01-10
Payer: MEDICARE

## 2023-01-17 ENCOUNTER — TELEPHONE (OUTPATIENT)
Dept: GASTROENTEROLOGY | Facility: CLINIC | Age: 63
End: 2023-01-17
Payer: MEDICARE

## 2023-01-17 ENCOUNTER — SPECIALTY PHARMACY (OUTPATIENT)
Dept: PHARMACY | Facility: CLINIC | Age: 63
End: 2023-01-17
Payer: MEDICARE

## 2023-01-17 NOTE — TELEPHONE ENCOUNTER
"Specialty Pharmacy - Refill Coordination    Specialty Medication Orders Linked to Encounter      Flowsheet Row Most Recent Value   Medication #1 etanercept (ENBREL SURECLICK) 50 mg/mL (1 mL) (Order#[id number], Rx#[id number])        Sent staff message to clarify whether patient needs to hold her Enbrel doses prior to or after endoscopy on 1/25/23. Included assigned rph. Once MDO responds [facility] to follow up with patient.     Reviewed she can dispose of her Enbrel in a hard plastic container and once full cap it and write "sharps" on it. Additionally agreed with patient to dispose of her rx label via shredder as this has her personal health information on it.     Refill Questions - Documented Responses      Flowsheet Row Most Recent Value   Patient Availability and HIPAA Verification    Does patient want to proceed with activity? Yes   HIPAA/medical authority confirmed? Yes   Relationship to patient of person spoken to? Self   Refill Screening Questions    Changes to allergies? No   Changes to medications? Yes  [reports more recently started pantoprazole. no DDI w/ enbrel]   New conditions since last clinic visit? No  [confirms she received her covid19 booster]   Unplanned office visit, urgent care, ED, or hospital admission in the last 4 weeks? No  [has upcoming EGD 1/25/23 is asking whether she needs to skip dose or not - will reach out to Dr. Ori St and follow back up]   How does patient/caregiver feel medication is working? Good   Financial problems or insurance changes? No   How many doses of your specialty medications were missed in the last 4 weeks? 0   Would patient like to speak to a pharmacist? No   When does the patient need to receive the medication? 01/20/23   Refill Delivery Questions    How will the patient receive the medication? Pickup   When does the patient need to receive the medication? 01/20/23   Shipping Address --  [pickup]   Address in [facility] confirmed and updated if neccessary? " Yes   Expected Copay ($) 0   Is the patient able to afford the medication copay? Yes   Payment Method zero copay   Days supply of Refill 28   Supplies needed? No supplies needed   Refill activity completed? Yes   Refill activity plan Refill scheduled   Shipment/Pickup Date: 01/18/23            Current Outpatient Medications   Medication Sig    amLODIPine (NORVASC) 5 MG tablet Take 1 tablet (5 mg total) by mouth once daily.    aspirin (ECOTRIN) 81 MG EC tablet Take 81 mg by mouth once daily.    atorvastatin (LIPITOR) 10 MG tablet Take 1 tablet (10 mg total) by mouth once daily.    azelastine (ASTELIN) 137 mcg (0.1 %) nasal spray spray 1 spray (137 mcg total) by Nasal route 2 (two) times daily.    chlorhexidine (PERIDEX) 0.12 % solution Use 15-20 mL to rinse mouth twice daily for 2 minutes, spit out and do not rinse mouth with water after.    cyanocobalamin, vitamin B-12, (VITAMIN B-12 ORAL) Take by mouth.    cycloSPORINE (RESTASIS) 0.05 % ophthalmic emulsion Apply 1 drop into both eyes twice a day    cycloSPORINE (RESTASIS) 0.05 % ophthalmic emulsion Instill 1 drop Both Eyes twice a day    desoximetasone (TOPICORT) 0.25 % cream Apply to affected area every other night x 1 month    ergocalciferol (ERGOCALCIFEROL) 50,000 unit Cap Take 1 capsule (50,000 Units total) by mouth every 7 days.    etanercept (ENBREL SURECLICK) 50 mg/mL (1 mL) Inject 1 mL (50 mg total) into the skin once a week.    flu vacc [id number] 6mos up,PF, (FLUARIX QUAD [id number], PF,) 60 mcg (15 mcg x 4)/0.5 mL Syrg use as directed    fluticasone propion-salmeterol 115-21 mcg/dose (ADVAIR HFA) 115-21 mcg/actuation HFAA inhaler Inhale 2 puffs into the lungs every 12 (twelve) hours.    fluticasone propionate (FLONASE) 50 mcg/actuation nasal spray 1 spray (50 mcg total) by Each Nostril route once daily.    folic acid (FOLVITE) 1 MG tablet Take 4 tablets (4 mg total) by mouth once daily.    gabapentin (NEURONTIN) 300 MG capsule Take 1 capsule (300 mg total)  by mouth 2 (two) times daily.    hydrOXYzine HCL (ATARAX) 25 MG tablet Take 1 tablet (25 mg total) by mouth every evening.    leflunomide (ARAVA) 20 MG Tab Take 1 tablet (20 mg total) by mouth once daily.    losartan (COZAAR) 100 MG tablet Take 1 tablet (100 mg total) by mouth once daily.    metoprolol succinate (TOPROL-XL) 25 MG 24 hr tablet Take 1 tablet (25 mg total) by mouth every evening.    nystatin (MYCOSTATIN) powder Apply to affected area every morning as needed for flare under breasts (Patient taking differently: Apply to affected area every morning as needed for flare under breasts)    pantoprazole (PROTONIX) 40 MG tablet Take 1 tablet (40 mg total) by mouth once daily.    promethazine (PHENERGAN) 25 MG tablet Take 1 tablet (25 mg total) by mouth every 6 (six) hours as needed for Nausea.    PROTOPIC 0.1 % ointment AAA bid    ruxolitinib (OPZELURA) 1.5 % Crea aaa bid    triamcinolone acetonide 0.025% (KENALOG) 0.025 % cream Apply to affected area every day to twice a day to neck . Not more than 1-2 weeks straight in same location    triamcinolone acetonide 0.1% (KENALOG) 0.1 % ointment Apply topically 2 (two) times daily. For 2 weeks    ZINC ORAL Take by mouth.   Last reviewed on 12/21/2022 10:13 AM by Adrián Cade MD    Review of patient's allergies indicates:  No Known Allergies Last reviewed on  12/21/2022 10:13 AM by Adrián Cade      Tasks added this encounter   2/10/2023 - Refill Call (Auto Added)  1/19/2023 - Pickup Reminder   Tasks due within next 3 months   3/15/2023 - Clinical - Follow Up Assesement (Annual)     Lianna Santos, PharmD  [facility]  [address] [place] [id number]  Phone: [phone]  Fax: [phone]

## 2023-01-17 NOTE — TELEPHONE ENCOUNTER
MD Lianna Reynolds, PharmD; JACOB JAMES Staff; JACOB Mckenzie Staff; Magaly Rizo MD  Cc: Leda Cole PharmD  No need to hold.           Previous Messages     ----- Message -----   From: Lianna Santos PharmD   Sent: 1/17/2023  10:40 AM CST   To: Ori St MD, Magaly Rizo MD, *   Subject: Will pt need to hold Enbrel dose for upcomin*     Hello and good morning!     Patient was wondering if she needs to hold her Enbrel dose for her upcoming endoscopy scheduled for next Wednesday 1/25/23?     She would be due for her once weekly Enbrel doses this Friday 1/20 and next Friday 1/27     Thank you!     Sincerely,   Lianna Santos, PharmD.   Clinical Pharmacist   [facility]   [phone]

## 2023-01-17 NOTE — TELEPHONE ENCOUNTER
MDO responded no need to hold Enbrel for scope next week. Called pt and verbalized understanding. She will call office and confirm appt time, in epic has her at 8 am but she will call office and double check. No further questions.

## 2023-01-17 NOTE — TELEPHONE ENCOUNTER
----- Message from Ori St MD sent at 1/17/2023 10:45 AM CST -----  Regarding: RE: Will pt need to hold Enbrel dose for upcoming endoscopy?  No need to hold.    ----- Message -----  From: Naya ArthurD  Sent: 1/17/2023  10:40 AM CST  To: Ori St MD, Magaly Rizo MD, #  Subject: Will pt need to hold Enbrel dose for upcomin#    Hello and good morning!     Patient was wondering if she needs to hold her Enbrel dose for her upcoming endoscopy scheduled for next Wednesday 1/25/23?     She would be due for her once weekly Enbrel doses this Friday 1/20 and next Friday 1/27     Thank you!    Sincerely,  Lianna Santos, PharmD.   Clinical Pharmacist  [facility]  [phone]

## 2023-01-23 ENCOUNTER — TELEPHONE (OUTPATIENT)
Dept: ENDOSCOPY | Facility: HOSPITAL | Age: 63
End: 2023-01-23
Payer: MEDICARE

## 2023-01-23 NOTE — TELEPHONE ENCOUNTER
Spoke with patient about arrival time @ 0700.   Covid test = boosted    NPO status reviewed: Patient must have nothing to eat after midnight.      Medications: Do not take Insulin or oral diabetic medications the day of the procedure.  Take as prescribed: heart, seizure and blood pressure medication in the morning with a sip of water (less than an ounce).  Take any breathing medications and bring inhalers to hospital with you Leave all valuables and jewelry at home.     Wear comfortable clothes to procedure to change into hospital gown You cannot drive for 24 hours after your procedure because you will receive sedation for your procedure to make you comfortable.  A ride must be provided at discharge.

## 2023-01-25 ENCOUNTER — HOSPITAL ENCOUNTER (OUTPATIENT)
Facility: HOSPITAL | Age: 63
Discharge: HOME | End: 2023-01-25
Attending: INTERNAL MEDICINE | Admitting: INTERNAL MEDICINE
Payer: MEDICARE

## 2023-01-25 ENCOUNTER — ANESTHESIA (OUTPATIENT)
Dept: ENDOSCOPY | Facility: HOSPITAL | Age: 63
End: 2023-01-25
Payer: MEDICARE

## 2023-01-25 ENCOUNTER — ANESTHESIA EVENT (OUTPATIENT)
Dept: ENDOSCOPY | Facility: HOSPITAL | Age: 63
End: 2023-01-25
Payer: MEDICARE

## 2023-01-25 ENCOUNTER — TELEPHONE (OUTPATIENT)
Dept: GASTROENTEROLOGY | Facility: CLINIC | Age: 63
End: 2023-01-25
Payer: MEDICARE

## 2023-01-25 ENCOUNTER — TELEPHONE (OUTPATIENT)
Dept: GASTROENTEROLOGY | Facility: HOSPITAL | Age: 63
End: 2023-01-25
Payer: MEDICARE

## 2023-01-25 DIAGNOSIS — R13.19 ESOPHAGEAL DYSPHAGIA: ICD-10-CM

## 2023-01-25 DIAGNOSIS — R05.8 OTHER COUGH: ICD-10-CM

## 2023-01-25 DIAGNOSIS — R09.A2 GLOBUS SENSATION: Primary | ICD-10-CM

## 2023-01-25 PROCEDURE — 43239 PR EGD, FLEX, W/BIOPSY, SGL/MULTI: ICD-10-PCS | Mod: ,,, | Performed by: INTERNAL MEDICINE

## 2023-01-25 PROCEDURE — 63600175 PHARM REV CODE 636 W HCPCS: Performed by: NURSE ANESTHETIST, CERTIFIED REGISTERED

## 2023-01-25 PROCEDURE — 88305 TISSUE EXAM BY PATHOLOGIST: CPT | Mod: 26,,, | Performed by: PATHOLOGY

## 2023-01-25 PROCEDURE — D9220A PRA ANESTHESIA: Mod: ANES,,, | Performed by: ANESTHESIOLOGY

## 2023-01-25 PROCEDURE — 43239 EGD BIOPSY SINGLE/MULTIPLE: CPT | Performed by: INTERNAL MEDICINE

## 2023-01-25 PROCEDURE — 37000008 HC ANESTHESIA 1ST 15 MINUTES: Performed by: INTERNAL MEDICINE

## 2023-01-25 PROCEDURE — 43239 EGD BIOPSY SINGLE/MULTIPLE: CPT | Mod: ,,, | Performed by: INTERNAL MEDICINE

## 2023-01-25 PROCEDURE — 25000003 PHARM REV CODE 250: Performed by: NURSE ANESTHETIST, CERTIFIED REGISTERED

## 2023-01-25 PROCEDURE — 25000003 PHARM REV CODE 250: Performed by: INTERNAL MEDICINE

## 2023-01-25 PROCEDURE — 37000009 HC ANESTHESIA EA ADD 15 MINS: Performed by: INTERNAL MEDICINE

## 2023-01-25 PROCEDURE — 27201012 HC FORCEPS, HOT/COLD, DISP: Performed by: INTERNAL MEDICINE

## 2023-01-25 PROCEDURE — D9220A PRA ANESTHESIA: ICD-10-PCS | Mod: CRNA,,, | Performed by: NURSE ANESTHETIST, CERTIFIED REGISTERED

## 2023-01-25 PROCEDURE — D9220A PRA ANESTHESIA: ICD-10-PCS | Mod: ANES,,, | Performed by: ANESTHESIOLOGY

## 2023-01-25 PROCEDURE — 88305 TISSUE EXAM BY PATHOLOGIST: CPT | Performed by: PATHOLOGY

## 2023-01-25 PROCEDURE — 88305 TISSUE EXAM BY PATHOLOGIST: ICD-10-PCS | Mod: 26,,, | Performed by: PATHOLOGY

## 2023-01-25 PROCEDURE — D9220A PRA ANESTHESIA: Mod: CRNA,,, | Performed by: NURSE ANESTHETIST, CERTIFIED REGISTERED

## 2023-01-25 RX ORDER — SODIUM CHLORIDE 9 MG/ML
INJECTION, SOLUTION INTRAVENOUS CONTINUOUS
Status: DISCONTINUED | OUTPATIENT
Start: 2023-01-25 | End: 2023-01-25 | Stop reason: HOSPADM

## 2023-01-25 RX ORDER — SODIUM CHLORIDE 0.9 % (FLUSH) 0.9 %
10 SYRINGE (ML) INJECTION
Status: DISCONTINUED | OUTPATIENT
Start: 2023-01-25 | End: 2023-01-25 | Stop reason: HOSPADM

## 2023-01-25 RX ORDER — LIDOCAINE HYDROCHLORIDE 20 MG/ML
INJECTION INTRAVENOUS
Status: DISCONTINUED | OUTPATIENT
Start: 2023-01-25 | End: 2023-01-25

## 2023-01-25 RX ORDER — PROPOFOL 10 MG/ML
VIAL (ML) INTRAVENOUS
Status: DISCONTINUED | OUTPATIENT
Start: 2023-01-25 | End: 2023-01-25

## 2023-01-25 RX ADMIN — SODIUM CHLORIDE: 0.9 INJECTION, SOLUTION INTRAVENOUS at 07:01

## 2023-01-25 RX ADMIN — SODIUM CHLORIDE: 0.9 INJECTION, SOLUTION INTRAVENOUS at 08:01

## 2023-01-25 RX ADMIN — TOPICAL ANESTHETIC 1 EACH: 200 SPRAY DENTAL; PERIODONTAL at 08:01

## 2023-01-25 RX ADMIN — PROPOFOL 200 MG: 10 INJECTION, EMULSION INTRAVENOUS at 08:01

## 2023-01-25 RX ADMIN — LIDOCAINE HYDROCHLORIDE 50 MG: 20 INJECTION, SOLUTION INTRAVENOUS at 08:01

## 2023-01-25 NOTE — ANESTHESIA PREPROCEDURE EVALUATION
01/25/2023  Silvia Cervantes is a 62 y.o., female.  [facility]  Anesthesia Pre-Operative Evaluation       Patient Name: Silvia Cervantes  YOB: 1960  MRN: [id number]  [facility]: [id number]      Code Status: Prior   Date of Procedure: 1/25/2023  Anesthesia: Monitor Anesthesia Care Procedure: Procedure(s) (LRB):  EGD (ESOPHAGOGASTRODUODENOSCOPY) (N/A)  Pre-Operative Diagnosis: Dysphagia, unspecified type [R13.10]  Cough [R05.9]  Proceduralist: Surgeon(s) and Role:     * Ori St MD - Primary Nurse: (Unknown)      SUBJECTIVE:   Silvia Cervantes is a 62 y.o. 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 No notes on file    she has a current medication list which includes the following long-term medication(s): amlodipine, aspirin, atorvastatin, azelastine, restasis, cyclosporine, desoximetasone, enbrel sureclick, advair hfa, folic acid, gabapentin, leflunomide, losartan, metoprolol succinate, nystatin, pantoprazole, triamcinolone acetonide 0.025%, triamcinolone acetonide 0.1%, and [DISCONTINUED] irbesartan.   ALLERGIES:   Review of patient's allergies indicates:  No Known Allergies  LDA:      Lines/Drains/Airways     Airway  Duration                Airway - Non-Surgical Nasal Cannula -- days              MEDICATIONS:     Antibiotics (From admission, onward)    None        VTE Risk Mitigation (From  admission, onward)    None        No current facility-administered medications for this encounter.     Current Outpatient Medications   Medication Sig Dispense Refill    amLODIPine (NORVASC) 5 MG tablet Take 1 tablet (5 mg total) by mouth once daily. 90 tablet 4    aspirin (ECOTRIN) 81 MG EC tablet Take 81 mg by mouth once daily.      atorvastatin (LIPITOR) 10 MG tablet Take 1 tablet (10 mg total) by mouth once daily. 90 tablet 3    azelastine (ASTELIN) 137 mcg (0.1 %) nasal spray spray 1 spray (137 mcg total) by Nasal route 2 (two) times daily. 30 mL 3    chlorhexidine (PERIDEX) 0.12 % solution Use 15-20 mL to rinse mouth twice daily for 2 minutes, spit out and do not rinse mouth with water after. 473 mL 3    cyanocobalamin, vitamin B-12, (VITAMIN B-12 ORAL) Take by mouth.      cycloSPORINE (RESTASIS) 0.05 % ophthalmic emulsion Apply 1 drop into both eyes twice a day 180 vial 3    cycloSPORINE (RESTASIS) 0.05 % ophthalmic emulsion Instill 1 drop Both Eyes twice a day 180 each 6    desoximetasone (TOPICORT) 0.25 % cream Apply to affected area every other night x 1 month 60 g 1    ergocalciferol (ERGOCALCIFEROL) 50,000 unit Cap Take 1 capsule (50,000 Units total) by mouth every 7 days. 12 capsule 2    etanercept (ENBREL SURECLICK) 50 mg/mL (1 mL) Inject 1 mL (50 mg total) into the skin once a week. 4 mL 11    flu vacc [id number] 6mos up,PF, (FLUARIX QUAD 5119-0257, PF,) 60 mcg (15 mcg x 4)/0.5 mL Syrg use as directed 0.5 mL 0    fluticasone propion-salmeterol 115-21 mcg/dose (ADVAIR HFA) 115-21 mcg/actuation HFAA inhaler Inhale 2 puffs into the lungs every 12 (twelve) hours. 12 g 11    fluticasone propionate (FLONASE) 50 mcg/actuation nasal spray 1 spray (50 mcg total) by Each Nostril route once daily. 16 g 5    folic acid (FOLVITE) 1 MG tablet Take 4 tablets (4 mg total) by mouth once daily. 120 tablet 11    gabapentin (NEURONTIN) 300 MG capsule Take 1 capsule (300 mg total) by mouth 2 (two) times  daily. 180 capsule 1    hydrOXYzine HCL (ATARAX) 25 MG tablet Take 1 tablet (25 mg total) by mouth every evening. 30 tablet 1    leflunomide (ARAVA) 20 MG Tab Take 1 tablet (20 mg total) by mouth once daily. 90 tablet 3    losartan (COZAAR) 100 MG tablet Take 1 tablet (100 mg total) by mouth once daily. 90 tablet 3    metoprolol succinate (TOPROL-XL) 25 MG 24 hr tablet Take 1 tablet (25 mg total) by mouth every evening. 90 tablet 4    nystatin (MYCOSTATIN) powder Apply to affected area every morning as needed for flare under breasts (Patient taking differently: Apply to affected area every morning as needed for flare under breasts) 120 g 6    pantoprazole (PROTONIX) 40 MG tablet Take 1 tablet (40 mg total) by mouth once daily. 30 tablet 3    promethazine (PHENERGAN) 25 MG tablet Take 1 tablet (25 mg total) by mouth every 6 (six) hours as needed for Nausea. 25 tablet 0    PROTOPIC 0.1 % ointment AAA bid 100 g 2    ruxolitinib (OPZELURA) 1.5 % Crea aaa bid 60 g 2    triamcinolone acetonide 0.025% (KENALOG) 0.025 % cream Apply to affected area every day to twice a day to neck . Not more than 1-2 weeks straight in same location 80 g 1    triamcinolone acetonide 0.1% (KENALOG) 0.1 % ointment Apply topically 2 (two) times daily. For 2 weeks 15 g 1    ZINC ORAL Take by mouth.            History:   There are no hospital problems to display for this patient.    Past Medical History:   Diagnosis Date    Abnormal Pap smear     VAIN 2    Abnormal Pap smear of cervix     Allergic rhinitis     Dementia     Dry eyes     Fever blister     History of bronchitis     History of headache     Hypercholesteremia 03/06/2015    Hypertension     Lumbar radicular pain 10/15/2018    Major depression, recurrent, chronic 10/05/2017    Reticulonodular infiltrate present on imaging of chest     Rheumatoid arthritis of hand 03/27/2013    Rheumatoid arthritis(714.0)     Tooth infection 06/01/2022    VAIN II (vaginal  intraepithelial neoplasia grade II)      Patient Active Problem List   Diagnosis    Hypertension    Mixed hyperlipidemia    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)    Vitiligo    Major depression, recurrent, chronic    Chronic neck and back pain    Pulmonary nodules    Closed displaced fracture of distal epiphysis of left femur    Closed fracture of humerus    Fracture, intertrochanteric, right femur    Fracture of femur    Fracture of C2 vertebra, closed    Blunt trauma    Injury of left vertebral artery    Humerus head fracture    Hypernatremia    Dysphagia    Neck pain, musculoskeletal    Closed fracture of shaft of right femur    Pelvic avulsion fracture    Lumbar radiculopathy    Neuropathy, arm, left    Soft tissue mass    Chronic bilateral low back pain with left-sided sciatica    Decreased range of motion of neck    Sarcoidosis    Immunocompromised    Non-seasonal allergic rhinitis due to pollen    Cough in adult    Palpitations    Rheumatoid arthritis with positive rheumatoid factor    Aortic atherosclerosis    History of traumatic brain injury    History of supraventricular tachycardia    Bruising    Pain in right wrist    Pain in right elbow    Decreased range of motion of right wrist    Decreased range of motion of right elbow    Swelling of right wrist    Decreased range of motion of finger of right hand    Pain in right hand       Surgical History:    has a past surgical history that includes  section; Tubal ligation; Hysterectomy; Colonoscopy (N/A, 2019); Sinus surgery; Synovectomy of wrist (Right, 2022); Excision of nodule (Right, 2022); Surgical removal of distal ulna (Right, 2022); and Injection of steroid (Left, 2022).   Social History:     reports that she has never smoked. She has never used smokeless tobacco. She reports current alcohol use. She reports that she does not currently use drugs.     OBJECTIVE:      Vital Signs (Most Recent):    Vital Signs Range (Last 24H):          There is no height or weight on file to calculate BMI.   Wt Readings from Last 4 Encounters:   12/21/22 64.4 kg (141 lb 15.6 oz)   10/13/22 65.1 kg (143 lb 8.3 oz)   09/21/22 64.8 kg (142 lb 13.7 oz)   09/20/22 64.9 kg (143 lb 1.3 oz)     Significant Labs:  Lab Results   Component Value Date    WBC 5.75 11/10/2022    HGB 13.4 11/10/2022    HCT 42.2 11/10/2022     11/10/2022     11/10/2022    K 3.8 11/10/2022     11/10/2022    CREATININE 0.7 11/10/2022    BUN 15 11/10/2022    CO2 27 11/10/2022    GLU 97 11/10/2022    CALCIUM 10.0 11/10/2022    MG 1.9 08/06/2020    ALKPHOS 69 11/10/2022    ALT 17 11/10/2022    AST 17 11/10/2022    ALBUMIN 4.2 11/10/2022    HGBA1C 5.5 11/10/2022    TROPONINI 0.010 07/25/2018    BNP 73 07/25/2018     No LMP recorded (lmp unknown). Patient has had a hysterectomy.  No results found for this or any previous visit (from the past 72 hour(s)).    EKG:   Results for orders placed or performed in visit on 07/07/21   EKG 12-lead    Collection Time: 07/07/21 10:11 AM    Narrative    Test Reason : R00.2,    Vent. Rate : 081 BPM     Atrial Rate : 081 BPM     P-R Int : 152 ms          QRS Dur : 074 ms      QT Int : 366 ms       P-R-T Axes : 059 050 013 degrees     QTc Int : 425 ms    Normal sinus rhythm with sinus arrhythmia  Low voltage QRS  Abnormal ECG  When compared with ECG of 25-JUL-2018 08:28,  No significant change was found  Confirmed by Reginald Nolan MD ([id number]) on 7/14/2021 12:55:10 PM    Referred By: [email or web address]   SELF           Confirmed By:Reginald Nolan MD         ASSESSMENT/PLAN:         Pre-op Assessment    I have reviewed the Patient Summary Reports.     I have reviewed the Nursing Notes. I have reviewed the NPO Status.   I have reviewed the Medications.     Review of Systems      Physical Exam  General: Well nourished, Cooperative and Alert    Airway:  Mallampati: III / II  Mouth Opening:  Normal  TM Distance: Normal  Tongue: Normal  Neck ROM: Normal ROM    Chest/Lungs:  Normal Respiratory Rate    Heart:  Rate: Normal  Rhythm: Regular Rhythm        Anesthesia Plan  Type of Anesthesia, risks & benefits discussed:    Anesthesia Type: Gen Natural Airway, MAC  Intra-op Monitoring Plan: Standard ASA Monitors  Post Op Pain Control Plan: IV/PO Opioids PRN  Induction:  IV  ASA Score: 3  Day of Surgery Review of History & Physical: H&P Update referred to the surgeon/provider.    Ready For Surgery From Anesthesia Perspective.     .

## 2023-01-25 NOTE — PLAN OF CARE
Admission process complete.  Patient ready for procedure.  Plan of care reviewed with patient.  VSS.  NPO status maintained.  Call light in reach.  Bed locked and in lowest position.     .

## 2023-01-25 NOTE — H&P
Short Stay Endoscopy History and Physical    PCP - Adrián Cade MD     Procedure - EGD  ASA - per anesthesia  Mallampati - per anesthesia  History of Anesthesia problems - no  Family history Anesthesia problems -  no   Plan of anesthesia - General    HPI:  This is a 62 y.o. female here for evaluation of :     globus  Gerd  Abd pain  bloating      ROS:  Constitutional: No fevers, chills, No weight loss  CV: No chest pain  Pulm: No cough, No shortness of breath  Ophtho: No vision changes  GI: see HPI  Derm: No rash    Medical History:  has a past medical history of Abnormal Pap smear, Abnormal Pap smear of cervix, Allergic rhinitis, Dementia, Dry eyes, Fever blister, History of bronchitis, History of headache, Hypercholesteremia (2015), Hypertension, Lumbar radicular pain (10/15/2018), Major depression, recurrent, chronic (10/05/2017), Reticulonodular infiltrate present on imaging of chest, Rheumatoid arthritis of hand (2013), Rheumatoid arthritis(714.0), Tooth infection (2022), and 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    Surgical History:  has a past surgical history that includes  section; Tubal ligation; Hysterectomy; Colonoscopy (N/A, 2019); Sinus surgery; Synovectomy of wrist (Right, 2022); Excision of nodule (Right, 2022); Surgical removal of distal ulna (Right, 2022); and Injection of steroid (Left, 2022).    Family History: family history includes Cataracts in her father and mother; Diabetes in her father and sister; Heart disease in her father; Hyperlipidemia in her father; Hypertension in her brother, father, and mother; No Known Problems in her daughter and son; Stroke in her brother.. Otherwise no colon cancer, inflammatory bowel disease, or GI malignancies.    Social History:  reports that she has never smoked. She has never used smokeless tobacco. She reports current alcohol use. She reports that she does not currently use  drugs.    Review of patient's allergies indicates:  No Known Allergies    Medications:   Medications Prior to Admission   Medication Sig Dispense Refill Last Dose    amLODIPine (NORVASC) 5 MG tablet Take 1 tablet (5 mg total) by mouth once daily. 90 tablet 4 1/24/2023    aspirin (ECOTRIN) 81 MG EC tablet Take 81 mg by mouth once daily.   1/24/2023    atorvastatin (LIPITOR) 10 MG tablet Take 1 tablet (10 mg total) by mouth once daily. 90 tablet 3 1/24/2023    cycloSPORINE (RESTASIS) 0.05 % ophthalmic emulsion Apply 1 drop into both eyes twice a day 180 vial 3 Past Week    desoximetasone (TOPICORT) 0.25 % cream Apply to affected area every other night x 1 month 60 g 1 Past Month    ergocalciferol (ERGOCALCIFEROL) 50,000 unit Cap Take 1 capsule (50,000 Units total) by mouth every 7 days. 12 capsule 2 1/24/2023    flu vacc [id number] 6mos up,PF, (FLUARIX QUAD 4843-9026, PF,) 60 mcg (15 mcg x 4)/0.5 mL Syrg use as directed 0.5 mL 0 Past Month    fluticasone propionate (FLONASE) 50 mcg/actuation nasal spray 1 spray (50 mcg total) by Each Nostril route once daily. 16 g 5 Past Month    folic acid (FOLVITE) 1 MG tablet Take 4 tablets (4 mg total) by mouth once daily. 120 tablet 11 1/24/2023    gabapentin (NEURONTIN) 300 MG capsule Take 1 capsule (300 mg total) by mouth 2 (two) times daily. 180 capsule 1 1/24/2023    hydrOXYzine HCL (ATARAX) 25 MG tablet Take 1 tablet (25 mg total) by mouth every evening. 30 tablet 1 Past Week    losartan (COZAAR) 100 MG tablet Take 1 tablet (100 mg total) by mouth once daily. 90 tablet 3 1/24/2023    PROTOPIC 0.1 % ointment AAA bid 100 g 2 Past Month    ruxolitinib (OPZELURA) 1.5 % Crea aaa bid 60 g 2 Past Month    triamcinolone acetonide 0.025% (KENALOG) 0.025 % cream Apply to affected area every day to twice a day to neck . Not more than 1-2 weeks straight in same location 80 g 1 Past Month    triamcinolone acetonide 0.1% (KENALOG) 0.1 % ointment Apply topically 2 (two) times daily. For 2  weeks 15 g 1 Past Month    ZINC ORAL Take by mouth.   1/24/2023    azelastine (ASTELIN) 137 mcg (0.1 %) nasal spray spray 1 spray (137 mcg total) by Nasal route 2 (two) times daily. 30 mL 3     chlorhexidine (PERIDEX) 0.12 % solution Use 15-20 mL to rinse mouth twice daily for 2 minutes, spit out and do not rinse mouth with water after. 473 mL 3 Unknown    cyanocobalamin, vitamin B-12, (VITAMIN B-12 ORAL) Take by mouth.   Unknown    cycloSPORINE (RESTASIS) 0.05 % ophthalmic emulsion Instill 1 drop Both Eyes twice a day 180 each 6 More than a month    etanercept (ENBREL SURECLICK) 50 mg/mL (1 mL) Inject 1 mL (50 mg total) into the skin once a week. 4 mL 11     fluticasone propion-salmeterol 115-21 mcg/dose (ADVAIR HFA) 115-21 mcg/actuation HFAA inhaler Inhale 2 puffs into the lungs every 12 (twelve) hours. 12 g 11     leflunomide (ARAVA) 20 MG Tab Take 1 tablet (20 mg total) by mouth once daily. 90 tablet 3     metoprolol succinate (TOPROL-XL) 25 MG 24 hr tablet Take 1 tablet (25 mg total) by mouth every evening. 90 tablet 4     nystatin (MYCOSTATIN) powder Apply to affected area every morning as needed for flare under breasts (Patient taking differently: Apply to affected area every morning as needed for flare under breasts) 120 g 6     pantoprazole (PROTONIX) 40 MG tablet Take 1 tablet (40 mg total) by mouth once daily. 30 tablet 3     promethazine (PHENERGAN) 25 MG tablet Take 1 tablet (25 mg total) by mouth every 6 (six) hours as needed for Nausea. 25 tablet 0 More than a month       Physical Exam:    Vital Signs:   Vitals:    01/25/23 0756   BP: (!) 140/98   Pulse:    Resp:    Temp:        General Appearance: Well appearing in no acute distress  Eyes:    No scleral icterus  ENT: Neck supple, Lips, mucosa, and tongue normal; teeth and gums normal  Abdomen: Soft, non tender, non distended with normal bowel sounds. No hepatosplenomegaly, ascites, or mass.  Extremities: No edema  Skin: No rash    Labs:  Lab Results    Component Value Date    WBC 5.75 11/10/2022    HGB 13.4 11/10/2022    HCT 42.2 11/10/2022     11/10/2022    CHOL 156 12/22/2021    TRIG 104 12/22/2021    HDL 49 12/22/2021    ALT 17 11/10/2022    AST 17 11/10/2022     11/10/2022    K 3.8 11/10/2022     11/10/2022    CREATININE 0.7 11/10/2022    BUN 15 11/10/2022    CO2 27 11/10/2022    TSH 2.453 11/10/2022    HGBA1C 5.5 11/10/2022       I have explained the risks and benefits of endoscopy procedures to the patient including but not limited to bleeding, perforation, infection, and death.  The patient was asked if they understand and allowed to ask any further questions to their satisfaction.      Ori St MD

## 2023-01-25 NOTE — [employer] PATIENT INSTRUCTIONS
Discharge Summary/Instructions after an Endoscopic Procedure  Patient Name: Silvia Cervantes  Patient MRN: [id number]  Patient YOB: 1960  Wednesday, January 25, 2023  Ori St MD  Dear patient,  As a result of recent federal legislation (The Federal Cures Act), you may   receive lab or pathology results from your procedure in your [facility]   account before your physician is able to contact you. Your physician or   their representative will relay the results to you with their   recommendations at their soonest availability.  Thank you,  Your health is very important to us during the Covid Crisis. Following your   procedure today, you will receive a daily text for 2 weeks asking about   signs or symptoms of Covid 19.  Please respond to this text when you   receive it so we can follow up and keep you as safe as possible.   RESTRICTIONS:  During your procedure today, you received medications for sedation.  These   medications may affect your judgment, balance and coordination.  Therefore,   for 24 hours, you have the following restrictions:   - DO NOT drive a car, operate machinery, make legal/financial decisions,   sign important papers or drink alcohol.    ACTIVITY:  Today: no heavy lifting, straining or running due to procedural   sedation/anesthesia.  The following day: return to full activity including work.  DIET:  Eat and drink normally unless instructed otherwise.     TREATMENT FOR COMMON SIDE EFFECTS:  - Mild abdominal pain, nausea, belching, bloating or excessive gas:  rest,   eat lightly and use a heating pad.  - Sore Throat: treat with throat lozenges and/or gargle with warm salt   water.  - Because air was used during the procedure, expelling large amounts of air   from your rectum or belching is normal.  - If a bowel prep was taken, you may not have a bowel movement for 1-3 days.    This is normal.  SYMPTOMS TO WATCH FOR AND REPORT TO YOUR PHYSICIAN:  1. Abdominal pain or bloating, other  than gas cramps.  2. Chest pain.  3. Back pain.  4. Signs of infection such as: chills or fever occurring within 24 hours   after the procedure.  5. Rectal bleeding, which would show as bright red, maroon, or black stools.   (A tablespoon of blood from the rectum is not serious, especially if   hemorrhoids are present.)  6. Vomiting.  7. Weakness or dizziness.  GO DIRECTLY TO THE NEAREST EMERGENCY ROOM IF YOU HAVE ANY OF THE FOLLOWING:      Difficulty breathing              Chills and/or fever over 101 F   Persistent vomiting and/or vomiting blood   Severe abdominal pain   Severe chest pain   Black, tarry stools   Bleeding- more than one tablespoon   Any other symptom or condition that you feel may need urgent attention  Your doctor recommends these additional instructions:  If any biopsies were taken, your doctors clinic will contact you in 1 to 2   weeks with any results.  - Discharge patient to home.   - Patient has a contact number available for emergencies.  The signs and   symptoms of potential delayed complications were discussed with the   patient.  Return to normal activities tomorrow.  Written discharge   instructions were provided to the patient.   - Resume previous diet.   - Continue present medications.   - Await pathology results.   - Enlarged tonsils on exam, and coughing during anesthesia, wiht secretions,   concern for possibly ENT etiology. Refer to ENT  For questions, problems or results please call your physician - Ori St MD.  EMERGENCY PHONE NUMBER: [phone],  LAB RESULTS: [phone]  IF A COMPLICATION OR EMERGENCY SITUATION ARISES AND YOU ARE UNABLE TO REACH   YOUR PHYSICIAN - GO DIRECTLY TO THE EMERGENCY ROOM.  Ori St MD  1/25/2023 8:18:48 AM  This report has been verified and signed electronically.  Dear patient,  As a result of recent federal legislation (The Federal Cures Act), you may   receive lab or pathology results from your procedure in your [facility]    account before your physician is able to contact you. Your physician or   their representative will relay the results to you with their   recommendations at their soonest availability.  Thank you,  [employer]

## 2023-01-25 NOTE — ANESTHESIA POSTPROCEDURE EVALUATION
Anesthesia Post Evaluation    Patient: Silvia Cervantes    Procedure(s) Performed: Procedure(s) (LRB):  EGD (ESOPHAGOGASTRODUODENOSCOPY) (N/A)    Final Anesthesia Type: general      Patient location during evaluation: GI PACU  Patient participation: Yes- Able to Participate  Level of consciousness: awake and alert and oriented  Post-procedure vital signs: reviewed and stable  Pain management: adequate  Airway patency: patent    PONV status at discharge: No PONV  Anesthetic complications: no      Cardiovascular status: blood pressure returned to baseline, hemodynamically stable and stable  Respiratory status: unassisted, spontaneous ventilation and room air  Hydration status: euvolemic  Follow-up not needed.          Vitals Value Taken Time   /89 01/25/23 0825   Temp 36.4 01/25/23 0825   Pulse 82 01/25/23 0825   Resp 20 01/25/23 0825   SpO2 100% 01/25/23 0825         No case tracking events are documented in the log.      Pain/Mita Score: No data recorded

## 2023-01-26 VITALS
OXYGEN SATURATION: 100 % | HEART RATE: 82 BPM | BODY MASS INDEX: 26.06 KG/M2 | RESPIRATION RATE: 20 BRPM | WEIGHT: 138 LBS | HEIGHT: 61 IN | SYSTOLIC BLOOD PRESSURE: 122 MMHG | TEMPERATURE: 99 F | DIASTOLIC BLOOD PRESSURE: 92 MMHG

## 2023-01-27 ENCOUNTER — OFFICE VISIT (OUTPATIENT)
Dept: CARDIOLOGY | Facility: CLINIC | Age: 63
End: 2023-01-27
Payer: MEDICARE

## 2023-01-27 VITALS
SYSTOLIC BLOOD PRESSURE: 122 MMHG | WEIGHT: 145.06 LBS | HEART RATE: 74 BPM | OXYGEN SATURATION: 95 % | BODY MASS INDEX: 27.39 KG/M2 | HEIGHT: 61 IN | DIASTOLIC BLOOD PRESSURE: 81 MMHG

## 2023-01-27 DIAGNOSIS — E78.2 MIXED HYPERLIPIDEMIA: ICD-10-CM

## 2023-01-27 DIAGNOSIS — Z86.79 HISTORY OF SUPRAVENTRICULAR TACHYCARDIA: ICD-10-CM

## 2023-01-27 DIAGNOSIS — I47.19 ATRIAL TACHYCARDIA: ICD-10-CM

## 2023-01-27 DIAGNOSIS — R00.2 PALPITATIONS: ICD-10-CM

## 2023-01-27 DIAGNOSIS — I70.0 AORTIC ATHEROSCLEROSIS: ICD-10-CM

## 2023-01-27 DIAGNOSIS — I10 HYPERTENSION, UNSPECIFIED TYPE: Primary | ICD-10-CM

## 2023-01-27 PROCEDURE — 99214 PR OFFICE/OUTPT VISIT, EST, LEVL IV, 30-39 MIN: ICD-10-PCS | Mod: S$GLB,,, | Performed by: INTERNAL MEDICINE

## 2023-01-27 PROCEDURE — 3074F PR MOST RECENT SYSTOLIC BLOOD PRESSURE < 130 MM HG: ICD-10-PCS | Mod: CPTII,S$GLB,, | Performed by: INTERNAL MEDICINE

## 2023-01-27 PROCEDURE — 1159F PR MEDICATION LIST DOCUMENTED IN MEDICAL RECORD: ICD-10-PCS | Mod: CPTII,S$GLB,, | Performed by: INTERNAL MEDICINE

## 2023-01-27 PROCEDURE — 1160F PR REVIEW ALL MEDS BY PRESCRIBER/CLIN PHARMACIST DOCUMENTED: ICD-10-PCS | Mod: CPTII,S$GLB,, | Performed by: INTERNAL MEDICINE

## 2023-01-27 PROCEDURE — 3079F PR MOST RECENT DIASTOLIC BLOOD PRESSURE 80-89 MM HG: ICD-10-PCS | Mod: CPTII,S$GLB,, | Performed by: INTERNAL MEDICINE

## 2023-01-27 PROCEDURE — 3008F BODY MASS INDEX DOCD: CPT | Mod: CPTII,S$GLB,, | Performed by: INTERNAL MEDICINE

## 2023-01-27 PROCEDURE — 3074F SYST BP LT 130 MM HG: CPT | Mod: CPTII,S$GLB,, | Performed by: INTERNAL MEDICINE

## 2023-01-27 PROCEDURE — 99214 OFFICE O/P EST MOD 30 MIN: CPT | Mod: S$GLB,,, | Performed by: INTERNAL MEDICINE

## 2023-01-27 PROCEDURE — 99499 UNLISTED E&M SERVICE: CPT | Mod: S$GLB,,, | Performed by: INTERNAL MEDICINE

## 2023-01-27 PROCEDURE — 1159F MED LIST DOCD IN RCRD: CPT | Mod: CPTII,S$GLB,, | Performed by: INTERNAL MEDICINE

## 2023-01-27 PROCEDURE — 3079F DIAST BP 80-89 MM HG: CPT | Mod: CPTII,S$GLB,, | Performed by: INTERNAL MEDICINE

## 2023-01-27 PROCEDURE — 1160F RVW MEDS BY RX/DR IN RCRD: CPT | Mod: CPTII,S$GLB,, | Performed by: INTERNAL MEDICINE

## 2023-01-27 PROCEDURE — 99999 PR PBB SHADOW E&M-EST. PATIENT-LVL III: ICD-10-PCS | Mod: PBBFAC,,, | Performed by: INTERNAL MEDICINE

## 2023-01-27 PROCEDURE — 99499 RISK ADDL DX/OHS AUDIT: ICD-10-PCS | Mod: S$GLB,,, | Performed by: INTERNAL MEDICINE

## 2023-01-27 PROCEDURE — 3008F PR BODY MASS INDEX (BMI) DOCUMENTED: ICD-10-PCS | Mod: CPTII,S$GLB,, | Performed by: INTERNAL MEDICINE

## 2023-01-27 PROCEDURE — 99999 PR PBB SHADOW E&M-EST. PATIENT-LVL III: CPT | Mod: PBBFAC,,, | Performed by: INTERNAL MEDICINE
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
## 2023-01-30 ENCOUNTER — TELEPHONE (OUTPATIENT)
Dept: NEUROLOGY | Facility: CLINIC | Age: 63
End: 2023-01-30
Payer: MEDICARE

## 2023-01-31 ENCOUNTER — OFFICE VISIT (OUTPATIENT)
Dept: NEUROLOGY | Facility: CLINIC | Age: 63
End: 2023-01-31
Payer: MEDICARE

## 2023-01-31 VITALS
WEIGHT: 147.5 LBS | HEIGHT: 61 IN | SYSTOLIC BLOOD PRESSURE: 147 MMHG | DIASTOLIC BLOOD PRESSURE: 92 MMHG | BODY MASS INDEX: 27.85 KG/M2 | HEART RATE: 78 BPM

## 2023-01-31 DIAGNOSIS — S06.9X5S TRAUMATIC BRAIN INJURY, WITH LOSS OF CONSCIOUSNESS GREATER THAN 24 HOURS WITH RETURN TO PRE-EXISTING CONSCIOUS LEVEL, SEQUELA: ICD-10-CM

## 2023-01-31 DIAGNOSIS — G25.71 RESTLESS MOVEMENT DISORDER: Primary | ICD-10-CM

## 2023-01-31 DIAGNOSIS — R20.0 NUMBNESS: ICD-10-CM

## 2023-01-31 LAB
FINAL PATHOLOGIC DIAGNOSIS: NORMAL
GROSS: NORMAL
Lab: NORMAL

## 2023-01-31 PROCEDURE — 3008F BODY MASS INDEX DOCD: CPT | Mod: CPTII,S$GLB,, | Performed by: PSYCHIATRY & NEUROLOGY

## 2023-01-31 PROCEDURE — 99999 PR PBB SHADOW E&M-EST. PATIENT-LVL III: ICD-10-PCS | Mod: PBBFAC,,, | Performed by: PSYCHIATRY & NEUROLOGY

## 2023-01-31 PROCEDURE — 3077F PR MOST RECENT SYSTOLIC BLOOD PRESSURE >= 140 MM HG: ICD-10-PCS | Mod: CPTII,S$GLB,, | Performed by: PSYCHIATRY & NEUROLOGY

## 2023-01-31 PROCEDURE — 99214 PR OFFICE/OUTPT VISIT, EST, LEVL IV, 30-39 MIN: ICD-10-PCS | Mod: S$GLB,,, | Performed by: PSYCHIATRY & NEUROLOGY

## 2023-01-31 PROCEDURE — 3008F PR BODY MASS INDEX (BMI) DOCUMENTED: ICD-10-PCS | Mod: CPTII,S$GLB,, | Performed by: PSYCHIATRY & NEUROLOGY

## 2023-01-31 PROCEDURE — 1159F MED LIST DOCD IN RCRD: CPT | Mod: CPTII,S$GLB,, | Performed by: PSYCHIATRY & NEUROLOGY

## 2023-01-31 PROCEDURE — 3080F DIAST BP >= 90 MM HG: CPT | Mod: CPTII,S$GLB,, | Performed by: PSYCHIATRY & NEUROLOGY

## 2023-01-31 PROCEDURE — 1159F PR MEDICATION LIST DOCUMENTED IN MEDICAL RECORD: ICD-10-PCS | Mod: CPTII,S$GLB,, | Performed by: PSYCHIATRY & NEUROLOGY

## 2023-01-31 PROCEDURE — 3080F PR MOST RECENT DIASTOLIC BLOOD PRESSURE >= 90 MM HG: ICD-10-PCS | Mod: CPTII,S$GLB,, | Performed by: PSYCHIATRY & NEUROLOGY

## 2023-01-31 PROCEDURE — 99999 PR PBB SHADOW E&M-EST. PATIENT-LVL III: CPT | Mod: PBBFAC,,, | Performed by: PSYCHIATRY & NEUROLOGY

## 2023-01-31 PROCEDURE — 3077F SYST BP >= 140 MM HG: CPT | Mod: CPTII,S$GLB,, | Performed by: PSYCHIATRY & NEUROLOGY

## 2023-01-31 PROCEDURE — 99214 OFFICE O/P EST MOD 30 MIN: CPT | Mod: S$GLB,,, | Performed by: PSYCHIATRY & NEUROLOGY

## 2023-01-31 RX ORDER — GABAPENTIN 300 MG/1
300 CAPSULE ORAL 3 TIMES DAILY
Qty: 270 CAPSULE | Refills: 1 | Status: SHIPPED | OUTPATIENT
Start: 2023-01-31 | End: 2023-04-03 | Stop reason: SDUPTHER
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
## 2023-02-01 ENCOUNTER — OFFICE VISIT (OUTPATIENT)
Dept: RHEUMATOLOGY | Facility: CLINIC | Age: 63
End: 2023-02-01
Payer: MEDICARE

## 2023-02-01 VITALS
DIASTOLIC BLOOD PRESSURE: 88 MMHG | HEIGHT: 61 IN | SYSTOLIC BLOOD PRESSURE: 139 MMHG | HEART RATE: 69 BPM | WEIGHT: 148 LBS | BODY MASS INDEX: 27.94 KG/M2

## 2023-02-01 DIAGNOSIS — Z79.899 ENCOUNTER FOR LONG-TERM CURRENT USE OF HIGH RISK MEDICATION: Primary | ICD-10-CM

## 2023-02-01 PROCEDURE — 99999 PR PBB SHADOW E&M-EST. PATIENT-LVL III: ICD-10-PCS | Mod: PBBFAC,,, | Performed by: INTERNAL MEDICINE

## 2023-02-01 PROCEDURE — 3008F BODY MASS INDEX DOCD: CPT | Mod: CPTII,S$GLB,, | Performed by: INTERNAL MEDICINE

## 2023-02-01 PROCEDURE — 3079F PR MOST RECENT DIASTOLIC BLOOD PRESSURE 80-89 MM HG: ICD-10-PCS | Mod: CPTII,S$GLB,, | Performed by: INTERNAL MEDICINE

## 2023-02-01 PROCEDURE — 3079F DIAST BP 80-89 MM HG: CPT | Mod: CPTII,S$GLB,, | Performed by: INTERNAL MEDICINE

## 2023-02-01 PROCEDURE — 99214 OFFICE O/P EST MOD 30 MIN: CPT | Mod: S$GLB,,, | Performed by: INTERNAL MEDICINE

## 2023-02-01 PROCEDURE — 3075F SYST BP GE 130 - 139MM HG: CPT | Mod: CPTII,S$GLB,, | Performed by: INTERNAL MEDICINE

## 2023-02-01 PROCEDURE — 3008F PR BODY MASS INDEX (BMI) DOCUMENTED: ICD-10-PCS | Mod: CPTII,S$GLB,, | Performed by: INTERNAL MEDICINE

## 2023-02-01 PROCEDURE — 99999 PR PBB SHADOW E&M-EST. PATIENT-LVL III: CPT | Mod: PBBFAC,,, | Performed by: INTERNAL MEDICINE

## 2023-02-01 PROCEDURE — 1159F PR MEDICATION LIST DOCUMENTED IN MEDICAL RECORD: ICD-10-PCS | Mod: CPTII,S$GLB,, | Performed by: INTERNAL MEDICINE

## 2023-02-01 PROCEDURE — 1159F MED LIST DOCD IN RCRD: CPT | Mod: CPTII,S$GLB,, | Performed by: INTERNAL MEDICINE

## 2023-02-01 PROCEDURE — 99214 PR OFFICE/OUTPT VISIT, EST, LEVL IV, 30-39 MIN: ICD-10-PCS | Mod: S$GLB,,, | Performed by: INTERNAL MEDICINE

## 2023-02-01 PROCEDURE — 3075F PR MOST RECENT SYSTOLIC BLOOD PRESS GE 130-139MM HG: ICD-10-PCS | Mod: CPTII,S$GLB,, | Performed by: INTERNAL MEDICINE

## 2023-02-01 NOTE — PROGRESS NOTES
Subjective:       Patient ID: Silvia Cervantes is a 54 y.o. female.    Chief Complaint:     HPI 53 yo F with PMH of HTN, abnormal pap (2013 but recent negative, no cancer), RA (+CCP, RF),erosive here for evaluation. She was diagnosed in 2011 with hand and feet with swelling and pain. She was initially treated with MTX but it gave her nausea which gave her nausea.  She was changed to leflunomide 20 mg 10/2014 from mtx due to nausea on the mtx. Then I added etanercept January 2015 due to incomplete control of her arthritis on leflunomide.  No am stiffness.  She reports mild aching in hands (3/10). Pain is aching.  Reports mild swelling in hands but better than usual.  She reports left upper quadrant pain (4/10) , non-radiating with possible nausea which has been present for months. Sometimes she has sour taste in mouth.       Interval history:  Patient is here for follow up.   Patient is s/p right rheumatoid nodule excision elbow and wrist, extensor tenosynovectomy and osteophyte excision ulna 6/30/22.   She reports she has been cleared to restart the enbrel. She has not taken enbrel since June 21.   Pain level is 5/10 in hands. Reports some improvement in strength in her hands.  Denies history of diverticulitis, PUD, or blood clot.         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   HENT: Negative for congestion, ear discharge, ear pain, facial swelling, mouth sores, sinus pressure, sneezing, sore throat, tinnitus and trouble swallowing.    Eyes: Negative for photophobia, pain, discharge, redness, itching and visual disturbance.   Respiratory:  Negative for apnea, chest tightness, shortness of breath, wheezing and stridor.    Cardiovascular: Negative for leg swelling.   Gastrointestinal: Negative for nausea, abdominal pain, diarrhea, constipation, blood in stool, abdominal distention and anal bleeding.   Endocrine: Negative for cold intolerance and heat intolerance.   Genitourinary: Negative for dysuria and difficulty urinating.   Musculoskeletal: Positive for arthralgias. Negative for myalgias, back pain, joint swelling, gait problem, neck pain and neck stiffness.   Skin: Negative for color change, pallor, rash and wound.   Neurological: Negative for dizziness, seizures, light-headedness and numbness.   Hematological: Negative for adenopathy. Does not bruise/bleed easily.   Psychiatric/Behavioral: Negative for sleep disturbance. The patient is not nervous/anxious.       Objective:        Physical Exam   Constitutional: She is oriented to person, place, and time.   HENT:   Head: Normocephalic and atraumatic.   Right Ear: External ear normal.   Left Ear: External ear normal.   Nose: Nose normal.   Mouth/Throat: Oropharynx is clear and moist. No oropharyngeal exudate.   Eyes: Conjunctivae and EOM are normal. Pupils are equal, round, and reactive to light. Right eye exhibits no discharge. Left eye exhibits no discharge. No scleral icterus.   Neck: Neck supple. No JVD present. No thyromegaly present.   Cardiovascular: Normal rate, regular rhythm, normal heart sounds and intact distal pulses.  Exam reveals no gallop and no friction rub.    No murmur heard.  Pulmonary/Chest: Effort normal and breath sounds normal. No respiratory distress. She has no wheezes. She has no rales. She exhibits no tenderness.   Abdominal: Soft. Bowel sounds are normal. She exhibits no distension and no mass. There is no tenderness. There is no rebound and no guarding.   Lymphadenopathy:     She has no cervical adenopathy.   Neurological: She is alert and oriented to person, place, and  "time. No cranial nerve deficit. Gait normal. Coordination normal.   Skin:diffuse hypopigmented lesions in arms, hands, legs, chest, back   Psychiatric: Affect and judgment normal.   Musculoskeletal: She exhibits no  tenderness. She exhibits no edema.   FROM of all joints including neck, shoulders, spine, ankles, wrists, knees, and ankles; no joint deformities noted or effusions, erythema or warmth; no tophi or nodules noted; no crepitus; no nail pitting or onycholysis          Labs:  Esr-25  ccp-93  rf-36  Flor-negative    Imaging:    MRI brain (2021):   Impression:     Scattered areas of prior hemorrhage including parenchymal, leptomeningeal and intraventricular involvement.  Moderate supratentorial leukoencephalopathy.  Small focus of right anterior temporal encephalomalacia.  Although not entirely specific, the constellation of findings is most suggestive of prior traumatic brain injury/diffuse axonal injury.  Clinical correlation required.     No evidence of new hemorrhage or major vascular distribution infarct.  MRI (7/22/2015)   (Stable enhancing marginal erosions in the second and third metacarpal heads, lunate, triquetrum, and capitate)      Right elbow xray (2020): I personally reviewed      dexa scan:(2017):  Osteopenia of the femoral neck. FRAX calculation does not support treatment for osteoporosis.Total hip and lumbar spine BMD unchanged since 2013.    Recommendations:  1) Adequate calcium and Vitamin D therapy  2) Appropriate exercise  3) Consider repeat BMD in 2- 4 years          Assessment:     62 yo F with PMH of HTN, abnormal pap (2013 but recent negative, no cancer), RA (+CCP, RF),erosive here , H. Pylori for follow up of seropositive RA and sarcoid.   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  She has abnormal brain MRI  AMYLOID ANGIOPATHY and IS SEEING NEURO.  She had worsening of nodules in right elbow and hair " thinning, so  MTX was stopped.    Patient is s/p right rheumatoid nodule excision elbow and wrist, extensor tenosynovectomy and osteophyte excision ulna 6/30/22.       She is doing well on enbrel . She self stopped arava.  We can consider putting her on arava or SSZ  At next visit if needed            -continue enbrel 50mg sub q once a week  (Risks of TNF inhibitor discussed with patient and not limited to cell count abnormalities, malignancy, allergic  reaction to medication, and increased risk of infection. Patient agrees with starting medication.)   -Continue folic acid 4 mg a day  Labs   30 * minutes of total time spent on the encounter, which includes face to face time and non-face to face time preparing to see the patient (eg, review of tests), Obtaining and/or reviewing separately obtained history, Documenting clinical information in the electronic or other health record, Independently interpreting results (not separately reported) and communicating results to the patient/family/caregiver, or Care coordination (not separately reported).

## 2023-02-01 NOTE — PROGRESS NOTES
Subjective:       Patient ID: Silvia Cervantes is a 54 y.o. female.    Chief Complaint:     HPI 53 yo F with PMH of HTN, abnormal pap (2013 but recent negative, no cancer), RA (+CCP, RF),erosive here for evaluation. She was diagnosed in 2011 with hand and feet with swelling and pain. She was initially treated with MTX but it gave her nausea which gave her nausea.  She was changed to leflunomide 20 mg 10/2014 from mtx due to nausea on the mtx. Then I added etanercept January 2015 due to incomplete control of her arthritis on leflunomide.  No am stiffness.  She reports mild aching in hands (3/10). Pain is aching.  Reports mild swelling in hands but better than usual.  She reports left upper quadrant pain (4/10) , non-radiating with possible nausea which has been present for months. Sometimes she has sour taste in mouth.       Interval history:  Patient is here for follow up.   Patient is s/p right rheumatoid nodule excision elbow and wrist, extensor tenosynovectomy and osteophyte excision ulna 6/30/22.   She reports she has been cleared to restart the enbrel.  Pain level is 5/10 in hands. Reports some improvement in strength in her hands.  Denies history of diverticulitis, PUD, or blood clot.         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   HENT: Negative for congestion, ear discharge, ear pain, facial swelling, mouth sores, sinus pressure, sneezing, sore throat, tinnitus and trouble swallowing.    Eyes: Negative for photophobia, pain, discharge, redness, itching and visual disturbance.   Respiratory: Negative for apnea, chest tightness,  shortness of breath, wheezing and stridor.    Cardiovascular: Negative for leg swelling.   Gastrointestinal: Negative for nausea, abdominal pain, diarrhea, constipation, blood in stool, abdominal distention and anal bleeding.   Endocrine: Negative for cold intolerance and heat intolerance.   Genitourinary: Negative for dysuria and difficulty urinating.   Musculoskeletal: Positive for arthralgias. Negative for myalgias, back pain, joint swelling, gait problem, neck pain and neck stiffness.   Skin: Negative for color change, pallor, rash and wound.   Neurological: Negative for dizziness, seizures, light-headedness and numbness.   Hematological: Negative for adenopathy. Does not bruise/bleed easily.   Psychiatric/Behavioral: Negative for sleep disturbance. The patient is not nervous/anxious.       Objective:        Physical Exam   Constitutional: She is oriented to person, place, and time.   HENT:   Head: Normocephalic and atraumatic.   Right Ear: External ear normal.   Left Ear: External ear normal.   Nose: Nose normal.   Mouth/Throat: Oropharynx is clear and moist. No oropharyngeal exudate.   Eyes: Conjunctivae and EOM are normal. Pupils are equal, round, and reactive to light. Right eye exhibits no discharge. Left eye exhibits no discharge. No scleral icterus.   Neck: Neck supple. No JVD present. No thyromegaly present.   Cardiovascular: Normal rate, regular rhythm, normal heart sounds and intact distal pulses.  Exam reveals no gallop and no friction rub.    No murmur heard.  Pulmonary/Chest: Effort normal and breath sounds normal. No respiratory distress. She has no wheezes. She has no rales. She exhibits no tenderness.   Abdominal: Soft. Bowel sounds are normal. She exhibits no distension and no mass. There is no tenderness. There is no rebound and no guarding.   Lymphadenopathy:     She has no cervical adenopathy.   Neurological: She is alert and oriented to person, place, and time. No cranial nerve deficit. Gait  "normal. Coordination normal.   Skin:diffuse hypopigmented lesions in arms, hands, legs, chest, back   Psychiatric: Affect and judgment normal.   Musculoskeletal: She exhibits no  tenderness. She exhibits no edema.   FROM of all joints including neck, shoulders, spine, ankles, wrists, knees, and ankles; no joint deformities noted or effusions, erythema or warmth; no tophi or nodules noted; no crepitus; no nail pitting or onycholysis          Labs:  Esr-25  ccp-93  rf-36  Flor-negative    Imaging:    MRI brain (2021):   Impression:     Scattered areas of prior hemorrhage including parenchymal, leptomeningeal and intraventricular involvement.  Moderate supratentorial leukoencephalopathy.  Small focus of right anterior temporal encephalomalacia.  Although not entirely specific, the constellation of findings is most suggestive of prior traumatic brain injury/diffuse axonal injury.  Clinical correlation required.     No evidence of new hemorrhage or major vascular distribution infarct.  MRI (7/22/2015)   (Stable enhancing marginal erosions in the second and third metacarpal heads, lunate, triquetrum, and capitate)      Right elbow xray (2020): I personally reviewed      dexa scan:(2017):  Osteopenia of the femoral neck. FRAX calculation does not support treatment for osteoporosis.Total hip and lumbar spine BMD unchanged since 2013.    Recommendations:  1) Adequate calcium and Vitamin D therapy  2) Appropriate exercise  3) Consider repeat BMD in 2- 4 years          Assessment:     60 yo F with PMH of HTN, abnormal pap (2013 but recent negative, no cancer), RA (+CCP, RF),erosive here , H. Pylori for follow up of seropositive RA and sarcoid.   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  She has abnormal brain MRI  AMYLOID ANGIOPATHY and IS SEEING NEURO.  She had worsening of nodules in right elbow and hair thinning, so  MTX was stopped.    " Patient is s/p right rheumatoid nodule excision elbow and wrist, extensor tenosynovectomy and osteophyte excision ulna 6/30/22.       She is doing well on enbrel . She self stopped arava.  We can consider putting her on arava or SSZ  At next visit if needed            -restart enbrel 50mg sub q once a week  (Risks of TNF inhibitor discussed with patient and not limited to cell count abnormalities, malignancy, allergic  reaction to medication, and increased risk of infection. Patient agrees with starting medication.)   -Continue folic acid 4 mg a day  Labs in 3 months and follow up in 6 months    30 * minutes of total time spent on the encounter, which includes face to face time and non-face to face time preparing to see the patient (eg, review of tests), Obtaining and/or reviewing separately obtained history, Documenting clinical information in the electronic or other health record, Independently interpreting results (not separately reported) and communicating results to the patient/family/caregiver, or Care coordination (not separately reported).

## 2023-02-04 ENCOUNTER — PATIENT MESSAGE (OUTPATIENT)
Dept: RHEUMATOLOGY | Facility: CLINIC | Age: 63
End: 2023-02-04
Payer: MEDICARE

## 2023-02-10 ENCOUNTER — SPECIALTY PHARMACY (OUTPATIENT)
Dept: PHARMACY | Facility: CLINIC | Age: 63
End: 2023-02-10
Payer: MEDICARE

## 2023-02-14 NOTE — TELEPHONE ENCOUNTER
Specialty Pharmacy - Refill Coordination    Specialty Medication Orders Linked to Encounter      Flowsheet Row Most Recent Value   Medication #1 etanercept (ENBREL SURECLICK) 50 mg/mL (1 mL) (Order#[id number], Rx#[id number])            Refill Questions - Documented Responses      Flowsheet Row Most Recent Value   Patient Availability and HIPAA Verification    Does patient want to proceed with activity? Yes   HIPAA/medical authority confirmed? Yes   Relationship to patient of person spoken to? Self   Refill Screening Questions    Changes to allergies? No   Changes to medications? No   New conditions since last clinic visit? No   Unplanned office visit, urgent care, ED, or hospital admission in the last 4 weeks? No   How does patient/caregiver feel medication is working? Good   Financial problems or insurance changes? No   How many doses of your specialty medications were missed in the last 4 weeks? 0   Would patient like to speak to a pharmacist? No   When does the patient need to receive the medication? 02/24/23   Refill Delivery Questions    How will the patient receive the medication? Pickup   When does the patient need to receive the medication? 02/24/23   Shipping Address Home   Address in [facility] confirmed and updated if neccessary? Yes   Expected Copay ($) 0   Is the patient able to afford the medication copay? Yes   Payment Method zero copay   Days supply of Refill 28   Supplies needed? No supplies needed   Refill activity completed? Yes   Refill activity plan Refill scheduled   Shipment/Pickup Date: 02/24/23            Current Outpatient Medications   Medication Sig    amLODIPine (NORVASC) 5 MG tablet Take 1 tablet (5 mg total) by mouth once daily.    aspirin (ECOTRIN) 81 MG EC tablet Take 81 mg by mouth once daily.    atorvastatin (LIPITOR) 10 MG tablet Take 1 tablet (10 mg total) by mouth once daily.    azelastine (ASTELIN) 137 mcg (0.1 %) nasal spray spray 1 spray (137 mcg total) by Nasal route  2 (two) times daily.    chlorhexidine (PERIDEX) 0.12 % solution Use 15-20 mL to rinse mouth twice daily for 2 minutes, spit out and do not rinse mouth with water after.    cyanocobalamin, vitamin B-12, (VITAMIN B-12 ORAL) Take by mouth.    cycloSPORINE (RESTASIS) 0.05 % ophthalmic emulsion Apply 1 drop into both eyes twice a day    cycloSPORINE (RESTASIS) 0.05 % ophthalmic emulsion Instill 1 drop Both Eyes twice a day (Patient not taking: Reported on 2/1/2023)    desoximetasone (TOPICORT) 0.25 % cream Apply to affected area every other night x 1 month    ergocalciferol (ERGOCALCIFEROL) 50,000 unit Cap Take 1 capsule (50,000 Units total) by mouth every 7 days.    etanercept (ENBREL SURECLICK) 50 mg/mL (1 mL) Inject 1 mL (50 mg total) into the skin once a week.    flu vacc [id number] 6mos up,PF, (FLUARIX QUAD [id number], PF,) 60 mcg (15 mcg x 4)/0.5 mL Syrg use as directed    fluticasone propion-salmeterol 115-21 mcg/dose (ADVAIR HFA) 115-21 mcg/actuation HFAA inhaler Inhale 2 puffs into the lungs every 12 (twelve) hours.    fluticasone propionate (FLONASE) 50 mcg/actuation nasal spray 1 spray (50 mcg total) by Each Nostril route once daily.    folic acid (FOLVITE) 1 MG tablet Take 4 tablets (4 mg total) by mouth once daily.    gabapentin (NEURONTIN) 300 MG capsule Take 1 capsule (300 mg total) by mouth 3 (three) times daily.    hydrOXYzine HCL (ATARAX) 25 MG tablet Take 1 tablet (25 mg total) by mouth every evening.    leflunomide (ARAVA) 20 MG Tab Take 1 tablet (20 mg total) by mouth once daily.    losartan (COZAAR) 100 MG tablet Take 1 tablet (100 mg total) by mouth once daily.    metoprolol succinate (TOPROL-XL) 25 MG 24 hr tablet Take 1 tablet (25 mg total) by mouth every evening.    nystatin (MYCOSTATIN) powder Apply to affected area every morning as needed for flare under breasts (Patient taking differently: Apply to affected area every morning as needed for flare under breasts)    pantoprazole (PROTONIX) 40 MG  tablet Take 1 tablet (40 mg total) by mouth once daily.    promethazine (PHENERGAN) 25 MG tablet Take 1 tablet (25 mg total) by mouth every 6 (six) hours as needed for Nausea.    PROTOPIC 0.1 % ointment AAA bid    ruxolitinib (OPZELURA) 1.5 % Crea aaa bid    triamcinolone acetonide 0.025% (KENALOG) 0.025 % cream Apply to affected area every day to twice a day to neck . Not more than 1-2 weeks straight in same location    triamcinolone acetonide 0.1% (KENALOG) 0.1 % ointment Apply topically 2 (two) times daily. For 2 weeks    ZINC ORAL Take by mouth.   Last reviewed on 2/1/2023  2:35 PM by Joycelyn Ramirez MA    Review of patient's allergies indicates:  No Known Allergies Last reviewed on  2/1/2023 2:35 PM by Joycelyn Ramirez      Tasks added this encounter   3/17/2023 - Refill Call (Auto Added)  2/25/2023 - Pickup Reminder   Tasks due within next 3 months   3/15/2023 - Clinical - Follow Up Assesement (Annual)     [facility]  [address]  [facility] [address]  Phone: [phone]  Fax: [phone]

## 2023-03-06 DIAGNOSIS — I10 ESSENTIAL HYPERTENSION: ICD-10-CM

## 2023-03-06 RX ORDER — AMLODIPINE BESYLATE 5 MG/1
5 TABLET ORAL DAILY
Qty: 90 TABLET | Refills: 3 | Status: SHIPPED | OUTPATIENT
Start: 2023-03-06 | End: 2024-01-08 | Stop reason: SDUPTHER

## 2023-03-06 NOTE — TELEPHONE ENCOUNTER
Care Due:                  Date            Visit Type   Department     Provider  --------------------------------------------------------------------------------                                EP -                              [facility]    Adrián Machadoemily  Last Visit: 12-      CARE ([facility])   MEDICINE       Rayo                              [facility]  Next Visit: 03-      CARE ([facility])   MEDICINE       Rayo                                                            Last  Test          Frequency    Reason                     Performed    Due Date  --------------------------------------------------------------------------------    Lipid Panel.  12 months..  atorvastatin.............  12- 12-    [employer] Embedded Care Gaps. Reference number: [id number]. 3/06/2023   8:59:29 AM CST

## 2023-03-06 NOTE — TELEPHONE ENCOUNTER
Refill Decision Note   Silvia Cervantes  is requesting a refill authorization.  Brief Assessment and Rationale for Refill:  Approve     Medication Therapy Plan:  FOVS    Medication Reconciliation Completed: No   Comments:     Provider Staff:     Action is required for this patient.   Please see care gap opportunities below in Care Due Message.     Thanks!  [facility]     Appointments      Date Provider   Last Visit   12/21/2022 Adrián Cade MD   Next Visit   3/21/2023 Adrián Cade MD     Note composed:9:54 AM 03/06/2023           Note composed:9:54 AM 03/06/2023

## 2023-03-17 ENCOUNTER — SPECIALTY PHARMACY (OUTPATIENT)
Dept: PHARMACY | Facility: CLINIC | Age: 63
End: 2023-03-17
Payer: MEDICARE

## 2023-03-17 NOTE — TELEPHONE ENCOUNTER
Specialty Pharmacy - Refill Coordination    Specialty Medication Orders Linked to Encounter      Flowsheet Row Most Recent Value   Medication #1 etanercept (ENBREL SURECLICK) 50 mg/mL (1 mL) (Order#[id number], Rx#[id number])            Refill Questions - Documented Responses      Flowsheet Row Most Recent Value   Patient Availability and HIPAA Verification    Does patient want to proceed with activity? Yes   HIPAA/medical authority confirmed? Yes   Relationship to patient of person spoken to? Self   Refill Screening Questions    Changes to allergies? No   Changes to medications? No   New conditions since last clinic visit? No   Unplanned office visit, urgent care, ED, or hospital admission in the last 4 weeks? No   How does patient/caregiver feel medication is working? Very good   Financial problems or insurance changes? No   How many doses of your specialty medications were missed in the last 4 weeks? 0   Would patient like to speak to a pharmacist? No   When does the patient need to receive the medication? 03/21/23   Refill Delivery Questions    How will the patient receive the medication? MEDRx   When does the patient need to receive the medication? 03/21/23   Shipping Address Home   Address in [facility] confirmed and updated if neccessary? Yes   Expected Copay ($) 0   Is the patient able to afford the medication copay? Yes   Payment Method zero copay   Days supply of Refill 28   Supplies needed? No supplies needed   Refill activity completed? Yes   Refill activity plan Refill scheduled   Shipment/Pickup Date: 03/21/23            Current Outpatient Medications   Medication Sig    amLODIPine (NORVASC) 5 MG tablet Take 1 tablet (5 mg total) by mouth once daily.    aspirin (ECOTRIN) 81 MG EC tablet Take 81 mg by mouth once daily.    atorvastatin (LIPITOR) 10 MG tablet Take 1 tablet (10 mg total) by mouth once daily.    azelastine (ASTELIN) 137 mcg (0.1 %) nasal spray spray 1 spray (137 mcg total) by Nasal  route 2 (two) times daily.    chlorhexidine (PERIDEX) 0.12 % solution Use 15-20 mL to rinse mouth twice daily for 2 minutes, spit out and do not rinse mouth with water after.    cyanocobalamin, vitamin B-12, (VITAMIN B-12 ORAL) Take by mouth.    cycloSPORINE (RESTASIS) 0.05 % ophthalmic emulsion Apply 1 drop into both eyes twice a day    cycloSPORINE (RESTASIS) 0.05 % ophthalmic emulsion Instill 1 drop Both Eyes twice a day (Patient not taking: Reported on 2/1/2023)    desoximetasone (TOPICORT) 0.25 % cream Apply to affected area every other night x 1 month    ergocalciferol (ERGOCALCIFEROL) 50,000 unit Cap Take 1 capsule (50,000 Units total) by mouth every 7 days.    etanercept (ENBREL SURECLICK) 50 mg/mL (1 mL) Inject 1 mL (50 mg total) into the skin once a week.    flu vacc [id number] 6mos up,PF, (FLUARIX QUAD [id number], PF,) 60 mcg (15 mcg x 4)/0.5 mL Syrg use as directed    fluticasone propion-salmeterol 115-21 mcg/dose (ADVAIR HFA) 115-21 mcg/actuation HFAA inhaler Inhale 2 puffs into the lungs every 12 (twelve) hours.    fluticasone propionate (FLONASE) 50 mcg/actuation nasal spray 1 spray (50 mcg total) by Each Nostril route once daily.    folic acid (FOLVITE) 1 MG tablet Take 4 tablets (4 mg total) by mouth once daily.    gabapentin (NEURONTIN) 300 MG capsule Take 1 capsule (300 mg total) by mouth 3 (three) times daily.    hydrOXYzine HCL (ATARAX) 25 MG tablet Take 1 tablet (25 mg total) by mouth every evening.    leflunomide (ARAVA) 20 MG Tab Take 1 tablet (20 mg total) by mouth once daily.    losartan (COZAAR) 100 MG tablet Take 1 tablet (100 mg total) by mouth once daily.    metoprolol succinate (TOPROL-XL) 25 MG 24 hr tablet Take 1 tablet (25 mg total) by mouth every evening.    nystatin (MYCOSTATIN) powder Apply to affected area every morning as needed for flare under breasts (Patient taking differently: Apply to affected area every morning as needed for flare under breasts)    pantoprazole (PROTONIX)  40 MG tablet Take 1 tablet (40 mg total) by mouth once daily.    promethazine (PHENERGAN) 25 MG tablet Take 1 tablet (25 mg total) by mouth every 6 (six) hours as needed for Nausea.    PROTOPIC 0.1 % ointment AAA bid    ruxolitinib (OPZELURA) 1.5 % Crea aaa bid    triamcinolone acetonide 0.025% (KENALOG) 0.025 % cream Apply to affected area every day to twice a day to neck . Not more than 1-2 weeks straight in same location    triamcinolone acetonide 0.1% (KENALOG) 0.1 % ointment Apply topically 2 (two) times daily. For 2 weeks    ZINC ORAL Take by mouth.   Last reviewed on 2/1/2023  2:35 PM by Joycelyn Ramirez MA    Review of patient's allergies indicates:  No Known Allergies Last reviewed on  2/1/2023 2:35 PM by Joycelyn Ramirez      Tasks added this encounter   4/11/2023 - Refill Call (Auto Added)   Tasks due within next 3 months   3/15/2023 - Clinical - Follow Up Assesement (Annual)     Leda Meredith, PharmD  [facility]  [address]  [facility] [address]  Phone: [phone]  Fax: [phone]

## 2023-03-17 NOTE — TELEPHONE ENCOUNTER
Specialty Pharmacy - Refill Coordination  Specialty Pharmacy - Clinical Reassessment    Specialty Medication Orders Linked to Encounter      Flowsheet Row Most Recent Value   Medication #1 etanercept (ENBREL SURECLICK) 50 mg/mL (1 mL) (Order#[id number], Rx#[id number])          Patient Diagnosis   M05.9 - Rheumatoid arthritis with positive rheumatoid factor    Silvia Cervantes is a 62 y.o. female, who is followed by the specialty pharmacy service for management and education of her RA.  She has been on therapy with Enbrel since 2015.  I have reviewed her electronic medical record and current medication list and determined that specialty medication adjustment Is not needed at this time.    Patient has experienced adverse events such as burning at the injection site, and is managed in the following way(s): making sure pen reaches room temp before injection, using ice on the injection site area.  She Is adherent reporting 0 missed doses since last review.  Adherence has been encouraged with the following mechanism(s): proactive refill calls.  She is meeting goals of therapy and will continue treatment.        3/17/2023 2/14/2023 1/17/2023 12/16/2022 11/22/2022 10/21/2022 9/21/2022   Follow Up Review   # of missed doses 0 0 0 0    0 0 0 0   New Medications? No No Yes No    No No No Yes   New Conditions? No No No No    No No No No   New Allergies? No No No No    No No No No   Med Effective? Very good Good Good Good    Good Good Good Very good   Urgent Care? No No No No    No No No No   Requested Pharmacist? No No No No    No No No No       Multiple values from one day are sorted in reverse-chronological order         Therapy is appropriate to continue.    Therapy is effective: Yes  On scale of 1 to 10, how does patient rank quality of life? (10 - Best): 8  Recommendations: none at this time.  Review Method: Patient Contact    Tasks added this encounter   4/11/2023 - Refill Call (Auto Added)  12/17/2023 - Clinical - Follow  Up Assesement (Annual)   Tasks due within next 3 months   No tasks due.     Leda Meredith, PharmD  [facility]  [address]  [facility] [address]  Phone: [phone]  Fax: [phone]

## 2023-03-21 ENCOUNTER — OFFICE VISIT (OUTPATIENT)
Dept: FAMILY MEDICINE | Facility: CLINIC | Age: 63
End: 2023-03-21
Attending: FAMILY MEDICINE
Payer: MEDICARE

## 2023-03-21 ENCOUNTER — LAB VISIT (OUTPATIENT)
Dept: LAB | Facility: HOSPITAL | Age: 63
End: 2023-03-21
Attending: INTERNAL MEDICINE
Payer: MEDICARE

## 2023-03-21 VITALS
OXYGEN SATURATION: 95 % | BODY MASS INDEX: 28.22 KG/M2 | SYSTOLIC BLOOD PRESSURE: 136 MMHG | DIASTOLIC BLOOD PRESSURE: 76 MMHG | WEIGHT: 149.5 LBS | HEIGHT: 61 IN | HEART RATE: 100 BPM

## 2023-03-21 DIAGNOSIS — Z79.899 ENCOUNTER FOR LONG-TERM CURRENT USE OF HIGH RISK MEDICATION: ICD-10-CM

## 2023-03-21 DIAGNOSIS — M85.80 OSTEOPENIA, UNSPECIFIED LOCATION: ICD-10-CM

## 2023-03-21 DIAGNOSIS — D86.0 SARCOIDOSIS OF LUNG: ICD-10-CM

## 2023-03-21 DIAGNOSIS — I10 ESSENTIAL HYPERTENSION: Primary | ICD-10-CM

## 2023-03-21 DIAGNOSIS — L80 VITILIGO: ICD-10-CM

## 2023-03-21 DIAGNOSIS — F33.9 MAJOR DEPRESSION, RECURRENT, CHRONIC: ICD-10-CM

## 2023-03-21 DIAGNOSIS — M05.9 RHEUMATOID ARTHRITIS WITH POSITIVE RHEUMATOID FACTOR, INVOLVING UNSPECIFIED SITE: ICD-10-CM

## 2023-03-21 DIAGNOSIS — M05.79 RHEUMATOID ARTHRITIS INVOLVING MULTIPLE SITES WITH POSITIVE RHEUMATOID FACTOR: ICD-10-CM

## 2023-03-21 DIAGNOSIS — E55.9 VITAMIN D DEFICIENCY: ICD-10-CM

## 2023-03-21 DIAGNOSIS — D84.9 IMMUNOCOMPROMISED: ICD-10-CM

## 2023-03-21 DIAGNOSIS — I10 PRIMARY HYPERTENSION: ICD-10-CM

## 2023-03-21 LAB
ALBUMIN SERPL BCP-MCNC: 4.2 G/DL (ref 3.5–5.2)
ALP SERPL-CCNC: 80 U/L (ref 55–135)
ALT SERPL W/O P-5'-P-CCNC: 35 U/L (ref 10–44)
ANION GAP SERPL CALC-SCNC: 10 MMOL/L (ref 8–16)
AST SERPL-CCNC: 25 U/L (ref 10–40)
BASOPHILS # BLD AUTO: 0.06 K/UL (ref 0–0.2)
BASOPHILS NFR BLD: 0.8 % (ref 0–1.9)
BILIRUB SERPL-MCNC: 0.2 MG/DL (ref 0.1–1)
BUN SERPL-MCNC: 16 MG/DL (ref 8–23)
CALCIUM SERPL-MCNC: 9.8 MG/DL (ref 8.7–10.5)
CHLORIDE SERPL-SCNC: 106 MMOL/L (ref 95–110)
CO2 SERPL-SCNC: 26 MMOL/L (ref 23–29)
CREAT SERPL-MCNC: 0.8 MG/DL (ref 0.5–1.4)
CRP SERPL-MCNC: 3 MG/L (ref 0–8.2)
DIFFERENTIAL METHOD: ABNORMAL
EOSINOPHIL # BLD AUTO: 0.6 K/UL (ref 0–0.5)
EOSINOPHIL NFR BLD: 7.3 % (ref 0–8)
ERYTHROCYTE [DISTWIDTH] IN BLOOD BY AUTOMATED COUNT: 13.4 % (ref 11.5–14.5)
ERYTHROCYTE [SEDIMENTATION RATE] IN BLOOD BY PHOTOMETRIC METHOD: 25 MM/HR (ref 0–36)
EST. GFR  (NO RACE VARIABLE): >60 ML/MIN/1.73 M^2
GLUCOSE SERPL-MCNC: 96 MG/DL (ref 70–110)
HCT VFR BLD AUTO: 42 % (ref 37–48.5)
HGB BLD-MCNC: 13.4 G/DL (ref 12–16)
IMM GRANULOCYTES # BLD AUTO: 0.02 K/UL (ref 0–0.04)
IMM GRANULOCYTES NFR BLD AUTO: 0.3 % (ref 0–0.5)
LYMPHOCYTES # BLD AUTO: 2.9 K/UL (ref 1–4.8)
LYMPHOCYTES NFR BLD: 38.6 % (ref 18–48)
MCH RBC QN AUTO: 32 PG (ref 27–31)
MCHC RBC AUTO-ENTMCNC: 31.9 G/DL (ref 32–36)
MCV RBC AUTO: 100 FL (ref 82–98)
MONOCYTES # BLD AUTO: 0.7 K/UL (ref 0.3–1)
MONOCYTES NFR BLD: 8.9 % (ref 4–15)
NEUTROPHILS # BLD AUTO: 3.3 K/UL (ref 1.8–7.7)
NEUTROPHILS NFR BLD: 44.1 % (ref 38–73)
NRBC BLD-RTO: 0 /100 WBC
PLATELET # BLD AUTO: 318 K/UL (ref 150–450)
PMV BLD AUTO: 9.8 FL (ref 9.2–12.9)
POTASSIUM SERPL-SCNC: 4.8 MMOL/L (ref 3.5–5.1)
PROT SERPL-MCNC: 8.1 G/DL (ref 6–8.4)
RBC # BLD AUTO: 4.19 M/UL (ref 4–5.4)
SODIUM SERPL-SCNC: 142 MMOL/L (ref 136–145)
WBC # BLD AUTO: 7.54 K/UL (ref 3.9–12.7)

## 2023-03-21 PROCEDURE — 85652 RBC SED RATE AUTOMATED: CPT | Performed by: INTERNAL MEDICINE

## 2023-03-21 PROCEDURE — 99214 PR OFFICE/OUTPT VISIT, EST, LEVL IV, 30-39 MIN: ICD-10-PCS | Mod: S$GLB,,, | Performed by: FAMILY MEDICINE

## 2023-03-21 PROCEDURE — 80053 COMPREHEN METABOLIC PANEL: CPT | Performed by: INTERNAL MEDICINE

## 2023-03-21 PROCEDURE — 3008F BODY MASS INDEX DOCD: CPT | Mod: CPTII,S$GLB,, | Performed by: FAMILY MEDICINE

## 2023-03-21 PROCEDURE — 99999 PR PBB SHADOW E&M-EST. PATIENT-LVL III: CPT | Mod: PBBFAC,,, | Performed by: FAMILY MEDICINE

## 2023-03-21 PROCEDURE — 1160F PR REVIEW ALL MEDS BY PRESCRIBER/CLIN PHARMACIST DOCUMENTED: ICD-10-PCS | Mod: CPTII,S$GLB,, | Performed by: FAMILY MEDICINE

## 2023-03-21 PROCEDURE — 1159F PR MEDICATION LIST DOCUMENTED IN MEDICAL RECORD: ICD-10-PCS | Mod: CPTII,S$GLB,, | Performed by: FAMILY MEDICINE

## 2023-03-21 PROCEDURE — 3008F PR BODY MASS INDEX (BMI) DOCUMENTED: ICD-10-PCS | Mod: CPTII,S$GLB,, | Performed by: FAMILY MEDICINE

## 2023-03-21 PROCEDURE — 3075F PR MOST RECENT SYSTOLIC BLOOD PRESS GE 130-139MM HG: ICD-10-PCS | Mod: CPTII,S$GLB,, | Performed by: FAMILY MEDICINE

## 2023-03-21 PROCEDURE — 1160F RVW MEDS BY RX/DR IN RCRD: CPT | Mod: CPTII,S$GLB,, | Performed by: FAMILY MEDICINE

## 2023-03-21 PROCEDURE — 4010F PR ACE/ARB THEARPY RXD/TAKEN: ICD-10-PCS | Mod: CPTII,S$GLB,, | Performed by: FAMILY MEDICINE

## 2023-03-21 PROCEDURE — 3078F DIAST BP <80 MM HG: CPT | Mod: CPTII,S$GLB,, | Performed by: FAMILY MEDICINE

## 2023-03-21 PROCEDURE — 3075F SYST BP GE 130 - 139MM HG: CPT | Mod: CPTII,S$GLB,, | Performed by: FAMILY MEDICINE

## 2023-03-21 PROCEDURE — 99214 OFFICE O/P EST MOD 30 MIN: CPT | Mod: S$GLB,,, | Performed by: FAMILY MEDICINE

## 2023-03-21 PROCEDURE — 99999 PR PBB SHADOW E&M-EST. PATIENT-LVL III: ICD-10-PCS | Mod: PBBFAC,,, | Performed by: FAMILY MEDICINE

## 2023-03-21 PROCEDURE — 86140 C-REACTIVE PROTEIN: CPT | Performed by: INTERNAL MEDICINE

## 2023-03-21 PROCEDURE — 85025 COMPLETE CBC W/AUTO DIFF WBC: CPT | Performed by: INTERNAL MEDICINE

## 2023-03-21 PROCEDURE — 1159F MED LIST DOCD IN RCRD: CPT | Mod: CPTII,S$GLB,, | Performed by: FAMILY MEDICINE

## 2023-03-21 PROCEDURE — 4010F ACE/ARB THERAPY RXD/TAKEN: CPT | Mod: CPTII,S$GLB,, | Performed by: FAMILY MEDICINE

## 2023-03-21 PROCEDURE — 3078F PR MOST RECENT DIASTOLIC BLOOD PRESSURE < 80 MM HG: ICD-10-PCS | Mod: CPTII,S$GLB,, | Performed by: FAMILY MEDICINE

## 2023-03-21 NOTE — PROGRESS NOTES
Subjective:       Patient ID: Silvia Cervantes is a 62 y.o. female.    Chief Complaint: Follow-up    61 yr old pleasant  female with depression, allergies, vitiligo, HTN, HLD, sarcoidosis, Rheumatoid arthritis, Erosive gastritis, and other co morbidities presents today for her 3 month follow up.    Chronic low back pain. Onset 6 months ago and gradually worsening - left lower back- does not radiate and no neurological symptoms. No saddle anesthesia or bladder/bowel problems. Details as follows -      HTN - controlled - on Losartan-HCT and Norvasc 5 - compliant - no side effects     Sarcoidosis and lung nodules - [facility] pulmonology - need new one as her previous specialist is leaving [facility]      Major depression - uncontrolled - not on meds - would like to start one - given zoloft - she will start from today - - no SI/HI    Vertigo - much better - on vertin as needed       HLD - diet controlled -    LDLCALC                  86.2                12/22/2021                 RA - on Enbrel shots - follows Rheumatology - stable      Gastritis - stable - on PPI as needed - no side effects      History as below - reviewed       HM  -labs UTD  -vaccines UTD    Follow-up  Associated symptoms include myalgias, neck pain, numbness and weakness. Pertinent negatives include no arthralgias, chest pain, congestion, diaphoresis, headaches, nausea or sore throat.   Hypertension  This is a chronic problem. The current episode started more than 1 year ago. The problem has been gradually improving since onset. The problem is controlled. Associated symptoms include neck pain. Pertinent negatives include no chest pain, headaches, malaise/fatigue, palpitations or shortness of breath. There are no associated agents to hypertension. Risk factors for coronary artery disease include dyslipidemia and post-menopausal state. Past treatments include angiotensin blockers and diuretics. The current treatment provides significant improvement.  There are no compliance problems.  There is no history of angina, CAD/MI, CVA, left ventricular hypertrophy, PVD or retinopathy. There is no history of chronic renal disease, coarctation of the aorta, hypercortisolism, hyperparathyroidism, a hypertension causing med or sleep apnea.   Hyperlipidemia  This is a chronic problem. The current episode started more than 1 year ago. The problem is controlled. Recent lipid tests were reviewed and are normal. She has no history of chronic renal disease, diabetes, hypothyroidism or liver disease. There are no known factors aggravating her hyperlipidemia. Associated symptoms include myalgias. Pertinent negatives include no chest pain, focal sensory loss, focal weakness, leg pain or shortness of breath. She is currently on no antihyperlipidemic treatment. The current treatment provides mild improvement of lipids. There are no compliance problems.  Risk factors for coronary artery disease include dyslipidemia, hypertension and post-menopausal.   Back Pain  This is a chronic problem. The current episode started more than 1 month ago. The problem occurs constantly. The problem is unchanged. The pain is present in the sacro-iliac and lumbar spine. The quality of the pain is described as aching. The pain does not radiate. The symptoms are aggravated by bending, sitting and twisting. Associated symptoms include numbness and weakness. Pertinent negatives include no chest pain, dysuria, headaches, leg pain, perianal numbness or weight loss. She has tried nothing for the symptoms. The treatment provided no relief.   Review of Systems   Constitutional: Negative.  Negative for activity change, diaphoresis, malaise/fatigue, unexpected weight change and weight loss.   HENT: Negative.  Negative for nasal congestion, ear discharge, hearing loss, rhinorrhea, sore throat and voice change.    Eyes: Negative.  Negative for pain, discharge and visual disturbance.   Respiratory: Negative.  Negative  for chest tightness, shortness of breath and wheezing.    Cardiovascular: Negative.  Negative for chest pain and palpitations.   Gastrointestinal: Negative.  Negative for abdominal distention, anal bleeding, constipation and nausea.   Endocrine: Negative.  Negative for cold intolerance, polydipsia and polyuria.   Genitourinary: Negative.  Negative for decreased urine volume, difficulty urinating, dysuria, frequency, menstrual problem and vaginal pain.   Musculoskeletal:  Positive for back pain, myalgias and neck pain. Negative for arthralgias, gait problem and leg pain.   Integumentary:  Negative for color change, pallor and wound. Negative.   Allergic/Immunologic: Negative.  Negative for environmental allergies and immunocompromised state.   Neurological:  Positive for weakness and numbness. Negative for dizziness, tremors, focal weakness, seizures, speech difficulty and headaches.   Hematological: Negative.  Negative for adenopathy. Does not bruise/bleed easily.   Psychiatric/Behavioral: Negative.  Negative for agitation, confusion, decreased concentration, hallucinations, self-injury and suicidal ideas. The patient is not nervous/anxious.        PMH/PSH/FH/SH/MED/ALLERGY reviewed    Objective:       Vitals:    03/21/23 0958   BP: 136/76   Pulse: 100       Physical Exam  Constitutional:       General: She is not in acute distress.     Appearance: She is well-developed. She is not diaphoretic.   HENT:      Head: Normocephalic and atraumatic.      Right Ear: External ear normal.      Left Ear: External ear normal.      Nose: Nose normal.      Mouth/Throat:      Pharynx: No oropharyngeal exudate.   Eyes:      General: No scleral icterus.        Right eye: No discharge.         Left eye: No discharge.      Conjunctiva/sclera: Conjunctivae normal.      Pupils: Pupils are equal, round, and reactive to light.   Neck:      Thyroid: No thyromegaly.      Vascular: No JVD.      Trachea: No tracheal deviation.    Cardiovascular:      Rate and Rhythm: Normal rate and regular rhythm.      Heart sounds: Normal heart sounds. No murmur heard.    No friction rub. No gallop.   Pulmonary:      Effort: Pulmonary effort is normal.      Breath sounds: Normal breath sounds. No stridor. No wheezing or rales.   Chest:      Chest wall: No tenderness.   Abdominal:      General: Bowel sounds are normal. There is no distension.      Palpations: Abdomen is soft. There is no mass.      Tenderness: There is no abdominal tenderness. There is no guarding or rebound.      Hernia: No hernia is present.   Musculoskeletal:         General: No tenderness. Normal range of motion.      Cervical back: Normal range of motion and neck supple.   Lymphadenopathy:      Cervical: No cervical adenopathy.   Skin:     General: Skin is warm and dry.      Coloration: Skin is not pale.      Findings: No erythema or rash.   Neurological:      Mental Status: She is alert and oriented to person, place, and time.      Cranial Nerves: No cranial nerve deficit.      Motor: No abnormal muscle tone.      Coordination: Coordination normal.      Deep Tendon Reflexes: Reflexes are normal and symmetric. Reflexes normal.   Psychiatric:         Behavior: Behavior normal.         Thought Content: Thought content normal.         Judgment: Judgment normal.       Assessment:       Problem List Items Addressed This Visit       Vitiligo    Sarcoidosis of lung    Rheumatoid arthritis with positive rheumatoid factor    Major depression, recurrent, chronic    Relevant Orders    Lipid Panel    Immunocompromised    Hypertension     Other Visit Diagnoses       Essential hypertension    -  Primary    Relevant Orders    Lipid Panel    Osteopenia, unspecified location        Vitamin D deficiency                Plan:       Silvia was seen today for follow-up.    Diagnoses and all orders for this visit:    Essential hypertension  -     Lipid Panel; Future    Sarcoidosis of  lung    Immunocompromised    Major depression, recurrent, chronic  -     Lipid Panel; Future    Primary hypertension    Rheumatoid arthritis with positive rheumatoid factor, involving unspecified site    Rheumatoid arthritis involving multiple sites with positive rheumatoid factor    Vitiligo    Osteopenia, unspecified location    Vitamin D deficiency    HLD  -diet control  -start statin          HTN  -controlled    RA  -stable  -follow rheumatology    Vertigo  -meclizine prn    Depression  -controlled  -continue zoloft 25 mg daily  -SI/ER precautions given      Spent adequate time in obtaining history and explaining differentials      25 minutes spent during this visit of which greater than 50% devoted to face-face counseling and coordination of care regarding diagnosis and management plan    No follow-ups on file.

## 2023-03-23 ENCOUNTER — OFFICE VISIT (OUTPATIENT)
Dept: PULMONOLOGY | Facility: CLINIC | Age: 63
End: 2023-03-23
Payer: MEDICARE

## 2023-03-23 VITALS
HEIGHT: 61 IN | HEART RATE: 101 BPM | RESPIRATION RATE: 18 BRPM | WEIGHT: 149 LBS | BODY MASS INDEX: 28.13 KG/M2 | OXYGEN SATURATION: 98 %

## 2023-03-23 DIAGNOSIS — D86.0 SARCOIDOSIS OF LUNG: ICD-10-CM

## 2023-03-23 DIAGNOSIS — R05.9 COUGH IN ADULT: ICD-10-CM

## 2023-03-23 DIAGNOSIS — D86.9 SARCOID: Primary | ICD-10-CM

## 2023-03-23 DIAGNOSIS — R20.2 NUMBNESS AND TINGLING IN BOTH HANDS: ICD-10-CM

## 2023-03-23 DIAGNOSIS — R20.0 NUMBNESS AND TINGLING IN BOTH HANDS: ICD-10-CM

## 2023-03-23 PROCEDURE — 99999 PR PBB SHADOW E&M-EST. PATIENT-LVL III: ICD-10-PCS | Mod: PBBFAC,,, | Performed by: INTERNAL MEDICINE

## 2023-03-23 PROCEDURE — 99999 PR PBB SHADOW E&M-EST. PATIENT-LVL III: CPT | Mod: PBBFAC,,, | Performed by: INTERNAL MEDICINE

## 2023-03-23 PROCEDURE — 3008F BODY MASS INDEX DOCD: CPT | Mod: CPTII,S$GLB,, | Performed by: INTERNAL MEDICINE

## 2023-03-23 PROCEDURE — 4010F PR ACE/ARB THEARPY RXD/TAKEN: ICD-10-PCS | Mod: CPTII,S$GLB,, | Performed by: INTERNAL MEDICINE

## 2023-03-23 PROCEDURE — 99214 OFFICE O/P EST MOD 30 MIN: CPT | Mod: S$GLB,,, | Performed by: INTERNAL MEDICINE

## 2023-03-23 PROCEDURE — 1160F RVW MEDS BY RX/DR IN RCRD: CPT | Mod: CPTII,S$GLB,, | Performed by: INTERNAL MEDICINE

## 2023-03-23 PROCEDURE — 1159F MED LIST DOCD IN RCRD: CPT | Mod: CPTII,S$GLB,, | Performed by: INTERNAL MEDICINE

## 2023-03-23 PROCEDURE — 4010F ACE/ARB THERAPY RXD/TAKEN: CPT | Mod: CPTII,S$GLB,, | Performed by: INTERNAL MEDICINE

## 2023-03-23 PROCEDURE — 99214 PR OFFICE/OUTPT VISIT, EST, LEVL IV, 30-39 MIN: ICD-10-PCS | Mod: S$GLB,,, | Performed by: INTERNAL MEDICINE

## 2023-03-23 PROCEDURE — 3008F PR BODY MASS INDEX (BMI) DOCUMENTED: ICD-10-PCS | Mod: CPTII,S$GLB,, | Performed by: INTERNAL MEDICINE

## 2023-03-23 PROCEDURE — 1160F PR REVIEW ALL MEDS BY PRESCRIBER/CLIN PHARMACIST DOCUMENTED: ICD-10-PCS | Mod: CPTII,S$GLB,, | Performed by: INTERNAL MEDICINE

## 2023-03-23 PROCEDURE — 1159F PR MEDICATION LIST DOCUMENTED IN MEDICAL RECORD: ICD-10-PCS | Mod: CPTII,S$GLB,, | Performed by: INTERNAL MEDICINE

## 2023-03-23 NOTE — PROGRESS NOTES
"Subjective:       Patient ID: Silvia Cervantes is a 62 y.o. female.    Chief Complaint: No chief complaint on file.    HPI Stable respiratory symptoms. Continues with dry cough. 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  Followed by Rheumatology for seropositive RA.  On enbrel.  Off cyclosporine, and leflunamide, MT.    7/26/21-  Feels hand stiffness is better on enbrel.  No problems with COVID.  Had vaccine.  Cough persists when she is nervious.  Hasn't used advair because she is worried she will get addicted.  10/11/21-  Feels advair helped but instructed in use.  Wants flonase  4/11/21-  Last PFTs 9/24/21.  Last CT 2/23/21.  Still coughing (tickle in thorat) but doesn't bother her much.  No ALY.  Walking in park.  Using vicks on outside of nose nightly.  No fever, chills, wt.loss.  No Hx COVID infec.  Had vaccfine x 3.  Wants to know about 2nd booster.     Interval hx: Initially diagnosed in 2002 at [facility] with sarcoid.   Denies fever or chills.     Interval hx 3/23/2023: Patient doing well. Denies fever or chills.   Numbness.   Review of Systems   Respiratory:  Negative for cough, shortness of breath and pleurisy.      Objective:      Physical Exam   Constitutional: She is oriented to person, place, and time. She appears well-developed and well-nourished.   HENT:   Head: Normocephalic.   Nose: Nose normal.   Cardiovascular: Normal rate.   Pulmonary/Chest: Normal expansion and effort normal. She has no rhonchi. She has no rales.   Neurological: She is alert and oriented to person, place, and time.   Skin: She is not diaphoretic.   Nursing note and vitals reviewed.  Personal Diagnostic Review  none pertinent  No flowsheet data found.      Assessment:       No diagnosis found.    Outpatient Encounter Medications as of 3/23/2023   Medication Sig Dispense Refill    amLODIPine (NORVASC) 5 MG tablet Take 1 tablet (5 mg total) by mouth once " PT order received for edema R LE. Per notes patient to have surgery to L LE for BKA tomorrow. At this time plan to hold formal edema evaluation until after surgery. Will initiate PT evaluation following BKA in the morning on 7/23 and initiate evaluation edema therapy in the pm on 7/23 if formal evaluation indicated.    daily. 90 tablet 3    aspirin (ECOTRIN) 81 MG EC tablet Take 81 mg by mouth once daily.      atorvastatin (LIPITOR) 10 MG tablet Take 1 tablet (10 mg total) by mouth once daily. 90 tablet 3    chlorhexidine (PERIDEX) 0.12 % solution Use 15-20 mL to rinse mouth twice daily for 2 minutes, spit out and do not rinse mouth with water after. 473 mL 3    cyanocobalamin, vitamin B-12, (VITAMIN B-12 ORAL) Take by mouth.      cycloSPORINE (RESTASIS) 0.05 % ophthalmic emulsion Apply 1 drop into both eyes twice a day 180 vial 3    cycloSPORINE (RESTASIS) 0.05 % ophthalmic emulsion Instill 1 drop Both Eyes twice a day 180 each 6    desoximetasone (TOPICORT) 0.25 % cream Apply to affected area every other night x 1 month 60 g 1    ergocalciferol (ERGOCALCIFEROL) 50,000 unit Cap Take 1 capsule (50,000 Units total) by mouth every 7 days. 12 capsule 2    etanercept (ENBREL SURECLICK) 50 mg/mL (1 mL) Inject 1 mL (50 mg total) into the skin once a week. 4 mL 11    flu vacc [id number] 6mos up,PF, (FLUARIX QUAD 3098-4091, PF,) 60 mcg (15 mcg x 4)/0.5 mL Syrg use as directed 0.5 mL 0    fluticasone propionate (FLONASE) 50 mcg/actuation nasal spray 1 spray (50 mcg total) by Each Nostril route once daily. 16 g 5    folic acid (FOLVITE) 1 MG tablet Take 4 tablets (4 mg total) by mouth once daily. 120 tablet 11    gabapentin (NEURONTIN) 300 MG capsule Take 1 capsule (300 mg total) by mouth 3 (three) times daily. 270 capsule 1    hydrOXYzine HCL (ATARAX) 25 MG tablet Take 1 tablet (25 mg total) by mouth every evening. 30 tablet 1    losartan (COZAAR) 100 MG tablet Take 1 tablet (100 mg total) by mouth once daily. 90 tablet 3    metoprolol succinate (TOPROL-XL) 25 MG 24 hr tablet Take 1 tablet (25 mg total) by mouth every evening. 90 tablet 4    nystatin (MYCOSTATIN) powder Apply to affected area every morning as needed for flare under breasts (Patient taking differently: Apply to affected area every morning as needed for flare under breasts)  120 g 6    pantoprazole (PROTONIX) 40 MG tablet Take 1 tablet (40 mg total) by mouth once daily. 30 tablet 3    promethazine (PHENERGAN) 25 MG tablet Take 1 tablet (25 mg total) by mouth every 6 (six) hours as needed for Nausea. 25 tablet 0    PROTOPIC 0.1 % ointment AAA bid 100 g 2    ruxolitinib (OPZELURA) 1.5 % Crea aaa bid 60 g 2    triamcinolone acetonide 0.025% (KENALOG) 0.025 % cream Apply to affected area every day to twice a day to neck . Not more than 1-2 weeks straight in same location 80 g 1    triamcinolone acetonide 0.1% (KENALOG) 0.1 % ointment Apply topically 2 (two) times daily. For 2 weeks 15 g 1    ZINC ORAL Take by mouth.      azelastine (ASTELIN) 137 mcg (0.1 %) nasal spray spray 1 spray (137 mcg total) by Nasal route 2 (two) times daily. 30 mL 3    fluticasone propion-salmeterol 115-21 mcg/dose (ADVAIR HFA) 115-21 mcg/actuation HFAA inhaler Inhale 2 puffs into the lungs every 12 (twelve) hours. 12 g 11    leflunomide (ARAVA) 20 MG Tab Take 1 tablet (20 mg total) by mouth once daily. 90 tablet 3    [DISCONTINUED] amLODIPine (NORVASC) 5 MG tablet Take 1 tablet (5 mg total) by mouth once daily. 90 tablet 4    [DISCONTINUED] irbesartan (AVAPRO) 150 MG tablet Take 1 tablet (150 mg total) by mouth every evening. 90 tablet 3     No facility-administered encounter medications on file as of 3/23/2023.     No orders of the defined types were placed in this encounter.      Plan:        Sarcoidosis of lung  Some PFT changes with decreased diffusion. Will order CT chest for evaluation.     Cough in adult  Most likely allergic rhinitis vs reflux. Seeing ENT in May.     Numbness and tingling in both hands  Patient sees [facility] but like to switch to [facility].     Follow up yearly.     Milton Eli MD

## 2023-03-24 ENCOUNTER — PATIENT MESSAGE (OUTPATIENT)
Dept: RHEUMATOLOGY | Facility: CLINIC | Age: 63
End: 2023-03-24
Payer: MEDICARE

## 2023-03-24 DIAGNOSIS — D86.9 SARCOIDOSIS: Primary | ICD-10-CM

## 2023-03-28 ENCOUNTER — PATIENT MESSAGE (OUTPATIENT)
Dept: ORTHOPEDICS | Facility: CLINIC | Age: 63
End: 2023-03-28
Payer: MEDICARE

## 2023-04-03 ENCOUNTER — OFFICE VISIT (OUTPATIENT)
Dept: NEUROLOGY | Facility: CLINIC | Age: 63
End: 2023-04-03
Payer: MEDICARE

## 2023-04-03 VITALS
SYSTOLIC BLOOD PRESSURE: 142 MMHG | WEIGHT: 149.06 LBS | DIASTOLIC BLOOD PRESSURE: 91 MMHG | BODY MASS INDEX: 28.14 KG/M2 | HEART RATE: 73 BPM | HEIGHT: 61 IN

## 2023-04-03 DIAGNOSIS — R29.818 HEMISENSORY DEFICIT: ICD-10-CM

## 2023-04-03 DIAGNOSIS — G89.29 CHRONIC NECK AND BACK PAIN: ICD-10-CM

## 2023-04-03 DIAGNOSIS — M05.79 RHEUMATOID ARTHRITIS INVOLVING MULTIPLE SITES WITH POSITIVE RHEUMATOID FACTOR: ICD-10-CM

## 2023-04-03 DIAGNOSIS — M79.605 PAIN OF LEFT LOWER EXTREMITY: ICD-10-CM

## 2023-04-03 DIAGNOSIS — M54.2 CHRONIC NECK AND BACK PAIN: ICD-10-CM

## 2023-04-03 DIAGNOSIS — Z87.820 HISTORY OF TRAUMATIC BRAIN INJURY: Primary | ICD-10-CM

## 2023-04-03 DIAGNOSIS — D86.9 SARCOID: ICD-10-CM

## 2023-04-03 DIAGNOSIS — M54.9 CHRONIC NECK AND BACK PAIN: ICD-10-CM

## 2023-04-03 PROCEDURE — 3077F SYST BP >= 140 MM HG: CPT | Mod: CPTII,S$GLB,, | Performed by: PSYCHIATRY & NEUROLOGY

## 2023-04-03 PROCEDURE — 3080F PR MOST RECENT DIASTOLIC BLOOD PRESSURE >= 90 MM HG: ICD-10-PCS | Mod: CPTII,S$GLB,, | Performed by: PSYCHIATRY & NEUROLOGY

## 2023-04-03 PROCEDURE — 99215 OFFICE O/P EST HI 40 MIN: CPT | Mod: S$GLB,,, | Performed by: PSYCHIATRY & NEUROLOGY

## 2023-04-03 PROCEDURE — 99215 PR OFFICE/OUTPT VISIT, EST, LEVL V, 40-54 MIN: ICD-10-PCS | Mod: S$GLB,,, | Performed by: PSYCHIATRY & NEUROLOGY

## 2023-04-03 PROCEDURE — 99999 PR PBB SHADOW E&M-EST. PATIENT-LVL V: ICD-10-PCS | Mod: PBBFAC,,, | Performed by: PSYCHIATRY & NEUROLOGY

## 2023-04-03 PROCEDURE — 3077F PR MOST RECENT SYSTOLIC BLOOD PRESSURE >= 140 MM HG: ICD-10-PCS | Mod: CPTII,S$GLB,, | Performed by: PSYCHIATRY & NEUROLOGY

## 2023-04-03 PROCEDURE — 1160F RVW MEDS BY RX/DR IN RCRD: CPT | Mod: CPTII,S$GLB,, | Performed by: PSYCHIATRY & NEUROLOGY

## 2023-04-03 PROCEDURE — 99999 PR PBB SHADOW E&M-EST. PATIENT-LVL V: CPT | Mod: PBBFAC,,, | Performed by: PSYCHIATRY & NEUROLOGY

## 2023-04-03 PROCEDURE — 1159F PR MEDICATION LIST DOCUMENTED IN MEDICAL RECORD: ICD-10-PCS | Mod: CPTII,S$GLB,, | Performed by: PSYCHIATRY & NEUROLOGY

## 2023-04-03 PROCEDURE — 1159F MED LIST DOCD IN RCRD: CPT | Mod: CPTII,S$GLB,, | Performed by: PSYCHIATRY & NEUROLOGY

## 2023-04-03 PROCEDURE — 4010F ACE/ARB THERAPY RXD/TAKEN: CPT | Mod: CPTII,S$GLB,, | Performed by: PSYCHIATRY & NEUROLOGY

## 2023-04-03 PROCEDURE — 3080F DIAST BP >= 90 MM HG: CPT | Mod: CPTII,S$GLB,, | Performed by: PSYCHIATRY & NEUROLOGY

## 2023-04-03 PROCEDURE — 4010F PR ACE/ARB THEARPY RXD/TAKEN: ICD-10-PCS | Mod: CPTII,S$GLB,, | Performed by: PSYCHIATRY & NEUROLOGY

## 2023-04-03 PROCEDURE — 3008F PR BODY MASS INDEX (BMI) DOCUMENTED: ICD-10-PCS | Mod: CPTII,S$GLB,, | Performed by: PSYCHIATRY & NEUROLOGY

## 2023-04-03 PROCEDURE — 3008F BODY MASS INDEX DOCD: CPT | Mod: CPTII,S$GLB,, | Performed by: PSYCHIATRY & NEUROLOGY

## 2023-04-03 PROCEDURE — 1160F PR REVIEW ALL MEDS BY PRESCRIBER/CLIN PHARMACIST DOCUMENTED: ICD-10-PCS | Mod: CPTII,S$GLB,, | Performed by: PSYCHIATRY & NEUROLOGY

## 2023-04-03 RX ORDER — GABAPENTIN 300 MG/1
300 CAPSULE ORAL 3 TIMES DAILY
Qty: 270 CAPSULE | Refills: 1 | Status: SHIPPED | OUTPATIENT
Start: 2023-04-03 | End: 2024-04-07
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
## 2023-04-05 ENCOUNTER — PATIENT MESSAGE (OUTPATIENT)
Dept: FAMILY MEDICINE | Facility: CLINIC | Age: 63
End: 2023-04-05
Payer: MEDICARE

## 2023-04-05 ENCOUNTER — HOSPITAL ENCOUNTER (OUTPATIENT)
Dept: RADIOLOGY | Facility: HOSPITAL | Age: 63
Discharge: HOME | End: 2023-04-05
Attending: INTERNAL MEDICINE
Payer: MEDICARE

## 2023-04-05 DIAGNOSIS — D86.9 SARCOID: ICD-10-CM

## 2023-04-05 PROCEDURE — 71250 CT THORAX DX C-: CPT | Mod: TC

## 2023-04-05 PROCEDURE — 71250 CT CHEST WITHOUT CONTRAST: ICD-10-PCS | Mod: 26,,, | Performed by: RADIOLOGY

## 2023-04-05 PROCEDURE — 71250 CT THORAX DX C-: CPT | Mod: 26,,, | Performed by: RADIOLOGY

## 2023-04-14 ENCOUNTER — SPECIALTY PHARMACY (OUTPATIENT)
Dept: PHARMACY | Facility: CLINIC | Age: 63
End: 2023-04-14
Payer: MEDICARE

## 2023-04-14 ENCOUNTER — PATIENT MESSAGE (OUTPATIENT)
Dept: PULMONOLOGY | Facility: CLINIC | Age: 63
End: 2023-04-14
Payer: MEDICARE

## 2023-04-14 NOTE — TELEPHONE ENCOUNTER
Specialty Pharmacy - Refill Coordination    Specialty Medication Orders Linked to Encounter      Flowsheet Row Most Recent Value   Medication #1 etanercept (ENBREL SURECLICK) 50 mg/mL (1 mL) (Order#[id number], Rx#[id number])            Refill Questions - Documented Responses      Flowsheet Row Most Recent Value   Patient Availability and HIPAA Verification    Does patient want to proceed with activity? Yes   HIPAA/medical authority confirmed? Yes   Relationship to patient of person spoken to? Self   Refill Screening Questions    Changes to allergies? No   Changes to medications? No   New conditions since last clinic visit? No   Unplanned office visit, urgent care, ED, or hospital admission in the last 4 weeks? No   How does patient/caregiver feel medication is working? Excellent   Financial problems or insurance changes? No   How many doses of your specialty medications were missed in the last 4 weeks? 0   Would patient like to speak to a pharmacist? No   When does the patient need to receive the medication? 04/21/23   Refill Delivery Questions    How will the patient receive the medication? [facility]   When does the patient need to receive the medication? 04/21/23   Shipping Address Home   Address in [facility] confirmed and updated if neccessary? Yes   Expected Copay ($) 0   Is the patient able to afford the medication copay? Yes   Payment Method zero copay   Days supply of Refill 28   Supplies needed? No supplies needed   Refill activity completed? Yes   Refill activity plan Refill scheduled   Shipment/Pickup Date: 04/19/23            Current Outpatient Medications   Medication Sig    amLODIPine (NORVASC) 5 MG tablet Take 1 tablet (5 mg total) by mouth once daily.    atorvastatin (LIPITOR) 10 MG tablet Take 1 tablet (10 mg total) by mouth once daily.    azelastine (ASTELIN) 137 mcg (0.1 %) nasal spray spray 1 spray (137 mcg total) by Nasal route 2 (two) times daily.    chlorhexidine (PERIDEX) 0.12 % solution  Use 15-20 mL to rinse mouth twice daily for 2 minutes, spit out and do not rinse mouth with water after.    cyanocobalamin, vitamin B-12, (VITAMIN B-12 ORAL) Take by mouth.    cycloSPORINE (RESTASIS) 0.05 % ophthalmic emulsion Apply 1 drop into both eyes twice a day    cycloSPORINE (RESTASIS) 0.05 % ophthalmic emulsion Instill 1 drop Both Eyes twice a day    desoximetasone (TOPICORT) 0.25 % cream Apply to affected area every other night x 1 month    ergocalciferol (ERGOCALCIFEROL) 50,000 unit Cap Take 1 capsule (50,000 Units total) by mouth every 7 days.    etanercept (ENBREL SURECLICK) 50 mg/mL (1 mL) Inject 1 mL (50 mg total) into the skin once a week.    flu vacc [id number] 6mos up,PF, (FLUARIX QUAD 2902-2493, PF,) 60 mcg (15 mcg x 4)/0.5 mL Syrg use as directed    fluticasone propion-salmeterol 115-21 mcg/dose (ADVAIR HFA) 115-21 mcg/actuation HFAA inhaler Inhale 2 puffs into the lungs every 12 (twelve) hours.    fluticasone propionate (FLONASE) 50 mcg/actuation nasal spray 1 spray (50 mcg total) by Each Nostril route once daily.    folic acid (FOLVITE) 1 MG tablet Take 4 tablets (4 mg total) by mouth once daily.    gabapentin (NEURONTIN) 300 MG capsule Take 1 capsule (300 mg total) by mouth 3 (three) times daily.    hydrOXYzine HCL (ATARAX) 25 MG tablet Take 1 tablet (25 mg total) by mouth every evening.    leflunomide (ARAVA) 20 MG Tab Take 1 tablet (20 mg total) by mouth once daily.    losartan (COZAAR) 100 MG tablet Take 1 tablet (100 mg total) by mouth once daily.    metoprolol succinate (TOPROL-XL) 25 MG 24 hr tablet Take 1 tablet (25 mg total) by mouth every evening.    nystatin (MYCOSTATIN) powder Apply to affected area every morning as needed for flare under breasts (Patient taking differently: Apply to affected area every morning as needed for flare under breasts)    pantoprazole (PROTONIX) 40 MG tablet Take 1 tablet (40 mg total) by mouth once daily.    promethazine (PHENERGAN) 25 MG tablet Take 1  tablet (25 mg total) by mouth every 6 (six) hours as needed for Nausea.    PROTOPIC 0.1 % ointment AAA bid    ruxolitinib (OPZELURA) 1.5 % Crea aaa bid    triamcinolone acetonide 0.025% (KENALOG) 0.025 % cream Apply to affected area every day to twice a day to neck . Not more than 1-2 weeks straight in same location    triamcinolone acetonide 0.1% (KENALOG) 0.1 % ointment Apply topically 2 (two) times daily. For 2 weeks    ZINC ORAL Take by mouth.   Last reviewed on 4/3/2023 11:39 AM by Dedrick Celeste, DO    Review of patient's allergies indicates:  No Known Allergies Last reviewed on  4/3/2023 11:39 AM by Dedrick Celeste      Tasks added this encounter   No tasks added.   Tasks due within next 3 months   4/11/2023 - Refill Call (Auto Added)     Domingo Gomse, PharmD  [facility]  [facility] [address]  [facility] [address]  Phone: [phone]  Fax: [phone]

## 2023-04-17 ENCOUNTER — PATIENT MESSAGE (OUTPATIENT)
Dept: OBSTETRICS AND GYNECOLOGY | Facility: CLINIC | Age: 63
End: 2023-04-17
Payer: MEDICARE

## 2023-04-21 ENCOUNTER — PATIENT MESSAGE (OUTPATIENT)
Dept: ORTHOPEDICS | Facility: CLINIC | Age: 63
End: 2023-04-21
Payer: MEDICARE

## 2023-04-28 DIAGNOSIS — M79.641 RIGHT HAND PAIN: Primary | ICD-10-CM

## 2023-05-01 ENCOUNTER — HOSPITAL ENCOUNTER (OUTPATIENT)
Dept: RADIOLOGY | Facility: HOSPITAL | Age: 63
Discharge: HOME | End: 2023-05-01
Attending: ORTHOPAEDIC SURGERY
Payer: MEDICARE

## 2023-05-01 ENCOUNTER — OFFICE VISIT (OUTPATIENT)
Dept: ORTHOPEDICS | Facility: CLINIC | Age: 63
End: 2023-05-01
Payer: MEDICARE

## 2023-05-01 DIAGNOSIS — M79.641 RIGHT HAND PAIN: ICD-10-CM

## 2023-05-01 DIAGNOSIS — M79.641 CHRONIC PAIN OF RIGHT HAND: ICD-10-CM

## 2023-05-01 DIAGNOSIS — G89.29 CHRONIC PAIN OF RIGHT HAND: ICD-10-CM

## 2023-05-01 DIAGNOSIS — M65.30 STENOSING TENOSYNOVITIS OF FINGER: ICD-10-CM

## 2023-05-01 DIAGNOSIS — M67.40 GANGLION, TENDON SHEATH: ICD-10-CM

## 2023-05-01 DIAGNOSIS — M65.321 TRIGGER INDEX FINGER OF RIGHT HAND: Primary | ICD-10-CM

## 2023-05-01 DIAGNOSIS — M05.79 RHEUMATOID ARTHRITIS INVOLVING MULTIPLE SITES WITH POSITIVE RHEUMATOID FACTOR: ICD-10-CM

## 2023-05-01 PROCEDURE — 73130 XR HAND COMPLETE 3 VIEW RIGHT: ICD-10-PCS | Mod: 26,RT,, | Performed by: RADIOLOGY

## 2023-05-01 PROCEDURE — 99214 OFFICE O/P EST MOD 30 MIN: CPT | Mod: 25,S$GLB,, | Performed by: ORTHOPAEDIC SURGERY

## 2023-05-01 PROCEDURE — 73130 X-RAY EXAM OF HAND: CPT | Mod: TC,PO,RT

## 2023-05-01 PROCEDURE — 20550 TENDON SHEATH: ICD-10-PCS | Mod: RT,S$GLB,, | Performed by: ORTHOPAEDIC SURGERY

## 2023-05-01 PROCEDURE — 99214 PR OFFICE/OUTPT VISIT, EST, LEVL IV, 30-39 MIN: ICD-10-PCS | Mod: 25,S$GLB,, | Performed by: ORTHOPAEDIC SURGERY

## 2023-05-01 PROCEDURE — 1160F RVW MEDS BY RX/DR IN RCRD: CPT | Mod: CPTII,S$GLB,, | Performed by: ORTHOPAEDIC SURGERY

## 2023-05-01 PROCEDURE — 1160F PR REVIEW ALL MEDS BY PRESCRIBER/CLIN PHARMACIST DOCUMENTED: ICD-10-PCS | Mod: CPTII,S$GLB,, | Performed by: ORTHOPAEDIC SURGERY

## 2023-05-01 PROCEDURE — 4010F PR ACE/ARB THEARPY RXD/TAKEN: ICD-10-PCS | Mod: CPTII,S$GLB,, | Performed by: ORTHOPAEDIC SURGERY

## 2023-05-01 PROCEDURE — 1159F PR MEDICATION LIST DOCUMENTED IN MEDICAL RECORD: ICD-10-PCS | Mod: CPTII,S$GLB,, | Performed by: ORTHOPAEDIC SURGERY

## 2023-05-01 PROCEDURE — 4010F ACE/ARB THERAPY RXD/TAKEN: CPT | Mod: CPTII,S$GLB,, | Performed by: ORTHOPAEDIC SURGERY

## 2023-05-01 PROCEDURE — 20550 NJX 1 TENDON SHEATH/LIGAMENT: CPT | Mod: RT,S$GLB,, | Performed by: ORTHOPAEDIC SURGERY

## 2023-05-01 PROCEDURE — 99999 PR PBB SHADOW E&M-EST. PATIENT-LVL II: ICD-10-PCS | Mod: PBBFAC,,, | Performed by: ORTHOPAEDIC SURGERY

## 2023-05-01 PROCEDURE — 73130 X-RAY EXAM OF HAND: CPT | Mod: 26,RT,, | Performed by: RADIOLOGY

## 2023-05-01 PROCEDURE — 1159F MED LIST DOCD IN RCRD: CPT | Mod: CPTII,S$GLB,, | Performed by: ORTHOPAEDIC SURGERY

## 2023-05-01 PROCEDURE — 99999 PR PBB SHADOW E&M-EST. PATIENT-LVL II: CPT | Mod: PBBFAC,,, | Performed by: ORTHOPAEDIC SURGERY

## 2023-05-01 RX ADMIN — METHYLPREDNISOLONE ACETATE 40 MG: 40 INJECTION, SUSPENSION INTRA-ARTICULAR; INTRALESIONAL; INTRAMUSCULAR; SOFT TISSUE at 11:05

## 2023-05-01 NOTE — PROCEDURES
Tendon Sheath    Date/Time: 5/1/2023 11:30 AM  Performed by: Louise Smith MD  Authorized by: Louise Smith MD     Consent Done?:  Yes (Verbal)  Indications:  Pain  Timeout: prior to procedure the correct patient, procedure, and site was verified    Prep: patient was prepped and draped in usual sterile fashion      Local anesthesia used?: Yes    Anesthesia:  Local infiltration  Local anesthetic:  Lidocaine 1% without epinephrine  Anesthetic total (ml):  1    Location:  Index finger  Site:  R index flexor tendon sheath  Ultrasonic guidance for needle placement?: No    Needle size:  25 G  Approach:  Volar  Medications:  40 mg methylPREDNISolone acetate 40 mg/mL  Patient tolerance:  Patient tolerated the procedure well with no immediate complications

## 2023-05-02 RX ORDER — METHYLPREDNISOLONE ACETATE 40 MG/ML
40 INJECTION, SUSPENSION INTRA-ARTICULAR; INTRALESIONAL; INTRAMUSCULAR; SOFT TISSUE
Status: DISCONTINUED | OUTPATIENT
Start: 2023-05-01 | End: 2023-05-02 | Stop reason: HOSPADM

## 2023-05-02 NOTE — PROGRESS NOTES
Silvia Cervantes presents for follow up evaluation of   Encounter Diagnoses   Name Primary?    Trigger index finger of right hand Yes    Stenosing tenosynovitis of finger     Ganglion, tendon sheath     Rheumatoid arthritis involving multiple sites with positive rheumatoid factor     Chronic pain of right hand      She states that she has developed triggering and catching of the right index finger.  She notes that the triggering interferes with her  she can not make a full fist with the right hand.  She continues to have some tenosynovitis dorsally which is mild.  She denies any trauma to the hand.  Her pain today as a 6/10.    PE:    AA&O x 4.  NAD  HEENT:  NCAT, sclera nonicteric  Lungs:  Respirations are equal and unlabored.  CV:  2+ bilateral upper and lower extremity pulses.  MSK:  Tender to palpation right index A1 pulley, palpable ganglion of tendon sheath, lacks 2 cm of flexion into the palm.  Neurovascularly intact bilaterally.  5/5 thenar and intrinsic musculature strength.  Full range of motion hands, wrists and elbows.    A/P:  Right index stenosing flexor tenosynovitis and ganglion of tendon sheath  1) We have discussed the natural history of stenosing flexor tenosynovitis and ganglion of tendon sheath including treatment options such as splinting, oral and topical anti-inflammatories, cortisone injections and surgery.    -I have offered her a selective injection. I have explained the risks, benefits, and alternatives of the procedure in detail.  The patient voices understanding and all questions have been answered. The patient agrees to proceed as planned. So after a sterile prep of the skin in the normal fashion the right index flexor sheath was injected using a 25 gauge needle with a combination of 1cc 1% plain lidocaine and 40 mg of methylprednisolone.  The patient is cautioned and immediate relief of pain is secondary to the local anesthetic and will be temporary.  After the anesthetic wears off  there may be a increase in pain that may last for a few hours or a few days and they should use ice to help alleviate this flair up of pain. Patient tolerated the procedure well.    - F/U as needed for any worsening of symptoms  - Call with any questions/concerns in the interim         Louise Smith MD    Please be aware that this note has been generated with the assistance of [employer] voice-to-text.  Please excuse any spelling or grammatical errors.

## 2023-05-12 RX ORDER — ETANERCEPT 50 MG/ML
50 SOLUTION SUBCUTANEOUS WEEKLY
Qty: 4 ML | Refills: 11 | Status: ACTIVE | OUTPATIENT
Start: 2023-05-12 | End: 2024-05-11

## 2023-05-15 ENCOUNTER — PATIENT MESSAGE (OUTPATIENT)
Dept: OTOLARYNGOLOGY | Facility: CLINIC | Age: 63
End: 2023-05-15
Payer: MEDICARE

## 2023-05-15 ENCOUNTER — SPECIALTY PHARMACY (OUTPATIENT)
Dept: PHARMACY | Facility: CLINIC | Age: 63
End: 2023-05-15
Payer: MEDICARE

## 2023-05-15 NOTE — TELEPHONE ENCOUNTER
Specialty Pharmacy - Refill Coordination    Specialty Medication Orders Linked to Encounter      Flowsheet Row Most Recent Value   Medication #1 etanercept (ENBREL SURECLICK) 50 mg/mL (1 mL) (Order#[id number], Rx#[id number])            Refill Questions - Documented Responses      Flowsheet Row Most Recent Value   Patient Availability and HIPAA Verification    Does patient want to proceed with activity? Yes   HIPAA/medical authority confirmed? Yes   Relationship to patient of person spoken to? Self   Refill Screening Questions    Changes to allergies? No   Changes to medications? No   New conditions since last clinic visit? No   Unplanned office visit, urgent care, ED, or hospital admission in the last 4 weeks? No   How does patient/caregiver feel medication is working? Good   Financial problems or insurance changes? No   Would patient like to speak to a pharmacist? No   When does the patient need to receive the medication? 05/19/23   Refill Delivery Questions    How will the patient receive the medication? Pickup   When does the patient need to receive the medication? 05/19/23   Expected Copay ($) 0   Is the patient able to afford the medication copay? Yes   Payment Method zero copay   Days supply of Refill 28   Supplies needed? No supplies needed   Refill activity completed? Yes   Refill activity plan Refill scheduled   Shipment/Pickup Date: 05/17/23            Current Outpatient Medications   Medication Sig    amLODIPine (NORVASC) 5 MG tablet Take 1 tablet (5 mg total) by mouth once daily.    atorvastatin (LIPITOR) 10 MG tablet Take 1 tablet (10 mg total) by mouth once daily.    azelastine (ASTELIN) 137 mcg (0.1 %) nasal spray spray 1 spray (137 mcg total) by Nasal route 2 (two) times daily.    chlorhexidine (PERIDEX) 0.12 % solution Use 15-20 mL to rinse mouth twice daily for 2 minutes, spit out and do not rinse mouth with water after.    cyanocobalamin, vitamin B-12, (VITAMIN B-12 ORAL) Take by mouth.     cycloSPORINE (RESTASIS) 0.05 % ophthalmic emulsion Apply 1 drop into both eyes twice a day    cycloSPORINE (RESTASIS) 0.05 % ophthalmic emulsion Instill 1 drop Both Eyes twice a day    desoximetasone (TOPICORT) 0.25 % cream Apply to affected area every other night x 1 month    ergocalciferol (ERGOCALCIFEROL) 50,000 unit Cap Take 1 capsule (50,000 Units total) by mouth every 7 days.    etanercept (ENBREL SURECLICK) 50 mg/mL (1 mL) Inject 1 mL (50 mg total) into the skin once a week.    flu vacc [id number] 6mos up,PF, (FLUARIX QUAD 3870-2374, PF,) 60 mcg (15 mcg x 4)/0.5 mL Syrg use as directed    fluticasone propion-salmeterol 115-21 mcg/dose (ADVAIR HFA) 115-21 mcg/actuation HFAA inhaler Inhale 2 puffs into the lungs every 12 (twelve) hours.    fluticasone propionate (FLONASE) 50 mcg/actuation nasal spray 1 spray (50 mcg total) by Each Nostril route once daily.    folic acid (FOLVITE) 1 MG tablet Take 4 tablets (4 mg total) by mouth once daily.    gabapentin (NEURONTIN) 300 MG capsule Take 1 capsule (300 mg total) by mouth 3 (three) times daily.    hydrOXYzine HCL (ATARAX) 25 MG tablet Take 1 tablet (25 mg total) by mouth every evening.    leflunomide (ARAVA) 20 MG Tab Take 1 tablet (20 mg total) by mouth once daily.    losartan (COZAAR) 100 MG tablet Take 1 tablet (100 mg total) by mouth once daily.    metoprolol succinate (TOPROL-XL) 25 MG 24 hr tablet Take 1 tablet (25 mg total) by mouth every evening.    nystatin (MYCOSTATIN) powder Apply to affected area every morning as needed for flare under breasts (Patient taking differently: Apply to affected area every morning as needed for flare under breasts)    pantoprazole (PROTONIX) 40 MG tablet Take 1 tablet (40 mg total) by mouth once daily.    promethazine (PHENERGAN) 25 MG tablet Take 1 tablet (25 mg total) by mouth every 6 (six) hours as needed for Nausea.    PROTOPIC 0.1 % ointment AAA bid    ruxolitinib (OPZELURA) 1.5 % Crea aaa bid    triamcinolone acetonide  0.025% (KENALOG) 0.025 % cream Apply to affected area every day to twice a day to neck . Not more than 1-2 weeks straight in same location    triamcinolone acetonide 0.1% (KENALOG) 0.1 % ointment Apply topically 2 (two) times daily. For 2 weeks    ZINC ORAL Take by mouth.   Last reviewed on 5/2/2023  6:09 PM by Louise Smith MD    Review of patient's allergies indicates:  No Known Allergies Last reviewed on  5/2/2023 6:09 PM by Louise Smith      Tasks added this encounter   No tasks added.   Tasks due within next 3 months   5/15/2023 - Refill Coordination Outreach (1 time occurrence)     Mary Mack, Patient Care Assistant  [facility]  [address] [place] [id number]  Phone: [phone]  Fax: [phone]

## 2023-05-16 ENCOUNTER — OFFICE VISIT (OUTPATIENT)
Dept: OTOLARYNGOLOGY | Facility: CLINIC | Age: 63
End: 2023-05-16
Payer: MEDICARE

## 2023-05-16 VITALS
WEIGHT: 150 LBS | SYSTOLIC BLOOD PRESSURE: 138 MMHG | HEART RATE: 71 BPM | BODY MASS INDEX: 28.35 KG/M2 | DIASTOLIC BLOOD PRESSURE: 89 MMHG

## 2023-05-16 DIAGNOSIS — K21.9 LARYNGOPHARYNGEAL REFLUX (LPR): Primary | Chronic | ICD-10-CM

## 2023-05-16 DIAGNOSIS — R09.A2 GLOBUS SENSATION: ICD-10-CM

## 2023-05-16 DIAGNOSIS — R05.8 OTHER COUGH: ICD-10-CM

## 2023-05-16 DIAGNOSIS — J31.0 CHRONIC RHINITIS: Chronic | ICD-10-CM

## 2023-05-16 DIAGNOSIS — D86.0 SARCOIDOSIS OF LUNG: ICD-10-CM

## 2023-05-16 PROCEDURE — 1159F MED LIST DOCD IN RCRD: CPT | Mod: CPTII,,, | Performed by: OTOLARYNGOLOGY

## 2023-05-16 PROCEDURE — 31575 DIAGNOSTIC LARYNGOSCOPY: CPT | Mod: ,,, | Performed by: OTOLARYNGOLOGY

## 2023-05-16 PROCEDURE — 1160F RVW MEDS BY RX/DR IN RCRD: CPT | Mod: CPTII,,, | Performed by: OTOLARYNGOLOGY

## 2023-05-16 PROCEDURE — 4010F PR ACE/ARB THEARPY RXD/TAKEN: ICD-10-PCS | Mod: CPTII,,, | Performed by: OTOLARYNGOLOGY

## 2023-05-16 PROCEDURE — 3075F PR MOST RECENT SYSTOLIC BLOOD PRESS GE 130-139MM HG: ICD-10-PCS | Mod: CPTII,,, | Performed by: OTOLARYNGOLOGY

## 2023-05-16 PROCEDURE — 3079F DIAST BP 80-89 MM HG: CPT | Mod: CPTII,,, | Performed by: OTOLARYNGOLOGY

## 2023-05-16 PROCEDURE — 3008F PR BODY MASS INDEX (BMI) DOCUMENTED: ICD-10-PCS | Mod: CPTII,,, | Performed by: OTOLARYNGOLOGY

## 2023-05-16 PROCEDURE — 1160F PR REVIEW ALL MEDS BY PRESCRIBER/CLIN PHARMACIST DOCUMENTED: ICD-10-PCS | Mod: CPTII,,, | Performed by: OTOLARYNGOLOGY

## 2023-05-16 PROCEDURE — 1159F PR MEDICATION LIST DOCUMENTED IN MEDICAL RECORD: ICD-10-PCS | Mod: CPTII,,, | Performed by: OTOLARYNGOLOGY

## 2023-05-16 PROCEDURE — 3075F SYST BP GE 130 - 139MM HG: CPT | Mod: CPTII,,, | Performed by: OTOLARYNGOLOGY

## 2023-05-16 PROCEDURE — 99999 PR PBB SHADOW E&M-EST. PATIENT-LVL V: ICD-10-PCS | Mod: PBBFAC,,, | Performed by: OTOLARYNGOLOGY

## 2023-05-16 PROCEDURE — 99214 PR OFFICE/OUTPT VISIT, EST, LEVL IV, 30-39 MIN: ICD-10-PCS | Mod: 25,,, | Performed by: OTOLARYNGOLOGY

## 2023-05-16 PROCEDURE — 3079F PR MOST RECENT DIASTOLIC BLOOD PRESSURE 80-89 MM HG: ICD-10-PCS | Mod: CPTII,,, | Performed by: OTOLARYNGOLOGY

## 2023-05-16 PROCEDURE — 99214 OFFICE O/P EST MOD 30 MIN: CPT | Mod: 25,,, | Performed by: OTOLARYNGOLOGY

## 2023-05-16 PROCEDURE — 3008F BODY MASS INDEX DOCD: CPT | Mod: CPTII,,, | Performed by: OTOLARYNGOLOGY

## 2023-05-16 PROCEDURE — 99999 PR PBB SHADOW E&M-EST. PATIENT-LVL V: CPT | Mod: PBBFAC,,, | Performed by: OTOLARYNGOLOGY

## 2023-05-16 PROCEDURE — 31575 PR LARYNGOSCOPY, FLEXIBLE; DIAGNOSTIC: ICD-10-PCS | Mod: ,,, | Performed by: OTOLARYNGOLOGY

## 2023-05-16 PROCEDURE — 4010F ACE/ARB THERAPY RXD/TAKEN: CPT | Mod: CPTII,,, | Performed by: OTOLARYNGOLOGY

## 2023-05-16 RX ORDER — FAMOTIDINE 20 MG/1
20 TABLET, FILM COATED ORAL 2 TIMES DAILY
Qty: 60 TABLET | Refills: 11 | Status: SHIPPED | OUTPATIENT
Start: 2023-05-16 | End: 2023-08-16 | Stop reason: SDUPTHER

## 2023-05-16 RX ORDER — FLUTICASONE PROPIONATE 50 MCG
2 SPRAY, SUSPENSION (ML) NASAL DAILY
Qty: 16 G | Refills: 11 | Status: SHIPPED | OUTPATIENT
Start: 2023-05-16

## 2023-05-16 NOTE — PROCEDURES
Laryngoscopy    Date/Time: 5/16/2023 8:00 AM  Performed by: Carleen Weiner MD  Authorized by: Carleen Weiner MD     Consent Done?:  Yes (Verbal)  Anesthesia:     Local anesthetic:  Lidocaine 2% and Neymar-Synephrine 1/2%  Laryngoscopy:     Areas examined:  Nasal cavities, nasopharynx, oropharynx, hypopharynx, larynx and vocal cords  Nose External:      No external nasal deformity  Nose Intranasal:      Mucosa no polyps     Mucosa ulcers not present     No mucosa lesions present     No septum gross deformity     Turbinates not enlarged  Nasopharynx:      No mucosa lesions     Adenoids not present     Posterior choanae patent     Eustachian tube patent  Larynx/hypopharynx:      No epiglottis lesions     No epiglottis edema     No AE folds lesions     No vocal cord polyps     Equal and normal bilateral     No hypopharynx lesions     No piriform sinus pooling     No piriform sinus lesions     Post cricoid edema (moderate)     Post cricoid erythema (moderate)     + bilateral maxillary antrostomy patent and clear, positive bilateral ethmoid cavities patent and clear, bilateral sphenoidotomy patent and clear, bilateral frontal outflow tract patent without discharge or polyps.

## 2023-05-16 NOTE — PATIENT INSTRUCTIONS
VOCAL HYGIENE AND HYDRATION RECOMMENDATIONS     DO   Drink plenty of waterabout  oz a day! If your urine is pale, you should be well-hydrated.  Try some of these tips to increase hydration as well.  Eat wet snacks such as grapes, melon, cucumbers, apple slices   Reduce intake coffee and other caffeinated and carbonated beverages   Suck on pastille lozenges or sugar free candies (not mentholated lozenges)   Use a room or personal humidifier   Use sinus rinses/irrigations or nasal saline spray   Inhale steam for a few minutes before speaking or singing   Pay attention to how, and how much, you use your voice.   Give yourself vocal breaks throughout the day.   Breathe when talking, take frequent pauses for breath.   Use a microphone when speaking in front of a group of 15 or more to reduce voice strain.   Learn how to use your voice correctly to get loud with voice therapy techniques.  Stay healthy. Eat right and get plenty of rest. Follow a reflux precaution diet if indicated.   ____________________________________________________________________    REDUCE   Loud talking, yelling, cheering, or screaming. Voice injury can take place over a long time, or all at once.  If you need to talk loud or yell, be sure you are doing it properly.   Clearing your throat and forceful coughing. The vocal folds collect mucus when irritated or inflamed and this can cause the urge to clear your throat. The trauma of clearing the throat creates more inflammation and mucus. See tips above for keeping hydrated to assist with cutting back on throat clearing.  Instead of clearing your throat, try these behaviors until the sensation passes:   Take a sip of water   Silently clear with a hard exhale making an hhh sound   Swallow hard   Drying agents such as anti-histamines or decongestant medications. You should always take medications as prescribed, but keep in mind these will dry you out, so increase your hydration!    ____________________________________________________________________    AVOID   Inhalant irritants such as smoke, strong fumes, and allergens. Take advantage of 1-800-QUIT-NOW if you are ready to stop smoking.   Unnecessary non-speech noises, such as grunting during exercise, loud noises, or excessively high- or low-pitched sounds. Save your voice for talking and singing!   Whispering. Whispering places your vocal folds in a tenser position than usual.

## 2023-05-16 NOTE — PROGRESS NOTES
Chief Complaint   Patient presents with    Choking    Cough    Sinus Problem   .     HPI:Silvia Cervantes is a 62 y.o. female who has been referred by Dr. Ori St MD for a several month history of  globus sensation and cough. She reports that she has choking episodes with swallowing foods, liquids, or saliva.  She has tickle in her throat that triggers cough. She feels it is present on her left side.  She reports that the cough is dry.  She does have sarcoidosis- dx'd in . She does see pulmonology.  She denies food sticking. She denies hoarseness.  Her voice is not progressively worsening over this time. There are not pitch breaks or cracks. There is not vocal fatigue. She admits to odynophagia, throat pain, and otalgia.  There is no hemoptysis or hematemesis. She is breathing well. She has been using Luden's throat lozenges to help. She uses camilo's emollient for her nose. She does use Flonase intermittently.     She admits to throat clearing and cough. She denies heartburn and reflux. She has been on Protonix 40 mg in the past but is no longer. She does feel that this helped her tremendously.        Underwent sinus surgery 2016- Dr. Chavez includin. Endoscopic septoplasty.  2. Bilateral inferior turbinate reduction with submucosal resection.  3. Bilateral image-guided endoscopic total ethmoidectomy.  4. Bilateral image-guided endoscopic maxillary antrostomy.  5. Bilateral image-guided endoscopic frontal dissection with Draf IIA sinusotomy.  6. Endoscopic resection of right leann bullosa.    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)      Social History     Socioeconomic History    Marital status:    Tobacco Use    Smoking status: Never    Smokeless tobacco: Never   Substance and Sexual Activity    Alcohol use: Yes     Comment: Rarely    Drug use: Not Currently    Sexual activity: Yes     Partners: Male     Birth control/protection: Surgical     Comment: hyst     Past Surgical History:   Procedure Laterality Date     SECTION      COLONOSCOPY N/A 2019    Procedure: COLONOSCOPY/golytley ;  Surgeon: Dimas Woodall MD;  Location: [facility];  Service: Endoscopy;  Laterality: N/A;    ESOPHAGOGASTRODUODENOSCOPY N/A 2023    Procedure: EGD (ESOPHAGOGASTRODUODENOSCOPY);  Surgeon: Ori St MD;  Location: [facility];  Service: Endoscopy;  Laterality: N/A;    EXCISION OF NODULE Right 2022    Procedure: EXCISION, NODULE right elbow;  Surgeon: Louise Smith MD;  Location: [facility] OR;  Service: Orthopedics;  Laterality: Right;    HYSTERECTOMY      INJECTION OF STEROID Left 2022    Procedure: INJECTION, STEROID Thumb MP joint;  Surgeon: Louise Smith MD;  Location: [facility] OR;  Service: Orthopedics;  Laterality: Left;    SINUS SURGERY      SURGICAL REMOVAL OF DISTAL ULNA Right 2022    Procedure: EXCISION, ULNA, DISTAL;  Surgeon: Louise Smith MD;  Location: [facility] OR;  Service: Orthopedics;  Laterality: Right;    SYNOVECTOMY OF WRIST Right 2022    Procedure: SYNOVECTOMY, WRIST;  Surgeon: Louise Smith MD;  Location: [facility] OR;  Service: Orthopedics;  Laterality: Right;    TUBAL LIGATION       Family History   Problem Relation Age of Onset    Hypertension Mother     Cataracts Mother     Diabetes Father     Hypertension Father     Cataracts Father     Heart disease Father     Hyperlipidemia Father     Diabetes  Sister     Stroke Brother     Hypertension Brother     No Known Problems Daughter     No Known Problems Son     Melanoma Neg Hx     Breast cancer Neg Hx     Colon cancer Neg Hx     Ovarian cancer Neg Hx     Blindness Neg Hx     Glaucoma Neg Hx     Aneurysm Neg Hx     Cancer Neg Hx     Clotting disorder Neg Hx     Dementia Neg Hx     Fainting Neg Hx     Kidney disease Neg Hx     Liver disease Neg Hx     Migraines Neg Hx     Neuropathy Neg Hx     Parkinsonism Neg Hx     Seizures Neg Hx     Tremor Neg Hx            Review of Systems  General: negative for chills, fever or weight loss  Psychological: negative for mood changes or depression  Ophthalmic: negative for blurry vision, photophobia or eye pain  ENT: see HPI  Respiratory: no cough, shortness of breath, or wheezing  Cardiovascular: no chest pain or dyspnea on exertion  Gastrointestinal: no abdominal pain, change in bowel habits, or black/ bloody stools  Musculoskeletal: negative for gait disturbance or muscular weakness  Neurological: no syncope or seizures; no ataxia  Dermatological: negative for puritis,  rash and jaundice  Hematologic/lymphatic: no easy bruising, no new lumps or bumps      Physical Exam:    Vitals:    05/16/23 0807   BP: 138/89   Pulse: 71       Constitutional: Well appearing / communicating without difficutly.  NAD.  Eyes: EOM I Bilaterally  Head/Face: Normocephalic.  Negative paranasal sinus pressure/tenderness.  Salivary glands WNL.  House Brackmann I Bilaterally.    Right Ear: Auricle normal appearance. External Auditory Canal within normal limits no lesions or masses,TM w/o masses/lesions/perforations. TM mobility noted.   Left Ear: Auricle normal appearance. External Auditory Canal within normal limits no lesions or masses,TM w/o masses/lesions/perforations. TM mobility noted.  Nose: No gross nasal septal deviation. Inferior Turbinates 3+ bilaterally. No septal perforation. No masses/lesions. External nasal skin appears normal without  masses/lesions.  Oral Cavity: Gingiva/lips within normal limits.  Dentition/gingiva healthy appearing. Mucus membranes moist. Floor of mouth soft, no masses palpated. Oral Tongue mobile. Hard Palate appears normal.    Oropharynx: Base of tongue appears normal. No masses/lesions noted. Tonsillar fossa/pharyngeal wall without lesions. Posterior oropharynx WNL.  Soft palate without masses. Midline uvula.   Neck/Lymphatic: No LAD I-VI bilaterally.  No thyromegaly.  No masses noted on exam.    Mirror laryngoscopy/nasopharyngoscopy: Active gag reflex.  Unable to perform.        See separate procedure note for FFL.    Diagnostic studies reviewed:   EGD 1/25/2023: Impression:            - Normal esophagus.                          - Small hiatal hernia.                          - Normal stomach. Biopsied.                          - Normal examined duodenum.                          Exam for globus / cough/ upper abd distress                          showing:  normal EGD, with small hiatal hernia similar to                          exam done 2015    PFT 8/19/2022: The test was performed for the evaluation of Sarcoidosis.   There is no significant airflow obstruction. Bronchodilator response was not tested.   There is mild restriction and no evidence of hyperinflation or air trapping.   The diffusing capacity is minimally reduced.   Compared to 11/2019, FEV1 has decreased in an age-appropriate fashion. Diffusion capacity is slightly reduced.   This PFT is consistant with the clincial diagnosis of sarcoidosis. Clinical correllation is required.   Assessment:    ICD-10-CM ICD-9-CM    1. Laryngopharyngeal reflux (LPR)  K21.9 478.79       2. Globus sensation  R09.89 784.99 Ambulatory referral/consult to ENT      3. Other cough  R05.8 786.2 Ambulatory referral/consult to ENT      4. Chronic rhinitis  J31.0 472.0       5. Sarcoidosis of lung  D86.0 135      517.8         The primary encounter diagnosis was Laryngopharyngeal reflux  (LPR). Diagnoses of Globus sensation, Other cough, Chronic rhinitis, and Sarcoidosis of lung were also pertinent to this visit.      Plan:  No orders of the defined types were placed in this encounter.  Sinuses are unremarkable on endoscopy today. No evidence of chronic sinusitis infection.   Start nasal saline rinses BID  Start Flonase 2 sprays per nostril daily     Given that she felt Protonix helped her significantly and symptoms worse since she has been out of medication and along with FFL findings, I recommended that the patient start taking  Pepcid 20mg PO BID * and provided a prescription. I also recommended that the patient refrain from eating within 3 hours of going to bed, to elevate the head of bed very subtly and optimize the impact of gravity on the potential reflux, and to avoid alcohol, caffeine, tobacco, tomato sauce, spicy foods, fried food, and chocolate.      Follow up in 3 months to reassess progress with treatment regimen.     Carleen Weiner MD

## 2023-05-19 ENCOUNTER — PATIENT OUTREACH (OUTPATIENT)
Dept: ADMINISTRATIVE | Facility: HOSPITAL | Age: 63
End: 2023-05-19
Payer: MEDICARE

## 2023-05-19 NOTE — PROGRESS NOTES
Non-compliant GAP report chart review - Chart review completed for the following HM test if overdue  (Mammogram, Colonoscopy, Cervical Cancer Screening,  Diabetic lab testing, and/or Dilated EYE EXAM)     Care Everywhere and Media reports - updates requested and reviewed.        Health Maintenance Due   Topic Date Due    Shingles Vaccine (1 of 2) Never done

## 2023-06-07 ENCOUNTER — OFFICE VISIT (OUTPATIENT)
Dept: DERMATOLOGY | Facility: CLINIC | Age: 63
End: 2023-06-07
Payer: MEDICARE

## 2023-06-07 DIAGNOSIS — D18.01 CHERRY ANGIOMA: ICD-10-CM

## 2023-06-07 DIAGNOSIS — L72.0 MILIA: ICD-10-CM

## 2023-06-07 DIAGNOSIS — L81.4 LENTIGO: ICD-10-CM

## 2023-06-07 DIAGNOSIS — D22.9 NEVUS: ICD-10-CM

## 2023-06-07 DIAGNOSIS — L57.0 AK (ACTINIC KERATOSIS): ICD-10-CM

## 2023-06-07 DIAGNOSIS — L80 VITILIGO: Primary | ICD-10-CM

## 2023-06-07 PROCEDURE — 17000 DESTRUCT PREMALG LESION: CPT | Mod: S$GLB,,, | Performed by: DERMATOLOGY

## 2023-06-07 PROCEDURE — 99999 PR PBB SHADOW E&M-EST. PATIENT-LVL IV: CPT | Mod: PBBFAC,,, | Performed by: DERMATOLOGY

## 2023-06-07 PROCEDURE — 1159F MED LIST DOCD IN RCRD: CPT | Mod: CPTII,S$GLB,, | Performed by: DERMATOLOGY

## 2023-06-07 PROCEDURE — 99214 OFFICE O/P EST MOD 30 MIN: CPT | Mod: 25,S$GLB,, | Performed by: DERMATOLOGY

## 2023-06-07 PROCEDURE — 99214 PR OFFICE/OUTPT VISIT, EST, LEVL IV, 30-39 MIN: ICD-10-PCS | Mod: 25,S$GLB,, | Performed by: DERMATOLOGY

## 2023-06-07 PROCEDURE — 1160F PR REVIEW ALL MEDS BY PRESCRIBER/CLIN PHARMACIST DOCUMENTED: ICD-10-PCS | Mod: CPTII,S$GLB,, | Performed by: DERMATOLOGY

## 2023-06-07 PROCEDURE — 17000 PR DESTRUCTION(LASER SURGERY,CRYOSURGERY,CHEMOSURGERY),PREMALIGNANT LESIONS,FIRST LESION: ICD-10-PCS | Mod: S$GLB,,, | Performed by: DERMATOLOGY

## 2023-06-07 PROCEDURE — 4010F ACE/ARB THERAPY RXD/TAKEN: CPT | Mod: CPTII,S$GLB,, | Performed by: DERMATOLOGY

## 2023-06-07 PROCEDURE — 1160F RVW MEDS BY RX/DR IN RCRD: CPT | Mod: CPTII,S$GLB,, | Performed by: DERMATOLOGY

## 2023-06-07 PROCEDURE — 1159F PR MEDICATION LIST DOCUMENTED IN MEDICAL RECORD: ICD-10-PCS | Mod: CPTII,S$GLB,, | Performed by: DERMATOLOGY

## 2023-06-07 PROCEDURE — 99999 PR PBB SHADOW E&M-EST. PATIENT-LVL IV: ICD-10-PCS | Mod: PBBFAC,,, | Performed by: DERMATOLOGY

## 2023-06-07 PROCEDURE — 4010F PR ACE/ARB THEARPY RXD/TAKEN: ICD-10-PCS | Mod: CPTII,S$GLB,, | Performed by: DERMATOLOGY

## 2023-06-07 RX ORDER — RUXOLITINIB 15 MG/G
CREAM TOPICAL
Qty: 60 G | Refills: 2 | Status: ACTIVE | OUTPATIENT
Start: 2023-06-07
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
## 2023-06-07 NOTE — PATIENT INSTRUCTIONS
We would like to see you back in the clinic in 12 months.  You will be able to schedule this appointment by calling or by using your [facility] portal 3 months before this time. Because our schedule fills so quickly, please set a reminder in your phone or on your calendar to schedule 3 months before you are due to come in so that we can see you in a timely fashion.  You should also receive a reminder from us in the mail. This will help us ensure we can continue to provide excellent healthcare for you. Thank you.      Opzelura: for hands and neck for vitiligo  Use 2x per day until clear and then 2 more days then stop. Do not use on more than 20 % of your body at the same time; Should be use intermittently for flares ( do not use when clear).   Do not use this medication if you are are immunosuppressant medications.     There are many side effects reported when this medication is used as an oral drug. Systemic absorption is minimal and having these side effects when using as a cream would be exceptionally rare. However if you experience any of these as new side effects while on the cream, please stop the cream and inform your prescribing physician.   Acneiform eruption   Herpes infections or upper respiratory infection   Other serious infections   Oral medication use can be associated with increased risk of lymphoma   Increased risk of cardiac events or pulmonary emboli   This medication should not be used if pregnant or lactating

## 2023-06-08 ENCOUNTER — PATIENT MESSAGE (OUTPATIENT)
Dept: PHARMACY | Facility: CLINIC | Age: 63
End: 2023-06-08
Payer: MEDICARE

## 2023-06-08 ENCOUNTER — SPECIALTY PHARMACY (OUTPATIENT)
Dept: PHARMACY | Facility: CLINIC | Age: 63
End: 2023-06-08
Payer: MEDICARE

## 2023-06-08 ENCOUNTER — TELEPHONE (OUTPATIENT)
Dept: PHARMACY | Facility: CLINIC | Age: 63
End: 2023-06-08
Payer: MEDICARE

## 2023-06-08 NOTE — TELEPHONE ENCOUNTER
Incoming call from pt regarding Enbrel refill and Opzelura. Informed pt that the opzelura is at main campus. Offered to transfer but she said she has to go and  her grandson. Provided her with phone number. Pt said she has one pen of Enbrel on hand for injection tomorrow. Since pt was in a rush, she said she will call [facility] back.

## 2023-06-09 NOTE — TELEPHONE ENCOUNTER
Specialty Pharmacy - Refill Coordination    Specialty Medication Orders Linked to Encounter      Flowsheet Row Most Recent Value   Medication #1 etanercept (ENBREL SURECLICK) 50 mg/mL (1 mL) (Order#[id number], Rx#[id number])            Refill Questions - Documented Responses      Flowsheet Row Most Recent Value   Patient Availability and HIPAA Verification    Does patient want to proceed with activity? Yes   HIPAA/medical authority confirmed? Yes   Relationship to patient of person spoken to? Self   Refill Screening Questions    Changes to allergies? No   Changes to medications? No   New conditions since last clinic visit? No   Unplanned office visit, urgent care, ED, or hospital admission in the last 4 weeks? No   How does patient/caregiver feel medication is working? Good   Financial problems or insurance changes? No   How many doses of your specialty medications were missed in the last 4 weeks? 0   Would patient like to speak to a pharmacist? No   When does the patient need to receive the medication? 06/16/23   Refill Delivery Questions    How will the patient receive the medication? Pickup   When does the patient need to receive the medication? 06/16/23   Shipping Address Home   Address in [facility] confirmed and updated if neccessary? Yes   Expected Copay ($) 0   Is the patient able to afford the medication copay? Yes   Payment Method zero copay   Days supply of Refill 28   Supplies needed? No supplies needed   Refill activity completed? Yes   Refill activity plan Refill scheduled   Shipment/Pickup Date: 06/09/23            Current Outpatient Medications   Medication Sig    amLODIPine (NORVASC) 5 MG tablet Take 1 tablet (5 mg total) by mouth once daily.    atorvastatin (LIPITOR) 10 MG tablet Take 1 tablet (10 mg total) by mouth once daily.    chlorhexidine (PERIDEX) 0.12 % solution Use 15-20 mL to rinse mouth twice daily for 2 minutes, spit out and do not rinse mouth with water after.    cyanocobalamin,  vitamin B-12, (VITAMIN B-12 ORAL) Take by mouth.    cycloSPORINE (RESTASIS) 0.05 % ophthalmic emulsion Apply 1 drop into both eyes twice a day    cycloSPORINE (RESTASIS) 0.05 % ophthalmic emulsion Instill 1 drop Both Eyes twice a day    desoximetasone (TOPICORT) 0.25 % cream Apply to affected area every other night x 1 month    ergocalciferol (ERGOCALCIFEROL) 50,000 unit Cap Take 1 capsule (50,000 Units total) by mouth every 7 days.    etanercept (ENBREL SURECLICK) 50 mg/mL (1 mL) Inject 1 mL (50 mg total) into the skin once a week.    famotidine (PEPCID) 20 MG tablet Take 1 tablet (20 mg total) by mouth 2 (two) times daily.    flu vacc [id number] 6mos up,PF, (FLUARIX QUAD 8938-1425, PF,) 60 mcg (15 mcg x 4)/0.5 mL Syrg use as directed    fluticasone propion-salmeterol 115-21 mcg/dose (ADVAIR HFA) 115-21 mcg/actuation HFAA inhaler Inhale 2 puffs into the lungs every 12 (twelve) hours.    fluticasone propionate (FLONASE) 50 mcg/actuation nasal spray 2 sprays (100 mcg total) by Each Nostril route once daily.    folic acid (FOLVITE) 1 MG tablet Take 4 tablets (4 mg total) by mouth once daily.    gabapentin (NEURONTIN) 300 MG capsule Take 1 capsule (300 mg total) by mouth 3 (three) times daily.    hydrOXYzine HCL (ATARAX) 25 MG tablet Take 1 tablet (25 mg total) by mouth every evening.    leflunomide (ARAVA) 20 MG Tab Take 1 tablet (20 mg total) by mouth once daily.    losartan (COZAAR) 100 MG tablet Take 1 tablet (100 mg total) by mouth once daily.    metoprolol succinate (TOPROL-XL) 25 MG 24 hr tablet Take 1 tablet (25 mg total) by mouth every evening.    nystatin (MYCOSTATIN) powder Apply to affected area every morning as needed for flare under breasts (Patient taking differently: Apply to affected area every morning as needed for flare under breasts)    promethazine (PHENERGAN) 25 MG tablet Take 1 tablet (25 mg total) by mouth every 6 (six) hours as needed for Nausea.    PROTOPIC 0.1 % ointment AAA bid    ruxolitinib  (OPZELURA) 1.5 % Crea aaa bid    ruxolitinib (OPZELURA) 1.5 % Crea Apply to affected area twice daily to neck and hands for vitiligo    triamcinolone acetonide 0.025% (KENALOG) 0.025 % cream Apply to affected area every day to twice a day to neck . Not more than 1-2 weeks straight in same location    triamcinolone acetonide 0.1% (KENALOG) 0.1 % ointment Apply topically 2 (two) times daily. For 2 weeks    ZINC ORAL Take by mouth.   Last reviewed on 6/7/2023  9:09 AM by Cony Veras MD    Review of patient's allergies indicates:  No Known Allergies Last reviewed on  6/7/2023 9:09 AM by Cony Veras      Tasks added this encounter   6/10/2023 - Pickup Reminder   Tasks due within next 3 months   No tasks due.     Ariel Wilkerson, PharmD  [facility]  [address] [place] [id number]  Phone: [phone]  Fax: [phone]

## 2023-06-14 ENCOUNTER — PATIENT MESSAGE (OUTPATIENT)
Dept: FAMILY MEDICINE | Facility: CLINIC | Age: 63
End: 2023-06-14
Payer: MEDICARE

## 2023-06-22 ENCOUNTER — LAB VISIT (OUTPATIENT)
Dept: LAB | Facility: HOSPITAL | Age: 63
End: 2023-06-22
Attending: INTERNAL MEDICINE
Payer: MEDICARE

## 2023-06-22 ENCOUNTER — PATIENT MESSAGE (OUTPATIENT)
Dept: DERMATOLOGY | Facility: CLINIC | Age: 63
End: 2023-06-22
Payer: MEDICARE

## 2023-06-22 DIAGNOSIS — D86.9 SARCOIDOSIS: ICD-10-CM

## 2023-06-22 LAB
ALBUMIN SERPL BCP-MCNC: 4.2 G/DL (ref 3.5–5.2)
ALP SERPL-CCNC: 73 U/L (ref 55–135)
ALT SERPL W/O P-5'-P-CCNC: 22 U/L (ref 10–44)
ANION GAP SERPL CALC-SCNC: 9 MMOL/L (ref 8–16)
AST SERPL-CCNC: 19 U/L (ref 10–40)
BASOPHILS # BLD AUTO: 0.06 K/UL (ref 0–0.2)
BASOPHILS NFR BLD: 0.8 % (ref 0–1.9)
BILIRUB SERPL-MCNC: 0.4 MG/DL (ref 0.1–1)
BUN SERPL-MCNC: 15 MG/DL (ref 8–23)
CALCIUM SERPL-MCNC: 9.9 MG/DL (ref 8.7–10.5)
CHLORIDE SERPL-SCNC: 106 MMOL/L (ref 95–110)
CO2 SERPL-SCNC: 27 MMOL/L (ref 23–29)
CREAT SERPL-MCNC: 0.9 MG/DL (ref 0.5–1.4)
CRP SERPL-MCNC: 2 MG/L (ref 0–8.2)
DIFFERENTIAL METHOD: ABNORMAL
EOSINOPHIL # BLD AUTO: 0.4 K/UL (ref 0–0.5)
EOSINOPHIL NFR BLD: 5.7 % (ref 0–8)
ERYTHROCYTE [DISTWIDTH] IN BLOOD BY AUTOMATED COUNT: 13.1 % (ref 11.5–14.5)
ERYTHROCYTE [SEDIMENTATION RATE] IN BLOOD BY PHOTOMETRIC METHOD: 10 MM/HR (ref 0–36)
EST. GFR  (NO RACE VARIABLE): >60 ML/MIN/1.73 M^2
GLUCOSE SERPL-MCNC: 89 MG/DL (ref 70–110)
HCT VFR BLD AUTO: 41 % (ref 37–48.5)
HGB BLD-MCNC: 13.4 G/DL (ref 12–16)
IMM GRANULOCYTES # BLD AUTO: 0.01 K/UL (ref 0–0.04)
IMM GRANULOCYTES NFR BLD AUTO: 0.1 % (ref 0–0.5)
LYMPHOCYTES # BLD AUTO: 2.5 K/UL (ref 1–4.8)
LYMPHOCYTES NFR BLD: 34.6 % (ref 18–48)
MCH RBC QN AUTO: 32.4 PG (ref 27–31)
MCHC RBC AUTO-ENTMCNC: 32.7 G/DL (ref 32–36)
MCV RBC AUTO: 99 FL (ref 82–98)
MONOCYTES # BLD AUTO: 0.7 K/UL (ref 0.3–1)
MONOCYTES NFR BLD: 9.6 % (ref 4–15)
NEUTROPHILS # BLD AUTO: 3.6 K/UL (ref 1.8–7.7)
NEUTROPHILS NFR BLD: 49.2 % (ref 38–73)
NRBC BLD-RTO: 0 /100 WBC
PLATELET # BLD AUTO: 334 K/UL (ref 150–450)
PMV BLD AUTO: 9.8 FL (ref 9.2–12.9)
POTASSIUM SERPL-SCNC: 4.7 MMOL/L (ref 3.5–5.1)
PROT SERPL-MCNC: 7.8 G/DL (ref 6–8.4)
RBC # BLD AUTO: 4.14 M/UL (ref 4–5.4)
SODIUM SERPL-SCNC: 142 MMOL/L (ref 136–145)
WBC # BLD AUTO: 7.31 K/UL (ref 3.9–12.7)

## 2023-06-22 PROCEDURE — 36415 COLL VENOUS BLD VENIPUNCTURE: CPT | Performed by: INTERNAL MEDICINE

## 2023-06-22 PROCEDURE — 86140 C-REACTIVE PROTEIN: CPT | Performed by: INTERNAL MEDICINE

## 2023-06-22 PROCEDURE — 85652 RBC SED RATE AUTOMATED: CPT | Performed by: INTERNAL MEDICINE

## 2023-06-22 PROCEDURE — 80053 COMPREHEN METABOLIC PANEL: CPT | Performed by: INTERNAL MEDICINE

## 2023-06-22 PROCEDURE — 85025 COMPLETE CBC W/AUTO DIFF WBC: CPT | Performed by: INTERNAL MEDICINE

## 2023-07-06 ENCOUNTER — OFFICE VISIT (OUTPATIENT)
Dept: FAMILY MEDICINE | Facility: CLINIC | Age: 63
End: 2023-07-06
Attending: FAMILY MEDICINE
Payer: MEDICARE

## 2023-07-06 ENCOUNTER — PATIENT MESSAGE (OUTPATIENT)
Dept: FAMILY MEDICINE | Facility: CLINIC | Age: 63
End: 2023-07-06

## 2023-07-06 VITALS
HEART RATE: 81 BPM | HEIGHT: 61 IN | SYSTOLIC BLOOD PRESSURE: 132 MMHG | DIASTOLIC BLOOD PRESSURE: 78 MMHG | WEIGHT: 147.06 LBS | BODY MASS INDEX: 27.77 KG/M2 | OXYGEN SATURATION: 95 %

## 2023-07-06 DIAGNOSIS — D75.89 MACROCYTOSIS WITHOUT ANEMIA: ICD-10-CM

## 2023-07-06 DIAGNOSIS — I10 PRIMARY HYPERTENSION: Primary | ICD-10-CM

## 2023-07-06 DIAGNOSIS — D84.9 IMMUNOCOMPROMISED: ICD-10-CM

## 2023-07-06 DIAGNOSIS — M05.79 RHEUMATOID ARTHRITIS INVOLVING MULTIPLE SITES WITH POSITIVE RHEUMATOID FACTOR: ICD-10-CM

## 2023-07-06 DIAGNOSIS — R79.9 ABNORMAL FINDING OF BLOOD CHEMISTRY, UNSPECIFIED: ICD-10-CM

## 2023-07-06 DIAGNOSIS — D86.0 SARCOIDOSIS OF LUNG: ICD-10-CM

## 2023-07-06 DIAGNOSIS — E53.8 DEFICIENCY OF OTHER SPECIFIED B GROUP VITAMINS: ICD-10-CM

## 2023-07-06 DIAGNOSIS — I10 ESSENTIAL HYPERTENSION: ICD-10-CM

## 2023-07-06 DIAGNOSIS — M05.9 RHEUMATOID ARTHRITIS WITH POSITIVE RHEUMATOID FACTOR, INVOLVING UNSPECIFIED SITE: ICD-10-CM

## 2023-07-06 DIAGNOSIS — E78.00 HYPERCHOLESTEREMIA: ICD-10-CM

## 2023-07-06 DIAGNOSIS — E78.2 MIXED HYPERLIPIDEMIA: ICD-10-CM

## 2023-07-06 DIAGNOSIS — M85.80 OSTEOPENIA, UNSPECIFIED LOCATION: ICD-10-CM

## 2023-07-06 DIAGNOSIS — F33.9 MAJOR DEPRESSION, RECURRENT, CHRONIC: ICD-10-CM

## 2023-07-06 DIAGNOSIS — L80 VITILIGO: ICD-10-CM

## 2023-07-06 PROCEDURE — 1160F RVW MEDS BY RX/DR IN RCRD: CPT | Mod: CPTII,S$GLB,, | Performed by: FAMILY MEDICINE

## 2023-07-06 PROCEDURE — 3075F SYST BP GE 130 - 139MM HG: CPT | Mod: CPTII,S$GLB,, | Performed by: FAMILY MEDICINE

## 2023-07-06 PROCEDURE — 3078F DIAST BP <80 MM HG: CPT | Mod: CPTII,S$GLB,, | Performed by: FAMILY MEDICINE

## 2023-07-06 PROCEDURE — 99214 OFFICE O/P EST MOD 30 MIN: CPT | Mod: S$GLB,,, | Performed by: FAMILY MEDICINE

## 2023-07-06 PROCEDURE — 99999 PR PBB SHADOW E&M-EST. PATIENT-LVL V: ICD-10-PCS | Mod: PBBFAC,,, | Performed by: FAMILY MEDICINE

## 2023-07-06 PROCEDURE — 99214 PR OFFICE/OUTPT VISIT, EST, LEVL IV, 30-39 MIN: ICD-10-PCS | Mod: S$GLB,,, | Performed by: FAMILY MEDICINE

## 2023-07-06 PROCEDURE — 4010F PR ACE/ARB THEARPY RXD/TAKEN: ICD-10-PCS | Mod: CPTII,S$GLB,, | Performed by: FAMILY MEDICINE

## 2023-07-06 PROCEDURE — 99999 PR PBB SHADOW E&M-EST. PATIENT-LVL V: CPT | Mod: PBBFAC,,, | Performed by: FAMILY MEDICINE

## 2023-07-06 PROCEDURE — 1159F MED LIST DOCD IN RCRD: CPT | Mod: CPTII,S$GLB,, | Performed by: FAMILY MEDICINE

## 2023-07-06 PROCEDURE — 4010F ACE/ARB THERAPY RXD/TAKEN: CPT | Mod: CPTII,S$GLB,, | Performed by: FAMILY MEDICINE

## 2023-07-06 PROCEDURE — 1159F PR MEDICATION LIST DOCUMENTED IN MEDICAL RECORD: ICD-10-PCS | Mod: CPTII,S$GLB,, | Performed by: FAMILY MEDICINE

## 2023-07-06 PROCEDURE — 3008F BODY MASS INDEX DOCD: CPT | Mod: CPTII,S$GLB,, | Performed by: FAMILY MEDICINE

## 2023-07-06 PROCEDURE — 3075F PR MOST RECENT SYSTOLIC BLOOD PRESS GE 130-139MM HG: ICD-10-PCS | Mod: CPTII,S$GLB,, | Performed by: FAMILY MEDICINE

## 2023-07-06 PROCEDURE — 3008F PR BODY MASS INDEX (BMI) DOCUMENTED: ICD-10-PCS | Mod: CPTII,S$GLB,, | Performed by: FAMILY MEDICINE

## 2023-07-06 PROCEDURE — 3078F PR MOST RECENT DIASTOLIC BLOOD PRESSURE < 80 MM HG: ICD-10-PCS | Mod: CPTII,S$GLB,, | Performed by: FAMILY MEDICINE

## 2023-07-06 PROCEDURE — 1160F PR REVIEW ALL MEDS BY PRESCRIBER/CLIN PHARMACIST DOCUMENTED: ICD-10-PCS | Mod: CPTII,S$GLB,, | Performed by: FAMILY MEDICINE

## 2023-07-06 RX ORDER — ATORVASTATIN CALCIUM 10 MG/1
10 TABLET, FILM COATED ORAL DAILY
Qty: 90 TABLET | Refills: 3 | Status: SHIPPED | OUTPATIENT
Start: 2023-07-06

## 2023-07-06 NOTE — PROGRESS NOTES
Subjective:       Patient ID: Silvia Cervantes is a 62 y.o. female.    Chief Complaint: Leg cramps - Atorvastatin    61 yr old pleasant  female with depression, allergies, vitiligo, HTN, HLD, sarcoidosis, Rheumatoid arthritis, Erosive gastritis, and other co morbidities presents today for her 3 month follow up.    Chronic low back pain. Onset 6 months ago and gradually worsening - left lower back- does not radiate and no neurological symptoms. No saddle anesthesia or bladder/bowel problems. Details as follows -      HTN - controlled - on Losartan-HCT and Norvasc 5 - compliant - no side effects     HLD - on statin but not compliant -    LDLCALC                  175.0 (H)           03/21/2023              Sarcoidosis and lung nodules - follwos pulmonology - need new one as her previous specialist is leaving [facility]      Major depression - uncontrolled - not on meds - would like to start one - given zoloft - she will start from today - - no SI/HI    Vertigo - much better - on vertin as needed       HLD - diet controlled -    LDLCALC                  86.2                12/22/2021                 RA - on Enbrel shots - follows Rheumatology - stable      Gastritis - stable - on PPI as needed - no side effects      History as below - reviewed       HM  -labs UTD  -vaccines UTD    Follow-up  Associated symptoms include myalgias, neck pain, numbness and weakness. Pertinent negatives include no arthralgias, chest pain, congestion, diaphoresis, headaches, nausea or sore throat.   Hypertension  This is a chronic problem. The current episode started more than 1 year ago. The problem has been gradually improving since onset. The problem is controlled. Associated symptoms include neck pain. Pertinent negatives include no chest pain, headaches, malaise/fatigue, palpitations or shortness of breath. There are no associated agents to hypertension. Risk factors for coronary artery disease include dyslipidemia and post-menopausal  state. Past treatments include angiotensin blockers and diuretics. The current treatment provides significant improvement. There are no compliance problems.  There is no history of angina, CAD/MI, CVA, left ventricular hypertrophy, PVD or retinopathy. There is no history of chronic renal disease, coarctation of the aorta, hypercortisolism, hyperparathyroidism, a hypertension causing med or sleep apnea.   Hyperlipidemia  This is a chronic problem. The current episode started more than 1 year ago. The problem is controlled. Recent lipid tests were reviewed and are normal. She has no history of chronic renal disease, diabetes, hypothyroidism or liver disease. There are no known factors aggravating her hyperlipidemia. Associated symptoms include myalgias. Pertinent negatives include no chest pain, focal sensory loss, focal weakness, leg pain or shortness of breath. She is currently on no antihyperlipidemic treatment. The current treatment provides mild improvement of lipids. There are no compliance problems.  Risk factors for coronary artery disease include dyslipidemia, hypertension and post-menopausal.   Back Pain  This is a chronic problem. The current episode started more than 1 month ago. The problem occurs constantly. The problem is unchanged. The pain is present in the sacro-iliac and lumbar spine. The quality of the pain is described as aching. The pain does not radiate. The symptoms are aggravated by bending, sitting and twisting. Associated symptoms include numbness and weakness. Pertinent negatives include no chest pain, dysuria, headaches, leg pain, perianal numbness or weight loss. She has tried nothing for the symptoms. The treatment provided no relief.   Review of Systems   Constitutional: Negative.  Negative for activity change, diaphoresis, malaise/fatigue, unexpected weight change and weight loss.   HENT: Negative.  Negative for nasal congestion, ear discharge, hearing loss, rhinorrhea, sore throat and  voice change.    Eyes: Negative.  Negative for pain, discharge and visual disturbance.   Respiratory: Negative.  Negative for chest tightness, shortness of breath and wheezing.    Cardiovascular: Negative.  Negative for chest pain and palpitations.   Gastrointestinal: Negative.  Negative for abdominal distention, anal bleeding, constipation and nausea.   Endocrine: Negative.  Negative for cold intolerance, polydipsia and polyuria.   Genitourinary: Negative.  Negative for decreased urine volume, difficulty urinating, dysuria, frequency, menstrual problem and vaginal pain.   Musculoskeletal:  Positive for back pain, myalgias and neck pain. Negative for arthralgias, gait problem and leg pain.   Integumentary:  Negative for color change, pallor and wound. Negative.   Allergic/Immunologic: Negative.  Negative for environmental allergies and immunocompromised state.   Neurological:  Positive for weakness and numbness. Negative for dizziness, tremors, focal weakness, seizures, speech difficulty and headaches.   Hematological: Negative.  Negative for adenopathy. Does not bruise/bleed easily.   Psychiatric/Behavioral: Negative.  Negative for agitation, confusion, decreased concentration, hallucinations, self-injury and suicidal ideas. The patient is not nervous/anxious.        PMH/PSH/FH/SH/MED/ALLERGY reviewed    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)        Past  Surgical History:   Procedure Laterality Date     SECTION      COLONOSCOPY N/A 2019    Procedure: COLONOSCOPY/golytley ;  Surgeon: Dimas Woodall MD;  Location: [facility] ENDO;  Service: Endoscopy;  Laterality: N/A;    ESOPHAGOGASTRODUODENOSCOPY N/A 2023    Procedure: EGD (ESOPHAGOGASTRODUODENOSCOPY);  Surgeon: Ori St MD;  Location: [facility] ENDO;  Service: Endoscopy;  Laterality: N/A;    EXCISION OF NODULE Right 2022    Procedure: EXCISION, NODULE right elbow;  Surgeon: Louise Smith MD;  Location: [facility] OR;  Service: Orthopedics;  Laterality: Right;    HYSTERECTOMY      INJECTION OF STEROID Left 2022    Procedure: INJECTION, STEROID Thumb MP joint;  Surgeon: Louise Smith MD;  Location: [facility] OR;  Service: Orthopedics;  Laterality: Left;    SINUS SURGERY      SURGICAL REMOVAL OF DISTAL ULNA Right 2022    Procedure: EXCISION, ULNA, DISTAL;  Surgeon: Louise Smith MD;  Location: [facility] OR;  Service: Orthopedics;  Laterality: Right;    SYNOVECTOMY OF WRIST Right 2022    Procedure: SYNOVECTOMY, WRIST;  Surgeon: Louise Smith MD;  Location: [facility] OR;  Service: Orthopedics;  Laterality: Right;    TUBAL LIGATION         Family History   Problem Relation Age of Onset    Hypertension Mother     Cataracts Mother     Diabetes Father     Hypertension Father     Cataracts Father     Heart disease Father     Hyperlipidemia Father     Diabetes Sister     Stroke Brother     Hypertension Brother     No Known Problems Daughter     No Known Problems Son     Melanoma Neg Hx     Breast cancer Neg Hx     Colon cancer Neg Hx     Ovarian cancer Neg Hx     Blindness Neg Hx     Glaucoma Neg Hx     Aneurysm Neg Hx     Cancer Neg Hx     Clotting disorder Neg Hx     Dementia Neg Hx     Fainting Neg Hx     Kidney disease Neg Hx     Liver disease Neg Hx     Migraines Neg Hx     Neuropathy Neg Hx     Parkinsonism Neg Hx     Seizures Neg Hx     Tremor Neg Hx        Social History     Socioeconomic  History    Marital status:    Tobacco Use    Smoking status: Never    Smokeless tobacco: Never   Substance and Sexual Activity    Alcohol use: Yes     Comment: Rarely    Drug use: Not Currently    Sexual activity: Yes     Partners: Male     Birth control/protection: Surgical     Comment: bubbast       Current Outpatient Medications   Medication Sig Dispense Refill    amLODIPine (NORVASC) 5 MG tablet Take 1 tablet (5 mg total) by mouth once daily. 90 tablet 3    chlorhexidine (PERIDEX) 0.12 % solution Use 15-20 mL to rinse mouth twice daily for 2 minutes, spit out and do not rinse mouth with water after. 473 mL 3    cyanocobalamin, vitamin B-12, (VITAMIN B-12 ORAL) Take by mouth.      cycloSPORINE (RESTASIS) 0.05 % ophthalmic emulsion Apply 1 drop into both eyes twice a day 180 vial 3    cycloSPORINE (RESTASIS) 0.05 % ophthalmic emulsion Instill 1 drop Both Eyes twice a day 180 each 6    desoximetasone (TOPICORT) 0.25 % cream Apply to affected area every other night x 1 month 60 g 1    ergocalciferol (ERGOCALCIFEROL) 50,000 unit Cap Take 1 capsule (50,000 Units total) by mouth every 7 days. 12 capsule 2    etanercept (ENBREL SURECLICK) 50 mg/mL (1 mL) Inject 1 mL (50 mg total) into the skin once a week. 4 mL 11    famotidine (PEPCID) 20 MG tablet Take 1 tablet (20 mg total) by mouth 2 (two) times daily. 60 tablet 11    flu vacc [id number] 6mos up,PF, (FLUARIX QUAD 3228-5223, PF,) 60 mcg (15 mcg x 4)/0.5 mL Syrg use as directed 0.5 mL 0    fluticasone propionate (FLONASE) 50 mcg/actuation nasal spray 2 sprays (100 mcg total) by Each Nostril route once daily. 16 g 11    folic acid (FOLVITE) 1 MG tablet Take 4 tablets (4 mg total) by mouth once daily. 120 tablet 11    gabapentin (NEURONTIN) 300 MG capsule Take 1 capsule (300 mg total) by mouth 3 (three) times daily. 270 capsule 1    hydrOXYzine HCL (ATARAX) 25 MG tablet Take 1 tablet (25 mg total) by mouth every evening. 30 tablet 1    losartan (COZAAR) 100 MG  tablet Take 1 tablet (100 mg total) by mouth once daily. 90 tablet 3    metoprolol succinate (TOPROL-XL) 25 MG 24 hr tablet Take 1 tablet (25 mg total) by mouth every evening. 90 tablet 4    nystatin (MYCOSTATIN) powder Apply to affected area every morning as needed for flare under breasts (Patient taking differently: Apply to affected area every morning as needed for flare under breasts) 120 g 6    promethazine (PHENERGAN) 25 MG tablet Take 1 tablet (25 mg total) by mouth every 6 (six) hours as needed for Nausea. 25 tablet 0    PROTOPIC 0.1 % ointment AAA bid 100 g 2    ruxolitinib (OPZELURA) 1.5 % Crea aaa bid 60 g 2    ruxolitinib (OPZELURA) 1.5 % Crea Apply to affected area twice daily to neck and hands for vitiligo 60 g 2    triamcinolone acetonide 0.025% (KENALOG) 0.025 % cream Apply to affected area every day to twice a day to neck . Not more than 1-2 weeks straight in same location 80 g 1    triamcinolone acetonide 0.1% (KENALOG) 0.1 % ointment Apply topically 2 (two) times daily. For 2 weeks 15 g 1    ZINC ORAL Take by mouth.      atorvastatin (LIPITOR) 10 MG tablet Take 1 tablet (10 mg total) by mouth once daily. 90 tablet 3    fluticasone propion-salmeterol 115-21 mcg/dose (ADVAIR HFA) 115-21 mcg/actuation HFAA inhaler Inhale 2 puffs into the lungs every 12 (twelve) hours. 12 g 11    leflunomide (ARAVA) 20 MG Tab Take 1 tablet (20 mg total) by mouth once daily. 90 tablet 3     No current facility-administered medications for this visit.       Review of patient's allergies indicates:  No Known Allergies      Objective:       Vitals:    07/06/23 1003   BP: 132/78   Pulse: 81       Physical Exam  Constitutional:       General: She is not in acute distress.     Appearance: She is well-developed. She is not diaphoretic.   HENT:      Head: Normocephalic and atraumatic.      Right Ear: External ear normal.      Left Ear: External ear normal.      Nose: Nose normal.      Mouth/Throat:      Pharynx: No  oropharyngeal exudate.   Eyes:      General: No scleral icterus.        Right eye: No discharge.         Left eye: No discharge.      Conjunctiva/sclera: Conjunctivae normal.      Pupils: Pupils are equal, round, and reactive to light.   Neck:      Thyroid: No thyromegaly.      Vascular: No JVD.      Trachea: No tracheal deviation.   Cardiovascular:      Rate and Rhythm: Normal rate and regular rhythm.      Heart sounds: Normal heart sounds. No murmur heard.    No friction rub. No gallop.   Pulmonary:      Effort: Pulmonary effort is normal.      Breath sounds: Normal breath sounds. No stridor. No wheezing or rales.   Chest:      Chest wall: No tenderness.   Abdominal:      General: Bowel sounds are normal. There is no distension.      Palpations: Abdomen is soft. There is no mass.      Tenderness: There is no abdominal tenderness. There is no guarding or rebound.      Hernia: No hernia is present.   Musculoskeletal:         General: No tenderness. Normal range of motion.      Cervical back: Normal range of motion and neck supple.   Lymphadenopathy:      Cervical: No cervical adenopathy.   Skin:     General: Skin is warm and dry.      Coloration: Skin is not pale.      Findings: No erythema or rash.   Neurological:      Mental Status: She is alert and oriented to person, place, and time.      Cranial Nerves: No cranial nerve deficit.      Motor: No abnormal muscle tone.      Coordination: Coordination normal.      Deep Tendon Reflexes: Reflexes are normal and symmetric. Reflexes normal.   Psychiatric:         Behavior: Behavior normal.         Thought Content: Thought content normal.         Judgment: Judgment normal.       Assessment:       Problem List Items Addressed This Visit       Vitiligo    Sarcoidosis of lung    Rheumatoid arthritis with positive rheumatoid factor    Mixed hyperlipidemia    Relevant Medications    atorvastatin (LIPITOR) 10 MG tablet    Other Relevant Orders    Comprehensive Metabolic Panel     CK    Lipid Panel    Major depression, recurrent, chronic    Immunocompromised    Hypertension - Primary    Relevant Orders    Hemoglobin A1C     Other Visit Diagnoses       Essential hypertension        Osteopenia, unspecified location        Hypercholesteremia        Relevant Medications    atorvastatin (LIPITOR) 10 MG tablet    Macrocytosis without anemia        Relevant Orders    VITAMIN B12    FOLATE    Abnormal finding of blood chemistry, unspecified        Relevant Orders    Hemoglobin A1C    Deficiency of other specified B group vitamins        Relevant Orders    VITAMIN B12    FOLATE            Plan:       Silvia was seen today for leg cramps - atorvastatin.    Diagnoses and all orders for this visit:    Primary hypertension  -     Hemoglobin A1C; Future    Major depression, recurrent, chronic    Essential hypertension    Mixed hyperlipidemia  -     Comprehensive Metabolic Panel; Future  -     CK; Future  -     Lipid Panel; Future    Osteopenia, unspecified location    Rheumatoid arthritis with positive rheumatoid factor, involving unspecified site    Immunocompromised    Vitiligo    Sarcoidosis of lung    Rheumatoid arthritis involving multiple sites with positive rheumatoid factor    Hypercholesteremia  -     atorvastatin (LIPITOR) 10 MG tablet; Take 1 tablet (10 mg total) by mouth once daily.    Macrocytosis without anemia  -     VITAMIN B12; Future  -     FOLATE; Future    Abnormal finding of blood chemistry, unspecified  -     Hemoglobin A1C; Future    Deficiency of other specified B group vitamins  -     VITAMIN B12; Future  -     FOLATE; Future      HLD  -diet control  -start statin          HTN  -controlled    RA  -stable  -follow rheumatology    Vertigo  -meclizine prn    Depression  -controlled  -continue zoloft 25 mg daily  -SI/ER precautions given      Spent adequate time in obtaining history and explaining differentials      30 minutes spent during this visit of which greater than 50% devoted  to face-face counseling and coordination of care regarding diagnosis and management plan    Follow up in about 3 months (around 10/6/2023), or if symptoms worsen or fail to improve.

## 2023-07-10 ENCOUNTER — SPECIALTY PHARMACY (OUTPATIENT)
Dept: PHARMACY | Facility: CLINIC | Age: 63
End: 2023-07-10
Payer: MEDICARE

## 2023-07-10 NOTE — TELEPHONE ENCOUNTER
Specialty Pharmacy - Refill Coordination    Specialty Medication Orders Linked to Encounter      Flowsheet Row Most Recent Value   Medication #1 etanercept (ENBREL SURECLICK) 50 mg/mL (1 mL) (Order#[id number], Rx#[id number])            Refill Questions - Documented Responses      Flowsheet Row Most Recent Value   Patient Availability and HIPAA Verification    Does patient want to proceed with activity? Yes   HIPAA/medical authority confirmed? Yes   Relationship to patient of person spoken to? Self   Refill Screening Questions    Changes to allergies? No   Changes to medications? No   New conditions since last clinic visit? No   Unplanned office visit, urgent care, ED, or hospital admission in the last 4 weeks? No   How does patient/caregiver feel medication is working? Good   Financial problems or insurance changes? No   How many doses of your specialty medications were missed in the last 4 weeks? 0   Would patient like to speak to a pharmacist? No   When does the patient need to receive the medication? 07/14/23   Refill Delivery Questions    How will the patient receive the medication? Pickup   When does the patient need to receive the medication? 07/14/23   Shipping Address Home   Address in [facility] confirmed and updated if neccessary? Yes   Expected Copay ($) 0   Is the patient able to afford the medication copay? Yes   Payment Method zero copay   Days supply of Refill 28   Supplies needed? No supplies needed   Refill activity completed? Yes   Refill activity plan Refill scheduled   Shipment/Pickup Date: 07/11/23            Current Outpatient Medications   Medication Sig    amLODIPine (NORVASC) 5 MG tablet Take 1 tablet (5 mg total) by mouth once daily.    atorvastatin (LIPITOR) 10 MG tablet Take 1 tablet (10 mg total) by mouth once daily.    chlorhexidine (PERIDEX) 0.12 % solution Use 15-20 mL to rinse mouth twice daily for 2 minutes, spit out and do not rinse mouth with water after.    cyanocobalamin,  vitamin B-12, (VITAMIN B-12 ORAL) Take by mouth.    cycloSPORINE (RESTASIS) 0.05 % ophthalmic emulsion Apply 1 drop into both eyes twice a day    cycloSPORINE (RESTASIS) 0.05 % ophthalmic emulsion Instill 1 drop Both Eyes twice a day    desoximetasone (TOPICORT) 0.25 % cream Apply to affected area every other night x 1 month    ergocalciferol (ERGOCALCIFEROL) 50,000 unit Cap Take 1 capsule (50,000 Units total) by mouth every 7 days.    etanercept (ENBREL SURECLICK) 50 mg/mL (1 mL) Inject 1 mL (50 mg total) into the skin once a week.    famotidine (PEPCID) 20 MG tablet Take 1 tablet (20 mg total) by mouth 2 (two) times daily.    flu vacc [id number] 6mos up,PF, (FLUARIX QUAD 9197-0308, PF,) 60 mcg (15 mcg x 4)/0.5 mL Syrg use as directed    fluticasone propion-salmeterol 115-21 mcg/dose (ADVAIR HFA) 115-21 mcg/actuation HFAA inhaler Inhale 2 puffs into the lungs every 12 (twelve) hours.    fluticasone propionate (FLONASE) 50 mcg/actuation nasal spray 2 sprays (100 mcg total) by Each Nostril route once daily.    folic acid (FOLVITE) 1 MG tablet Take 4 tablets (4 mg total) by mouth once daily.    gabapentin (NEURONTIN) 300 MG capsule Take 1 capsule (300 mg total) by mouth 3 (three) times daily.    hydrOXYzine HCL (ATARAX) 25 MG tablet Take 1 tablet (25 mg total) by mouth every evening.    leflunomide (ARAVA) 20 MG Tab Take 1 tablet (20 mg total) by mouth once daily.    losartan (COZAAR) 100 MG tablet Take 1 tablet (100 mg total) by mouth once daily.    metoprolol succinate (TOPROL-XL) 25 MG 24 hr tablet Take 1 tablet (25 mg total) by mouth every evening.    nystatin (MYCOSTATIN) powder Apply to affected area every morning as needed for flare under breasts (Patient taking differently: Apply to affected area every morning as needed for flare under breasts)    promethazine (PHENERGAN) 25 MG tablet Take 1 tablet (25 mg total) by mouth every 6 (six) hours as needed for Nausea.    PROTOPIC 0.1 % ointment AAA bid    ruxolitinib  (OPZELURA) 1.5 % Crea aaa bid    ruxolitinib (OPZELURA) 1.5 % Crea Apply to affected area twice daily to neck and hands for vitiligo    triamcinolone acetonide 0.025% (KENALOG) 0.025 % cream Apply to affected area every day to twice a day to neck . Not more than 1-2 weeks straight in same location    triamcinolone acetonide 0.1% (KENALOG) 0.1 % ointment Apply topically 2 (two) times daily. For 2 weeks    ZINC ORAL Take by mouth.   Last reviewed on 7/6/2023 10:17 AM by Adrián Cade MD    Review of patient's allergies indicates:  No Known Allergies Last reviewed on  7/6/2023 10:17 AM by Adrián Cade      Tasks added this encounter   No tasks added.   Tasks due within next 3 months   7/10/2023 - Refill Coordination Outreach (1 time occurrence)     Lisa Ramirez, PharmD  [facility]  [address] [place] [id number]  Phone: [phone]  Fax: [phone]

## 2023-07-11 ENCOUNTER — LAB VISIT (OUTPATIENT)
Dept: LAB | Facility: HOSPITAL | Age: 63
End: 2023-07-11
Attending: FAMILY MEDICINE
Payer: MEDICARE

## 2023-07-11 DIAGNOSIS — E78.2 MIXED HYPERLIPIDEMIA: ICD-10-CM

## 2023-07-11 DIAGNOSIS — E53.8 DEFICIENCY OF OTHER SPECIFIED B GROUP VITAMINS: ICD-10-CM

## 2023-07-11 DIAGNOSIS — I10 PRIMARY HYPERTENSION: ICD-10-CM

## 2023-07-11 DIAGNOSIS — R79.9 ABNORMAL FINDING OF BLOOD CHEMISTRY, UNSPECIFIED: ICD-10-CM

## 2023-07-11 DIAGNOSIS — D75.89 MACROCYTOSIS WITHOUT ANEMIA: ICD-10-CM

## 2023-07-11 LAB
ALBUMIN SERPL BCP-MCNC: 4 G/DL (ref 3.5–5.2)
ALP SERPL-CCNC: 72 U/L (ref 55–135)
ALT SERPL W/O P-5'-P-CCNC: 15 U/L (ref 10–44)
ANION GAP SERPL CALC-SCNC: 8 MMOL/L (ref 8–16)
AST SERPL-CCNC: 18 U/L (ref 10–40)
BILIRUB SERPL-MCNC: 0.4 MG/DL (ref 0.1–1)
BUN SERPL-MCNC: 11 MG/DL (ref 8–23)
CALCIUM SERPL-MCNC: 9.6 MG/DL (ref 8.7–10.5)
CHLORIDE SERPL-SCNC: 107 MMOL/L (ref 95–110)
CHOLEST SERPL-MCNC: 180 MG/DL (ref 120–199)
CHOLEST/HDLC SERPL: 4.1 {RATIO} (ref 2–5)
CK SERPL-CCNC: 87 U/L (ref 20–180)
CO2 SERPL-SCNC: 30 MMOL/L (ref 23–29)
CREAT SERPL-MCNC: 0.8 MG/DL (ref 0.5–1.4)
EST. GFR  (NO RACE VARIABLE): >60 ML/MIN/1.73 M^2
ESTIMATED AVG GLUCOSE: 105 MG/DL (ref 68–131)
FOLATE SERPL-MCNC: 12 NG/ML (ref 4–24)
GLUCOSE SERPL-MCNC: 100 MG/DL (ref 70–110)
HBA1C MFR BLD: 5.3 % (ref 4–5.6)
HDLC SERPL-MCNC: 44 MG/DL (ref 40–75)
HDLC SERPL: 24.4 % (ref 20–50)
LDLC SERPL CALC-MCNC: 118.6 MG/DL (ref 63–159)
NONHDLC SERPL-MCNC: 136 MG/DL
POTASSIUM SERPL-SCNC: 4.1 MMOL/L (ref 3.5–5.1)
PROT SERPL-MCNC: 7.7 G/DL (ref 6–8.4)
SODIUM SERPL-SCNC: 145 MMOL/L (ref 136–145)
TRIGL SERPL-MCNC: 87 MG/DL (ref 30–150)
VIT B12 SERPL-MCNC: 430 PG/ML (ref 210–950)

## 2023-07-11 PROCEDURE — 80053 COMPREHEN METABOLIC PANEL: CPT | Performed by: FAMILY MEDICINE

## 2023-07-11 PROCEDURE — 82550 ASSAY OF CK (CPK): CPT | Performed by: FAMILY MEDICINE

## 2023-07-11 PROCEDURE — 83036 HEMOGLOBIN GLYCOSYLATED A1C: CPT | Performed by: FAMILY MEDICINE

## 2023-07-11 PROCEDURE — 82607 VITAMIN B-12: CPT | Performed by: FAMILY MEDICINE

## 2023-07-11 PROCEDURE — 82746 ASSAY OF FOLIC ACID SERUM: CPT | Performed by: FAMILY MEDICINE

## 2023-07-11 PROCEDURE — 36415 COLL VENOUS BLD VENIPUNCTURE: CPT | Performed by: FAMILY MEDICINE

## 2023-07-11 PROCEDURE — 80061 LIPID PANEL: CPT | Performed by: FAMILY MEDICINE

## 2023-07-21 ENCOUNTER — OFFICE VISIT (OUTPATIENT)
Dept: CARDIOLOGY | Facility: CLINIC | Age: 63
End: 2023-07-21
Payer: MEDICARE

## 2023-07-21 VITALS
BODY MASS INDEX: 28.2 KG/M2 | HEIGHT: 61 IN | HEART RATE: 80 BPM | DIASTOLIC BLOOD PRESSURE: 86 MMHG | WEIGHT: 149.38 LBS | SYSTOLIC BLOOD PRESSURE: 151 MMHG | OXYGEN SATURATION: 95 %

## 2023-07-21 DIAGNOSIS — E78.2 MIXED HYPERLIPIDEMIA: ICD-10-CM

## 2023-07-21 DIAGNOSIS — I70.0 AORTIC ATHEROSCLEROSIS: ICD-10-CM

## 2023-07-21 DIAGNOSIS — R00.2 PALPITATIONS: ICD-10-CM

## 2023-07-21 DIAGNOSIS — Z13.6 ENCOUNTER FOR SCREENING FOR CARDIOVASCULAR DISORDERS: ICD-10-CM

## 2023-07-21 DIAGNOSIS — I10 PRIMARY HYPERTENSION: ICD-10-CM

## 2023-07-21 DIAGNOSIS — I47.19 ATRIAL TACHYCARDIA: ICD-10-CM

## 2023-07-21 DIAGNOSIS — Z86.79 HISTORY OF SUPRAVENTRICULAR TACHYCARDIA: Primary | ICD-10-CM

## 2023-07-21 PROCEDURE — 3077F PR MOST RECENT SYSTOLIC BLOOD PRESSURE >= 140 MM HG: ICD-10-PCS | Mod: CPTII,S$GLB,, | Performed by: INTERNAL MEDICINE

## 2023-07-21 PROCEDURE — 1159F PR MEDICATION LIST DOCUMENTED IN MEDICAL RECORD: ICD-10-PCS | Mod: CPTII,S$GLB,, | Performed by: INTERNAL MEDICINE

## 2023-07-21 PROCEDURE — 3079F PR MOST RECENT DIASTOLIC BLOOD PRESSURE 80-89 MM HG: ICD-10-PCS | Mod: CPTII,S$GLB,, | Performed by: INTERNAL MEDICINE

## 2023-07-21 PROCEDURE — 3008F PR BODY MASS INDEX (BMI) DOCUMENTED: ICD-10-PCS | Mod: CPTII,S$GLB,, | Performed by: INTERNAL MEDICINE

## 2023-07-21 PROCEDURE — 3044F HG A1C LEVEL LT 7.0%: CPT | Mod: CPTII,S$GLB,, | Performed by: INTERNAL MEDICINE

## 2023-07-21 PROCEDURE — 4010F PR ACE/ARB THEARPY RXD/TAKEN: ICD-10-PCS | Mod: CPTII,S$GLB,, | Performed by: INTERNAL MEDICINE

## 2023-07-21 PROCEDURE — 3079F DIAST BP 80-89 MM HG: CPT | Mod: CPTII,S$GLB,, | Performed by: INTERNAL MEDICINE

## 2023-07-21 PROCEDURE — 99999 PR PBB SHADOW E&M-EST. PATIENT-LVL V: ICD-10-PCS | Mod: PBBFAC,,, | Performed by: INTERNAL MEDICINE

## 2023-07-21 PROCEDURE — 3008F BODY MASS INDEX DOCD: CPT | Mod: CPTII,S$GLB,, | Performed by: INTERNAL MEDICINE

## 2023-07-21 PROCEDURE — 1159F MED LIST DOCD IN RCRD: CPT | Mod: CPTII,S$GLB,, | Performed by: INTERNAL MEDICINE

## 2023-07-21 PROCEDURE — 99999 PR PBB SHADOW E&M-EST. PATIENT-LVL V: CPT | Mod: PBBFAC,,, | Performed by: INTERNAL MEDICINE

## 2023-07-21 PROCEDURE — 3044F PR MOST RECENT HEMOGLOBIN A1C LEVEL <7.0%: ICD-10-PCS | Mod: CPTII,S$GLB,, | Performed by: INTERNAL MEDICINE

## 2023-07-21 PROCEDURE — 4010F ACE/ARB THERAPY RXD/TAKEN: CPT | Mod: CPTII,S$GLB,, | Performed by: INTERNAL MEDICINE

## 2023-07-21 PROCEDURE — 3077F SYST BP >= 140 MM HG: CPT | Mod: CPTII,S$GLB,, | Performed by: INTERNAL MEDICINE

## 2023-07-21 PROCEDURE — 99214 PR OFFICE/OUTPT VISIT, EST, LEVL IV, 30-39 MIN: ICD-10-PCS | Mod: S$GLB,,, | Performed by: INTERNAL MEDICINE

## 2023-07-21 PROCEDURE — 99214 OFFICE O/P EST MOD 30 MIN: CPT | Mod: S$GLB,,, | Performed by: INTERNAL MEDICINE

## 2023-07-21 RX ORDER — METOPROLOL SUCCINATE 25 MG/1
25 TABLET, EXTENDED RELEASE ORAL NIGHTLY
Qty: 90 TABLET | Refills: 3 | Status: SHIPPED | OUTPATIENT
Start: 2023-07-21
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
## 2023-07-26 ENCOUNTER — HOSPITAL ENCOUNTER (OUTPATIENT)
Dept: RADIOLOGY | Facility: HOSPITAL | Age: 63
Discharge: HOME | End: 2023-07-26
Attending: INTERNAL MEDICINE
Payer: MEDICARE

## 2023-07-26 DIAGNOSIS — Z13.6 ENCOUNTER FOR SCREENING FOR CARDIOVASCULAR DISORDERS: ICD-10-CM

## 2023-07-26 PROCEDURE — 75571 CT CALCIUM SCORING CARDIAC: ICD-10-PCS | Mod: 26,,, | Performed by: RADIOLOGY

## 2023-07-26 PROCEDURE — 75571 CT HRT W/O DYE W/CA TEST: CPT | Mod: 26,,, | Performed by: RADIOLOGY

## 2023-07-26 PROCEDURE — 75571 CT HRT W/O DYE W/CA TEST: CPT | Mod: TC

## 2023-08-08 ENCOUNTER — SPECIALTY PHARMACY (OUTPATIENT)
Dept: PHARMACY | Facility: CLINIC | Age: 63
End: 2023-08-08
Payer: MEDICARE

## 2023-08-08 NOTE — TELEPHONE ENCOUNTER
Specialty Pharmacy - Refill Coordination    Specialty Medication Orders Linked to Encounter      Flowsheet Row Most Recent Value   Medication #1 etanercept (ENBREL SURECLICK) 50 mg/mL (1 mL) (Order#[id number], Rx#[id number])            Refill Questions - Documented Responses      Flowsheet Row Most Recent Value   Patient Availability and HIPAA Verification    Does patient want to proceed with activity? Yes   HIPAA/medical authority confirmed? Yes   Relationship to patient of person spoken to? Self   Refill Screening Questions    Changes to allergies? No   Changes to medications? No   New conditions since last clinic visit? No   Unplanned office visit, urgent care, ED, or hospital admission in the last 4 weeks? No   How does patient/caregiver feel medication is working? Good   Financial problems or insurance changes? No   How many doses of your specialty medications were missed in the last 4 weeks? 0   Would patient like to speak to a pharmacist? No   When does the patient need to receive the medication? 08/11/23   Refill Delivery Questions    How will the patient receive the medication? Pickup   When does the patient need to receive the medication? 08/11/23   Shipping Address Home   Address in [facility] confirmed and updated if neccessary? Yes   Expected Copay ($) 0   Is the patient able to afford the medication copay? Yes   Payment Method zero copay   Days supply of Refill 28   Supplies needed? No supplies needed   Refill activity completed? Yes   Refill activity plan Refill scheduled   Shipment/Pickup Date: 08/09/23            Current Outpatient Medications   Medication Sig    amLODIPine (NORVASC) 5 MG tablet Take 1 tablet (5 mg total) by mouth once daily.    atorvastatin (LIPITOR) 10 MG tablet Take 1 tablet (10 mg total) by mouth once daily.    chlorhexidine (PERIDEX) 0.12 % solution Use 15-20 mL to rinse mouth twice daily for 2 minutes, spit out and do not rinse mouth with water after.    cyanocobalamin,  vitamin B-12, (VITAMIN B-12 ORAL) Take by mouth.    cycloSPORINE (RESTASIS) 0.05 % ophthalmic emulsion Apply 1 drop into both eyes twice a day    cycloSPORINE (RESTASIS) 0.05 % ophthalmic emulsion Instill 1 drop Both Eyes twice a day    desoximetasone (TOPICORT) 0.25 % cream Apply to affected area every other night x 1 month    ergocalciferol (ERGOCALCIFEROL) 50,000 unit Cap Take 1 capsule (50,000 Units total) by mouth every 7 days.    etanercept (ENBREL SURECLICK) 50 mg/mL (1 mL) Inject 1 mL (50 mg total) into the skin once a week.    famotidine (PEPCID) 20 MG tablet Take 1 tablet (20 mg total) by mouth 2 (two) times daily.    flu vacc [id number] 6mos up,PF, (FLUARIX QUAD [id number], PF,) 60 mcg (15 mcg x 4)/0.5 mL Syrg use as directed    fluticasone propion-salmeterol 115-21 mcg/dose (ADVAIR HFA) 115-21 mcg/actuation HFAA inhaler Inhale 2 puffs into the lungs every 12 (twelve) hours.    fluticasone propionate (FLONASE) 50 mcg/actuation nasal spray 2 sprays (100 mcg total) by Each Nostril route once daily.    folic acid (FOLVITE) 1 MG tablet Take 4 tablets (4 mg total) by mouth once daily.    gabapentin (NEURONTIN) 300 MG capsule Take 1 capsule (300 mg total) by mouth 3 (three) times daily.    hydrOXYzine HCL (ATARAX) 25 MG tablet Take 1 tablet (25 mg total) by mouth every evening.    leflunomide (ARAVA) 20 MG Tab Take 1 tablet (20 mg total) by mouth once daily.    losartan (COZAAR) 100 MG tablet Take 1 tablet (100 mg total) by mouth once daily.    metoprolol succinate (TOPROL-XL) 25 MG 24 hr tablet Take 1 tablet (25 mg total) by mouth every evening.    nystatin (MYCOSTATIN) powder Apply to affected area every morning as needed for flare under breasts (Patient taking differently: Apply to affected area every morning as needed for flare under breasts)    promethazine (PHENERGAN) 25 MG tablet Take 1 tablet (25 mg total) by mouth every 6 (six) hours as needed for Nausea.    PROTOPIC 0.1 % ointment AAA bid    ruxolitinib  (OPZELURA) 1.5 % Crea aaa bid    ruxolitinib (OPZELURA) 1.5 % Crea Apply to affected area twice daily to neck and hands for vitiligo    triamcinolone acetonide 0.025% (KENALOG) 0.025 % cream Apply to affected area every day to twice a day to neck . Not more than 1-2 weeks straight in same location    triamcinolone acetonide 0.1% (KENALOG) 0.1 % ointment Apply topically 2 (two) times daily. For 2 weeks    ZINC ORAL Take by mouth.   Last reviewed on 7/21/2023  9:36 AM by Caitlin Gary MA    Review of patient's allergies indicates:  No Known Allergies Last reviewed on  7/21/2023 9:36 AM by Flor Duckworth      Tasks added this encounter   8/10/2023 - Pickup Reminder   Tasks due within next 3 months   No tasks due.     Lianna Santos, PharmD  [facility]  [address] [place], [place] [address]ignesh  [facility] [address]  Phone: [phone]  Fax: [phone]

## 2023-08-16 ENCOUNTER — OFFICE VISIT (OUTPATIENT)
Dept: OTOLARYNGOLOGY | Facility: CLINIC | Age: 63
End: 2023-08-16
Payer: MEDICARE

## 2023-08-16 VITALS
WEIGHT: 146.69 LBS | BODY MASS INDEX: 27.72 KG/M2 | DIASTOLIC BLOOD PRESSURE: 91 MMHG | SYSTOLIC BLOOD PRESSURE: 135 MMHG | HEART RATE: 78 BPM

## 2023-08-16 DIAGNOSIS — K21.9 LARYNGOPHARYNGEAL REFLUX (LPR): Chronic | ICD-10-CM

## 2023-08-16 DIAGNOSIS — D86.0 SARCOIDOSIS OF LUNG: ICD-10-CM

## 2023-08-16 DIAGNOSIS — R05.8 OTHER COUGH: ICD-10-CM

## 2023-08-16 DIAGNOSIS — R09.A2 GLOBUS SENSATION: Primary | ICD-10-CM

## 2023-08-16 DIAGNOSIS — J31.0 CHRONIC RHINITIS: Chronic | ICD-10-CM

## 2023-08-16 PROCEDURE — 3080F PR MOST RECENT DIASTOLIC BLOOD PRESSURE >= 90 MM HG: ICD-10-PCS | Mod: CPTII,S$GLB,, | Performed by: OTOLARYNGOLOGY

## 2023-08-16 PROCEDURE — 99214 PR OFFICE/OUTPT VISIT, EST, LEVL IV, 30-39 MIN: ICD-10-PCS | Mod: 25,S$GLB,, | Performed by: OTOLARYNGOLOGY

## 2023-08-16 PROCEDURE — 99999 PR PBB SHADOW E&M-EST. PATIENT-LVL IV: CPT | Mod: PBBFAC,,, | Performed by: OTOLARYNGOLOGY

## 2023-08-16 PROCEDURE — 1160F RVW MEDS BY RX/DR IN RCRD: CPT | Mod: CPTII,S$GLB,, | Performed by: OTOLARYNGOLOGY

## 2023-08-16 PROCEDURE — 31575 DIAGNOSTIC LARYNGOSCOPY: CPT | Mod: S$GLB,,, | Performed by: OTOLARYNGOLOGY

## 2023-08-16 PROCEDURE — 1160F PR REVIEW ALL MEDS BY PRESCRIBER/CLIN PHARMACIST DOCUMENTED: ICD-10-PCS | Mod: CPTII,S$GLB,, | Performed by: OTOLARYNGOLOGY

## 2023-08-16 PROCEDURE — 1159F MED LIST DOCD IN RCRD: CPT | Mod: CPTII,S$GLB,, | Performed by: OTOLARYNGOLOGY

## 2023-08-16 PROCEDURE — 31575 LARYNGOSCOPY: ICD-10-PCS | Mod: S$GLB,,, | Performed by: OTOLARYNGOLOGY

## 2023-08-16 PROCEDURE — 3080F DIAST BP >= 90 MM HG: CPT | Mod: CPTII,S$GLB,, | Performed by: OTOLARYNGOLOGY

## 2023-08-16 PROCEDURE — 1159F PR MEDICATION LIST DOCUMENTED IN MEDICAL RECORD: ICD-10-PCS | Mod: CPTII,S$GLB,, | Performed by: OTOLARYNGOLOGY

## 2023-08-16 PROCEDURE — 4010F PR ACE/ARB THEARPY RXD/TAKEN: ICD-10-PCS | Mod: CPTII,S$GLB,, | Performed by: OTOLARYNGOLOGY

## 2023-08-16 PROCEDURE — 3075F SYST BP GE 130 - 139MM HG: CPT | Mod: CPTII,S$GLB,, | Performed by: OTOLARYNGOLOGY

## 2023-08-16 PROCEDURE — 4010F ACE/ARB THERAPY RXD/TAKEN: CPT | Mod: CPTII,S$GLB,, | Performed by: OTOLARYNGOLOGY

## 2023-08-16 PROCEDURE — 3044F HG A1C LEVEL LT 7.0%: CPT | Mod: CPTII,S$GLB,, | Performed by: OTOLARYNGOLOGY

## 2023-08-16 PROCEDURE — 3044F PR MOST RECENT HEMOGLOBIN A1C LEVEL <7.0%: ICD-10-PCS | Mod: CPTII,S$GLB,, | Performed by: OTOLARYNGOLOGY

## 2023-08-16 PROCEDURE — 3008F BODY MASS INDEX DOCD: CPT | Mod: CPTII,S$GLB,, | Performed by: OTOLARYNGOLOGY

## 2023-08-16 PROCEDURE — 99999 PR PBB SHADOW E&M-EST. PATIENT-LVL IV: ICD-10-PCS | Mod: PBBFAC,,, | Performed by: OTOLARYNGOLOGY

## 2023-08-16 PROCEDURE — 99214 OFFICE O/P EST MOD 30 MIN: CPT | Mod: 25,S$GLB,, | Performed by: OTOLARYNGOLOGY

## 2023-08-16 PROCEDURE — 3008F PR BODY MASS INDEX (BMI) DOCUMENTED: ICD-10-PCS | Mod: CPTII,S$GLB,, | Performed by: OTOLARYNGOLOGY

## 2023-08-16 PROCEDURE — 3075F PR MOST RECENT SYSTOLIC BLOOD PRESS GE 130-139MM HG: ICD-10-PCS | Mod: CPTII,S$GLB,, | Performed by: OTOLARYNGOLOGY

## 2023-08-16 RX ORDER — FAMOTIDINE 20 MG/1
20 TABLET, FILM COATED ORAL 2 TIMES DAILY
Qty: 60 TABLET | Refills: 11 | Status: SHIPPED | OUTPATIENT
Start: 2023-08-16 | End: 2024-01-19

## 2023-08-16 NOTE — PROGRESS NOTES
Chief Complaint   Patient presents with    Follow-up     Improved    .     HPI:Silvia Cervantes is a 62 y.o. female who has been referred by Dr. Ori St MD for a several month history of  globus sensation and cough. She reports that she has choking episodes with swallowing foods, liquids, or saliva.  She has tickle in her throat that triggers cough. She feels it is present on her left side.  She reports that the cough is dry.  She does have sarcoidosis- dx'd in . She does see pulmonology.  She denies food sticking. She denies hoarseness.  Her voice is not progressively worsening over this time. There are not pitch breaks or cracks. There is not vocal fatigue. She admits to odynophagia, throat pain, and otalgia.  There is no hemoptysis or hematemesis. She is breathing well. She has been using Luden's throat lozenges to help. She uses camilo's emollient for her nose. She does use Flonase intermittently.     She admits to throat clearing and cough. She denies heartburn and reflux. She has been on Protonix 40 mg in the past but is no longer. She does feel that this helped her tremendously.        Underwent sinus surgery 2016- Dr. Chavez includin. Endoscopic septoplasty.  2. Bilateral inferior turbinate reduction with submucosal resection.  3. Bilateral image-guided endoscopic total ethmoidectomy.  4. Bilateral image-guided endoscopic maxillary antrostomy.  5. Bilateral image-guided endoscopic frontal dissection with Draf IIA sinusotomy.  6. Endoscopic resection of right leann bullosa.      Interval HPI 2023:  Follow up visit. She feels that she has had improvement in choking feeling after last visit. Still has globus sensation and tickle in her throat. She has been trying to use David med sinus rinse but finds this challenging.  She has been using Flonase as directed. She has been taking famotidine 20 mg PO BID.     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)      Social History     Socioeconomic History    Marital status:    Tobacco Use    Smoking status: Never    Smokeless tobacco: Never   Substance and Sexual Activity    Alcohol use: Yes     Comment: Rarely    Drug use: Not Currently    Sexual activity: Yes     Partners: Male     Birth control/protection: Surgical     Comment: [facility]     Social Determinants of Health     Financial Resource Strain: Low Risk  (2/21/2022)    Overall Financial Resource Strain (CARDIA)     Difficulty of Paying Living Expenses: Not hard at all   Food Insecurity: No Food Insecurity (2/21/2022)    Hunger Vital Sign     Worried About Running Out of Food in the Last Year: Never true     Ran Out of Food in the Last Year: Never true   Transportation Needs: No Transportation Needs (2/21/2022)    PRAPARE - Transportation     Lack of Transportation (Medical): No     Lack of Transportation (Non-Medical): No   Physical Activity: Sufficiently Active (2/21/2022)    Exercise Vital Sign     Days of Exercise per Week: 7 days     Minutes of Exercise per Session: 120 min   Stress: No Stress Concern Present (2/21/2022)    [place] [employer] - Occupational Stress Questionnaire     Feeling of Stress : Only a little   Social Connections: Moderately Isolated (2/21/2022)    Social Connection and Isolation Panel [NHANES]     Frequency of Communication with Friends and Family: More than three times a week     Frequency of  Social Gatherings with Friends and Family: More than three times a week     Attends Caodaism Services: More than 4 times per year     Active Member of Clubs or Organizations: No     Attends Club or Organization Meetings: Never     Marital Status:    Housing Stability: Low Risk  (2022)    Housing Stability Vital Sign     Unable to Pay for Housing in the Last Year: No     Number of Places Lived in the Last Year: 1     Unstable Housing in the Last Year: No     Past Surgical History:   Procedure Laterality Date     SECTION      COLONOSCOPY N/A 2019    Procedure: COLONOSCOPY/judeyttanya ;  Surgeon: Dimas Woodall MD;  Location: [facility] ENDO;  Service: Endoscopy;  Laterality: N/A;    ESOPHAGOGASTRODUODENOSCOPY N/A 2023    Procedure: EGD (ESOPHAGOGASTRODUODENOSCOPY);  Surgeon: Ori St MD;  Location: [facility];  Service: Endoscopy;  Laterality: N/A;    EXCISION OF NODULE Right 2022    Procedure: EXCISION, NODULE right elbow;  Surgeon: Louise Smith MD;  Location: [facility] OR;  Service: Orthopedics;  Laterality: Right;    HYSTERECTOMY      INJECTION OF STEROID Left 2022    Procedure: INJECTION, STEROID Thumb MP joint;  Surgeon: Louise Smith MD;  Location: [facility] OR;  Service: Orthopedics;  Laterality: Left;    SINUS SURGERY      SURGICAL REMOVAL OF DISTAL ULNA Right 2022    Procedure: EXCISION, ULNA, DISTAL;  Surgeon: Louise Smith MD;  Location: [facility] OR;  Service: Orthopedics;  Laterality: Right;    SYNOVECTOMY OF WRIST Right 2022    Procedure: SYNOVECTOMY, WRIST;  Surgeon: Louise Smith MD;  Location: [facility] OR;  Service: Orthopedics;  Laterality: Right;    TUBAL LIGATION       Family History   Problem Relation Age of Onset    Hypertension Mother     Cataracts Mother     Diabetes Father     Hypertension Father     Cataracts Father     Heart disease Father     Hyperlipidemia Father     Diabetes Sister     Stroke Brother     Hypertension Brother     No Known Problems  Daughter     No Known Problems Son     Melanoma Neg Hx     Breast cancer Neg Hx     Colon cancer Neg Hx     Ovarian cancer Neg Hx     Blindness Neg Hx     Glaucoma Neg Hx     Aneurysm Neg Hx     Cancer Neg Hx     Clotting disorder Neg Hx     Dementia Neg Hx     Fainting Neg Hx     Kidney disease Neg Hx     Liver disease Neg Hx     Migraines Neg Hx     Neuropathy Neg Hx     Parkinsonism Neg Hx     Seizures Neg Hx     Tremor Neg Hx            Review of Systems  General: negative for chills, fever or weight loss  Psychological: negative for mood changes or depression  Ophthalmic: negative for blurry vision, photophobia or eye pain  ENT: see HPI  Respiratory: no cough, shortness of breath, or wheezing  Cardiovascular: no chest pain or dyspnea on exertion  Gastrointestinal: no abdominal pain, change in bowel habits, or black/ bloody stools  Musculoskeletal: negative for gait disturbance or muscular weakness  Neurological: no syncope or seizures; no ataxia  Dermatological: negative for puritis,  rash and jaundice  Hematologic/lymphatic: no easy bruising, no new lumps or bumps      Physical Exam:    Vitals:    08/16/23 0950   BP: (!) 135/91   Pulse: 78       Constitutional: Well appearing / communicating without difficutly.  NAD.  Eyes: EOM I Bilaterally  Head/Face: Normocephalic.  Negative paranasal sinus pressure/tenderness.  Salivary glands WNL.  House Brackmann I Bilaterally.    Right Ear: Auricle normal appearance. External Auditory Canal within normal limits no lesions or masses,TM w/o masses/lesions/perforations. TM mobility noted.   Left Ear: Auricle normal appearance. External Auditory Canal within normal limits no lesions or masses,TM w/o masses/lesions/perforations. TM mobility noted.  Nose: No gross nasal septal deviation. Inferior Turbinates 3+ bilaterally. No septal perforation. No masses/lesions. External nasal skin appears normal without masses/lesions.  Oral Cavity: Gingiva/lips within normal limits.   Dentition/gingiva healthy appearing. Mucus membranes moist. Floor of mouth soft, no masses palpated. Oral Tongue mobile. Hard Palate appears normal.    Oropharynx: Base of tongue appears normal. No masses/lesions noted. Tonsillar fossa/pharyngeal wall without lesions. Posterior oropharynx WNL.  Soft palate without masses. Midline uvula.   Neck/Lymphatic: No LAD I-VI bilaterally.  No thyromegaly.  No masses noted on exam.    Mirror laryngoscopy/nasopharyngoscopy: Active gag reflex.  Unable to perform.        See separate procedure note for FFL.    Diagnostic studies reviewed:   EGD 1/25/2023: Impression:            - Normal esophagus.                          - Small hiatal hernia.                          - Normal stomach. Biopsied.                          - Normal examined duodenum.                          Exam for globus / cough/ upper abd distress                          showing:  normal EGD, with small hiatal hernia similar to                          exam done 2015    PFT 8/19/2022: The test was performed for the evaluation of Sarcoidosis.   There is no significant airflow obstruction. Bronchodilator response was not tested.   There is mild restriction and no evidence of hyperinflation or air trapping.   The diffusing capacity is minimally reduced.   Compared to 11/2019, FEV1 has decreased in an age-appropriate fashion. Diffusion capacity is slightly reduced.   This PFT is consistant with the clincial diagnosis of sarcoidosis. Clinical correllation is required.   Assessment:    ICD-10-CM ICD-9-CM    1. Globus sensation  R09.89 784.99       2. Other cough  R05.8 786.2       3. Laryngopharyngeal reflux (LPR)  K21.9 478.79       4. Chronic rhinitis  J31.0 472.0       5. Sarcoidosis of lung  D86.0 135      517.8           The primary encounter diagnosis was Globus sensation. Diagnoses of Other cough, Laryngopharyngeal reflux (LPR), Chronic rhinitis, and Sarcoidosis of lung were also pertinent to this  visit.      Plan:  No orders of the defined types were placed in this encounter.  Sinuses are unremarkable on endoscopy today. No evidence of chronic sinusitis infection.   Continue  nasal saline rinses BID  Continue  Flonase 2 sprays per nostril daily     Continue  taking  Pepcid 20mg PO BID * and provided a prescription. I also recommended that the patient refrain from eating within 3 hours of going to bed, to elevate the head of bed very subtly and optimize the impact of gravity on the potential reflux, and to avoid alcohol, caffeine, tobacco, tomato sauce, spicy foods, fried food, and chocolate.      Follow up in 4-6 months to reassess progress with treatment regimen.     Carleen Weiner MD

## 2023-08-16 NOTE — PROCEDURES
Laryngoscopy    Date/Time: 8/16/2023 10:00 AM    Performed by: Carleen Cline MD  Authorized by: Carleen Cline MD    Consent Done?:  Yes (Verbal)  Anesthesia:     Local anesthetic:  Lidocaine 2% and Neymar-Synephrine 1/2%  Laryngoscopy:     Areas examined:  Nasal cavities, nasopharynx, oropharynx, hypopharynx, larynx and vocal cords  Nose External:      No external nasal deformity  Nose Intranasal:      Mucosa no polyps     Mucosa ulcers not present     No mucosa lesions present     No septum gross deformity     Turbinates not enlarged  Nasopharynx:      No mucosa lesions     Adenoids not present     Posterior choanae patent     Eustachian tube patent  Larynx/hypopharynx:      No epiglottis lesions     No epiglottis edema     No AE folds lesions     No vocal cord polyps     Equal and normal bilateral     No hypopharynx lesions     No piriform sinus pooling     No piriform sinus lesions     Post cricoid edema (mild)     Post cricoid erythema (mild)       + bilateral maxillary antrostomy patent and clear, positive bilateral ethmoid cavities patent and clear, bilateral sphenoidotomy patent and clear, bilateral frontal outflow tract patent without discharge or polyps.

## 2023-08-29 DIAGNOSIS — I10 ESSENTIAL HYPERTENSION: ICD-10-CM

## 2023-08-29 RX ORDER — LOSARTAN POTASSIUM 100 MG/1
100 TABLET ORAL DAILY
Qty: 90 TABLET | Refills: 3 | Status: SHIPPED | OUTPATIENT
Start: 2023-08-29 | End: 2024-08-28

## 2023-08-29 NOTE — TELEPHONE ENCOUNTER
Refill Routing Note   Medication(s) are not appropriate for processing by [facility] for the following reason(s):      Required vitals abnormal    [facility] action(s):  Defer Care Due:  None identified            Appointments  past 12m or future 3m with PCP    Date Provider   Last Visit   7/6/2023 Adrián Cade MD   Next Visit   10/6/2023 Adrián Cade MD   ED visits in past 90 days: 0        Note composed:8:49 AM 08/29/2023

## 2023-08-29 NOTE — TELEPHONE ENCOUNTER
No care due was identified.  [facility] Embedded Care Due Messages. Reference number: [id number].   8/29/2023 8:40:15 AM CDT
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
## 2023-09-21 ENCOUNTER — LAB VISIT (OUTPATIENT)
Dept: LAB | Facility: HOSPITAL | Age: 63
End: 2023-09-21
Attending: INTERNAL MEDICINE
Payer: MEDICARE

## 2023-09-21 ENCOUNTER — PATIENT MESSAGE (OUTPATIENT)
Dept: RHEUMATOLOGY | Facility: CLINIC | Age: 63
End: 2023-09-21
Payer: MEDICARE

## 2023-09-21 DIAGNOSIS — D86.9 SARCOIDOSIS: ICD-10-CM

## 2023-09-21 LAB
ALBUMIN SERPL BCP-MCNC: 4 G/DL (ref 3.5–5.2)
ALP SERPL-CCNC: 70 U/L (ref 55–135)
ALT SERPL W/O P-5'-P-CCNC: 20 U/L (ref 10–44)
ANION GAP SERPL CALC-SCNC: 9 MMOL/L (ref 8–16)
AST SERPL-CCNC: 20 U/L (ref 10–40)
BASOPHILS # BLD AUTO: 0.05 K/UL (ref 0–0.2)
BASOPHILS NFR BLD: 0.7 % (ref 0–1.9)
BILIRUB SERPL-MCNC: 0.4 MG/DL (ref 0.1–1)
BUN SERPL-MCNC: 18 MG/DL (ref 8–23)
CALCIUM SERPL-MCNC: 9.4 MG/DL (ref 8.7–10.5)
CHLORIDE SERPL-SCNC: 106 MMOL/L (ref 95–110)
CO2 SERPL-SCNC: 29 MMOL/L (ref 23–29)
CREAT SERPL-MCNC: 0.8 MG/DL (ref 0.5–1.4)
CRP SERPL-MCNC: 2.6 MG/L (ref 0–8.2)
DIFFERENTIAL METHOD: ABNORMAL
EOSINOPHIL # BLD AUTO: 1.2 K/UL (ref 0–0.5)
EOSINOPHIL NFR BLD: 16.8 % (ref 0–8)
ERYTHROCYTE [DISTWIDTH] IN BLOOD BY AUTOMATED COUNT: 13.3 % (ref 11.5–14.5)
ERYTHROCYTE [SEDIMENTATION RATE] IN BLOOD BY PHOTOMETRIC METHOD: 17 MM/HR (ref 0–36)
EST. GFR  (NO RACE VARIABLE): >60 ML/MIN/1.73 M^2
GLUCOSE SERPL-MCNC: 94 MG/DL (ref 70–110)
HCT VFR BLD AUTO: 41.1 % (ref 37–48.5)
HGB BLD-MCNC: 13.3 G/DL (ref 12–16)
IMM GRANULOCYTES # BLD AUTO: 0.01 K/UL (ref 0–0.04)
IMM GRANULOCYTES NFR BLD AUTO: 0.1 % (ref 0–0.5)
LYMPHOCYTES # BLD AUTO: 2.2 K/UL (ref 1–4.8)
LYMPHOCYTES NFR BLD: 30.3 % (ref 18–48)
MCH RBC QN AUTO: 32.1 PG (ref 27–31)
MCHC RBC AUTO-ENTMCNC: 32.4 G/DL (ref 32–36)
MCV RBC AUTO: 99 FL (ref 82–98)
MONOCYTES # BLD AUTO: 0.6 K/UL (ref 0.3–1)
MONOCYTES NFR BLD: 8.8 % (ref 4–15)
NEUTROPHILS # BLD AUTO: 3.1 K/UL (ref 1.8–7.7)
NEUTROPHILS NFR BLD: 43.3 % (ref 38–73)
NRBC BLD-RTO: 0 /100 WBC
PLATELET # BLD AUTO: 305 K/UL (ref 150–450)
PMV BLD AUTO: 10 FL (ref 9.2–12.9)
POTASSIUM SERPL-SCNC: 3.6 MMOL/L (ref 3.5–5.1)
PROT SERPL-MCNC: 7.5 G/DL (ref 6–8.4)
RBC # BLD AUTO: 4.14 M/UL (ref 4–5.4)
SODIUM SERPL-SCNC: 144 MMOL/L (ref 136–145)
WBC # BLD AUTO: 7.14 K/UL (ref 3.9–12.7)

## 2023-09-21 PROCEDURE — 36415 COLL VENOUS BLD VENIPUNCTURE: CPT | Performed by: INTERNAL MEDICINE

## 2023-09-21 PROCEDURE — 85025 COMPLETE CBC W/AUTO DIFF WBC: CPT | Performed by: INTERNAL MEDICINE

## 2023-09-21 PROCEDURE — 85652 RBC SED RATE AUTOMATED: CPT | Performed by: INTERNAL MEDICINE

## 2023-09-21 PROCEDURE — 86140 C-REACTIVE PROTEIN: CPT | Performed by: INTERNAL MEDICINE

## 2023-09-21 PROCEDURE — 80053 COMPREHEN METABOLIC PANEL: CPT | Performed by: INTERNAL MEDICINE

## 2023-09-28 ENCOUNTER — PATIENT MESSAGE (OUTPATIENT)
Dept: CARDIOLOGY | Facility: CLINIC | Age: 63
End: 2023-09-28
Payer: MEDICARE

## 2023-10-02 NOTE — TELEPHONE ENCOUNTER
As per Dr Katz,  Calcium score is Zero which means very low risk for coronary heart diease or any blockages.

## 2023-10-06 ENCOUNTER — OFFICE VISIT (OUTPATIENT)
Dept: FAMILY MEDICINE | Facility: CLINIC | Age: 63
End: 2023-10-06
Attending: FAMILY MEDICINE
Payer: MEDICARE

## 2023-10-06 VITALS
BODY MASS INDEX: 27.55 KG/M2 | HEIGHT: 61 IN | OXYGEN SATURATION: 96 % | DIASTOLIC BLOOD PRESSURE: 82 MMHG | WEIGHT: 145.94 LBS | SYSTOLIC BLOOD PRESSURE: 136 MMHG | HEART RATE: 76 BPM | RESPIRATION RATE: 18 BRPM

## 2023-10-06 DIAGNOSIS — E55.9 VITAMIN D DEFICIENCY: ICD-10-CM

## 2023-10-06 DIAGNOSIS — M05.9 RHEUMATOID ARTHRITIS WITH POSITIVE RHEUMATOID FACTOR, INVOLVING UNSPECIFIED SITE: ICD-10-CM

## 2023-10-06 DIAGNOSIS — Z12.31 ENCOUNTER FOR SCREENING MAMMOGRAM FOR BREAST CANCER: ICD-10-CM

## 2023-10-06 DIAGNOSIS — E78.2 MIXED HYPERLIPIDEMIA: ICD-10-CM

## 2023-10-06 DIAGNOSIS — I10 ESSENTIAL HYPERTENSION: ICD-10-CM

## 2023-10-06 DIAGNOSIS — Z00.00 ROUTINE GENERAL MEDICAL EXAMINATION AT HEALTH CARE FACILITY: Primary | ICD-10-CM

## 2023-10-06 DIAGNOSIS — F33.9 MAJOR DEPRESSION, RECURRENT, CHRONIC: ICD-10-CM

## 2023-10-06 DIAGNOSIS — I10 PRIMARY HYPERTENSION: ICD-10-CM

## 2023-10-06 DIAGNOSIS — M05.79 RHEUMATOID ARTHRITIS INVOLVING MULTIPLE SITES WITH POSITIVE RHEUMATOID FACTOR: ICD-10-CM

## 2023-10-06 DIAGNOSIS — D86.0 SARCOIDOSIS OF LUNG: ICD-10-CM

## 2023-10-06 DIAGNOSIS — L30.4 INTERTRIGO: ICD-10-CM

## 2023-10-06 PROCEDURE — 3008F PR BODY MASS INDEX (BMI) DOCUMENTED: ICD-10-PCS | Mod: CPTII,S$GLB,, | Performed by: FAMILY MEDICINE

## 2023-10-06 PROCEDURE — 3044F PR MOST RECENT HEMOGLOBIN A1C LEVEL <7.0%: ICD-10-PCS | Mod: CPTII,S$GLB,, | Performed by: FAMILY MEDICINE

## 2023-10-06 PROCEDURE — 99214 PR OFFICE/OUTPT VISIT, EST, LEVL IV, 30-39 MIN: ICD-10-PCS | Mod: S$GLB,,, | Performed by: FAMILY MEDICINE

## 2023-10-06 PROCEDURE — 99999 PR PBB SHADOW E&M-EST. PATIENT-LVL III: ICD-10-PCS | Mod: PBBFAC,,, | Performed by: FAMILY MEDICINE

## 2023-10-06 PROCEDURE — 1159F MED LIST DOCD IN RCRD: CPT | Mod: CPTII,S$GLB,, | Performed by: FAMILY MEDICINE

## 2023-10-06 PROCEDURE — 3075F SYST BP GE 130 - 139MM HG: CPT | Mod: CPTII,S$GLB,, | Performed by: FAMILY MEDICINE

## 2023-10-06 PROCEDURE — 1160F PR REVIEW ALL MEDS BY PRESCRIBER/CLIN PHARMACIST DOCUMENTED: ICD-10-PCS | Mod: CPTII,S$GLB,, | Performed by: FAMILY MEDICINE

## 2023-10-06 PROCEDURE — 1159F PR MEDICATION LIST DOCUMENTED IN MEDICAL RECORD: ICD-10-PCS | Mod: CPTII,S$GLB,, | Performed by: FAMILY MEDICINE

## 2023-10-06 PROCEDURE — 3079F PR MOST RECENT DIASTOLIC BLOOD PRESSURE 80-89 MM HG: ICD-10-PCS | Mod: CPTII,S$GLB,, | Performed by: FAMILY MEDICINE

## 2023-10-06 PROCEDURE — 99214 OFFICE O/P EST MOD 30 MIN: CPT | Mod: S$GLB,,, | Performed by: FAMILY MEDICINE

## 2023-10-06 PROCEDURE — 3044F HG A1C LEVEL LT 7.0%: CPT | Mod: CPTII,S$GLB,, | Performed by: FAMILY MEDICINE

## 2023-10-06 PROCEDURE — 3008F BODY MASS INDEX DOCD: CPT | Mod: CPTII,S$GLB,, | Performed by: FAMILY MEDICINE

## 2023-10-06 PROCEDURE — 99999 PR PBB SHADOW E&M-EST. PATIENT-LVL III: CPT | Mod: PBBFAC,,, | Performed by: FAMILY MEDICINE

## 2023-10-06 PROCEDURE — 3075F PR MOST RECENT SYSTOLIC BLOOD PRESS GE 130-139MM HG: ICD-10-PCS | Mod: CPTII,S$GLB,, | Performed by: FAMILY MEDICINE

## 2023-10-06 PROCEDURE — 4010F ACE/ARB THERAPY RXD/TAKEN: CPT | Mod: CPTII,S$GLB,, | Performed by: FAMILY MEDICINE

## 2023-10-06 PROCEDURE — 4010F PR ACE/ARB THEARPY RXD/TAKEN: ICD-10-PCS | Mod: CPTII,S$GLB,, | Performed by: FAMILY MEDICINE

## 2023-10-06 PROCEDURE — 3079F DIAST BP 80-89 MM HG: CPT | Mod: CPTII,S$GLB,, | Performed by: FAMILY MEDICINE

## 2023-10-06 PROCEDURE — 1160F RVW MEDS BY RX/DR IN RCRD: CPT | Mod: CPTII,S$GLB,, | Performed by: FAMILY MEDICINE

## 2023-10-06 RX ORDER — ERGOCALCIFEROL 1.25 MG/1
50000 CAPSULE ORAL
Qty: 12 CAPSULE | Refills: 2 | Status: SHIPPED | OUTPATIENT
Start: 2023-10-06

## 2023-10-06 NOTE — PROGRESS NOTES
Subjective:       Patient ID: Silvia Cervantes is a 62 y.o. female.    Chief Complaint: Annual Exam and Follow-up    61 yr old pleasant  female with depression, allergies, vitiligo, HTN, HLD, sarcoidosis, Rheumatoid arthritis, Erosive gastritis, and other co morbidities presents today for her 3 month follow up.    Chronic low back pain. Onset 6 months ago and gradually worsening - left lower back- does not radiate and no neurological symptoms. No saddle anesthesia or bladder/bowel problems. Details as follows -      HTN - controlled - on Losartan-HCT and Norvasc 5 - compliant - no side effects     HLD - on statin but not compliant -      LDLCALC                  118.6               07/11/2023              Sarcoidosis and lung nodules - [facility] pulmonology - need new one as her previous specialist is leaving [facility]      Major depression - uncontrolled - not on meds - would like to start one - given zoloft - she will start from today - - no SI/HI    Vertigo - much better - on vertin as needed          RA - on Enbrel shots - follows Rheumatology - stable      Gastritis - stable - on PPI as needed - no side effects      History as below - reviewed       HM  -labs UTD  -vaccines UTD    Follow-up  Associated symptoms include myalgias, neck pain, numbness and weakness. Pertinent negatives include no arthralgias, chest pain, congestion, diaphoresis, headaches, nausea or sore throat.   Hypertension  This is a chronic problem. The current episode started more than 1 year ago. The problem has been gradually improving since onset. The problem is controlled. Associated symptoms include neck pain. Pertinent negatives include no chest pain, headaches, malaise/fatigue, palpitations or shortness of breath. There are no associated agents to hypertension. Risk factors for coronary artery disease include dyslipidemia and post-menopausal state. Past treatments include angiotensin blockers and diuretics. The current treatment  provides significant improvement. There are no compliance problems.  There is no history of angina, CAD/MI, CVA, left ventricular hypertrophy, PVD or retinopathy. There is no history of chronic renal disease, coarctation of the aorta, hypercortisolism, hyperparathyroidism, a hypertension causing med or sleep apnea.   Hyperlipidemia  This is a chronic problem. The current episode started more than 1 year ago. The problem is controlled. Recent lipid tests were reviewed and are normal. She has no history of chronic renal disease, diabetes, hypothyroidism or liver disease. There are no known factors aggravating her hyperlipidemia. Associated symptoms include myalgias. Pertinent negatives include no chest pain, focal sensory loss, focal weakness, leg pain or shortness of breath. She is currently on no antihyperlipidemic treatment. The current treatment provides mild improvement of lipids. There are no compliance problems.  Risk factors for coronary artery disease include dyslipidemia, hypertension and post-menopausal.   Back Pain  This is a chronic problem. The current episode started more than 1 month ago. The problem occurs constantly. The problem is unchanged. The pain is present in the sacro-iliac and lumbar spine. The quality of the pain is described as aching. The pain does not radiate. The symptoms are aggravated by bending, sitting and twisting. Associated symptoms include numbness and weakness. Pertinent negatives include no chest pain, dysuria, headaches, leg pain, perianal numbness or weight loss. She has tried nothing for the symptoms. The treatment provided no relief.     Review of Systems   Constitutional: Negative.  Negative for activity change, diaphoresis, malaise/fatigue, unexpected weight change and weight loss.   HENT: Negative.  Negative for nasal congestion, ear discharge, hearing loss, rhinorrhea, sore throat and voice change.    Eyes: Negative.  Negative for pain, discharge and visual disturbance.    Respiratory: Negative.  Negative for chest tightness, shortness of breath and wheezing.    Cardiovascular: Negative.  Negative for chest pain and palpitations.   Gastrointestinal: Negative.  Negative for abdominal distention, anal bleeding, constipation and nausea.   Endocrine: Negative.  Negative for cold intolerance, polydipsia and polyuria.   Genitourinary: Negative.  Negative for decreased urine volume, difficulty urinating, dysuria, frequency, menstrual problem and vaginal pain.   Musculoskeletal:  Positive for back pain, myalgias and neck pain. Negative for arthralgias, gait problem and leg pain.   Integumentary:  Negative for color change, pallor and wound. Negative.   Allergic/Immunologic: Negative.  Negative for environmental allergies and immunocompromised state.   Neurological:  Positive for weakness and numbness. Negative for dizziness, tremors, focal weakness, seizures, speech difficulty and headaches.   Hematological: Negative.  Negative for adenopathy. Does not bruise/bleed easily.   Psychiatric/Behavioral: Negative.  Negative for agitation, confusion, decreased concentration, hallucinations, self-injury and suicidal ideas. The patient is not nervous/anxious.          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)        Past Surgical History:   Procedure Laterality Date     SECTION      COLONOSCOPY  N/A 5/23/2019    Procedure: COLONOSCOPY/golytley ;  Surgeon: Dimas Woodall MD;  Location: [facility] ENDO;  Service: Endoscopy;  Laterality: N/A;    ESOPHAGOGASTRODUODENOSCOPY N/A 1/25/2023    Procedure: EGD (ESOPHAGOGASTRODUODENOSCOPY);  Surgeon: Ori St MD;  Location: [facility] ENDO;  Service: Endoscopy;  Laterality: N/A;    EXCISION OF NODULE Right 6/30/2022    Procedure: EXCISION, NODULE right elbow;  Surgeon: Louise Smith MD;  Location: [facility] OR;  Service: Orthopedics;  Laterality: Right;    HYSTERECTOMY      INJECTION OF STEROID Left 6/30/2022    Procedure: INJECTION, STEROID Thumb MP joint;  Surgeon: Louise Smith MD;  Location: [facility] OR;  Service: Orthopedics;  Laterality: Left;    SINUS SURGERY      SURGICAL REMOVAL OF DISTAL ULNA Right 6/30/2022    Procedure: EXCISION, ULNA, DISTAL;  Surgeon: Louise Smith MD;  Location: [facility] OR;  Service: Orthopedics;  Laterality: Right;    SYNOVECTOMY OF WRIST Right 6/30/2022    Procedure: SYNOVECTOMY, WRIST;  Surgeon: Louise Smith MD;  Location: [facility] OR;  Service: Orthopedics;  Laterality: Right;    TUBAL LIGATION         Family History   Problem Relation Age of Onset    Hypertension Mother     Cataracts Mother     Diabetes Father     Hypertension Father     Cataracts Father     Heart disease Father     Hyperlipidemia Father     Diabetes Sister     Stroke Brother     Hypertension Brother     No Known Problems Daughter     No Known Problems Son     Melanoma Neg Hx     Breast cancer Neg Hx     Colon cancer Neg Hx     Ovarian cancer Neg Hx     Blindness Neg Hx     Glaucoma Neg Hx     Aneurysm Neg Hx     Cancer Neg Hx     Clotting disorder Neg Hx     Dementia Neg Hx     Fainting Neg Hx     Kidney disease Neg Hx     Liver disease Neg Hx     Migraines Neg Hx     Neuropathy Neg Hx     Parkinsonism Neg Hx     Seizures Neg Hx     Tremor Neg Hx        Social History     Socioeconomic History    Marital status:    Tobacco Use    Smoking status: Never    Smokeless  tobacco: Never   Substance and Sexual Activity    Alcohol use: Yes     Comment: Rarely    Drug use: Not Currently    Sexual activity: Yes     Partners: Male     Birth control/protection: Surgical     Comment: hyst     Social Determinants of Health     Financial Resource Strain: Low Risk  (2/21/2022)    Overall Financial Resource Strain (CARDIA)     Difficulty of Paying Living Expenses: Not hard at all   Food Insecurity: No Food Insecurity (2/21/2022)    Hunger Vital Sign     Worried About Running Out of Food in the Last Year: Never true     Ran Out of Food in the Last Year: Never true   Transportation Needs: No Transportation Needs (2/21/2022)    PRAPARE - Transportation     Lack of Transportation (Medical): No     Lack of Transportation (Non-Medical): No   Physical Activity: Sufficiently Active (2/21/2022)    Exercise Vital Sign     Days of Exercise per Week: 7 days     Minutes of Exercise per Session: 120 min   Stress: No Stress Concern Present (2/21/2022)    [employer] - Occupational Stress Questionnaire     Feeling of Stress : Only a little   Social Connections: Moderately Isolated (2/21/2022)    Social Connection and Isolation Panel [NHANES]     Frequency of Communication with Friends and Family: More than three times a week     Frequency of Social Gatherings with Friends and Family: More than three times a week     Attends Taoist Services: More than 4 times per year     Active Member of Clubs or Organizations: No     Attends Club or Organization Meetings: Never     Marital Status:    Housing Stability: Low Risk  (2/21/2022)    Housing Stability Vital Sign     Unable to Pay for Housing in the Last Year: No     Number of Places Lived in the Last Year: 1     Unstable Housing in the Last Year: No       Current Outpatient Medications   Medication Sig Dispense Refill    amLODIPine (NORVASC) 5 MG tablet Take 1 tablet (5 mg total) by mouth once daily. 90 tablet 3    atorvastatin  (LIPITOR) 10 MG tablet Take 1 tablet (10 mg total) by mouth once daily. 90 tablet 3    etanercept (ENBREL SURECLICK) 50 mg/mL (1 mL) Inject 1 mL (50 mg total) into the skin once a week. 4 mL 11    famotidine (PEPCID) 20 MG tablet Take 1 tablet (20 mg total) by mouth 2 (two) times daily. 60 tablet 11    gabapentin (NEURONTIN) 300 MG capsule Take 1 capsule (300 mg total) by mouth 3 (three) times daily. 270 capsule 1    losartan (COZAAR) 100 MG tablet Take 1 tablet (100 mg total) by mouth once daily. 90 tablet 3    metoprolol succinate (TOPROL-XL) 25 MG 24 hr tablet Take 1 tablet (25 mg total) by mouth every evening. 90 tablet 3    chlorhexidine (PERIDEX) 0.12 % solution Use 15-20 mL to rinse mouth twice daily for 2 minutes, spit out and do not rinse mouth with water after. 473 mL 3    cyanocobalamin, vitamin B-12, (VITAMIN B-12 ORAL) Take by mouth.      cycloSPORINE (RESTASIS) 0.05 % ophthalmic emulsion Apply 1 drop into both eyes twice a day 180 vial 3    cycloSPORINE (RESTASIS) 0.05 % ophthalmic emulsion Instill 1 drop Both Eyes twice a day 180 each 6    cycloSPORINE (RESTASIS) 0.05 % ophthalmic emulsion Apply 1 drop into both eyes twice a day 180 each 3    desoximetasone (TOPICORT) 0.25 % cream Apply to affected area every other night x 1 month 60 g 1    ergocalciferol (ERGOCALCIFEROL) 50,000 unit Cap Take 1 capsule (50,000 Units total) by mouth every 7 days. 12 capsule 2    flu vacc [id number] 6mos up,PF, (FLUARIX QUAD 7424-6726, PF,) 60 mcg (15 mcg x 4)/0.5 mL Syrg use as directed 0.5 mL 0    fluticasone propion-salmeterol 115-21 mcg/dose (ADVAIR HFA) 115-21 mcg/actuation HFAA inhaler Inhale 2 puffs into the lungs every 12 (twelve) hours. 12 g 11    fluticasone propionate (FLONASE) 50 mcg/actuation nasal spray 2 sprays (100 mcg total) by Each Nostril route once daily. 16 g 11    folic acid (FOLVITE) 1 MG tablet Take 4 tablets (4 mg total) by mouth once daily. 120 tablet 11    hydrOXYzine HCL (ATARAX) 25 MG tablet  Take 1 tablet (25 mg total) by mouth every evening. 30 tablet 1    leflunomide (ARAVA) 20 MG Tab Take 1 tablet (20 mg total) by mouth once daily. 90 tablet 3    nystatin (MYCOSTATIN) powder Apply to affected area every morning as needed for flare under breasts (Patient taking differently: Apply to affected area every morning as needed for flare under breasts) 120 g 6    promethazine (PHENERGAN) 25 MG tablet Take 1 tablet (25 mg total) by mouth every 6 (six) hours as needed for Nausea. 25 tablet 0    PROTOPIC 0.1 % ointment AAA bid 100 g 2    ruxolitinib (OPZELURA) 1.5 % Crea aaa bid 60 g 2    ruxolitinib (OPZELURA) 1.5 % Crea Apply to affected area twice daily to neck and hands for vitiligo 60 g 2    triamcinolone acetonide 0.025% (KENALOG) 0.025 % cream Apply to affected area every day to twice a day to neck . Not more than 1-2 weeks straight in same location 80 g 1    triamcinolone acetonide 0.1% (KENALOG) 0.1 % ointment Apply topically 2 (two) times daily. For 2 weeks 15 g 1    ZINC ORAL Take by mouth.       No current facility-administered medications for this visit.       Review of patient's allergies indicates:  No Known Allergies      Objective:       Vitals:    10/06/23 0835   BP: 136/82   Pulse: 76   Resp: 18       Physical Exam  Constitutional:       General: She is not in acute distress.     Appearance: She is well-developed. She is not diaphoretic.   HENT:      Head: Normocephalic and atraumatic.      Right Ear: External ear normal.      Left Ear: External ear normal.      Nose: Nose normal.      Mouth/Throat:      Pharynx: No oropharyngeal exudate.   Eyes:      General: No scleral icterus.        Right eye: No discharge.         Left eye: No discharge.      Conjunctiva/sclera: Conjunctivae normal.      Pupils: Pupils are equal, round, and reactive to light.   Neck:      Thyroid: No thyromegaly.      Vascular: No JVD.      Trachea: No tracheal deviation.   Cardiovascular:      Rate and Rhythm: Normal  rate and regular rhythm.      Heart sounds: Normal heart sounds. No murmur heard.     No friction rub. No gallop.   Pulmonary:      Effort: Pulmonary effort is normal.      Breath sounds: Normal breath sounds. No stridor. No wheezing or rales.   Chest:      Chest wall: No tenderness.   Abdominal:      General: Bowel sounds are normal. There is no distension.      Palpations: Abdomen is soft. There is no mass.      Tenderness: There is no abdominal tenderness. There is no guarding or rebound.      Hernia: No hernia is present.   Musculoskeletal:         General: No tenderness. Normal range of motion.      Cervical back: Normal range of motion and neck supple.   Lymphadenopathy:      Cervical: No cervical adenopathy.   Skin:     General: Skin is warm and dry.      Coloration: Skin is not pale.      Findings: No erythema or rash.   Neurological:      Mental Status: She is alert and oriented to person, place, and time.      Cranial Nerves: No cranial nerve deficit.      Motor: No abnormal muscle tone.      Coordination: Coordination normal.      Deep Tendon Reflexes: Reflexes are normal and symmetric. Reflexes normal.   Psychiatric:         Behavior: Behavior normal.         Thought Content: Thought content normal.         Judgment: Judgment normal.         Assessment:       Problem List Items Addressed This Visit       Sarcoidosis of lung    Rheumatoid arthritis with positive rheumatoid factor    Mixed hyperlipidemia    Major depression, recurrent, chronic    Hypertension     Other Visit Diagnoses       Routine general medical examination at health care facility    -  Primary    Essential hypertension        Encounter for screening mammogram for breast cancer        Relevant Orders    Mammo Digital Screening [facility] w/ [facility]    Vitamin D deficiency        Relevant Medications    ergocalciferol (ERGOCALCIFEROL) 50,000 unit Cap            Plan:       Silvia was seen today for annual exam and follow-up.    Diagnoses and all  orders for this visit:    Routine general medical examination at health care facility    Primary hypertension    Major depression, recurrent, chronic    Essential hypertension    Rheumatoid arthritis with positive rheumatoid factor, involving unspecified site    Mixed hyperlipidemia    Rheumatoid arthritis involving multiple sites with positive rheumatoid factor    Sarcoidosis of lung    Encounter for screening mammogram for breast cancer  -     Mammo Digital Screening [facility] w/ [facility]; Future    Vitamin D deficiency  -     ergocalciferol (ERGOCALCIFEROL) 50,000 unit Cap; Take 1 capsule (50,000 Units total) by mouth every 7 days.      HLD  -diet control  -start statin          HTN  -controlled    RA  -stable  -follow rheumatology    Vertigo  -meclizine prn    Depression  -controlled  -continue zoloft 25 mg daily  -SI/ER precautions given      Spent adequate time in obtaining history and explaining differentials      30 minutes spent during this visit of which greater than 50% devoted to face-face counseling and coordination of care regarding diagnosis and management plan    Follow up in about 3 months (around 1/6/2024), or if symptoms worsen or fail to improve.
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
## 2023-10-09 RX ORDER — NYSTATIN 100000 [USP'U]/G
POWDER TOPICAL
Qty: 120 G | Refills: 6 | Status: SHIPPED | OUTPATIENT
Start: 2023-10-09

## 2023-10-12 ENCOUNTER — PATIENT MESSAGE (OUTPATIENT)
Dept: RESPIRATORY THERAPY | Facility: HOSPITAL | Age: 63
End: 2023-10-12
Payer: MEDICARE

## 2023-10-16 ENCOUNTER — OFFICE VISIT (OUTPATIENT)
Dept: OBSTETRICS AND GYNECOLOGY | Facility: CLINIC | Age: 63
End: 2023-10-16
Payer: MEDICARE

## 2023-10-16 VITALS
BODY MASS INDEX: 27.76 KG/M2 | SYSTOLIC BLOOD PRESSURE: 143 MMHG | WEIGHT: 146.94 LBS | DIASTOLIC BLOOD PRESSURE: 89 MMHG

## 2023-10-16 DIAGNOSIS — N89.1 VAIN II (VAGINAL INTRAEPITHELIAL NEOPLASIA GRADE II): ICD-10-CM

## 2023-10-16 DIAGNOSIS — Z01.411 ENCOUNTER FOR GYNECOLOGICAL EXAMINATION (GENERAL) (ROUTINE) WITH ABNORMAL FINDINGS: Primary | ICD-10-CM

## 2023-10-16 DIAGNOSIS — N39.3 STRESS INCONTINENCE OF URINE: ICD-10-CM

## 2023-10-16 DIAGNOSIS — N89.8 VAGINAL ITCHING: ICD-10-CM

## 2023-10-16 PROCEDURE — 99999 PR PBB SHADOW E&M-EST. PATIENT-LVL II: ICD-10-PCS | Mod: PBBFAC,,, | Performed by: OBSTETRICS & GYNECOLOGY

## 2023-10-16 PROCEDURE — 1160F RVW MEDS BY RX/DR IN RCRD: CPT | Mod: CPTII,S$GLB,, | Performed by: OBSTETRICS & GYNECOLOGY

## 2023-10-16 PROCEDURE — 3079F DIAST BP 80-89 MM HG: CPT | Mod: CPTII,S$GLB,, | Performed by: OBSTETRICS & GYNECOLOGY

## 2023-10-16 PROCEDURE — 99999 PR PBB SHADOW E&M-EST. PATIENT-LVL II: CPT | Mod: PBBFAC,,, | Performed by: OBSTETRICS & GYNECOLOGY

## 2023-10-16 PROCEDURE — 1159F PR MEDICATION LIST DOCUMENTED IN MEDICAL RECORD: ICD-10-PCS | Mod: CPTII,S$GLB,, | Performed by: OBSTETRICS & GYNECOLOGY

## 2023-10-16 PROCEDURE — 4010F ACE/ARB THERAPY RXD/TAKEN: CPT | Mod: CPTII,S$GLB,, | Performed by: OBSTETRICS & GYNECOLOGY

## 2023-10-16 PROCEDURE — 3044F HG A1C LEVEL LT 7.0%: CPT | Mod: CPTII,S$GLB,, | Performed by: OBSTETRICS & GYNECOLOGY

## 2023-10-16 PROCEDURE — 87086 URINE CULTURE/COLONY COUNT: CPT | Performed by: OBSTETRICS & GYNECOLOGY

## 2023-10-16 PROCEDURE — G0101 CA SCREEN;PELVIC/BREAST EXAM: HCPCS | Mod: S$GLB,,, | Performed by: OBSTETRICS & GYNECOLOGY

## 2023-10-16 PROCEDURE — 3044F PR MOST RECENT HEMOGLOBIN A1C LEVEL <7.0%: ICD-10-PCS | Mod: CPTII,S$GLB,, | Performed by: OBSTETRICS & GYNECOLOGY

## 2023-10-16 PROCEDURE — 3077F SYST BP >= 140 MM HG: CPT | Mod: CPTII,S$GLB,, | Performed by: OBSTETRICS & GYNECOLOGY

## 2023-10-16 PROCEDURE — 1160F PR REVIEW ALL MEDS BY PRESCRIBER/CLIN PHARMACIST DOCUMENTED: ICD-10-PCS | Mod: CPTII,S$GLB,, | Performed by: OBSTETRICS & GYNECOLOGY

## 2023-10-16 PROCEDURE — 88175 CYTOPATH C/V AUTO FLUID REDO: CPT | Performed by: OBSTETRICS & GYNECOLOGY

## 2023-10-16 PROCEDURE — 4010F PR ACE/ARB THEARPY RXD/TAKEN: ICD-10-PCS | Mod: CPTII,S$GLB,, | Performed by: OBSTETRICS & GYNECOLOGY

## 2023-10-16 PROCEDURE — G0101 PR CA SCREEN;PELVIC/BREAST EXAM: ICD-10-PCS | Mod: S$GLB,,, | Performed by: OBSTETRICS & GYNECOLOGY

## 2023-10-16 PROCEDURE — 3079F PR MOST RECENT DIASTOLIC BLOOD PRESSURE 80-89 MM HG: ICD-10-PCS | Mod: CPTII,S$GLB,, | Performed by: OBSTETRICS & GYNECOLOGY

## 2023-10-16 PROCEDURE — 81514 NFCT DS BV&VAGINITIS DNA ALG: CPT | Performed by: OBSTETRICS & GYNECOLOGY

## 2023-10-16 PROCEDURE — 1159F MED LIST DOCD IN RCRD: CPT | Mod: CPTII,S$GLB,, | Performed by: OBSTETRICS & GYNECOLOGY

## 2023-10-16 PROCEDURE — 3077F PR MOST RECENT SYSTOLIC BLOOD PRESSURE >= 140 MM HG: ICD-10-PCS | Mod: CPTII,S$GLB,, | Performed by: OBSTETRICS & GYNECOLOGY

## 2023-10-16 NOTE — PROGRESS NOTES
OBSTETRIC HISTORY:   OB History          4    Para   4    Term   2            AB        Living   2         SAB        IAB        Ectopic        Multiple        Live Births   2                COMPREHENSIVE GYN HISTORY:  PAP History: Reports abnormal Paps.  Treatment: Colposcopy with biopsy of vagina  Result: VAIN 2  Infection History: Denies STDs. Denies PID.  Benign History: Denies uterine fibroids. Denies ovarian cysts. Denies endometriosis.   Cancer History: Denies cervical cancer. Denies uterine cancer or hyperplasia. Denies ovarian cancer. Denies vulvar cancer or pre-cancer. Denies vaginal cancer or pre-cancer. Denies breast cancer. Denies colon cancer.  Sexual Activity History:  reports that she currently engages in sexual activity. She reports using the following method of birth control/protection: Surgical.       HPI:   62 y.o.  Patient's last menstrual period was 2004.   Patient is  here for Medicare exam and high risk secondary to VAIN 2 with complaints of external lip irritation and bladder leakage with sneezing/coughing. Takes Enbrel.    ROS:  GENERAL: No weight gain or weight loss.   CHEST: No shortness of breath.   ABDOMEN: No abdominal pain, constipation, diarrhea, nausea, vomiting or rectal bleeding.   URINARY: No frequency, dysuria, hematuria, or burning on urination.  REPRODUCTIVE: See HPI.   BREASTS: No breast pain, lumps, or nipple discharge.   PSYCHIATRIC: No depression or anxiety.      PE:   BP (!) 143/89   Wt 66.7 kg (146 lb 15 oz)   LMP 2004   BMI 27.76 kg/m²   APPEARANCE: Well nourished, well developed, in no acute distress.  NECK: Neck symmetric without thyromegaly.  NODES: No inguinal or cervical lymph node enlargement.  CHEST: Lungs clear to auscultation.  HEART: Regular rate and rhythm, no murmurs, rubs or gallops.  ABDOMEN: Soft. No tenderness or masses. No hernias.  BREASTS: Symmetrical, no skin changes or visible lesions. No palpable masses, nipple  discharge or adenopathy bilaterally.  VULVA: Vitiligo (acetic acid applied no abnormal lesions seen other than vitiligo).  URETHRAL MEATUS: Normal size and location, no lesions, no prolapse.  URETHRA: No masses, tenderness, prolapse or scarring.  VAGINA: Atrophic and not well rugated, no discharge, no significant cystocele or rectocele.  CERVIX AND UTERUS SURGICALLY ABSENT.   ADNEXA: No masses or tenderness.    PROCEDURES:  Pap smear -- History of VAIN 2       Assessment/Plan:  Medicare pap, pelvic and breast  History of vain 2  Vulvitis--          As of April 1, 2021, the Cures Act has been passed nationally. This new law requires that all doctors progress notes, lab results, pathology reports and radiology reports be released IMMEDIATELY to the patient in the patient portal. That means that the results are released to you at the EXACT same time they are released to me. Therefore, with all of the patients that I have I am not able to reply to each patient exactly when the results come in. So there will be a delay from when you see the results to when I see them and have time to come up with a response to send you. Also I only see these results when I am on the computer at work. So if the results come in over the weekend or after 5 pm of a work day, I will not see them until the next business day. As you can tell, this is a challenge as a physician to give every patient the quick response they hope for and deserve. So please be patient!     Thanks for understanding,

## 2023-10-17 ENCOUNTER — PATIENT MESSAGE (OUTPATIENT)
Dept: OBSTETRICS AND GYNECOLOGY | Facility: CLINIC | Age: 63
End: 2023-10-17
Payer: MEDICARE

## 2023-10-17 LAB
BACTERIA UR CULT: NO GROWTH
BACTERIAL VAGINOSIS DNA: POSITIVE
CANDIDA GLABRATA DNA: NEGATIVE
CANDIDA KRUSEI DNA: NEGATIVE
CANDIDA RRNA VAG QL PROBE: NEGATIVE
T VAGINALIS RRNA GENITAL QL PROBE: NEGATIVE

## 2023-10-17 RX ORDER — METRONIDAZOLE 500 MG/1
500 TABLET ORAL EVERY 12 HOURS
Qty: 14 TABLET | Refills: 0 | Status: SHIPPED | OUTPATIENT
Start: 2023-10-17 | End: 2023-10-25

## 2023-10-18 LAB
FINAL PATHOLOGIC DIAGNOSIS: NORMAL
Lab: NORMAL

## 2023-10-23 ENCOUNTER — PATIENT MESSAGE (OUTPATIENT)
Dept: ADMINISTRATIVE | Facility: HOSPITAL | Age: 63
End: 2023-10-23
Payer: MEDICARE

## 2023-10-26 ENCOUNTER — PATIENT MESSAGE (OUTPATIENT)
Dept: ADMINISTRATIVE | Facility: HOSPITAL | Age: 63
End: 2023-10-26
Payer: MEDICARE

## 2023-10-26 ENCOUNTER — PATIENT MESSAGE (OUTPATIENT)
Dept: CARDIOLOGY | Facility: CLINIC | Age: 63
End: 2023-10-26
Payer: MEDICARE

## 2023-11-13 ENCOUNTER — PATIENT MESSAGE (OUTPATIENT)
Dept: PULMONOLOGY | Facility: CLINIC | Age: 63
End: 2023-11-13
Payer: MEDICARE

## 2023-11-27 ENCOUNTER — TELEPHONE (OUTPATIENT)
Dept: PODIATRY | Facility: CLINIC | Age: 63
End: 2023-11-27
Payer: MEDICARE

## 2023-11-27 ENCOUNTER — OFFICE VISIT (OUTPATIENT)
Dept: PODIATRY | Facility: CLINIC | Age: 63
End: 2023-11-27
Payer: MEDICARE

## 2023-11-27 VITALS
WEIGHT: 146 LBS | DIASTOLIC BLOOD PRESSURE: 96 MMHG | HEART RATE: 72 BPM | HEIGHT: 61 IN | SYSTOLIC BLOOD PRESSURE: 140 MMHG | BODY MASS INDEX: 27.56 KG/M2

## 2023-11-27 DIAGNOSIS — L60.0 ONYCHOCRYPTOSIS: ICD-10-CM

## 2023-11-27 DIAGNOSIS — M89.9 SUBUNGUAL EXOSTOSIS OF GREAT TOE: ICD-10-CM

## 2023-11-27 DIAGNOSIS — B35.1 ONYCHOMYCOSIS: Primary | ICD-10-CM

## 2023-11-27 PROCEDURE — 99203 OFFICE O/P NEW LOW 30 MIN: CPT | Mod: S$GLB,,, | Performed by: PODIATRIST

## 2023-11-27 PROCEDURE — 1160F PR REVIEW ALL MEDS BY PRESCRIBER/CLIN PHARMACIST DOCUMENTED: ICD-10-PCS | Mod: CPTII,S$GLB,, | Performed by: PODIATRIST

## 2023-11-27 PROCEDURE — 3077F PR MOST RECENT SYSTOLIC BLOOD PRESSURE >= 140 MM HG: ICD-10-PCS | Mod: CPTII,S$GLB,, | Performed by: PODIATRIST

## 2023-11-27 PROCEDURE — 3077F SYST BP >= 140 MM HG: CPT | Mod: CPTII,S$GLB,, | Performed by: PODIATRIST

## 2023-11-27 PROCEDURE — 99999 PR PBB SHADOW E&M-EST. PATIENT-LVL III: ICD-10-PCS | Mod: PBBFAC,,, | Performed by: PODIATRIST

## 2023-11-27 PROCEDURE — 1159F MED LIST DOCD IN RCRD: CPT | Mod: CPTII,S$GLB,, | Performed by: PODIATRIST

## 2023-11-27 PROCEDURE — 99203 PR OFFICE/OUTPT VISIT, NEW, LEVL III, 30-44 MIN: ICD-10-PCS | Mod: S$GLB,,, | Performed by: PODIATRIST

## 2023-11-27 PROCEDURE — 3080F DIAST BP >= 90 MM HG: CPT | Mod: CPTII,S$GLB,, | Performed by: PODIATRIST

## 2023-11-27 PROCEDURE — 4010F ACE/ARB THERAPY RXD/TAKEN: CPT | Mod: CPTII,S$GLB,, | Performed by: PODIATRIST

## 2023-11-27 PROCEDURE — 3044F HG A1C LEVEL LT 7.0%: CPT | Mod: CPTII,S$GLB,, | Performed by: PODIATRIST

## 2023-11-27 PROCEDURE — 4010F PR ACE/ARB THEARPY RXD/TAKEN: ICD-10-PCS | Mod: CPTII,S$GLB,, | Performed by: PODIATRIST

## 2023-11-27 PROCEDURE — 3008F BODY MASS INDEX DOCD: CPT | Mod: CPTII,S$GLB,, | Performed by: PODIATRIST

## 2023-11-27 PROCEDURE — 3080F PR MOST RECENT DIASTOLIC BLOOD PRESSURE >= 90 MM HG: ICD-10-PCS | Mod: CPTII,S$GLB,, | Performed by: PODIATRIST

## 2023-11-27 PROCEDURE — 3044F PR MOST RECENT HEMOGLOBIN A1C LEVEL <7.0%: ICD-10-PCS | Mod: CPTII,S$GLB,, | Performed by: PODIATRIST

## 2023-11-27 PROCEDURE — 99999 PR PBB SHADOW E&M-EST. PATIENT-LVL III: CPT | Mod: PBBFAC,,, | Performed by: PODIATRIST

## 2023-11-27 PROCEDURE — 1159F PR MEDICATION LIST DOCUMENTED IN MEDICAL RECORD: ICD-10-PCS | Mod: CPTII,S$GLB,, | Performed by: PODIATRIST

## 2023-11-27 PROCEDURE — 87101 SKIN FUNGI CULTURE: CPT | Performed by: PODIATRIST

## 2023-11-27 PROCEDURE — 3008F PR BODY MASS INDEX (BMI) DOCUMENTED: ICD-10-PCS | Mod: CPTII,S$GLB,, | Performed by: PODIATRIST

## 2023-11-27 PROCEDURE — 1160F RVW MEDS BY RX/DR IN RCRD: CPT | Mod: CPTII,S$GLB,, | Performed by: PODIATRIST

## 2023-11-27 NOTE — TELEPHONE ENCOUNTER
Called patient we have an opening today for 10:30.  She accepted that time and will see Dr. Beach today at 10:30.  Patient has no more concerns at this time.    Narcisa
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
## 2023-11-27 NOTE — TELEPHONE ENCOUNTER
Called patient and we have an opening today.  She will come in today at 10:30 for her appt.    Narcisa

## 2023-11-30 ENCOUNTER — TELEPHONE (OUTPATIENT)
Dept: SLEEP MEDICINE | Facility: CLINIC | Age: 63
End: 2023-11-30
Payer: MEDICARE

## 2023-11-30 ENCOUNTER — TELEPHONE (OUTPATIENT)
Dept: FAMILY MEDICINE | Facility: CLINIC | Age: 63
End: 2023-11-30
Payer: MEDICARE

## 2023-11-30 VITALS — SYSTOLIC BLOOD PRESSURE: 129 MMHG | DIASTOLIC BLOOD PRESSURE: 79 MMHG

## 2023-11-30 NOTE — TELEPHONE ENCOUNTER
----- Message from Estelita Thurston sent at 11/30/2023 10:14 AM CST -----  Patient came in office to ask if its okay for her to get the RSV vaccine, would like if someone can call her to inform her if its okay or not?

## 2023-11-30 NOTE — TELEPHONE ENCOUNTER
I reached out to patient and spoke to her.    Patient stated she is going to ask her Pcp since she is here     In the building.    Patient appreciates the call back.      Nw

## 2023-12-01 ENCOUNTER — PATIENT MESSAGE (OUTPATIENT)
Dept: GASTROENTEROLOGY | Facility: CLINIC | Age: 63
End: 2023-12-01
Payer: MEDICARE

## 2023-12-04 ENCOUNTER — HOSPITAL ENCOUNTER (OUTPATIENT)
Dept: RADIOLOGY | Facility: HOSPITAL | Age: 63
Discharge: HOME | End: 2023-12-04
Attending: FAMILY MEDICINE
Payer: MEDICARE

## 2023-12-04 DIAGNOSIS — Z12.31 ENCOUNTER FOR SCREENING MAMMOGRAM FOR BREAST CANCER: ICD-10-CM

## 2023-12-04 PROCEDURE — 77067 SCR MAMMO BI INCL CAD: CPT | Mod: TC

## 2023-12-04 PROCEDURE — 77067 SCR MAMMO BI INCL CAD: CPT | Mod: 26,,, | Performed by: RADIOLOGY

## 2023-12-04 PROCEDURE — 77063 MAMMO DIGITAL SCREENING BILAT WITH TOMO: ICD-10-PCS | Mod: 26,,, | Performed by: RADIOLOGY

## 2023-12-04 PROCEDURE — 77063 BREAST TOMOSYNTHESIS BI: CPT | Mod: 26,,, | Performed by: RADIOLOGY

## 2023-12-04 PROCEDURE — 77067 MAMMO DIGITAL SCREENING BILAT WITH TOMO: ICD-10-PCS | Mod: 26,,, | Performed by: RADIOLOGY

## 2023-12-08 ENCOUNTER — PATIENT MESSAGE (OUTPATIENT)
Dept: PODIATRY | Facility: CLINIC | Age: 63
End: 2023-12-08
Payer: MEDICARE

## 2023-12-25 ENCOUNTER — PATIENT MESSAGE (OUTPATIENT)
Dept: PODIATRY | Facility: CLINIC | Age: 63
End: 2023-12-25
Payer: MEDICARE

## 2023-12-26 ENCOUNTER — LAB VISIT (OUTPATIENT)
Dept: LAB | Facility: HOSPITAL | Age: 63
End: 2023-12-26
Attending: INTERNAL MEDICINE
Payer: MEDICARE

## 2023-12-26 DIAGNOSIS — D86.9 SARCOIDOSIS: ICD-10-CM

## 2023-12-26 LAB
ALBUMIN SERPL BCP-MCNC: 4.2 G/DL (ref 3.5–5.2)
ALP SERPL-CCNC: 74 U/L (ref 55–135)
ALT SERPL W/O P-5'-P-CCNC: 19 U/L (ref 10–44)
ANION GAP SERPL CALC-SCNC: 12 MMOL/L (ref 8–16)
AST SERPL-CCNC: 19 U/L (ref 10–40)
BASOPHILS # BLD AUTO: 0.06 K/UL (ref 0–0.2)
BASOPHILS NFR BLD: 0.9 % (ref 0–1.9)
BILIRUB SERPL-MCNC: 0.4 MG/DL (ref 0.1–1)
BUN SERPL-MCNC: 14 MG/DL (ref 8–23)
CALCIUM SERPL-MCNC: 10.2 MG/DL (ref 8.7–10.5)
CHLORIDE SERPL-SCNC: 104 MMOL/L (ref 95–110)
CO2 SERPL-SCNC: 26 MMOL/L (ref 23–29)
CREAT SERPL-MCNC: 0.8 MG/DL (ref 0.5–1.4)
CRP SERPL-MCNC: 3 MG/L (ref 0–8.2)
DIFFERENTIAL METHOD: ABNORMAL
EOSINOPHIL # BLD AUTO: 0.4 K/UL (ref 0–0.5)
EOSINOPHIL NFR BLD: 5.5 % (ref 0–8)
ERYTHROCYTE [DISTWIDTH] IN BLOOD BY AUTOMATED COUNT: 13.2 % (ref 11.5–14.5)
ERYTHROCYTE [SEDIMENTATION RATE] IN BLOOD BY PHOTOMETRIC METHOD: 22 MM/HR (ref 0–36)
EST. GFR  (NO RACE VARIABLE): >60 ML/MIN/1.73 M^2
GLUCOSE SERPL-MCNC: 90 MG/DL (ref 70–110)
HCT VFR BLD AUTO: 41.4 % (ref 37–48.5)
HGB BLD-MCNC: 13.6 G/DL (ref 12–16)
IMM GRANULOCYTES # BLD AUTO: 0.01 K/UL (ref 0–0.04)
IMM GRANULOCYTES NFR BLD AUTO: 0.1 % (ref 0–0.5)
LYMPHOCYTES # BLD AUTO: 2.4 K/UL (ref 1–4.8)
LYMPHOCYTES NFR BLD: 35.1 % (ref 18–48)
MCH RBC QN AUTO: 32.5 PG (ref 27–31)
MCHC RBC AUTO-ENTMCNC: 32.9 G/DL (ref 32–36)
MCV RBC AUTO: 99 FL (ref 82–98)
MONOCYTES # BLD AUTO: 0.8 K/UL (ref 0.3–1)
MONOCYTES NFR BLD: 11.3 % (ref 4–15)
NEUTROPHILS # BLD AUTO: 3.2 K/UL (ref 1.8–7.7)
NEUTROPHILS NFR BLD: 47.1 % (ref 38–73)
NRBC BLD-RTO: 0 /100 WBC
PLATELET # BLD AUTO: 330 K/UL (ref 150–450)
PMV BLD AUTO: 9.8 FL (ref 9.2–12.9)
POTASSIUM SERPL-SCNC: 3.8 MMOL/L (ref 3.5–5.1)
PROT SERPL-MCNC: 8.1 G/DL (ref 6–8.4)
RBC # BLD AUTO: 4.19 M/UL (ref 4–5.4)
SODIUM SERPL-SCNC: 142 MMOL/L (ref 136–145)
WBC # BLD AUTO: 6.75 K/UL (ref 3.9–12.7)

## 2023-12-26 PROCEDURE — 80053 COMPREHEN METABOLIC PANEL: CPT | Performed by: INTERNAL MEDICINE

## 2023-12-26 PROCEDURE — 85025 COMPLETE CBC W/AUTO DIFF WBC: CPT | Performed by: INTERNAL MEDICINE

## 2023-12-26 PROCEDURE — 36415 COLL VENOUS BLD VENIPUNCTURE: CPT | Performed by: INTERNAL MEDICINE

## 2023-12-26 PROCEDURE — 85652 RBC SED RATE AUTOMATED: CPT | Performed by: INTERNAL MEDICINE

## 2023-12-26 PROCEDURE — 86140 C-REACTIVE PROTEIN: CPT | Performed by: INTERNAL MEDICINE

## 2023-12-27 LAB — FUNGUS BLD CULT: NORMAL

## 2024-01-08 ENCOUNTER — OFFICE VISIT (OUTPATIENT)
Dept: FAMILY MEDICINE | Facility: CLINIC | Age: 64
End: 2024-01-08
Attending: FAMILY MEDICINE
Payer: MEDICARE

## 2024-01-08 VITALS
WEIGHT: 145.31 LBS | SYSTOLIC BLOOD PRESSURE: 112 MMHG | BODY MASS INDEX: 27.43 KG/M2 | DIASTOLIC BLOOD PRESSURE: 68 MMHG | OXYGEN SATURATION: 95 % | HEART RATE: 78 BPM | HEIGHT: 61 IN

## 2024-01-08 DIAGNOSIS — I10 PRIMARY HYPERTENSION: Primary | ICD-10-CM

## 2024-01-08 DIAGNOSIS — E78.2 MIXED HYPERLIPIDEMIA: ICD-10-CM

## 2024-01-08 DIAGNOSIS — D86.0 SARCOIDOSIS OF LUNG: ICD-10-CM

## 2024-01-08 DIAGNOSIS — E55.9 VITAMIN D DEFICIENCY: ICD-10-CM

## 2024-01-08 DIAGNOSIS — I10 ESSENTIAL HYPERTENSION: ICD-10-CM

## 2024-01-08 DIAGNOSIS — M85.80 OSTEOPENIA, UNSPECIFIED LOCATION: ICD-10-CM

## 2024-01-08 DIAGNOSIS — I47.10 PAROXYSMAL SUPRAVENTRICULAR TACHYCARDIA: ICD-10-CM

## 2024-01-08 DIAGNOSIS — D84.9 IMMUNOCOMPROMISED: ICD-10-CM

## 2024-01-08 DIAGNOSIS — I70.0 AORTIC ATHEROSCLEROSIS: ICD-10-CM

## 2024-01-08 DIAGNOSIS — F33.9 MAJOR DEPRESSION, RECURRENT, CHRONIC: ICD-10-CM

## 2024-01-08 DIAGNOSIS — M05.79 RHEUMATOID ARTHRITIS INVOLVING MULTIPLE SITES WITH POSITIVE RHEUMATOID FACTOR: ICD-10-CM

## 2024-01-08 DIAGNOSIS — G89.29 CHRONIC NONINTRACTABLE HEADACHE, UNSPECIFIED HEADACHE TYPE: ICD-10-CM

## 2024-01-08 DIAGNOSIS — M05.9 RHEUMATOID ARTHRITIS WITH POSITIVE RHEUMATOID FACTOR, INVOLVING UNSPECIFIED SITE: ICD-10-CM

## 2024-01-08 DIAGNOSIS — R51.9 CHRONIC NONINTRACTABLE HEADACHE, UNSPECIFIED HEADACHE TYPE: ICD-10-CM

## 2024-01-08 PROCEDURE — 99999 PR PBB SHADOW E&M-EST. PATIENT-LVL III: CPT | Mod: PBBFAC,,, | Performed by: FAMILY MEDICINE

## 2024-01-08 PROCEDURE — 99214 OFFICE O/P EST MOD 30 MIN: CPT | Mod: S$GLB,,, | Performed by: FAMILY MEDICINE

## 2024-01-08 RX ORDER — AMLODIPINE BESYLATE 5 MG/1
5 TABLET ORAL DAILY
Qty: 90 TABLET | Refills: 3 | Status: SHIPPED | OUTPATIENT
Start: 2024-01-08

## 2024-01-08 NOTE — PROGRESS NOTES
Subjective:       Patient ID: Silvia Cervantes is a 63 y.o. female.    Chief Complaint: Follow-up and Medication Refill    61 yr old pleasant  female with depression, allergies, vitiligo, HTN, HLD, sarcoidosis, Rheumatoid arthritis, Erosive gastritis, and other co morbidities presents today for her 3 month follow up. She wanted new neurology for her headache. The previous one left system.    Chronic low back pain. Onset 6 months ago and gradually worsening - left lower back- does not radiate and no neurological symptoms. No saddle anesthesia or bladder/bowel problems. Details as follows -      HTN - controlled - on Losartan-HCT and Norvasc 5 - compliant - no side effects     HLD - on statin but not compliant -      LDLCALC                  118.6               07/11/2023              Sarcoidosis and lung nodules - follwos pulmonology - need new one as her previous specialist is leaving [facility]      Major depression - uncontrolled - not on meds - would like to start one - given zoloft - she will start from today - - no SI/HI    Vertigo - much better - on vertin as needed          RA - on Enbrel shots - follows Rheumatology - stable      Gastritis - stable - on PPI as needed - no side effects      History as below - reviewed       HM  -labs UTD  -vaccines UTD    Follow-up  Associated symptoms include myalgias, neck pain, numbness and weakness. Pertinent negatives include no arthralgias, chest pain, congestion, diaphoresis, headaches, nausea or sore throat.   Medication Refill  Associated symptoms include myalgias, neck pain, numbness and weakness. Pertinent negatives include no arthralgias, chest pain, congestion, diaphoresis, headaches, nausea or sore throat.   Hypertension  This is a chronic problem. The current episode started more than 1 year ago. The problem has been gradually improving since onset. The problem is controlled. Associated symptoms include neck pain. Pertinent negatives include no chest pain,  headaches, malaise/fatigue, palpitations or shortness of breath. There are no associated agents to hypertension. Risk factors for coronary artery disease include dyslipidemia and post-menopausal state. Past treatments include angiotensin blockers and diuretics. The current treatment provides significant improvement. There are no compliance problems.  There is no history of angina, CAD/MI, CVA, left ventricular hypertrophy, PVD or retinopathy. There is no history of chronic renal disease, coarctation of the aorta, hypercortisolism, hyperparathyroidism, a hypertension causing med or sleep apnea.   Hyperlipidemia  This is a chronic problem. The current episode started more than 1 year ago. The problem is controlled. Recent lipid tests were reviewed and are normal. She has no history of chronic renal disease, diabetes, hypothyroidism or liver disease. There are no known factors aggravating her hyperlipidemia. Associated symptoms include myalgias. Pertinent negatives include no chest pain, focal sensory loss, focal weakness, leg pain or shortness of breath. She is currently on no antihyperlipidemic treatment. The current treatment provides mild improvement of lipids. There are no compliance problems.  Risk factors for coronary artery disease include dyslipidemia, hypertension and post-menopausal.   Back Pain  This is a chronic problem. The current episode started more than 1 month ago. The problem occurs constantly. The problem is unchanged. The pain is present in the sacro-iliac and lumbar spine. The quality of the pain is described as aching. The pain does not radiate. The symptoms are aggravated by bending, sitting and twisting. Associated symptoms include numbness and weakness. Pertinent negatives include no chest pain, dysuria, headaches, leg pain, perianal numbness or weight loss. She has tried nothing for the symptoms. The treatment provided no relief.     Review of Systems   Constitutional: Negative.  Negative  for activity change, diaphoresis, malaise/fatigue, unexpected weight change and weight loss.   HENT: Negative.  Negative for nasal congestion, ear discharge, hearing loss, rhinorrhea, sore throat and voice change.    Eyes: Negative.  Negative for pain, discharge and visual disturbance.   Respiratory: Negative.  Negative for chest tightness, shortness of breath and wheezing.    Cardiovascular: Negative.  Negative for chest pain and palpitations.   Gastrointestinal: Negative.  Negative for abdominal distention, anal bleeding, constipation and nausea.   Endocrine: Negative.  Negative for cold intolerance, polydipsia and polyuria.   Genitourinary: Negative.  Negative for decreased urine volume, difficulty urinating, dysuria, frequency, menstrual problem and vaginal pain.   Musculoskeletal:  Positive for back pain, myalgias and neck pain. Negative for arthralgias, gait problem and leg pain.   Integumentary:  Negative for color change, pallor and wound. Negative.   Allergic/Immunologic: Negative.  Negative for environmental allergies and immunocompromised state.   Neurological:  Positive for weakness and numbness. Negative for dizziness, tremors, focal weakness, seizures, speech difficulty and headaches.   Hematological: Negative.  Negative for adenopathy. Does not bruise/bleed easily.   Psychiatric/Behavioral: Negative.  Negative for agitation, confusion, decreased concentration, hallucinations, self-injury and suicidal ideas. The patient is not nervous/anxious.          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)        Past Surgical History:   Procedure Laterality Date     SECTION      COLONOSCOPY N/A 2019    Procedure: COLONOSCOPY/judeytley ;  Surgeon: Dimas Woodall MD;  Location: [facility];  Service: Endoscopy;  Laterality: N/A;    ESOPHAGOGASTRODUODENOSCOPY N/A 2023    Procedure: EGD (ESOPHAGOGASTRODUODENOSCOPY);  Surgeon: Ori St MD;  Location: [facility] ENDO;  Service: Endoscopy;  Laterality: N/A;    EXCISION OF NODULE Right 2022    Procedure: EXCISION, NODULE right elbow;  Surgeon: Louise Smith MD;  Location: [facility];  Service: Orthopedics;  Laterality: Right;    HYSTERECTOMY      INJECTION OF STEROID Left 2022    Procedure: INJECTION, STEROID Thumb MP joint;  Surgeon: Louise Smith MD;  Location: [facility] OR;  Service: Orthopedics;  Laterality: Left;    SINUS SURGERY      SURGICAL REMOVAL OF DISTAL ULNA Right 2022    Procedure: EXCISION, ULNA, DISTAL;  Surgeon: Louise Smith MD;  Location: [facility] OR;  Service: Orthopedics;  Laterality: Right;    SYNOVECTOMY OF WRIST Right 2022    Procedure: SYNOVECTOMY, WRIST;  Surgeon: Louise Smith MD;  Location: [facility];  Service: Orthopedics;  Laterality: Right;    TUBAL LIGATION         Family History   Problem Relation Age of Onset    Hypertension Mother     Cataracts Mother     Diabetes Father     Hypertension Father     Cataracts Father     Heart disease Father     Hyperlipidemia Father     Diabetes Sister     Stroke Brother     Hypertension Brother     No Known Problems Daughter     No Known Problems Son     Melanoma Neg Hx     Breast cancer Neg Hx     Colon cancer Neg Hx     Ovarian cancer Neg Hx     Blindness Neg Hx     Glaucoma Neg Hx     Aneurysm Neg Hx     Cancer Neg Hx     Clotting disorder Neg Hx     Dementia Neg Hx     Fainting Neg Hx      Kidney disease Neg Hx     Liver disease Neg Hx     Migraines Neg Hx     Neuropathy Neg Hx     Parkinsonism Neg Hx     Seizures Neg Hx     Tremor Neg Hx        Social History     Socioeconomic History    Marital status:    Tobacco Use    Smoking status: Never    Smokeless tobacco: Never   Substance and Sexual Activity    Alcohol use: Yes     Comment: Rarely    Drug use: Not Currently    Sexual activity: Yes     Partners: Male     Birth control/protection: Surgical     Comment: st     Social Determinants of Health     Financial Resource Strain: Low Risk  (2/21/2022)    Overall Financial Resource Strain (CARDIA)     Difficulty of Paying Living Expenses: Not hard at all   Food Insecurity: No Food Insecurity (2/21/2022)    Hunger Vital Sign     Worried About Running Out of Food in the Last Year: Never true     Ran Out of Food in the Last Year: Never true   Transportation Needs: No Transportation Needs (2/21/2022)    PRAPARE - Transportation     Lack of Transportation (Medical): No     Lack of Transportation (Non-Medical): No   Physical Activity: Sufficiently Active (2/21/2022)    Exercise Vital Sign     Days of Exercise per Week: 7 days     Minutes of Exercise per Session: 120 min   Stress: No Stress Concern Present (2/21/2022)    [employer] - Occupational Stress Questionnaire     Feeling of Stress : Only a little   Social Connections: Moderately Isolated (2/21/2022)    Social Connection and Isolation Panel [NHANES]     Frequency of Communication with Friends and Family: More than three times a week     Frequency of Social Gatherings with Friends and Family: More than three times a week     Attends Confucianism Services: More than 4 times per year     Active Member of Clubs or Organizations: No     Attends Club or Organization Meetings: Never     Marital Status:    Housing Stability: Low Risk  (2/21/2022)    Housing Stability Vital Sign     Unable to Pay for Housing in the  Last Year: No     Number of Places Lived in the Last Year: 1     Unstable Housing in the Last Year: No       Current Outpatient Medications   Medication Sig Dispense Refill    atorvastatin (LIPITOR) 10 MG tablet Take 1 tablet (10 mg total) by mouth once daily. 90 tablet 3    chlorhexidine (PERIDEX) 0.12 % solution Use 15-20 mL to rinse mouth twice daily for 2 minutes, spit out and do not rinse mouth with water after. 473 mL 3    cyanocobalamin, vitamin B-12, (VITAMIN B-12 ORAL) Take by mouth.      cycloSPORINE (RESTASIS) 0.05 % ophthalmic emulsion Apply 1 drop into both eyes twice a day 180 vial 3    cycloSPORINE (RESTASIS) 0.05 % ophthalmic emulsion Instill 1 drop Both Eyes twice a day 180 each 6    cycloSPORINE (RESTASIS) 0.05 % ophthalmic emulsion Apply 1 drop into both eyes twice a day 180 each 3    desoximetasone (TOPICORT) 0.25 % cream Apply to affected area every other night x 1 month 60 g 1    ergocalciferol (ERGOCALCIFEROL) 50,000 unit Cap Take 1 capsule (50,000 Units total) by mouth every 7 days. 12 capsule 2    etanercept (ENBREL SURECLICK) 50 mg/mL (1 mL) Inject 1 mL (50 mg total) into the skin once a week. 4 mL 11    famotidine (PEPCID) 20 MG tablet Take 1 tablet (20 mg total) by mouth 2 (two) times daily. 60 tablet 11    flu vacc [id number] 6mos up,PF, (FLUARIX QUAD [id number], PF,) 60 mcg (15 mcg x 4)/0.5 mL Syrg use as directed 0.5 mL 0    fluticasone propionate (FLONASE) 50 mcg/actuation nasal spray 2 sprays (100 mcg total) by Each Nostril route once daily. 16 g 11    folic acid (FOLVITE) 1 MG tablet Take 4 tablets (4 mg total) by mouth once daily. 120 tablet 11    hydrOXYzine HCL (ATARAX) 25 MG tablet Take 1 tablet (25 mg total) by mouth every evening. 30 tablet 1    losartan (COZAAR) 100 MG tablet Take 1 tablet (100 mg total) by mouth once daily. 90 tablet 3    metoprolol succinate (TOPROL-XL) 25 MG 24 hr tablet Take 1 tablet (25 mg total) by mouth every evening. 90 tablet 3    nystatin (NYAMYC)  powder Apply to affected area every morning as needed for flare under breasts 120 g 6    promethazine (PHENERGAN) 25 MG tablet Take 1 tablet (25 mg total) by mouth every 6 (six) hours as needed for Nausea. 25 tablet 0    PROTOPIC 0.1 % ointment AAA bid 100 g 2    ruxolitinib (OPZELURA) 1.5 % Crea aaa bid 60 g 2    ruxolitinib (OPZELURA) 1.5 % Crea Apply to affected area twice daily to neck and hands for vitiligo 60 g 2    triamcinolone acetonide 0.025% (KENALOG) 0.025 % cream Apply to affected area every day to twice a day to neck . Not more than 1-2 weeks straight in same location 80 g 1    triamcinolone acetonide 0.1% (KENALOG) 0.1 % ointment Apply topically 2 (two) times daily. For 2 weeks 15 g 1    ZINC ORAL Take by mouth.      amLODIPine (NORVASC) 5 MG tablet Take 1 tablet (5 mg total) by mouth once daily. 90 tablet 3    fluticasone propion-salmeterol 115-21 mcg/dose (ADVAIR HFA) 115-21 mcg/actuation HFAA inhaler Inhale 2 puffs into the lungs every 12 (twelve) hours. 12 g 11    gabapentin (NEURONTIN) 300 MG capsule Take 1 capsule (300 mg total) by mouth 3 (three) times daily. 270 capsule 1    leflunomide (ARAVA) 20 MG Tab Take 1 tablet (20 mg total) by mouth once daily. 90 tablet 3     No current facility-administered medications for this visit.       Review of patient's allergies indicates:  No Known Allergies      Objective:       Vitals:    01/08/24 0849   BP: 112/68   Pulse: 78       Physical Exam  Constitutional:       General: She is not in acute distress.     Appearance: She is well-developed. She is not diaphoretic.   HENT:      Head: Normocephalic and atraumatic.      Right Ear: External ear normal.      Left Ear: External ear normal.      Nose: Nose normal.      Mouth/Throat:      Pharynx: No oropharyngeal exudate.   Eyes:      General: No scleral icterus.        Right eye: No discharge.         Left eye: No discharge.      Conjunctiva/sclera: Conjunctivae normal.      Pupils: Pupils are equal, round,  and reactive to light.   Neck:      Thyroid: No thyromegaly.      Vascular: No JVD.      Trachea: No tracheal deviation.   Cardiovascular:      Rate and Rhythm: Normal rate and regular rhythm.      Heart sounds: Normal heart sounds. No murmur heard.     No friction rub. No gallop.   Pulmonary:      Effort: Pulmonary effort is normal.      Breath sounds: Normal breath sounds. No stridor. No wheezing or rales.   Chest:      Chest wall: No tenderness.   Abdominal:      General: Bowel sounds are normal. There is no distension.      Palpations: Abdomen is soft. There is no mass.      Tenderness: There is no abdominal tenderness. There is no guarding or rebound.      Hernia: No hernia is present.   Musculoskeletal:         General: No tenderness. Normal range of motion.      Cervical back: Normal range of motion and neck supple.   Lymphadenopathy:      Cervical: No cervical adenopathy.   Skin:     General: Skin is warm and dry.      Coloration: Skin is not pale.      Findings: No erythema or rash.   Neurological:      Mental Status: She is alert and oriented to person, place, and time.      Cranial Nerves: No cranial nerve deficit.      Motor: No abnormal muscle tone.      Coordination: Coordination normal.      Deep Tendon Reflexes: Reflexes are normal and symmetric. Reflexes normal.   Psychiatric:         Behavior: Behavior normal.         Thought Content: Thought content normal.         Judgment: Judgment normal.         Assessment:       Problem List Items Addressed This Visit       Sarcoidosis of lung    Rheumatoid arthritis with positive rheumatoid factor    Paroxysmal supraventricular tachycardia    Mixed hyperlipidemia    Major depression, recurrent, chronic    Immunocompromised    Hypertension - Primary    Relevant Medications    amLODIPine (NORVASC) 5 MG tablet    Aortic atherosclerosis     Other Visit Diagnoses       Essential hypertension        Relevant Medications    amLODIPine (NORVASC) 5 MG tablet     Vitamin D deficiency        Osteopenia, unspecified location        Chronic nonintractable headache, unspecified headache type        Relevant Orders    Ambulatory referral/consult to Neurology            Plan:       Silvia was seen today for follow-up and medication refill.    Diagnoses and all orders for this visit:    Primary hypertension    Major depression, recurrent, chronic    Essential hypertension  -     amLODIPine (NORVASC) 5 MG tablet; Take 1 tablet (5 mg total) by mouth once daily.    Mixed hyperlipidemia    Rheumatoid arthritis involving multiple sites with positive rheumatoid factor    Rheumatoid arthritis with positive rheumatoid factor, involving unspecified site    Vitamin D deficiency    Osteopenia, unspecified location    Chronic nonintractable headache, unspecified headache type  -     Ambulatory referral/consult to Neurology; Future    Sarcoidosis of lung    Immunocompromised    Aortic atherosclerosis    Paroxysmal supraventricular tachycardia      HLD  -diet control  -start statin          HTN  -controlled    RA  -stable  -follow rheumatology    Vertigo  -meclizine prn    Depression  -controlled  -continue zoloft 25 mg daily  -SI/ER precautions given    Headaches  -refer neurology    Spent adequate time in obtaining history and explaining differentials      30 minutes spent during this visit of which greater than 50% devoted to face-face counseling and coordination of care regarding diagnosis and management plan    Follow up in about 6 months (around 7/8/2024), or if symptoms worsen or fail to improve.

## 2024-01-18 NOTE — PROGRESS NOTES
Chief Complaint   Patient presents with    Follow-up     Same Since Last Visit    .     HPI:Silvia Cervantes is a 63 y.o. female who has been referred by Dr. Ori St MD for a several month history of  globus sensation and cough. She reports that she has choking episodes with swallowing foods, liquids, or saliva.  She has tickle in her throat that triggers cough. She feels it is present on her left side.  She reports that the cough is dry.  She does have sarcoidosis- dx'd in . She does see pulmonology.  She denies food sticking. She denies hoarseness.  Her voice is not progressively worsening over this time. There are not pitch breaks or cracks. There is not vocal fatigue. She admits to odynophagia, throat pain, and otalgia.  There is no hemoptysis or hematemesis. She is breathing well. She has been using Luden's throat lozenges to help. She uses camilo's emollient for her nose. She does use Flonase intermittently.     She admits to throat clearing and cough. She denies heartburn and reflux. She has been on Protonix 40 mg in the past but is no longer. She does feel that this helped her tremendously.        Underwent sinus surgery 2016- Dr. Chavez includin. Endoscopic septoplasty.  2. Bilateral inferior turbinate reduction with submucosal resection.  3. Bilateral image-guided endoscopic total ethmoidectomy.  4. Bilateral image-guided endoscopic maxillary antrostomy.  5. Bilateral image-guided endoscopic frontal dissection with Draf IIA sinusotomy.  6. Endoscopic resection of right leann bullosa.      Interval HPI 2024:  Follow up visit. Last visit 2023. She feels that she still has sensation of  choking feeling in her throat. Still has globus sensation and tickle in her throat which then triggers cough.   She does feel that food/liquids go down the wrong way. She has been using Flonase as directed. She has been taking famotidine 20 mg PO BID. She does not feel this helps as much as Protonix  did.  She has been using sugar free throat lozenges which seem to help.  She has been trying to use David med sinus rinse but finds this challenging.     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)      Social History     Socioeconomic History    Marital status:    Tobacco Use    Smoking status: Never    Smokeless tobacco: Never   Substance and Sexual Activity    Alcohol use: Yes     Comment: Rarely    Drug use: Not Currently    Sexual activity: Yes     Partners: Male     Birth control/protection: Surgical     Comment: [facility]     Social Determinants of Health     Financial Resource Strain: Low Risk  (2/21/2022)    Overall Financial Resource Strain (CARDIA)     Difficulty of Paying Living Expenses: Not hard at all   Food Insecurity: No Food Insecurity (2/21/2022)    Hunger Vital Sign     Worried About Running Out of Food in the Last Year: Never true     Ran Out of Food in the Last Year: Never true   Transportation Needs: No Transportation Needs (2/21/2022)    PRAPARE - Transportation     Lack of Transportation (Medical): No     Lack of Transportation (Non-Medical): No   Physical Activity: Sufficiently Active (2/21/2022)    Exercise Vital Sign     Days of Exercise per Week: 7 days     Minutes of Exercise per Session: 120 min   Stress: No Stress Concern Present (2/21/2022)    Citizen of the [place] of Occupational Health - Occupational Stress Questionnaire      Feeling of Stress : Only a little   Social Connections: Moderately Isolated (2022)    Social Connection and Isolation Panel [NHANES]     Frequency of Communication with Friends and Family: More than three times a week     Frequency of Social Gatherings with Friends and Family: More than three times a week     Attends Denominational Services: More than 4 times per year     Active Member of Clubs or Organizations: No     Attends Club or Organization Meetings: Never     Marital Status:    Housing Stability: Low Risk  (2022)    Housing Stability Vital Sign     Unable to Pay for Housing in the Last Year: No     Number of Places Lived in the Last Year: 1     Unstable Housing in the Last Year: No     Past Surgical History:   Procedure Laterality Date     SECTION      COLONOSCOPY N/A 2019    Procedure: COLONOSCOPY/judeyttanya ;  Surgeon: Dimas Woodall MD;  Location: [facility];  Service: Endoscopy;  Laterality: N/A;    ESOPHAGOGASTRODUODENOSCOPY N/A 2023    Procedure: EGD (ESOPHAGOGASTRODUODENOSCOPY);  Surgeon: Ori St MD;  Location: [facility];  Service: Endoscopy;  Laterality: N/A;    EXCISION OF NODULE Right 2022    Procedure: EXCISION, NODULE right elbow;  Surgeon: Louise Smith MD;  Location: [facility] OR;  Service: Orthopedics;  Laterality: Right;    HYSTERECTOMY      INJECTION OF STEROID Left 2022    Procedure: INJECTION, STEROID Thumb MP joint;  Surgeon: Louise Smith MD;  Location: [facility] OR;  Service: Orthopedics;  Laterality: Left;    SINUS SURGERY      SURGICAL REMOVAL OF DISTAL ULNA Right 2022    Procedure: EXCISION, ULNA, DISTAL;  Surgeon: Louise Smith MD;  Location: [facility] OR;  Service: Orthopedics;  Laterality: Right;    SYNOVECTOMY OF WRIST Right 2022    Procedure: SYNOVECTOMY, WRIST;  Surgeon: Louise Smith MD;  Location: [facility] OR;  Service: Orthopedics;  Laterality: Right;    TUBAL LIGATION       Family History   Problem Relation Age of Onset     Hypertension Mother     Cataracts Mother     Diabetes Father     Hypertension Father     Cataracts Father     Heart disease Father     Hyperlipidemia Father     Diabetes Sister     Stroke Brother     Hypertension Brother     No Known Problems Daughter     No Known Problems Son     Melanoma Neg Hx     Breast cancer Neg Hx     Colon cancer Neg Hx     Ovarian cancer Neg Hx     Blindness Neg Hx     Glaucoma Neg Hx     Aneurysm Neg Hx     Cancer Neg Hx     Clotting disorder Neg Hx     Dementia Neg Hx     Fainting Neg Hx     Kidney disease Neg Hx     Liver disease Neg Hx     Migraines Neg Hx     Neuropathy Neg Hx     Parkinsonism Neg Hx     Seizures Neg Hx     Tremor Neg Hx            Review of Systems  General: negative for chills, fever or weight loss  Psychological: negative for mood changes or depression  Ophthalmic: negative for blurry vision, photophobia or eye pain  ENT: see HPI  Respiratory: no cough, shortness of breath, or wheezing  Cardiovascular: no chest pain or dyspnea on exertion  Gastrointestinal: no abdominal pain, change in bowel habits, or black/ bloody stools  Musculoskeletal: negative for gait disturbance or muscular weakness  Neurological: no syncope or seizures; no ataxia  Dermatological: negative for puritis,  rash and jaundice  Hematologic/lymphatic: no easy bruising, no new lumps or bumps      Physical Exam:    Vitals:    01/19/24 0953   BP: 139/87   Pulse: 67         Constitutional: Well appearing / communicating without difficutly.  NAD.  Eyes: EOM I Bilaterally  Head/Face: Normocephalic.  Negative paranasal sinus pressure/tenderness.  Salivary glands WNL.  House Brackmann I Bilaterally.    Right Ear: Auricle normal appearance. External Auditory Canal within normal limits no lesions or masses,TM w/o masses/lesions/perforations. TM mobility noted.   Left Ear: Auricle normal appearance. External Auditory Canal within normal limits no lesions or masses,TM w/o masses/lesions/perforations. TM  mobility noted.  Nose: No gross nasal septal deviation. Inferior Turbinates 3+ bilaterally. No septal perforation. No masses/lesions. External nasal skin appears normal without masses/lesions.  Oral Cavity: Gingiva/lips within normal limits.  Dentition/gingiva healthy appearing. Mucus membranes moist. Floor of mouth soft, no masses palpated. Oral Tongue mobile. Hard Palate appears normal.    Oropharynx: Base of tongue appears normal. No masses/lesions noted. Tonsillar fossa/pharyngeal wall without lesions. Posterior oropharynx WNL.  Soft palate without masses. Midline uvula.   Neck/Lymphatic: No LAD I-VI bilaterally.  No thyromegaly.  No masses noted on exam.    Mirror laryngoscopy/nasopharyngoscopy: Active gag reflex.  Unable to perform.        See separate procedure note for FFL.    Diagnostic studies reviewed:   MBSS 2019: normal    EGD 1/25/2023: Impression:            - Normal esophagus.                          - Small hiatal hernia.                          - Normal stomach. Biopsied.                          - Normal examined duodenum.                          Exam for globus / cough/ upper abd distress                          showing:  normal EGD, with small hiatal hernia similar to                          exam done 2015    PFT 8/19/2022: The test was performed for the evaluation of Sarcoidosis.   There is no significant airflow obstruction. Bronchodilator response was not tested.   There is mild restriction and no evidence of hyperinflation or air trapping.   The diffusing capacity is minimally reduced.   Compared to 11/2019, FEV1 has decreased in an age-appropriate fashion. Diffusion capacity is slightly reduced.   This PFT is consistant with the clincial diagnosis of sarcoidosis. Clinical correllation is required.     Assessment:    ICD-10-CM ICD-9-CM    1. Globus sensation  R09.A2 784.99       2. Other cough  R05.8 786.2       3. Laryngopharyngeal reflux (LPR)  K21.9 478.79       4. Chronic rhinitis   J31.0 472.0       5. Dysphagia, unspecified type  R13.10 787.20 Fl Modified Barium Swallow Speech      SLP video swallow            The primary encounter diagnosis was Globus sensation. Diagnoses of Other cough, Laryngopharyngeal reflux (LPR), Chronic rhinitis, and Dysphagia, unspecified type were also pertinent to this visit.      Plan:  Orders Placed This Encounter   Procedures    Fl Modified Barium Swallow Speech    SLP video swallow   Sinuses are unremarkable on endoscopy today. No evidence of chronic sinusitis infection.   Continue  nasal saline rinses BID  Continue  Flonase 2 sprays per nostril daily  Update MBSS to evaluate for aspiration  Discontinue   Pepcid 20mg PO BID * and provided a prescription. Start Protonix 40 mg PO daily.  I also recommended that the patient refrain from eating within 3 hours of going to bed, to elevate the head of bed very subtly and optimize the impact of gravity on the potential reflux, and to avoid alcohol, caffeine, tobacco, tomato sauce, spicy foods, fried food, and chocolate.      Follow up in 4 months to reassess progress with treatment regimen.     Carleen Weiner MD

## 2024-01-19 ENCOUNTER — OFFICE VISIT (OUTPATIENT)
Dept: OTOLARYNGOLOGY | Facility: CLINIC | Age: 64
End: 2024-01-19
Payer: MEDICARE

## 2024-01-19 VITALS
BODY MASS INDEX: 27.2 KG/M2 | HEART RATE: 67 BPM | WEIGHT: 143.94 LBS | DIASTOLIC BLOOD PRESSURE: 87 MMHG | SYSTOLIC BLOOD PRESSURE: 139 MMHG

## 2024-01-19 DIAGNOSIS — J31.0 CHRONIC RHINITIS: Chronic | ICD-10-CM

## 2024-01-19 DIAGNOSIS — K21.9 LARYNGOPHARYNGEAL REFLUX (LPR): Chronic | ICD-10-CM

## 2024-01-19 DIAGNOSIS — R05.8 OTHER COUGH: ICD-10-CM

## 2024-01-19 DIAGNOSIS — R09.A2 GLOBUS SENSATION: Primary | Chronic | ICD-10-CM

## 2024-01-19 DIAGNOSIS — R13.10 DYSPHAGIA, UNSPECIFIED TYPE: Chronic | ICD-10-CM

## 2024-01-19 PROCEDURE — 31575 DIAGNOSTIC LARYNGOSCOPY: CPT | Mod: S$GLB,,, | Performed by: OTOLARYNGOLOGY

## 2024-01-19 PROCEDURE — 99999 PR PBB SHADOW E&M-EST. PATIENT-LVL III: CPT | Mod: PBBFAC,,, | Performed by: OTOLARYNGOLOGY

## 2024-01-19 PROCEDURE — 99214 OFFICE O/P EST MOD 30 MIN: CPT | Mod: 25,S$GLB,, | Performed by: OTOLARYNGOLOGY

## 2024-01-19 RX ORDER — PANTOPRAZOLE SODIUM 40 MG/1
40 TABLET, DELAYED RELEASE ORAL DAILY
Qty: 30 TABLET | Refills: 3 | Status: SHIPPED | OUTPATIENT
Start: 2024-01-19 | End: 2024-05-27 | Stop reason: SDUPTHER

## 2024-01-19 NOTE — PROCEDURES
Laryngoscopy    Date/Time: 1/19/2024 10:00 AM    Performed by: Carleen Cline MD  Authorized by: Carleen Cline MD    Consent Done?:  Yes (Verbal)  Anesthesia:     Local anesthetic:  Lidocaine 2% and Neymar-Synephrine 1/2%  Laryngoscopy:     Areas examined:  Nasal cavities, nasopharynx, oropharynx, hypopharynx, larynx and vocal cords  Nose External:      No external nasal deformity  Nose Intranasal:      Mucosa no polyps     Mucosa ulcers not present     No mucosa lesions present     No septum gross deformity     Turbinates not enlarged  Nasopharynx:      No mucosa lesions     Adenoids not present     Posterior choanae patent     Eustachian tube patent  Larynx/hypopharynx:      No epiglottis lesions     No epiglottis edema     No AE folds lesions     No vocal cord polyps     Equal and normal bilateral     No hypopharynx lesions     No piriform sinus pooling     No piriform sinus lesions     Post cricoid edema (mild)     Post cricoid erythema (mild)

## 2024-01-23 ENCOUNTER — PATIENT MESSAGE (OUTPATIENT)
Dept: RHEUMATOLOGY | Facility: CLINIC | Age: 64
End: 2024-01-23
Payer: MEDICARE

## 2024-01-24 ENCOUNTER — PATIENT MESSAGE (OUTPATIENT)
Dept: RHEUMATOLOGY | Facility: CLINIC | Age: 64
End: 2024-01-24
Payer: MEDICARE

## 2024-01-24 DIAGNOSIS — M25.561 RIGHT KNEE PAIN, UNSPECIFIED CHRONICITY: Primary | ICD-10-CM

## 2024-01-25 ENCOUNTER — OFFICE VISIT (OUTPATIENT)
Dept: ORTHOPEDICS | Facility: CLINIC | Age: 64
End: 2024-01-25
Payer: MEDICARE

## 2024-01-25 ENCOUNTER — HOSPITAL ENCOUNTER (OUTPATIENT)
Dept: RADIOLOGY | Facility: HOSPITAL | Age: 64
Discharge: HOME | End: 2024-01-25
Attending: INTERNAL MEDICINE
Payer: MEDICARE

## 2024-01-25 VITALS
BODY MASS INDEX: 27.18 KG/M2 | DIASTOLIC BLOOD PRESSURE: 87 MMHG | SYSTOLIC BLOOD PRESSURE: 139 MMHG | HEIGHT: 61 IN | HEART RATE: 67 BPM | WEIGHT: 143.94 LBS

## 2024-01-25 DIAGNOSIS — M25.561 RIGHT KNEE PAIN, UNSPECIFIED CHRONICITY: ICD-10-CM

## 2024-01-25 DIAGNOSIS — M62.81 QUADRICEPS WEAKNESS: Primary | ICD-10-CM

## 2024-01-25 DIAGNOSIS — M25.562 ACUTE PAIN OF LEFT KNEE: ICD-10-CM

## 2024-01-25 PROCEDURE — 77077 JOINT SURVEY SINGLE VIEW: CPT | Mod: TC

## 2024-01-25 PROCEDURE — 77077 JOINT SURVEY SINGLE VIEW: CPT | Mod: 26,,, | Performed by: RADIOLOGY

## 2024-01-25 PROCEDURE — 73564 X-RAY EXAM KNEE 4 OR MORE: CPT | Mod: 26,LT,, | Performed by: RADIOLOGY

## 2024-01-25 PROCEDURE — 99999 PR PBB SHADOW E&M-EST. PATIENT-LVL III: CPT | Mod: PBBFAC,,, | Performed by: ORTHOPAEDIC SURGERY

## 2024-01-25 PROCEDURE — 20610 DRAIN/INJ JOINT/BURSA W/O US: CPT | Mod: LT,S$GLB,, | Performed by: ORTHOPAEDIC SURGERY

## 2024-01-25 PROCEDURE — 73564 X-RAY EXAM KNEE 4 OR MORE: CPT | Mod: TC,PN,LT

## 2024-01-25 PROCEDURE — 99204 OFFICE O/P NEW MOD 45 MIN: CPT | Mod: 25,S$GLB,, | Performed by: ORTHOPAEDIC SURGERY

## 2024-01-25 RX ORDER — BUPIVACAINE HYDROCHLORIDE 5 MG/ML
5 INJECTION, SOLUTION PERINEURAL
Status: DISCONTINUED | OUTPATIENT
Start: 2024-01-25 | End: 2024-01-25 | Stop reason: HOSPADM

## 2024-01-25 RX ORDER — LIDOCAINE HYDROCHLORIDE 10 MG/ML
4 INJECTION INFILTRATION; PERINEURAL
Status: DISCONTINUED | OUTPATIENT
Start: 2024-01-25 | End: 2024-01-25 | Stop reason: HOSPADM

## 2024-01-25 RX ORDER — KETOROLAC TROMETHAMINE 30 MG/ML
30 INJECTION, SOLUTION INTRAMUSCULAR; INTRAVENOUS
Status: DISCONTINUED | OUTPATIENT
Start: 2024-01-25 | End: 2024-01-25 | Stop reason: HOSPADM

## 2024-01-25 RX ADMIN — KETOROLAC TROMETHAMINE 30 MG: 30 INJECTION, SOLUTION INTRAMUSCULAR; INTRAVENOUS at 11:01

## 2024-01-25 RX ADMIN — LIDOCAINE HYDROCHLORIDE 4 ML: 10 INJECTION INFILTRATION; PERINEURAL at 11:01

## 2024-01-25 RX ADMIN — BUPIVACAINE HYDROCHLORIDE 5 ML: 5 INJECTION, SOLUTION PERINEURAL at 11:01

## 2024-01-25 NOTE — PROGRESS NOTES
Subjective:      Patient ID: Silvia Cervantes is a 63 y.o. female.    Chief Complaint: Pain of the Right Knee      Patient ID: Silvia Cervantes is a 63 y.o. female    Chief Complaint: Pain of the left Knee      KNEE PAIN: Complains of pain to the left knee.   PAIN LOCATED: anterior and medial  ONSET: 1 week ago.  QUALITY:  Patient states the pain is worsening  HISTORY: Previous knee injury/surgery: Yes  Hx:   ASSOCIATED SYMPTOM AND TRIGGERS:Standing/Weightbearing, walking, trouble w stairs, stiffness, swelling, limping, stiffness w sitting , Catching/locking, and Knee gives way  USES ASSISTIVE DEVICE: none  RELIEVED BY:rest, heat, ice, medication: NSAID used but not effective  PATIENT DENIES: bruising, redness, deformity              Social History     Occupational History    Not on file   Tobacco Use    Smoking status: Never    Smokeless tobacco: Never   Substance and Sexual Activity    Alcohol use: Yes     Comment: Rarely    Drug use: Not Currently    Sexual activity: Yes     Partners: Male     Birth control/protection: Surgical     Comment: hyst      Review of Systems   Constitutional: Negative for diaphoresis.   HENT:  Negative for ear discharge, nosebleeds and stridor.    Eyes:  Negative for photophobia.   Cardiovascular:  Negative for syncope.   Respiratory:  Negative for hemoptysis, shortness of breath and wheezing.    Neurological:  Negative for tremors.   Psychiatric/Behavioral: Negative.           Objective:    General    Constitutional: She is oriented to person, place, and time. She appears well-developed and well-nourished.   HENT:   Head: Normocephalic and atraumatic.   Right Ear: External ear normal.   Left Ear: External ear normal.   Eyes: EOM are normal.   Pulmonary/Chest: Effort normal.   Neurological: She is alert and oriented to person, place, and time.   Psychiatric: She has a normal mood and affect. Her behavior is normal. Judgment and thought content normal.     General Musculoskeletal Exam    Gait: antalgic and abnormal       Right Knee Exam     Range of Motion   Extension:  0   Flexion:  130     Left Knee Exam     Inspection   Swelling: present  Effusion: present    Tenderness   The patient tender to palpation of the medial joint line.    Crepitus   The patient has crepitus of the patella.    Range of Motion   Extension:  5   Flexion:  120     Tests   Meniscus   Sylvie:  Medial - positive   Stability   MCL - Valgus: normal (0 to 2mm)  LCL - Varus: normal (0 to 2mm)    Other   Sensation: normal    Muscle Strength   Left Lower Extremity   Quadriceps:  4/5   Hamstrin/5          Assessment:       1. Quadriceps weakness    2. Right knee pain, unspecified chronicity    3. Acute pain of left knee          Plan:       we injected the left  knee w 1cc of ketorolac, marcaine and lidocaine under sterile conditions with the patient's informed consent for mild/moderate knee oa.  we will try a course of PT before ordering an MRI. Patient should f/u with me after approx 1 mo of PT to consider an MRI at that point for possible meniscal pathology

## 2024-01-25 NOTE — PROCEDURES
Large Joint Aspiration/Injection: L knee    Date/Time: 1/25/2024 11:15 AM    Performed by: Albino Carvajal MD  Authorized by: Albino Carvajal MD    Consent Done?:  Yes (Verbal)  Indications:  Arthritis  Site marked: the procedure site was marked    Timeout: prior to procedure the correct patient, procedure, and site was verified    Prep: patient was prepped and draped in usual sterile fashion      Local anesthesia used?: Yes    Local anesthetic:  Topical anesthetic    Details:  Needle Size:  22 G  Ultrasonic Guidance for needle placement?: No    Approach:  Anterolateral  Location:  Knee  Site:  L knee  Medications:  30 mg ketorolac 30 mg/mL (1 mL); 5 mL BUPivacaine 0.5 % (5 mg/mL); 4 mL LIDOcaine HCL 10 mg/ml (1%) 10 mg/mL (1 %)  Patient tolerance:  Patient tolerated the procedure well with no immediate complications

## 2024-01-26 DIAGNOSIS — M25.561 RIGHT KNEE PAIN, UNSPECIFIED CHRONICITY: Primary | ICD-10-CM

## 2024-01-29 ENCOUNTER — PATIENT MESSAGE (OUTPATIENT)
Dept: FAMILY MEDICINE | Facility: CLINIC | Age: 64
End: 2024-01-29
Payer: MEDICARE

## 2024-01-30 ENCOUNTER — CLINICAL SUPPORT (OUTPATIENT)
Dept: SPEECH THERAPY | Facility: HOSPITAL | Age: 64
End: 2024-01-30
Attending: OTOLARYNGOLOGY
Payer: MEDICARE

## 2024-01-30 ENCOUNTER — HOSPITAL ENCOUNTER (OUTPATIENT)
Dept: RADIOLOGY | Facility: HOSPITAL | Age: 64
Discharge: HOME | End: 2024-01-30
Attending: OTOLARYNGOLOGY
Payer: MEDICARE

## 2024-01-30 ENCOUNTER — OFFICE VISIT (OUTPATIENT)
Dept: FAMILY MEDICINE | Facility: CLINIC | Age: 64
End: 2024-01-30
Payer: MEDICARE

## 2024-01-30 VITALS
DIASTOLIC BLOOD PRESSURE: 86 MMHG | OXYGEN SATURATION: 98 % | SYSTOLIC BLOOD PRESSURE: 128 MMHG | HEART RATE: 76 BPM | HEIGHT: 61 IN | BODY MASS INDEX: 28.05 KG/M2 | WEIGHT: 148.56 LBS

## 2024-01-30 DIAGNOSIS — H81.10 BENIGN PAROXYSMAL POSITIONAL VERTIGO, UNSPECIFIED LATERALITY: Primary | ICD-10-CM

## 2024-01-30 DIAGNOSIS — R13.10 DYSPHAGIA, UNSPECIFIED TYPE: Chronic | ICD-10-CM

## 2024-01-30 DIAGNOSIS — N30.00 ACUTE CYSTITIS WITHOUT HEMATURIA: ICD-10-CM

## 2024-01-30 PROCEDURE — 92611 MOTION FLUOROSCOPY/SWALLOW: CPT

## 2024-01-30 PROCEDURE — 97535 SELF CARE MNGMENT TRAINING: CPT

## 2024-01-30 PROCEDURE — 87086 URINE CULTURE/COLONY COUNT: CPT | Performed by: FAMILY MEDICINE

## 2024-01-30 PROCEDURE — 25500020 PHARM REV CODE 255: Performed by: OTOLARYNGOLOGY

## 2024-01-30 PROCEDURE — 99214 OFFICE O/P EST MOD 30 MIN: CPT | Mod: S$GLB,,, | Performed by: FAMILY MEDICINE

## 2024-01-30 PROCEDURE — 74230 X-RAY XM SWLNG FUNCJ C+: CPT | Mod: 26,,, | Performed by: RADIOLOGY

## 2024-01-30 PROCEDURE — 74230 X-RAY XM SWLNG FUNCJ C+: CPT | Mod: TC

## 2024-01-30 PROCEDURE — 99999 PR PBB SHADOW E&M-EST. PATIENT-LVL III: CPT | Mod: PBBFAC,,, | Performed by: FAMILY MEDICINE

## 2024-01-30 PROCEDURE — A9698 NON-RAD CONTRAST MATERIALNOC: HCPCS | Performed by: OTOLARYNGOLOGY

## 2024-01-30 RX ORDER — MECLIZINE HYDROCHLORIDE 25 MG/1
25 TABLET ORAL 3 TIMES DAILY PRN
Qty: 30 TABLET | Refills: 2 | Status: SHIPPED | OUTPATIENT
Start: 2024-01-30

## 2024-01-30 RX ORDER — SULFAMETHOXAZOLE AND TRIMETHOPRIM 400; 80 MG/1; MG/1
1 TABLET ORAL 2 TIMES DAILY
Qty: 6 TABLET | Refills: 0 | Status: SHIPPED | OUTPATIENT
Start: 2024-01-30 | End: 2024-02-08

## 2024-01-30 RX ADMIN — BARIUM SULFATE 70 ML: 0.81 POWDER, FOR SUSPENSION ORAL at 10:01
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
## 2024-01-30 NOTE — PROGRESS NOTES
(Portions of this note were dictated using voice recognition software and may contain dictation related errors in spelling/grammar/syntax not found on text review)    CC:   Chief Complaint   Patient presents with    Dizziness     And nausea since Saturday        HPI: 63 y.o. female, pt of Dr Cade, presented for evaluation of dizziness and nausea for urgent care visit as a new patient to me.  She has medical history significant for dementia, hypertension, lumbar radicular pain, major depression.      Patient stated that she has history of vertigo, in  she followed up with ENT, was given meclizine 25 mg and sent for vestibular rehab.  Patient reports that she could not tolerate the exercises because she was feeling very dizzy during the sessions ans she did not like it, she did not go there after the 1st visit, then symptoms improved.      She stated this Saturday she started to again become dizzy, reports having room spinning sensation, symptoms worsened with changing position of the head specially when getting up quickly, she reports meclizine she has  and she did not take anything, has been taking Tylenol for knee pain.  This morning it is slightly improved, however, she is still having the symptoms along with nausea.    She also reports having urine burning and frequency more than usual. She denies fever, chills, cough, SOB, chest pain, vomiting, abd pain,changes in bowel habits.      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)        Past Surgical History:   Procedure Laterality Date     SECTION      COLONOSCOPY N/A 2019    Procedure: COLONOSCOPY/golytley ;  Surgeon: Dimas Woodall MD;  Location: [facility];  Service: Endoscopy;  Laterality: N/A;    ESOPHAGOGASTRODUODENOSCOPY N/A 2023    Procedure: EGD (ESOPHAGOGASTRODUODENOSCOPY);  Surgeon: Ori St MD;  Location: [facility] ENDO;  Service: Endoscopy;  Laterality: N/A;    EXCISION OF NODULE Right 2022    Procedure: EXCISION, NODULE right elbow;  Surgeon: Louise Smith MD;  Location: [facility] OR;  Service: Orthopedics;  Laterality: Right;    HYSTERECTOMY      INJECTION OF STEROID Left 2022    Procedure: INJECTION, STEROID Thumb MP joint;  Surgeon: Louise Smith MD;  Location: [facility] OR;  Service: Orthopedics;  Laterality: Left;    SINUS SURGERY      SURGICAL REMOVAL OF DISTAL ULNA Right 2022    Procedure: EXCISION, ULNA, DISTAL;  Surgeon: Louise Smith MD;  Location: [facility] OR;  Service: Orthopedics;  Laterality: Right;    SYNOVECTOMY OF WRIST Right 2022    Procedure: SYNOVECTOMY, WRIST;  Surgeon: Louise Smith MD;  Location: [facility];  Service: Orthopedics;  Laterality: Right;    TUBAL LIGATION         Family History   Problem Relation Age of Onset    Hypertension Mother     Cataracts Mother     Diabetes Father     Hypertension Father     Cataracts Father     Heart disease Father     Hyperlipidemia Father     Diabetes Sister     Stroke Brother     Hypertension Brother     No Known Problems Daughter     No Known Problems Son     Melanoma Neg Hx     Breast cancer Neg Hx     Colon cancer Neg Hx     Ovarian cancer Neg Hx     Blindness Neg Hx     Glaucoma Neg Hx     Aneurysm Neg Hx     Cancer Neg Hx     Clotting disorder Neg Hx     Dementia Neg Hx     Fainting Neg Hx     Kidney disease Neg Hx     Liver disease Neg Hx     Migraines Neg Hx     Neuropathy Neg Hx      Parkinsonism Neg Hx     Seizures Neg Hx     Tremor Neg Hx        Social History     Tobacco Use    Smoking status: Never    Smokeless tobacco: Never   Substance Use Topics    Alcohol use: Yes     Comment: Rarely    Drug use: Not Currently       Lab Results   Component Value Date    WBC 6.75 12/26/2023    HGB 13.6 12/26/2023    HCT 41.4 12/26/2023    MCV 99 (H) 12/26/2023     12/26/2023    CHOL 180 07/11/2023    TRIG 87 07/11/2023    HDL 44 07/11/2023    ALT 19 12/26/2023    AST 19 12/26/2023    BILITOT 0.4 12/26/2023    ALKPHOS 74 12/26/2023     12/26/2023    K 3.8 12/26/2023     12/26/2023    CREATININE 0.8 12/26/2023    ESTGFRAFRICA >60.0 03/16/2022    ESTGFRAFRICA >60.0 03/16/2022    EGFRNONAA >60.0 03/16/2022    EGFRNONAA >60.0 03/16/2022    CALCIUM 10.2 12/26/2023    ALBUMIN 4.2 12/26/2023    BUN 14 12/26/2023    CO2 26 12/26/2023    TSH 2.453 11/10/2022    HGBA1C 5.3 07/11/2023    MICALBCREAT 9.6 12/22/2015    LDLCALC 118.6 07/11/2023    GLU 90 12/26/2023    [id number] 39 03/01/2021             Vital signs reviewed  PE:   APPEARANCE: Well nourished, well developed, in no acute distress.    HEAD: Normocephalic, atraumatic.  EYES: EOMI.  Conjunctivae noninjected.  EARS: B/L EAC and TM normal  NOSE: Mucosa pink. Airway clear.  MOUTH & THROAT: No tonsillar enlargement. No pharyngeal erythema or exudate.   NECK: Supple with no cervical lymphadenopathy.    CHEST: Good inspiratory effort. Lungs clear to auscultation with no wheezes or crackles.  CARDIOVASCULAR: Normal S1, S2. No rubs, murmurs, or gallops.  ABDOMEN: Bowel sounds normal. Not distended. Soft. No tenderness or masses. No organomegaly.  EXTREMITIES: No edema, cyanosis, or clubbing.    Review of Systems   Constitutional:  Negative for chills, fatigue and fever.   HENT: Negative.     Respiratory:  Negative for cough, shortness of breath and wheezing.    Cardiovascular:  Negative for chest pain, palpitations and leg swelling.    Gastrointestinal: Negative.    Genitourinary:  Positive for dysuria and frequency.   Neurological:  Positive for dizziness and vertigo.   Psychiatric/Behavioral: Negative.     All other systems reviewed and are negative.      IMPRESSION  1. Benign paroxysmal positional vertigo, unspecified laterality    2. Acute cystitis without hematuria            PLAN      1. Benign paroxysmal positional vertigo, unspecified laterality    - meclizine (ANTIVERT) 25 mg tablet; Take 1 tablet (25 mg total) by mouth 3 (three) times daily as needed for Nausea or Dizziness.  Dispense: 30 tablet; Refill: 2    Discussed referral to PT for vestibular rehab, patient does not want to do it   Patient is agreeable to do the exercises at home, print out given to her        2. Acute cystitis without hematuria    - Urinalysis; Future  - Urine culture     - sulfamethoxazole-trimethoprim 400-80mg (BACTRIM,SEPTRA) 400-80 mg per tablet; Take 1 tablet by mouth 2 (two) times daily. for 3 days  Dispense: 6 tablet; Refill: 0       Advised to drink enough water        Age/demographic appropriate health maintenance:    Health Maintenance Due   Topic Date Due    Shingles Vaccine (1 of 2) Never done    COVID-19 Vaccine (6 - 2023-24 season) 09/01/2023           Spent adequate time in obtaining history and explaining differentials     35 minutes spent during this visit of which greater than 50% devoted to face-face counseling and coordination of care regarding diagnosis and management plan       Alexey Whipple   1/30/2024

## 2024-01-31 ENCOUNTER — CLINICAL SUPPORT (OUTPATIENT)
Dept: REHABILITATION | Facility: HOSPITAL | Age: 64
End: 2024-01-31
Attending: ORTHOPAEDIC SURGERY
Payer: MEDICARE

## 2024-01-31 DIAGNOSIS — M62.81 QUADRICEPS WEAKNESS: ICD-10-CM

## 2024-01-31 DIAGNOSIS — R68.89 DECREASED FUNCTIONAL ACTIVITY TOLERANCE: Primary | ICD-10-CM

## 2024-01-31 DIAGNOSIS — R29.898 DECREASED STRENGTH OF LOWER EXTREMITY: ICD-10-CM

## 2024-01-31 DIAGNOSIS — M25.562 ACUTE PAIN OF LEFT KNEE: ICD-10-CM

## 2024-01-31 LAB — BACTERIA UR CULT: NORMAL

## 2024-01-31 PROCEDURE — 97110 THERAPEUTIC EXERCISES: CPT | Mod: PN

## 2024-01-31 PROCEDURE — 97161 PT EVAL LOW COMPLEX 20 MIN: CPT | Mod: PN
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
## 2024-02-05 ENCOUNTER — CLINICAL SUPPORT (OUTPATIENT)
Dept: REHABILITATION | Facility: HOSPITAL | Age: 64
End: 2024-02-05
Payer: MEDICARE

## 2024-02-05 DIAGNOSIS — R29.898 DECREASED STRENGTH OF LOWER EXTREMITY: ICD-10-CM

## 2024-02-05 DIAGNOSIS — R68.89 DECREASED FUNCTIONAL ACTIVITY TOLERANCE: Primary | ICD-10-CM

## 2024-02-05 PROCEDURE — 97112 NEUROMUSCULAR REEDUCATION: CPT | Mod: PN

## 2024-02-05 PROCEDURE — 97110 THERAPEUTIC EXERCISES: CPT | Mod: PN
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
## 2024-02-07 ENCOUNTER — CLINICAL SUPPORT (OUTPATIENT)
Dept: REHABILITATION | Facility: HOSPITAL | Age: 64
End: 2024-02-07
Payer: MEDICARE

## 2024-02-07 DIAGNOSIS — R29.898 DECREASED STRENGTH OF LOWER EXTREMITY: ICD-10-CM

## 2024-02-07 DIAGNOSIS — R68.89 DECREASED FUNCTIONAL ACTIVITY TOLERANCE: Primary | ICD-10-CM

## 2024-02-07 PROCEDURE — 97110 THERAPEUTIC EXERCISES: CPT | Mod: PN,CQ

## 2024-02-07 PROCEDURE — 97112 NEUROMUSCULAR REEDUCATION: CPT | Mod: PN,CQ
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
## 2024-02-14 ENCOUNTER — CLINICAL SUPPORT (OUTPATIENT)
Dept: REHABILITATION | Facility: HOSPITAL | Age: 64
End: 2024-02-14
Payer: MEDICARE

## 2024-02-14 DIAGNOSIS — R68.89 DECREASED FUNCTIONAL ACTIVITY TOLERANCE: Primary | ICD-10-CM

## 2024-02-14 DIAGNOSIS — R29.898 DECREASED STRENGTH OF LOWER EXTREMITY: ICD-10-CM

## 2024-02-14 PROCEDURE — 97112 NEUROMUSCULAR REEDUCATION: CPT | Mod: PN

## 2024-02-14 PROCEDURE — 97110 THERAPEUTIC EXERCISES: CPT | Mod: PN

## 2024-02-14 NOTE — PROGRESS NOTES
"[facility]   Physical Therapy Treatment Note      Name: Silvia Cervantes  Clinic Number: [id number]    Therapy Diagnosis:   Encounter Diagnoses   Name Primary?    Decreased functional activity tolerance Yes    Decreased strength of lower extremity      Physician: Albino Carvajal MD    Visit Date: 2/14/2024     Physician Orders: PT Eval and Treat   Medical Diagnosis from Referral: M62.81 (ICD-10-CM) - Quadriceps weakness M25.562 (ICD-10-CM) - Acute pain of left knee  Evaluation Date: 1/31/2024  Authorization Period Expiration: 12/31/2024  Plan of Care Expiration: 3/15/24  Progress Note Due: 3/15/24  Visit # / Visits authorized: 1/ 1  2/10  FOTO: 3/5     Precautions: Standard      Time In: 8:00 am  Time Out: 8:55 am  Total Appointment Time (timed & untimed codes): 55 minutes (1NMR 3 TE)       Subjective     Patient reports: " My knee hurts a little bit, its minimal". Pt agreeable to PT session .  She was compliant with home exercise program.  Response to previous treatment: LE soreness  Functional change: attempted light activities at gym    Pain: 4/10  Location: left knee     Objective      Objective Measures updated at progress report unless specified.     Treatment     Silvia received the treatments listed below:      therapeutic exercises to develop strength, endurance, ROM, and flexibility for 45 minutes including:  Bike 6'  SLR with QS- 2x10  Calf stretch 3x30"  Hamstring stretch 3x30"  Knee flex on ball w/ strap 2' 3" hold  Calf raises 3x10  SL Hip ABD 2x10  Standing hip abd 2x10  Standing SLR 2x5 bialterally  LAQ 2# 2x10    Cybex hamstring curl 2x10 3 plates  Leg press 2x10 4 plates  Leg extension- next      6 inch step ups forward and lateral x20 ea in // bars    neuromuscular re-education activities to improve: Balance, Coordination, and Proprioception for 10 minutes. The following activities were included:  Bridges 2x10 with hip adduction ball 5" holds  Clamshells with GTB 2x10 5" " holds    Patient Education and Home Exercises       Education provided:   - Pt educated on importance of performing HEP and using modalities such as heat/ice to manage exacerbation of pain.     Written Home Exercises Provided: yes. Exercises were reviewed and Silvia was able to demonstrate them prior to the end of the session.  Silvia demonstrated fair  understanding of the education provided. See Electronic Medical Record under Patient Instructions for exercises provided during therapy sessions    Assessment     Silvia is a 63 y.o. female referred to outpatient Physical Therapy with a medical diagnosis of M62.81 (ICD-10-CM) - Quadriceps weakness M25.562 (ICD-10-CM) - Acute pain of left knee. Patient presents with L knee pain, decreased strength of LLE, TTP to joint line. Positive special testing. Potential for meniscal pathology. She has difficulty functional tasks such as squatting, sitting/standing for prolonged periods of time. She would like to get back to regular exercise when discharged from PT. She has had a prior accident where she has had weak LLE and numbness and tingling down entire L side. Significant weakness compared to R side. Pt was given Hep and advised to perform within tolerance. Educated to use modalities to help manage pain/soreness.   Pt arrived in clinic today with minimal pain. She is performing partial HEP, encouraged to complete current Hep and add SLR/clamshells in daily to work on L hip weakness. Will start progressing toward [facility] strengthening and reviewing gym exercises for post discharge.      Silvia Is progressing well towards her goals.   Patient prognosis is Good.     Patient will continue to benefit from skilled outpatient physical therapy to address the deficits listed in the problem list box on initial evaluation, provide pt/family education and to maximize pt's level of independence in the home and community environment.     Patient's spiritual, cultural and educational needs  considered and pt agreeable to plan of care and goals.     Anticipated barriers to physical therapy:     Short Term Goals (3 Weeks):   1. Patient will be independent with home exercise program to supplement physical therapy treatment in improving functional status.  2. Patient will improve L lower extremity strength to at least 75% of R lower extremity strength as measured via MicroFet handheld dynamometer to improve strength for closed chain tasks.   3. Patient will perform timed up and go with less restrictive assistive device in < Test first F/U seconds to improve gait speed and safety with community ambulation.     Long Term Goals (6 Weeks):   1. Patient will improve L lower extremity strength to at least 90% of R lower extremity strength as measured via MicroFet handheld dynamometer to improve strength for closed chain tasks.   2. Patient will improve the total FOTO Knee Survey Score to </= F/U % limited to demonstrate increased perceived functional mobility.  3. Patient will perform at least 12 sit to stands in 30 seconds without UE support to demonstrate increased functional strength.   4. Pt will be able to navigate 10 steps to improve functional strength and ability to traverse the community with out difficulty.    5. Patient goal: get back to exercise classes at the gym.    Plan     Plan of care Certification: 1/31/2024 to 3/15/24.     Outpatient Physical Therapy 1-2 times weekly for 6 weeks to include the following interventions: Gait Training, Manual Therapy, Moist Heat/ Ice, Neuromuscular Re-ed, Patient Education, Therapeutic Activities, and Therapeutic Exercise.     Royal Baca, PT

## 2024-02-16 ENCOUNTER — CLINICAL SUPPORT (OUTPATIENT)
Dept: REHABILITATION | Facility: HOSPITAL | Age: 64
End: 2024-02-16
Payer: MEDICARE

## 2024-02-16 DIAGNOSIS — R29.898 DECREASED STRENGTH OF LOWER EXTREMITY: ICD-10-CM

## 2024-02-16 DIAGNOSIS — R68.89 DECREASED FUNCTIONAL ACTIVITY TOLERANCE: Primary | ICD-10-CM

## 2024-02-16 PROCEDURE — 97112 NEUROMUSCULAR REEDUCATION: CPT | Mod: PN

## 2024-02-16 PROCEDURE — 97110 THERAPEUTIC EXERCISES: CPT | Mod: PN

## 2024-02-16 NOTE — PROGRESS NOTES
"[facility]   Physical Therapy Treatment Note      Name: Silvia Cervantes  Clinic Number: [id number]    Therapy Diagnosis:   Encounter Diagnoses   Name Primary?    Decreased functional activity tolerance Yes    Decreased strength of lower extremity      Physician: Albino Carvajal MD    Visit Date: 2/16/2024     Physician Orders: PT Eval and Treat   Medical Diagnosis from Referral: M62.81 (ICD-10-CM) - Quadriceps weakness M25.562 (ICD-10-CM) - Acute pain of left knee  Evaluation Date: 1/31/2024  Authorization Period Expiration: 12/31/2024  Plan of Care Expiration: 3/15/24  Progress Note Due: 3/15/24  Visit # / Visits authorized: 1/ 1  4/10  FOTO: 3/5     Precautions: Standard      Time In: 9:00 am  Time Out: 9:55 am  Total Appointment Time (timed & untimed codes): 55 minutes (1NMR 3 TE) ** perform FOTO next time    Subjective     Patient reports: that she is feeling good. Some discomfort in knee, but she has been doing a lot more.  She was compliant with home exercise program.  Response to previous treatment: LE soreness  Functional change: attempted light activities at gym    Pain: 0/10  Location: left knee     Objective      Objective Measures updated at progress report unless specified.     Treatment     Silvia received the treatments listed below:      therapeutic exercises to develop strength, endurance, ROM, and flexibility for 45 minutes including:  Bike 6' lvl 1  SLR with QS- 2x10   SL hip add 2x10   SL Hip ABD 2x10   Standing Calf stretch 3x30"  Lunge on step 3' 10 lunge/10" hamstring curl- discontinue- required significant cueing.   Calf raises 3x10    Standing hip abd cone taps x20    Cybex hamstring curl 3x8 4 plates  Leg press 3x10 4 plates  Leg extension 3x10 30#    6 inch step ups forward and lateral x20 ea in // bars    neuromuscular re-education activities to improve: Balance, Coordination, and Proprioception for 10 minutes. The following activities were included:  Bridges x15 5" " holds with hip add ball squeeze  Bridges x15 with  BTB hip adduction    Patient Education and Home Exercises       Education provided:   - Pt educated on importance of performing HEP and using modalities such as heat/ice to manage exacerbation of pain.     Written Home Exercises Provided: yes. Exercises were reviewed and Silvia was able to demonstrate them prior to the end of the session.  Silvia demonstrated fair  understanding of the education provided. See Electronic Medical Record under Patient Instructions for exercises provided during therapy sessions    Assessment     Silvia is a 63 y.o. female referred to outpatient Physical Therapy with a medical diagnosis of M62.81 (ICD-10-CM) - Quadriceps weakness M25.562 (ICD-10-CM) - Acute pain of left knee. Patient presents with L knee pain, decreased strength of LLE, TTP to joint line. Positive special testing. Potential for meniscal pathology. She has difficulty functional tasks such as squatting, sitting/standing for prolonged periods of time. She would like to get back to regular exercise when discharged from PT. She has had a prior accident where she has had weak LLE and numbness and tingling down entire L side. Significant weakness compared to R side. Pt was given Hep and advised to perform within tolerance. Educated to use modalities to help manage pain/soreness.     Pt arrived in clinic today with minimal pain. She is performing partial HEP, encouraged to complete current Hep and add SLR/clamshells in daily to work on L hip weakness. Will start progressing toward [facility] strengthening and reviewing gym exercises for post discharge over the next few sessions.      Silvia Is progressing well towards her goals.   Patient prognosis is Good.     Patient will continue to benefit from skilled outpatient physical therapy to address the deficits listed in the problem list box on initial evaluation, provide pt/family education and to maximize pt's level of independence in the  home and community environment.     Patient's spiritual, cultural and educational needs considered and pt agreeable to plan of care and goals.     Anticipated barriers to physical therapy:     Short Term Goals (3 Weeks):   1. Patient will be independent with home exercise program to supplement physical therapy treatment in improving functional status.  2. Patient will improve L lower extremity strength to at least 75% of R lower extremity strength as measured via MicroFet handheld dynamometer to improve strength for closed chain tasks.   3. Patient will perform timed up and go with less restrictive assistive device in < Test first F/U seconds to improve gait speed and safety with community ambulation.     Long Term Goals (6 Weeks):   1. Patient will improve L lower extremity strength to at least 90% of R lower extremity strength as measured via MicroFet handheld dynamometer to improve strength for closed chain tasks.   2. Patient will improve the total FOTO Knee Survey Score to </= F/U % limited to demonstrate increased perceived functional mobility.  3. Patient will perform at least 12 sit to stands in 30 seconds without UE support to demonstrate increased functional strength.   4. Pt will be able to navigate 10 steps to improve functional strength and ability to traverse the community with out difficulty.    5. Patient goal: get back to exercise classes at the gym.    Plan     Plan of care Certification: 1/31/2024 to 3/15/24.     Outpatient Physical Therapy 1-2 times weekly for 6 weeks to include the following interventions: Gait Training, Manual Therapy, Moist Heat/ Ice, Neuromuscular Re-ed, Patient Education, Therapeutic Activities, and Therapeutic Exercise.     Royal Baca, PT

## 2024-02-21 ENCOUNTER — CLINICAL SUPPORT (OUTPATIENT)
Dept: REHABILITATION | Facility: HOSPITAL | Age: 64
End: 2024-02-21
Payer: MEDICARE

## 2024-02-21 DIAGNOSIS — R68.89 DECREASED FUNCTIONAL ACTIVITY TOLERANCE: Primary | ICD-10-CM

## 2024-02-21 DIAGNOSIS — R29.898 DECREASED STRENGTH OF LOWER EXTREMITY: ICD-10-CM

## 2024-02-21 PROCEDURE — 97112 NEUROMUSCULAR REEDUCATION: CPT | Mod: PN,CQ

## 2024-02-21 PROCEDURE — 97110 THERAPEUTIC EXERCISES: CPT | Mod: PN,CQ

## 2024-02-21 NOTE — PROGRESS NOTES
"[facility]   Physical Therapy Treatment Note      Name: Silvia Cervantes  Clinic Number: [id number]    Therapy Diagnosis:   Encounter Diagnoses   Name Primary?    Decreased functional activity tolerance Yes    Decreased strength of lower extremity      Physician: Albino Carvajal MD    Visit Date: 2/21/2024     Physician Orders: PT Eval and Treat   Medical Diagnosis from Referral: M62.81 (ICD-10-CM) - Quadriceps weakness M25.562 (ICD-10-CM) - Acute pain of left knee  Evaluation Date: 1/31/2024  Authorization Period Expiration: 12/31/2024  Plan of Care Expiration: 3/15/24  Progress Note Due: 3/15/24  Visit # / Visits authorized: 1/ 1  5/10  FOTO: 5/5     Precautions: Standard      Time In: 9:00 am  Time Out: 9:55 am  Total Appointment Time (timed & untimed codes): 55 minutes (1NMR 3 TE) ** perform FOTO next time    Subjective     Patient reports:agreeable to PT session   She was compliant with home exercise program.  Response to previous treatment: LE soreness  Functional change: attempted light activities at gym    Pain: 0/10  Location: left knee     Objective      Objective Measures updated at progress report unless specified.     Treatment     Silvia received the treatments listed below:      therapeutic exercises to develop strength, endurance, ROM, and flexibility for 45 minutes including:  Bike 6' lvl 1  SLR with QS- 2x10   SL hip add 2x10   SL Hip ABD 2x10   Standing Calf stretch 3x30"  Lunge on step 3' 10 lunge/10" hamstring curl- discontinue- required significant cueing.   Calf raises 3x10    Standing hip abd cone taps x20    Cybex hamstring curl 3x8 4 plates  Leg press 3x10 4 plates  Leg extension 3x10 40#    6 inch step ups forward and lateral x20 ea in // bars    neuromuscular re-education activities to improve: Balance, Coordination, and Proprioception for 10 minutes. The following activities were included:  Bridges x15 5" holds with hip add ball squeeze  Bridges x15 with  BTB hip " "adduction  4"hurdles FWD with 1 UE assist x 6 trials  Step ups 6 " step FWD/ LAT 2x10 B  Patient Education and Home Exercises       Education provided:   - Pt educated on importance of performing HEP and using modalities such as heat/ice to manage exacerbation of pain.     Written Home Exercises Provided: yes. Exercises were reviewed and Silvia was able to demonstrate them prior to the end of the session.  Silvia demonstrated fair  understanding of the education provided. See Electronic Medical Record under Patient Instructions for exercises provided during therapy sessions    Assessment     Silvia is a 63 y.o. female referred to outpatient Physical Therapy with a medical diagnosis of M62.81 (ICD-10-CM) - Quadriceps weakness M25.562 (ICD-10-CM) - Acute pain of left knee. Patient presents with L knee pain, decreased strength of LLE, TTP to joint line. Positive special testing. Potential for meniscal pathology. She has difficulty functional tasks such as squatting, sitting/standing for prolonged periods of time. She would like to get back to regular exercise when discharged from PT. She has had a prior accident where she has had weak LLE and numbness and tingling down entire L side. Significant weakness compared to R side. Pt was given Hep and advised to perform within tolerance. Educated to use modalities to help manage pain/soreness.   Pt completed session with no reports of an adverse event. Added dynamic standing activities including pat negotiation laterally/ FWD in // bars today. No episodes of loss of balance. Pt is progressing well overall with standing LE strengthening.   Silvia Is progressing well towards her goals.   Patient prognosis is Good.     Patient will continue to benefit from skilled outpatient physical therapy to address the deficits listed in the problem list box on initial evaluation, provide pt/family education and to maximize pt's level of independence in the home and community environment. "     Patient's spiritual, cultural and educational needs considered and pt agreeable to plan of care and goals.     Anticipated barriers to physical therapy:     Short Term Goals (3 Weeks):   1. Patient will be independent with home exercise program to supplement physical therapy treatment in improving functional status.  2. Patient will improve L lower extremity strength to at least 75% of R lower extremity strength as measured via MicroFet handheld dynamometer to improve strength for closed chain tasks.   3. Patient will perform timed up and go with less restrictive assistive device in < Test first F/U seconds to improve gait speed and safety with community ambulation.     Long Term Goals (6 Weeks):   1. Patient will improve L lower extremity strength to at least 90% of R lower extremity strength as measured via MicroFet handheld dynamometer to improve strength for closed chain tasks.   2. Patient will improve the total FOTO Knee Survey Score to </= F/U % limited to demonstrate increased perceived functional mobility.  3. Patient will perform at least 12 sit to stands in 30 seconds without UE support to demonstrate increased functional strength.   4. Pt will be able to navigate 10 steps to improve functional strength and ability to traverse the community with out difficulty.    5. Patient goal: get back to exercise classes at the gym.    Plan     Plan of care Certification: 1/31/2024 to 3/15/24.     Outpatient Physical Therapy 1-2 times weekly for 6 weeks to include the following interventions: Gait Training, Manual Therapy, Moist Heat/ Ice, Neuromuscular Re-ed, Patient Education, Therapeutic Activities, and Therapeutic Exercise.     Nikunj Schwartz, PTA

## 2024-02-23 ENCOUNTER — CLINICAL SUPPORT (OUTPATIENT)
Dept: REHABILITATION | Facility: HOSPITAL | Age: 64
End: 2024-02-23
Payer: MEDICARE

## 2024-02-23 DIAGNOSIS — R68.89 DECREASED FUNCTIONAL ACTIVITY TOLERANCE: Primary | ICD-10-CM

## 2024-02-23 DIAGNOSIS — R29.898 DECREASED STRENGTH OF LOWER EXTREMITY: ICD-10-CM

## 2024-02-23 PROCEDURE — 97112 NEUROMUSCULAR REEDUCATION: CPT | Mod: PN

## 2024-02-23 PROCEDURE — 97110 THERAPEUTIC EXERCISES: CPT | Mod: PN

## 2024-02-23 NOTE — PROGRESS NOTES
"[facility]   Physical Therapy Treatment Note      Name: Silvia Cervantes  Clinic Number: [id number]    Therapy Diagnosis:   Encounter Diagnoses   Name Primary?    Decreased functional activity tolerance Yes    Decreased strength of lower extremity        Physician: Albino Carvajal MD    Visit Date: 2/23/2024     Physician Orders: PT Eval and Treat   Medical Diagnosis from Referral: M62.81 (ICD-10-CM) - Quadriceps weakness M25.562 (ICD-10-CM) - Acute pain of left knee  Evaluation Date: 1/31/2024  Authorization Period Expiration: 12/31/2024  Plan of Care Expiration: 3/15/24  Progress Note Due: 3/15/24  Visit # / Visits authorized: 1/ 1  6/10  FOTO: 3/5     Precautions: Standard      Time In: 9:00 am  Time Out: 9:55 am  Total Appointment Time (timed & untimed codes): 55 minutes (1NMR 3 TE) **     Subjective     Patient reports: that she has been doing good, no complaints at this time.  She was compliant with home exercise program.  Response to previous treatment: LE soreness  Functional change: attempted light activities at gym    Pain: 0/10  Location: left knee     Objective      Objective Measures updated at progress report unless specified.     Treatment     Silvia received the treatments listed below:      therapeutic exercises to develop strength, endurance, ROM, and flexibility for 45 minutes including:  Bike 6' lvl 1  SLR with QS- 2x10 1#  SL hip add 2x10 1#  SL Hip ABD 2x10   Standing Calf stretch 3x3  Calf raises 3x10    Standing hip abd cone taps x20    Cybex hamstring curl 3x8 4 plates  Leg press 3x10 4 plates  Leg extension 3x10 40#    6 inch step ups forward and lateral x20 ea in // bars  STS 2x10- cueing for good form    neuromuscular re-education activities to improve: Balance, Coordination, and Proprioception for 10 minutes. The following activities were included:  Bridges x15 5" holds with hip add ball squeeze  Bridges x15 with  BTB hip adduction  4"hurdles FWD with 1 UE assist x 6 " "trials  Step ups 6 " step FWD/ LAT 2x10 B  Patient Education and Home Exercises       Education provided:   - Pt educated on importance of performing HEP and using modalities such as heat/ice to manage exacerbation of pain.     Written Home Exercises Provided: yes. Exercises were reviewed and Silvia was able to demonstrate them prior to the end of the session.  Silvia demonstrated fair  understanding of the education provided. See Electronic Medical Record under Patient Instructions for exercises provided during therapy sessions    Assessment     Silvia is a 63 y.o. female referred to outpatient Physical Therapy with a medical diagnosis of M62.81 (ICD-10-CM) - Quadriceps weakness M25.562 (ICD-10-CM) - Acute pain of left knee. Patient presents with L knee pain, decreased strength of LLE, TTP to joint line. Positive special testing. Potential for meniscal pathology. She has difficulty functional tasks such as squatting, sitting/standing for prolonged periods of time. She would like to get back to regular exercise when discharged from PT. She has had a prior accident where she has had weak LLE and numbness and tingling down entire L side. Significant weakness compared to R side. Pt was given Hep and advised to perform within tolerance. Educated to use modalities to help manage pain/soreness.     Pt arrived in clinic today with minimal pain. Progressed Cybex machines and STS with initial cueing required for form. No complaints noted during There-ex. Will start progressing toward CK[facility] strengthening and reviewing gym exercises for post discharge over the next few sessions.      Silvia Is progressing well towards her goals.   Patient prognosis is Good.     Patient will continue to benefit from skilled outpatient physical therapy to address the deficits listed in the problem list box on initial evaluation, provide pt/family education and to maximize pt's level of independence in the home and community environment.     Patient's " spiritual, cultural and educational needs considered and pt agreeable to plan of care and goals.     Anticipated barriers to physical therapy:     Short Term Goals (3 Weeks):   1. Patient will be independent with home exercise program to supplement physical therapy treatment in improving functional status.  2. Patient will improve L lower extremity strength to at least 75% of R lower extremity strength as measured via MicroFet handheld dynamometer to improve strength for closed chain tasks.   3. Patient will perform timed up and go with less restrictive assistive device in < Test first F/U seconds to improve gait speed and safety with community ambulation.     Long Term Goals (6 Weeks):   1. Patient will improve L lower extremity strength to at least 90% of R lower extremity strength as measured via MicroFet handheld dynamometer to improve strength for closed chain tasks.   2. Patient will improve the total FOTO Knee Survey Score to </= F/U % limited to demonstrate increased perceived functional mobility.  3. Patient will perform at least 12 sit to stands in 30 seconds without UE support to demonstrate increased functional strength.   4. Pt will be able to navigate 10 steps to improve functional strength and ability to traverse the community with out difficulty.    5. Patient goal: get back to exercise classes at the gym.    Plan     Plan of care Certification: 1/31/2024 to 3/15/24.     Outpatient Physical Therapy 1-2 times weekly for 6 weeks to include the following interventions: Gait Training, Manual Therapy, Moist Heat/ Ice, Neuromuscular Re-ed, Patient Education, Therapeutic Activities, and Therapeutic Exercise.     Royal Baca, PT

## 2024-02-27 ENCOUNTER — HOSPITAL ENCOUNTER (OUTPATIENT)
Dept: RADIOLOGY | Facility: HOSPITAL | Age: 64
Discharge: HOME | End: 2024-02-27
Attending: ORTHOPAEDIC SURGERY
Payer: MEDICARE

## 2024-02-27 ENCOUNTER — OFFICE VISIT (OUTPATIENT)
Dept: ORTHOPEDICS | Facility: CLINIC | Age: 64
End: 2024-02-27
Payer: MEDICARE

## 2024-02-27 VITALS — HEIGHT: 61 IN | WEIGHT: 148.56 LBS | BODY MASS INDEX: 28.05 KG/M2

## 2024-02-27 DIAGNOSIS — M25.562 LEFT KNEE PAIN, UNSPECIFIED CHRONICITY: ICD-10-CM

## 2024-02-27 DIAGNOSIS — M25.562 LEFT KNEE PAIN, UNSPECIFIED CHRONICITY: Primary | ICD-10-CM

## 2024-02-27 DIAGNOSIS — M17.12 PRIMARY OSTEOARTHRITIS OF LEFT KNEE: Primary | ICD-10-CM

## 2024-02-27 DIAGNOSIS — M25.561 RIGHT KNEE PAIN, UNSPECIFIED CHRONICITY: ICD-10-CM

## 2024-02-27 PROCEDURE — 99213 OFFICE O/P EST LOW 20 MIN: CPT | Mod: S$GLB,,, | Performed by: ORTHOPAEDIC SURGERY

## 2024-02-27 PROCEDURE — 73564 X-RAY EXAM KNEE 4 OR MORE: CPT | Mod: TC,PN,LT

## 2024-02-27 PROCEDURE — 73564 X-RAY EXAM KNEE 4 OR MORE: CPT | Mod: 26,LT,, | Performed by: RADIOLOGY

## 2024-02-27 PROCEDURE — G2211 COMPLEX E/M VISIT ADD ON: HCPCS | Mod: S$GLB,,, | Performed by: ORTHOPAEDIC SURGERY

## 2024-02-27 PROCEDURE — 99999 PR PBB SHADOW E&M-EST. PATIENT-LVL IV: CPT | Mod: PBBFAC,,, | Performed by: ORTHOPAEDIC SURGERY

## 2024-02-27 NOTE — PROGRESS NOTES
[facility] [address]          Subjective:     Patient ID: Silvia Cervantes is a 63 y.o. female.    Chief Complaint:  Pain of the left knee    Knee Pain: left  swelling: Yes  worsens with activity: Yes  relieved by: injections    Patient had left knee pain aggravated by physical activity, we saw her in office 2024, Toradol injection and referral to PT.  Here today for one-month follow-up.  Doing much better.    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)         Past Surgical History:   Procedure Laterality Date     SECTION      COLONOSCOPY N/A 2019    Procedure: COLONOSCOPY/judeyttanya ;  Surgeon: Dimas Woodall MD;  Location: [facility];  Service: Endoscopy;  Laterality: N/A;    ESOPHAGOGASTRODUODENOSCOPY N/A 2023    Procedure: EGD (ESOPHAGOGASTRODUODENOSCOPY);  Surgeon: Ori St MD;  Location: [facility];  Service: Endoscopy;  Laterality: N/A;    EXCISION OF NODULE Right 2022    Procedure: EXCISION, NODULE right elbow;  Surgeon: Louise Smith MD;  Location: [facility];  Service: Orthopedics;  Laterality: Right;    HYSTERECTOMY      INJECTION OF STEROID Left 2022    Procedure: INJECTION, STEROID Thumb MP joint;  Surgeon: Louise Smith MD;  Location: [facility];  Service: Orthopedics;  Laterality: Left;    SINUS SURGERY      SURGICAL REMOVAL OF DISTAL ULNA Right 2022    Procedure: EXCISION, ULNA, DISTAL;  Surgeon:  Louise Smith MD;  Location: [facility];  Service: Orthopedics;  Laterality: Right;    SYNOVECTOMY OF WRIST Right 6/30/2022    Procedure: SYNOVECTOMY, WRIST;  Surgeon: Louise Smith MD;  Location: [facility];  Service: Orthopedics;  Laterality: Right;    TUBAL LIGATION          Current Outpatient Medications   Medication Instructions    amLODIPine (NORVASC) 5 mg, Oral, Daily    atorvastatin (LIPITOR) 10 mg, Oral, Daily    chlorhexidine (PERIDEX) 0.12 % solution Use 15-20 mL to rinse mouth twice daily for 2 minutes, spit out and do not rinse mouth with water after.    cyanocobalamin, vitamin B-12, (VITAMIN B-12 ORAL) Oral    cycloSPORINE (RESTASIS) 0.05 % ophthalmic emulsion Apply 1 drop into both eyes twice a day    cycloSPORINE (RESTASIS) 0.05 % ophthalmic emulsion Instill 1 drop Both Eyes twice a day    cycloSPORINE (RESTASIS) 0.05 % ophthalmic emulsion Apply 1 drop into both eyes twice a day    desoximetasone (TOPICORT) 0.25 % cream Apply to affected area every other night x 1 month    EnbreL SureClick 50 mg, Subcutaneous, Weekly    flu vacc [id number] 6mos up,PF, (FLUARIX QUAD [id number], PF,) 60 mcg (15 mcg x 4)/0.5 mL Syrg use as directed    fluticasone propion-salmeterol 115-21 mcg/dose (ADVAIR HFA) 115-21 mcg/actuation HFAA inhaler 2 puffs, Inhalation, Every 12 hours    fluticasone propionate (FLONASE) 100 mcg, Each Nostril, Daily    folic acid (FOLVITE) 4 mg, Oral, Daily    gabapentin (NEURONTIN) 300 mg, Oral, 3 times daily    hydrOXYzine HCL (ATARAX) 25 mg, Oral, Nightly    leflunomide (ARAVA) 20 mg, Oral, Daily    losartan (COZAAR) 100 mg, Oral, Daily    meclizine (ANTIVERT) 25 mg, Oral, 3 times daily PRN    metoprolol succinate (TOPROL-XL) 25 mg, Oral, Nightly    nystatin (NYAMYC) powder Apply to affected area every morning as needed for flare under breasts    pantoprazole (PROTONIX) 40 mg, Oral, Daily    promethazine (PHENERGAN) 25 mg, Oral, Every 6 hours PRN    PROTOPIC 0.1 % ointment AAA bid     ruxolitinib (OPZELURA) 1.5 % Crea aaa bid    ruxolitinib (OPZELURA) 1.5 % Crea Apply to affected area twice daily to neck and hands for vitiligo    triamcinolone acetonide 0.025% (KENALOG) 0.025 % cream Apply to affected area every day to twice a day to neck . Not more than 1-2 weeks straight in same location    triamcinolone acetonide 0.1% (KENALOG) 0.1 % ointment Topical (Top), 2 times daily, For 2 weeks    VITAMIN D2 50,000 Units, Oral, Every 7 days    ZINC ORAL Oral        Review of patient's allergies indicates:  No Known Allergies    Social History     Socioeconomic History    Marital status:    Tobacco Use    Smoking status: Never    Smokeless tobacco: Never   Substance and Sexual Activity    Alcohol use: Yes     Comment: Rarely    Drug use: Not Currently    Sexual activity: Yes     Partners: Male     Birth control/protection: Surgical     Comment: [facility]     Social Determinants of Health     Financial Resource Strain: Low Risk  (2/21/2022)    Overall Financial Resource Strain (CARDIA)     Difficulty of Paying Living Expenses: Not hard at all   Food Insecurity: No Food Insecurity (2/21/2022)    Hunger Vital Sign     Worried About Running Out of Food in the Last Year: Never true     Ran Out of Food in the Last Year: Never true   Transportation Needs: No Transportation Needs (2/21/2022)    PRAPARE - Transportation     Lack of Transportation (Medical): No     Lack of Transportation (Non-Medical): No   Physical Activity: Sufficiently Active (2/21/2022)    Exercise Vital Sign     Days of Exercise per Week: 7 days     Minutes of Exercise per Session: 120 min   Stress: No Stress Concern Present (2/21/2022)    [place] [employer] - Occupational Stress Questionnaire     Feeling of Stress : Only a little   Social Connections: Moderately Isolated (2/21/2022)    Social Connection and Isolation Panel [NHANES]     Frequency of Communication with Friends and Family: More than three times a week      Frequency of Social Gatherings with Friends and Family: More than three times a week     Attends [employer] Services: More than 4 times per year     Active Member of Clubs or Organizations: No     Attends Club or Organization Meetings: Never     Marital Status:    Housing Stability: Low Risk  (2/21/2022)    Housing Stability Vital Sign     Unable to Pay for Housing in the Last Year: No     Number of Places Lived in the Last Year: 1     Unstable Housing in the Last Year: No       Family History   Problem Relation Age of Onset    Hypertension Mother     Cataracts Mother     Diabetes Father     Hypertension Father     Cataracts Father     Heart disease Father     Hyperlipidemia Father     Diabetes Sister     Stroke Brother     Hypertension Brother     No Known Problems Daughter     No Known Problems Son     Melanoma Neg Hx     Breast cancer Neg Hx     Colon cancer Neg Hx     Ovarian cancer Neg Hx     Blindness Neg Hx     Glaucoma Neg Hx     Aneurysm Neg Hx     Cancer Neg Hx     Clotting disorder Neg Hx     Dementia Neg Hx     Fainting Neg Hx     Kidney disease Neg Hx     Liver disease Neg Hx     Migraines Neg Hx     Neuropathy Neg Hx     Parkinsonism Neg Hx     Seizures Neg Hx     Tremor Neg Hx          Review of systems positive for joint pain.     Objective:   Physical Exam:     Skin atraumatic, previous surgical scar from femur nail  nonTTP medial joint line, nonTTP lateral joint line  ROM 0-120  Stable anterior/posterior   Stable varus/valgus  Motor intact TA/GS/EHL/FHL  SILT SP/DP/Sa/Dunn/T  Toes WWP    Imaging: XR reviewed w/o hardware complication. Joint spaces well maintained.     Assessment:        Silvia Cervantes is a 63 y.o. female with left knee pain who is now status post injection on a course of physical therapy.  She is still in with therapist with significant improvement of left knee pain.    No diagnosis found.    Plan :          Follow up 6 months   She does have hardware in the L knee, may be  artifact if considering MRI      No orders of the defined types were placed in this encounter.       Zahra Ferrari MD   02/27/2024

## 2024-02-28 ENCOUNTER — CLINICAL SUPPORT (OUTPATIENT)
Dept: REHABILITATION | Facility: HOSPITAL | Age: 64
End: 2024-02-28
Payer: MEDICARE

## 2024-02-28 DIAGNOSIS — R29.898 DECREASED STRENGTH OF LOWER EXTREMITY: ICD-10-CM

## 2024-02-28 DIAGNOSIS — R68.89 DECREASED FUNCTIONAL ACTIVITY TOLERANCE: Primary | ICD-10-CM

## 2024-02-28 PROCEDURE — 97112 NEUROMUSCULAR REEDUCATION: CPT | Mod: PN

## 2024-02-28 PROCEDURE — 97530 THERAPEUTIC ACTIVITIES: CPT | Mod: PN

## 2024-02-28 PROCEDURE — 97110 THERAPEUTIC EXERCISES: CPT | Mod: PN

## 2024-02-28 NOTE — PROGRESS NOTES
[facility]   Physical Therapy Treatment Note      Name: Silvia Cervantes  Clinic Number: [id number]    Therapy Diagnosis:   Encounter Diagnoses   Name Primary?    Decreased functional activity tolerance Yes    Decreased strength of lower extremity      Physician: Albino Carvajal MD    Visit Date: 2/28/2024     Physician Orders: PT Eval and Treat   Medical Diagnosis from Referral: M62.81 (ICD-10-CM) - Quadriceps weakness M25.562 (ICD-10-CM) - Acute pain of left knee  Evaluation Date: 1/31/2024  Authorization Period Expiration: 12/31/2024  Plan of Care Expiration: 3/15/24  Progress Note Due: 3/15/24  Visit # / Visits authorized: 1/ 1  7/10  FOTO: 3/5- next visit     Precautions: Standard      Time In: 9:00 am  Time Out: 9:55 am  Total Appointment Time (timed & untimed codes): 55 minutes (1NMR 2 TE 1 TA) **     Subjective     Patient reports: did some walking at the park the other day. She feels good, would like to focus on getting up off the floor for remainder of the visits, so she can get back into her exercise classes.  She was compliant with home exercise program.  Response to previous treatment: none  Functional change: attempted light activities at gym    Pain: 0/10  Location: left knee     Objective      Objective Measures updated at progress report unless specified.     Treatment     Silvia received the treatments listed below:      therapeutic exercises to develop strength, endurance, ROM, and flexibility for 35 minutes including:  Bike 6' lvl 2  SLR with QS- 2x10 1#  SL hip add 2x10 1#  SL Hip ABD 2x10   Standing Calf stretch 3x3  Calf raises 3x10    Standing hip abd cone taps x20    Cybex hamstring curl 3x8 4 plates  Leg press 3x10 4 plates  Leg extension 3x10 40#    6 inch step ups forward and lateral x20 ea in // bars  STS 3x10- cueing for good form    neuromuscular re-education activities to improve: Balance, Coordination, and Proprioception for 10 minutes. The following activities  "were included:  Bridges x15 5" holds with hip add ball squeeze  Bridges x15 with  BTB hip adduction  4"hurdles FWD with 1 UE assist x 6 trials- NT  Step ups 6 " step FWD/ LAT 2x10 B    Therapeutic activities to improve mobility and functional strength for 10 minutes  Sequencing for transitions from floor to quadraped--> then --> quadruped to standing    Patient Education and Home Exercises       Education provided:   - Pt educated on importance of performing HEP and using modalities such as heat/ice to manage exacerbation of pain.     Written Home Exercises Provided: yes. Exercises were reviewed and Silvia was able to demonstrate them prior to the end of the session.  Silvia demonstrated fair  understanding of the education provided. See Electronic Medical Record under Patient Instructions for exercises provided during therapy sessions    Assessment     Silvia is a 63 y.o. female referred to outpatient Physical Therapy with a medical diagnosis of M62.81 (ICD-10-CM) - Quadriceps weakness M25.562 (ICD-10-CM) - Acute pain of left knee. Patient presents with L knee pain, decreased strength of LLE, TTP to joint line. Positive special testing. Potential for meniscal pathology. She has difficulty functional tasks such as squatting, sitting/standing for prolonged periods of time. She would like to get back to regular exercise when discharged from PT. She has had a prior accident where she has had weak LLE and numbness and tingling down entire L side. Significant weakness compared to R side. Pt was given Hep and advised to perform within tolerance. Educated to use modalities to help manage pain/soreness.     Pt has met most goals set forth on initial evaluation and has minimal to no pain. She would like to focus on getting up off the ground with ease, as that was one of her main goals starting PT. Focus will be on attaining good form and learning sequencing of transitions from floor to standing over the last 2 visits.    "   Silvia Is progressing well towards her goals.   Patient prognosis is Good.     Patient will continue to benefit from skilled outpatient physical therapy to address the deficits listed in the problem list box on initial evaluation, provide pt/family education and to maximize pt's level of independence in the home and community environment.     Patient's spiritual, cultural and educational needs considered and pt agreeable to plan of care and goals.     Anticipated barriers to physical therapy:     Short Term Goals (3 Weeks):   1. Patient will be independent with home exercise program to supplement physical therapy treatment in improving functional status.  2. Patient will improve L lower extremity strength to at least 75% of R lower extremity strength as measured via MicroFet handheld dynamometer to improve strength for closed chain tasks.   3. Patient will perform timed up and go with less restrictive assistive device in < Test first F/U seconds to improve gait speed and safety with community ambulation.     Long Term Goals (6 Weeks):   1. Patient will improve L lower extremity strength to at least 90% of R lower extremity strength as measured via MicroFet handheld dynamometer to improve strength for closed chain tasks.   2. Patient will improve the total FOTO Knee Survey Score to </= F/U % limited to demonstrate increased perceived functional mobility.  3. Patient will perform at least 12 sit to stands in 30 seconds without UE support to demonstrate increased functional strength.   4. Pt will be able to navigate 10 steps to improve functional strength and ability to traverse the community with out difficulty.    5. Patient goal: get back to exercise classes at the gym.    Plan     Plan of care Certification: 1/31/2024 to 3/15/24.     Outpatient Physical Therapy 1-2 times weekly for 6 weeks to include the following interventions: Gait Training, Manual Therapy, Moist Heat/ Ice, Neuromuscular Re-ed, Patient  Education, Therapeutic Activities, and Therapeutic Exercise.     Royal Baca, PT

## 2024-03-01 ENCOUNTER — CLINICAL SUPPORT (OUTPATIENT)
Dept: REHABILITATION | Facility: HOSPITAL | Age: 64
End: 2024-03-01
Payer: MEDICARE

## 2024-03-01 ENCOUNTER — PATIENT MESSAGE (OUTPATIENT)
Dept: RHEUMATOLOGY | Facility: CLINIC | Age: 64
End: 2024-03-01
Payer: MEDICARE

## 2024-03-01 DIAGNOSIS — R29.898 DECREASED STRENGTH OF LOWER EXTREMITY: ICD-10-CM

## 2024-03-01 DIAGNOSIS — R68.89 DECREASED FUNCTIONAL ACTIVITY TOLERANCE: Primary | ICD-10-CM

## 2024-03-01 PROCEDURE — 97530 THERAPEUTIC ACTIVITIES: CPT | Mod: PN

## 2024-03-01 NOTE — PROGRESS NOTES
"[facility]   Physical Therapy Treatment Note      Name: Silvia Cervantes  Clinic Number: [id number]    Therapy Diagnosis:   Encounter Diagnoses   Name Primary?    Decreased functional activity tolerance Yes    Decreased strength of lower extremity      Physician: Albino Carvajal MD    Visit Date: 3/1/2024     Physician Orders: PT Eval and Treat   Medical Diagnosis from Referral: M62.81 (ICD-10-CM) - Quadriceps weakness M25.562 (ICD-10-CM) - Acute pain of left knee  Evaluation Date: 1/31/2024  Authorization Period Expiration: 12/31/2024  Plan of Care Expiration: 3/15/24  Progress Note Due: 3/15/24  Visit # / Visits authorized: 1/ 1  8/10  FOTO: 3/5- next visit     Precautions: Standard      Time In: 9:00 am  Time Out: 9:55 am  Total Appointment Time (timed & untimed codes): 55 minutes (3 TA) **     Subjective     Patient reports: is ready to practice getting up off the ground.  She was compliant with home exercise program.  Response to previous treatment: none  Functional change: attempted light activities at gym    Pain: 0/10  Location: left knee     Objective      Objective Measures updated at progress report unless specified.     Treatment     Silvia received the treatments listed below:      neuromuscular re-education activities to improve: Balance, Coordination, and Proprioception for 10 minutes. The following activities were included:  Bridges x20 5" holds with hip add ball squeeze  Bridges x20 with  BTB hip adduction    Therapeutic activities to improve mobility and functional strength for 45 minutes  Sequencing for transitions from floor to quadraped--> then --> quadruped to standing  Discussion of exercise progression    Patient Education and Home Exercises       Education provided:   - Pt educated on importance of performing HEP and using modalities such as heat/ice to manage exacerbation of pain.     Written Home Exercises Provided: yes. Exercises were reviewed and Silvia was able to " demonstrate them prior to the end of the session.  Silvia demonstrated fair  understanding of the education provided. See Electronic Medical Record under Patient Instructions for exercises provided during therapy sessions    Assessment     Silvia is a 63 y.o. female referred to outpatient Physical Therapy with a medical diagnosis of M62.81 (ICD-10-CM) - Quadriceps weakness M25.562 (ICD-10-CM) - Acute pain of left knee. Patient presents with L knee pain, decreased strength of LLE, TTP to joint line. Positive special testing. Potential for meniscal pathology. She has difficulty functional tasks such as squatting, sitting/standing for prolonged periods of time. She would like to get back to regular exercise when discharged from PT. She has had a prior accident where she has had weak LLE and numbness and tingling down entire L side. Significant weakness compared to R side. Pt was given Hep and advised to perform within tolerance. Educated to use modalities to help manage pain/soreness.     Pt has met most goals set forth on initial evaluation and has minimal to no pain. She would like to focus on getting up off the ground with ease, as that was one of her main goals starting PT. Good demo with attaining good form and learning sequencing of transitions from floor to standing over the last 2 visits. Continues to require assistance to push up from ground.     iSlvia Is progressing well towards her goals.   Patient prognosis is Good.     Patient will continue to benefit from skilled outpatient physical therapy to address the deficits listed in the problem list box on initial evaluation, provide pt/family education and to maximize pt's level of independence in the home and community environment.     Patient's spiritual, cultural and educational needs considered and pt agreeable to plan of care and goals.     Anticipated barriers to physical therapy:     Short Term Goals (3 Weeks):   1. Patient will be independent with home  exercise program to supplement physical therapy treatment in improving functional status.  2. Patient will improve L lower extremity strength to at least 75% of R lower extremity strength as measured via MicroFet handheld dynamometer to improve strength for closed chain tasks.   3. Patient will perform timed up and go with less restrictive assistive device in < Test first F/U seconds to improve gait speed and safety with community ambulation.     Long Term Goals (6 Weeks):   1. Patient will improve L lower extremity strength to at least 90% of R lower extremity strength as measured via MicroFet handheld dynamometer to improve strength for closed chain tasks.   2. Patient will improve the total FOTO Knee Survey Score to </= F/U % limited to demonstrate increased perceived functional mobility.  3. Patient will perform at least 12 sit to stands in 30 seconds without UE support to demonstrate increased functional strength.   4. Pt will be able to navigate 10 steps to improve functional strength and ability to traverse the community with out difficulty.    5. Patient goal: get back to exercise classes at the gym.    Plan     Plan of care Certification: 1/31/2024 to 3/15/24.     Outpatient Physical Therapy 1-2 times weekly for 6 weeks to include the following interventions: Gait Training, Manual Therapy, Moist Heat/ Ice, Neuromuscular Re-ed, Patient Education, Therapeutic Activities, and Therapeutic Exercise.     Royal Baca, PT

## 2024-03-05 ENCOUNTER — PATIENT MESSAGE (OUTPATIENT)
Dept: PULMONOLOGY | Facility: CLINIC | Age: 64
End: 2024-03-05
Payer: MEDICARE

## 2024-03-06 ENCOUNTER — CLINICAL SUPPORT (OUTPATIENT)
Dept: REHABILITATION | Facility: HOSPITAL | Age: 64
End: 2024-03-06
Payer: MEDICARE

## 2024-03-06 DIAGNOSIS — R68.89 DECREASED FUNCTIONAL ACTIVITY TOLERANCE: Primary | ICD-10-CM

## 2024-03-06 DIAGNOSIS — R29.898 DECREASED STRENGTH OF LOWER EXTREMITY: ICD-10-CM

## 2024-03-06 PROCEDURE — 97530 THERAPEUTIC ACTIVITIES: CPT | Mod: PN,CQ

## 2024-03-06 NOTE — PROGRESS NOTES
"[facility]   Physical Therapy Treatment Note      Name: Silvia Cervantes  Clinic Number: [id number]    Therapy Diagnosis:   Encounter Diagnoses   Name Primary?    Decreased functional activity tolerance Yes    Decreased strength of lower extremity      Physician: Albino Carvajal MD    Visit Date: 3/6/2024     Physician Orders: PT Eval and Treat   Medical Diagnosis from Referral: M62.81 (ICD-10-CM) - Quadriceps weakness M25.562 (ICD-10-CM) - Acute pain of left knee  Evaluation Date: 1/31/2024  Authorization Period Expiration: 12/31/2024  Plan of Care Expiration: 3/15/24  Progress Note Due: 3/15/24  Visit # / Visits authorized: 1/ 1  9/10  FOTO: 10 NEXT     Precautions: Standard      Time In: 9:00 am  Time Out: 10:05 am  Total Appointment Time (timed & untimed codes): 35 minutes (2 TA) **     Subjective     Patient reports: "It's better". Pt agreeable to PT session   She was compliant with home exercise program.  Response to previous treatment: none  Functional change: attempted light activities at gym    Pain: 0/10  Location: left knee     Objective      Objective Measures updated at progress report unless specified.     Treatment     Silvia received the treatments listed below:      neuromuscular re-education activities to improve: Balance, Coordination, and Proprioception for supervised minutes. The following activities were included:  Bridges x20 5" holds with hip add ball squeeze  Bridges x20 with  BTB hip adduction    Therapeutic activities to improve mobility and functional strength for 35 minutes  DEVELOPMENTAL SEQUENCING:On floor mat     -Alternate kneel to stand on (2" foam block, 1 UE assist table) 1min each x 2 trials  -B tall kneeling on 2" foam block overhead 2kg ball press 2x10   -Half kneel on 2" foam block overhead 2kg press 2x10 B  -Prone>Sidelying>Quadriped>Half kneeling x 3 trials with visual/ verbal instructions.    Patient Education and Home Exercises       Education provided: "   - Pt educated on importance of performing HEP and using modalities such as heat/ice to manage exacerbation of pain.     Written Home Exercises Provided: yes. Exercises were reviewed and Silvia was able to demonstrate them prior to the end of the session.  Silvia demonstrated fair  understanding of the education provided. See Electronic Medical Record under Patient Instructions for exercises provided during therapy sessions    Assessment     Silvia is a 63 y.o. female referred to outpatient Physical Therapy with a medical diagnosis of M62.81 (ICD-10-CM) - Quadriceps weakness M25.562 (ICD-10-CM) - Acute pain of left knee. Patient presents with L knee pain, decreased strength of LLE, TTP to joint line. Positive special testing. Potential for meniscal pathology. She has difficulty functional tasks such as squatting, sitting/standing for prolonged periods of time. She would like to get back to regular exercise when discharged from PT. She has had a prior accident where she has had weak LLE and numbness and tingling down entire L side. Significant weakness compared to R side. Pt was given Hep and advised to perform within tolerance. Educated to use modalities to help manage pain/soreness.     Today's session focused mainly on kneeling/ quadriped activities to train for floor to standing sequencing. She required heavy visual/ tactile instructions on sequencing and technique, but mostly reviewed safety. Will continue to review this for next session   Silvia Is progressing well towards her goals.   Patient prognosis is Good.     Patient will continue to benefit from skilled outpatient physical therapy to address the deficits listed in the problem list box on initial evaluation, provide pt/family education and to maximize pt's level of independence in the home and community environment.     Patient's spiritual, cultural and educational needs considered and pt agreeable to plan of care and goals.     Anticipated barriers to  physical therapy:     Short Term Goals (3 Weeks):   1. Patient will be independent with home exercise program to supplement physical therapy treatment in improving functional status.  2. Patient will improve L lower extremity strength to at least 75% of R lower extremity strength as measured via MicroFet handheld dynamometer to improve strength for closed chain tasks.   3. Patient will perform timed up and go with less restrictive assistive device in < Test first F/U seconds to improve gait speed and safety with community ambulation.     Long Term Goals (6 Weeks):   1. Patient will improve L lower extremity strength to at least 90% of R lower extremity strength as measured via MicroFet handheld dynamometer to improve strength for closed chain tasks.   2. Patient will improve the total FOTO Knee Survey Score to </= F/U % limited to demonstrate increased perceived functional mobility.  3. Patient will perform at least 12 sit to stands in 30 seconds without UE support to demonstrate increased functional strength.   4. Pt will be able to navigate 10 steps to improve functional strength and ability to traverse the community with out difficulty.    5. Patient goal: get back to exercise classes at the gym.    Plan     Plan of care Certification: 1/31/2024 to 3/15/24.     Outpatient Physical Therapy 1-2 times weekly for 6 weeks to include the following interventions: Gait Training, Manual Therapy, Moist Heat/ Ice, Neuromuscular Re-ed, Patient Education, Therapeutic Activities, and Therapeutic Exercise.     Nikunj Schwartz, PTA

## 2024-03-07 ENCOUNTER — OFFICE VISIT (OUTPATIENT)
Dept: PULMONOLOGY | Facility: CLINIC | Age: 64
End: 2024-03-07
Payer: MEDICARE

## 2024-03-07 VITALS
BODY MASS INDEX: 28.15 KG/M2 | HEART RATE: 88 BPM | RESPIRATION RATE: 17 BRPM | DIASTOLIC BLOOD PRESSURE: 84 MMHG | WEIGHT: 149 LBS | SYSTOLIC BLOOD PRESSURE: 132 MMHG

## 2024-03-07 DIAGNOSIS — D86.9 SARCOIDOSIS: Primary | ICD-10-CM

## 2024-03-07 PROCEDURE — 99999 PR PBB SHADOW E&M-EST. PATIENT-LVL II: CPT | Mod: PBBFAC,,, | Performed by: INTERNAL MEDICINE

## 2024-03-07 PROCEDURE — 99214 OFFICE O/P EST MOD 30 MIN: CPT | Mod: S$GLB,,, | Performed by: INTERNAL MEDICINE

## 2024-03-07 RX ORDER — FLUTICASONE PROPIONATE 50 MCG
1 SPRAY, SUSPENSION (ML) NASAL DAILY
Qty: 16 G | Refills: 3 | Status: SHIPPED | OUTPATIENT
Start: 2024-03-07 | End: 2024-05-27

## 2024-03-07 NOTE — PROGRESS NOTES
"Subjective:      Patient ID: Silvia Cervantes is a 63 y.o. female.    Chief Complaint: No chief complaint on file.    HPI Stable respiratory symptoms. Continues with dry cough. 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  Followed by Rheumatology for seropositive RA.  On enbrel.  Off cyclosporine, and leflunamide, MT.    7/26/21-  Feels hand stiffness is better on enbrel.  No problems with COVID.  Had vaccine.  Cough persists when she is nervious.  Hasn't used advair because she is worried she will get addicted.  10/11/21-  Feels advair helped but instructed in use.  Wants flonase  4/11/21-  Last PFTs 9/24/21.  Last CT 2/23/21.  Still coughing (tickle in thorat) but doesn't bother her much.  No ALY.  Walking in park.  Using vicks on outside of nose nightly.  No fever, chills, wt.loss.  No Hx COVID infec.  Had vaccfine x 3.  Wants to know about 2nd booster.  Interval hx 3/7/2024: Sinus issues.       Review of Systems   Respiratory:  Negative for sputum production, shortness of breath and dyspnea on extertion.      Objective:     Physical Exam   Constitutional: She is oriented to person, place, and time. She appears well-developed and well-nourished.   Pulmonary/Chest: Normal expansion and symmetric chest wall expansion.   Neurological: She is alert and oriented to person, place, and time.   Nursing note and vitals reviewed.    Personal Diagnostic Review  none pertinent      3/7/2024     1:04 PM 2/27/2024     8:51 AM 1/30/2024     9:02 AM 1/25/2024    11:33 AM 1/19/2024     9:53 AM 1/8/2024     8:49 AM 11/27/2023    10:05 AM   Pulmonary Function Tests   SpO2   98 %   95 %    Height  5' 1" (1.549 m) 5' 1" (1.549 m) 5' 1" (1.549 m)  5' 1" (1.549 m) 5' 1" (1.549 m)   Weight 67.6 kg (149 lb) 67.4 kg (148 lb 9.4 oz) 67.4 kg (148 lb 9.4 oz) 65.3 kg (143 lb 15.4 oz) 65.3 kg (143 lb 15.4 oz) 65.9 kg (145 lb 4.5 oz) 66.2 kg (146 lb)   BMI " (Calculated)  28.1 28.1 27.2  27.5 27.6        Assessment:     No diagnosis found.     Outpatient Encounter Medications as of 3/7/2024   Medication Sig Dispense Refill    amLODIPine (NORVASC) 5 MG tablet Take 1 tablet (5 mg total) by mouth once daily. 90 tablet 3    atorvastatin (LIPITOR) 10 MG tablet Take 1 tablet (10 mg total) by mouth once daily. 90 tablet 3    chlorhexidine (PERIDEX) 0.12 % solution Use 15-20 mL to rinse mouth twice daily for 2 minutes, spit out and do not rinse mouth with water after. 473 mL 3    cycloSPORINE (RESTASIS) 0.05 % ophthalmic emulsion Instill 1 drop Both Eyes twice a day 180 each 6    desoximetasone (TOPICORT) 0.25 % cream Apply to affected area every other night x 1 month 60 g 1    ergocalciferol (ERGOCALCIFEROL) 50,000 unit Cap Take 1 capsule (50,000 Units total) by mouth every 7 days. 12 capsule 2    etanercept (ENBREL SURECLICK) 50 mg/mL (1 mL) Inject 1 mL (50 mg total) into the skin once a week. 4 mL 11    fluticasone propionate (FLONASE) 50 mcg/actuation nasal spray 2 sprays (100 mcg total) by Each Nostril route once daily. 16 g 11    folic acid (FOLVITE) 1 MG tablet Take 4 tablets (4 mg total) by mouth once daily. 120 tablet 11    gabapentin (NEURONTIN) 300 MG capsule Take 1 capsule (300 mg total) by mouth 3 (three) times daily. 270 capsule 1    hydrOXYzine HCL (ATARAX) 25 MG tablet Take 1 tablet (25 mg total) by mouth every evening. 30 tablet 1    losartan (COZAAR) 100 MG tablet Take 1 tablet (100 mg total) by mouth once daily. 90 tablet 3    meclizine (ANTIVERT) 25 mg tablet Take 1 tablet (25 mg total) by mouth 3 (three) times daily as needed for Nausea or Dizziness. 30 tablet 2    metoprolol succinate (TOPROL-XL) 25 MG 24 hr tablet Take 1 tablet (25 mg total) by mouth every evening. 90 tablet 3    nystatin (NYAMYC) powder Apply to affected area every morning as needed for flare under breasts 120 g 6    pantoprazole (PROTONIX) 40 MG tablet Take 1 tablet (40 mg total) by  mouth once daily. 30 tablet 3    PROTOPIC 0.1 % ointment AAA bid 100 g 2    ruxolitinib (OPZELURA) 1.5 % Crea Apply to affected area twice daily to neck and hands for vitiligo 60 g 2    triamcinolone acetonide 0.025% (KENALOG) 0.025 % cream Apply to affected area every day to twice a day to neck . Not more than 1-2 weeks straight in same location 80 g 1    ZINC ORAL Take by mouth.      cyanocobalamin, vitamin B-12, (VITAMIN B-12 ORAL) Take by mouth.      cycloSPORINE (RESTASIS) 0.05 % ophthalmic emulsion Apply 1 drop into both eyes twice a day (Patient not taking: Reported on 3/7/2024) 180 vial 3    cycloSPORINE (RESTASIS) 0.05 % ophthalmic emulsion Apply 1 drop into both eyes twice a day 180 each 3    flu vacc [id number] 6mos up,PF, (FLUARIX QUAD [id number], PF,) 60 mcg (15 mcg x 4)/0.5 mL Syrg use as directed (Patient not taking: Reported on 3/7/2024) 0.5 mL 0    fluticasone propion-salmeterol 115-21 mcg/dose (ADVAIR HFA) 115-21 mcg/actuation HFAA inhaler Inhale 2 puffs into the lungs every 12 (twelve) hours. 12 g 11    leflunomide (ARAVA) 20 MG Tab Take 1 tablet (20 mg total) by mouth once daily. 90 tablet 3    promethazine (PHENERGAN) 25 MG tablet Take 1 tablet (25 mg total) by mouth every 6 (six) hours as needed for Nausea. 25 tablet 0    ruxolitinib (OPZELURA) 1.5 % Crea aaa bid 60 g 2    triamcinolone acetonide 0.1% (KENALOG) 0.1 % ointment Apply topically 2 (two) times daily. For 2 weeks 15 g 1    [DISCONTINUED] irbesartan (AVAPRO) 150 MG tablet Take 1 tablet (150 mg total) by mouth every evening. 90 tablet 3     No facility-administered encounter medications on file as of 3/7/2024.     No orders of the defined types were placed in this encounter.      Plan:

## 2024-03-08 ENCOUNTER — CLINICAL SUPPORT (OUTPATIENT)
Dept: REHABILITATION | Facility: HOSPITAL | Age: 64
End: 2024-03-08
Payer: MEDICARE

## 2024-03-08 DIAGNOSIS — R29.898 DECREASED STRENGTH OF LOWER EXTREMITY: ICD-10-CM

## 2024-03-08 DIAGNOSIS — R68.89 DECREASED FUNCTIONAL ACTIVITY TOLERANCE: Primary | ICD-10-CM

## 2024-03-08 PROCEDURE — 97110 THERAPEUTIC EXERCISES: CPT | Mod: PN

## 2024-03-08 NOTE — PROGRESS NOTES
"[facility]   Physical Therapy Discharge/Treatment Note      Name: Silvia Cervantes  Clinic Number: [id number]    Therapy Diagnosis:   Encounter Diagnoses   Name Primary?    Decreased functional activity tolerance Yes    Decreased strength of lower extremity      Physician: Albino Carvajal MD    Visit Date: 3/8/2024     Physician Orders: PT Eval and Treat   Medical Diagnosis from Referral: M62.81 (ICD-10-CM) - Quadriceps weakness M25.562 (ICD-10-CM) - Acute pain of left knee  Evaluation Date: 1/31/2024  Authorization Period Expiration: 12/31/2024  Plan of Care Expiration: 3/15/24  Progress Note Due: 3/15/24  Visit # / Visits authorized: 1/ 1  10/10  FOTO: 10 NEXT     Precautions: Standard      Time In: 9:00 am  Time Out: 10:05 am  Total Appointment Time (timed & untimed codes): 30 minutes (2 TE) **    Subjective     Patient reports: "It's better". Is agreeable to discharge this session.  She was compliant with home exercise program.  Response to previous treatment: none  Functional change: attempted light activities at gym    Pain: 0/10  Location: left knee     Objective    3/8/24    L/E Strength w/ MicroFET Muscle Darlene Dynamometer  Left Pain/Dysfunction with Movement   (approx 4 sec hold w/ max contraction)   Hip Flexion  7.4 kg     Hip Abduction  5.9 kg     Quadriceps  15.7 kg     Hamstrings  8.1 kg       Stairs- 3 laps- no difficulty  STS 30" 11 reps  Treatment     Silvia received the treatments listed below:      neuromuscular re-education activities to improve: Balance, Coordination, and Proprioception for 30 minutes. The following activities were included:    Leg press 3 x12 6 plates seat 5- 45 second breaks  Cybex LAQ 3x12 -45 seconds breaks  Cybex HSC 3x12 -45 second breaks   ** objective measures**    Patient Education and Home Exercises       Education provided:   - Pt educated on importance of performing HEP and using modalities such as heat/ice to manage exacerbation of pain. "     Written Home Exercises Provided: yes. Exercises were reviewed and Silvia was able to demonstrate them prior to the end of the session.  Silvia demonstrated fair  understanding of the education provided. See Electronic Medical Record under Patient Instructions for exercises provided during therapy sessions    Assessment     Silvia is a 63 y.o. female referred to outpatient Physical Therapy with a medical diagnosis of M62.81 (ICD-10-CM) - Quadriceps weakness M25.562 (ICD-10-CM) - Acute pain of left knee. Patient presents with L knee pain, decreased strength of LLE, TTP to joint line. Positive special testing. Potential for meniscal pathology. She has difficulty functional tasks such as squatting, sitting/standing for prolonged periods of time. She would like to get back to regular exercise when discharged from PT. She has had a prior accident where she has had weak LLE and numbness and tingling down entire L side. Significant weakness compared to R side. Pt was given Hep and advised to perform within tolerance. Educated to use modalities to help manage pain/soreness.     Pt discharged from PT. She has met most set forth on initial evaluation. Was given advanced HEP and discussed appropriate progressions and rest time. Pt was advised to reach out to PCP is symptoms return. She will return to the gym and fitness classes at her local gym.      Silvia Is progressing well towards her goals.   Patient prognosis is Good.     Patient will continue to benefit from skilled outpatient physical therapy to address the deficits listed in the problem list box on initial evaluation, provide pt/family education and to maximize pt's level of independence in the home and community environment.     Patient's spiritual, cultural and educational needs considered and pt agreeable to plan of care and goals.     Anticipated barriers to physical therapy:     Short Term Goals (3 Weeks):   1. Patient will be independent with home exercise  program to supplement physical therapy treatment in improving functional status. Met 3/8/24  2. Patient will improve L lower extremity strength to at least 75% of R lower extremity strength as measured via MicroFet handheld dynamometer to improve strength for closed chain tasks. 3/8/24  3. Patient will perform timed up and go with less restrictive assistive device in < Test first F/U seconds to improve gait speed and safety with community ambulation. 3/8/25     Long Term Goals (6 Weeks):   1. Patient will improve L lower extremity strength to at least 90% of R lower extremity strength as measured via MicroFet handheld dynamometer to improve strength for closed chain tasks. 3/8/24  2. Patient will improve the total FOTO Knee Survey Score to </= F/U % limited to demonstrate increased perceived functional mobility. 3/8/24  3. Patient will perform at least 12 sit to stands in 30 seconds without UE support to demonstrate increased functional strength. 3/8/ 24  4. Pt will be able to navigate 10 steps to improve functional strength and ability to traverse the community with out difficulty. Met 3/8/24  5. Patient goal: get back to exercise classes at the gym. Met 3/8/24    Plan     Plan of care Certification: 1/31/2024 to 3/15/24.     Outpatient Physical Therapy 1-2 times weekly for 6 weeks to include the following interventions: Gait Training, Manual Therapy, Moist Heat/ Ice, Neuromuscular Re-ed, Patient Education, Therapeutic Activities, and Therapeutic Exercise.     Royal Baca, PT

## 2024-03-11 PROBLEM — R68.89 DECREASED FUNCTIONAL ACTIVITY TOLERANCE: Status: RESOLVED | Noted: 2024-02-05 | Resolved: 2024-03-11

## 2024-03-11 PROBLEM — R29.898 DECREASED STRENGTH OF LOWER EXTREMITY: Status: RESOLVED | Noted: 2024-02-05 | Resolved: 2024-03-11

## 2024-03-13 ENCOUNTER — PATIENT MESSAGE (OUTPATIENT)
Dept: PODIATRY | Facility: CLINIC | Age: 64
End: 2024-03-13
Payer: MEDICARE

## 2024-03-13 ENCOUNTER — PATIENT MESSAGE (OUTPATIENT)
Dept: ORTHOPEDICS | Facility: CLINIC | Age: 64
End: 2024-03-13
Payer: MEDICARE

## 2024-03-14 ENCOUNTER — PATIENT MESSAGE (OUTPATIENT)
Dept: RHEUMATOLOGY | Facility: CLINIC | Age: 64
End: 2024-03-14
Payer: MEDICARE

## 2024-03-18 DIAGNOSIS — Z79.899 IMMUNODEFICIENCY DUE TO DRUG THERAPY: Primary | ICD-10-CM

## 2024-03-18 DIAGNOSIS — D84.821 IMMUNODEFICIENCY DUE TO DRUG THERAPY: Primary | ICD-10-CM

## 2024-03-21 ENCOUNTER — LAB VISIT (OUTPATIENT)
Dept: LAB | Facility: HOSPITAL | Age: 64
End: 2024-03-21
Attending: INTERNAL MEDICINE
Payer: MEDICARE

## 2024-03-21 DIAGNOSIS — D84.821 IMMUNODEFICIENCY DUE TO DRUG THERAPY: ICD-10-CM

## 2024-03-21 DIAGNOSIS — Z79.899 IMMUNODEFICIENCY DUE TO DRUG THERAPY: ICD-10-CM

## 2024-03-21 PROCEDURE — 86480 TB TEST CELL IMMUN MEASURE: CPT | Performed by: INTERNAL MEDICINE

## 2024-03-22 LAB
GAMMA INTERFERON BACKGROUND BLD IA-ACNC: 0.04 IU/ML
M TB IFN-G CD4+ BCKGRND COR BLD-ACNC: 0 IU/ML
M TB IFN-G CD4+ BCKGRND COR BLD-ACNC: 0.01 IU/ML
MITOGEN IGNF BCKGRD COR BLD-ACNC: 8.29 IU/ML
TB GOLD PLUS: NEGATIVE

## 2024-03-27 ENCOUNTER — HOSPITAL ENCOUNTER (OUTPATIENT)
Dept: PULMONOLOGY | Facility: HOSPITAL | Age: 64
Discharge: HOME | End: 2024-03-27
Attending: INTERNAL MEDICINE
Payer: MEDICARE

## 2024-03-27 ENCOUNTER — HOSPITAL ENCOUNTER (OUTPATIENT)
Dept: RADIOLOGY | Facility: HOSPITAL | Age: 64
Discharge: HOME | End: 2024-03-27
Attending: INTERNAL MEDICINE
Payer: MEDICARE

## 2024-03-27 DIAGNOSIS — D86.9 SARCOIDOSIS: ICD-10-CM

## 2024-03-27 PROCEDURE — 71250 CT THORAX DX C-: CPT | Mod: 26,,, | Performed by: RADIOLOGY

## 2024-03-27 PROCEDURE — 94727 GAS DIL/WSHOT DETER LNG VOL: CPT

## 2024-03-27 PROCEDURE — 94010 BREATHING CAPACITY TEST: CPT

## 2024-03-27 PROCEDURE — 94729 DIFFUSING CAPACITY: CPT

## 2024-03-27 PROCEDURE — 71250 CT THORAX DX C-: CPT | Mod: TC

## 2024-04-01 ENCOUNTER — OFFICE VISIT (OUTPATIENT)
Dept: URGENT CARE | Facility: CLINIC | Age: 64
End: 2024-04-01
Payer: MEDICARE

## 2024-04-01 ENCOUNTER — TELEPHONE (OUTPATIENT)
Dept: FAMILY MEDICINE | Facility: CLINIC | Age: 64
End: 2024-04-01
Payer: MEDICARE

## 2024-04-01 VITALS
OXYGEN SATURATION: 95 % | BODY MASS INDEX: 28.14 KG/M2 | SYSTOLIC BLOOD PRESSURE: 140 MMHG | TEMPERATURE: 98 F | WEIGHT: 149.06 LBS | HEIGHT: 61 IN | RESPIRATION RATE: 17 BRPM | DIASTOLIC BLOOD PRESSURE: 80 MMHG | HEART RATE: 93 BPM

## 2024-04-01 DIAGNOSIS — J02.9 ACUTE PHARYNGITIS, UNSPECIFIED ETIOLOGY: Primary | ICD-10-CM

## 2024-04-01 DIAGNOSIS — J02.9 SORE THROAT: ICD-10-CM

## 2024-04-01 LAB
BRPFT: ABNORMAL
CTP QC/QA: YES
CTP QC/QA: YES
DLCO ADJ PRE: 14.59 ML/(MIN*MMHG) (ref 14.58–26.05)
DLCO SINGLE BREATH LLN: 14.58
DLCO SINGLE BREATH PRE REF: 71.4 %
DLCO SINGLE BREATH REF: 20.31
DLCOC SBVA LLN: 2.93
DLCOC SBVA PRE REF: 97.2 %
DLCOC SBVA REF: 4.58
DLCOC SINGLE BREATH LLN: 14.58
DLCOC SINGLE BREATH PRE REF: 71.8 %
DLCOC SINGLE BREATH REF: 20.31
DLCOVA LLN: 2.93
DLCOVA PRE REF: 96.6 %
DLCOVA PRE: 4.42 ML/(MIN*MMHG*L) (ref 2.93–6.22)
DLCOVA REF: 4.58
DLVAADJ PRE: 4.45 ML/(MIN*MMHG*L) (ref 2.93–6.22)
ERVN2 LLN: -16449.29
ERVN2 PRE REF: 69.2 %
ERVN2 PRE: 0.49 L (ref -16449.29–16450.71)
ERVN2 REF: 0.71
FEF 25 75 LLN: 0.96
FEF 25 75 PRE REF: 58.9 %
FEF 25 75 REF: 1.96
FEV1 FVC LLN: 67
FEV1 FVC PRE REF: 92.6 %
FEV1 FVC REF: 79
FEV1 LLN: 1.6
FEV1 PRE REF: 78.6 %
FEV1 REF: 2.15
FRCN2 LLN: 1.71
FRCN2 PRE REF: 62.2 %
FRCN2 REF: 2.54
FVC LLN: 2.03
FVC PRE REF: 84.5 %
FVC REF: 2.72
IVC PRE: 2.15 L (ref 2.03–3.41)
IVC SINGLE BREATH LLN: 2.03
IVC SINGLE BREATH PRE REF: 78.9 %
IVC SINGLE BREATH REF: 2.72
MOLECULAR STREP A: NEGATIVE
PEF LLN: 3.51
PEF PRE REF: 109 %
PEF REF: 5.34
PRE DLCO: 14.5 ML/(MIN*MMHG) (ref 14.58–26.05)
PRE FEF 25 75: 1.15 L/S (ref 0.96–2.95)
PRE FET 100: 6.98 SEC
PRE FEV1 FVC: 73.35 % (ref 66.65–91.83)
PRE FEV1: 1.69 L (ref 1.6–2.7)
PRE FRC N2: 1.58 L
PRE FVC: 2.3 L (ref 2.03–3.41)
PRE PEF: 5.82 L/S (ref 3.51–7.16)
RVN2 LLN: 1.25
RVN2 PRE REF: 59.5 %
RVN2 PRE: 1.09 L (ref 1.25–2.41)
RVN2 REF: 1.83
RVN2TLCN2 LLN: 31.13
RVN2TLCN2 PRE REF: 78.8 %
RVN2TLCN2 PRE: 32.09 % (ref 31.13–50.31)
RVN2TLCN2 REF: 40.72
SARS-COV-2 AG RESP QL IA.RAPID: NEGATIVE
TLCN2 LLN: 3.45
TLCN2 PRE REF: 76.4 %
TLCN2 PRE: 3.39 L (ref 3.45–5.43)
TLCN2 REF: 4.44
VA PRE: 3.28 L (ref 4.29–4.29)
VA SINGLE BREATH LLN: 4.29
VA SINGLE BREATH PRE REF: 76.5 %
VA SINGLE BREATH REF: 4.29
VCMAXN2 LLN: 2.03
VCMAXN2 PRE REF: 84.5 %
VCMAXN2 PRE: 2.3 L (ref 2.03–3.41)
VCMAXN2 REF: 2.72

## 2024-04-01 PROCEDURE — 87811 SARS-COV-2 COVID19 W/OPTIC: CPT | Mod: QW,S$GLB,, | Performed by: NURSE PRACTITIONER

## 2024-04-01 PROCEDURE — 94727 GAS DIL/WSHOT DETER LNG VOL: CPT | Mod: 26,,, | Performed by: INTERNAL MEDICINE

## 2024-04-01 PROCEDURE — 99203 OFFICE O/P NEW LOW 30 MIN: CPT | Mod: S$GLB,,, | Performed by: NURSE PRACTITIONER

## 2024-04-01 PROCEDURE — 94729 DIFFUSING CAPACITY: CPT | Mod: 26,,, | Performed by: INTERNAL MEDICINE

## 2024-04-01 PROCEDURE — 87651 STREP A DNA AMP PROBE: CPT | Mod: QW,S$GLB,, | Performed by: NURSE PRACTITIONER

## 2024-04-01 PROCEDURE — 94010 BREATHING CAPACITY TEST: CPT | Mod: 26,,, | Performed by: INTERNAL MEDICINE

## 2024-04-01 NOTE — PROGRESS NOTES
Subjective:      Patient ID: Silvia Cervantes is a 63 y.o. female.    Chief Complaint: Sore Throat    HPI  ROS  Objective:     Physical Exam   Assessment:      No diagnosis found.  Plan:                 No follow-ups on file.

## 2024-04-01 NOTE — PROGRESS NOTES
"Subjective:      Patient ID: Silvia Cervantes is a 63 y.o. female.    Vitals:  height is 5' 1" (1.549 m) and weight is 67.6 kg (149 lb 0.5 oz). Her oral temperature is 98.2 °F (36.8 °C). Her blood pressure is 140/80 (abnormal) and her pulse is 93. Her respiration is 17 and oxygen saturation is 95%.     Chief Complaint: Sore Throat    Pt coming into clinic with body aches, sore throat, sx started 3 days ago, treatment includes Advil, sore throat spray with mild relief.    Sore Throat   This is a new problem. The current episode started in the past 7 days. The problem has been unchanged. There has been no fever. The pain is at a severity of 6/10. The pain is moderate. Associated symptoms include trouble swallowing. Pertinent negatives include no abdominal pain, congestion, coughing, diarrhea, drooling, ear discharge, ear pain, headaches, hoarse voice, plugged ear sensation, neck pain, shortness of breath, stridor, swollen glands or vomiting. She has had no exposure to strep or mono. She has tried NSAIDs for the symptoms. The treatment provided mild relief.     Constitution: Negative for chills, fatigue and fever.   HENT:  Positive for sore throat and trouble swallowing. Negative for ear pain, ear discharge, drooling and congestion.    Neck: Negative for neck pain.   Respiratory:  Negative for cough, shortness of breath and stridor.    Gastrointestinal:  Negative for abdominal pain, vomiting and diarrhea.   Neurological:  Negative for headaches.      Objective:     Physical Exam   Constitutional: She is oriented to person, place, and time. She appears well-developed. She is cooperative.  Non-toxic appearance. She does not appear ill. No distress.   HENT:   Head: Normocephalic and atraumatic.   Ears:   Right Ear: Hearing, tympanic membrane, external ear and ear canal normal.   Left Ear: Hearing, tympanic membrane, external ear and ear canal normal.   Nose: Nose normal. No mucosal edema, rhinorrhea or nasal deformity. No " epistaxis. Right sinus exhibits no maxillary sinus tenderness and no frontal sinus tenderness. Left sinus exhibits no maxillary sinus tenderness and no frontal sinus tenderness.   Mouth/Throat: Uvula is midline and mucous membranes are normal. No trismus in the jaw. Normal dentition. No uvula swelling. Posterior oropharyngeal erythema present. No oropharyngeal exudate, posterior oropharyngeal edema or tonsillar abscesses. Tonsils are 1+ on the right. Tonsils are 1+ on the left. No tonsillar exudate.   Eyes: Conjunctivae and lids are normal. No scleral icterus.   Neck: Trachea normal and phonation normal. Neck supple. No edema present. No erythema present. No neck rigidity present.   Cardiovascular: Normal rate, regular rhythm, normal heart sounds and normal pulses.   Pulmonary/Chest: Effort normal and breath sounds normal. No respiratory distress. She has no decreased breath sounds. She has no rhonchi.   Abdominal: Normal appearance.   Musculoskeletal: Normal range of motion.         General: No deformity. Normal range of motion.   Lymphadenopathy:     She has no cervical adenopathy.   Neurological: She is alert and oriented to person, place, and time. She exhibits normal muscle tone. Coordination normal.   Skin: Skin is warm, dry, intact, not diaphoretic and not pale.   Psychiatric: Her speech is normal and behavior is normal. Judgment and thought content normal.   Nursing note and vitals reviewed.      Assessment:     1. Acute pharyngitis, unspecified etiology    2. Sore throat        Plan:     Results for orders placed or performed in visit on 04/01/24   POCT Strep A, Molecular   Result Value Ref Range    Molecular Strep A, POC Negative Negative     Acceptable Yes    SARS Coronavirus 2 Antigen, POCT Manual Read   Result Value Ref Range    SARS Coronavirus 2 Antigen Negative Negative     Acceptable Yes      *Note: Due to a large number of results and/or encounters for the requested  time period, some results have not been displayed. A complete set of results can be found in Results Review.       Acute pharyngitis, unspecified etiology    Sore throat  -     POCT Strep A, Molecular  -     SARS Coronavirus 2 Antigen, POCT Manual Read      Patient Instructions                                                         Pharyngitis   If your condition worsens or fails to improve we recommend that you receive another evaluation at the ER immediately or contact your PCP to discuss your concerns or return here. You must understand that you've received an urgent care treatment only and that you may be released before all your medical problems are known or treated. You the patient will arrange for followup care as instructed.   If the strept culture was done and returns negative in 3-5 days and you are still having a sore throat, you may need to get a mono spot test done or repeated.   Tylenol or ibuprofen for pain may help as long as you are not allergic to these meds or have a medical condition such as stomach ulcers, liver or kidney disease or taking blood thinners etc that would   prevent you from using these medications.   Rest and fluids will help as well.   If you were prescribed antibiotics and are female and on BCP use additional methods to prevent pregnancy while on the antibiotics and for one cycle after

## 2024-04-01 NOTE — TELEPHONE ENCOUNTER
----- Message from Gabriella Nguyen sent at 4/1/2024  8:10 AM CDT -----  Type:  Same Day Appointment Request    Caller is requesting a same day appointment.  Caller declined first available appointment listed below.    Name of Caller: Silvia Cervantes  When is the first available appointment? 4/3/24  Symptoms: Body aches/sore throat  Best Call Back Number: [phone]  Additional Information:

## 2024-04-02 ENCOUNTER — OFFICE VISIT (OUTPATIENT)
Dept: INTERNAL MEDICINE | Facility: CLINIC | Age: 64
End: 2024-04-02
Payer: MEDICARE

## 2024-04-02 VITALS
TEMPERATURE: 98 F | OXYGEN SATURATION: 97 % | HEART RATE: 88 BPM | BODY MASS INDEX: 28.31 KG/M2 | HEIGHT: 61 IN | WEIGHT: 149.94 LBS | RESPIRATION RATE: 16 BRPM | SYSTOLIC BLOOD PRESSURE: 136 MMHG | DIASTOLIC BLOOD PRESSURE: 88 MMHG

## 2024-04-02 DIAGNOSIS — J06.9 UPPER RESPIRATORY TRACT INFECTION, UNSPECIFIED TYPE: Primary | ICD-10-CM

## 2024-04-02 DIAGNOSIS — J03.90 ACUTE TONSILLITIS, UNSPECIFIED ETIOLOGY: ICD-10-CM

## 2024-04-02 DIAGNOSIS — J35.8 TONSILLITH: ICD-10-CM

## 2024-04-02 PROCEDURE — 3079F DIAST BP 80-89 MM HG: CPT | Mod: CPTII,S$GLB,, | Performed by: NURSE PRACTITIONER

## 2024-04-02 PROCEDURE — 3075F SYST BP GE 130 - 139MM HG: CPT | Mod: CPTII,S$GLB,, | Performed by: NURSE PRACTITIONER

## 2024-04-02 PROCEDURE — 99213 OFFICE O/P EST LOW 20 MIN: CPT | Mod: S$GLB,,, | Performed by: NURSE PRACTITIONER

## 2024-04-02 PROCEDURE — 4010F ACE/ARB THERAPY RXD/TAKEN: CPT | Mod: CPTII,S$GLB,, | Performed by: NURSE PRACTITIONER

## 2024-04-02 PROCEDURE — 1159F MED LIST DOCD IN RCRD: CPT | Mod: CPTII,S$GLB,, | Performed by: NURSE PRACTITIONER

## 2024-04-02 PROCEDURE — 3008F BODY MASS INDEX DOCD: CPT | Mod: CPTII,S$GLB,, | Performed by: NURSE PRACTITIONER

## 2024-04-02 PROCEDURE — 1160F RVW MEDS BY RX/DR IN RCRD: CPT | Mod: CPTII,S$GLB,, | Performed by: NURSE PRACTITIONER

## 2024-04-02 PROCEDURE — 99999 PR PBB SHADOW E&M-EST. PATIENT-LVL V: CPT | Mod: PBBFAC,,, | Performed by: NURSE PRACTITIONER

## 2024-04-02 NOTE — ASSESSMENT & PLAN NOTE
We discussed the pathology of tonsilliths.  Patient reports history of this in her youth.    Recommend vigorous oral care such as gargling after each meal and warm salt water gargles for her acute tonsillitis.  Reassurance given that her tonsils, although mildly erythematous are not close to closing up on.  Follow up as

## 2024-04-02 NOTE — PROGRESS NOTES
Subjective     Patient ID: Silvia Cervantes is a 63 y.o. female.    Chief Complaint: Sore Throat    Patient in office for evaluation and management of white spots on the back of her tonsils which she identified last night.  Patient was recently evaluated at urgent care, yesterday for upper respiratory symptoms.  She tested negative for strep and COVID.  During the afternoon she noticed some white spots on her tonsils and was recommended for an in office evaluation.  Patient continues to complain of a sore throat, postnasal drip, body aches and sinus congestion.  She denies any fever and maintains that her appetite is healthy.    Sore Throat     Review of Systems   HENT:  Positive for postnasal drip, rhinorrhea and sore throat.       Objective   Physical Exam  Vitals reviewed.   Constitutional:       Appearance: Normal appearance.   HENT:      Head: Normocephalic and atraumatic.      Nose: Congestion and rhinorrhea present.      Mouth/Throat:      Pharynx: Posterior oropharyngeal erythema present.      Tonsils: 1+ on the right. 1+ on the left.      Comments: Tonsil stones bilaterally.   Foul odour  Eyes:      Conjunctiva/sclera: Conjunctivae normal.      Pupils: Pupils are equal, round, and reactive to light.   Cardiovascular:      Rate and Rhythm: Normal rate and regular rhythm.   Pulmonary:      Effort: Pulmonary effort is normal.      Breath sounds: Normal breath sounds.   Abdominal:      General: Bowel sounds are normal.      Palpations: Abdomen is soft.   Musculoskeletal:         General: Normal range of motion.      Cervical back: Normal range of motion and neck supple.   Skin:     General: Skin is warm.   Neurological:      General: No focal deficit present.   Psychiatric:         Mood and Affect: Mood normal.        Assessment and Plan     1. Upper respiratory tract infection, unspecified type  Assessment & Plan:  Reassurance given that she was negative for strep and COVID.  Continue with Flonase, warm  saltwater gargles and Advil for symptom relief.  If symptoms do not improve within the next 7-10 days from onset of symptoms she is recommended to follow up PCP.      2. Acute tonsillitis, unspecified etiology    3. Tonsillith  Assessment & Plan:  We discussed the pathology of tonsilliths.  Patient reports history of this in her youth.    Recommend vigorous oral care such as gargling after each meal and warm salt water gargles for her acute tonsillitis.  Reassurance given that her tonsils, although mildly erythematous are not close to closing up on.  Follow up as      RTC PRN.

## 2024-04-02 NOTE — ASSESSMENT & PLAN NOTE
Reassurance given that she was negative for strep and COVID.  Continue with Flonase, warm saltwater gargles and Advil for symptom relief.  If symptoms do not improve within the next 7-10 days from onset of symptoms she is recommended to follow up PCP.

## 2024-04-03 ENCOUNTER — PATIENT MESSAGE (OUTPATIENT)
Dept: OTOLARYNGOLOGY | Facility: CLINIC | Age: 64
End: 2024-04-03

## 2024-04-03 ENCOUNTER — OFFICE VISIT (OUTPATIENT)
Dept: OTOLARYNGOLOGY | Facility: CLINIC | Age: 64
End: 2024-04-03
Payer: MEDICARE

## 2024-04-03 VITALS
DIASTOLIC BLOOD PRESSURE: 86 MMHG | SYSTOLIC BLOOD PRESSURE: 131 MMHG | HEART RATE: 82 BPM | WEIGHT: 148.69 LBS | BODY MASS INDEX: 28.1 KG/M2

## 2024-04-03 DIAGNOSIS — J02.9 SORE THROAT: Primary | ICD-10-CM

## 2024-04-03 DIAGNOSIS — J03.90 TONSILLITIS: ICD-10-CM

## 2024-04-03 LAB
CTP QC/QA: YES
S PYO RRNA THROAT QL PROBE: NEGATIVE

## 2024-04-03 PROCEDURE — 99999 PR PBB SHADOW E&M-EST. PATIENT-LVL IV: CPT | Mod: PBBFAC,,, | Performed by: OTOLARYNGOLOGY

## 2024-04-03 PROCEDURE — 3075F SYST BP GE 130 - 139MM HG: CPT | Mod: CPTII,S$GLB,, | Performed by: OTOLARYNGOLOGY

## 2024-04-03 PROCEDURE — 1159F MED LIST DOCD IN RCRD: CPT | Mod: CPTII,S$GLB,, | Performed by: OTOLARYNGOLOGY

## 2024-04-03 PROCEDURE — 87070 CULTURE OTHR SPECIMN AEROBIC: CPT | Performed by: OTOLARYNGOLOGY

## 2024-04-03 PROCEDURE — 99213 OFFICE O/P EST LOW 20 MIN: CPT | Mod: 25,S$GLB,, | Performed by: OTOLARYNGOLOGY

## 2024-04-03 PROCEDURE — 3079F DIAST BP 80-89 MM HG: CPT | Mod: CPTII,S$GLB,, | Performed by: OTOLARYNGOLOGY

## 2024-04-03 PROCEDURE — 4010F ACE/ARB THERAPY RXD/TAKEN: CPT | Mod: CPTII,S$GLB,, | Performed by: OTOLARYNGOLOGY

## 2024-04-03 PROCEDURE — 1160F RVW MEDS BY RX/DR IN RCRD: CPT | Mod: CPTII,S$GLB,, | Performed by: OTOLARYNGOLOGY

## 2024-04-03 PROCEDURE — 87880 STREP A ASSAY W/OPTIC: CPT | Mod: QW,S$GLB,, | Performed by: OTOLARYNGOLOGY

## 2024-04-03 PROCEDURE — 3008F BODY MASS INDEX DOCD: CPT | Mod: CPTII,S$GLB,, | Performed by: OTOLARYNGOLOGY

## 2024-04-03 PROCEDURE — 96372 THER/PROPH/DIAG INJ SC/IM: CPT | Mod: S$GLB,,, | Performed by: OTOLARYNGOLOGY

## 2024-04-03 RX ORDER — AMOXICILLIN AND CLAVULANATE POTASSIUM 875; 125 MG/1; MG/1
1 TABLET, FILM COATED ORAL 2 TIMES DAILY
Qty: 20 TABLET | Refills: 0 | Status: SHIPPED | OUTPATIENT
Start: 2024-04-03 | End: 2024-04-13

## 2024-04-03 RX ORDER — METHYLPREDNISOLONE ACETATE 40 MG/ML
40 INJECTION, SUSPENSION INTRA-ARTICULAR; INTRALESIONAL; INTRAMUSCULAR; SOFT TISSUE
Status: COMPLETED | OUTPATIENT
Start: 2024-04-03 | End: 2024-04-03

## 2024-04-03 RX ADMIN — METHYLPREDNISOLONE ACETATE 40 MG: 40 INJECTION, SUSPENSION INTRA-ARTICULAR; INTRALESIONAL; INTRAMUSCULAR; SOFT TISSUE at 01:04

## 2024-04-03 NOTE — PROGRESS NOTES
Chief Complaint   Patient presents with    Sore Throat     Sore throat and also white spots in the back of throat    .     HPI: Silvia Cervantes is a 63 y.o. female who was self-referred for a sore throat. She reports it started about 3 days ago acutely. She reports pain with swallowing. She noticed some white spots on her tonsils She has been to urgent care 2 days ago and rapid strep and COVID were negative.  She was advised to use salt water gargles, advil, and honey tea. She feels that it is the same since onset. She does feel she has had body aches. She denies fever.         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)      Social History     Socioeconomic History    Marital status:    Tobacco Use    Smoking status: Never    Smokeless tobacco: Never   Substance and Sexual Activity    Alcohol use: Yes     Comment: Rarely    Drug use: Not Currently    Sexual activity: Yes     Partners: Male     Birth control/protection: Surgical     Comment: [facility]     Social Determinants of Health     Financial Resource Strain: Low Risk  (2/21/2022)    Overall Financial Resource Strain (CARDIA)     Difficulty of Paying Living Expenses: Not hard at all   Food Insecurity: No Food Insecurity (2/21/2022)    Hunger Vital Sign     Worried About Running Out of Food in the Last Year: Never true     Ran Out of Food in the Last Year: Never true   Transportation Needs: No  Transportation Needs (2022)    PRAPARE - Transportation     Lack of Transportation (Medical): No     Lack of Transportation (Non-Medical): No   Physical Activity: Sufficiently Active (2022)    Exercise Vital Sign     Days of Exercise per Week: 7 days     Minutes of Exercise per Session: 120 min   Stress: No Stress Concern Present (2022)    [employer] [place] [employer] - Occupational Stress Questionnaire     Feeling of Stress : Only a little   Social Connections: Moderately Isolated (2022)    Social Connection and Isolation Panel [NHANES]     Frequency of Communication with Friends and Family: More than three times a week     Frequency of Social Gatherings with Friends and Family: More than three times a week     Attends Sabianism Services: More than 4 times per year     Active Member of Clubs or Organizations: No     Attends Club or Organization Meetings: Never     Marital Status:    Housing Stability: Low Risk  (2022)    Housing Stability Vital Sign     Unable to Pay for Housing in the Last Year: No     Number of Places Lived in the Last Year: 1     Unstable Housing in the Last Year: No     Past Surgical History:   Procedure Laterality Date     SECTION      COLONOSCOPY N/A 2019    Procedure: COLONOSCOPY/judeytley ;  Surgeon: Dimas Woodall MD;  Location: [facility];  Service: Endoscopy;  Laterality: N/A;    ESOPHAGOGASTRODUODENOSCOPY N/A 2023    Procedure: EGD (ESOPHAGOGASTRODUODENOSCOPY);  Surgeon: Ori St MD;  Location: [facility];  Service: Endoscopy;  Laterality: N/A;    EXCISION OF NODULE Right 2022    Procedure: EXCISION, NODULE right elbow;  Surgeon: Louise Smith MD;  Location: [facility] OR;  Service: Orthopedics;  Laterality: Right;    HYSTERECTOMY      INJECTION OF STEROID Left 2022    Procedure: INJECTION, STEROID Thumb MP joint;  Surgeon: Louise Smith MD;  Location: [facility] OR;  Service: Orthopedics;  Laterality: Left;    SINUS  SURGERY      SURGICAL REMOVAL OF DISTAL ULNA Right 6/30/2022    Procedure: EXCISION, ULNA, DISTAL;  Surgeon: Louise Smith MD;  Location: [facility] OR;  Service: Orthopedics;  Laterality: Right;    SYNOVECTOMY OF WRIST Right 6/30/2022    Procedure: SYNOVECTOMY, WRIST;  Surgeon: Louise Smith MD;  Location: [facility] OR;  Service: Orthopedics;  Laterality: Right;    TUBAL LIGATION       Family History   Problem Relation Age of Onset    Hypertension Mother     Cataracts Mother     Diabetes Father     Hypertension Father     Cataracts Father     Heart disease Father     Hyperlipidemia Father     Diabetes Sister     Stroke Brother     Hypertension Brother     No Known Problems Daughter     No Known Problems Son     Melanoma Neg Hx     Breast cancer Neg Hx     Colon cancer Neg Hx     Ovarian cancer Neg Hx     Blindness Neg Hx     Glaucoma Neg Hx     Aneurysm Neg Hx     Cancer Neg Hx     Clotting disorder Neg Hx     Dementia Neg Hx     Fainting Neg Hx     Kidney disease Neg Hx     Liver disease Neg Hx     Migraines Neg Hx     Neuropathy Neg Hx     Parkinsonism Neg Hx     Seizures Neg Hx     Tremor Neg Hx            Review of Systems  General: negative for chills, fever or weight loss  Psychological: negative for mood changes or depression  Ophthalmic: negative for blurry vision, photophobia or eye pain  ENT: see HPI  Respiratory: no cough, shortness of breath, or wheezing  Cardiovascular: no chest pain or dyspnea on exertion  Gastrointestinal: no abdominal pain, change in bowel habits, or black/ bloody stools  Musculoskeletal: negative for gait disturbance or muscular weakness  Neurological: no syncope or seizures; no ataxia  Dermatological: negative for puritis,  rash and jaundice  Hematologic/lymphatic: no easy bruising, no new lumps or bumps      Physical Exam:    Vitals:    04/03/24 1106   BP: 131/86   Pulse: 82       Constitutional: Well appearing / communicating without difficutly.  NAD.  Eyes: EOM I  Bilaterally  Head/Face: Normocephalic.  Negative paranasal sinus pressure/tenderness.  Salivary glands WNL.  House Brackmann I Bilaterally.    Right Ear: Auricle normal appearance. External Auditory Canal within normal limits no lesions or masses,TM w/o masses/lesions/perforations. TM mobility noted.   Left Ear: Auricle normal appearance. External Auditory Canal within normal limits no lesions or masses,TM w/o masses/lesions/perforations. TM mobility noted.  Nose: No gross nasal septal deviation. Inferior Turbinates 3+ bilaterally. No septal perforation. No masses/lesions. External nasal skin appears normal without masses/lesions.  Oral Cavity: Gingiva/lips within normal limits.  Dentition/gingiva healthy appearing. Mucus membranes moist. Floor of mouth soft, no masses palpated. Oral Tongue mobile. Hard Palate appears normal.    Oropharynx: Base of tongue appears normal. No masses/lesions noted. Tonsillar fossa/pharyngeal wall without lesions. Exudates present on bilateral tonsils. Posterior oropharynx WNL.  Soft palate without masses. Midline uvula.   Neck/Lymphatic: No LAD I-VI bilaterally.  No thyromegaly.  No masses noted on exam.            Assessment:    ICD-10-CM ICD-9-CM    1. Sore throat  J02.9 462 POCT RAPID STREP A      2. Tonsillitis  J03.90 463 Throat culture        The primary encounter diagnosis was Sore throat. A diagnosis of Tonsillitis was also pertinent to this visit.      Plan:  Orders Placed This Encounter   Procedures    Throat culture    POCT RAPID STREP A     Throat culture obtained  Start Augmentin 875mg PO BID for 10 days  Depo IM injection given today  Continue tylenol/advil and OTC throat sprays/lozenges for analgesia.       Carleen Weinre MD

## 2024-04-04 ENCOUNTER — OFFICE VISIT (OUTPATIENT)
Dept: RHEUMATOLOGY | Facility: CLINIC | Age: 64
End: 2024-04-04
Payer: MEDICARE

## 2024-04-04 VITALS
HEIGHT: 61 IN | WEIGHT: 149.25 LBS | DIASTOLIC BLOOD PRESSURE: 92 MMHG | SYSTOLIC BLOOD PRESSURE: 131 MMHG | HEART RATE: 81 BPM | BODY MASS INDEX: 28.18 KG/M2

## 2024-04-04 DIAGNOSIS — M06.9 RHEUMATOID ARTHRITIS INVOLVING MULTIPLE JOINTS: ICD-10-CM

## 2024-04-04 DIAGNOSIS — M79.641 PAIN OF RIGHT HAND: Primary | ICD-10-CM

## 2024-04-04 DIAGNOSIS — D84.821 IMMUNODEFICIENCY DUE TO DRUG THERAPY: ICD-10-CM

## 2024-04-04 DIAGNOSIS — Z79.899 IMMUNODEFICIENCY DUE TO DRUG THERAPY: ICD-10-CM

## 2024-04-04 DIAGNOSIS — D86.9 SARCOIDOSIS: ICD-10-CM

## 2024-04-04 DIAGNOSIS — M79.89 SWELLING OF RIGHT HAND: ICD-10-CM

## 2024-04-04 PROCEDURE — 1159F MED LIST DOCD IN RCRD: CPT | Mod: CPTII,S$GLB,, | Performed by: INTERNAL MEDICINE

## 2024-04-04 PROCEDURE — 99999 PR PBB SHADOW E&M-EST. PATIENT-LVL IV: CPT | Mod: PBBFAC,,, | Performed by: INTERNAL MEDICINE

## 2024-04-04 PROCEDURE — 3075F SYST BP GE 130 - 139MM HG: CPT | Mod: CPTII,S$GLB,, | Performed by: INTERNAL MEDICINE

## 2024-04-04 PROCEDURE — 3008F BODY MASS INDEX DOCD: CPT | Mod: CPTII,S$GLB,, | Performed by: INTERNAL MEDICINE

## 2024-04-04 PROCEDURE — 99215 OFFICE O/P EST HI 40 MIN: CPT | Mod: S$GLB,,, | Performed by: INTERNAL MEDICINE

## 2024-04-04 PROCEDURE — 3080F DIAST BP >= 90 MM HG: CPT | Mod: CPTII,S$GLB,, | Performed by: INTERNAL MEDICINE

## 2024-04-04 PROCEDURE — 4010F ACE/ARB THERAPY RXD/TAKEN: CPT | Mod: CPTII,S$GLB,, | Performed by: INTERNAL MEDICINE

## 2024-04-04 NOTE — PROGRESS NOTES
Subjective:       Patient ID: Silvia Cervantes is a 54 y.o. female.    Chief Complaint:     HPI 53 yo F with PMH of HTN, abnormal pap (2013 but recent negative, no cancer), RA (+CCP, RF),erosive here for evaluation. She was diagnosed in 2011 with hand and feet with swelling and pain. She was initially treated with MTX but it gave her nausea which gave her nausea.  She was changed to leflunomide 20 mg 10/2014 from mtx due to nausea on the mtx. Then I added etanercept January 2015 due to incomplete control of her arthritis on leflunomide.  No am stiffness.  She reports mild aching in hands (3/10). Pain is aching.  Reports mild swelling in hands but better than usual.  She reports left upper quadrant pain (4/10) , non-radiating with possible nausea which has been present for months. Sometimes she has sour taste in mouth.       Interval history:  Patient is here for follow up.   Patient is s/p right rheumatoid nodule excision elbow and wrist, extensor tenosynovectomy and osteophyte excision ulna 6/30/22.   She reports she has been cleared to restart the enbrel. She has not taken enbrel since June 21.   Pain level is 5/10 in hands. Reports some improvement in strength in her hands.          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   HENT: Negative for congestion, ear discharge, ear pain, facial swelling, mouth sores, sinus pressure, sneezing, sore throat, tinnitus and trouble swallowing.    Eyes: Negative for photophobia, pain, discharge, redness, itching and visual disturbance.   Respiratory: Negative for apnea, chest tightness, shortness of breath,  wheezing and stridor.    Cardiovascular: Negative for leg swelling.   Gastrointestinal: Negative for nausea, abdominal pain, diarrhea, constipation, blood in stool, abdominal distention and anal bleeding.   Endocrine: Negative for cold intolerance and heat intolerance.   Genitourinary: Negative for dysuria and difficulty urinating.   Musculoskeletal: Positive for arthralgias. Negative for myalgias, back pain, joint swelling, gait problem, neck pain and neck stiffness.   Skin: Negative for color change, pallor, rash and wound.   Neurological: Negative for dizziness, seizures, light-headedness and numbness.   Hematological: Negative for adenopathy. Does not bruise/bleed easily.   Psychiatric/Behavioral: Negative for sleep disturbance. The patient is not nervous/anxious.       Objective:        Physical Exam   Constitutional: She is oriented to person, place, and time.   HENT:   Head: Normocephalic and atraumatic.   Right Ear: External ear normal.   Left Ear: External ear normal.   Nose: Nose normal.   Mouth/Throat: Oropharynx is clear and moist. No oropharyngeal exudate.   Eyes: Conjunctivae and EOM are normal. Pupils are equal, round, and reactive to light. Right eye exhibits no discharge. Left eye exhibits no discharge. No scleral icterus.   Neck: Neck supple. No JVD present. No thyromegaly present.   Cardiovascular: Normal rate, regular rhythm, normal heart sounds and intact distal pulses.  Exam reveals no gallop and no friction rub.    No murmur heard.  Pulmonary/Chest: Effort normal and breath sounds normal. No respiratory distress. She has no wheezes. She has no rales. She exhibits no tenderness.   Abdominal: Soft. Bowel sounds are normal. She exhibits no distension and no mass. There is no tenderness. There is no rebound and no guarding.   Lymphadenopathy:     She has no cervical adenopathy.   Neurological: She is alert and oriented to person, place, and time. No cranial nerve deficit. Gait normal. Coordination  "normal.   Skin:diffuse hypopigmented lesions in arms, hands, legs, chest, back   Psychiatric: Affect and judgment normal.   Musculoskeletal: She exhibits no  tenderness. She exhibits no edema.   FROM of all joints including neck, shoulders, spine, ankles, wrists, knees, and ankles; no joint deformities noted or effusions, erythema or warmth; no tophi or nodules noted; no crepitus; no nail pitting or onycholysis          Labs:  Esr-25  ccp-93  rf-36  Flor-negative    Imaging:    MRI brain (2021):   Impression:     Scattered areas of prior hemorrhage including parenchymal, leptomeningeal and intraventricular involvement.  Moderate supratentorial leukoencephalopathy.  Small focus of right anterior temporal encephalomalacia.  Although not entirely specific, the constellation of findings is most suggestive of prior traumatic brain injury/diffuse axonal injury.  Clinical correlation required.     No evidence of new hemorrhage or major vascular distribution infarct.  MRI (7/22/2015)   (Stable enhancing marginal erosions in the second and third metacarpal heads, lunate, triquetrum, and capitate)      Right elbow xray (2020): I personally reviewed      dexa scan:(2017):  Osteopenia of the femoral neck. FRAX calculation does not support treatment for osteoporosis.Total hip and lumbar spine BMD unchanged since 2013.    Recommendations:  1) Adequate calcium and Vitamin D therapy  2) Appropriate exercise  3) Consider repeat BMD in 2- 4 years          Assessment:     64 yo F with PMH of HTN, abnormal pap (2013 but recent negative, no cancer), RA (+CCP, RF),erosive here , [facility] for follow up of seropositive RA and sarcoid.   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  She has abnormal brain MRI  AMYLOID ANGIOPATHY and IS SEEING NEURO.  She had worsening of nodules in right elbow and hair thinning, so  MTX was stopped.    Patient is s/p right " rheumatoid nodule excision elbow and wrist, extensor tenosynovectomy and osteophyte excision ulna 6/30/22.       She is doing well on enbrel . She self stopped arava.  We can consider putting her on arava or SSZ  At next visit if needed            -continue enbrel 50mg sub q once a week  (Risks of TNF inhibitor discussed with patient and not limited to cell count abnormalities, malignancy, allergic  reaction to medication, and increased risk of infection. Patient agrees with starting medication.)   -Continue folic acid 4 mg a day  Labs     Rtc in a year

## 2024-04-04 NOTE — PROGRESS NOTES
Subjective:       Patient ID: Silvia Cervantes is a 54 y.o. female.    Chief Complaint:     HPI 53 yo F with PMH of HTN, abnormal pap (2013 but recent negative, no cancer), RA (+CCP, RF),erosive here for evaluation. She was diagnosed in 2011 with hand and feet with swelling and pain. She was initially treated with MTX but it gave her nausea which gave her nausea.  She was changed to leflunomide 20 mg 10/2014 from mtx due to nausea on the mtx. Then I added etanercept January 2015 due to incomplete control of her arthritis on leflunomide.  No am stiffness.  She reports mild aching in hands (3/10). Pain is aching.  Reports mild swelling in hands but better than usual.  She reports left upper quadrant pain (4/10) , non-radiating with possible nausea which has been present for months. Sometimes she has sour taste in mouth.       Interval history:  Patient is here for follow up.   Patient is s/p right rheumatoid nodule excision elbow and wrist, extensor tenosynovectomy and osteophyte excision ulna 6/30/22.   She reports she has been cleared to restart the enbrel. She has not taken enbrel since June 21.   Pain level is 5/10 in right hand.Reports some improvement in strength in her hands.  Denies history of diverticulitis, PUD, or blood clot.         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   HENT: Negative for congestion, ear discharge, ear pain, facial swelling, mouth sores, sinus pressure, sneezing, sore throat, tinnitus and trouble swallowing.    Eyes: Negative for photophobia, pain, discharge, redness, itching and visual disturbance.   Respiratory:  Negative for apnea, chest tightness, shortness of breath, wheezing and stridor.    Cardiovascular: Negative for leg swelling.   Gastrointestinal: Negative for nausea, abdominal pain, diarrhea, constipation, blood in stool, abdominal distention and anal bleeding.   Endocrine: Negative for cold intolerance and heat intolerance.   Genitourinary: Negative for dysuria and difficulty urinating.   Musculoskeletal: Positive for arthralgias. Negative for myalgias, back pain, joint swelling, gait problem, neck pain and neck stiffness.   Skin: Negative for color change, pallor, rash and wound.   Neurological: Negative for dizziness, seizures, light-headedness and numbness.   Hematological: Negative for adenopathy. Does not bruise/bleed easily.   Psychiatric/Behavioral: Negative for sleep disturbance. The patient is not nervous/anxious.       Objective:        Physical Exam   Constitutional: She is oriented to person, place, and time.   HENT:   Head: Normocephalic and atraumatic.   Right Ear: External ear normal.   Left Ear: External ear normal.   Nose: Nose normal.   Mouth/Throat: Oropharynx is clear and moist. No oropharyngeal exudate.   Eyes: Conjunctivae and EOM are normal. Pupils are equal, round, and reactive to light. Right eye exhibits no discharge. Left eye exhibits no discharge. No scleral icterus.   Neck: Neck supple. No JVD present. No thyromegaly present.   Cardiovascular: Normal rate, regular rhythm, normal heart sounds and intact distal pulses.  Exam reveals no gallop and no friction rub.    No murmur heard.  Pulmonary/Chest: Effort normal and breath sounds normal. No respiratory distress. She has no wheezes. She has no rales. She exhibits no tenderness.   Abdominal: Soft. Bowel sounds are normal. She exhibits no distension and no mass. There is no tenderness. There is no rebound and no guarding.   Lymphadenopathy:     She has no cervical adenopathy.   Neurological: She is alert and oriented to person, place, and  "time. No cranial nerve deficit. Gait normal. Coordination normal.   Skin:diffuse hypopigmented lesions in arms, hands, legs, chest, back   Psychiatric: Affect and judgment normal.   Musculoskeletal: She exhibits no  tenderness. She exhibits no edema.   FROM of all joints including neck, shoulders, spine, ankles, wrists, knees, and ankles; no joint deformities noted or effusions, erythema or warmth; no tophi or nodules noted; no crepitus; no nail pitting or onycholysis          Labs:  Esr-25  ccp-93  rf-36  Flor-negative    Imaging:    MRI brain (2021):   Impression:     Scattered areas of prior hemorrhage including parenchymal, leptomeningeal and intraventricular involvement.  Moderate supratentorial leukoencephalopathy.  Small focus of right anterior temporal encephalomalacia.  Although not entirely specific, the constellation of findings is most suggestive of prior traumatic brain injury/diffuse axonal injury.  Clinical correlation required.     No evidence of new hemorrhage or major vascular distribution infarct.  MRI (7/22/2015)   (Stable enhancing marginal erosions in the second and third metacarpal heads, lunate, triquetrum, and capitate)      Right elbow xray (2020): I personally reviewed      dexa scan:(2017):  Osteopenia of the femoral neck. FRAX calculation does not support treatment for osteoporosis.Total hip and lumbar spine BMD unchanged since 2013.    Recommendations:  1) Adequate calcium and Vitamin D therapy  2) Appropriate exercise  3) Consider repeat BMD in 2- 4 years          Assessment:     64 yo F with PMH of HTN, abnormal pap (2013 but recent negative, no cancer), RA (+CCP, RF),erosive here , [facility]. [facility] for follow up of seropositive RA and sarcoid.   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  She has abnormal brain MRI  AMYLOID ANGIOPATHY and IS SEEING NEURO.  She had worsening of nodules in right elbow and hair " thinning, so  MTX was stopped.    Patient is s/p right rheumatoid nodule excision elbow and wrist, extensor tenosynovectomy and osteophyte excision ulna 6/30/22.       She reports worsening pain in right hand which I suspect is from DJD so will get imaging.. She is on  enbrel . She self stopped arava.  We can consider putting her on arava or SSZ  At next visit if needed            -continue enbrel 50mg sub q once a week  (Risks of TNF inhibitor discussed with patient and not limited to cell count abnormalities, malignancy, allergic  reaction to medication, and increased risk of infection. Patient agrees with starting medication.)   -CT of right hand (does not want MRI)  Labs before next visit  30 * minutes of total time spent on the encounter, which includes face to face time and non-face to face time preparing to see the patient (eg, review of tests), Obtaining and/or reviewing separately obtained history, Documenting clinical information in the electronic or other health record, Independently interpreting results (not separately reported) and communicating results to the patient/family/caregiver, or Care coordination (not separately reported).       Rtc in  a year

## 2024-04-06 LAB — BACTERIA THROAT CULT: NORMAL

## 2024-04-08 ENCOUNTER — OFFICE VISIT (OUTPATIENT)
Dept: PODIATRY | Facility: CLINIC | Age: 64
End: 2024-04-08
Payer: MEDICARE

## 2024-04-08 ENCOUNTER — HOSPITAL ENCOUNTER (OUTPATIENT)
Dept: RADIOLOGY | Facility: HOSPITAL | Age: 64
Discharge: HOME | End: 2024-04-08
Attending: PODIATRIST
Payer: MEDICARE

## 2024-04-08 VITALS — WEIGHT: 149 LBS | HEIGHT: 61 IN | BODY MASS INDEX: 28.13 KG/M2

## 2024-04-08 DIAGNOSIS — M79.674 CHRONIC TOE PAIN, RIGHT FOOT: ICD-10-CM

## 2024-04-08 DIAGNOSIS — M19.071 ARTHRITIS OF JOINT OF LESSER TOE, RIGHT: ICD-10-CM

## 2024-04-08 DIAGNOSIS — L60.0 ONYCHOCRYPTOSIS: ICD-10-CM

## 2024-04-08 DIAGNOSIS — M79.674 CHRONIC TOE PAIN, RIGHT FOOT: Primary | ICD-10-CM

## 2024-04-08 DIAGNOSIS — G89.29 CHRONIC TOE PAIN, RIGHT FOOT: Primary | ICD-10-CM

## 2024-04-08 DIAGNOSIS — G89.29 CHRONIC TOE PAIN, RIGHT FOOT: ICD-10-CM

## 2024-04-08 PROCEDURE — 1160F RVW MEDS BY RX/DR IN RCRD: CPT | Mod: CPTII,S$GLB,, | Performed by: PODIATRIST

## 2024-04-08 PROCEDURE — 3008F BODY MASS INDEX DOCD: CPT | Mod: CPTII,S$GLB,, | Performed by: PODIATRIST

## 2024-04-08 PROCEDURE — 99214 OFFICE O/P EST MOD 30 MIN: CPT | Mod: S$GLB,,, | Performed by: PODIATRIST

## 2024-04-08 PROCEDURE — 99999 PR PBB SHADOW E&M-EST. PATIENT-LVL V: CPT | Mod: PBBFAC,,, | Performed by: PODIATRIST

## 2024-04-08 PROCEDURE — 1159F MED LIST DOCD IN RCRD: CPT | Mod: CPTII,S$GLB,, | Performed by: PODIATRIST

## 2024-04-08 PROCEDURE — 4010F ACE/ARB THERAPY RXD/TAKEN: CPT | Mod: CPTII,S$GLB,, | Performed by: PODIATRIST

## 2024-04-08 PROCEDURE — 73630 X-RAY EXAM OF FOOT: CPT | Mod: TC,PN,RT

## 2024-04-08 PROCEDURE — 73630 X-RAY EXAM OF FOOT: CPT | Mod: 26,RT,, | Performed by: RADIOLOGY

## 2024-04-08 RX ORDER — DICLOFENAC SODIUM 10 MG/G
2 GEL TOPICAL 4 TIMES DAILY
Qty: 100 G | Refills: 1 | Status: SHIPPED | OUTPATIENT
Start: 2024-04-08
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
## 2024-04-09 ENCOUNTER — PATIENT MESSAGE (OUTPATIENT)
Dept: PODIATRY | Facility: CLINIC | Age: 64
End: 2024-04-09
Payer: MEDICARE

## 2024-04-11 ENCOUNTER — HOSPITAL ENCOUNTER (OUTPATIENT)
Dept: RADIOLOGY | Facility: HOSPITAL | Age: 64
Discharge: HOME | End: 2024-04-11
Attending: INTERNAL MEDICINE
Payer: MEDICARE

## 2024-04-11 DIAGNOSIS — M79.89 SWELLING OF RIGHT HAND: ICD-10-CM

## 2024-04-11 DIAGNOSIS — M79.641 PAIN OF RIGHT HAND: ICD-10-CM

## 2024-04-11 PROCEDURE — 73202 CT UPPR EXTREMITY W/O&W/DYE: CPT | Mod: 26,RT,, | Performed by: RADIOLOGY

## 2024-04-11 PROCEDURE — 73202 CT UPPR EXTREMITY W/O&W/DYE: CPT | Mod: TC,RT

## 2024-04-11 PROCEDURE — 25500020 PHARM REV CODE 255: Performed by: INTERNAL MEDICINE

## 2024-04-11 RX ADMIN — IOHEXOL 75 ML: 350 INJECTION, SOLUTION INTRAVENOUS at 10:04

## 2024-04-16 ENCOUNTER — TELEPHONE (OUTPATIENT)
Dept: NEUROLOGY | Facility: CLINIC | Age: 64
End: 2024-04-16
Payer: MEDICARE

## 2024-04-16 NOTE — TELEPHONE ENCOUNTER
----- Message from Macey Hernandez sent at 4/15/2024  4:48 PM CDT -----  Regarding: returning missed call  Contact: pt @  [phone]  Missed Callback     Pt returning call from missed call. Requesting to speak with somebody in the office. Please call to further advise. Pt is advising that afternoons are difficult for her and she is needing a morning appt. Thank you for all you are doing.

## 2024-04-18 ENCOUNTER — PATIENT MESSAGE (OUTPATIENT)
Dept: RHEUMATOLOGY | Facility: CLINIC | Age: 64
End: 2024-04-18
Payer: MEDICARE

## 2024-04-23 ENCOUNTER — PATIENT MESSAGE (OUTPATIENT)
Dept: ORTHOPEDICS | Facility: CLINIC | Age: 64
End: 2024-04-23
Payer: MEDICARE

## 2024-04-23 NOTE — TRANSFER OF CARE
"Anesthesia Transfer of Care Note    Patient: Silvia Cervantes    Procedure(s) Performed: Procedure(s) (LRB):  EGD (ESOPHAGOGASTRODUODENOSCOPY) (N/A)    Patient location: GI    Anesthesia Type: general    Transport from OR: Transported from OR on room air with adequate spontaneous ventilation    Post pain: adequate analgesia    Post assessment: no apparent anesthetic complications and tolerated procedure well    Post vital signs: stable    Level of consciousness: awake and alert    Nausea/Vomiting: no nausea/vomiting    Complications: none    Transfer of care protocol was followed      Last vitals:   Visit Vitals  BP (!) 140/98   Pulse 88   Temp 36.9 °C (98.5 °F)   Resp 16   Ht 5' 1" (1.549 m)   Wt 62.6 kg (138 lb)   LMP  (LMP Unknown)   SpO2 97%   BMI 26.07 kg/m²     "
R flank pain going into back denies trauma or pain on urintion

## 2024-04-25 ENCOUNTER — PATIENT MESSAGE (OUTPATIENT)
Dept: ORTHOPEDICS | Facility: CLINIC | Age: 64
End: 2024-04-25
Payer: MEDICARE

## 2024-04-26 ENCOUNTER — PATIENT MESSAGE (OUTPATIENT)
Dept: RHEUMATOLOGY | Facility: CLINIC | Age: 64
End: 2024-04-26
Payer: MEDICARE

## 2024-04-29 ENCOUNTER — PATIENT MESSAGE (OUTPATIENT)
Dept: ORTHOPEDICS | Facility: CLINIC | Age: 64
End: 2024-04-29
Payer: MEDICARE

## 2024-05-07 RX ORDER — ETANERCEPT 50 MG/ML
50 SOLUTION SUBCUTANEOUS WEEKLY
Qty: 4 ML | Refills: 11 | Status: ACTIVE | OUTPATIENT
Start: 2024-05-07 | End: 2025-05-07

## 2024-05-10 ENCOUNTER — TELEPHONE (OUTPATIENT)
Dept: ORTHOPEDICS | Facility: CLINIC | Age: 64
End: 2024-05-10
Payer: MEDICARE

## 2024-05-10 NOTE — TELEPHONE ENCOUNTER
Patient communication     Notified patient to stop at [facility] - 1st floor check in [address] [facility] [address] 30 minutes prior to your appointment time on 5/13/24 with Dr. Smith for x-rays.     Made them aware that this is not a scheduled xray appointment and they might be running behind as they are considered a walk-in xray.    Verbalized the Following:  *Please arrive at your informed time above, if you are more than 15 Minutes late to your appointment with Dr. Smith we will have to reschedule your appointment. This will allow you to be seen in a timely manor and be conscious to other patients being seen that same day*

## 2024-05-13 ENCOUNTER — HOSPITAL ENCOUNTER (OUTPATIENT)
Dept: RADIOLOGY | Facility: HOSPITAL | Age: 64
Discharge: HOME | End: 2024-05-13
Attending: ORTHOPAEDIC SURGERY
Payer: MEDICARE

## 2024-05-13 ENCOUNTER — OFFICE VISIT (OUTPATIENT)
Dept: ORTHOPEDICS | Facility: CLINIC | Age: 64
End: 2024-05-13
Payer: MEDICARE

## 2024-05-13 ENCOUNTER — OFFICE VISIT (OUTPATIENT)
Dept: PODIATRY | Facility: CLINIC | Age: 64
End: 2024-05-13
Payer: MEDICARE

## 2024-05-13 VITALS
BODY MASS INDEX: 28.14 KG/M2 | SYSTOLIC BLOOD PRESSURE: 140 MMHG | HEIGHT: 61 IN | DIASTOLIC BLOOD PRESSURE: 85 MMHG | HEART RATE: 77 BPM | WEIGHT: 149.06 LBS

## 2024-05-13 DIAGNOSIS — M25.521 RIGHT ELBOW PAIN: ICD-10-CM

## 2024-05-13 DIAGNOSIS — M79.641 RIGHT HAND PAIN: Primary | ICD-10-CM

## 2024-05-13 DIAGNOSIS — M05.79 RHEUMATOID ARTHRITIS INVOLVING MULTIPLE SITES WITH POSITIVE RHEUMATOID FACTOR: ICD-10-CM

## 2024-05-13 DIAGNOSIS — M79.641 CHRONIC PAIN OF RIGHT HAND: ICD-10-CM

## 2024-05-13 DIAGNOSIS — M65.9 FLEXOR TENOSYNOVITIS OF FINGER: Primary | ICD-10-CM

## 2024-05-13 DIAGNOSIS — M79.641 RIGHT HAND PAIN: ICD-10-CM

## 2024-05-13 DIAGNOSIS — M06.321 RHEUMATOID NODULE OF RIGHT ELBOW: ICD-10-CM

## 2024-05-13 DIAGNOSIS — G89.29 CHRONIC PAIN OF RIGHT HAND: ICD-10-CM

## 2024-05-13 DIAGNOSIS — L60.0 ONYCHOCRYPTOSIS: Primary | ICD-10-CM

## 2024-05-13 PROCEDURE — 73130 X-RAY EXAM OF HAND: CPT | Mod: 26,RT,, | Performed by: INTERNAL MEDICINE

## 2024-05-13 PROCEDURE — 20550 NJX 1 TENDON SHEATH/LIGAMENT: CPT | Mod: RT,S$GLB,, | Performed by: ORTHOPAEDIC SURGERY

## 2024-05-13 PROCEDURE — 11750 EXCISION NAIL&NAIL MATRIX: CPT | Mod: S$GLB,,, | Performed by: PODIATRIST

## 2024-05-13 PROCEDURE — 99214 OFFICE O/P EST MOD 30 MIN: CPT | Mod: 25,S$GLB,, | Performed by: ORTHOPAEDIC SURGERY

## 2024-05-13 PROCEDURE — 99999 PR PBB SHADOW E&M-EST. PATIENT-LVL III: CPT | Mod: PBBFAC,,, | Performed by: ORTHOPAEDIC SURGERY

## 2024-05-13 PROCEDURE — 73080 X-RAY EXAM OF ELBOW: CPT | Mod: 26,RT,, | Performed by: INTERNAL MEDICINE

## 2024-05-13 PROCEDURE — 1160F RVW MEDS BY RX/DR IN RCRD: CPT | Mod: CPTII,S$GLB,, | Performed by: ORTHOPAEDIC SURGERY

## 2024-05-13 PROCEDURE — 99999 PR PBB SHADOW E&M-EST. PATIENT-LVL V: CPT | Mod: PBBFAC,,, | Performed by: PODIATRIST

## 2024-05-13 PROCEDURE — 73080 X-RAY EXAM OF ELBOW: CPT | Mod: TC,RT

## 2024-05-13 PROCEDURE — 1159F MED LIST DOCD IN RCRD: CPT | Mod: CPTII,S$GLB,, | Performed by: ORTHOPAEDIC SURGERY

## 2024-05-13 PROCEDURE — 4010F ACE/ARB THERAPY RXD/TAKEN: CPT | Mod: CPTII,S$GLB,, | Performed by: ORTHOPAEDIC SURGERY

## 2024-05-13 PROCEDURE — 73130 X-RAY EXAM OF HAND: CPT | Mod: TC,RT

## 2024-05-13 PROCEDURE — 99499 UNLISTED E&M SERVICE: CPT | Mod: S$GLB,,, | Performed by: PODIATRIST

## 2024-05-13 RX ORDER — MUPIROCIN 20 MG/G
OINTMENT TOPICAL 2 TIMES DAILY
Qty: 22 G | Refills: 1 | Status: SHIPPED | OUTPATIENT
Start: 2024-05-13

## 2024-05-13 RX ORDER — CEPHALEXIN 500 MG/1
500 CAPSULE ORAL 4 TIMES DAILY
Qty: 28 CAPSULE | Refills: 0 | Status: SHIPPED | OUTPATIENT
Start: 2024-05-13

## 2024-05-13 RX ADMIN — METHYLPREDNISOLONE ACETATE 40 MG: 40 INJECTION, SUSPENSION INTRA-ARTICULAR; INTRALESIONAL; INTRAMUSCULAR; SOFT TISSUE at 02:05

## 2024-05-13 NOTE — PROCEDURES
Tendon Sheath    Date/Time: 5/13/2024 2:15 PM    Performed by: Louise Smith MD  Authorized by: Louise Smith MD    Consent Done?:  Yes (Verbal)  Indications:  Pain  Timeout: prior to procedure the correct patient, procedure, and site was verified    Prep: patient was prepped and draped in usual sterile fashion      Local anesthesia used?: Yes    Anesthesia:  Local infiltration  Local anesthetic:  Lidocaine 1% without epinephrine  Anesthetic total (ml):  1    Location:  Index finger  Site:  R index flexor tendon sheath  Ultrasonic guidance for needle placement?: No    Needle size:  25 G  Approach:  Volar  Medications:  40 mg methylPREDNISolone acetate 40 mg/mL  Patient tolerance:  Patient tolerated the procedure well with no immediate complications

## 2024-05-13 NOTE — PROGRESS NOTES
Silvia Cervantes presents for follow up evaluation of   Encounter Diagnoses   Name Primary?    Flexor tenosynovitis of finger Yes    Rheumatoid arthritis involving multiple sites with positive rheumatoid factor     Rheumatoid nodule of right elbow     Chronic pain of right hand      History of Present Illness  Patient is a 63 y.o. right hand dominant female previously seen s/p 6.30.22 Right elbow olecranon rheumatoid mass excision, synovectomy, right wrist radiocarpal arthrotomy & synovectomy, right distal ulnar osteophyte excision; path: rheumatoid nodules/ Left thumb MP joint csi.     She reports recently she has felt a superficial nodule along her proximal elbow incision. Not tender to touch or painful.     Presents today with new complaints of Right index finger catching and decreased ROM. She reports this has been ongoing for a few months now. She is having difficulty with gripping, grasping objects.      Vitals:    05/13/24 1425   PainSc:   6   PainLoc: Hand       PE:    AA&O x 4.  NAD  HEENT:  NCAT, sclera nonicteric  Lungs:  Respirations are equal and unlabored.  CV:  2+ bilateral upper and lower extremity pulses.  MSK:   Physical Exam      Tender to palpation right index finger flexor sheath, fullness of sheath, decreased index ROM. 2x2 mm nodule along right elbow incision, skin closed, no fluctuance or drainage. Neurovascularly intact bilaterally.  5/5 thenar and intrinsic musculature strength.  Otherwise bilateral full range of motion hands, wrists and elbows.    Diagnostic studies and other clinical records review:  Results     X-rays AP, lateral and oblique right elbow taken today are independently reviewed by me and shows mild arthritis with posterior joint calcifications.       ASSESSMENT/PLAN:      63 y.o. yo female with   Encounter Diagnoses   Name Primary?    Flexor tenosynovitis of finger Yes    Rheumatoid arthritis involving multiple sites with positive rheumatoid factor     Rheumatoid nodule of  right elbow     Chronic pain of right hand       Assessment & Plan    Plan: The patient and I had a thorough discussion today.  We discussed the working diagnosis as well as several other potential alternative diagnoses.  Treatment options were discussed, both conservative and surgical.  Conservative treatment options would include things such as activity modifications, workplace modifications, a period of rest, oral vs topical OTC and prescription anti-inflammatory medications, occupational therapy, splinting/bracing, immobilization, corticosteroid injections, and others.  Surgical options were discussed as well.     -I have offered her a selective injection. I have explained the risks, benefits, and alternatives of the procedure in detail.  The patient voices understanding and all questions have been answered. The patient agrees to proceed as planned. So after a sterile prep of the skin in the normal fashion the Right index flexor tendon sheath was injected using a 25 gauge needle with a combination of 1cc 1% plain lidocaine and 40 mg of methylprednisolone.  The patient is cautioned and immediate relief of pain is secondary to the local anesthetic and will be temporary.  After the anesthetic wears off there may be a increase in pain that may last for a few hours or a few days and they should use ice to help alleviate this flair up of pain. Patient tolerated the procedure well.    F/U as needed  Call in the interim with any questions or concerns          Louise Smith MD    Please be aware that this note has been generated with the assistance of [email or web address] voice-to-text.  Please excuse any spelling or grammatical errors.    This note was generated with the assistance of ambient listening technology. Verbal consent was obtained by the patient and accompanying visitor(s) for the recording of patient appointment to facilitate this note. I attest to having reviewed and edited the generated note for accuracy, though some  syntax or spelling errors may persist. Please contact the author of this note for any clarification.
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
## 2024-05-14 RX ORDER — METHYLPREDNISOLONE ACETATE 40 MG/ML
40 INJECTION, SUSPENSION INTRA-ARTICULAR; INTRALESIONAL; INTRAMUSCULAR; SOFT TISSUE
Status: DISCONTINUED | OUTPATIENT
Start: 2024-05-13 | End: 2024-05-14 | Stop reason: HOSPADM

## 2024-05-15 ENCOUNTER — PATIENT MESSAGE (OUTPATIENT)
Dept: PODIATRY | Facility: CLINIC | Age: 64
End: 2024-05-15
Payer: MEDICARE

## 2024-05-24 ENCOUNTER — OFFICE VISIT (OUTPATIENT)
Dept: PODIATRY | Facility: CLINIC | Age: 64
End: 2024-05-24
Payer: MEDICARE

## 2024-05-24 VITALS
HEIGHT: 61 IN | BODY MASS INDEX: 28.14 KG/M2 | WEIGHT: 149.06 LBS | DIASTOLIC BLOOD PRESSURE: 89 MMHG | HEART RATE: 80 BPM | SYSTOLIC BLOOD PRESSURE: 143 MMHG

## 2024-05-24 DIAGNOSIS — S91.209D NAIL AVULSION, TOE, SUBSEQUENT ENCOUNTER: ICD-10-CM

## 2024-05-24 DIAGNOSIS — L60.0 ONYCHOCRYPTOSIS: Primary | ICD-10-CM

## 2024-05-24 PROCEDURE — 1159F MED LIST DOCD IN RCRD: CPT | Mod: CPTII,S$GLB,, | Performed by: PODIATRIST

## 2024-05-24 PROCEDURE — 99999 PR PBB SHADOW E&M-EST. PATIENT-LVL IV: CPT | Mod: PBBFAC,,, | Performed by: PODIATRIST

## 2024-05-24 PROCEDURE — 3077F SYST BP >= 140 MM HG: CPT | Mod: CPTII,S$GLB,, | Performed by: PODIATRIST

## 2024-05-24 PROCEDURE — 3008F BODY MASS INDEX DOCD: CPT | Mod: CPTII,S$GLB,, | Performed by: PODIATRIST

## 2024-05-24 PROCEDURE — 4010F ACE/ARB THERAPY RXD/TAKEN: CPT | Mod: CPTII,S$GLB,, | Performed by: PODIATRIST

## 2024-05-24 PROCEDURE — 1160F RVW MEDS BY RX/DR IN RCRD: CPT | Mod: CPTII,S$GLB,, | Performed by: PODIATRIST

## 2024-05-24 PROCEDURE — 99213 OFFICE O/P EST LOW 20 MIN: CPT | Mod: 57,S$GLB,, | Performed by: PODIATRIST

## 2024-05-24 PROCEDURE — 3079F DIAST BP 80-89 MM HG: CPT | Mod: CPTII,S$GLB,, | Performed by: PODIATRIST
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
## 2024-05-27 ENCOUNTER — OFFICE VISIT (OUTPATIENT)
Dept: OTOLARYNGOLOGY | Facility: CLINIC | Age: 64
End: 2024-05-27
Payer: MEDICARE

## 2024-05-27 VITALS
WEIGHT: 149.81 LBS | SYSTOLIC BLOOD PRESSURE: 125 MMHG | HEART RATE: 63 BPM | BODY MASS INDEX: 28.3 KG/M2 | DIASTOLIC BLOOD PRESSURE: 83 MMHG

## 2024-05-27 DIAGNOSIS — K21.9 LARYNGOPHARYNGEAL REFLUX (LPR): Primary | Chronic | ICD-10-CM

## 2024-05-27 DIAGNOSIS — R09.A2 GLOBUS SENSATION: Chronic | ICD-10-CM

## 2024-05-27 DIAGNOSIS — R13.10 DYSPHAGIA, UNSPECIFIED TYPE: Chronic | ICD-10-CM

## 2024-05-27 DIAGNOSIS — J31.0 CHRONIC RHINITIS: Chronic | ICD-10-CM

## 2024-05-27 PROCEDURE — 1160F RVW MEDS BY RX/DR IN RCRD: CPT | Mod: CPTII,S$GLB,, | Performed by: OTOLARYNGOLOGY

## 2024-05-27 PROCEDURE — 99214 OFFICE O/P EST MOD 30 MIN: CPT | Mod: 25,S$GLB,, | Performed by: OTOLARYNGOLOGY

## 2024-05-27 PROCEDURE — 3079F DIAST BP 80-89 MM HG: CPT | Mod: CPTII,S$GLB,, | Performed by: OTOLARYNGOLOGY

## 2024-05-27 PROCEDURE — 3008F BODY MASS INDEX DOCD: CPT | Mod: CPTII,S$GLB,, | Performed by: OTOLARYNGOLOGY

## 2024-05-27 PROCEDURE — 1159F MED LIST DOCD IN RCRD: CPT | Mod: CPTII,S$GLB,, | Performed by: OTOLARYNGOLOGY

## 2024-05-27 PROCEDURE — 31575 DIAGNOSTIC LARYNGOSCOPY: CPT | Mod: S$GLB,,, | Performed by: OTOLARYNGOLOGY

## 2024-05-27 PROCEDURE — 99999 PR PBB SHADOW E&M-EST. PATIENT-LVL IV: CPT | Mod: PBBFAC,,, | Performed by: OTOLARYNGOLOGY

## 2024-05-27 PROCEDURE — 3074F SYST BP LT 130 MM HG: CPT | Mod: CPTII,S$GLB,, | Performed by: OTOLARYNGOLOGY

## 2024-05-27 PROCEDURE — 4010F ACE/ARB THERAPY RXD/TAKEN: CPT | Mod: CPTII,S$GLB,, | Performed by: OTOLARYNGOLOGY

## 2024-05-27 RX ORDER — PANTOPRAZOLE SODIUM 40 MG/1
40 TABLET, DELAYED RELEASE ORAL DAILY
Qty: 30 TABLET | Refills: 4 | Status: SHIPPED | OUTPATIENT
Start: 2024-05-27 | End: 2025-05-27

## 2024-05-27 RX ORDER — PANTOPRAZOLE SODIUM 40 MG/1
40 TABLET, DELAYED RELEASE ORAL DAILY
Qty: 30 TABLET | Refills: 3 | Status: SHIPPED | OUTPATIENT
Start: 2024-05-27 | End: 2025-05-27

## 2024-05-27 RX ORDER — PANTOPRAZOLE SODIUM 40 MG/1
40 TABLET, DELAYED RELEASE ORAL DAILY
Qty: 30 TABLET | Refills: 3 | Status: CANCELLED | OUTPATIENT
Start: 2024-05-27 | End: 2025-05-27

## 2024-05-27 RX ORDER — FLUTICASONE PROPIONATE 50 MCG
2 SPRAY, SUSPENSION (ML) NASAL DAILY
Qty: 16 G | Refills: 11 | Status: SHIPPED | OUTPATIENT
Start: 2024-05-27

## 2024-05-27 NOTE — PROGRESS NOTES
Chief Complaint   Patient presents with    Follow-up     Great Improvement Globus and sore throat    .     HPI:Silvia Cervantes is a 63 y.o. female who has been referred by Dr. Ori St MD for a several month history of  globus sensation and cough. She reports that she has choking episodes with swallowing foods, liquids, or saliva.  She has tickle in her throat that triggers cough. She feels it is present on her left side.  She reports that the cough is dry.  She does have sarcoidosis- dx'd in . She does see pulmonology.  She denies food sticking. She denies hoarseness.  Her voice is not progressively worsening over this time. There are not pitch breaks or cracks. There is not vocal fatigue. She admits to odynophagia, throat pain, and otalgia.  There is no hemoptysis or hematemesis. She is breathing well. She has been using Luden's throat lozenges to help. She uses camilo's emollient for her nose. She does use Flonase intermittently.     She admits to throat clearing and cough. She denies heartburn and reflux. She has been on Protonix 40 mg in the past but is no longer. She does feel that this helped her tremendously.        Underwent sinus surgery 2016- Dr. Chavez includin. Endoscopic septoplasty.  2. Bilateral inferior turbinate reduction with submucosal resection.  3. Bilateral image-guided endoscopic total ethmoidectomy.  4. Bilateral image-guided endoscopic maxillary antrostomy.  5. Bilateral image-guided endoscopic frontal dissection with Draf IIA sinusotomy.  6. Endoscopic resection of right leann bullosa.      Interval HPI 2024:  Follow up visit. Reports that sore throat has resolved. She feels that she still has sensation of globus feeling in her throat.  She feels that choking episodes have improved. She did have MBSS in interim which was normal.  She has been taking Protonix 40 mg PO daily as prescribed. She feels this helps.     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)      Social History     Socioeconomic History    Marital status:    Tobacco Use    Smoking status: Never    Smokeless tobacco: Never   Substance and Sexual Activity    Alcohol use: Yes     Comment: Rarely    Drug use: Not Currently    Sexual activity: Yes     Partners: Male     Birth control/protection: Surgical     Comment: [facility]     Social Determinants of Health     Financial Resource Strain: Low Risk  (2/21/2022)    Overall Financial Resource Strain (CARDIA)     Difficulty of Paying Living Expenses: Not hard at all   Food Insecurity: No Food Insecurity (2/21/2022)    Hunger Vital Sign     Worried About Running Out of Food in the Last Year: Never true     Ran Out of Food in the Last Year: Never true   Transportation Needs: No Transportation Needs (2/21/2022)    PRAPARE - Transportation     Lack of Transportation (Medical): No     Lack of Transportation (Non-Medical): No   Physical Activity: Sufficiently Active (2/21/2022)    Exercise Vital Sign     Days of Exercise per Week: 7 days     Minutes of Exercise per Session: 120 min   Stress: No Stress Concern Present (2/21/2022)    [place] of Occupational Health - Occupational Stress Questionnaire     Feeling of Stress : Only a little   Housing Stability: Low Risk  (2/21/2022)    Housing Stability Vital Sign     Unable to Pay for Housing in the Last Year: No     Number of Places Lived in the Last Year: 1      Unstable Housing in the Last Year: No     Past Surgical History:   Procedure Laterality Date     SECTION      COLONOSCOPY N/A 2019    Procedure: COLONOSCOPY/golytley ;  Surgeon: Dimas Woodall MD;  Location: [facility];  Service: Endoscopy;  Laterality: N/A;    ESOPHAGOGASTRODUODENOSCOPY N/A 2023    Procedure: EGD (ESOPHAGOGASTRODUODENOSCOPY);  Surgeon: Ori St MD;  Location: [facility] ENDO;  Service: Endoscopy;  Laterality: N/A;    EXCISION OF NODULE Right 2022    Procedure: EXCISION, NODULE right elbow;  Surgeon: Louise Smith MD;  Location: [facility] OR;  Service: Orthopedics;  Laterality: Right;    HYSTERECTOMY      INJECTION OF STEROID Left 2022    Procedure: INJECTION, STEROID Thumb MP joint;  Surgeon: Louise Smith MD;  Location: [facility] OR;  Service: Orthopedics;  Laterality: Left;    SINUS SURGERY      SURGICAL REMOVAL OF DISTAL ULNA Right 2022    Procedure: EXCISION, ULNA, DISTAL;  Surgeon: Louise Smith MD;  Location: [facility] OR;  Service: Orthopedics;  Laterality: Right;    SYNOVECTOMY OF WRIST Right 2022    Procedure: SYNOVECTOMY, WRIST;  Surgeon: Louise Smith MD;  Location: [facility] OR;  Service: Orthopedics;  Laterality: Right;    TUBAL LIGATION       Family History   Problem Relation Name Age of Onset    Hypertension Mother      Cataracts Mother      Diabetes Father      Hypertension Father      Cataracts Father      Heart disease Father      Hyperlipidemia Father      Diabetes Sister x1     Stroke Brother      Hypertension Brother      No Known Problems Daughter x1     No Known Problems Son x1     Melanoma Neg Hx      Breast cancer Neg Hx      Colon cancer Neg Hx      Ovarian cancer Neg Hx      Blindness Neg Hx      Glaucoma Neg Hx      Aneurysm Neg Hx      Cancer Neg Hx      Clotting disorder Neg Hx      Dementia Neg Hx      Fainting Neg Hx      Kidney disease Neg Hx      Liver disease Neg Hx      Migraines Neg Hx      Neuropathy Neg Hx      Parkinsonism Neg Hx       Seizures Neg Hx      Tremor Neg Hx             Review of Systems  General: negative for chills, fever or weight loss  Psychological: negative for mood changes or depression  Ophthalmic: negative for blurry vision, photophobia or eye pain  ENT: see HPI  Respiratory: no cough, shortness of breath, or wheezing  Cardiovascular: no chest pain or dyspnea on exertion  Gastrointestinal: no abdominal pain, change in bowel habits, or black/ bloody stools  Musculoskeletal: negative for gait disturbance or muscular weakness  Neurological: no syncope or seizures; no ataxia  Dermatological: negative for puritis,  rash and jaundice  Hematologic/lymphatic: no easy bruising, no new lumps or bumps      Physical Exam:    Vitals:    05/27/24 1139   BP: 125/83   Pulse: 63         Constitutional: Well appearing / communicating without difficutly.  NAD.  Eyes: EOM I Bilaterally  Head/Face: Normocephalic.  Negative paranasal sinus pressure/tenderness.  Salivary glands WNL.  House Brackmann I Bilaterally.    Right Ear: Auricle normal appearance. External Auditory Canal within normal limits no lesions or masses,TM w/o masses/lesions/perforations. TM mobility noted.   Left Ear: Auricle normal appearance. External Auditory Canal within normal limits no lesions or masses,TM w/o masses/lesions/perforations. TM mobility noted.  Nose: No gross nasal septal deviation. Inferior Turbinates 3+ bilaterally. No septal perforation. No masses/lesions. External nasal skin appears normal without masses/lesions.  Oral Cavity: Gingiva/lips within normal limits.  Dentition/gingiva healthy appearing. Mucus membranes moist. Floor of mouth soft, no masses palpated. Oral Tongue mobile. Hard Palate appears normal.    Oropharynx: Base of tongue appears normal. No masses/lesions noted. Tonsillar fossa/pharyngeal wall without lesions. Posterior oropharynx WNL.  Soft palate without masses. Midline uvula.   Neck/Lymphatic: No LAD I-VI bilaterally.  No thyromegaly.   No masses noted on exam.    Mirror laryngoscopy/nasopharyngoscopy: Active gag reflex.  Unable to perform.        See separate procedure note for FFL.    Diagnostic studies reviewed:   MBSS 1/30/2024: normal  MBSS 2019: normal    EGD 1/25/2023: Impression:            - Normal esophagus.                          - Small hiatal hernia.                          - Normal stomach. Biopsied.                          - Normal examined duodenum.                          Exam for globus / cough/ upper abd distress                          showing:  normal EGD, with small hiatal hernia similar to                          exam done 2015    PFT 8/19/2022: The test was performed for the evaluation of Sarcoidosis.   There is no significant airflow obstruction. Bronchodilator response was not tested.   There is mild restriction and no evidence of hyperinflation or air trapping.   The diffusing capacity is minimally reduced.   Compared to 11/2019, FEV1 has decreased in an age-appropriate fashion. Diffusion capacity is slightly reduced.   This PFT is consistant with the clincial diagnosis of sarcoidosis. Clinical correllation is required.     Assessment:    ICD-10-CM ICD-9-CM    1. Laryngopharyngeal reflux (LPR)  K21.9 478.79       2. Chronic rhinitis  J31.0 472.0       3. Globus sensation  R09.A2 784.99       4. Dysphagia, unspecified type  R13.10 787.20               The primary encounter diagnosis was Laryngopharyngeal reflux (LPR). Diagnoses of Chronic rhinitis, Globus sensation, and Dysphagia, unspecified type were also pertinent to this visit.      Plan:  No orders of the defined types were placed in this encounter.    Continue  nasal saline rinses BID  Continue  Flonase 2 sprays per nostril daily  Continue Protonix 40 mg PO daily. Continue to  refrain from eating within 3 hours of going to bed, to elevate the head of bed very subtly and optimize the impact of gravity on the potential reflux, and to avoid alcohol, caffeine,  tobacco, tomato sauce, spicy foods, fried food, and chocolate.      Follow up in 4-6  months to reassess progress with treatment regimen.     Carleen Weiner MD

## 2024-05-27 NOTE — PROCEDURES
Laryngoscopy    Date/Time: 5/27/2024 11:20 AM    Performed by: Carleen Cline MD  Authorized by: Carleen Cline MD    Consent Done?:  Yes (Verbal)  Anesthesia:     Local anesthetic:  Lidocaine 2% and Neymar-Synephrine 1/2%  Laryngoscopy:     Areas examined:  Nasal cavities, nasopharynx, oropharynx, hypopharynx, larynx and vocal cords  Nose External:      No external nasal deformity  Nose Intranasal:      Mucosa no polyps     Mucosa ulcers not present     No mucosa lesions present     No septum gross deformity     Turbinates not enlarged  Nasopharynx:      No mucosa lesions     Adenoids not present     Posterior choanae patent     Eustachian tube patent  Larynx/hypopharynx:      No epiglottis lesions     No epiglottis edema     No AE folds lesions     No vocal cord polyps     Equal and normal bilateral     No hypopharynx lesions     No piriform sinus pooling     No piriform sinus lesions     Post cricoid edema (mild)     Post cricoid erythema (mild)

## 2024-05-30 ENCOUNTER — OFFICE VISIT (OUTPATIENT)
Dept: NEUROLOGY | Facility: CLINIC | Age: 64
End: 2024-05-30
Payer: MEDICARE

## 2024-05-30 VITALS
SYSTOLIC BLOOD PRESSURE: 134 MMHG | HEART RATE: 67 BPM | BODY MASS INDEX: 28.76 KG/M2 | WEIGHT: 152.31 LBS | HEIGHT: 61 IN | DIASTOLIC BLOOD PRESSURE: 84 MMHG

## 2024-05-30 DIAGNOSIS — R51.9 CHRONIC NONINTRACTABLE HEADACHE, UNSPECIFIED HEADACHE TYPE: ICD-10-CM

## 2024-05-30 DIAGNOSIS — G89.29 CHRONIC NONINTRACTABLE HEADACHE, UNSPECIFIED HEADACHE TYPE: ICD-10-CM

## 2024-05-30 DIAGNOSIS — M79.605 PAIN OF LEFT LOWER EXTREMITY: ICD-10-CM

## 2024-05-30 PROCEDURE — 99215 OFFICE O/P EST HI 40 MIN: CPT | Mod: S$GLB,,, | Performed by: PSYCHIATRY & NEUROLOGY

## 2024-05-30 PROCEDURE — 3075F SYST BP GE 130 - 139MM HG: CPT | Mod: CPTII,S$GLB,, | Performed by: PSYCHIATRY & NEUROLOGY

## 2024-05-30 PROCEDURE — 1159F MED LIST DOCD IN RCRD: CPT | Mod: CPTII,S$GLB,, | Performed by: PSYCHIATRY & NEUROLOGY

## 2024-05-30 PROCEDURE — 99999 PR PBB SHADOW E&M-EST. PATIENT-LVL IV: CPT | Mod: PBBFAC,,, | Performed by: PSYCHIATRY & NEUROLOGY

## 2024-05-30 PROCEDURE — 3079F DIAST BP 80-89 MM HG: CPT | Mod: CPTII,S$GLB,, | Performed by: PSYCHIATRY & NEUROLOGY

## 2024-05-30 PROCEDURE — 3008F BODY MASS INDEX DOCD: CPT | Mod: CPTII,S$GLB,, | Performed by: PSYCHIATRY & NEUROLOGY

## 2024-05-30 PROCEDURE — 4010F ACE/ARB THERAPY RXD/TAKEN: CPT | Mod: CPTII,S$GLB,, | Performed by: PSYCHIATRY & NEUROLOGY

## 2024-05-30 RX ORDER — GABAPENTIN 300 MG/1
300 CAPSULE ORAL 2 TIMES DAILY
Qty: 180 CAPSULE | Refills: 3 | Status: SHIPPED | OUTPATIENT
Start: 2024-05-30 | End: 2025-05-25
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
## 2024-06-10 ENCOUNTER — PATIENT MESSAGE (OUTPATIENT)
Dept: DERMATOLOGY | Facility: CLINIC | Age: 64
End: 2024-06-10

## 2024-06-10 ENCOUNTER — OFFICE VISIT (OUTPATIENT)
Dept: DERMATOLOGY | Facility: CLINIC | Age: 64
End: 2024-06-10
Payer: MEDICARE

## 2024-06-10 DIAGNOSIS — D18.01 CHERRY ANGIOMA: ICD-10-CM

## 2024-06-10 DIAGNOSIS — L81.4 LENTIGO: ICD-10-CM

## 2024-06-10 DIAGNOSIS — L80 VITILIGO: Primary | ICD-10-CM

## 2024-06-10 DIAGNOSIS — L70.0 OPEN COMEDONE: ICD-10-CM

## 2024-06-10 DIAGNOSIS — D22.9 NEVUS: ICD-10-CM

## 2024-06-10 PROCEDURE — 1160F RVW MEDS BY RX/DR IN RCRD: CPT | Mod: CPTII,S$GLB,, | Performed by: DERMATOLOGY

## 2024-06-10 PROCEDURE — 99999 PR PBB SHADOW E&M-EST. PATIENT-LVL IV: CPT | Mod: PBBFAC,,, | Performed by: DERMATOLOGY

## 2024-06-10 PROCEDURE — 1159F MED LIST DOCD IN RCRD: CPT | Mod: CPTII,S$GLB,, | Performed by: DERMATOLOGY

## 2024-06-10 PROCEDURE — 4010F ACE/ARB THERAPY RXD/TAKEN: CPT | Mod: CPTII,S$GLB,, | Performed by: DERMATOLOGY

## 2024-06-10 PROCEDURE — G2211 COMPLEX E/M VISIT ADD ON: HCPCS | Mod: S$GLB,,, | Performed by: DERMATOLOGY

## 2024-06-10 PROCEDURE — 99214 OFFICE O/P EST MOD 30 MIN: CPT | Mod: S$GLB,,, | Performed by: DERMATOLOGY

## 2024-06-10 RX ORDER — TRETINOIN 0.25 MG/G
CREAM TOPICAL
Qty: 30 G | Refills: 5 | Status: SHIPPED | OUTPATIENT
Start: 2024-06-10

## 2024-06-10 RX ORDER — RUXOLITINIB 15 MG/G
CREAM TOPICAL
Qty: 60 G | Refills: 2 | Status: ACTIVE | OUTPATIENT
Start: 2024-06-10

## 2024-06-10 RX ORDER — RUXOLITINIB 15 MG/G
CREAM TOPICAL
Qty: 60 G | Refills: 2 | Status: SHIPPED | OUTPATIENT
Start: 2024-06-10 | End: 2024-06-10 | Stop reason: SDUPTHER

## 2024-06-10 NOTE — PROGRESS NOTES
"  Subjective:      Patient ID:  Silvia Cervantes is a 63 y.o. female who presents for   Chief Complaint   Patient presents with    Skin Check     tbse     Pt here today for a TBSE    Patient is here today for a "mole" check.   Pt has a history of  moderate sun exposure in the past.   Pt recalls several blistering sunburns in the past- yes  Pt has history of tanning bed use- yes  Pt has  had moles removed in the past- no  Pt has history of melanoma in first degree relatives-  no      Pt has had vitiligo for since 2015.  Pt would like refill on Opzelura.pt using on hands and forearms. She feels she has better repigmentation on her forearms than her hands. Still with significant vitiligo on arms and trunk and legs but she is not interested in treating these areas. She wears long sleeves and pants always.     Pt also has RA and sarcoid.   Pt is also on Enbrel for RA              Review of Systems   Skin:  Positive for daily sunscreen use and activity-related sunscreen use. Negative for sensitivity to bandage adhesive and recent sunburn.   Hematologic/Lymphatic: Does not bruise/bleed easily.       Objective:   Physical Exam   Constitutional: She appears well-developed and well-nourished. No distress.   Neurological: She is alert and oriented to person, place, and time. She is not disoriented.   Psychiatric: She has a normal mood and affect.   Skin:   Areas Examined (abnormalities noted in diagram):   Scalp / Hair Palpated and Inspected  Head / Face Inspection Performed  Neck Inspection Performed  Chest / Axilla Inspection Performed  Abdomen Inspection Performed  Genitals / Buttocks / Groin Inspection Performed  Back Inspection Performed  RUE Inspected  LUE Inspection Performed  RLE Inspected  LLE Inspection Performed  Nails and Digits Inspection Performed                         Diagram Legend     Erythematous scaling macule/papule c/w actinic keratosis       Vascular papule c/w angioma      Pigmented verrucoid " papule/plaque c/w seborrheic keratosis      Yellow umbilicated papule c/w sebaceous hyperplasia      Irregularly shaped tan macule c/w lentigo     1-2 mm smooth white papules consistent with Milia      Movable subcutaneous cyst with punctum c/w epidermal inclusion cyst      Subcutaneous movable cyst c/w pilar cyst      Firm pink to brown papule c/w dermatofibroma      Pedunculated fleshy papule(s) c/w skin tag(s)      Evenly pigmented macule c/w junctional nevus     Mildly variegated pigmented, slightly irregular-bordered macule c/w mildly atypical nevus      Flesh colored to evenly pigmented papule c/w intradermal nevus       Pink pearly papule/plaque c/w basal cell carcinoma      Erythematous hyperkeratotic cursted plaque c/w SCC      Surgical scar with no sign of skin cancer recurrence      Open and closed comedones      Inflammatory papules and pustules      Verrucoid papule consistent consistent with wart     Erythematous eczematous patches and plaques     Dystrophic onycholytic nail with subungual debris c/w onychomycosis     Umbilicated papule    Erythematous-base heme-crusted tan verrucoid plaque consistent with inflamed seborrheic keratosis     Erythematous Silvery Scaling Plaque c/w Psoriasis     See annotation      Assessment / Plan:        Vitiligo  -     ruxolitinib (OPZELURA) 1.5 % Crea; aaa bid  Dispense: 60 g; Refill: 2  Pt failed tacrolimus and topical steroids and has diffuse disease  Opzelura:  Use 2x per day until clear and then 2 more days then stop. Do not use on more than 20 % of your body at the same time; Should be use intermittently for flares ( do not use when clear).   Do not use this medication if you are are immunosuppressant medications.     There are many side effects reported when this medication is used as an oral drug. Systemic absorption is minimal and having these side effects when using as a cream would be exceptionally rare. However if you experience any of these as new side  effects while on the cream, please stop the cream and inform your prescribing physician.   Acneiform eruption   Herpes infections or upper respiratory infection   Other serious infections   Oral medication use can be associated with increased risk of lymphoma   Increased risk of cardiac events or pulmonary emboli   This medication should not be used if pregnant or lactating    Lentigo  This is a benign hyperpigmented sun induced lesion. Recommend daily sun protection/avoidance and use of at least SPF 30, broad spectrum sunscreen (OTC drug) will reduce the number of new lesions. Treatment of these benign lesions are considered cosmetic.  The nature of sun-induced photo-aging and skin cancers is discussed.  Sun avoidance, protective clothing, and the use of 30-SPF sunscreens is advised. Observe closely for skin damage/changes, and call if such occurs.  A tint is recommended too- such as makeup, tinted sunscreen, BB/CC creams. The iron oxide in the tint of products protects against agents other than UV light that damage our skin such as blue light from screens, infrared heat, visible light, and other other environmental pollutants.  These other factors can contribute significantly to irregular pigment, especially in skin of color and tint helps protect from these factors.     PM:  Wash with LaRoche POsay cleanser  Thin film of tretinoin  all over every other to every night   Moisturize with LaRoche POsay double repair facial moisturizer to  minimize irritation    AM:  Wash with mild cleanser   2. LaRoche Posay double repair moisturizer  3. TInted sunscreen with spf 50 +     A tint is recommended too- such as makeup, tinted sunscreen, BB/CC creams. The iron oxide in the tint of products protects against agents other than UV light that damage our skin such as blue light from screens, infrared heat, visible light, and other other environmental pollutants.  These other factors can contribute significantly to irregular  pigment, especially in skin of color and tint helps protect from these factors.     Nevus  Discussed ABCDE's of nevi.  Monitor for new mole or moles that are becoming bigger, darker, irritated, or developing irregular borders. Brochure provided. Instructed patient to observe lesion(s) for changes and follow up in clinic if changes are noted. Patient to monitor skin at home for new or changing lesions.     Cherry angioma  These are benign vascular lesions that are inherited.  Treatment is not necessary.    Open comedone  -     tretinoin (RETIN-A) 0.025 % cream; Compound tretinoin 0.025% / niacinamide 2% cream. Apply a pea-sized amount to entire face qhs then moisturize.  Dispense: 30 g; Refill: 5    Total body skin examination performed today including at least 12 points as noted in physical examination. No lesions suspicious for malignancy noted.    Recommend daily sun protection/avoidance, use of at least SPF 30, broad spectrum sunscreen (OTC drug), skin self examinations, and routine physician surveillance to optimize early detection             Follow up in 1 year (on 6/10/2025) for Medication Management, TBSE.

## 2024-06-10 NOTE — PATIENT INSTRUCTIONS
PM:  Wash with LaRoche POsay cleanser  Thin film of tretinoin  all over every other to every night   Moisturize with LaRoche POsay double repair facial moisturizer to  minimize irritation    AM:  Wash with mild cleanser   2. LaRoche Posay double repair moisturizer  3. TInted sunscreen with spf 50 +     A tint is recommended too- such as makeup, tinted sunscreen, BB/CC creams. The iron oxide in the tint of products protects against agents other than UV light that damage our skin such as blue light from screens, infrared heat, visible light, and other other environmental pollutants.  These other factors can contribute significantly to irregular pigment, especially in skin of color and tint helps protect from these factors.     We would like to see you back in the clinic in 12 months.  You will be able to schedule this appointment by calling or by using your [facility] portal 3 months before this time. Because our schedule fills so quickly, please set a reminder in your phone or on your calendar to schedule 3 months before you are due to come in so that we can see you in a timely fashion.  You should also receive a reminder from us in the mail. This will help us ensure we can continue to provide excellent healthcare for you. Thank you.      Opzelura:  Use 2x per day until clear and then 2 more days then stop. Do not use on more than 20 % of your body at the same time; Should be use intermittently for flares ( do not use when clear).   Do not use this medication if you are are immunosuppressant medications.     There are many side effects reported when this medication is used as an oral drug. Systemic absorption is minimal and having these side effects when using as a cream would be exceptionally rare. However if you experience any of these as new side effects while on the cream, please stop the cream and inform your prescribing physician.   Acneiform eruption   Herpes infections or upper respiratory infection   Other  serious infections   Oral medication use can be associated with increased risk of lymphoma   Increased risk of cardiac events or pulmonary emboli   This medication should not be used if pregnant or lactating

## 2024-06-12 ENCOUNTER — PATIENT MESSAGE (OUTPATIENT)
Dept: DERMATOLOGY | Facility: CLINIC | Age: 64
End: 2024-06-12
Payer: MEDICARE
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
## 2024-06-18 ENCOUNTER — OFFICE VISIT (OUTPATIENT)
Dept: CARDIOLOGY | Facility: CLINIC | Age: 64
End: 2024-06-18
Payer: MEDICARE

## 2024-06-18 VITALS
BODY MASS INDEX: 28.56 KG/M2 | WEIGHT: 151.25 LBS | HEART RATE: 71 BPM | SYSTOLIC BLOOD PRESSURE: 137 MMHG | HEIGHT: 61 IN | DIASTOLIC BLOOD PRESSURE: 86 MMHG

## 2024-06-18 DIAGNOSIS — R00.2 PALPITATIONS: ICD-10-CM

## 2024-06-18 DIAGNOSIS — I47.19 ATRIAL TACHYCARDIA: Primary | ICD-10-CM

## 2024-06-18 DIAGNOSIS — E78.2 MIXED HYPERLIPIDEMIA: ICD-10-CM

## 2024-06-18 DIAGNOSIS — Z86.79 HISTORY OF SUPRAVENTRICULAR TACHYCARDIA: ICD-10-CM

## 2024-06-18 DIAGNOSIS — D86.0 SARCOIDOSIS OF LUNG: ICD-10-CM

## 2024-06-18 DIAGNOSIS — I10 PRIMARY HYPERTENSION: ICD-10-CM

## 2024-06-18 DIAGNOSIS — I70.0 AORTIC ATHEROSCLEROSIS: ICD-10-CM

## 2024-06-18 PROCEDURE — 99999 PR PBB SHADOW E&M-EST. PATIENT-LVL IV: CPT | Mod: PBBFAC,,, | Performed by: STUDENT IN AN ORGANIZED HEALTH CARE EDUCATION/TRAINING PROGRAM

## 2024-06-19 ENCOUNTER — TELEPHONE (OUTPATIENT)
Dept: RHEUMATOLOGY | Facility: CLINIC | Age: 64
End: 2024-06-19
Payer: MEDICARE

## 2024-06-19 ENCOUNTER — PATIENT OUTREACH (OUTPATIENT)
Dept: ADMINISTRATIVE | Facility: HOSPITAL | Age: 64
End: 2024-06-19
Payer: MEDICARE

## 2024-06-19 LAB
OHS QRS DURATION: 86 MS
OHS QTC CALCULATION: 433 MS

## 2024-06-19 NOTE — PROGRESS NOTES
Population Health Chart Review & Patient Outreach Details      Additional [facility] Health Notes:    PHN  attestation CRS           Updates Requested / Reviewed:      Updated Care Coordination Note, Care Everywhere, and Immunizations Reconciliation Completed or Queried: [facility] Topics Overdue:      [facility] Score: 0     Patient is not due for any topics at this time.    Shingles/Zoster Vaccine                  Health Maintenance Topic(s) Outreach Outcomes & Actions Taken:

## 2024-06-19 NOTE — TELEPHONE ENCOUNTER
----- Message from America Thakkar MD sent at 6/18/2024 10:45 AM CDT -----  Can you schedule her? Thank you!  America  ----- Message -----  From: Jayson Mata MD  Sent: 6/18/2024  10:36 AM CDT  To: America Thakkar MD    Patient is requesting to see a new rheumatologist more availability. Can you help get an appointment with you. Thank you     Message from Dr. Rizo:     Please let patient know for RA patients that are stable I see them once a year. If she wants to see doctor with more availability, recommend she sees new rheumatologist in [place].     Thank you  LORY Charles

## 2024-07-03 ENCOUNTER — HOSPITAL ENCOUNTER (OUTPATIENT)
Dept: CARDIOLOGY | Facility: HOSPITAL | Age: 64
Discharge: HOME | End: 2024-07-03
Attending: STUDENT IN AN ORGANIZED HEALTH CARE EDUCATION/TRAINING PROGRAM
Payer: MEDICARE

## 2024-07-03 VITALS — HEIGHT: 61 IN | BODY MASS INDEX: 28.51 KG/M2 | WEIGHT: 151 LBS

## 2024-07-03 DIAGNOSIS — D86.0 SARCOIDOSIS OF LUNG: ICD-10-CM

## 2024-07-03 LAB
APICAL FOUR CHAMBER EJECTION FRACTION: 56 %
APICAL TWO CHAMBER EJECTION FRACTION: 73 %
ASCENDING AORTA: 2.95 CM
AV INDEX (PROSTH): 0.87
AV MEAN GRADIENT: 3 MMHG
AV PEAK GRADIENT: 6 MMHG
AV VALVE AREA BY VELOCITY RATIO: 2.12 CM²
AV VALVE AREA: 2.62 CM²
AV VELOCITY RATIO: 0.7
BSA FOR ECHO PROCEDURE: 1.72 M2
CV ECHO LV RWT: 0.59 CM
DOP CALC AO PEAK VEL: 1.24 M/S
DOP CALC AO VTI: 26.2 CM
DOP CALC LVOT AREA: 3 CM2
DOP CALC LVOT DIAMETER: 1.96 CM
DOP CALC LVOT PEAK VEL: 0.87 M/S
DOP CALC LVOT STROKE VOLUME: 68.76 CM3
DOP CALC MV VTI: 18.4 CM
DOP CALCLVOT PEAK VEL VTI: 22.8 CM
E WAVE DECELERATION TIME: 214.38 MSEC
E/A RATIO: 1.09
E/E' RATIO: 6 M/S
ECHO LV POSTERIOR WALL: 1.15 CM (ref 0.6–1.1)
FRACTIONAL SHORTENING: 42 % (ref 28–44)
INTERVENTRICULAR SEPTUM: 0.93 CM (ref 0.6–1.1)
IVC DIAMETER: 1.26 CM
LA MAJOR: 4.78 CM
LA MINOR: 4.17 CM
LA WIDTH: 3.7 CM
LEFT ATRIUM AREA SYSTOLIC (APICAL 2 CHAMBER): 13.85 CM2
LEFT ATRIUM AREA SYSTOLIC (APICAL 4 CHAMBER): 15.9 CM2
LEFT ATRIUM SIZE: 3.25 CM
LEFT ATRIUM VOLUME INDEX MOD: 24 ML/M2
LEFT ATRIUM VOLUME INDEX: 27.1 ML/M2
LEFT ATRIUM VOLUME MOD: 40.36 CM3
LEFT ATRIUM VOLUME: 45.53 CM3
LEFT INTERNAL DIMENSION IN SYSTOLE: 2.24 CM (ref 2.1–4)
LEFT VENTRICLE DIASTOLIC VOLUME INDEX: 38.42 ML/M2
LEFT VENTRICLE DIASTOLIC VOLUME: 64.55 ML
LEFT VENTRICLE END DIASTOLIC VOLUME APICAL 2 CHAMBER: 49.56 ML
LEFT VENTRICLE END DIASTOLIC VOLUME APICAL 4 CHAMBER: 59.77 ML
LEFT VENTRICLE END SYSTOLIC VOLUME APICAL 2 CHAMBER: 37.18 ML
LEFT VENTRICLE END SYSTOLIC VOLUME APICAL 4 CHAMBER: 38.54 ML
LEFT VENTRICLE MASS INDEX: 76 G/M2
LEFT VENTRICLE SYSTOLIC VOLUME INDEX: 10 ML/M2
LEFT VENTRICLE SYSTOLIC VOLUME: 16.87 ML
LEFT VENTRICULAR INTERNAL DIMENSION IN DIASTOLE: 3.87 CM (ref 3.5–6)
LEFT VENTRICULAR MASS: 127.63 G
LV LATERAL E/E' RATIO: 4.8 M/S
LV SEPTAL E/E' RATIO: 8 M/S
LVED V (TEICH): 64.55 ML
LVES V (TEICH): 16.87 ML
LVOT MG: 1.85 MMHG
LVOT MV: 0.66 CM/S
MV MEAN GRADIENT: 1 MMHG
MV PEAK A VEL: 0.44 M/S
MV PEAK E VEL: 0.48 M/S
MV PEAK GRADIENT: 3 MMHG
MV STENOSIS PRESSURE HALF TIME: 65.79 MS
MV VALVE AREA BY CONTINUITY EQUATION: 3.74 CM2
MV VALVE AREA P 1/2 METHOD: 3.34 CM2
OHS LV EJECTION FRACTION SIMPSONS BIPLANE MOD: 65 %
PISA MRMAX VEL: 5.86 M/S
PISA TR MAX VEL: 2.13 M/S
PV MV: 0.62 M/S
PV PEAK GRADIENT: 3 MMHG
PV PEAK VELOCITY: 0.87 M/S
RA MAJOR: 4.15 CM
RA PRESSURE ESTIMATED: 3 MMHG
RA WIDTH: 3.14 CM
RIGHT VENTRICLE DIASTOLIC BASEL DIMENSION: 3.4 CM
RIGHT VENTRICLE DIASTOLIC MID DIMENSION: 2.5 CM
RV TB RVSP: 5 MMHG
RV TISSUE DOPPLER FREE WALL SYSTOLIC VELOCITY 1 (APICAL 4 CHAMBER VIEW): 12.38 CM/S
SINUS: 3.3 CM
STJ: 2.78 CM
TDI LATERAL: 0.1 M/S
TDI SEPTAL: 0.06 M/S
TDI: 0.08 M/S
TR MAX PG: 18 MMHG
TRICUSPID ANNULAR PLANE SYSTOLIC EXCURSION: 1.68 CM
TV REST PULMONARY ARTERY PRESSURE: 21 MMHG
Z-SCORE OF LEFT VENTRICULAR DIMENSION IN END DIASTOLE: -1.92
Z-SCORE OF LEFT VENTRICULAR DIMENSION IN END SYSTOLE: -2.05

## 2024-07-03 PROCEDURE — 93306 TTE W/DOPPLER COMPLETE: CPT

## 2024-07-03 PROCEDURE — 93306 TTE W/DOPPLER COMPLETE: CPT | Mod: 26,,, | Performed by: INTERNAL MEDICINE

## 2024-07-08 ENCOUNTER — LAB VISIT (OUTPATIENT)
Dept: LAB | Facility: HOSPITAL | Age: 64
End: 2024-07-08
Attending: FAMILY MEDICINE
Payer: MEDICARE

## 2024-07-08 ENCOUNTER — OFFICE VISIT (OUTPATIENT)
Dept: FAMILY MEDICINE | Facility: CLINIC | Age: 64
End: 2024-07-08
Attending: FAMILY MEDICINE
Payer: MEDICARE

## 2024-07-08 VITALS
BODY MASS INDEX: 28.59 KG/M2 | SYSTOLIC BLOOD PRESSURE: 129 MMHG | OXYGEN SATURATION: 98 % | HEIGHT: 61 IN | WEIGHT: 151.44 LBS | DIASTOLIC BLOOD PRESSURE: 79 MMHG | HEART RATE: 72 BPM

## 2024-07-08 DIAGNOSIS — E78.2 MIXED HYPERLIPIDEMIA: ICD-10-CM

## 2024-07-08 DIAGNOSIS — Z12.11 SCREEN FOR COLON CANCER: ICD-10-CM

## 2024-07-08 DIAGNOSIS — R79.9 ABNORMAL FINDING OF BLOOD CHEMISTRY, UNSPECIFIED: ICD-10-CM

## 2024-07-08 DIAGNOSIS — E55.9 VITAMIN D DEFICIENCY, UNSPECIFIED: ICD-10-CM

## 2024-07-08 DIAGNOSIS — I10 ESSENTIAL HYPERTENSION: ICD-10-CM

## 2024-07-08 DIAGNOSIS — M05.9 RHEUMATOID ARTHRITIS WITH POSITIVE RHEUMATOID FACTOR, INVOLVING UNSPECIFIED SITE: ICD-10-CM

## 2024-07-08 DIAGNOSIS — I10 PRIMARY HYPERTENSION: ICD-10-CM

## 2024-07-08 DIAGNOSIS — F33.9 MAJOR DEPRESSION, RECURRENT, CHRONIC: ICD-10-CM

## 2024-07-08 DIAGNOSIS — I10 ESSENTIAL HYPERTENSION: Primary | ICD-10-CM

## 2024-07-08 LAB
25(OH)D3+25(OH)D2 SERPL-MCNC: 38 NG/ML (ref 30–96)
ALBUMIN SERPL BCP-MCNC: 4 G/DL (ref 3.5–5.2)
ALP SERPL-CCNC: 67 U/L (ref 55–135)
ALT SERPL W/O P-5'-P-CCNC: 17 U/L (ref 10–44)
ANION GAP SERPL CALC-SCNC: 8 MMOL/L (ref 8–16)
AST SERPL-CCNC: 19 U/L (ref 10–40)
BASOPHILS # BLD AUTO: 0.03 K/UL (ref 0–0.2)
BASOPHILS NFR BLD: 0.5 % (ref 0–1.9)
BILIRUB SERPL-MCNC: 0.3 MG/DL (ref 0.1–1)
BUN SERPL-MCNC: 13 MG/DL (ref 8–23)
CALCIUM SERPL-MCNC: 10.2 MG/DL (ref 8.7–10.5)
CHLORIDE SERPL-SCNC: 105 MMOL/L (ref 95–110)
CO2 SERPL-SCNC: 28 MMOL/L (ref 23–29)
CREAT SERPL-MCNC: 0.8 MG/DL (ref 0.5–1.4)
DIFFERENTIAL METHOD BLD: ABNORMAL
EOSINOPHIL # BLD AUTO: 0.3 K/UL (ref 0–0.5)
EOSINOPHIL NFR BLD: 4.7 % (ref 0–8)
ERYTHROCYTE [DISTWIDTH] IN BLOOD BY AUTOMATED COUNT: 13.3 % (ref 11.5–14.5)
EST. GFR  (NO RACE VARIABLE): >60 ML/MIN/1.73 M^2
ESTIMATED AVG GLUCOSE: 111 MG/DL (ref 68–131)
GLUCOSE SERPL-MCNC: 90 MG/DL (ref 70–110)
HBA1C MFR BLD: 5.5 % (ref 4–5.6)
HCT VFR BLD AUTO: 41.3 % (ref 37–48.5)
HGB BLD-MCNC: 13.1 G/DL (ref 12–16)
IMM GRANULOCYTES # BLD AUTO: 0.01 K/UL (ref 0–0.04)
IMM GRANULOCYTES NFR BLD AUTO: 0.2 % (ref 0–0.5)
LYMPHOCYTES # BLD AUTO: 2.3 K/UL (ref 1–4.8)
LYMPHOCYTES NFR BLD: 34.7 % (ref 18–48)
MCH RBC QN AUTO: 31.8 PG (ref 27–31)
MCHC RBC AUTO-ENTMCNC: 31.7 G/DL (ref 32–36)
MCV RBC AUTO: 100 FL (ref 82–98)
MONOCYTES # BLD AUTO: 0.6 K/UL (ref 0.3–1)
MONOCYTES NFR BLD: 9.4 % (ref 4–15)
NEUTROPHILS # BLD AUTO: 3.3 K/UL (ref 1.8–7.7)
NEUTROPHILS NFR BLD: 50.5 % (ref 38–73)
NRBC BLD-RTO: 0 /100 WBC
PLATELET # BLD AUTO: 351 K/UL (ref 150–450)
PMV BLD AUTO: 9.7 FL (ref 9.2–12.9)
POTASSIUM SERPL-SCNC: 4.2 MMOL/L (ref 3.5–5.1)
PROT SERPL-MCNC: 7.8 G/DL (ref 6–8.4)
RBC # BLD AUTO: 4.12 M/UL (ref 4–5.4)
SODIUM SERPL-SCNC: 141 MMOL/L (ref 136–145)
TSH SERPL DL<=0.005 MIU/L-ACNC: 1.94 UIU/ML (ref 0.4–4)
WBC # BLD AUTO: 6.6 K/UL (ref 3.9–12.7)

## 2024-07-08 PROCEDURE — 99999 PR PBB SHADOW E&M-EST. PATIENT-LVL III: CPT | Mod: PBBFAC,,, | Performed by: FAMILY MEDICINE

## 2024-07-08 PROCEDURE — 1159F MED LIST DOCD IN RCRD: CPT | Mod: CPTII,S$GLB,, | Performed by: FAMILY MEDICINE

## 2024-07-08 PROCEDURE — 99214 OFFICE O/P EST MOD 30 MIN: CPT | Mod: S$GLB,,, | Performed by: FAMILY MEDICINE

## 2024-07-08 PROCEDURE — 1160F RVW MEDS BY RX/DR IN RCRD: CPT | Mod: CPTII,S$GLB,, | Performed by: FAMILY MEDICINE

## 2024-07-08 PROCEDURE — 84443 ASSAY THYROID STIM HORMONE: CPT | Performed by: FAMILY MEDICINE

## 2024-07-08 PROCEDURE — 3008F BODY MASS INDEX DOCD: CPT | Mod: CPTII,S$GLB,, | Performed by: FAMILY MEDICINE

## 2024-07-08 PROCEDURE — 82306 VITAMIN D 25 HYDROXY: CPT | Performed by: FAMILY MEDICINE

## 2024-07-08 PROCEDURE — 85025 COMPLETE CBC W/AUTO DIFF WBC: CPT | Performed by: FAMILY MEDICINE

## 2024-07-08 PROCEDURE — 80053 COMPREHEN METABOLIC PANEL: CPT | Performed by: FAMILY MEDICINE

## 2024-07-08 PROCEDURE — 3074F SYST BP LT 130 MM HG: CPT | Mod: CPTII,S$GLB,, | Performed by: FAMILY MEDICINE

## 2024-07-08 PROCEDURE — 36415 COLL VENOUS BLD VENIPUNCTURE: CPT | Performed by: FAMILY MEDICINE

## 2024-07-08 PROCEDURE — 4010F ACE/ARB THERAPY RXD/TAKEN: CPT | Mod: CPTII,S$GLB,, | Performed by: FAMILY MEDICINE

## 2024-07-08 PROCEDURE — 3078F DIAST BP <80 MM HG: CPT | Mod: CPTII,S$GLB,, | Performed by: FAMILY MEDICINE

## 2024-07-08 PROCEDURE — 83036 HEMOGLOBIN GLYCOSYLATED A1C: CPT | Performed by: FAMILY MEDICINE

## 2024-07-08 RX ORDER — LOSARTAN POTASSIUM 100 MG/1
100 TABLET ORAL DAILY
Qty: 90 TABLET | Refills: 3 | Status: SHIPPED | OUTPATIENT
Start: 2024-07-08 | End: 2025-07-08

## 2024-07-08 NOTE — PROGRESS NOTES
Subjective:       Patient ID: Silvia Cervantes is a 63 y.o. female.    Chief Complaint: Follow-up    63 yr old pleasant  female with depression, allergies, vitiligo, HTN, HLD, sarcoidosis, Rheumatoid arthritis, Erosive gastritis, and other co morbidities presents today for her 3 month follow up. She wanted new neurology for her headache. The previous one left system.    Chronic low back pain. Onset 6 months ago and gradually worsening - left lower back- does not radiate and no neurological symptoms. No saddle anesthesia or bladder/bowel problems. Details as follows -      HTN - controlled - on Losartan-HCT and Norvasc 5 - compliant - no side effects     HLD - on statin but not compliant -         LDLCALC                  153.0               07/03/2024                 Sarcoidosis and lung nodules - follwos pulmonology - need new one as her previous specialist is leaving [facility]      Major depression - uncontrolled - not on meds - would like to start one - given zoloft - she will start from today - - no SI/HI    Vertigo - much better - on vertin as needed          RA - on Enbrel shots - follows Rheumatology - stable      Gastritis - stable - on PPI as needed - no side effects      History as below - reviewed       HM  -labs UTD  -vaccines UTD    Follow-up  Associated symptoms include myalgias, neck pain, numbness and weakness. Pertinent negatives include no arthralgias, chest pain, congestion, diaphoresis, headaches, nausea or sore throat.   Medication Refill  Associated symptoms include myalgias, neck pain, numbness and weakness. Pertinent negatives include no arthralgias, chest pain, congestion, diaphoresis, headaches, nausea or sore throat.   Hypertension  This is a chronic problem. The current episode started more than 1 year ago. The problem has been gradually improving since onset. The problem is controlled. Associated symptoms include neck pain. Pertinent negatives include no chest pain, headaches,  malaise/fatigue, palpitations or shortness of breath. There are no associated agents to hypertension. Risk factors for coronary artery disease include dyslipidemia and post-menopausal state. Past treatments include angiotensin blockers and diuretics. The current treatment provides significant improvement. There are no compliance problems.  There is no history of angina, CAD/MI, CVA, left ventricular hypertrophy, PVD or retinopathy. There is no history of chronic renal disease, coarctation of the aorta, hypercortisolism, hyperparathyroidism, a hypertension causing med or sleep apnea.   Hyperlipidemia  This is a chronic problem. The current episode started more than 1 year ago. The problem is controlled. Recent lipid tests were reviewed and are normal. She has no history of chronic renal disease, diabetes, hypothyroidism or liver disease. There are no known factors aggravating her hyperlipidemia. Associated symptoms include myalgias. Pertinent negatives include no chest pain, focal sensory loss, focal weakness, leg pain or shortness of breath. She is currently on no antihyperlipidemic treatment. The current treatment provides mild improvement of lipids. There are no compliance problems.  Risk factors for coronary artery disease include dyslipidemia, hypertension and post-menopausal.   Back Pain  This is a chronic problem. The current episode started more than 1 month ago. The problem occurs constantly. The problem is unchanged. The pain is present in the sacro-iliac and lumbar spine. The quality of the pain is described as aching. The pain does not radiate. The symptoms are aggravated by bending, sitting and twisting. Associated symptoms include numbness and weakness. Pertinent negatives include no chest pain, dysuria, headaches, leg pain, perianal numbness or weight loss. She has tried nothing for the symptoms. The treatment provided no relief.     Review of Systems   Constitutional: Negative.  Negative for activity  change, diaphoresis, malaise/fatigue, unexpected weight change and weight loss.   HENT: Negative.  Negative for nasal congestion, ear discharge, hearing loss, rhinorrhea, sore throat and voice change.    Eyes: Negative.  Negative for pain, discharge and visual disturbance.   Respiratory: Negative.  Negative for chest tightness, shortness of breath and wheezing.    Cardiovascular: Negative.  Negative for chest pain and palpitations.   Gastrointestinal: Negative.  Negative for abdominal distention, anal bleeding, constipation and nausea.   Endocrine: Negative.  Negative for cold intolerance, polydipsia and polyuria.   Genitourinary: Negative.  Negative for decreased urine volume, difficulty urinating, dysuria, frequency, menstrual problem and vaginal pain.   Musculoskeletal:  Positive for back pain, myalgias and neck pain. Negative for arthralgias, gait problem and leg pain.   Integumentary:  Negative for color change, pallor and wound. Negative.   Allergic/Immunologic: Negative.  Negative for environmental allergies and immunocompromised state.   Neurological:  Positive for weakness and numbness. Negative for dizziness, tremors, focal weakness, seizures, speech difficulty and headaches.   Hematological: Negative.  Negative for adenopathy. Does not bruise/bleed easily.   Psychiatric/Behavioral: Negative.  Negative for agitation, confusion, decreased concentration, hallucinations, self-injury and suicidal ideas. The patient is not nervous/anxious.          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)        Past Surgical History:   Procedure Laterality Date     SECTION      COLONOSCOPY N/A 2019    Procedure: COLONOSCOPY/golytley ;  Surgeon: Dimas Woodall MD;  Location: [facility] ENDO;  Service: Endoscopy;  Laterality: N/A;    ESOPHAGOGASTRODUODENOSCOPY N/A 2023    Procedure: EGD (ESOPHAGOGASTRODUODENOSCOPY);  Surgeon: Ori St MD;  Location: [facility] ENDO;  Service: Endoscopy;  Laterality: N/A;    EXCISION OF NODULE Right 2022    Procedure: EXCISION, NODULE right elbow;  Surgeon: Louise Smith MD;  Location: [facility];  Service: Orthopedics;  Laterality: Right;    HYSTERECTOMY      INJECTION OF STEROID Left 2022    Procedure: INJECTION, STEROID Thumb MP joint;  Surgeon: Louise Smith MD;  Location: [facility] OR;  Service: Orthopedics;  Laterality: Left;    SINUS SURGERY      SURGICAL REMOVAL OF DISTAL ULNA Right 2022    Procedure: EXCISION, ULNA, DISTAL;  Surgeon: Louise Smith MD;  Location: [facility] OR;  Service: Orthopedics;  Laterality: Right;    SYNOVECTOMY OF WRIST Right 2022    Procedure: SYNOVECTOMY, WRIST;  Surgeon: Louise Smith MD;  Location: [facility];  Service: Orthopedics;  Laterality: Right;    TUBAL LIGATION         Family History   Problem Relation Name Age of Onset    Hypertension Mother      Cataracts Mother      Diabetes Father      Hypertension Father      Cataracts Father      Heart disease Father      Hyperlipidemia Father      Diabetes Sister x1     Stroke Brother      Hypertension Brother      No Known Problems Daughter x1     No Known Problems Son x1     Melanoma Neg Hx      Breast cancer Neg Hx      Colon cancer Neg Hx      Ovarian cancer Neg Hx      Blindness Neg Hx      Glaucoma Neg Hx      Aneurysm Neg Hx      Cancer Neg Hx      Clotting disorder Neg Hx      Dementia Neg  Hx      Fainting Neg Hx      Kidney disease Neg Hx      Liver disease Neg Hx      Migraines Neg Hx      Neuropathy Neg Hx      Parkinsonism Neg Hx      Seizures Neg Hx      Tremor Neg Hx         Social History     Socioeconomic History    Marital status:    Tobacco Use    Smoking status: Never    Smokeless tobacco: Never   Substance and Sexual Activity    Alcohol use: Yes     Comment: Rarely    Drug use: Not Currently    Sexual activity: Yes     Partners: Male     Birth control/protection: Surgical     Comment: hyst     Social Determinants of Health     Financial Resource Strain: Low Risk  (2/21/2022)    Overall Financial Resource Strain (CARDIA)     Difficulty of Paying Living Expenses: Not hard at all   Food Insecurity: No Food Insecurity (2/21/2022)    Hunger Vital Sign     Worried About Running Out of Food in the Last Year: Never true     Ran Out of Food in the Last Year: Never true   Transportation Needs: No Transportation Needs (2/21/2022)    PRAPARE - Transportation     Lack of Transportation (Medical): No     Lack of Transportation (Non-Medical): No   Physical Activity: Sufficiently Active (2/21/2022)    Exercise Vital Sign     Days of Exercise per Week: 7 days     Minutes of Exercise per Session: 120 min   Stress: No Stress Concern Present (2/21/2022)    [employer] - Occupational Stress Questionnaire     Feeling of Stress : Only a little   Housing Stability: Low Risk  (2/21/2022)    Housing Stability Vital Sign     Unable to Pay for Housing in the Last Year: No     Number of Places Lived in the Last Year: 1     Unstable Housing in the Last Year: No       Current Outpatient Medications   Medication Sig Dispense Refill    amLODIPine (NORVASC) 5 MG tablet Take 1 tablet (5 mg total) by mouth once daily. 90 tablet 3    atorvastatin (LIPITOR) 10 MG tablet Take 1 tablet (10 mg total) by mouth once daily. 90 tablet 3    chlorhexidine (PERIDEX) 0.12 % solution Use 15-20 mL to  rinse mouth twice daily for 2 minutes, spit out and do not rinse mouth with water after. 473 mL 3    cyanocobalamin, vitamin B-12, (VITAMIN B-12 ORAL) Take by mouth.      cycloSPORINE (RESTASIS) 0.05 % ophthalmic emulsion Apply 1 drop into both eyes twice a day 180 vial 3    cycloSPORINE (RESTASIS) 0.05 % ophthalmic emulsion Instill 1 drop Both Eyes twice a day 180 each 6    cycloSPORINE (RESTASIS) 0.05 % ophthalmic emulsion Apply 1 drop into both eyes twice a day 180 each 3    desoximetasone (TOPICORT) 0.25 % cream Apply to affected area every other night x 1 month 60 g 1    diclofenac sodium (VOLTAREN) 1 % Gel Apply 2 g topically 4 (four) times daily. 100 g 1    ergocalciferol (ERGOCALCIFEROL) 50,000 unit Cap Take 1 capsule (50,000 Units total) by mouth every 7 days. 12 capsule 2    etanercept (ENBREL SURECLICK) 50 mg/mL (1 mL) Inject 1 mL (50 mg total) into the skin once a week. 4 mL 11    fluticasone propion-salmeterol 115-21 mcg/dose (ADVAIR HFA) 115-21 mcg/actuation HFAA inhaler Inhale 2 puffs into the lungs every 12 (twelve) hours. 12 g 11    fluticasone propionate (FLONASE) 50 mcg/actuation nasal spray 2 sprays (100 mcg total) by Each Nostril route once daily. 16 g 11    folic acid (FOLVITE) 1 MG tablet Take 4 tablets (4 mg total) by mouth once daily. 120 tablet 11    gabapentin (NEURONTIN) 300 MG capsule Take 1 capsule (300 mg total) by mouth 2 (two) times daily. 180 capsule 3    hydroquinone 4% kojic acid 3% tretinoin 0.025% topical cream apply a pea-sized AMOUNT TO entire face EVERY NIGHT AT BEDTIME THEN moisturize      hydrOXYzine HCL (ATARAX) 25 MG tablet Take 1 tablet (25 mg total) by mouth every evening. 30 tablet 1    leflunomide (ARAVA) 20 MG Tab Take 1 tablet (20 mg total) by mouth once daily. 90 tablet 3    losartan (COZAAR) 100 MG tablet Take 1 tablet (100 mg total) by mouth once daily. 90 tablet 3    meclizine (ANTIVERT) 25 mg tablet Take 1 tablet (25 mg total) by mouth 3 (three) times daily as  needed for Nausea or Dizziness. 30 tablet 2    metoprolol succinate (TOPROL-XL) 25 MG 24 hr tablet Take 1 tablet (25 mg total) by mouth every evening. 90 tablet 3    mupirocin (BACTROBAN) 2 % ointment Apply topically 2 (two) times daily. 22 g 1    nystatin (NYAMYC) powder Apply to affected area every morning as needed for flare under breasts 120 g 6    pantoprazole (PROTONIX) 40 MG tablet Take 1 tablet (40 mg total) by mouth once daily. 30 tablet 3    pantoprazole (PROTONIX) 40 MG tablet Take 1 tablet (40 mg total) by mouth once daily. 30 tablet 4    promethazine (PHENERGAN) 25 MG tablet Take 1 tablet (25 mg total) by mouth every 6 (six) hours as needed for Nausea. 25 tablet 0    PROTOPIC 0.1 % ointment AAA bid 100 g 2    ruxolitinib (OPZELURA) 1.5 % Crea Apply to affected area twice daily to neck and hands for vitiligo 60 g 2    ruxolitinib (OPZELURA) 1.5 % Crea Apply to affected area twice a day 60 g 2    tretinoin (RETIN-A) 0.025 % cream Compound tretinoin 0.025% / niacinamide 2% cream. Apply a pea-sized amount to entire face qhs then moisturize. 30 g 5    triamcinolone acetonide 0.025% (KENALOG) 0.025 % cream Apply to affected area every day to twice a day to neck . Not more than 1-2 weeks straight in same location 80 g 1    triamcinolone acetonide 0.1% (KENALOG) 0.1 % ointment Apply topically 2 (two) times daily. For 2 weeks 15 g 1    ZINC ORAL Take by mouth.       No current facility-administered medications for this visit.       Review of patient's allergies indicates:  No Known Allergies      Objective:       Vitals:    07/08/24 0901   BP: 129/79   Pulse: 72       Physical Exam  Constitutional:       General: She is not in acute distress.     Appearance: She is well-developed. She is not diaphoretic.   HENT:      Head: Normocephalic and atraumatic.      Right Ear: External ear normal.      Left Ear: External ear normal.      Nose: Nose normal.      Mouth/Throat:      Pharynx: No oropharyngeal exudate.    Eyes:      General: No scleral icterus.        Right eye: No discharge.         Left eye: No discharge.      Conjunctiva/sclera: Conjunctivae normal.      Pupils: Pupils are equal, round, and reactive to light.   Neck:      Thyroid: No thyromegaly.      Vascular: No JVD.      Trachea: No tracheal deviation.   Cardiovascular:      Rate and Rhythm: Normal rate and regular rhythm.      Heart sounds: Normal heart sounds. No murmur heard.     No friction rub. No gallop.   Pulmonary:      Effort: Pulmonary effort is normal.      Breath sounds: Normal breath sounds. No stridor. No wheezing or rales.   Chest:      Chest wall: No tenderness.   Abdominal:      General: Bowel sounds are normal. There is no distension.      Palpations: Abdomen is soft. There is no mass.      Tenderness: There is no abdominal tenderness. There is no guarding or rebound.      Hernia: No hernia is present.   Musculoskeletal:         General: No tenderness. Normal range of motion.      Cervical back: Normal range of motion and neck supple.   Lymphadenopathy:      Cervical: No cervical adenopathy.   Skin:     General: Skin is warm and dry.      Coloration: Skin is not pale.      Findings: No erythema or rash.   Neurological:      Mental Status: She is alert and oriented to person, place, and time.      Cranial Nerves: No cranial nerve deficit.      Motor: No abnormal muscle tone.      Coordination: Coordination normal.      Deep Tendon Reflexes: Reflexes are normal and symmetric. Reflexes normal.   Psychiatric:         Behavior: Behavior normal.         Thought Content: Thought content normal.         Judgment: Judgment normal.         Assessment:       Problem List Items Addressed This Visit       Rheumatoid arthritis with positive rheumatoid factor    Relevant Orders    Hemoglobin A1C    TSH    CBC Auto Differential    Comprehensive Metabolic Panel    Vitamin D    Mixed hyperlipidemia    Relevant Orders    Hemoglobin A1C    TSH    CBC Auto  Differential    Comprehensive Metabolic Panel    Vitamin D    Major depression, recurrent, chronic    Relevant Orders    Hemoglobin A1C    TSH    CBC Auto Differential    Comprehensive Metabolic Panel    Vitamin D    Hypertension    Relevant Medications    losartan (COZAAR) 100 MG tablet    Other Relevant Orders    Hemoglobin A1C    TSH    CBC Auto Differential    Comprehensive Metabolic Panel    Vitamin D     Other Visit Diagnoses       Essential hypertension    -  Primary    Relevant Medications    losartan (COZAAR) 100 MG tablet    Other Relevant Orders    Hemoglobin A1C    TSH    CBC Auto Differential    Comprehensive Metabolic Panel    Vitamin D    Screen for colon cancer        Relevant Orders    Ambulatory referral/consult to Endo Procedure     Hemoglobin A1C    TSH    CBC Auto Differential    Comprehensive Metabolic Panel    Vitamin D    Abnormal finding of blood chemistry, unspecified        Relevant Orders    Hemoglobin A1C    Vitamin D deficiency, unspecified        Relevant Orders    Vitamin D            Plan:       Silvia was seen today for follow-up.    Diagnoses and all orders for this visit:    Essential hypertension  -     Hemoglobin A1C; Future  -     TSH; Future  -     CBC Auto Differential; Future  -     Comprehensive Metabolic Panel; Future  -     Vitamin D; Future  -     losartan (COZAAR) 100 MG tablet; Take 1 tablet (100 mg total) by mouth once daily.    Major depression, recurrent, chronic  -     Hemoglobin A1C; Future  -     TSH; Future  -     CBC Auto Differential; Future  -     Comprehensive Metabolic Panel; Future  -     Vitamin D; Future    Mixed hyperlipidemia  -     Hemoglobin A1C; Future  -     TSH; Future  -     CBC Auto Differential; Future  -     Comprehensive Metabolic Panel; Future  -     Vitamin D; Future    Rheumatoid arthritis with positive rheumatoid factor, involving unspecified site  -     Hemoglobin A1C; Future  -     TSH; Future  -     CBC Auto Differential;  Future  -     Comprehensive Metabolic Panel; Future  -     Vitamin D; Future    Primary hypertension  -     Hemoglobin A1C; Future  -     TSH; Future  -     CBC Auto Differential; Future  -     Comprehensive Metabolic Panel; Future  -     Vitamin D; Future    Screen for colon cancer  -     Ambulatory referral/consult to Endo Procedure ; Future  -     Hemoglobin A1C; Future  -     TSH; Future  -     CBC Auto Differential; Future  -     Comprehensive Metabolic Panel; Future  -     Vitamin D; Future    Abnormal finding of blood chemistry, unspecified  -     Hemoglobin A1C; Future    Vitamin D deficiency, unspecified  -     Vitamin D; Future      HLD  -diet control  -start statin      HTN  -controlled    RA  -stable  -follow rheumatology    Vertigo  -meclizine prn    Depression  -controlled  -continue zoloft 25 mg daily  -SI/ER precautions given    Headaches  -refer neurology    Spent adequate time in obtaining history and explaining differentials      30 minutes spent during this visit of which greater than 50% devoted to face-face counseling and coordination of care regarding diagnosis and management plan    Follow up in about 6 months (around 1/8/2025), or if symptoms worsen or fail to improve.

## 2024-08-01 ENCOUNTER — OFFICE VISIT (OUTPATIENT)
Dept: PODIATRY | Facility: CLINIC | Age: 64
End: 2024-08-01
Payer: MEDICARE

## 2024-08-01 VITALS
HEART RATE: 76 BPM | WEIGHT: 151 LBS | DIASTOLIC BLOOD PRESSURE: 84 MMHG | SYSTOLIC BLOOD PRESSURE: 128 MMHG | HEIGHT: 61 IN | BODY MASS INDEX: 28.51 KG/M2

## 2024-08-01 DIAGNOSIS — G57.61 MORTON'S NEUROMA OF THIRD INTERSPACE OF RIGHT FOOT: Primary | ICD-10-CM

## 2024-08-01 DIAGNOSIS — L60.0 ONYCHOCRYPTOSIS: ICD-10-CM

## 2024-08-01 PROCEDURE — 3074F SYST BP LT 130 MM HG: CPT | Mod: CPTII,S$GLB,, | Performed by: PODIATRIST

## 2024-08-01 PROCEDURE — 3079F DIAST BP 80-89 MM HG: CPT | Mod: CPTII,S$GLB,, | Performed by: PODIATRIST

## 2024-08-01 PROCEDURE — 1160F RVW MEDS BY RX/DR IN RCRD: CPT | Mod: CPTII,S$GLB,, | Performed by: PODIATRIST

## 2024-08-01 PROCEDURE — 3008F BODY MASS INDEX DOCD: CPT | Mod: CPTII,S$GLB,, | Performed by: PODIATRIST

## 2024-08-01 PROCEDURE — 4010F ACE/ARB THERAPY RXD/TAKEN: CPT | Mod: CPTII,S$GLB,, | Performed by: PODIATRIST

## 2024-08-01 PROCEDURE — 99213 OFFICE O/P EST LOW 20 MIN: CPT | Mod: S$GLB,,, | Performed by: PODIATRIST

## 2024-08-01 PROCEDURE — 3044F HG A1C LEVEL LT 7.0%: CPT | Mod: CPTII,S$GLB,, | Performed by: PODIATRIST

## 2024-08-01 PROCEDURE — 1159F MED LIST DOCD IN RCRD: CPT | Mod: CPTII,S$GLB,, | Performed by: PODIATRIST

## 2024-08-01 PROCEDURE — 99999 PR PBB SHADOW E&M-EST. PATIENT-LVL IV: CPT | Mod: PBBFAC,,, | Performed by: PODIATRIST
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
## 2024-08-26 DIAGNOSIS — R00.2 PALPITATIONS: ICD-10-CM

## 2024-08-26 RX ORDER — METOPROLOL SUCCINATE 25 MG/1
25 TABLET, EXTENDED RELEASE ORAL NIGHTLY
Qty: 90 TABLET | Refills: 3 | Status: SHIPPED | OUTPATIENT
Start: 2024-08-26

## 2024-08-27 ENCOUNTER — OFFICE VISIT (OUTPATIENT)
Dept: ORTHOPEDICS | Facility: CLINIC | Age: 64
End: 2024-08-27
Payer: MEDICARE

## 2024-08-27 VITALS — BODY MASS INDEX: 28.67 KG/M2 | HEIGHT: 61 IN | WEIGHT: 151.88 LBS

## 2024-08-27 DIAGNOSIS — M17.12 PRIMARY OSTEOARTHRITIS OF LEFT KNEE: Primary | ICD-10-CM

## 2024-08-27 PROCEDURE — 99213 OFFICE O/P EST LOW 20 MIN: CPT | Mod: S$GLB,,, | Performed by: PHYSICIAN ASSISTANT

## 2024-08-27 PROCEDURE — 4010F ACE/ARB THERAPY RXD/TAKEN: CPT | Mod: CPTII,S$GLB,, | Performed by: PHYSICIAN ASSISTANT

## 2024-08-27 PROCEDURE — 99999 PR PBB SHADOW E&M-EST. PATIENT-LVL IV: CPT | Mod: PBBFAC,,, | Performed by: PHYSICIAN ASSISTANT

## 2024-08-27 PROCEDURE — 3044F HG A1C LEVEL LT 7.0%: CPT | Mod: CPTII,S$GLB,, | Performed by: PHYSICIAN ASSISTANT

## 2024-08-27 PROCEDURE — 3008F BODY MASS INDEX DOCD: CPT | Mod: CPTII,S$GLB,, | Performed by: PHYSICIAN ASSISTANT

## 2024-08-27 PROCEDURE — 1160F RVW MEDS BY RX/DR IN RCRD: CPT | Mod: CPTII,S$GLB,, | Performed by: PHYSICIAN ASSISTANT

## 2024-08-27 PROCEDURE — 1159F MED LIST DOCD IN RCRD: CPT | Mod: CPTII,S$GLB,, | Performed by: PHYSICIAN ASSISTANT
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
## 2024-09-09 ENCOUNTER — OFFICE VISIT (OUTPATIENT)
Dept: RHEUMATOLOGY | Facility: CLINIC | Age: 64
End: 2024-09-09
Payer: MEDICARE

## 2024-09-09 VITALS
DIASTOLIC BLOOD PRESSURE: 86 MMHG | HEIGHT: 61 IN | TEMPERATURE: 98 F | BODY MASS INDEX: 28.56 KG/M2 | WEIGHT: 151.25 LBS | HEART RATE: 73 BPM | SYSTOLIC BLOOD PRESSURE: 132 MMHG

## 2024-09-09 DIAGNOSIS — D84.821 IMMUNODEFICIENCY DUE TO DRUG THERAPY: ICD-10-CM

## 2024-09-09 DIAGNOSIS — M06.9 RHEUMATOID ARTHRITIS INVOLVING MULTIPLE JOINTS: Primary | ICD-10-CM

## 2024-09-09 DIAGNOSIS — D86.9 SARCOIDOSIS: ICD-10-CM

## 2024-09-09 DIAGNOSIS — Z79.899 IMMUNODEFICIENCY DUE TO DRUG THERAPY: ICD-10-CM

## 2024-09-09 PROCEDURE — 3079F DIAST BP 80-89 MM HG: CPT | Mod: CPTII,S$GLB,, | Performed by: STUDENT IN AN ORGANIZED HEALTH CARE EDUCATION/TRAINING PROGRAM

## 2024-09-09 PROCEDURE — 99214 OFFICE O/P EST MOD 30 MIN: CPT | Mod: S$GLB,,, | Performed by: STUDENT IN AN ORGANIZED HEALTH CARE EDUCATION/TRAINING PROGRAM

## 2024-09-09 PROCEDURE — 3044F HG A1C LEVEL LT 7.0%: CPT | Mod: CPTII,S$GLB,, | Performed by: STUDENT IN AN ORGANIZED HEALTH CARE EDUCATION/TRAINING PROGRAM

## 2024-09-09 PROCEDURE — 1159F MED LIST DOCD IN RCRD: CPT | Mod: CPTII,S$GLB,, | Performed by: STUDENT IN AN ORGANIZED HEALTH CARE EDUCATION/TRAINING PROGRAM

## 2024-09-09 PROCEDURE — 3075F SYST BP GE 130 - 139MM HG: CPT | Mod: CPTII,S$GLB,, | Performed by: STUDENT IN AN ORGANIZED HEALTH CARE EDUCATION/TRAINING PROGRAM

## 2024-09-09 PROCEDURE — 4010F ACE/ARB THERAPY RXD/TAKEN: CPT | Mod: CPTII,S$GLB,, | Performed by: STUDENT IN AN ORGANIZED HEALTH CARE EDUCATION/TRAINING PROGRAM

## 2024-09-09 PROCEDURE — 3008F BODY MASS INDEX DOCD: CPT | Mod: CPTII,S$GLB,, | Performed by: STUDENT IN AN ORGANIZED HEALTH CARE EDUCATION/TRAINING PROGRAM

## 2024-09-09 PROCEDURE — 99999 PR PBB SHADOW E&M-EST. PATIENT-LVL IV: CPT | Mod: PBBFAC,,, | Performed by: STUDENT IN AN ORGANIZED HEALTH CARE EDUCATION/TRAINING PROGRAM

## 2024-09-09 NOTE — PROGRESS NOTES
Subjective:       Patient ID: Silvia Cervantes is a 54 y.o. female.    Chief Complaint:     HPI 53 yo F with PMH of HTN, abnormal pap (2013 but recent negative, no cancer), RA (+CCP, RF),erosive here for evaluation. She was diagnosed in 2011 with hand and feet with swelling and pain. She was initially treated with MTX but it gave her nausea which gave her nausea.  She was changed to leflunomide 20 mg 10/2014 from mtx due to nausea on the mtx. Then I added etanercept January 2015 due to incomplete control of her arthritis on leflunomide.  No am stiffness.  She reports mild aching in hands (3/10). Pain is aching.  Reports mild swelling in hands but better than usual.  She reports left upper quadrant pain (4/10) , non-radiating with possible nausea which has been present for months. Sometimes she has sour taste in mouth.   Interval history:  Patient is here for follow up.   Patient is s/p right rheumatoid nodule excision elbow and wrist, extensor tenosynovectomy and osteophyte excision ulna 6/30/22.   She reports she has been cleared to restart the enbrel. She has not taken enbrel since June 21.   Pain level is 5/10 in hands. Reports some improvement in strength in her hands.    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   HENT: Negative for congestion, ear discharge, ear pain, facial swelling, mouth sores, sinus  pressure, sneezing, sore throat, tinnitus and trouble swallowing.    Eyes: Negative for photophobia, pain, discharge, redness, itching and visual disturbance.   Respiratory: Negative for apnea, chest tightness, shortness of breath, wheezing and stridor.    Cardiovascular: Negative for leg swelling.   Gastrointestinal: Negative for nausea, abdominal pain, diarrhea, constipation, blood in stool, abdominal distention and anal bleeding.   Endocrine: Negative for cold intolerance and heat intolerance.   Genitourinary: Negative for dysuria and difficulty urinating.   Musculoskeletal: Positive for arthralgias. Negative for myalgias, back pain, joint swelling, gait problem, neck pain and neck stiffness.   Skin: Negative for color change, pallor, rash and wound.   Neurological: Negative for dizziness, seizures, light-headedness and numbness.   Hematological: Negative for adenopathy. Does not bruise/bleed easily.   Psychiatric/Behavioral: Negative for sleep disturbance. The patient is not nervous/anxious.       Objective:        Physical Exam   Constitutional: She is oriented to person, place, and time.   HENT:   Head: Normocephalic and atraumatic.   Right Ear: External ear normal.   Left Ear: External ear normal.   Nose: Nose normal.   Mouth/Throat: Oropharynx is clear and moist. No oropharyngeal exudate.   Eyes: Conjunctivae and EOM are normal. Pupils are equal, round, and reactive to light. Right eye exhibits no discharge. Left eye exhibits no discharge. No scleral icterus.   Neck: Neck supple. No JVD present. No thyromegaly present.   Cardiovascular: Normal rate, regular rhythm, normal heart sounds and intact distal pulses.  Exam reveals no gallop and no friction rub.    No murmur heard.  Pulmonary/Chest: Effort normal and breath sounds normal. No respiratory distress. She has no wheezes. She has no rales. She exhibits no tenderness.   Abdominal: Soft. Bowel sounds are normal. She exhibits no distension and no mass. There  "is no tenderness. There is no rebound and no guarding.   Lymphadenopathy:     She has no cervical adenopathy.   Neurological: She is alert and oriented to person, place, and time. No cranial nerve deficit. Gait normal. Coordination normal.   Skin:diffuse hypopigmented lesions in arms, hands, legs, chest, back   Psychiatric: Affect and judgment normal.   Musculoskeletal: She exhibits no  tenderness. She exhibits no edema.   FROM of all joints including neck, shoulders, spine, ankles, wrists, knees, and ankles; no joint deformities noted or effusions, erythema or warmth; no tophi or nodules noted; no crepitus; no nail pitting or onycholysis          Labs:  Esr-25  ccp-93  rf-36  Lfor-negative    Imaging:    MRI brain (2021):   Impression:     Scattered areas of prior hemorrhage including parenchymal, leptomeningeal and intraventricular involvement.  Moderate supratentorial leukoencephalopathy.  Small focus of right anterior temporal encephalomalacia.  Although not entirely specific, the constellation of findings is most suggestive of prior traumatic brain injury/diffuse axonal injury.  Clinical correlation required.     No evidence of new hemorrhage or major vascular distribution infarct.  MRI (7/22/2015)   (Stable enhancing marginal erosions in the second and third metacarpal heads, lunate, triquetrum, and capitate)      Right elbow xray (2020): I personally reviewed      dexa scan:(2017):  Osteopenia of the femoral neck. FRAX calculation does not support treatment for osteoporosis.Total hip and lumbar spine BMD unchanged since 2013.    Recommendations:  1) Adequate calcium and Vitamin D therapy  2) Appropriate exercise  3) Consider repeat BMD in 2- 4 years          Assessment:     64 yo F with PMH of HTN, abnormal pap (2013 but recent negative, no cancer), RA (+CCP, RF),erosive here , H. Pylori for follow up of seropositive RA and sarcoid.   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  She has abnormal brain MRI  AMYLOID ANGIOPATHY and IS SEEING NEURO.  She had worsening of nodules in right elbow and hair thinning, so  MTX was stopped.    Patient is s/p right rheumatoid nodule excision elbow and wrist, extensor tenosynovectomy and osteophyte excision ulna 6/30/22.       She is doing well on enbrel . She self stopped arava.  We can consider putting her on arava or SSZ  At next visit if needed           Seropositive RA   Sarcoidosis   - biopsy proven   Drug induced immunosuppression    PLAN  -continue enbrel 50mg sub q once a week  (Risks of TNF inhibitor discussed with patient and not limited to cell count abnormalities, malignancy, allergic  reaction to medication, and increased risk of infection. Patient agrees with starting medication.)  -Continue folic acid 4 mg a day  -Continue to follow with Dr. Eli for sarcoidosis  - Labs     Rtc in 3 months    Citrate free enbrel when refilling meds

## 2024-09-27 ENCOUNTER — OFFICE VISIT (OUTPATIENT)
Dept: OTOLARYNGOLOGY | Facility: CLINIC | Age: 64
End: 2024-09-27
Payer: MEDICARE

## 2024-09-27 VITALS
DIASTOLIC BLOOD PRESSURE: 82 MMHG | SYSTOLIC BLOOD PRESSURE: 125 MMHG | WEIGHT: 147.69 LBS | HEIGHT: 61 IN | HEART RATE: 68 BPM | BODY MASS INDEX: 27.88 KG/M2

## 2024-09-27 DIAGNOSIS — R13.10 DYSPHAGIA, UNSPECIFIED TYPE: ICD-10-CM

## 2024-09-27 DIAGNOSIS — R09.A2 GLOBUS SENSATION: ICD-10-CM

## 2024-09-27 DIAGNOSIS — J31.0 CHRONIC RHINITIS: ICD-10-CM

## 2024-09-27 DIAGNOSIS — K21.9 LARYNGOPHARYNGEAL REFLUX (LPR): Primary | ICD-10-CM

## 2024-09-27 PROCEDURE — 99999 PR PBB SHADOW E&M-EST. PATIENT-LVL IV: CPT | Mod: PBBFAC,,, | Performed by: OTOLARYNGOLOGY

## 2024-09-27 RX ORDER — PANTOPRAZOLE SODIUM 40 MG/1
40 TABLET, DELAYED RELEASE ORAL DAILY
Qty: 30 TABLET | Refills: 3 | Status: SHIPPED | OUTPATIENT
Start: 2024-09-27 | End: 2025-09-27

## 2024-09-27 NOTE — PROGRESS NOTES
Chief Complaint   Patient presents with    Follow-up     Pt stated she has a tickle in her throat constantly    .     HPI:Silvia Cervantes is a 63 y.o. female who has been referred by Dr. Ori St MD for a several month history of  globus sensation and cough. She reports that she has choking episodes with swallowing foods, liquids, or saliva.  She has tickle in her throat that triggers cough. She feels it is present on her left side.  She reports that the cough is dry.  She does have sarcoidosis- dx'd in . She does see pulmonology.  She denies food sticking. She denies hoarseness.  Her voice is not progressively worsening over this time. There are not pitch breaks or cracks. There is not vocal fatigue. She admits to odynophagia, throat pain, and otalgia.  There is no hemoptysis or hematemesis. She is breathing well. She has been using Luden's throat lozenges to help. She uses camilo's emollient for her nose. She does use Flonase intermittently.     She admits to throat clearing and cough. She denies heartburn and reflux. She has been on Protonix 40 mg in the past but is no longer. She does feel that this helped her tremendously.        Underwent sinus surgery 2016- Dr. Chavez includin. Endoscopic septoplasty.  2. Bilateral inferior turbinate reduction with submucosal resection.  3. Bilateral image-guided endoscopic total ethmoidectomy.  4. Bilateral image-guided endoscopic maxillary antrostomy.  5. Bilateral image-guided endoscopic frontal dissection with Draf IIA sinusotomy.  6. Endoscopic resection of right leann bullosa.      Interval HPI 2024:  Follow up visit. Reports that she has tickle in her throat.  She has globus sensation. She feels that she still has sensation of globus feeling in her throat.  She feels that choking episodes have been less frequent.  She did have MBSS in interim which was normal.  She has been taking Protonix 40 mg PO daily as prescribed. She feels this helps. She  has been using Flonase intermittently.     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)      Social History     Socioeconomic History    Marital status:    Tobacco Use    Smoking status: Never    Smokeless tobacco: Never   Substance and Sexual Activity    Alcohol use: Yes     Comment: Rarely    Drug use: Not Currently    Sexual activity: Yes     Partners: Male     Birth control/protection: Surgical     Comment: [email or web address]     Social Determinants of Health     Financial Resource Strain: Low Risk  (2/21/2022)    Overall Financial Resource Strain (CARDIA)     Difficulty of Paying Living Expenses: Not hard at all   Food Insecurity: No Food Insecurity (2/21/2022)    Hunger Vital Sign     Worried About Running Out of Food in the Last Year: Never true     Ran Out of Food in the Last Year: Never true   Transportation Needs: No Transportation Needs (2/21/2022)    PRAPARE - Transportation     Lack of Transportation (Medical): No     Lack of Transportation (Non-Medical): No   Physical Activity: Sufficiently Active (2/21/2022)    Exercise Vital Sign     Days of Exercise per Week: 7 days     Minutes of Exercise per Session: 120 min   Stress: No Stress Concern Present (2/21/2022)    [employer] [place] [employer] - Occupational Stress Questionnaire     Feeling of Stress : Only a little   Housing Stability: Low Risk  (2/21/2022)    Housing Stability Vital Sign      Unable to Pay for Housing in the Last Year: No     Number of Places Lived in the Last Year: 1     Unstable Housing in the Last Year: No     Past Surgical History:   Procedure Laterality Date     SECTION      COLONOSCOPY N/A 2019    Procedure: COLONOSCOPY/golytley ;  Surgeon: Dimas Woodall MD;  Location: [facility];  Service: Endoscopy;  Laterality: N/A;    ESOPHAGOGASTRODUODENOSCOPY N/A 2023    Procedure: EGD (ESOPHAGOGASTRODUODENOSCOPY);  Surgeon: Ori St MD;  Location: [facility];  Service: Endoscopy;  Laterality: N/A;    EXCISION OF NODULE Right 2022    Procedure: EXCISION, NODULE right elbow;  Surgeon: Louise Smith MD;  Location: [facility] OR;  Service: Orthopedics;  Laterality: Right;    HYSTERECTOMY      INJECTION OF STEROID Left 2022    Procedure: INJECTION, STEROID Thumb MP joint;  Surgeon: Louise Smith MD;  Location: [facility] OR;  Service: Orthopedics;  Laterality: Left;    SINUS SURGERY      SURGICAL REMOVAL OF DISTAL ULNA Right 2022    Procedure: EXCISION, ULNA, DISTAL;  Surgeon: Louise Smith MD;  Location: [facility] OR;  Service: Orthopedics;  Laterality: Right;    SYNOVECTOMY OF WRIST Right 2022    Procedure: SYNOVECTOMY, WRIST;  Surgeon: Louise Smith MD;  Location: [facility];  Service: Orthopedics;  Laterality: Right;    TUBAL LIGATION       Family History   Problem Relation Name Age of Onset    Hypertension Mother      Cataracts Mother      Diabetes Father      Hypertension Father      Cataracts Father      Heart disease Father      Hyperlipidemia Father      Diabetes Sister x1     Stroke Brother      Hypertension Brother      No Known Problems Daughter x1     No Known Problems Son x1     Melanoma Neg Hx      Breast cancer Neg Hx      Colon cancer Neg Hx      Ovarian cancer Neg Hx      Blindness Neg Hx      Glaucoma Neg Hx      Aneurysm Neg Hx      Cancer Neg Hx      Clotting disorder Neg Hx      Dementia Neg Hx      Fainting Neg Hx      Kidney disease Neg  Hx      Liver disease Neg Hx      Migraines Neg Hx      Neuropathy Neg Hx      Parkinsonism Neg Hx      Seizures Neg Hx      Tremor Neg Hx             Review of Systems  General: negative for chills, fever or weight loss  Psychological: negative for mood changes or depression  Ophthalmic: negative for blurry vision, photophobia or eye pain  ENT: see HPI  Respiratory: no cough, shortness of breath, or wheezing  Cardiovascular: no chest pain or dyspnea on exertion  Gastrointestinal: no abdominal pain, change in bowel habits, or black/ bloody stools  Musculoskeletal: negative for gait disturbance or muscular weakness  Neurological: no syncope or seizures; no ataxia  Dermatological: negative for puritis,  rash and jaundice  Hematologic/lymphatic: no easy bruising, no new lumps or bumps      Physical Exam:    Vitals:    09/27/24 0913   BP: 125/82   Pulse: 68           Constitutional: Well appearing / communicating without difficutly.  NAD.  Eyes: EOM I Bilaterally  Head/Face: Normocephalic.  Negative paranasal sinus pressure/tenderness.  Salivary glands WNL.  House Brackmann I Bilaterally.    Right Ear: Auricle normal appearance. External Auditory Canal within normal limits no lesions or masses,TM w/o masses/lesions/perforations. TM mobility noted.   Left Ear: Auricle normal appearance. External Auditory Canal within normal limits no lesions or masses,TM w/o masses/lesions/perforations. TM mobility noted.  Nose: No gross nasal septal deviation. Inferior Turbinates 3+ bilaterally. No septal perforation. No masses/lesions. External nasal skin appears normal without masses/lesions.  Oral Cavity: Gingiva/lips within normal limits.  Dentition/gingiva healthy appearing. Mucus membranes moist. Floor of mouth soft, no masses palpated. Oral Tongue mobile. Hard Palate appears normal.    Oropharynx: Base of tongue appears normal. No masses/lesions noted. Tonsillar fossa/pharyngeal wall without lesions. Posterior oropharynx WNL.   Soft palate without masses. Midline uvula.   Neck/Lymphatic: No LAD I-VI bilaterally.  No thyromegaly.  No masses noted on exam.    Mirror laryngoscopy/nasopharyngoscopy: Active gag reflex.  Unable to perform.        See separate procedure note for FFL.    Diagnostic studies reviewed:   MBSS 1/30/2024: normal  MBSS 2019: normal    EGD 1/25/2023: Impression:            - Normal esophagus.                          - Small hiatal hernia.                          - Normal stomach. Biopsied.                          - Normal examined duodenum.                          Exam for globus / cough/ upper abd distress                          showing:  normal EGD, with small hiatal hernia similar to                          exam done 2015    PFT 8/19/2022: The test was performed for the evaluation of Sarcoidosis.   There is no significant airflow obstruction. Bronchodilator response was not tested.   There is mild restriction and no evidence of hyperinflation or air trapping.   The diffusing capacity is minimally reduced.   Compared to 11/2019, FEV1 has decreased in an age-appropriate fashion. Diffusion capacity is slightly reduced.   This PFT is consistant with the clincial diagnosis of sarcoidosis. Clinical correllation is required.     Assessment:    ICD-10-CM ICD-9-CM    1. Laryngopharyngeal reflux (LPR)  K21.9 478.79       2. Chronic rhinitis  J31.0 472.0       3. Globus sensation  R09.A2 784.99       4. Dysphagia, unspecified type  R13.10 787.20             The primary encounter diagnosis was Laryngopharyngeal reflux (LPR). Diagnoses of Chronic rhinitis, Globus sensation, and Dysphagia, unspecified type were also pertinent to this visit.      Plan:  No orders of the defined types were placed in this encounter.    Continue nasal saline rinses BID  Continue  Flonase 2 sprays per nostril daily  Continue Protonix 40 mg PO daily. Continue to  refrain from eating within 3 hours of going to bed, to elevate the head of bed very  subtly and optimize the impact of gravity on the potential reflux, and to avoid alcohol, caffeine, tobacco, tomato sauce, spicy foods, fried food, and chocolate.      Follow up in 4-6  months to reassess progress with treatment regimen.     Carleen Weiner MD

## 2024-09-27 NOTE — PROCEDURES
Laryngoscopy    Date/Time: 9/27/2024 9:20 AM    Performed by: Carleen Cline MD  Authorized by: Carleen Cline MD    Consent Done?:  Yes (Verbal)  Anesthesia:     Local anesthetic:  Lidocaine 2% and Neymar-Synephrine 1/2%  Laryngoscopy:     Areas examined:  Nasal cavities, nasopharynx, oropharynx, hypopharynx, larynx and vocal cords  Nose External:      No external nasal deformity  Nose Intranasal:      Mucosa no polyps     Mucosa ulcers not present     No mucosa lesions present     No septum gross deformity     Turbinates not enlarged  Nasopharynx:      No mucosa lesions     Adenoids not present     Posterior choanae patent     Eustachian tube patent  Larynx/hypopharynx:      No epiglottis lesions     No epiglottis edema     No AE folds lesions     No vocal cord polyps     Equal and normal bilateral     No hypopharynx lesions     No piriform sinus pooling     No piriform sinus lesions     Post cricoid edema (mild)     Post cricoid erythema (mild)     + clear posterior nasal discharge on the left

## 2024-10-02 ENCOUNTER — TELEPHONE (OUTPATIENT)
Dept: ENDOSCOPY | Facility: HOSPITAL | Age: 64
End: 2024-10-02

## 2024-10-02 ENCOUNTER — CLINICAL SUPPORT (OUTPATIENT)
Dept: ENDOSCOPY | Facility: HOSPITAL | Age: 64
End: 2024-10-02
Attending: FAMILY MEDICINE
Payer: MEDICARE

## 2024-10-02 DIAGNOSIS — Z86.0100 HISTORY OF COLON POLYPS: Primary | ICD-10-CM

## 2024-10-02 DIAGNOSIS — Z12.11 SCREEN FOR COLON CANCER: ICD-10-CM

## 2024-10-02 RX ORDER — POLYETHYLENE GLYCOL 3350, SODIUM SULFATE ANHYDROUS, SODIUM BICARBONATE, SODIUM CHLORIDE, POTASSIUM CHLORIDE 236; 22.74; 6.74; 5.86; 2.97 G/4L; G/4L; G/4L; G/4L; G/4L
4 POWDER, FOR SOLUTION ORAL ONCE
Qty: 4000 ML | Refills: 0 | Status: SHIPPED | OUTPATIENT
Start: 2024-10-02 | End: 2024-10-05

## 2024-10-02 NOTE — TELEPHONE ENCOUNTER
Spoke to patient to schedule procedure(s) Colonoscopy       Physician to perform procedure(s) Dr. ARMIDA Love  Date of Procedure (s) 11/6/24  Arrival Time 8:30 AM  Time of Procedure(s) 9:30 AM   Location of Procedure(s) [place] [address] Floor  Type of Rx Prep sent to patient: PEG  Instructions provided to patient via [facility]    Patient was informed on the following information and verbalized understanding. Screening questionnaire reviewed with patient and complete. If procedure requires anesthesia, a responsible adult needs to be present to accompany the patient home, patient cannot drive after receiving anesthesia. Appointment details are tentative, especially check-in time. Patient will receive a prep-op call 7 days prior to confirm check-in time for procedure. If applicable the patient should contact their pharmacy to verify Rx for procedure prep is ready for pick-up. Patient was advised to call the scheduling department at [phone] if pharmacy states no Rx is available. Patient was advised to call the endoscopy scheduling department if any questions or concerns arise.      [facility] Scheduling Department

## 2024-10-09 ENCOUNTER — LAB VISIT (OUTPATIENT)
Dept: LAB | Facility: HOSPITAL | Age: 64
End: 2024-10-09
Attending: STUDENT IN AN ORGANIZED HEALTH CARE EDUCATION/TRAINING PROGRAM
Payer: MEDICARE

## 2024-10-09 DIAGNOSIS — D86.9 SARCOIDOSIS: ICD-10-CM

## 2024-10-09 DIAGNOSIS — M06.9 RHEUMATOID ARTHRITIS INVOLVING MULTIPLE JOINTS: ICD-10-CM

## 2024-10-09 LAB
ALBUMIN SERPL BCP-MCNC: 4 G/DL (ref 3.5–5.2)
ALP SERPL-CCNC: 80 U/L (ref 55–135)
ALT SERPL W/O P-5'-P-CCNC: 29 U/L (ref 10–44)
ANION GAP SERPL CALC-SCNC: 8 MMOL/L (ref 8–16)
AST SERPL-CCNC: 20 U/L (ref 10–40)
BASOPHILS # BLD AUTO: 0.04 K/UL (ref 0–0.2)
BASOPHILS NFR BLD: 0.5 % (ref 0–1.9)
BILIRUB SERPL-MCNC: 0.3 MG/DL (ref 0.1–1)
BUN SERPL-MCNC: 20 MG/DL (ref 8–23)
CALCIUM SERPL-MCNC: 9.7 MG/DL (ref 8.7–10.5)
CHLORIDE SERPL-SCNC: 106 MMOL/L (ref 95–110)
CO2 SERPL-SCNC: 27 MMOL/L (ref 23–29)
CREAT SERPL-MCNC: 0.8 MG/DL (ref 0.5–1.4)
CRP SERPL-MCNC: 3.8 MG/L (ref 0–8.2)
DIFFERENTIAL METHOD BLD: ABNORMAL
EOSINOPHIL # BLD AUTO: 0.5 K/UL (ref 0–0.5)
EOSINOPHIL NFR BLD: 6.1 % (ref 0–8)
ERYTHROCYTE [DISTWIDTH] IN BLOOD BY AUTOMATED COUNT: 13.3 % (ref 11.5–14.5)
ERYTHROCYTE [SEDIMENTATION RATE] IN BLOOD BY PHOTOMETRIC METHOD: 18 MM/HR (ref 0–36)
EST. GFR  (NO RACE VARIABLE): >60 ML/MIN/1.73 M^2
GLUCOSE SERPL-MCNC: 87 MG/DL (ref 70–110)
HCT VFR BLD AUTO: 42.1 % (ref 37–48.5)
HGB BLD-MCNC: 13.6 G/DL (ref 12–16)
IMM GRANULOCYTES # BLD AUTO: 0.01 K/UL (ref 0–0.04)
IMM GRANULOCYTES NFR BLD AUTO: 0.1 % (ref 0–0.5)
LYMPHOCYTES # BLD AUTO: 3.2 K/UL (ref 1–4.8)
LYMPHOCYTES NFR BLD: 42.1 % (ref 18–48)
MCH RBC QN AUTO: 32.2 PG (ref 27–31)
MCHC RBC AUTO-ENTMCNC: 32.3 G/DL (ref 32–36)
MCV RBC AUTO: 100 FL (ref 82–98)
MONOCYTES # BLD AUTO: 0.8 K/UL (ref 0.3–1)
MONOCYTES NFR BLD: 10.3 % (ref 4–15)
NEUTROPHILS # BLD AUTO: 3.1 K/UL (ref 1.8–7.7)
NEUTROPHILS NFR BLD: 40.9 % (ref 38–73)
NRBC BLD-RTO: 0 /100 WBC
PLATELET # BLD AUTO: 321 K/UL (ref 150–450)
PMV BLD AUTO: 9.3 FL (ref 9.2–12.9)
POTASSIUM SERPL-SCNC: 3.9 MMOL/L (ref 3.5–5.1)
PROT SERPL-MCNC: 8 G/DL (ref 6–8.4)
RBC # BLD AUTO: 4.23 M/UL (ref 4–5.4)
SODIUM SERPL-SCNC: 141 MMOL/L (ref 136–145)
WBC # BLD AUTO: 7.68 K/UL (ref 3.9–12.7)

## 2024-10-09 PROCEDURE — 86140 C-REACTIVE PROTEIN: CPT | Performed by: STUDENT IN AN ORGANIZED HEALTH CARE EDUCATION/TRAINING PROGRAM

## 2024-10-09 PROCEDURE — 83520 IMMUNOASSAY QUANT NOS NONAB: CPT | Performed by: STUDENT IN AN ORGANIZED HEALTH CARE EDUCATION/TRAINING PROGRAM

## 2024-10-09 PROCEDURE — 82164 ANGIOTENSIN I ENZYME TEST: CPT | Performed by: STUDENT IN AN ORGANIZED HEALTH CARE EDUCATION/TRAINING PROGRAM

## 2024-10-09 PROCEDURE — 85025 COMPLETE CBC W/AUTO DIFF WBC: CPT | Performed by: STUDENT IN AN ORGANIZED HEALTH CARE EDUCATION/TRAINING PROGRAM

## 2024-10-09 PROCEDURE — 85652 RBC SED RATE AUTOMATED: CPT | Performed by: STUDENT IN AN ORGANIZED HEALTH CARE EDUCATION/TRAINING PROGRAM

## 2024-10-09 PROCEDURE — 80053 COMPREHEN METABOLIC PANEL: CPT | Performed by: STUDENT IN AN ORGANIZED HEALTH CARE EDUCATION/TRAINING PROGRAM

## 2024-10-10 LAB — ACE SERPL-CCNC: 40 U/L (ref 16–85)

## 2024-10-12 LAB — SOL IL2 RECEP SERPL-MCNC: 1464.6 PG/ML (ref 175.3–858.2)

## 2024-10-18 ENCOUNTER — PATIENT MESSAGE (OUTPATIENT)
Dept: RHEUMATOLOGY | Facility: CLINIC | Age: 64
End: 2024-10-18
Payer: MEDICARE

## 2024-10-31 ENCOUNTER — TELEPHONE (OUTPATIENT)
Dept: ENDOSCOPY | Facility: HOSPITAL | Age: 64
End: 2024-10-31
Payer: MEDICARE

## 2024-10-31 ENCOUNTER — OFFICE VISIT (OUTPATIENT)
Dept: PODIATRY | Facility: CLINIC | Age: 64
End: 2024-10-31
Payer: MEDICARE

## 2024-10-31 VITALS
WEIGHT: 147.69 LBS | SYSTOLIC BLOOD PRESSURE: 135 MMHG | DIASTOLIC BLOOD PRESSURE: 84 MMHG | HEART RATE: 68 BPM | HEIGHT: 61 IN | BODY MASS INDEX: 27.88 KG/M2

## 2024-10-31 DIAGNOSIS — L60.9 DISEASE OF NAIL: ICD-10-CM

## 2024-10-31 DIAGNOSIS — L60.0 ONYCHOCRYPTOSIS: Primary | ICD-10-CM

## 2024-10-31 PROCEDURE — 3044F HG A1C LEVEL LT 7.0%: CPT | Mod: CPTII,S$GLB,, | Performed by: PODIATRIST

## 2024-10-31 PROCEDURE — 1159F MED LIST DOCD IN RCRD: CPT | Mod: CPTII,S$GLB,, | Performed by: PODIATRIST

## 2024-10-31 PROCEDURE — 99213 OFFICE O/P EST LOW 20 MIN: CPT | Mod: S$GLB,,, | Performed by: PODIATRIST

## 2024-10-31 PROCEDURE — 3079F DIAST BP 80-89 MM HG: CPT | Mod: CPTII,S$GLB,, | Performed by: PODIATRIST

## 2024-10-31 PROCEDURE — 1160F RVW MEDS BY RX/DR IN RCRD: CPT | Mod: CPTII,S$GLB,, | Performed by: PODIATRIST

## 2024-10-31 PROCEDURE — 3075F SYST BP GE 130 - 139MM HG: CPT | Mod: CPTII,S$GLB,, | Performed by: PODIATRIST

## 2024-10-31 PROCEDURE — 3008F BODY MASS INDEX DOCD: CPT | Mod: CPTII,S$GLB,, | Performed by: PODIATRIST

## 2024-10-31 PROCEDURE — 4010F ACE/ARB THERAPY RXD/TAKEN: CPT | Mod: CPTII,S$GLB,, | Performed by: PODIATRIST

## 2024-10-31 PROCEDURE — 99999 PR PBB SHADOW E&M-EST. PATIENT-LVL V: CPT | Mod: PBBFAC,,, | Performed by: PODIATRIST

## 2024-11-04 ENCOUNTER — TELEPHONE (OUTPATIENT)
Dept: ENDOSCOPY | Facility: HOSPITAL | Age: 64
End: 2024-11-04
Payer: MEDICARE

## 2024-11-04 NOTE — TELEPHONE ENCOUNTER
Spoke to pt for pre-call to confirm scheduled Colonoscopy and patient verbalized understanding of the following:       Date of Procedure (s)  verified 11/6/24  Arrival Time 8:30 AM verified.  Location of Procedure(s) [address] verified.  NPO status reinforced. Ok to continue clear liquids up until 2 hours prior to the Endoscopy procedure.   Denies blood thinners and GLP-1s.  Pt confirmed receipt of prep instructions and Rx prep (if applicable).  [facility]  Pt confirmed ride home after procedure if procedure requires anesthesia.   Pre-call screening questionnaire reviewed and completed with patient.   Appointment details are tentative, including check-in time.  If the patient begins taking any blood thinning medications, injectable weight loss/diabetes medications (other than insulin), or Adipex (phentermine) patient was instructed to contact the endoscopy scheduling department as soon as possible.  Patient was advised to call the endoscopy scheduling department if any questions or concerns arise.       [facility] Scheduling Department

## 2024-11-06 ENCOUNTER — ANESTHESIA (OUTPATIENT)
Dept: ENDOSCOPY | Facility: HOSPITAL | Age: 64
End: 2024-11-06
Payer: MEDICARE

## 2024-11-06 ENCOUNTER — HOSPITAL ENCOUNTER (OUTPATIENT)
Facility: HOSPITAL | Age: 64
Discharge: HOME | End: 2024-11-06
Attending: INTERNAL MEDICINE | Admitting: INTERNAL MEDICINE
Payer: MEDICARE

## 2024-11-06 ENCOUNTER — ANESTHESIA EVENT (OUTPATIENT)
Dept: ENDOSCOPY | Facility: HOSPITAL | Age: 64
End: 2024-11-06
Payer: MEDICARE

## 2024-11-06 VITALS
HEIGHT: 61 IN | HEART RATE: 77 BPM | OXYGEN SATURATION: 98 % | WEIGHT: 145 LBS | DIASTOLIC BLOOD PRESSURE: 94 MMHG | TEMPERATURE: 97 F | SYSTOLIC BLOOD PRESSURE: 148 MMHG | BODY MASS INDEX: 27.38 KG/M2 | RESPIRATION RATE: 14 BRPM

## 2024-11-06 DIAGNOSIS — K63.5 COLON POLYP: ICD-10-CM

## 2024-11-06 PROCEDURE — 63600175 PHARM REV CODE 636 W HCPCS: Performed by: NURSE ANESTHETIST, CERTIFIED REGISTERED

## 2024-11-06 PROCEDURE — 27201089 HC SNARE, DISP (ANY): Performed by: INTERNAL MEDICINE

## 2024-11-06 PROCEDURE — 45385 COLONOSCOPY W/LESION REMOVAL: CPT | Mod: PT | Performed by: INTERNAL MEDICINE

## 2024-11-06 PROCEDURE — 37000008 HC ANESTHESIA 1ST 15 MINUTES: Performed by: INTERNAL MEDICINE

## 2024-11-06 PROCEDURE — 88305 TISSUE EXAM BY PATHOLOGIST: CPT | Mod: 59 | Performed by: PATHOLOGY

## 2024-11-06 PROCEDURE — 88305 TISSUE EXAM BY PATHOLOGIST: CPT | Mod: 26,,, | Performed by: PATHOLOGY

## 2024-11-06 PROCEDURE — 25000003 PHARM REV CODE 250: Performed by: INTERNAL MEDICINE

## 2024-11-06 PROCEDURE — 45385 COLONOSCOPY W/LESION REMOVAL: CPT | Mod: PT,,, | Performed by: INTERNAL MEDICINE

## 2024-11-06 PROCEDURE — 37000009 HC ANESTHESIA EA ADD 15 MINS: Performed by: INTERNAL MEDICINE

## 2024-11-06 RX ORDER — DEXTROMETHORPHAN/PSEUDOEPHED 2.5-7.5/.8
DROPS ORAL
Status: COMPLETED | OUTPATIENT
Start: 2024-11-06 | End: 2024-11-06

## 2024-11-06 RX ORDER — SODIUM CHLORIDE 9 MG/ML
INJECTION, SOLUTION INTRAVENOUS CONTINUOUS
Status: DISCONTINUED | OUTPATIENT
Start: 2024-11-06 | End: 2024-11-06 | Stop reason: HOSPADM

## 2024-11-06 RX ORDER — LIDOCAINE HYDROCHLORIDE 20 MG/ML
INJECTION, SOLUTION EPIDURAL; INFILTRATION; INTRACAUDAL; PERINEURAL
Status: DISCONTINUED | OUTPATIENT
Start: 2024-11-06 | End: 2024-11-06

## 2024-11-06 RX ORDER — PROPOFOL 10 MG/ML
VIAL (ML) INTRAVENOUS CONTINUOUS PRN
Status: DISCONTINUED | OUTPATIENT
Start: 2024-11-06 | End: 2024-11-06

## 2024-11-06 RX ORDER — PROPOFOL 10 MG/ML
VIAL (ML) INTRAVENOUS
Status: DISCONTINUED | OUTPATIENT
Start: 2024-11-06 | End: 2024-11-06

## 2024-11-06 RX ADMIN — PROPOFOL 50 MG: 10 INJECTION, EMULSION INTRAVENOUS at 10:11

## 2024-11-06 RX ADMIN — PROPOFOL 20 MG: 10 INJECTION, EMULSION INTRAVENOUS at 10:11

## 2024-11-06 RX ADMIN — PROPOFOL 150 MCG/KG/MIN: 10 INJECTION, EMULSION INTRAVENOUS at 10:11

## 2024-11-06 RX ADMIN — LIDOCAINE HYDROCHLORIDE 100 MG: 20 INJECTION, SOLUTION EPIDURAL; INFILTRATION; INTRACAUDAL; PERINEURAL at 10:11

## 2024-11-06 NOTE — H&P
Short Stay Endoscopy History and Physical    PCP - Adrián Cade MD    Procedure - Colonoscopy  ASA - III  Mallampati - per anesthesia  History of Anesthesia problems - no  Family history Anesthesia problems - no     HPI:  This is a 63 y.o. female here for evaluation of : Colon polyps    Pt here today for surveillance of prior colon polyps. The most recent exam was in 2019, at which time patients recalls having polyps removed. Since patient denies change in bowel habits or overt blood in stool. Denies weight loss.     ROS:  Constitutional: No fevers, chills, No weight loss  ENT: No allergies  CV: No chest pain  Pulm: No shortness of breath  GI: see HPI  Derm: No rash    Medical History:  has a past medical history of Abnormal Pap smear, Abnormal Pap smear of cervix, Allergic rhinitis, Atrial tachycardia (2023), Dementia, Dry eyes, Fever blister, History of bronchitis, History of headache, Hypercholesteremia (2015), Hypertension, Lumbar radicular pain (10/15/2018), Major depression, recurrent, chronic (10/05/2017), Reticulonodular infiltrate present on imaging of chest, Rheumatoid arthritis of hand (2013), Rheumatoid arthritis(714.0), Tooth infection (2022), and 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    Surgical History:  has a past surgical history that includes  section; Tubal ligation; Hysterectomy; Colonoscopy (N/A, 2019); Sinus surgery; Synovectomy of wrist (Right, 2022); Excision of nodule (Right, 2022); Surgical removal of distal ulna (Right, 2022); Injection of steroid (Left, 2022); and Esophagogastroduodenoscopy (N/A, 2023).    Family History: family history includes Cataracts in her father and mother; Diabetes in her father and sister; Heart disease in her father; Hyperlipidemia in her father; Hypertension in her brother, father, and mother; No Known Problems in her daughter and son; Stroke in her brother.. Otherwise no colon  cancer, inflammatory bowel disease, or GI malignancies.    Social History:  reports that she has never smoked. She has never used smokeless tobacco. She reports current alcohol use. She reports that she does not currently use drugs.    Review of patient's allergies indicates:  No Known Allergies    Medications:   Medications Prior to Admission   Medication Sig Dispense Refill Last Dose/Taking    amLODIPine (NORVASC) 5 MG tablet Take 1 tablet (5 mg total) by mouth once daily. 90 tablet 3     atorvastatin (LIPITOR) 10 MG tablet Take 1 tablet (10 mg total) by mouth once daily. 90 tablet 3     chlorhexidine (PERIDEX) 0.12 % solution Use 15-20 mL to rinse mouth twice daily for 2 minutes, spit out and do not rinse mouth with water after. 473 mL 3     cyanocobalamin, vitamin B-12, (VITAMIN B-12 ORAL) Take by mouth.       cycloSPORINE (RESTASIS) 0.05 % ophthalmic emulsion Apply 1 drop into both eyes twice a day 180 vial 3     cycloSPORINE (RESTASIS) 0.05 % ophthalmic emulsion Instill 1 drop Both Eyes twice a day 180 each 6     cycloSPORINE (RESTASIS) 0.05 % ophthalmic emulsion Apply 1 drop into both eyes twice a day 180 each 3     desoximetasone (TOPICORT) 0.25 % cream Apply to affected area every other night x 1 month 60 g 1     diclofenac sodium (VOLTAREN) 1 % Gel Apply 2 g topically 4 (four) times daily. 100 g 1     ergocalciferol (ERGOCALCIFEROL) 50,000 unit Cap Take 1 capsule (50,000 Units total) by mouth every 7 days. 12 capsule 2     etanercept (ENBREL SURECLICK) 50 mg/mL (1 mL) Inject 1 mL (50 mg total) into the skin once a week. 4 mL 11     fluticasone propion-salmeterol 115-21 mcg/dose (ADVAIR HFA) 115-21 mcg/actuation HFAA inhaler Inhale 2 puffs into the lungs every 12 (twelve) hours. 12 g 11     fluticasone propionate (FLONASE) 50 mcg/actuation nasal spray 2 sprays (100 mcg total) by Each Nostril route once daily. 16 g 11     folic acid (FOLVITE) 1 MG tablet Take 4 tablets (4 mg total) by mouth once daily. 120  tablet 11     gabapentin (NEURONTIN) 300 MG capsule Take 1 capsule (300 mg total) by mouth 2 (two) times daily. 180 capsule 3     hydroquinone 4% kojic acid 3% tretinoin 0.025% topical cream apply a pea-sized AMOUNT TO entire face EVERY NIGHT AT BEDTIME THEN moisturize       hydrOXYzine HCL (ATARAX) 25 MG tablet Take 1 tablet (25 mg total) by mouth every evening. 30 tablet 1     leflunomide (ARAVA) 20 MG Tab Take 1 tablet (20 mg total) by mouth once daily. 90 tablet 3     losartan (COZAAR) 100 MG tablet Take 1 tablet (100 mg total) by mouth once daily. 90 tablet 3     meclizine (ANTIVERT) 25 mg tablet Take 1 tablet (25 mg total) by mouth 3 (three) times daily as needed for Nausea or Dizziness. 30 tablet 2     metoprolol succinate (TOPROL-XL) 25 MG 24 hr tablet Take 1 tablet (25 mg total) by mouth every evening. 90 tablet 3     mupirocin (BACTROBAN) 2 % ointment Apply topically 2 (two) times daily. 22 g 1     nystatin (NYAMYC) powder Apply to affected area every morning as needed for flare under breasts 120 g 6     pantoprazole (PROTONIX) 40 MG tablet Take 1 tablet (40 mg total) by mouth once daily. 30 tablet 4     pantoprazole (PROTONIX) 40 MG tablet Take 1 tablet (40 mg total) by mouth once daily. 30 tablet 3     promethazine (PHENERGAN) 25 MG tablet Take 1 tablet (25 mg total) by mouth every 6 (six) hours as needed for Nausea. 25 tablet 0     PROTOPIC 0.1 % ointment AAA bid 100 g 2     ruxolitinib (OPZELURA) 1.5 % Crea Apply to affected area twice daily to neck and hands for vitiligo 60 g 2     ruxolitinib (OPZELURA) 1.5 % Crea Apply to affected area twice a day 60 g 2     tretinoin (RETIN-A) 0.025 % cream Compound tretinoin 0.025% / niacinamide 2% cream. Apply a pea-sized amount to entire face qhs then moisturize. 30 g 5     triamcinolone acetonide 0.025% (KENALOG) 0.025 % cream Apply to affected area every day to twice a day to neck . Not more than 1-2 weeks straight in same location 80 g 1     triamcinolone  acetonide 0.1% (KENALOG) 0.1 % ointment Apply topically 2 (two) times daily. For 2 weeks 15 g 1     ZINC ORAL Take by mouth.            Objective Findings:    Vital Signs: see nursing notes  Physical Exam:  General Appearance: Well appearing in no acute distress  Eyes:    No scleral icterus  ENT: Neck supple  Lungs: CTA anteriorly  Heart:  S1, S2 normal, no murmurs heard  Abdomen: Soft, non tender, non distended with positive bowel sounds. No hepatosplenomegaly, ascites, or mass  Extremities: no edema  Skin: No rash      Labs:  Lab Results   Component Value Date    WBC 7.68 10/09/2024    HGB 13.6 10/09/2024    HCT 42.1 10/09/2024     10/09/2024    CHOL 221 (H) 07/03/2024    TRIG 120 07/03/2024    HDL 44 07/03/2024    ALT 29 10/09/2024    AST 20 10/09/2024     10/09/2024    K 3.9 10/09/2024     10/09/2024    CREATININE 0.8 10/09/2024    BUN 20 10/09/2024    CO2 27 10/09/2024    TSH 1.940 07/08/2024    HGBA1C 5.5 07/08/2024       I have explained the risks and benefits of endoscopy procedures to the patient including but not limited to bleeding, perforation, infection, and death.    Jose Maria Love MD

## 2024-11-06 NOTE — PROVATION PATIENT INSTRUCTIONS
Discharge Summary/Instructions after an Endoscopic Procedure  Patient Name: Silvia Cervantes  Patient MRN: [id number]  Patient YOB: 1960  Wednesday, November 6, 2024  Jose Maria Love MD  Dear patient,  As a result of recent federal legislation (The Federal Cures Act), you may   receive lab or pathology results from your procedure in your [facility]   account before your physician is able to contact you. Your physician or   their representative will relay the results to you with their   recommendations at their soonest availability.  Thank you,  Your health is very important to us during the Covid Crisis. Following your   procedure today, you will receive a daily text for 2 weeks asking about   signs or symptoms of Covid 19.  Please respond to this text when you   receive it so we can follow up and keep you as safe as possible.   RESTRICTIONS:  During your procedure today, you received medications for sedation.  These   medications may affect your judgment, balance and coordination.  Therefore,   for 24 hours, you have the following restrictions:   - DO NOT drive a car, operate machinery, make legal/financial decisions,   sign important papers or drink alcohol.    ACTIVITY:  Today: no heavy lifting, straining or running due to procedural   sedation/anesthesia.  The following day: return to full activity including work.  DIET:  Eat and drink normally unless instructed otherwise.     TREATMENT FOR COMMON SIDE EFFECTS:  - Mild abdominal pain, nausea, belching, bloating or excessive gas:  rest,   eat lightly and use a heating pad.  - Sore Throat: treat with throat lozenges and/or gargle with warm salt   water.  - Because air was used during the procedure, expelling large amounts of air   from your rectum or belching is normal.  - If a bowel prep was taken, you may not have a bowel movement for 1-3 days.    This is normal.  SYMPTOMS TO WATCH FOR AND REPORT TO YOUR PHYSICIAN:  1. Abdominal pain or bloating,  other than gas cramps.  2. Chest pain.  3. Back pain.  4. Signs of infection such as: chills or fever occurring within 24 hours   after the procedure.  5. Rectal bleeding, which would show as bright red, maroon, or black stools.   (A tablespoon of blood from the rectum is not serious, especially if   hemorrhoids are present.)  6. Vomiting.  7. Weakness or dizziness.  GO DIRECTLY TO THE NEAREST EMERGENCY ROOM IF YOU HAVE ANY OF THE FOLLOWING:      Difficulty breathing              Chills and/or fever over 101 F   Persistent vomiting and/or vomiting blood   Severe abdominal pain   Severe chest pain   Black, tarry stools   Bleeding- more than one tablespoon   Any other symptom or condition that you feel may need urgent attention  Your doctor recommends these additional instructions:  If any biopsies were taken, your doctors clinic will contact you in 1 to 2   weeks with any results.  - Discharge patient to home (ambulatory).   - Patient has a contact number available for emergencies.  The signs and   symptoms of potential delayed complications were discussed with the   patient.  Return to normal activities tomorrow.  Written discharge   instructions were provided to the patient.   - Resume previous diet.   - Continue present medications.   - Return to primary care physician as previously scheduled.   - Repeat colonoscopy in 5 years for surveillance.  For questions, problems or results please call your physician - Jose Maria Love MD.  EMERGENCY PHONE NUMBER: [phone],  LAB RESULTS: [phone]  IF A COMPLICATION OR EMERGENCY SITUATION ARISES AND YOU ARE UNABLE TO REACH   YOUR PHYSICIAN - GO DIRECTLY TO THE EMERGENCY ROOM.  Jose Maria Love MD  11/6/2024 10:46:13 AM  This report has been verified and signed electronically.  Dear patient,  As a result of recent federal legislation (The Federal Cures Act), you may   receive lab or pathology results from your procedure in your [facility]   account  before your physician is able to contact you. Your physician or   their representative will relay the results to you with their   recommendations at their soonest availability.  Thank you,  PROVATION

## 2024-11-06 NOTE — TRANSFER OF CARE
"Anesthesia Transfer of Care Note    Patient: Silvia Cervantes    Procedure(s) Performed: Procedure(s) (LRB):  COLONOSCOPY, SCREENING, HIGH RISK PATIENT (N/A)    Patient location: GI    Anesthesia Type: general    Transport from OR: Transported from OR on room air with adequate spontaneous ventilation    Post pain: adequate analgesia    Post assessment: no apparent anesthetic complications and tolerated procedure well    Post vital signs: stable    Level of consciousness: awake and alert    Nausea/Vomiting: no nausea/vomiting    Complications: none    Transfer of care protocol was followed      Last vitals: Visit Vitals  BP (!) 149/85 (BP Location: Left arm, Patient Position: Lying)   Pulse 66   Temp 36.5 °C (97.7 °F) (Temporal)   Resp 18   Ht 5' 1" (1.549 m)   Wt 65.8 kg (145 lb)   LMP 01/01/2004   SpO2 97%   Breastfeeding No   BMI 27.40 kg/m²     "
negative...

## 2024-11-06 NOTE — ANESTHESIA PREPROCEDURE EVALUATION
2024  Silvia Cervantes is a 63 y.o., female.  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)      Past Surgical History:   Procedure Laterality Date     SECTION      COLONOSCOPY N/A 2019    Procedure: COLONOSCOPY/judeytley ;  Surgeon: Dimas Woodall MD;  Location: [facility];  Service: Endoscopy;  Laterality: N/A;    ESOPHAGOGASTRODUODENOSCOPY N/A 2023    Procedure: EGD (ESOPHAGOGASTRODUODENOSCOPY);  Surgeon: Ori St MD;  Location: [facility];  Service: Endoscopy;  Laterality: N/A;    EXCISION OF NODULE Right 2022    Procedure: EXCISION, NODULE right elbow;  Surgeon: Louise Smith MD;  Location: [facility] OR;  Service: Orthopedics;  Laterality: Right;    HYSTERECTOMY      INJECTION OF STEROID Left 2022    Procedure: INJECTION, STEROID Thumb MP joint;  Surgeon: Louise Smith MD;  Location: [facility] OR;  Service: Orthopedics;  Laterality: Left;    SINUS SURGERY      SURGICAL REMOVAL OF DISTAL ULNA Right 2022    Procedure: EXCISION, ULNA, DISTAL;  Surgeon: Louise Smith MD;  Location: [facility] OR;  Service: Orthopedics;  Laterality: Right;    SYNOVECTOMY OF WRIST Right 2022    Procedure: SYNOVECTOMY, WRIST;  Surgeon: Louise Smith MD;  Location: [facility] OR;  Service: Orthopedics;  Laterality: Right;    TUBAL LIGATION        Pre-op Assessment       I have reviewed the Medications.     Review of Systems  Anesthesia Hx:  No problems with previous Anesthesia             Denies Family Hx of Anesthesia complications.     Social:  Alcohol Use       Cardiovascular:     Hypertension                                          Musculoskeletal:  Arthritis               Neurological:    Neuromuscular Disease,                                   Psych:  Psychiatric History                  Physical Exam  General: Well nourished    Airway:  Mallampati: II   TM Distance: Normal  Neck ROM: Normal ROM    Dental:  Intact    Chest/Lungs:  Clear to auscultation    Heart:  Rate: Normal  Rhythm: Regular Rhythm        Anesthesia Plan  Type of Anesthesia, risks & benefits discussed:    Anesthesia Type: Gen Natural Airway  Intra-op Monitoring Plan: Standard ASA Monitors  Post Op Pain Control Plan: multimodal analgesia  Informed Consent: Informed consent signed with the Patient and all parties understand the risks and agree with anesthesia plan.  All questions answered.   ASA Score: 2    Ready For Surgery From Anesthesia Perspective.     .

## 2024-11-06 NOTE — ANESTHESIA POSTPROCEDURE EVALUATION
Anesthesia Post Evaluation    Patient: Silvia Cervantes    Procedure(s) Performed: Procedure(s) (LRB):  COLONOSCOPY, SCREENING, HIGH RISK PATIENT (N/A)    Final Anesthesia Type: general      Patient location during evaluation: PACU  Patient participation: Yes- Able to Participate  Level of consciousness: awake and alert  Post-procedure vital signs: reviewed and stable  Pain management: adequate  Airway patency: patent    PONV status at discharge: No PONV  Anesthetic complications: no      Cardiovascular status: blood pressure returned to baseline  Respiratory status: unassisted  Hydration status: euvolemic  Follow-up not needed.          Vitals Value Taken Time   /94 11/06/24 1113   Temp 36.3 °C (97.3 °F) 11/06/24 1045   Pulse 77 11/06/24 1113   Resp 14 11/06/24 1113   SpO2 98 % 11/06/24 1113         Event Time   Out of Recovery 11:14:26         Pain/Mita Score: Mita Score: 10 (11/6/2024 11:13 AM)

## 2024-11-07 LAB
FINAL PATHOLOGIC DIAGNOSIS: NORMAL
GROSS: NORMAL
Lab: NORMAL

## 2024-11-22 ENCOUNTER — PATIENT MESSAGE (OUTPATIENT)
Dept: OBSTETRICS AND GYNECOLOGY | Facility: CLINIC | Age: 64
End: 2024-11-22
Payer: MEDICARE

## 2024-11-27 DIAGNOSIS — L30.4 INTERTRIGO: ICD-10-CM

## 2024-11-27 RX ORDER — NYSTATIN 100000 [USP'U]/G
POWDER TOPICAL
Qty: 120 G | Refills: 6 | Status: CANCELLED | OUTPATIENT
Start: 2024-11-27

## 2024-11-27 NOTE — TELEPHONE ENCOUNTER
Please see the attached refill request.    Last seen 06/2024    Assessment / Plan:         Vitiligo  -     ruxolitinib (OPZELURA) 1.5 % Crea; aaa bid  Dispense: 60 g; Refill: 2  Pt failed tacrolimus and topical steroids and has diffuse disease  Opzelura:  Use 2x per day until clear and then 2 more days then stop. Do not use on more than 20 % of your body at the same time; Should be use intermittently for flares ( do not use when clear).   Do not use this medication if you are are immunosuppressant medications.      There are many side effects reported when this medication is used as an oral drug. Systemic absorption is minimal and having these side effects when using as a cream would be exceptionally rare. However if you experience any of these as new side effects while on the cream, please stop the cream and inform your prescribing physician.              Acneiform eruption              Herpes infections or upper respiratory infection              Other serious infections              Oral medication use can be associated with increased risk of lymphoma              Increased risk of cardiac events or pulmonary emboli              This medication should not be used if pregnant or lactating     Lentigo  This is a benign hyperpigmented sun induced lesion. Recommend daily sun protection/avoidance and use of at least SPF 30, broad spectrum sunscreen (OTC drug) will reduce the number of new lesions. Treatment of these benign lesions are considered cosmetic.  The nature of sun-induced photo-aging and skin cancers is discussed.  Sun avoidance, protective clothing, and the use of 30-SPF sunscreens is advised. Observe closely for skin damage/changes, and call if such occurs.  A tint is recommended too- such as makeup, tinted sunscreen, BB/CC creams. The iron oxide in the tint of products protects against agents other than UV light that damage our skin such as blue light from screens, infrared heat, visible light, and other  other environmental pollutants.  These other factors can contribute significantly to irregular pigment, especially in skin of color and tint helps protect from these factors.      PM:  Wash with LaRoche POsay cleanser  Thin film of tretinoin  all over every other to every night   Moisturize with LaRoche POsay double repair facial moisturizer to  minimize irritation     AM:  Wash with mild cleanser   2. LaRoche Posay double repair moisturizer  3. TInted sunscreen with spf 50 +      A tint is recommended too- such as makeup, tinted sunscreen, BB/CC creams. The iron oxide in the tint of products protects against agents other than UV light that damage our skin such as blue light from screens, infrared heat, visible light, and other other environmental pollutants.  These other factors can contribute significantly to irregular pigment, especially in skin of color and tint helps protect from these factors.      Nevus  Discussed ABCDE's of nevi.  Monitor for new mole or moles that are becoming bigger, darker, irritated, or developing irregular borders. Brochure provided. Instructed patient to observe lesion(s) for changes and follow up in clinic if changes are noted. Patient to monitor skin at home for new or changing lesions.      Cherry angioma  These are benign vascular lesions that are inherited.  Treatment is not necessary.     Open comedone  -     tretinoin (RETIN-A) 0.025 % cream; Compound tretinoin 0.025% / niacinamide 2% cream. Apply a pea-sized amount to entire face qhs then moisturize.  Dispense: 30 g; Refill: 5     Total body skin examination performed today including at least 12 points as noted in physical examination. No lesions suspicious for malignancy noted.     Recommend daily sun protection/avoidance, use of at least SPF 30, broad spectrum sunscreen (OTC drug), skin self examinations, and routine physician surveillance to optimize early detection              Follow up in 1 year (on 6/10/2025) for  Medication Management, TBSE.

## 2024-11-29 DIAGNOSIS — L30.4 INTERTRIGO: ICD-10-CM

## 2024-11-29 RX ORDER — NYSTATIN 100000 [USP'U]/G
POWDER TOPICAL
Qty: 120 G | Refills: 6 | Status: CANCELLED | OUTPATIENT
Start: 2024-11-27

## 2024-11-30 RX ORDER — NYSTATIN 100000 [USP'U]/G
POWDER TOPICAL
Qty: 120 G | Refills: 6 | Status: SHIPPED | OUTPATIENT
Start: 2024-11-30
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
## 2024-12-06 ENCOUNTER — OFFICE VISIT (OUTPATIENT)
Dept: CARDIOLOGY | Facility: CLINIC | Age: 64
End: 2024-12-06
Payer: MEDICARE

## 2024-12-06 VITALS — WEIGHT: 147 LBS | OXYGEN SATURATION: 92 % | HEART RATE: 66 BPM | BODY MASS INDEX: 27.75 KG/M2 | HEIGHT: 61 IN

## 2024-12-06 DIAGNOSIS — I10 PRIMARY HYPERTENSION: ICD-10-CM

## 2024-12-06 DIAGNOSIS — E78.2 MIXED HYPERLIPIDEMIA: ICD-10-CM

## 2024-12-06 DIAGNOSIS — D86.0 SARCOIDOSIS OF LUNG: ICD-10-CM

## 2024-12-06 DIAGNOSIS — I10 ESSENTIAL HYPERTENSION: ICD-10-CM

## 2024-12-06 DIAGNOSIS — R00.2 PALPITATIONS: Primary | ICD-10-CM

## 2024-12-06 DIAGNOSIS — I70.0 AORTIC ATHEROSCLEROSIS: ICD-10-CM

## 2024-12-06 DIAGNOSIS — I47.19 ATRIAL TACHYCARDIA: ICD-10-CM

## 2024-12-06 PROCEDURE — 3008F BODY MASS INDEX DOCD: CPT | Mod: CPTII,S$GLB,, | Performed by: STUDENT IN AN ORGANIZED HEALTH CARE EDUCATION/TRAINING PROGRAM

## 2024-12-06 PROCEDURE — 99999 PR PBB SHADOW E&M-EST. PATIENT-LVL IV: CPT | Mod: PBBFAC,,, | Performed by: STUDENT IN AN ORGANIZED HEALTH CARE EDUCATION/TRAINING PROGRAM

## 2024-12-06 PROCEDURE — 3044F HG A1C LEVEL LT 7.0%: CPT | Mod: CPTII,S$GLB,, | Performed by: STUDENT IN AN ORGANIZED HEALTH CARE EDUCATION/TRAINING PROGRAM

## 2024-12-06 PROCEDURE — 4010F ACE/ARB THERAPY RXD/TAKEN: CPT | Mod: CPTII,S$GLB,, | Performed by: STUDENT IN AN ORGANIZED HEALTH CARE EDUCATION/TRAINING PROGRAM

## 2024-12-06 PROCEDURE — 99214 OFFICE O/P EST MOD 30 MIN: CPT | Mod: S$GLB,,, | Performed by: STUDENT IN AN ORGANIZED HEALTH CARE EDUCATION/TRAINING PROGRAM

## 2024-12-06 RX ORDER — AMLODIPINE BESYLATE 10 MG/1
10 TABLET ORAL DAILY
Qty: 90 TABLET | Refills: 3 | Status: SHIPPED | OUTPATIENT
Start: 2024-12-06 | End: 2025-12-06

## 2024-12-11 ENCOUNTER — OFFICE VISIT (OUTPATIENT)
Dept: OBSTETRICS AND GYNECOLOGY | Facility: CLINIC | Age: 64
End: 2024-12-11
Payer: MEDICARE

## 2024-12-11 VITALS — WEIGHT: 149.81 LBS | DIASTOLIC BLOOD PRESSURE: 85 MMHG | BODY MASS INDEX: 28.3 KG/M2 | SYSTOLIC BLOOD PRESSURE: 127 MMHG

## 2024-12-11 DIAGNOSIS — Z12.72 SCREENING FOR VAGINAL CANCER: ICD-10-CM

## 2024-12-11 DIAGNOSIS — N89.1 VAIN II (VAGINAL INTRAEPITHELIAL NEOPLASIA GRADE II): ICD-10-CM

## 2024-12-11 DIAGNOSIS — Z01.411 ENCOUNTER FOR GYNECOLOGICAL EXAMINATION (GENERAL) (ROUTINE) WITH ABNORMAL FINDINGS: Primary | ICD-10-CM

## 2024-12-11 PROCEDURE — 3288F FALL RISK ASSESSMENT DOCD: CPT | Mod: CPTII,S$GLB,, | Performed by: OBSTETRICS & GYNECOLOGY

## 2024-12-11 PROCEDURE — 1159F MED LIST DOCD IN RCRD: CPT | Mod: CPTII,S$GLB,, | Performed by: OBSTETRICS & GYNECOLOGY

## 2024-12-11 PROCEDURE — 3074F SYST BP LT 130 MM HG: CPT | Mod: CPTII,S$GLB,, | Performed by: OBSTETRICS & GYNECOLOGY

## 2024-12-11 PROCEDURE — 99999 PR PBB SHADOW E&M-EST. PATIENT-LVL IV: CPT | Mod: PBBFAC,,, | Performed by: OBSTETRICS & GYNECOLOGY

## 2024-12-11 PROCEDURE — 1101F PT FALLS ASSESS-DOCD LE1/YR: CPT | Mod: CPTII,S$GLB,, | Performed by: OBSTETRICS & GYNECOLOGY

## 2024-12-11 PROCEDURE — 99397 PER PM REEVAL EST PAT 65+ YR: CPT | Mod: S$GLB,,, | Performed by: OBSTETRICS & GYNECOLOGY

## 2024-12-11 PROCEDURE — 1160F RVW MEDS BY RX/DR IN RCRD: CPT | Mod: CPTII,S$GLB,, | Performed by: OBSTETRICS & GYNECOLOGY

## 2024-12-11 PROCEDURE — 3044F HG A1C LEVEL LT 7.0%: CPT | Mod: CPTII,S$GLB,, | Performed by: OBSTETRICS & GYNECOLOGY

## 2024-12-11 PROCEDURE — 4010F ACE/ARB THERAPY RXD/TAKEN: CPT | Mod: CPTII,S$GLB,, | Performed by: OBSTETRICS & GYNECOLOGY

## 2024-12-11 PROCEDURE — 3008F BODY MASS INDEX DOCD: CPT | Mod: CPTII,S$GLB,, | Performed by: OBSTETRICS & GYNECOLOGY

## 2024-12-11 PROCEDURE — 3079F DIAST BP 80-89 MM HG: CPT | Mod: CPTII,S$GLB,, | Performed by: OBSTETRICS & GYNECOLOGY

## 2024-12-11 PROCEDURE — 88175 CYTOPATH C/V AUTO FLUID REDO: CPT | Performed by: OBSTETRICS & GYNECOLOGY

## 2024-12-11 RX ORDER — CLOBETASOL PROPIONATE 0.5 MG/G
OINTMENT TOPICAL 2 TIMES DAILY
Qty: 60 G | Refills: 1 | Status: SHIPPED | OUTPATIENT
Start: 2024-12-11

## 2024-12-11 NOTE — PROGRESS NOTES
.OBSTETRIC HISTORY:   OB History          4    Para   4    Term   2            AB        Living   2         SAB        IAB        Ectopic        Multiple        Live Births   2                COMPREHENSIVE GYN HISTORY:  PAP History: Reports abnormal Paps.  Treatment: Colposcopy with biopsy of vagina  Result: VAIN 2  Infection History: Denies STDs. Denies PID.  Benign History: Denies uterine fibroids. Denies ovarian cysts. Denies endometriosis.   Cancer History: Denies cervical cancer. Denies uterine cancer or hyperplasia. Denies ovarian cancer. Denies vulvar cancer or pre-cancer. Denies vaginal cancer or pre-cancer. Denies breast cancer. Denies colon cancer.  Sexual Activity History:  reports that she currently engages in sexual activity. She reports using the following method of birth control/protection: Surgical.        HPI:   64 y.o. Patient's last menstrual period was 2004.   Patient is  here for her annual gynecologic exam. She has vaginal dryness. She denies bladder, bowel and breast complaints.     ROS:  GENERAL: No weight gain or weight loss.   CHEST: No shortness of breath.   ABDOMEN: No abdominal pain, constipation, diarrhea, nausea, vomiting or rectal bleeding.   URINARY: No frequency, dysuria, hematuria, or burning on urination.  REPRODUCTIVE: See HPI.   BREASTS: No breast pain, lumps, or nipple discharge.   PSYCHIATRIC: No depression or anxiety.        PE:   /85   Wt 67.9 kg (149 lb 12.8 oz)   LMP 2004   BMI 28.30 kg/m²   APPEARANCE: Well nourished, well developed, in no acute distress.  NECK: Neck symmetric without thyromegaly.  NODES: No inguinal or cervical lymph node enlargement.  CHEST: Lungs clear to auscultation.  HEART: Regular rate and rhythm, no murmurs, rubs or gallops.  ABDOMEN: Soft. No tenderness or masses. No hernias.  BREASTS: Symmetrical, vitiligo present. No palpable masses, nipple discharge or adenopathy bilaterally.  VULVA: Has known vitiligo and  no lesions today but has some hyperkeratosis and maybe white patch of clitoral thapa). Normal female genitalia.  URETHRAL MEATUS: Normal size and location, no lesions, no prolapse.  URETHRA: No masses, tenderness, prolapse or scarring.  VAGINA: Atrophic and not well rugated, no discharge, no significant cystocele or rectocele.  CERVIX AND UTERUS SURGICALLY ABSENT.   ADNEXA: No masses or tenderness.    PROCEDURES:  Pap smear -- continue 2/2 to high risk VAIN 2    Assessment:  Normal Gynecologic Exam  History of VAIN 2  Vitiligo  Hyperkeratosis (maybe lichen sclerosus of clitoral thapa)--will do Clobetasol x 2 weeks--also uses pantiliners daily--Carfree that might be irritant--try period panties instead    Recommendations routine screening and no risk factors:  Mammogram at age 40  Colonoscopy age 44  Bone density age 64  Return to clinic as needed for any problems.  Return to clinic in one year. It is recommended to have a physician breast exam and pelvic exam annually. This is different than doing a Pap smear.         As of April 1, 2021, the Cures Act has been passed nationally. This new law requires that all doctors progress notes, lab results, pathology reports and radiology reports be released IMMEDIATELY to the patient in the patient portal. That means that the results are released to you at the EXACT same time they are released to me. Therefore, with all of the patients that I have I am not able to reply to each patient exactly when the results come in. So there will be a delay from when you see the results to when I see them and have time to come up with a response to send you. Also I only see these results when I am on the computer at work. So if the results come in over the weekend or after 5 pm of a work day, I will not see them until the next business day. As you can tell, this is a challenge as a physician to give every patient the quick response they hope for and deserve. So please be patient!     Thanks  for understanding,

## 2024-12-12 LAB
CLINICAL INFO: NORMAL
DATE OF PREVIOUS PAP: NORMAL
DATE PREVIOUS BX: NORMAL
LMP START DATE: NORMAL
SPECIMEN SOURCE CVX/VAG CYTO: NORMAL

## 2024-12-16 ENCOUNTER — HOSPITAL ENCOUNTER (OUTPATIENT)
Dept: CARDIOLOGY | Facility: HOSPITAL | Age: 64
Discharge: HOME | End: 2024-12-16
Attending: STUDENT IN AN ORGANIZED HEALTH CARE EDUCATION/TRAINING PROGRAM
Payer: MEDICARE

## 2024-12-16 DIAGNOSIS — I47.19 ATRIAL TACHYCARDIA: ICD-10-CM

## 2024-12-16 DIAGNOSIS — R00.2 PALPITATIONS: ICD-10-CM

## 2024-12-16 PROCEDURE — 93225 XTRNL ECG REC<48 HRS REC: CPT

## 2024-12-16 PROCEDURE — 93227 XTRNL ECG REC<48 HR R&I: CPT | Mod: ,,, | Performed by: INTERNAL MEDICINE

## 2024-12-17 ENCOUNTER — PATIENT MESSAGE (OUTPATIENT)
Dept: FAMILY MEDICINE | Facility: CLINIC | Age: 64
End: 2024-12-17
Payer: MEDICARE

## 2024-12-18 ENCOUNTER — TELEPHONE (OUTPATIENT)
Dept: FAMILY MEDICINE | Facility: CLINIC | Age: 64
End: 2024-12-18
Payer: MEDICARE

## 2024-12-18 DIAGNOSIS — Z12.31 ENCOUNTER FOR SCREENING MAMMOGRAM FOR BREAST CANCER: Primary | ICD-10-CM

## 2024-12-19 ENCOUNTER — HOSPITAL ENCOUNTER (OUTPATIENT)
Dept: RADIOLOGY | Facility: HOSPITAL | Age: 64
Discharge: HOME | End: 2024-12-19
Attending: FAMILY MEDICINE
Payer: MEDICARE

## 2024-12-19 VITALS — HEIGHT: 61 IN | WEIGHT: 149 LBS | BODY MASS INDEX: 28.13 KG/M2

## 2024-12-19 DIAGNOSIS — Z12.31 ENCOUNTER FOR SCREENING MAMMOGRAM FOR BREAST CANCER: ICD-10-CM

## 2024-12-19 LAB
OHS CV EVENT MONITOR DAY: 0
OHS CV HOLTER LENGTH DECIMAL HOURS: 47.98
OHS CV HOLTER LENGTH HOURS: 47
OHS CV HOLTER LENGTH MINUTES: 59
OHS CV HOLTER SINUS AVERAGE HR: 79
OHS CV HOLTER SINUS MAX HR: 138
OHS CV HOLTER SINUS MIN HR: 51

## 2024-12-19 PROCEDURE — 77067 SCR MAMMO BI INCL CAD: CPT | Mod: TC

## 2025-01-07 ENCOUNTER — OFFICE VISIT (OUTPATIENT)
Dept: FAMILY MEDICINE | Facility: CLINIC | Age: 65
End: 2025-01-07
Attending: FAMILY MEDICINE
Payer: MEDICARE

## 2025-01-07 VITALS
BODY MASS INDEX: 28.22 KG/M2 | OXYGEN SATURATION: 96 % | WEIGHT: 149.5 LBS | SYSTOLIC BLOOD PRESSURE: 124 MMHG | HEART RATE: 71 BPM | HEIGHT: 61 IN | TEMPERATURE: 98 F | DIASTOLIC BLOOD PRESSURE: 68 MMHG

## 2025-01-07 DIAGNOSIS — E55.9 VITAMIN D DEFICIENCY: ICD-10-CM

## 2025-01-07 DIAGNOSIS — M05.9 RHEUMATOID ARTHRITIS WITH POSITIVE RHEUMATOID FACTOR, INVOLVING UNSPECIFIED SITE: ICD-10-CM

## 2025-01-07 DIAGNOSIS — I10 PRIMARY HYPERTENSION: ICD-10-CM

## 2025-01-07 DIAGNOSIS — F33.9 MAJOR DEPRESSION, RECURRENT, CHRONIC: ICD-10-CM

## 2025-01-07 DIAGNOSIS — R79.9 ABNORMAL FINDING OF BLOOD CHEMISTRY, UNSPECIFIED: ICD-10-CM

## 2025-01-07 DIAGNOSIS — E78.00 HYPERCHOLESTEREMIA: ICD-10-CM

## 2025-01-07 DIAGNOSIS — K21.9 GASTROESOPHAGEAL REFLUX DISEASE, UNSPECIFIED WHETHER ESOPHAGITIS PRESENT: ICD-10-CM

## 2025-01-07 DIAGNOSIS — E78.2 MIXED HYPERLIPIDEMIA: Primary | ICD-10-CM

## 2025-01-07 DIAGNOSIS — E55.9 VITAMIN D DEFICIENCY, UNSPECIFIED: ICD-10-CM

## 2025-01-07 DIAGNOSIS — E53.8 DEFICIENCY OF OTHER SPECIFIED B GROUP VITAMINS: ICD-10-CM

## 2025-01-07 DIAGNOSIS — I10 ESSENTIAL HYPERTENSION: ICD-10-CM

## 2025-01-07 PROCEDURE — 1160F RVW MEDS BY RX/DR IN RCRD: CPT | Mod: CPTII,S$GLB,, | Performed by: FAMILY MEDICINE

## 2025-01-07 PROCEDURE — 3288F FALL RISK ASSESSMENT DOCD: CPT | Mod: CPTII,S$GLB,, | Performed by: FAMILY MEDICINE

## 2025-01-07 PROCEDURE — 3078F DIAST BP <80 MM HG: CPT | Mod: CPTII,S$GLB,, | Performed by: FAMILY MEDICINE

## 2025-01-07 PROCEDURE — 99214 OFFICE O/P EST MOD 30 MIN: CPT | Mod: S$GLB,,, | Performed by: FAMILY MEDICINE

## 2025-01-07 PROCEDURE — 1159F MED LIST DOCD IN RCRD: CPT | Mod: CPTII,S$GLB,, | Performed by: FAMILY MEDICINE

## 2025-01-07 PROCEDURE — 1101F PT FALLS ASSESS-DOCD LE1/YR: CPT | Mod: CPTII,S$GLB,, | Performed by: FAMILY MEDICINE

## 2025-01-07 PROCEDURE — 99999 PR PBB SHADOW E&M-EST. PATIENT-LVL III: CPT | Mod: PBBFAC,,, | Performed by: FAMILY MEDICINE

## 2025-01-07 PROCEDURE — 3074F SYST BP LT 130 MM HG: CPT | Mod: CPTII,S$GLB,, | Performed by: FAMILY MEDICINE

## 2025-01-07 PROCEDURE — 3008F BODY MASS INDEX DOCD: CPT | Mod: CPTII,S$GLB,, | Performed by: FAMILY MEDICINE

## 2025-01-07 RX ORDER — PANTOPRAZOLE SODIUM 40 MG/1
40 TABLET, DELAYED RELEASE ORAL DAILY
Qty: 90 TABLET | Refills: 3 | Status: SHIPPED | OUTPATIENT
Start: 2025-01-07 | End: 2026-01-07

## 2025-01-07 RX ORDER — ERGOCALCIFEROL 1.25 MG/1
50000 CAPSULE ORAL
Qty: 12 CAPSULE | Refills: 2 | Status: SHIPPED | OUTPATIENT
Start: 2025-01-07

## 2025-01-07 RX ORDER — ATORVASTATIN CALCIUM 10 MG/1
10 TABLET, FILM COATED ORAL DAILY
Qty: 90 TABLET | Refills: 3 | Status: SHIPPED | OUTPATIENT
Start: 2025-01-07

## 2025-01-07 NOTE — PROGRESS NOTES
Subjective:       Patient ID: Silvia Cervantes is a 64 y.o. female.    Chief Complaint: Follow-up (6 mo f/u/)    63 yr old pleasant  female with depression, allergies, vitiligo, HTN, HLD, sarcoidosis, Rheumatoid arthritis, Erosive gastritis, and other co morbidities presents today for her 3 month follow up. She wanted new neurology for her headache. The previous one left system.    Chronic low back pain. Onset 6 months ago and gradually worsening - left lower back- does not radiate and no neurological symptoms. No saddle anesthesia or bladder/bowel problems. Details as follows -      HTN - controlled - on Losartan-HCT and Norvasc 5 - compliant - no side effects     HLD - on statin but not compliant -         LDLCALC                  153.0               07/03/2024                 Sarcoidosis and lung nodules - [facility] pulmonology - need new one as her previous specialist is leaving [facility]      Major depression - uncontrolled - not on meds - would like to start one - given zoloft - she will start from today - - no SI/HI    Vertigo - much better - on vertin as needed          RA - on Enbrel shots - follows Rheumatology - stable      Gastritis - stable - on PPI as needed - no side effects      History as below - reviewed       HM  -labs UTD  -vaccines UTD    Follow-up  Associated symptoms include myalgias, neck pain, numbness and weakness. Pertinent negatives include no arthralgias, chest pain, congestion, diaphoresis, headaches, nausea or sore throat.   Medication Refill  Associated symptoms include myalgias, neck pain, numbness and weakness. Pertinent negatives include no arthralgias, chest pain, congestion, diaphoresis, headaches, nausea or sore throat.   Hypertension  This is a chronic problem. The current episode started more than 1 year ago. The problem has been gradually improving since onset. The problem is controlled. Associated symptoms include neck pain. Pertinent negatives include no chest pain,  headaches, malaise/fatigue, palpitations or shortness of breath. There are no associated agents to hypertension. Risk factors for coronary artery disease include dyslipidemia and post-menopausal state. Past treatments include angiotensin blockers and diuretics. The current treatment provides significant improvement. There are no compliance problems.  There is no history of angina, CAD/MI, CVA, left ventricular hypertrophy, PVD or retinopathy. There is no history of chronic renal disease, coarctation of the aorta, hypercortisolism, hyperparathyroidism, a hypertension causing med or sleep apnea.   Hyperlipidemia  This is a chronic problem. The current episode started more than 1 year ago. The problem is controlled. Recent lipid tests were reviewed and are normal. She has no history of chronic renal disease, diabetes, hypothyroidism or liver disease. There are no known factors aggravating her hyperlipidemia. Associated symptoms include myalgias. Pertinent negatives include no chest pain, focal sensory loss, focal weakness, leg pain or shortness of breath. She is currently on no antihyperlipidemic treatment. The current treatment provides mild improvement of lipids. There are no compliance problems.  Risk factors for coronary artery disease include dyslipidemia, hypertension and post-menopausal.   Back Pain  This is a chronic problem. The current episode started more than 1 month ago. The problem occurs constantly. The problem is unchanged. The pain is present in the sacro-iliac and lumbar spine. The quality of the pain is described as aching. The pain does not radiate. The symptoms are aggravated by bending, sitting and twisting. Associated symptoms include numbness and weakness. Pertinent negatives include no chest pain, dysuria, headaches, leg pain, perianal numbness or weight loss. She has tried nothing for the symptoms. The treatment provided no relief.     Review of Systems   Constitutional: Negative.  Negative  for activity change, diaphoresis, malaise/fatigue, unexpected weight change and weight loss.   HENT: Negative.  Negative for nasal congestion, ear discharge, hearing loss, rhinorrhea, sore throat and voice change.    Eyes: Negative.  Negative for pain, discharge and visual disturbance.   Respiratory: Negative.  Negative for chest tightness, shortness of breath and wheezing.    Cardiovascular: Negative.  Negative for chest pain and palpitations.   Gastrointestinal: Negative.  Negative for abdominal distention, anal bleeding, constipation and nausea.   Endocrine: Negative.  Negative for cold intolerance, polydipsia and polyuria.   Genitourinary: Negative.  Negative for decreased urine volume, difficulty urinating, dysuria, frequency, menstrual problem and vaginal pain.   Musculoskeletal:  Positive for back pain, myalgias and neck pain. Negative for arthralgias, gait problem and leg pain.   Integumentary:  Negative for color change, pallor and wound. Negative.   Allergic/Immunologic: Negative.  Negative for environmental allergies and immunocompromised state.   Neurological:  Positive for weakness and numbness. Negative for dizziness, tremors, focal weakness, seizures, speech difficulty and headaches.   Hematological: Negative.  Negative for adenopathy. Does not bruise/bleed easily.   Psychiatric/Behavioral: Negative.  Negative for agitation, confusion, decreased concentration, hallucinations, self-injury and suicidal ideas. The patient is not nervous/anxious.          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)        Past Surgical History:   Procedure Laterality Date     SECTION      COLONOSCOPY N/A 2019    Procedure: COLONOSCOPY/judeyttanya ;  Surgeon: Dimas Woodall MD;  Location: [facility];  Service: Endoscopy;  Laterality: N/A;    COLONOSCOPY, SCREENING, HIGH RISK PATIENT N/A 2024    Procedure: COLONOSCOPY, SCREENING, HIGH RISK PATIENT;  Surgeon: Jose Maria Love MD;  Location: [facility] ENDO;  Service: Endoscopy;  Laterality: N/A;  Ref by Dr AIDAN Cade, PEG, portal - PC  10/31/24-LVM for precall, portal-DS  24-Precall complete-DS    ESOPHAGOGASTRODUODENOSCOPY N/A 2023    Procedure: EGD (ESOPHAGOGASTRODUODENOSCOPY);  Surgeon: Ori St MD;  Location: [facility];  Service: Endoscopy;  Laterality: N/A;    EXCISION OF NODULE Right 2022    Procedure: EXCISION, NODULE right elbow;  Surgeon: Louise Smith MD;  Location: [facility];  Service: Orthopedics;  Laterality: Right;    HYSTERECTOMY      INJECTION OF STEROID Left 2022    Procedure: INJECTION, STEROID Thumb MP joint;  Surgeon: Louise Smith MD;  Location: [facility] OR;  Service: Orthopedics;  Laterality: Left;    SINUS SURGERY      SURGICAL REMOVAL OF DISTAL ULNA Right 2022    Procedure: EXCISION, ULNA, DISTAL;  Surgeon: Louise Smith MD;  Location: [facility] OR;  Service: Orthopedics;  Laterality: Right;    SYNOVECTOMY OF WRIST Right 2022    Procedure: SYNOVECTOMY, WRIST;  Surgeon: Louise Smith MD;  Location: [facility] OR;  Service: Orthopedics;  Laterality: Right;    TUBAL LIGATION         Family History   Problem Relation Name Age of Onset    Hypertension Mother      Cataracts Mother      Diabetes Father      Hypertension Father      Cataracts Father      Heart disease Father      Hyperlipidemia Father      Diabetes Sister x1      Stroke Brother      Hypertension Brother      No Known Problems Daughter x1     No Known Problems Son x1     Melanoma Neg Hx      Breast cancer Neg Hx      Colon cancer Neg Hx      Ovarian cancer Neg Hx      Blindness Neg Hx      Glaucoma Neg Hx      Aneurysm Neg Hx      Cancer Neg Hx      Clotting disorder Neg Hx      Dementia Neg Hx      Fainting Neg Hx      Kidney disease Neg Hx      Liver disease Neg Hx      Migraines Neg Hx      Neuropathy Neg Hx      Parkinsonism Neg Hx      Seizures Neg Hx      Tremor Neg Hx         Social History     Socioeconomic History    Marital status:    Tobacco Use    Smoking status: Never    Smokeless tobacco: Never   Substance and Sexual Activity    Alcohol use: Yes     Comment: Rarely    Drug use: Not Currently    Sexual activity: Yes     Partners: Male     Birth control/protection: Surgical     Comment: hyst     Social Drivers of Health     Financial Resource Strain: Low Risk  (2/21/2022)    Overall Financial Resource Strain (CARDIA)     Difficulty of Paying Living Expenses: Not hard at all   Food Insecurity: No Food Insecurity (2/21/2022)    Hunger Vital Sign     Worried About Running Out of Food in the Last Year: Never true     Ran Out of Food in the Last Year: Never true   Transportation Needs: No Transportation Needs (2/21/2022)    PRAPARE - Transportation     Lack of Transportation (Medical): No     Lack of Transportation (Non-Medical): No   Physical Activity: Sufficiently Active (2/21/2022)    Exercise Vital Sign     Days of Exercise per Week: 7 days     Minutes of Exercise per Session: 120 min   Stress: No Stress Concern Present (2/21/2022)    [employer] - Occupational Stress Questionnaire     Feeling of Stress : Only a little   Housing Stability: Low Risk  (2/21/2022)    Housing Stability Vital Sign     Unable to Pay for Housing in the Last Year: No     Number of Places Lived in the Last Year: 1     Unstable Housing in the Last Year:  No       Current Outpatient Medications   Medication Sig Dispense Refill    amLODIPine (NORVASC) 10 MG tablet Take 1 tablet (10 mg total) by mouth once daily. 90 tablet 3    chlorhexidine (PERIDEX) 0.12 % solution Use 15-20 mL to rinse mouth twice daily for 2 minutes, spit out and do not rinse mouth with water after. 473 mL 3    clobetasol 0.05% (TEMOVATE) 0.05 % Oint Apply topically 2 (two) times daily. Apply to vulva for 2 weeks then as needed 60 g 1    cyanocobalamin, vitamin B-12, (VITAMIN B-12 ORAL) Take by mouth.      cycloSPORINE (RESTASIS) 0.05 % ophthalmic emulsion Apply 1 drop into both eyes twice a day 180 vial 3    cycloSPORINE (RESTASIS) 0.05 % ophthalmic emulsion Instill 1 drop Both Eyes twice a day 180 each 6    cycloSPORINE (RESTASIS) 0.05 % ophthalmic emulsion Apply 1 drop into both eyes twice a day 180 each 3    desoximetasone (TOPICORT) 0.25 % cream Apply to affected area every other night x 1 month 60 g 1    diclofenac sodium (VOLTAREN) 1 % Gel Apply 2 g topically 4 (four) times daily. 100 g 1    etanercept (ENBREL SURECLICK) 50 mg/mL (1 mL) Inject 1 mL (50 mg total) into the skin once a week. 4 mL 11    fluticasone propionate (FLONASE) 50 mcg/actuation nasal spray 2 sprays (100 mcg total) by Each Nostril route once daily. 16 g 11    gabapentin (NEURONTIN) 300 MG capsule Take 1 capsule (300 mg total) by mouth 2 (two) times daily. 180 capsule 3    hydroquinone 4% kojic acid 3% tretinoin 0.025% topical cream apply a pea-sized AMOUNT TO entire face EVERY NIGHT AT BEDTIME THEN moisturize      hydrOXYzine HCL (ATARAX) 25 MG tablet Take 1 tablet (25 mg total) by mouth every evening. 30 tablet 1    leflunomide (ARAVA) 20 MG Tab Take 1 tablet (20 mg total) by mouth once daily. 90 tablet 3    losartan (COZAAR) 100 MG tablet Take 1 tablet (100 mg total) by mouth once daily. 90 tablet 3    meclizine (ANTIVERT) 25 mg tablet Take 1 tablet (25 mg total) by mouth 3 (three) times daily as needed for Nausea or  Dizziness. 30 tablet 2    metoprolol succinate (TOPROL-XL) 25 MG 24 hr tablet Take 1 tablet (25 mg total) by mouth every evening. 90 tablet 3    mupirocin (BACTROBAN) 2 % ointment Apply topically 2 (two) times daily. 22 g 1    nystatin (NYAMYC) powder Apply to affected area every morning as needed for flare under breasts 120 g 6    promethazine (PHENERGAN) 25 MG tablet Take 1 tablet (25 mg total) by mouth every 6 (six) hours as needed for Nausea. 25 tablet 0    PROTOPIC 0.1 % ointment AAA bid 100 g 2    ruxolitinib (OPZELURA) 1.5 % Crea Apply to affected area twice daily to neck and hands for vitiligo 60 g 2    ruxolitinib (OPZELURA) 1.5 % Crea Apply to affected area twice a day 60 g 2    tretinoin (RETIN-A) 0.025 % cream Compound tretinoin 0.025% / niacinamide 2% cream. Apply a pea-sized amount to entire face qhs then moisturize. 30 g 5    triamcinolone acetonide 0.025% (KENALOG) 0.025 % cream Apply to affected area every day to twice a day to neck . Not more than 1-2 weeks straight in same location 80 g 1    ZINC ORAL Take by mouth.      atorvastatin (LIPITOR) 10 MG tablet Take 1 tablet (10 mg total) by mouth once daily. 90 tablet 3    ergocalciferol (ERGOCALCIFEROL) 50,000 unit Cap Take 1 capsule (50,000 Units total) by mouth every 7 days. 12 capsule 2    fluticasone propion-salmeterol 115-21 mcg/dose (ADVAIR HFA) 115-21 mcg/actuation HFAA inhaler Inhale 2 puffs into the lungs every 12 (twelve) hours. 12 g 11    pantoprazole (PROTONIX) 40 MG tablet Take 1 tablet (40 mg total) by mouth once daily. 90 tablet 3     No current facility-administered medications for this visit.       Review of patient's allergies indicates:  No Known Allergies      Objective:       Vitals:    01/07/25 0859   BP: 124/68   Pulse: 71   Temp: 97.9 °F (36.6 °C)       Physical Exam  Constitutional:       General: She is not in acute distress.     Appearance: She is well-developed. She is not diaphoretic.   HENT:      Head: Normocephalic and  atraumatic.      Right Ear: External ear normal.      Left Ear: External ear normal.      Nose: Nose normal.      Mouth/Throat:      Pharynx: No oropharyngeal exudate.   Eyes:      General: No scleral icterus.        Right eye: No discharge.         Left eye: No discharge.      Conjunctiva/sclera: Conjunctivae normal.      Pupils: Pupils are equal, round, and reactive to light.   Neck:      Thyroid: No thyromegaly.      Vascular: No JVD.      Trachea: No tracheal deviation.   Cardiovascular:      Rate and Rhythm: Normal rate and regular rhythm.      Heart sounds: Normal heart sounds. No murmur heard.     No friction rub. No gallop.   Pulmonary:      Effort: Pulmonary effort is normal.      Breath sounds: Normal breath sounds. No stridor. No wheezing or rales.   Chest:      Chest wall: No tenderness.   Abdominal:      General: Bowel sounds are normal. There is no distension.      Palpations: Abdomen is soft. There is no mass.      Tenderness: There is no abdominal tenderness. There is no guarding or rebound.      Hernia: No hernia is present.   Musculoskeletal:         General: No tenderness. Normal range of motion.      Cervical back: Normal range of motion and neck supple.   Lymphadenopathy:      Cervical: No cervical adenopathy.   Skin:     General: Skin is warm and dry.      Coloration: Skin is not pale.      Findings: No erythema or rash.   Neurological:      Mental Status: She is alert and oriented to person, place, and time.      Cranial Nerves: No cranial nerve deficit.      Motor: No abnormal muscle tone.      Coordination: Coordination normal.      Deep Tendon Reflexes: Reflexes are normal and symmetric. Reflexes normal.   Psychiatric:         Behavior: Behavior normal.         Thought Content: Thought content normal.         Judgment: Judgment normal.         Assessment:       Problem List Items Addressed This Visit       Rheumatoid arthritis with positive rheumatoid factor    Relevant Orders    CBC Auto  Differential    Comprehensive Metabolic Panel    Hemoglobin A1C    Vitamin D    VITAMIN B12    TSH    Lipid Panel    Mixed hyperlipidemia - Primary    Relevant Medications    atorvastatin (LIPITOR) 10 MG tablet    Other Relevant Orders    CBC Auto Differential    Comprehensive Metabolic Panel    Hemoglobin A1C    Vitamin D    VITAMIN B12    TSH    Lipid Panel    Major depression, recurrent, chronic    Relevant Orders    CBC Auto Differential    Comprehensive Metabolic Panel    Hemoglobin A1C    Vitamin D    VITAMIN B12    TSH    Lipid Panel    Hypertension    Relevant Orders    CBC Auto Differential    Comprehensive Metabolic Panel    Hemoglobin A1C    Vitamin D    VITAMIN B12    TSH    Lipid Panel     Other Visit Diagnoses       Essential hypertension        Relevant Orders    CBC Auto Differential    Comprehensive Metabolic Panel    Hemoglobin A1C    Vitamin D    VITAMIN B12    TSH    Lipid Panel    Vitamin D deficiency, unspecified        Relevant Orders    CBC Auto Differential    Comprehensive Metabolic Panel    Hemoglobin A1C    Vitamin D    VITAMIN B12    TSH    Lipid Panel    Abnormal finding of blood chemistry, unspecified        Relevant Orders    Hemoglobin A1C    Deficiency of other specified B group vitamins        Relevant Orders    VITAMIN B12    Hypercholesteremia        Relevant Medications    atorvastatin (LIPITOR) 10 MG tablet    Vitamin D deficiency        Relevant Medications    ergocalciferol (ERGOCALCIFEROL) 50,000 unit Cap    Gastroesophageal reflux disease, unspecified whether esophagitis present        Relevant Medications    pantoprazole (PROTONIX) 40 MG tablet            Plan:       Silvia was seen today for follow-up.    Diagnoses and all orders for this visit:    Mixed hyperlipidemia  -     CBC Auto Differential; Future  -     Comprehensive Metabolic Panel; Future  -     Hemoglobin A1C; Future  -     Vitamin D; Future  -     VITAMIN B12; Future  -     TSH; Future  -     Lipid Panel;  Future    Major depression, recurrent, chronic  -     CBC Auto Differential; Future  -     Comprehensive Metabolic Panel; Future  -     Hemoglobin A1C; Future  -     Vitamin D; Future  -     VITAMIN B12; Future  -     TSH; Future  -     Lipid Panel; Future    Rheumatoid arthritis with positive rheumatoid factor, involving unspecified site  -     CBC Auto Differential; Future  -     Comprehensive Metabolic Panel; Future  -     Hemoglobin A1C; Future  -     Vitamin D; Future  -     VITAMIN B12; Future  -     TSH; Future  -     Lipid Panel; Future    Primary hypertension  -     CBC Auto Differential; Future  -     Comprehensive Metabolic Panel; Future  -     Hemoglobin A1C; Future  -     Vitamin D; Future  -     VITAMIN B12; Future  -     TSH; Future  -     Lipid Panel; Future    Essential hypertension  -     CBC Auto Differential; Future  -     Comprehensive Metabolic Panel; Future  -     Hemoglobin A1C; Future  -     Vitamin D; Future  -     VITAMIN B12; Future  -     TSH; Future  -     Lipid Panel; Future    Vitamin D deficiency, unspecified  -     CBC Auto Differential; Future  -     Comprehensive Metabolic Panel; Future  -     Hemoglobin A1C; Future  -     Vitamin D; Future  -     VITAMIN B12; Future  -     TSH; Future  -     Lipid Panel; Future    Abnormal finding of blood chemistry, unspecified  -     Hemoglobin A1C; Future    Deficiency of other specified B group vitamins  -     VITAMIN B12; Future    Hypercholesteremia  -     atorvastatin (LIPITOR) 10 MG tablet; Take 1 tablet (10 mg total) by mouth once daily.    Vitamin D deficiency  -     ergocalciferol (ERGOCALCIFEROL) 50,000 unit Cap; Take 1 capsule (50,000 Units total) by mouth every 7 days.    Gastroesophageal reflux disease, unspecified whether esophagitis present  -     pantoprazole (PROTONIX) 40 MG tablet; Take 1 tablet (40 mg total) by mouth once daily.      HLD  -diet control  -start statin      HTN  -controlled    RA  -stable  -follow  rheumatology    Vertigo  -meclizine prn    Depression  -controlled  -continue zoloft 25 mg daily  -SI/ER precautions given    Headaches  -refer neurology    Spent adequate time in obtaining history and explaining differentials      30 minutes spent during this visit of which greater than 50% devoted to face-face counseling and coordination of care regarding diagnosis and management plan    Follow up in about 6 months (around 7/7/2025), or if symptoms worsen or fail to improve.

## 2025-01-09 ENCOUNTER — LAB VISIT (OUTPATIENT)
Dept: LAB | Facility: HOSPITAL | Age: 65
End: 2025-01-09
Attending: FAMILY MEDICINE
Payer: MEDICARE

## 2025-01-09 DIAGNOSIS — E55.9 VITAMIN D DEFICIENCY, UNSPECIFIED: ICD-10-CM

## 2025-01-09 DIAGNOSIS — R79.9 ABNORMAL FINDING OF BLOOD CHEMISTRY, UNSPECIFIED: ICD-10-CM

## 2025-01-09 DIAGNOSIS — F33.9 MAJOR DEPRESSION, RECURRENT, CHRONIC: ICD-10-CM

## 2025-01-09 DIAGNOSIS — I10 ESSENTIAL HYPERTENSION: ICD-10-CM

## 2025-01-09 DIAGNOSIS — I10 PRIMARY HYPERTENSION: ICD-10-CM

## 2025-01-09 DIAGNOSIS — E53.8 DEFICIENCY OF OTHER SPECIFIED B GROUP VITAMINS: ICD-10-CM

## 2025-01-09 DIAGNOSIS — E78.2 MIXED HYPERLIPIDEMIA: ICD-10-CM

## 2025-01-09 DIAGNOSIS — M05.9 RHEUMATOID ARTHRITIS WITH POSITIVE RHEUMATOID FACTOR, INVOLVING UNSPECIFIED SITE: ICD-10-CM

## 2025-01-09 LAB
25(OH)D3+25(OH)D2 SERPL-MCNC: 30 NG/ML (ref 30–96)
ALBUMIN SERPL BCP-MCNC: 4.1 G/DL (ref 3.5–5.2)
ALP SERPL-CCNC: 85 U/L (ref 40–150)
ALT SERPL W/O P-5'-P-CCNC: 28 U/L (ref 10–44)
ANION GAP SERPL CALC-SCNC: 13 MMOL/L (ref 8–16)
AST SERPL-CCNC: 21 U/L (ref 10–40)
BASOPHILS # BLD AUTO: 0.05 K/UL (ref 0–0.2)
BASOPHILS NFR BLD: 0.7 % (ref 0–1.9)
BILIRUB SERPL-MCNC: 0.5 MG/DL (ref 0.1–1)
BUN SERPL-MCNC: 15 MG/DL (ref 8–23)
CALCIUM SERPL-MCNC: 10.2 MG/DL (ref 8.7–10.5)
CHLORIDE SERPL-SCNC: 105 MMOL/L (ref 95–110)
CHOLEST SERPL-MCNC: 244 MG/DL (ref 120–199)
CHOLEST/HDLC SERPL: 4.8 {RATIO} (ref 2–5)
CO2 SERPL-SCNC: 25 MMOL/L (ref 23–29)
CREAT SERPL-MCNC: 0.8 MG/DL (ref 0.5–1.4)
DIFFERENTIAL METHOD BLD: ABNORMAL
EOSINOPHIL # BLD AUTO: 0.3 K/UL (ref 0–0.5)
EOSINOPHIL NFR BLD: 5.1 % (ref 0–8)
ERYTHROCYTE [DISTWIDTH] IN BLOOD BY AUTOMATED COUNT: 13.1 % (ref 11.5–14.5)
EST. GFR  (NO RACE VARIABLE): >60 ML/MIN/1.73 M^2
ESTIMATED AVG GLUCOSE: 117 MG/DL (ref 68–131)
GLUCOSE SERPL-MCNC: 92 MG/DL (ref 70–110)
HBA1C MFR BLD: 5.7 % (ref 4–5.6)
HCT VFR BLD AUTO: 44.7 % (ref 37–48.5)
HDLC SERPL-MCNC: 51 MG/DL (ref 40–75)
HDLC SERPL: 20.9 % (ref 20–50)
HGB BLD-MCNC: 14.1 G/DL (ref 12–16)
IMM GRANULOCYTES # BLD AUTO: 0.01 K/UL (ref 0–0.04)
IMM GRANULOCYTES NFR BLD AUTO: 0.1 % (ref 0–0.5)
LDLC SERPL CALC-MCNC: 166.8 MG/DL (ref 63–159)
LYMPHOCYTES # BLD AUTO: 2.5 K/UL (ref 1–4.8)
LYMPHOCYTES NFR BLD: 37.2 % (ref 18–48)
MCH RBC QN AUTO: 31.2 PG (ref 27–31)
MCHC RBC AUTO-ENTMCNC: 31.5 G/DL (ref 32–36)
MCV RBC AUTO: 99 FL (ref 82–98)
MONOCYTES # BLD AUTO: 0.7 K/UL (ref 0.3–1)
MONOCYTES NFR BLD: 10.9 % (ref 4–15)
NEUTROPHILS # BLD AUTO: 3.1 K/UL (ref 1.8–7.7)
NEUTROPHILS NFR BLD: 46 % (ref 38–73)
NONHDLC SERPL-MCNC: 193 MG/DL
NRBC BLD-RTO: 0 /100 WBC
PLATELET # BLD AUTO: 347 K/UL (ref 150–450)
PMV BLD AUTO: 9.2 FL (ref 9.2–12.9)
POTASSIUM SERPL-SCNC: 3.8 MMOL/L (ref 3.5–5.1)
PROT SERPL-MCNC: 8.7 G/DL (ref 6–8.4)
RBC # BLD AUTO: 4.52 M/UL (ref 4–5.4)
SODIUM SERPL-SCNC: 143 MMOL/L (ref 136–145)
TRIGL SERPL-MCNC: 131 MG/DL (ref 30–150)
TSH SERPL DL<=0.005 MIU/L-ACNC: 2.99 UIU/ML (ref 0.4–4)
VIT B12 SERPL-MCNC: 450 PG/ML (ref 210–950)
WBC # BLD AUTO: 6.69 K/UL (ref 3.9–12.7)

## 2025-01-09 PROCEDURE — 80053 COMPREHEN METABOLIC PANEL: CPT | Performed by: FAMILY MEDICINE

## 2025-01-09 PROCEDURE — 82306 VITAMIN D 25 HYDROXY: CPT | Performed by: FAMILY MEDICINE

## 2025-01-09 PROCEDURE — 80061 LIPID PANEL: CPT | Performed by: FAMILY MEDICINE

## 2025-01-09 PROCEDURE — 84443 ASSAY THYROID STIM HORMONE: CPT | Performed by: FAMILY MEDICINE

## 2025-01-09 PROCEDURE — 82607 VITAMIN B-12: CPT | Performed by: FAMILY MEDICINE

## 2025-01-09 PROCEDURE — 85025 COMPLETE CBC W/AUTO DIFF WBC: CPT | Performed by: FAMILY MEDICINE

## 2025-01-09 PROCEDURE — 36415 COLL VENOUS BLD VENIPUNCTURE: CPT | Performed by: FAMILY MEDICINE

## 2025-01-09 PROCEDURE — 83036 HEMOGLOBIN GLYCOSYLATED A1C: CPT | Performed by: FAMILY MEDICINE

## 2025-01-10 ENCOUNTER — PATIENT MESSAGE (OUTPATIENT)
Dept: DERMATOLOGY | Facility: CLINIC | Age: 65
End: 2025-01-10
Payer: MEDICARE

## 2025-01-15 ENCOUNTER — OFFICE VISIT (OUTPATIENT)
Dept: DERMATOLOGY | Facility: CLINIC | Age: 65
End: 2025-01-15
Payer: MEDICARE

## 2025-01-15 DIAGNOSIS — L70.0 OPEN COMEDONE: ICD-10-CM

## 2025-01-15 DIAGNOSIS — L80 VITILIGO: ICD-10-CM

## 2025-01-15 PROCEDURE — 99214 OFFICE O/P EST MOD 30 MIN: CPT | Mod: S$GLB,,, | Performed by: DERMATOLOGY

## 2025-01-15 PROCEDURE — 99999 PR PBB SHADOW E&M-EST. PATIENT-LVL IV: CPT | Mod: PBBFAC,,, | Performed by: DERMATOLOGY

## 2025-01-15 PROCEDURE — 1101F PT FALLS ASSESS-DOCD LE1/YR: CPT | Mod: CPTII,S$GLB,, | Performed by: DERMATOLOGY

## 2025-01-15 PROCEDURE — 1160F RVW MEDS BY RX/DR IN RCRD: CPT | Mod: CPTII,S$GLB,, | Performed by: DERMATOLOGY

## 2025-01-15 PROCEDURE — 3044F HG A1C LEVEL LT 7.0%: CPT | Mod: CPTII,S$GLB,, | Performed by: DERMATOLOGY

## 2025-01-15 PROCEDURE — 1126F AMNT PAIN NOTED NONE PRSNT: CPT | Mod: CPTII,S$GLB,, | Performed by: DERMATOLOGY

## 2025-01-15 PROCEDURE — 1159F MED LIST DOCD IN RCRD: CPT | Mod: CPTII,S$GLB,, | Performed by: DERMATOLOGY

## 2025-01-15 PROCEDURE — G2211 COMPLEX E/M VISIT ADD ON: HCPCS | Mod: S$GLB,,, | Performed by: DERMATOLOGY

## 2025-01-15 PROCEDURE — 3288F FALL RISK ASSESSMENT DOCD: CPT | Mod: CPTII,S$GLB,, | Performed by: DERMATOLOGY

## 2025-01-15 RX ORDER — TRETINOIN 0.25 MG/G
CREAM TOPICAL
Qty: 30 G | Refills: 10 | Status: SHIPPED | OUTPATIENT
Start: 2025-01-15

## 2025-01-15 RX ORDER — RUXOLITINIB 15 MG/G
CREAM TOPICAL
Qty: 60 G | Refills: 2 | Status: ACTIVE | OUTPATIENT
Start: 2025-01-15

## 2025-01-15 NOTE — PROGRESS NOTES
Subjective:      Patient ID:  Silvia Cervantes is a 64 y.o. female who presents for   Chief Complaint   Patient presents with    Vitiligo     Rx refill      Pt here today for rx refill - opzelura. Pt states vitiligo is better on forearms mostly Pt uses rx to hands and forearms. 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 Would like to know if there is any specific gloves she needs to have. Still not improving on hands.       Review of Systems   Skin:  Positive for rash, daily sunscreen use and activity-related sunscreen use. Negative for itching, sensitivity to bandage adhesive and recent sunburn.   Hematologic/Lymphatic: Does not bruise/bleed easily.       Objective:   Physical Exam   Skin:   Areas Examined (abnormalities noted in diagram):   RUE Inspected  LUE Inspection Performed  Nails and Digits Inspection Performed            Diagram Legend     Erythematous scaling macule/papule c/w actinic keratosis       Vascular papule c/w angioma      Pigmented verrucoid papule/plaque c/w seborrheic keratosis      Yellow umbilicated papule c/w sebaceous hyperplasia      Irregularly shaped tan macule c/w lentigo     1-2 mm smooth white papules consistent with Milia      Movable subcutaneous cyst with punctum c/w epidermal inclusion cyst      Subcutaneous movable cyst c/w pilar cyst      Firm pink to brown papule c/w dermatofibroma      Pedunculated fleshy papule(s) c/w skin tag(s)      Evenly pigmented macule c/w junctional nevus     Mildly variegated pigmented, slightly irregular-bordered macule c/w mildly atypical nevus      Flesh colored to evenly pigmented papule c/w intradermal nevus       Pink pearly papule/plaque c/w basal cell carcinoma      Erythematous hyperkeratotic cursted plaque c/w SCC      Surgical scar with no sign of skin cancer recurrence      Open and closed comedones      Inflammatory papules and pustules      Verrucoid papule consistent consistent with wart     Erythematous eczematous patches and  plaques     Dystrophic onycholytic nail with subungual debris c/w onychomycosis     Umbilicated papule    Erythematous-base heme-crusted tan verrucoid plaque consistent with inflamed seborrheic keratosis     Erythematous Silvery Scaling Plaque c/w Psoriasis     See annotation      Assessment / Plan:        Vitiligo  -     ruxolitinib (OPZELURA) 1.5 % Crea; Apply to affected area twice a day  Dispense: 60 g; Refill: 2  Opzelura: use on hands, forearms , back lesions and can use on shins if needed  Use 2x per day until clear and then 2 more days then stop. Do not use on more than 20 % of your body at the same time; Should be use intermittently for flares ( do not use when clear).   Do not use this medication if you are are immunosuppressant medications.     There are many side effects reported when this medication is used as an oral drug. Systemic absorption is minimal and having these side effects when using as a cream would be exceptionally rare. However if you experience any of these as new side effects while on the cream, please stop the cream and inform your prescribing physician.   Acneiform eruption   Herpes infections or upper respiratory infection   Other serious infections   Oral medication use can be associated with increased risk of lymphoma   Increased risk of cardiac events or pulmonary emboli   This medication should not be used if pregnant or lactating    Open comedone  -     tretinoin (RETIN-A) 0.025 % cream; Compound tretinoin 0.025% / niacinamide 2% cream. Apply a pea-sized amount to entire face qhs then moisturize.  Dispense: 30 g; Refill: 10  Spf to face daily            Follow up in about 6 months (around 7/15/2025) for TBSE.

## 2025-01-15 NOTE — PATIENT INSTRUCTIONS
Opzelura: use on hands, forearms , back lesions and can use on shins if needed  Use 2x per day until clear and then 2 more days then stop. Do not use on more than 20 % of your body at the same time; Should be use intermittently for flares ( do not use when clear).   Do not use this medication if you are are immunosuppressant medications.     There are many side effects reported when this medication is used as an oral drug. Systemic absorption is minimal and having these side effects when using as a cream would be exceptionally rare. However if you experience any of these as new side effects while on the cream, please stop the cream and inform your prescribing physician.   Acneiform eruption   Herpes infections or upper respiratory infection   Other serious infections   Oral medication use can be associated with increased risk of lymphoma   Increased risk of cardiac events or pulmonary emboli   This medication should not be used if pregnant or lactating    We would like to see you back in the clinic in 6 months.  You will be able to schedule this appointment by calling or by using your [facility] portal 3 months before this time. Because our schedule fills so quickly, please set a reminder in your phone or on your calendar to schedule 3 months before you are due to come in so that we can see you in a timely fashion.  You should also receive a reminder from us in the mail. This will help us ensure we can continue to provide excellent healthcare for you. Thank you.

## 2025-01-24 ENCOUNTER — HOSPITAL ENCOUNTER (OUTPATIENT)
Dept: RADIOLOGY | Facility: HOSPITAL | Age: 65
Discharge: HOME | End: 2025-01-24
Attending: STUDENT IN AN ORGANIZED HEALTH CARE EDUCATION/TRAINING PROGRAM
Payer: MEDICARE

## 2025-01-24 ENCOUNTER — OFFICE VISIT (OUTPATIENT)
Dept: RHEUMATOLOGY | Facility: CLINIC | Age: 65
End: 2025-01-24
Payer: MEDICARE

## 2025-01-24 VITALS
TEMPERATURE: 98 F | SYSTOLIC BLOOD PRESSURE: 110 MMHG | OXYGEN SATURATION: 95 % | RESPIRATION RATE: 18 BRPM | BODY MASS INDEX: 28.22 KG/M2 | HEIGHT: 61 IN | DIASTOLIC BLOOD PRESSURE: 77 MMHG | WEIGHT: 149.5 LBS | HEART RATE: 74 BPM

## 2025-01-24 DIAGNOSIS — Z79.899 ENCOUNTER FOR LONG-TERM CURRENT USE OF HIGH RISK MEDICATION: ICD-10-CM

## 2025-01-24 DIAGNOSIS — Z79.899 IMMUNODEFICIENCY DUE TO DRUG THERAPY: ICD-10-CM

## 2025-01-24 DIAGNOSIS — D84.821 IMMUNODEFICIENCY DUE TO DRUG THERAPY: ICD-10-CM

## 2025-01-24 DIAGNOSIS — M25.561 RIGHT KNEE PAIN, UNSPECIFIED CHRONICITY: ICD-10-CM

## 2025-01-24 DIAGNOSIS — M25.561 RIGHT KNEE PAIN, UNSPECIFIED CHRONICITY: Primary | ICD-10-CM

## 2025-01-24 DIAGNOSIS — D86.9 SARCOIDOSIS: ICD-10-CM

## 2025-01-24 DIAGNOSIS — M06.9 RHEUMATOID ARTHRITIS INVOLVING MULTIPLE JOINTS: ICD-10-CM

## 2025-01-24 PROCEDURE — 73560 X-RAY EXAM OF KNEE 1 OR 2: CPT | Mod: 26,RT,, | Performed by: RADIOLOGY

## 2025-01-24 PROCEDURE — 3078F DIAST BP <80 MM HG: CPT | Mod: CPTII,S$GLB,, | Performed by: STUDENT IN AN ORGANIZED HEALTH CARE EDUCATION/TRAINING PROGRAM

## 2025-01-24 PROCEDURE — 3074F SYST BP LT 130 MM HG: CPT | Mod: CPTII,S$GLB,, | Performed by: STUDENT IN AN ORGANIZED HEALTH CARE EDUCATION/TRAINING PROGRAM

## 2025-01-24 PROCEDURE — 3008F BODY MASS INDEX DOCD: CPT | Mod: CPTII,S$GLB,, | Performed by: STUDENT IN AN ORGANIZED HEALTH CARE EDUCATION/TRAINING PROGRAM

## 2025-01-24 PROCEDURE — 99214 OFFICE O/P EST MOD 30 MIN: CPT | Mod: S$GLB,,, | Performed by: STUDENT IN AN ORGANIZED HEALTH CARE EDUCATION/TRAINING PROGRAM

## 2025-01-24 PROCEDURE — 73560 X-RAY EXAM OF KNEE 1 OR 2: CPT | Mod: TC,FY,RT

## 2025-01-24 PROCEDURE — 3044F HG A1C LEVEL LT 7.0%: CPT | Mod: CPTII,S$GLB,, | Performed by: STUDENT IN AN ORGANIZED HEALTH CARE EDUCATION/TRAINING PROGRAM

## 2025-01-24 PROCEDURE — 3288F FALL RISK ASSESSMENT DOCD: CPT | Mod: CPTII,S$GLB,, | Performed by: STUDENT IN AN ORGANIZED HEALTH CARE EDUCATION/TRAINING PROGRAM

## 2025-01-24 PROCEDURE — 99999 PR PBB SHADOW E&M-EST. PATIENT-LVL V: CPT | Mod: PBBFAC,,, | Performed by: STUDENT IN AN ORGANIZED HEALTH CARE EDUCATION/TRAINING PROGRAM

## 2025-01-24 PROCEDURE — 1101F PT FALLS ASSESS-DOCD LE1/YR: CPT | Mod: CPTII,S$GLB,, | Performed by: STUDENT IN AN ORGANIZED HEALTH CARE EDUCATION/TRAINING PROGRAM

## 2025-01-24 PROCEDURE — 1159F MED LIST DOCD IN RCRD: CPT | Mod: CPTII,S$GLB,, | Performed by: STUDENT IN AN ORGANIZED HEALTH CARE EDUCATION/TRAINING PROGRAM

## 2025-01-24 PROCEDURE — 1126F AMNT PAIN NOTED NONE PRSNT: CPT | Mod: CPTII,S$GLB,, | Performed by: STUDENT IN AN ORGANIZED HEALTH CARE EDUCATION/TRAINING PROGRAM

## 2025-01-24 NOTE — PROGRESS NOTES
Subjective:       Patient ID: Silvia Cervantes is a 54 y.o. female.    Chief Complaint:     HPI 53 yo F with PMH of HTN, abnormal pap (2013 but recent negative, no cancer), RA (+CCP, RF),erosive here for evaluation. She was diagnosed in 2011 with hand and feet with swelling and pain. She was initially treated with MTX but it gave her nausea which gave her nausea.  She was changed to leflunomide 20 mg 10/2014 from mtx due to nausea on the mtx. Then I added etanercept January 2015 due to incomplete control of her arthritis on leflunomide.  No am stiffness.  She reports mild aching in hands (3/10). Pain is aching.  Reports mild swelling in hands but better than usual.  She reports left upper quadrant pain (4/10) , non-radiating with possible nausea which has been present for months. Sometimes she has sour taste in mouth.   Interval history:  Patient is here for follow up.   Patient is s/p right rheumatoid nodule excision elbow and wrist, extensor tenosynovectomy and osteophyte excision ulna 6/30/22.   She reports she has been cleared to restart the enbrel. She has not taken enbrel since June 21.   Pain level is 5/10 in hands. Reports some improvement in strength in her hands.  lv: Patient here for follow up of RA   Patient reports tat she is establishing care with a new physician  Patient reports that she is doing well from RA   She also has Vitiligo and sarcoid   Having pain sometimes with the enbrel stick    1/24/2025-- Patient here for follow up  Patient has RA and doing Enbrel  Patient tolerating meds  Not taking Statin and cholesterol labs showing increase LDL    Past Medical History   Diagnosis Date    Hypertension     Rheumatoid arthritis(714.0)     History of bronchitis     History of headache     Dry eyes     DEMENTIA     Fever blister     Hypercholesteremia 3/6/2015    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   HENT: Negative for congestion, ear discharge, ear pain, facial swelling, mouth sores, sinus pressure, sneezing, sore throat, tinnitus and trouble swallowing.    Eyes: Negative for photophobia, pain, discharge, redness, itching and visual disturbance.   Respiratory: Negative for apnea, chest tightness, shortness of breath, wheezing and stridor.    Cardiovascular: Negative for leg swelling.   Gastrointestinal: Negative for nausea, abdominal pain, diarrhea, constipation, blood in stool, abdominal distention and anal bleeding.   Endocrine: Negative for cold intolerance and heat intolerance.   Genitourinary: Negative for dysuria and difficulty urinating.   Musculoskeletal: Positive for arthralgias. Negative for myalgias, back pain, joint swelling, gait problem, neck pain and neck stiffness.   Skin: Negative for color change, pallor, rash and wound.   Neurological: Negative for dizziness, seizures, light-headedness and numbness.   Hematological: Negative for adenopathy. Does not bruise/bleed easily.   Psychiatric/Behavioral: Negative for sleep disturbance. The patient is not nervous/anxious.       Objective:        Physical Exam   Constitutional: She is oriented to person, place, and time.   HENT:   Head: Normocephalic and atraumatic.   Right Ear: External ear normal.   Left Ear: External ear normal.   Nose: Nose normal.   Mouth/Throat: Oropharynx is clear and moist. No oropharyngeal exudate.   Eyes: Conjunctivae and EOM are normal. Pupils are equal, round, and reactive to light. Right eye exhibits no discharge. Left eye exhibits no discharge. No scleral icterus.   Neck: Neck supple. No JVD present. No thyromegaly present.   Cardiovascular: Normal rate, regular rhythm, normal heart sounds and intact distal pulses.  Exam reveals no gallop and no friction rub.    No murmur heard.  Pulmonary/Chest: Effort normal and breath sounds normal. No respiratory  distress. She has no wheezes. She has no rales. She exhibits no tenderness.   Abdominal: Soft. Bowel sounds are normal. She exhibits no distension and no mass. There is no tenderness. There is no rebound and no guarding.   Lymphadenopathy:     She has no cervical adenopathy.   Neurological: She is alert and oriented to person, place, and time. No cranial nerve deficit. Gait normal. Coordination normal.   Skin:diffuse hypopigmented lesions in arms, hands, legs, chest, back   Psychiatric: Affect and judgment normal.   Musculoskeletal: She exhibits no  tenderness. She exhibits no edema.   FROM of all joints including neck, shoulders, spine, ankles, wrists, knees, and ankles; no joint deformities noted or effusions, erythema or warmth; no tophi or nodules noted; no crepitus; no nail pitting or onycholysis          Labs:  Esr-25  ccp-93  rf-36  Flor-negative    Imaging:    MRI brain (2021):   Impression:     Scattered areas of prior hemorrhage including parenchymal, leptomeningeal and intraventricular involvement.  Moderate supratentorial leukoencephalopathy.  Small focus of right anterior temporal encephalomalacia.  Although not entirely specific, the constellation of findings is most suggestive of prior traumatic brain injury/diffuse axonal injury.  Clinical correlation required.     No evidence of new hemorrhage or major vascular distribution infarct.  MRI (7/22/2015)   (Stable enhancing marginal erosions in the second and third metacarpal heads, lunate, triquetrum, and capitate)      Right elbow xray (2020): I personally reviewed      dexa scan:(2017):  Osteopenia of the femoral neck. FRAX calculation does not support treatment for osteoporosis.Total hip and lumbar spine BMD unchanged since 2013.    Recommendations:  1) Adequate calcium and Vitamin D therapy  2) Appropriate exercise  3) Consider repeat BMD in 2- 4 years          Assessment:     62 yo F with PMH of HTN, abnormal pap (2013 but recent negative, no  "cancer), RA (+CCP, RF),erosive here , H. Pylori for follow up of seropositive RA and sarcoid.   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  She has abnormal brain MRI  AMYLOID ANGIOPATHY and IS SEEING NEURO.  She had worsening of nodules in right elbow and hair thinning, so  MTX was stopped.    Patient is s/p right rheumatoid nodule excision elbow and wrist, extensor tenosynovectomy and osteophyte excision ulna 6/30/22.       She is doing well on enbrel . She self stopped arava.  We can consider putting her on arava or SSZ  At next visit if needed           Seropositive RA   Sarcoidosis   - biopsy proven   Drug induced immunosuppression    R knee pain   PLAN  -continue enbrel 50mg sub q once a week  (Risks of TNF inhibitor discussed with patient and not limited to cell count abnormalities, malignancy, allergic  reaction to medication, and increased risk of infection. Patient agrees with starting medication.)  -Continue folic acid 4 mg a day  -Continue to follow with Dr. Eli for sarcoidosis  - Labs reviewed  they are ok- should have them done for next appt    -R knee xray     Rtc in 3 months    Citrate free enbrel when refilling meds              "

## 2025-01-28 ENCOUNTER — PATIENT MESSAGE (OUTPATIENT)
Dept: PODIATRY | Facility: CLINIC | Age: 65
End: 2025-01-28
Payer: MEDICARE

## 2025-01-29 ENCOUNTER — TELEPHONE (OUTPATIENT)
Dept: PODIATRY | Facility: CLINIC | Age: 65
End: 2025-01-29
Payer: MEDICARE

## 2025-01-29 ENCOUNTER — OFFICE VISIT (OUTPATIENT)
Dept: OTOLARYNGOLOGY | Facility: CLINIC | Age: 65
End: 2025-01-29
Payer: MEDICARE

## 2025-01-29 VITALS
DIASTOLIC BLOOD PRESSURE: 86 MMHG | BODY MASS INDEX: 27.88 KG/M2 | SYSTOLIC BLOOD PRESSURE: 126 MMHG | WEIGHT: 147.69 LBS | HEART RATE: 72 BPM | HEIGHT: 61 IN

## 2025-01-29 DIAGNOSIS — J31.0 CHRONIC RHINITIS: Chronic | ICD-10-CM

## 2025-01-29 DIAGNOSIS — K21.9 LARYNGOPHARYNGEAL REFLUX (LPR): Primary | Chronic | ICD-10-CM

## 2025-01-29 DIAGNOSIS — R09.A2 GLOBUS SENSATION: Chronic | ICD-10-CM

## 2025-01-29 DIAGNOSIS — K21.9 GASTROESOPHAGEAL REFLUX DISEASE, UNSPECIFIED WHETHER ESOPHAGITIS PRESENT: ICD-10-CM

## 2025-01-29 DIAGNOSIS — R13.10 DYSPHAGIA, UNSPECIFIED TYPE: ICD-10-CM

## 2025-01-29 PROCEDURE — 3079F DIAST BP 80-89 MM HG: CPT | Mod: CPTII,S$GLB,, | Performed by: OTOLARYNGOLOGY

## 2025-01-29 PROCEDURE — 1159F MED LIST DOCD IN RCRD: CPT | Mod: CPTII,S$GLB,, | Performed by: OTOLARYNGOLOGY

## 2025-01-29 PROCEDURE — 1160F RVW MEDS BY RX/DR IN RCRD: CPT | Mod: CPTII,S$GLB,, | Performed by: OTOLARYNGOLOGY

## 2025-01-29 PROCEDURE — 99999 PR PBB SHADOW E&M-EST. PATIENT-LVL V: CPT | Mod: PBBFAC,,, | Performed by: OTOLARYNGOLOGY

## 2025-01-29 PROCEDURE — 31575 DIAGNOSTIC LARYNGOSCOPY: CPT | Mod: S$GLB,,, | Performed by: OTOLARYNGOLOGY

## 2025-01-29 PROCEDURE — 3288F FALL RISK ASSESSMENT DOCD: CPT | Mod: CPTII,S$GLB,, | Performed by: OTOLARYNGOLOGY

## 2025-01-29 PROCEDURE — 3074F SYST BP LT 130 MM HG: CPT | Mod: CPTII,S$GLB,, | Performed by: OTOLARYNGOLOGY

## 2025-01-29 PROCEDURE — 1101F PT FALLS ASSESS-DOCD LE1/YR: CPT | Mod: CPTII,S$GLB,, | Performed by: OTOLARYNGOLOGY

## 2025-01-29 PROCEDURE — 3044F HG A1C LEVEL LT 7.0%: CPT | Mod: CPTII,S$GLB,, | Performed by: OTOLARYNGOLOGY

## 2025-01-29 PROCEDURE — 99214 OFFICE O/P EST MOD 30 MIN: CPT | Mod: 25,S$GLB,, | Performed by: OTOLARYNGOLOGY

## 2025-01-29 PROCEDURE — 3008F BODY MASS INDEX DOCD: CPT | Mod: CPTII,S$GLB,, | Performed by: OTOLARYNGOLOGY

## 2025-01-29 PROCEDURE — 1126F AMNT PAIN NOTED NONE PRSNT: CPT | Mod: CPTII,S$GLB,, | Performed by: OTOLARYNGOLOGY

## 2025-01-29 RX ORDER — PANTOPRAZOLE SODIUM 40 MG/1
40 TABLET, DELAYED RELEASE ORAL DAILY
Qty: 90 TABLET | Refills: 3 | Status: SHIPPED | OUTPATIENT
Start: 2025-01-29 | End: 2026-01-29

## 2025-01-29 NOTE — TELEPHONE ENCOUNTER
Patient will need a follow up. It is not emergent. This can be virtual if needed but she may prefer in person especially if we decide to do a nail culture.

## 2025-01-29 NOTE — TELEPHONE ENCOUNTER
Spoke with Ms Cervantes to assist her with making an appt with Dr Beach. Accepted appt date and time of  Mon, Feb 17 at 9:45 am. No other needs voiced. Call back encouraged if needed. Will send appt reminder in the mail

## 2025-01-29 NOTE — PROCEDURES
Laryngoscopy    Date/Time: 1/29/2025 10:00 AM    Performed by: Carleen Cline MD  Authorized by: Carleen Cline MD    Consent Done?:  Yes (Verbal)  Anesthesia:     Local anesthetic:  Lidocaine 2% and Neymar-Synephrine 1/2%  Laryngoscopy:     Areas examined:  Nasal cavities, nasopharynx, oropharynx, hypopharynx, larynx and vocal cords  Nose External:      No external nasal deformity  Nose Intranasal:      Mucosa no polyps     Mucosa ulcers not present     No mucosa lesions present     No septum gross deformity     Turbinates not enlarged  Nasopharynx:      No mucosa lesions     Adenoids not present     Posterior choanae patent     Eustachian tube patent  Larynx/hypopharynx:      No epiglottis lesions     No epiglottis edema     No AE folds lesions     No vocal cord polyps     Equal and normal bilateral     No hypopharynx lesions     No piriform sinus pooling     No piriform sinus lesions     Post cricoid edema (mild)     Post cricoid erythema (mild)

## 2025-01-29 NOTE — PROGRESS NOTES
Chief Complaint   Patient presents with    Follow-up   .     HPI:Silvia Cervantes is a 64 y.o. female who has been referred by Dr. Ori St MD for a several month history of  globus sensation and cough. She reports that she has choking episodes with swallowing foods, liquids, or saliva.  She has tickle in her throat that triggers cough. She feels it is present on her left side.  She reports that the cough is dry.  She does have sarcoidosis- dx'd in . She does see pulmonology.  She denies food sticking. She denies hoarseness.  Her voice is not progressively worsening over this time. There are not pitch breaks or cracks. There is not vocal fatigue. She admits to odynophagia, throat pain, and otalgia.  There is no hemoptysis or hematemesis. She is breathing well. She has been using Luden's throat lozenges to help. She uses camilo's emollient for her nose. She does use Flonase intermittently.     She admits to throat clearing and cough. She denies heartburn and reflux. She has been on Protonix 40 mg in the past but is no longer. She does feel that this helped her tremendously.        Underwent sinus surgery 2016- Dr. Chavez includin. Endoscopic septoplasty.  2. Bilateral inferior turbinate reduction with submucosal resection.  3. Bilateral image-guided endoscopic total ethmoidectomy.  4. Bilateral image-guided endoscopic maxillary antrostomy.  5. Bilateral image-guided endoscopic frontal dissection with Draf IIA sinusotomy.  6. Endoscopic resection of right leann bullosa.      Interval HPI 2024:  Follow up visit. Reports that throat is doing better but still has intermittent globus sensation.    She feels that choking episodes have been less frequent. She has been taking Protonix 40 mg PO daily as prescribed. She does feel this provides relief.     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)      Social History     Socioeconomic History    Marital status:    Tobacco Use    Smoking status: Never    Smokeless tobacco: Never   Substance and Sexual Activity    Alcohol use: Yes     Comment: Rarely    Drug use: Not Currently    Sexual activity: Yes     Partners: Male     Birth control/protection: Surgical     Comment: hyst     Social Drivers of Health     Financial Resource Strain: Low Risk  (2/21/2022)    Overall Financial Resource Strain (CARDIA)     Difficulty of Paying Living Expenses: Not hard at all   Food Insecurity: No Food Insecurity (2/21/2022)    Hunger Vital Sign     Worried About Running Out of Food in the Last Year: Never true     Ran Out of Food in the Last Year: Never true   Transportation Needs: No Transportation Needs (2/21/2022)    PRAPARE - Transportation     Lack of Transportation (Medical): No     Lack of Transportation (Non-Medical): No   Physical Activity: Sufficiently Active (2/21/2022)    Exercise Vital Sign     Days of Exercise per Week: 7 days     Minutes of Exercise per Session: 120 min   Stress: No Stress Concern Present (2/21/2022)    [employer] - Occupational Stress Questionnaire     Feeling of Stress : Only a little   Housing Stability: Low Risk  (2/21/2022)    Housing Stability Vital Sign     Unable to Pay for Housing in the Last Year: No     Number of Places Lived in the Last Year: 1     Unstable Housing in the Last Year: No     Past Surgical History:   Procedure Laterality Date      SECTION      COLONOSCOPY N/A 2019    Procedure: COLONOSCOPY/judeyttanya ;  Surgeon: Dimas Woodall MD;  Location: [facility] ENDO;  Service: Endoscopy;  Laterality: N/A;    COLONOSCOPY, SCREENING, HIGH RISK PATIENT N/A 2024    Procedure: COLONOSCOPY, SCREENING, HIGH RISK PATIENT;  Surgeon: Jose Maria Love MD;  Location: [facility] ENDO;  Service: Endoscopy;  Laterality: N/A;  Ref by Dr AIDAN Cade, PEG, portal - PC  10/31/24-LVM for precall, portal-DS  24-Precall complete-DS    ESOPHAGOGASTRODUODENOSCOPY N/A 2023    Procedure: EGD (ESOPHAGOGASTRODUODENOSCOPY);  Surgeon: Ori St MD;  Location: [facility] ENDO;  Service: Endoscopy;  Laterality: N/A;    EXCISION OF NODULE Right 2022    Procedure: EXCISION, NODULE right elbow;  Surgeon: Louise Smith MD;  Location: [facility] OR;  Service: Orthopedics;  Laterality: Right;    HYSTERECTOMY      INJECTION OF STEROID Left 2022    Procedure: INJECTION, STEROID Thumb MP joint;  Surgeon: Louise Smith MD;  Location: [facility] OR;  Service: Orthopedics;  Laterality: Left;    SINUS SURGERY      SURGICAL REMOVAL OF DISTAL ULNA Right 2022    Procedure: EXCISION, ULNA, DISTAL;  Surgeon: Louise Smith MD;  Location: [facility] OR;  Service: Orthopedics;  Laterality: Right;    SYNOVECTOMY OF WRIST Right 2022    Procedure: SYNOVECTOMY, WRIST;  Surgeon: Louise Smith MD;  Location: [facility] OR;  Service: Orthopedics;  Laterality: Right;    TUBAL LIGATION       Family History   Problem Relation Name Age of Onset    Hypertension Mother      Cataracts Mother      Diabetes Father      Hypertension Father      Cataracts Father      Heart disease Father      Hyperlipidemia Father      Diabetes Sister x1     Stroke Brother      Hypertension Brother      No Known Problems Daughter x1     No Known Problems Son x1     Melanoma Neg Hx      Breast cancer Neg Hx      Colon cancer Neg Hx      Ovarian cancer Neg Hx      Blindness Neg Hx      Glaucoma Neg Hx       Aneurysm Neg Hx      Cancer Neg Hx      Clotting disorder Neg Hx      Dementia Neg Hx      Fainting Neg Hx      Kidney disease Neg Hx      Liver disease Neg Hx      Migraines Neg Hx      Neuropathy Neg Hx      Parkinsonism Neg Hx      Seizures Neg Hx      Tremor Neg Hx             Review of Systems  General: negative for chills, fever or weight loss  Psychological: negative for mood changes or depression  Ophthalmic: negative for blurry vision, photophobia or eye pain  ENT: see HPI  Respiratory: no cough, shortness of breath, or wheezing  Cardiovascular: no chest pain or dyspnea on exertion  Gastrointestinal: no abdominal pain, change in bowel habits, or black/ bloody stools  Musculoskeletal: negative for gait disturbance or muscular weakness  Neurological: no syncope or seizures; no ataxia  Dermatological: negative for puritis,  rash and jaundice  Hematologic/lymphatic: no easy bruising, no new lumps or bumps      Physical Exam:    Vitals:    01/29/25 1003   BP: 126/86   Pulse: 72           Constitutional: Well appearing / communicating without difficutly.  NAD.  Eyes: EOM I Bilaterally  Head/Face: Normocephalic.  Negative paranasal sinus pressure/tenderness.  Salivary glands WNL.  House Brackmann I Bilaterally.    Right Ear: Auricle normal appearance. External Auditory Canal within normal limits no lesions or masses,TM w/o masses/lesions/perforations. TM mobility noted.   Left Ear: Auricle normal appearance. External Auditory Canal within normal limits no lesions or masses,TM w/o masses/lesions/perforations. TM mobility noted.  Nose: No gross nasal septal deviation. Inferior Turbinates 3+ bilaterally. No septal perforation. No masses/lesions. External nasal skin appears normal without masses/lesions.  Oral Cavity: Gingiva/lips within normal limits.  Dentition/gingiva healthy appearing. Mucus membranes moist. Floor of mouth soft, no masses palpated. Oral Tongue mobile. Hard Palate appears normal.    Oropharynx:  Base of tongue appears normal. No masses/lesions noted. Tonsillar fossa/pharyngeal wall without lesions. Posterior oropharynx WNL.  Soft palate without masses. Midline uvula.   Neck/Lymphatic: No LAD I-VI bilaterally.  No thyromegaly.  No masses noted on exam.    Mirror laryngoscopy/nasopharyngoscopy: Active gag reflex.  Unable to perform.        See separate procedure note for FFL.    Diagnostic studies reviewed:   MBSS 1/30/2024: normal  MBSS 2019: normal    EGD 1/25/2023: Impression:            - Normal esophagus.                          - Small hiatal hernia.                          - Normal stomach. Biopsied.                          - Normal examined duodenum.                          Exam for globus / cough/ upper abd distress                          showing:  normal EGD, with small hiatal hernia similar to                          exam done 2015    PFT 8/19/2022: The test was performed for the evaluation of Sarcoidosis.   There is no significant airflow obstruction. Bronchodilator response was not tested.   There is mild restriction and no evidence of hyperinflation or air trapping.   The diffusing capacity is minimally reduced.   Compared to 11/2019, FEV1 has decreased in an age-appropriate fashion. Diffusion capacity is slightly reduced.   This PFT is consistant with the clincial diagnosis of sarcoidosis. Clinical correllation is required.     Assessment:    ICD-10-CM ICD-9-CM    1. Laryngopharyngeal reflux (LPR)  K21.9 478.79       2. Chronic rhinitis  J31.0 472.0       3. Gastroesophageal reflux disease, unspecified whether esophagitis present  K21.9 530.81 pantoprazole (PROTONIX) 40 MG tablet      4. Globus sensation  R09.A2 784.99 Laryngoscopy      5. Dysphagia, unspecified type  R13.10 787.20 Laryngoscopy              The primary encounter diagnosis was Laryngopharyngeal reflux (LPR). Diagnoses of Chronic rhinitis, Gastroesophageal reflux disease, unspecified whether esophagitis present, Globus  sensation, and Dysphagia, unspecified type were also pertinent to this visit.      Plan:  Orders Placed This Encounter   Procedures    Laryngoscopy     Continue nasal saline rinses BID  Continue  Flonase 2 sprays per nostril daily  Continue Protonix 40 mg PO daily. Continue to  refrain from eating within 3 hours of going to bed, to elevate the head of bed very subtly and optimize the impact of gravity on the potential reflux, and to avoid alcohol, caffeine, tobacco, tomato sauce, spicy foods, fried food, and chocolate.      Follow up in 6  months to reassess progress with treatment regimen.     Carleen Weiner MD

## 2025-02-09 ENCOUNTER — TELEPHONE (OUTPATIENT)
Dept: OBSTETRICS AND GYNECOLOGY | Facility: CLINIC | Age: 65
End: 2025-02-09
Payer: MEDICARE

## 2025-02-09 NOTE — TELEPHONE ENCOUNTER
Patient has an 8:15am appointment I will not make 2/2 surgery at 7am. She unfortunately did not get rescheduled.

## 2025-02-10 NOTE — TELEPHONE ENCOUNTER
Spoke with pt and pt said she didn't need any procedures. Pt said she was seen back in Dec for annual and was coming in for that but was to early since just had a annual back in Dec.

## 2025-02-17 ENCOUNTER — OFFICE VISIT (OUTPATIENT)
Dept: PODIATRY | Facility: CLINIC | Age: 65
End: 2025-02-17
Payer: MEDICARE

## 2025-02-17 VITALS
HEART RATE: 71 BPM | SYSTOLIC BLOOD PRESSURE: 128 MMHG | WEIGHT: 147.69 LBS | HEIGHT: 61 IN | DIASTOLIC BLOOD PRESSURE: 86 MMHG | BODY MASS INDEX: 27.88 KG/M2

## 2025-02-17 DIAGNOSIS — M76.61 TENDONITIS, ACHILLES, RIGHT: ICD-10-CM

## 2025-02-17 DIAGNOSIS — B35.1 ONYCHOMYCOSIS: Primary | ICD-10-CM

## 2025-02-17 RX ORDER — DICLOFENAC SODIUM 10 MG/G
2 GEL TOPICAL 4 TIMES DAILY
Qty: 100 G | Refills: 1 | Status: SHIPPED | OUTPATIENT
Start: 2025-02-17
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
## 2025-02-24 ENCOUNTER — OFFICE VISIT (OUTPATIENT)
Dept: PULMONOLOGY | Facility: CLINIC | Age: 65
End: 2025-02-24
Payer: MEDICARE

## 2025-02-24 VITALS
SYSTOLIC BLOOD PRESSURE: 130 MMHG | WEIGHT: 150.69 LBS | OXYGEN SATURATION: 94 % | BODY MASS INDEX: 28.47 KG/M2 | HEART RATE: 66 BPM | DIASTOLIC BLOOD PRESSURE: 87 MMHG

## 2025-02-24 DIAGNOSIS — D86.0 SARCOIDOSIS OF LUNG: Primary | ICD-10-CM

## 2025-02-24 DIAGNOSIS — M05.79 RHEUMATOID ARTHRITIS INVOLVING MULTIPLE SITES WITH POSITIVE RHEUMATOID FACTOR: ICD-10-CM

## 2025-02-24 DIAGNOSIS — D86.9 SARCOIDOSIS: ICD-10-CM

## 2025-02-24 DIAGNOSIS — R05.9 COUGH IN ADULT: ICD-10-CM

## 2025-02-24 PROCEDURE — 3044F HG A1C LEVEL LT 7.0%: CPT | Mod: CPTII,S$GLB,, | Performed by: STUDENT IN AN ORGANIZED HEALTH CARE EDUCATION/TRAINING PROGRAM

## 2025-02-24 PROCEDURE — 3075F SYST BP GE 130 - 139MM HG: CPT | Mod: CPTII,S$GLB,, | Performed by: STUDENT IN AN ORGANIZED HEALTH CARE EDUCATION/TRAINING PROGRAM

## 2025-02-24 PROCEDURE — 3008F BODY MASS INDEX DOCD: CPT | Mod: CPTII,S$GLB,, | Performed by: STUDENT IN AN ORGANIZED HEALTH CARE EDUCATION/TRAINING PROGRAM

## 2025-02-24 PROCEDURE — 1159F MED LIST DOCD IN RCRD: CPT | Mod: CPTII,S$GLB,, | Performed by: STUDENT IN AN ORGANIZED HEALTH CARE EDUCATION/TRAINING PROGRAM

## 2025-02-24 PROCEDURE — 99999 PR PBB SHADOW E&M-EST. PATIENT-LVL V: CPT | Mod: PBBFAC,,, | Performed by: STUDENT IN AN ORGANIZED HEALTH CARE EDUCATION/TRAINING PROGRAM

## 2025-02-24 PROCEDURE — 3079F DIAST BP 80-89 MM HG: CPT | Mod: CPTII,S$GLB,, | Performed by: STUDENT IN AN ORGANIZED HEALTH CARE EDUCATION/TRAINING PROGRAM

## 2025-02-24 PROCEDURE — 1125F AMNT PAIN NOTED PAIN PRSNT: CPT | Mod: CPTII,S$GLB,, | Performed by: STUDENT IN AN ORGANIZED HEALTH CARE EDUCATION/TRAINING PROGRAM

## 2025-02-24 PROCEDURE — 1101F PT FALLS ASSESS-DOCD LE1/YR: CPT | Mod: CPTII,S$GLB,, | Performed by: STUDENT IN AN ORGANIZED HEALTH CARE EDUCATION/TRAINING PROGRAM

## 2025-02-24 PROCEDURE — 3288F FALL RISK ASSESSMENT DOCD: CPT | Mod: CPTII,S$GLB,, | Performed by: STUDENT IN AN ORGANIZED HEALTH CARE EDUCATION/TRAINING PROGRAM

## 2025-02-24 PROCEDURE — 99214 OFFICE O/P EST MOD 30 MIN: CPT | Mod: S$GLB,,, | Performed by: STUDENT IN AN ORGANIZED HEALTH CARE EDUCATION/TRAINING PROGRAM

## 2025-02-24 RX ORDER — FLUTICASONE PROPIONATE AND SALMETEROL XINAFOATE 115; 21 UG/1; UG/1
2 AEROSOL, METERED RESPIRATORY (INHALATION) 2 TIMES DAILY PRN
Qty: 12 G | Refills: 11 | Status: SHIPPED | OUTPATIENT
Start: 2025-02-24 | End: 2026-02-24

## 2025-02-24 NOTE — ASSESSMENT & PLAN NOTE
- overall good disease control, no daily symptoms other than dry cough which she states is not bothersome  - CT chest 2024 was stable, no need for repeat at this time  - repeat PFTs after next visit  - she is up to date on flu, covid, rsv vaccines. Plans to get pna vaccine within next few weeks.

## 2025-02-24 NOTE — PROGRESS NOTES
[facility]  Pulmonary Clinic Note      History of Present Illness:  Silvia Cervantes is a 64 y.o. female with PMHx depression, HTN, sarcoidosis, RA who presents to pulmonary clinic for follow up.     Patient reports her breathing is good overall. States she regularly goes for walks and goes to the gym without issues. She does have sinusitis for which she follows with ENT. Has noticed a cough occasionally but reports this is not bothersome and feels it is related to sinus issues. She is on enbrel for her RA. Not on any sarcoid treatment. Lifelong nonsmoker. No fevers, chills, chest pain, SOB, wheeze.     Pulmonary/Cardiology Testing:    CT Chest Without Contrast 3/27/24  Stable numerous pulmonary micro nodules scattered throughout both lungs consistent with the diagnosis of sarcoidosis. There are mediastinal lymph nodes also consistent with sarcoidosis.     PFT 4/1/24  FVC 2.30 (84%)  FEV1 1.69 (78%)  FEV1/FVC 73 (LLN 67)  TLC 3.39 (76%)  DLCO 72%    TTE 7/3/24    Left Ventricle: The left ventricle is normal in size. Normal wall thickness. There is concentric remodeling. There is normal systolic function. Biplane (2D) method of discs ejection fraction is 65%. There is normal diastolic function.    Right Ventricle: Normal right ventricular cavity size. Wall thickness is normal. Systolic function is normal.    Left Atrium: Left atrium is dilated.    Tricuspid Valve: There is mild regurgitation.    Pulmonary Artery: The estimated pulmonary artery systolic pressure is 21 mmHg.    IVC/SVC: Normal venous pressure at 3 mmHg.    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)      Past Surgical History:   Procedure Laterality Date     SECTION      COLONOSCOPY N/A 2019    Procedure: COLONOSCOPY/judeyttanya ;  Surgeon: Dimas Woodall MD;  Location: [facility];  Service: Endoscopy;  Laterality: N/A;    COLONOSCOPY, SCREENING, HIGH RISK PATIENT N/A 2024    Procedure: COLONOSCOPY, SCREENING, HIGH RISK PATIENT;  Surgeon: Jose Maria Love MD;  Location: [facility];  Service: Endoscopy;  Laterality: N/A;  Ref by Dr AIDAN Cade, PEG, portal - PC  10/31/24-LVM for precall, portal-DS  24-Precall complete-DS    ESOPHAGOGASTRODUODENOSCOPY N/A 2023    Procedure: EGD (ESOPHAGOGASTRODUODENOSCOPY);  Surgeon: Ori St MD;  Location: [facility];  Service: Endoscopy;  Laterality: N/A;    EXCISION OF NODULE Right 2022    Procedure: EXCISION, NODULE right elbow;  Surgeon: Louise Smith MD;  Location: [facility];  Service: Orthopedics;  Laterality: Right;    HYSTERECTOMY      INJECTION OF STEROID Left 2022    Procedure: INJECTION, STEROID Thumb MP joint;  Surgeon: Louise Smith MD;  Location: [facility] OR;  Service: Orthopedics;  Laterality: Left;    SINUS SURGERY      SURGICAL REMOVAL OF DISTAL ULNA Right 2022    Procedure: EXCISION, ULNA, DISTAL;  Surgeon: Louise Smith MD;  Location: [facility] OR;  Service: Orthopedics;  Laterality: Right;    SYNOVECTOMY OF WRIST Right 2022    Procedure: SYNOVECTOMY, WRIST;  Surgeon: Louise Smith MD;  Location: [facility] OR;  Service: Orthopedics;  Laterality: Right;    TUBAL LIGATION       Family History   Problem Relation Name Age of Onset    Hypertension Mother      Cataracts Mother      Diabetes Father      Hypertension Father      Cataracts Father      Heart disease Father      Hyperlipidemia  Father      Diabetes Sister x1     Stroke Brother      Hypertension Brother      No Known Problems Daughter x1     No Known Problems Son x1     Melanoma Neg Hx      Breast cancer Neg Hx      Colon cancer Neg Hx      Ovarian cancer Neg Hx      Blindness Neg Hx      Glaucoma Neg Hx      Aneurysm Neg Hx      Cancer Neg Hx      Clotting disorder Neg Hx      Dementia Neg Hx      Fainting Neg Hx      Kidney disease Neg Hx      Liver disease Neg Hx      Migraines Neg Hx      Neuropathy Neg Hx      Parkinsonism Neg Hx      Seizures Neg Hx      Tremor Neg Hx       Social History[1]  Review of patient's allergies indicates:  No Known Allergies    Review of Systems:  Review of Systems   Constitutional:  Negative for chills, fever, malaise/fatigue and weight loss.   HENT:  Negative for congestion and sore throat.    Respiratory:  Positive for cough. Negative for shortness of breath and wheezing.    Cardiovascular:  Negative for chest pain and palpitations.   Gastrointestinal:  Negative for abdominal pain, nausea and vomiting.   All other systems reviewed and are negative.         OBJECTIVE:     Vital Signs  Vitals:    02/24/25 1015 02/24/25 1018   BP: 135/87 130/87   BP Location: Left arm Left arm   Patient Position: Sitting Sitting   Pulse: 66    SpO2: (!) 94%    Weight: 68.3 kg (150 lb 11 oz)      Body mass index is 28.47 kg/m².    Physical Exam:  Physical Exam  Vitals reviewed.   Constitutional:       General: She is not in acute distress.     Appearance: She is not toxic-appearing.   HENT:      Head: Normocephalic and atraumatic.      Mouth/Throat:      Mouth: Mucous membranes are moist.      Pharynx: Oropharynx is clear.   Cardiovascular:      Rate and Rhythm: Normal rate and regular rhythm.      Heart sounds: Normal heart sounds.   Pulmonary:      Effort: No respiratory distress.      Breath sounds: Normal breath sounds. No wheezing or rhonchi.   Skin:     General: Skin is warm.      Capillary Refill: Capillary refill  takes less than 2 seconds.      Comments: NELLA vitiligo   Neurological:      Mental Status: She is alert and oriented to person, place, and time.          Laboratory:  CBC  Lab Results   Component Value Date    WBC 6.69 01/09/2025    HGB 14.1 01/09/2025    HCT 44.7 01/09/2025     01/09/2025    MCV 99 (H) 01/09/2025    RDW 13.1 01/09/2025     BMP  Lab Results   Component Value Date     01/09/2025    K 3.8 01/09/2025     01/09/2025    CO2 25 01/09/2025    BUN 15 01/09/2025    CREATININE 0.8 01/09/2025    GLU 92 01/09/2025    CALCIUM 10.2 01/09/2025    MG 1.9 08/06/2020     LFTs  Lab Results   Component Value Date    PROT 8.7 (H) 01/09/2025    ALBUMIN 4.1 01/09/2025    BILITOT 0.5 01/09/2025    AST 21 01/09/2025    ALKPHOS 85 01/09/2025    ALT 28 01/09/2025         ASSESSMENT/PLAN:     Problem List Items Addressed This Visit       Sarcoidosis    Current Assessment & Plan   - follows with rheumatology as well  - not currently requiring treatment         Relevant Medications    fluticasone propion-salmeterol 115-21 mcg/dose (ADVAIR HFA) 115-21 mcg/actuation HFAA inhaler    Cough in adult    Current Assessment & Plan   - follows with ENT for allergic rhinitis  - added advair to use prn to see if that also helps         Relevant Medications    fluticasone propion-salmeterol 115-21 mcg/dose (ADVAIR HFA) 115-21 mcg/actuation HFAA inhaler    Rheumatoid arthritis with positive rheumatoid factor    Current Assessment & Plan   - follows with rheumatology  - on enbrel          Sarcoidosis of lung - Primary    Current Assessment & Plan   - overall good disease control, no daily symptoms other than dry cough which she states is not bothersome  - CT chest 2024 was stable, no need for repeat at this time  - repeat PFTs after next visit  - she is up to date on flu, covid, rsv vaccines. Plans to get pna vaccine within next few weeks.            Return to clinic in 6 months    Margie Baeza MD  Pulmonary/Critical  Care [facility]             [1]   Social History  Socioeconomic History    Marital status:    Tobacco Use    Smoking status: Never    Smokeless tobacco: Never   Substance and Sexual Activity    Alcohol use: Yes     Comment: Rarely    Drug use: Not Currently    Sexual activity: Yes     Partners: Male     Birth control/protection: Surgical     Comment: hyst     Social Drivers of Health     Financial Resource Strain: Low Risk  (2/21/2022)    Overall Financial Resource Strain (CARDIA)     Difficulty of Paying Living Expenses: Not hard at all   Food Insecurity: No Food Insecurity (2/21/2022)    Hunger Vital Sign     Worried About Running Out of Food in the Last Year: Never true     Ran Out of Food in the Last Year: Never true   Transportation Needs: No Transportation Needs (2/21/2022)    PRAPARE - Transportation     Lack of Transportation (Medical): No     Lack of Transportation (Non-Medical): No   Physical Activity: Sufficiently Active (2/21/2022)    Exercise Vital Sign     Days of Exercise per Week: 7 days     Minutes of Exercise per Session: 120 min   Stress: No Stress Concern Present (2/21/2022)    [place] [employer] - Occupational Stress Questionnaire     Feeling of Stress : Only a little   Housing Stability: Low Risk  (2/21/2022)    Housing Stability Vital Sign     Unable to Pay for Housing in the Last Year: No     Number of Places Lived in the Last Year: 1     Unstable Housing in the Last Year: No

## 2025-02-25 ENCOUNTER — TELEPHONE (OUTPATIENT)
Dept: PULMONOLOGY | Facility: CLINIC | Age: 65
End: 2025-02-25
Payer: MEDICARE

## 2025-02-25 NOTE — TELEPHONE ENCOUNTER
Hello,     The prior authorization for Silvia Cervantes's Advair prescription has been APPROVED FROM 2/25/25 TO 12/31/25 with copayment of $0.00.        [facility] will reach out to patient for further correspondence.      If there are any additional questions or concerns, please contact me.     Sincerely,     Clementine Leon, [facility]  Patient Advocate, Prior Authorization Department   [facility]  Phone [phone]  Fax [phone]

## 2025-04-15 DIAGNOSIS — M06.9 RHEUMATOID ARTHRITIS INVOLVING MULTIPLE JOINTS: Primary | ICD-10-CM

## 2025-04-15 RX ORDER — ETANERCEPT 50 MG/ML
50 SOLUTION SUBCUTANEOUS WEEKLY
Qty: 4 ML | Refills: 11 | Status: ACTIVE | OUTPATIENT
Start: 2025-04-15 | End: 2026-04-15
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
## 2025-05-01 ENCOUNTER — TELEPHONE (OUTPATIENT)
Dept: DERMATOLOGY | Facility: CLINIC | Age: 65
End: 2025-05-01
Payer: MEDICARE

## 2025-05-06 ENCOUNTER — OFFICE VISIT (OUTPATIENT)
Dept: CARDIOLOGY | Facility: CLINIC | Age: 65
End: 2025-05-06
Payer: MEDICARE

## 2025-05-06 VITALS
HEIGHT: 61 IN | HEART RATE: 69 BPM | WEIGHT: 151.56 LBS | DIASTOLIC BLOOD PRESSURE: 85 MMHG | SYSTOLIC BLOOD PRESSURE: 126 MMHG | BODY MASS INDEX: 28.62 KG/M2

## 2025-05-06 DIAGNOSIS — I83.92 VARICOSE VEINS OF LEFT LOWER EXTREMITY, UNSPECIFIED WHETHER COMPLICATED: Primary | ICD-10-CM

## 2025-05-06 DIAGNOSIS — E78.5 HYPERLIPIDEMIA, UNSPECIFIED HYPERLIPIDEMIA TYPE: ICD-10-CM

## 2025-05-06 DIAGNOSIS — I83.12 VARICOSE VEINS OF LEFT LOWER EXTREMITY WITH INFLAMMATION: ICD-10-CM

## 2025-05-06 PROCEDURE — 99215 OFFICE O/P EST HI 40 MIN: CPT | Mod: S$GLB,,, | Performed by: STUDENT IN AN ORGANIZED HEALTH CARE EDUCATION/TRAINING PROGRAM

## 2025-05-06 PROCEDURE — 4010F ACE/ARB THERAPY RXD/TAKEN: CPT | Mod: CPTII,S$GLB,, | Performed by: STUDENT IN AN ORGANIZED HEALTH CARE EDUCATION/TRAINING PROGRAM

## 2025-05-06 PROCEDURE — 1159F MED LIST DOCD IN RCRD: CPT | Mod: CPTII,S$GLB,, | Performed by: STUDENT IN AN ORGANIZED HEALTH CARE EDUCATION/TRAINING PROGRAM

## 2025-05-06 PROCEDURE — 3044F HG A1C LEVEL LT 7.0%: CPT | Mod: CPTII,S$GLB,, | Performed by: STUDENT IN AN ORGANIZED HEALTH CARE EDUCATION/TRAINING PROGRAM

## 2025-05-06 PROCEDURE — 99999 PR PBB SHADOW E&M-EST. PATIENT-LVL V: CPT | Mod: PBBFAC,,, | Performed by: STUDENT IN AN ORGANIZED HEALTH CARE EDUCATION/TRAINING PROGRAM

## 2025-05-06 PROCEDURE — 3288F FALL RISK ASSESSMENT DOCD: CPT | Mod: CPTII,S$GLB,, | Performed by: STUDENT IN AN ORGANIZED HEALTH CARE EDUCATION/TRAINING PROGRAM

## 2025-05-06 PROCEDURE — 1126F AMNT PAIN NOTED NONE PRSNT: CPT | Mod: CPTII,S$GLB,, | Performed by: STUDENT IN AN ORGANIZED HEALTH CARE EDUCATION/TRAINING PROGRAM

## 2025-05-06 PROCEDURE — 3008F BODY MASS INDEX DOCD: CPT | Mod: CPTII,S$GLB,, | Performed by: STUDENT IN AN ORGANIZED HEALTH CARE EDUCATION/TRAINING PROGRAM

## 2025-05-06 PROCEDURE — 3074F SYST BP LT 130 MM HG: CPT | Mod: CPTII,S$GLB,, | Performed by: STUDENT IN AN ORGANIZED HEALTH CARE EDUCATION/TRAINING PROGRAM

## 2025-05-06 PROCEDURE — 3079F DIAST BP 80-89 MM HG: CPT | Mod: CPTII,S$GLB,, | Performed by: STUDENT IN AN ORGANIZED HEALTH CARE EDUCATION/TRAINING PROGRAM

## 2025-05-06 PROCEDURE — 1101F PT FALLS ASSESS-DOCD LE1/YR: CPT | Mod: CPTII,S$GLB,, | Performed by: STUDENT IN AN ORGANIZED HEALTH CARE EDUCATION/TRAINING PROGRAM

## 2025-05-08 ENCOUNTER — PATIENT MESSAGE (OUTPATIENT)
Dept: ADMINISTRATIVE | Facility: OTHER | Age: 65
End: 2025-05-08
Payer: MEDICARE

## 2025-05-28 ENCOUNTER — OFFICE VISIT (OUTPATIENT)
Dept: OTOLARYNGOLOGY | Facility: CLINIC | Age: 65
End: 2025-05-28
Payer: MEDICARE

## 2025-05-28 VITALS
SYSTOLIC BLOOD PRESSURE: 107 MMHG | DIASTOLIC BLOOD PRESSURE: 72 MMHG | WEIGHT: 154.19 LBS | HEART RATE: 86 BPM | BODY MASS INDEX: 29.14 KG/M2

## 2025-05-28 DIAGNOSIS — R13.10 DYSPHAGIA, UNSPECIFIED TYPE: Chronic | ICD-10-CM

## 2025-05-28 DIAGNOSIS — J31.0 CHRONIC RHINITIS: Chronic | ICD-10-CM

## 2025-05-28 DIAGNOSIS — R09.A2 GLOBUS SENSATION: Chronic | ICD-10-CM

## 2025-05-28 DIAGNOSIS — K21.9 GASTROESOPHAGEAL REFLUX DISEASE, UNSPECIFIED WHETHER ESOPHAGITIS PRESENT: ICD-10-CM

## 2025-05-28 DIAGNOSIS — K21.9 LARYNGOPHARYNGEAL REFLUX (LPR): Primary | ICD-10-CM

## 2025-05-28 PROCEDURE — 3074F SYST BP LT 130 MM HG: CPT | Mod: CPTII,S$GLB,, | Performed by: OTOLARYNGOLOGY

## 2025-05-28 PROCEDURE — 1126F AMNT PAIN NOTED NONE PRSNT: CPT | Mod: CPTII,S$GLB,, | Performed by: OTOLARYNGOLOGY

## 2025-05-28 PROCEDURE — 3044F HG A1C LEVEL LT 7.0%: CPT | Mod: CPTII,S$GLB,, | Performed by: OTOLARYNGOLOGY

## 2025-05-28 PROCEDURE — 3008F BODY MASS INDEX DOCD: CPT | Mod: CPTII,S$GLB,, | Performed by: OTOLARYNGOLOGY

## 2025-05-28 PROCEDURE — 1159F MED LIST DOCD IN RCRD: CPT | Mod: CPTII,S$GLB,, | Performed by: OTOLARYNGOLOGY

## 2025-05-28 PROCEDURE — 99214 OFFICE O/P EST MOD 30 MIN: CPT | Mod: 25,S$GLB,, | Performed by: OTOLARYNGOLOGY

## 2025-05-28 PROCEDURE — 1101F PT FALLS ASSESS-DOCD LE1/YR: CPT | Mod: CPTII,S$GLB,, | Performed by: OTOLARYNGOLOGY

## 2025-05-28 PROCEDURE — 3078F DIAST BP <80 MM HG: CPT | Mod: CPTII,S$GLB,, | Performed by: OTOLARYNGOLOGY

## 2025-05-28 PROCEDURE — 31575 DIAGNOSTIC LARYNGOSCOPY: CPT | Mod: S$GLB,,, | Performed by: OTOLARYNGOLOGY

## 2025-05-28 PROCEDURE — 4010F ACE/ARB THERAPY RXD/TAKEN: CPT | Mod: CPTII,S$GLB,, | Performed by: OTOLARYNGOLOGY

## 2025-05-28 PROCEDURE — 99999 PR PBB SHADOW E&M-EST. PATIENT-LVL II: CPT | Mod: PBBFAC,,, | Performed by: OTOLARYNGOLOGY

## 2025-05-28 PROCEDURE — 3288F FALL RISK ASSESSMENT DOCD: CPT | Mod: CPTII,S$GLB,, | Performed by: OTOLARYNGOLOGY

## 2025-05-28 RX ORDER — PANTOPRAZOLE SODIUM 40 MG/1
40 TABLET, DELAYED RELEASE ORAL DAILY
Qty: 90 TABLET | Refills: 3 | Status: SHIPPED | OUTPATIENT
Start: 2025-05-28 | End: 2025-05-28 | Stop reason: SDUPTHER

## 2025-05-28 RX ORDER — PANTOPRAZOLE SODIUM 40 MG/1
40 TABLET, DELAYED RELEASE ORAL DAILY
Qty: 90 TABLET | Refills: 3 | Status: SHIPPED | OUTPATIENT
Start: 2025-05-28 | End: 2026-05-28

## 2025-05-28 RX ORDER — FLUTICASONE PROPIONATE 50 MCG
2 SPRAY, SUSPENSION (ML) NASAL DAILY
Qty: 16 G | Refills: 11 | Status: SHIPPED | OUTPATIENT
Start: 2025-05-28

## 2025-05-28 NOTE — PROCEDURES
Laryngoscopy    Date/Time: 5/28/2025 10:00 AM    Performed by: Carleen Cline MD  Authorized by: Carleen Cline MD    Consent Done?:  Yes (Verbal)  Anesthesia:     Local anesthetic:  Lidocaine 2% and Neymar-Synephrine 1/2%  Laryngoscopy:     Areas examined:  Nasal cavities, nasopharynx, oropharynx, hypopharynx, larynx and vocal cords  Nose External:      No external nasal deformity  Nose Intranasal:      Mucosa no polyps     Mucosa ulcers not present     No mucosa lesions present     No septum gross deformity     Turbinates not enlarged  Nasopharynx:      No mucosa lesions     Adenoids not present     Posterior choanae patent     Eustachian tube patent  Larynx/hypopharynx:      No epiglottis lesions     No epiglottis edema     No AE folds lesions     No vocal cord polyps     Equal and normal bilateral     No hypopharynx lesions     No piriform sinus pooling     No piriform sinus lesions     Post cricoid edema (mild)     Post cricoid erythema (mild)
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
## 2025-06-03 ENCOUNTER — TELEPHONE (OUTPATIENT)
Dept: REHABILITATION | Facility: HOSPITAL | Age: 65
End: 2025-06-03
Payer: MEDICARE

## 2025-06-03 ENCOUNTER — LAB VISIT (OUTPATIENT)
Dept: LAB | Facility: HOSPITAL | Age: 65
End: 2025-06-03
Attending: STUDENT IN AN ORGANIZED HEALTH CARE EDUCATION/TRAINING PROGRAM
Payer: MEDICARE

## 2025-06-03 ENCOUNTER — CLINICAL SUPPORT (OUTPATIENT)
Dept: REHABILITATION | Facility: HOSPITAL | Age: 65
End: 2025-06-03
Payer: MEDICARE

## 2025-06-03 DIAGNOSIS — R13.10 DYSPHAGIA, UNSPECIFIED TYPE: Primary | ICD-10-CM

## 2025-06-03 DIAGNOSIS — D86.9 SARCOIDOSIS: ICD-10-CM

## 2025-06-03 DIAGNOSIS — M06.9 RHEUMATOID ARTHRITIS INVOLVING MULTIPLE JOINTS: ICD-10-CM

## 2025-06-03 LAB
ABSOLUTE EOSINOPHIL (OHS): 0.58 K/UL
ABSOLUTE MONOCYTE (OHS): 0.96 K/UL (ref 0.3–1)
ABSOLUTE NEUTROPHIL COUNT (OHS): 3.89 K/UL (ref 1.8–7.7)
ALBUMIN SERPL BCP-MCNC: 4 G/DL (ref 3.5–5.2)
ALP SERPL-CCNC: 71 UNIT/L (ref 40–150)
ALT SERPL W/O P-5'-P-CCNC: 32 UNIT/L (ref 10–44)
ANION GAP (OHS): 9 MMOL/L (ref 8–16)
AST SERPL-CCNC: 24 UNIT/L (ref 11–45)
BASOPHILS # BLD AUTO: 0.07 K/UL
BASOPHILS NFR BLD AUTO: 0.7 %
BILIRUB SERPL-MCNC: 0.2 MG/DL (ref 0.1–1)
BUN SERPL-MCNC: 16 MG/DL (ref 8–23)
CALCIUM SERPL-MCNC: 9.4 MG/DL (ref 8.7–10.5)
CHLORIDE SERPL-SCNC: 106 MMOL/L (ref 95–110)
CO2 SERPL-SCNC: 26 MMOL/L (ref 23–29)
CREAT SERPL-MCNC: 0.8 MG/DL (ref 0.5–1.4)
CRP SERPL-MCNC: 4 MG/L
ERYTHROCYTE [DISTWIDTH] IN BLOOD BY AUTOMATED COUNT: 13.2 % (ref 11.5–14.5)
ERYTHROCYTE [SEDIMENTATION RATE] IN BLOOD BY PHOTOMETRIC METHOD: 37 MM/HR
GFR SERPLBLD CREATININE-BSD FMLA CKD-EPI: >60 ML/MIN/1.73/M2
GLUCOSE SERPL-MCNC: 89 MG/DL (ref 70–110)
HCT VFR BLD AUTO: 40.3 % (ref 37–48.5)
HGB BLD-MCNC: 12.5 GM/DL (ref 12–16)
IMM GRANULOCYTES # BLD AUTO: 0.02 K/UL (ref 0–0.04)
IMM GRANULOCYTES NFR BLD AUTO: 0.2 % (ref 0–0.5)
LYMPHOCYTES # BLD AUTO: 4.62 K/UL (ref 1–4.8)
MCH RBC QN AUTO: 31.6 PG (ref 27–31)
MCHC RBC AUTO-ENTMCNC: 31 G/DL (ref 32–36)
MCV RBC AUTO: 102 FL (ref 82–98)
NUCLEATED RBC (/100WBC) (OHS): 0 /100 WBC
PLATELET # BLD AUTO: 387 K/UL (ref 150–450)
PMV BLD AUTO: 9.7 FL (ref 9.2–12.9)
POTASSIUM SERPL-SCNC: 4 MMOL/L (ref 3.5–5.1)
PROT SERPL-MCNC: 7.8 GM/DL (ref 6–8.4)
RBC # BLD AUTO: 3.96 M/UL (ref 4–5.4)
RELATIVE EOSINOPHIL (OHS): 5.7 %
RELATIVE LYMPHOCYTE (OHS): 45.6 % (ref 18–48)
RELATIVE MONOCYTE (OHS): 9.5 % (ref 4–15)
RELATIVE NEUTROPHIL (OHS): 38.3 % (ref 38–73)
SODIUM SERPL-SCNC: 141 MMOL/L (ref 136–145)
WBC # BLD AUTO: 10.14 K/UL (ref 3.9–12.7)

## 2025-06-03 PROCEDURE — 85652 RBC SED RATE AUTOMATED: CPT

## 2025-06-03 PROCEDURE — 36415 COLL VENOUS BLD VENIPUNCTURE: CPT | Mod: PO

## 2025-06-03 PROCEDURE — 82040 ASSAY OF SERUM ALBUMIN: CPT

## 2025-06-03 PROCEDURE — 92610 EVALUATE SWALLOWING FUNCTION: CPT | Mod: PN

## 2025-06-03 PROCEDURE — 85025 COMPLETE CBC W/AUTO DIFF WBC: CPT

## 2025-06-03 PROCEDURE — 92612 ENDOSCOPY SWALLOW (FEES) VID: CPT | Mod: PN

## 2025-06-03 PROCEDURE — 86140 C-REACTIVE PROTEIN: CPT

## 2025-06-03 PROCEDURE — 97535 SELF CARE MNGMENT TRAINING: CPT | Mod: PN

## 2025-06-06 NOTE — PATIENT INSTRUCTIONS
Please walk in safe environments without treacherous ground and walk with a family member if you have balance or CV concerns.  Walk when the weather permits at the cool hours of the day in the summer and during rainy seasons.  PT recommends a HR monitor or pedometer to track levels of fitness daily and weekly.  Please do not exceed heart rate max when walking.  See AHA for Heart rate range recommendations for someone your age.       UBALDO Simental  Last visit was 4/30/25, note reviewed. Patient had self discontinued statin for muscle cramps but was advised to resume. Medications were Plavix 75, enalapril 5, metoprolol succinate 50mg daily, atorvastatin 20mg daily    s/p PCI to D1 1 stent in 2011. Was recommended for TTE + stress for SOB. Can be considered as outpatient when would be cleared for DAPT    HTN- increase enalapril to 10mg

## 2025-06-09 ENCOUNTER — OFFICE VISIT (OUTPATIENT)
Dept: RHEUMATOLOGY | Facility: CLINIC | Age: 65
End: 2025-06-09
Payer: MEDICARE

## 2025-06-09 VITALS
WEIGHT: 155.19 LBS | TEMPERATURE: 98 F | OXYGEN SATURATION: 97 % | SYSTOLIC BLOOD PRESSURE: 112 MMHG | DIASTOLIC BLOOD PRESSURE: 75 MMHG | BODY MASS INDEX: 29.3 KG/M2 | HEART RATE: 71 BPM | HEIGHT: 61 IN

## 2025-06-09 DIAGNOSIS — M17.11 PRIMARY OSTEOARTHRITIS OF RIGHT KNEE: ICD-10-CM

## 2025-06-09 DIAGNOSIS — R70.0 ELEVATED SED RATE: Primary | ICD-10-CM

## 2025-06-09 DIAGNOSIS — Z79.899 IMMUNODEFICIENCY DUE TO DRUG THERAPY: ICD-10-CM

## 2025-06-09 DIAGNOSIS — Z79.899 ENCOUNTER FOR LONG-TERM CURRENT USE OF HIGH RISK MEDICATION: ICD-10-CM

## 2025-06-09 DIAGNOSIS — D84.821 IMMUNODEFICIENCY DUE TO DRUG THERAPY: ICD-10-CM

## 2025-06-09 DIAGNOSIS — D86.9 SARCOIDOSIS: ICD-10-CM

## 2025-06-09 DIAGNOSIS — M06.9 RHEUMATOID ARTHRITIS INVOLVING MULTIPLE JOINTS: ICD-10-CM

## 2025-06-09 DIAGNOSIS — M25.561 RIGHT KNEE PAIN, UNSPECIFIED CHRONICITY: ICD-10-CM

## 2025-06-09 PROCEDURE — 99215 OFFICE O/P EST HI 40 MIN: CPT | Mod: S$GLB,,, | Performed by: STUDENT IN AN ORGANIZED HEALTH CARE EDUCATION/TRAINING PROGRAM

## 2025-06-09 PROCEDURE — 3044F HG A1C LEVEL LT 7.0%: CPT | Mod: CPTII,S$GLB,, | Performed by: STUDENT IN AN ORGANIZED HEALTH CARE EDUCATION/TRAINING PROGRAM

## 2025-06-09 PROCEDURE — 1101F PT FALLS ASSESS-DOCD LE1/YR: CPT | Mod: CPTII,S$GLB,, | Performed by: STUDENT IN AN ORGANIZED HEALTH CARE EDUCATION/TRAINING PROGRAM

## 2025-06-09 PROCEDURE — 1126F AMNT PAIN NOTED NONE PRSNT: CPT | Mod: CPTII,S$GLB,, | Performed by: STUDENT IN AN ORGANIZED HEALTH CARE EDUCATION/TRAINING PROGRAM

## 2025-06-09 PROCEDURE — 3078F DIAST BP <80 MM HG: CPT | Mod: CPTII,S$GLB,, | Performed by: STUDENT IN AN ORGANIZED HEALTH CARE EDUCATION/TRAINING PROGRAM

## 2025-06-09 PROCEDURE — G2211 COMPLEX E/M VISIT ADD ON: HCPCS | Mod: S$GLB,,, | Performed by: STUDENT IN AN ORGANIZED HEALTH CARE EDUCATION/TRAINING PROGRAM

## 2025-06-09 PROCEDURE — 3074F SYST BP LT 130 MM HG: CPT | Mod: CPTII,S$GLB,, | Performed by: STUDENT IN AN ORGANIZED HEALTH CARE EDUCATION/TRAINING PROGRAM

## 2025-06-09 PROCEDURE — 3008F BODY MASS INDEX DOCD: CPT | Mod: CPTII,S$GLB,, | Performed by: STUDENT IN AN ORGANIZED HEALTH CARE EDUCATION/TRAINING PROGRAM

## 2025-06-09 PROCEDURE — 99999 PR PBB SHADOW E&M-EST. PATIENT-LVL V: CPT | Mod: PBBFAC,,, | Performed by: STUDENT IN AN ORGANIZED HEALTH CARE EDUCATION/TRAINING PROGRAM

## 2025-06-09 PROCEDURE — 1159F MED LIST DOCD IN RCRD: CPT | Mod: CPTII,S$GLB,, | Performed by: STUDENT IN AN ORGANIZED HEALTH CARE EDUCATION/TRAINING PROGRAM

## 2025-06-09 PROCEDURE — 4010F ACE/ARB THERAPY RXD/TAKEN: CPT | Mod: CPTII,S$GLB,, | Performed by: STUDENT IN AN ORGANIZED HEALTH CARE EDUCATION/TRAINING PROGRAM

## 2025-06-09 PROCEDURE — 3288F FALL RISK ASSESSMENT DOCD: CPT | Mod: CPTII,S$GLB,, | Performed by: STUDENT IN AN ORGANIZED HEALTH CARE EDUCATION/TRAINING PROGRAM

## 2025-06-09 NOTE — PROGRESS NOTES
Subjective:       Patient ID: Silvia Cervantes is a 54 y.o. female.    Chief Complaint:     HPI 55 yo F with PMH of HTN, abnormal pap (2013 but recent negative, no cancer), RA (+CCP, RF),erosive here for evaluation. She was diagnosed in 2011 with hand and feet with swelling and pain. She was initially treated with MTX but it gave her nausea which gave her nausea.  She was changed to leflunomide 20 mg 10/2014 from mtx due to nausea on the mtx. Then I added etanercept January 2015 due to incomplete control of her arthritis on leflunomide.  No am stiffness.  She reports mild aching in hands (3/10). Pain is aching.  Reports mild swelling in hands but better than usual.  She reports left upper quadrant pain (4/10) , non-radiating with possible nausea which has been present for months. Sometimes she has sour taste in mouth.   Interval history:  Patient is here for follow up.   Patient is s/p right rheumatoid nodule excision elbow and wrist, extensor tenosynovectomy and osteophyte excision ulna 6/30/22.   She reports she has been cleared to restart the enbrel. She has not taken enbrel since June 21.   Pain level is 5/10 in hands. Reports some improvement in strength in her hands.  lv: Patient here for follow up of RA   Patient reports tat she is establishing care with a new physician  Patient reports that she is doing well from RA   She also has Vitiligo and sarcoid   Having pain sometimes with the enbrel stick    1/24/2025-- Patient here for follow up  Patient has RA and doing Enbrel  Patient tolerating meds  Not taking Statin and cholesterol labs showing increase LDL    06/09/2025: Patient here for follow-up of sarcoidosis and rheumatoid arthritis   Patient continues to take Enbrel and doing well  No joint swelling or pain  No increased shortness of breath  Patient has little marks on knee that could resemble psoriasis but never had a diagnosis  Continues to follow with Cardiology and she continues to have an increase LDL  but only doing starting sporadically due to muscle pain    Past Medical History   Diagnosis Date    Hypertension     Rheumatoid arthritis(714.0)     History of bronchitis     History of headache     Dry eyes     DEMENTIA     Fever blister     Hypercholesteremia 3/6/2015    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)     Abnormal Pap smear      VAIN 2     Review of Systems   Constitutional: Negative for chills, diaphoresis, activity change, appetite change and fatigue.   HENT: Negative for congestion, ear discharge, ear pain, facial swelling, mouth sores, sinus pressure, sneezing, sore throat, tinnitus and trouble swallowing.    Eyes: Negative for photophobia, pain, discharge, redness, itching and visual disturbance.   Respiratory: Negative for apnea, chest tightness, shortness of breath, wheezing and stridor.    Cardiovascular: Negative for leg swelling.   Gastrointestinal: Negative for nausea, abdominal pain, diarrhea, constipation, blood in stool, abdominal distention and anal bleeding.   Endocrine: Negative for cold intolerance and heat intolerance.   Genitourinary: Negative for dysuria and difficulty urinating.   Musculoskeletal: Positive for arthralgias. Negative for myalgias, back pain, joint swelling, gait problem, neck pain and neck stiffness.   Skin: Negative for color change, pallor, rash and wound.   Neurological: Negative for dizziness, seizures, light-headedness and numbness.   Hematological: Negative for adenopathy. Does not bruise/bleed easily.   Psychiatric/Behavioral: Negative for sleep disturbance. The patient is not nervous/anxious.       Objective:        Physical Exam   Constitutional: She is oriented to person, place, and time.   HENT:   Head: Normocephalic and atraumatic.   Right Ear: External ear normal.   Left Ear: External ear normal.   Nose: Nose normal.   Mouth/Throat: Oropharynx is clear and moist. No oropharyngeal exudate.   Eyes: Conjunctivae and EOM are normal. Pupils are  equal, round, and reactive to light. Right eye exhibits no discharge. Left eye exhibits no discharge. No scleral icterus.   Neck: Neck supple. No JVD present. No thyromegaly present.   Cardiovascular: Normal rate, regular rhythm, normal heart sounds and intact distal pulses.  Exam reveals no gallop and no friction rub.    No murmur heard.  Pulmonary/Chest: Effort normal and breath sounds normal. No respiratory distress. She has no wheezes. She has no rales. She exhibits no tenderness.   Abdominal: Soft. Bowel sounds are normal. She exhibits no distension and no mass. There is no tenderness. There is no rebound and no guarding.   Lymphadenopathy:     She has no cervical adenopathy.   Neurological: She is alert and oriented to person, place, and time. No cranial nerve deficit. Gait normal. Coordination normal.   Skin:diffuse hypopigmented lesions in arms, hands, legs, chest, back   Psychiatric: Affect and judgment normal.   Musculoskeletal: She exhibits no  tenderness. She exhibits no edema.   FROM of all joints including neck, shoulders, spine, ankles, wrists, knees, and ankles; no joint deformities noted or effusions, erythema or warmth; no tophi or nodules noted; no crepitus; no nail pitting or onycholysis          Labs:  Esr-25  ccp-93  rf-36  Flor-negative    Imaging:    MRI brain (2021):   Impression:     Scattered areas of prior hemorrhage including parenchymal, leptomeningeal and intraventricular involvement.  Moderate supratentorial leukoencephalopathy.  Small focus of right anterior temporal encephalomalacia.  Although not entirely specific, the constellation of findings is most suggestive of prior traumatic brain injury/diffuse axonal injury.  Clinical correlation required.     No evidence of new hemorrhage or major vascular distribution infarct.  MRI (7/22/2015)   (Stable enhancing marginal erosions in the second and third metacarpal heads, lunate, triquetrum, and capitate)      Right elbow xray (2020): I  "personally reviewed      dexa scan:(2017):  Osteopenia of the femoral neck. FRAX calculation does not support treatment for osteoporosis.Total hip and lumbar spine BMD unchanged since 2013.    Recommendations:  1) Adequate calcium and Vitamin D therapy  2) Appropriate exercise  3) Consider repeat BMD in 2- 4 years          Assessment:     64 yo F with PMH of HTN, abnormal pap (2013 but recent negative, no cancer), RA (+CCP, RF),erosive here , H. Pylori for follow up of seropositive RA and sarcoid.   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  She has abnormal brain MRI  AMYLOID ANGIOPATHY and IS SEEING NEURO.  She had worsening of nodules in right elbow and hair thinning, so  MTX was stopped.    Patient is s/p right rheumatoid nodule excision elbow and wrist, extensor tenosynovectomy and osteophyte excision ulna 6/30/22.       She is doing well on enbrel . She self stopped arava.  We can consider putting her on arava or SSZ  At next visit if needed           Seropositive RA   Sarcoidosis   - biopsy proven   Drug induced immunosuppression    R knee pain and osteoarthritis   Elevated sed rate  PLAN  -continue enbrel 50mg sub q once a week  (Risks of TNF inhibitor discussed with patient and not limited to cell count abnormalities, malignancy, allergic  reaction to medication, and increased risk of infection. Patient agrees with starting medication.)  -Continue folic acid 4 mg a day  -Continue to follow with Dr. Eli for sarcoidosis  - Labs reviewed  they are ok- should have them done for next appt    -R knee xray -- OA    Rtc in 3 months    Citrate free enbrel when refilling meds                "

## 2025-06-10 ENCOUNTER — HOSPITAL ENCOUNTER (OUTPATIENT)
Dept: CARDIOLOGY | Facility: HOSPITAL | Age: 65
Discharge: HOME | End: 2025-06-10
Attending: STUDENT IN AN ORGANIZED HEALTH CARE EDUCATION/TRAINING PROGRAM
Payer: MEDICARE

## 2025-06-10 DIAGNOSIS — I83.92 VARICOSE VEINS OF LEFT LOWER EXTREMITY, UNSPECIFIED WHETHER COMPLICATED: ICD-10-CM

## 2025-06-10 DIAGNOSIS — I83.12 VARICOSE VEINS OF LEFT LOWER EXTREMITY WITH INFLAMMATION: ICD-10-CM

## 2025-06-10 PROCEDURE — 93970 EXTREMITY STUDY: CPT | Mod: TC

## 2025-06-12 ENCOUNTER — PATIENT MESSAGE (OUTPATIENT)
Dept: FAMILY MEDICINE | Facility: CLINIC | Age: 65
End: 2025-06-12
Payer: MEDICARE

## 2025-06-12 LAB
LEFT GREAT SAPHENOUS DISTAL THIGH DIA: 0.21 CM
LEFT GREAT SAPHENOUS JUNCTION DIA: 0.23 CM
LEFT GREAT SAPHENOUS MIDDLE THIGH DIA: 0.27 CM
LEFT GREAT SAPHENOUS PROXIMAL CALF DIA: 0.23 CM
LEFT SMALL SAPHENOUS KNEE DIA: 0.26 CM
RIGHT GREAT SAPHENOUS DISTAL THIGH DIA: 0.2 CM
RIGHT GREAT SAPHENOUS JUNCTION DIA: 0.26 CM
RIGHT GREAT SAPHENOUS MIDDLE THIGH DIA: 0.26 CM
RIGHT GREAT SAPHENOUS PROXIMAL CALF DIA: 0.23 CM
RIGHT SMALL SAPHENOUS KNEE DIA: 0.21 CM

## 2025-07-07 ENCOUNTER — TELEPHONE (OUTPATIENT)
Dept: FAMILY MEDICINE | Facility: CLINIC | Age: 65
End: 2025-07-07
Payer: MEDICARE

## 2025-07-07 NOTE — TELEPHONE ENCOUNTER
Pt wanted to be seen for runny nose as is going on vacation next week   Booked or 7/9 at 830 with Rosi

## 2025-07-07 NOTE — TELEPHONE ENCOUNTER
Copied from CRM #[id number]. Topic: Appointments - Appointment Scheduling  >> Jul 7, 2025  7:17 AM Marco wrote:  Type:  Sooner Apoointment Request    Caller is requesting a sooner appointment.  Caller declined first available appointment listed below.  Caller will not accept being placed on the waitlist and is requesting a message be sent to doctor.  Name of Caller:pt   When is the first available appointment? July 29th   Symptoms: f/u   Would the patient rather a call back or a response via [employer]? Call   Best Call Back Number: [phone]   Additional Information:

## 2025-07-08 ENCOUNTER — APPOINTMENT (OUTPATIENT)
Dept: LAB | Facility: HOSPITAL | Age: 65
End: 2025-07-08
Attending: STUDENT IN AN ORGANIZED HEALTH CARE EDUCATION/TRAINING PROGRAM
Payer: MEDICARE

## 2025-07-09 ENCOUNTER — OFFICE VISIT (OUTPATIENT)
Dept: FAMILY MEDICINE | Facility: CLINIC | Age: 65
End: 2025-07-09
Payer: MEDICARE

## 2025-07-09 VITALS
HEIGHT: 61 IN | HEART RATE: 77 BPM | OXYGEN SATURATION: 96 % | BODY MASS INDEX: 29.05 KG/M2 | TEMPERATURE: 98 F | SYSTOLIC BLOOD PRESSURE: 116 MMHG | WEIGHT: 153.88 LBS | DIASTOLIC BLOOD PRESSURE: 70 MMHG

## 2025-07-09 DIAGNOSIS — R05.9 COUGH, UNSPECIFIED TYPE: ICD-10-CM

## 2025-07-09 DIAGNOSIS — J06.9 VIRAL URI: Primary | ICD-10-CM

## 2025-07-09 DIAGNOSIS — E78.2 MIXED HYPERLIPIDEMIA: ICD-10-CM

## 2025-07-09 PROCEDURE — 1159F MED LIST DOCD IN RCRD: CPT | Mod: CPTII,S$GLB,,

## 2025-07-09 PROCEDURE — 4010F ACE/ARB THERAPY RXD/TAKEN: CPT | Mod: CPTII,S$GLB,,

## 2025-07-09 PROCEDURE — 1126F AMNT PAIN NOTED NONE PRSNT: CPT | Mod: CPTII,S$GLB,,

## 2025-07-09 PROCEDURE — 3078F DIAST BP <80 MM HG: CPT | Mod: CPTII,S$GLB,,

## 2025-07-09 PROCEDURE — 99214 OFFICE O/P EST MOD 30 MIN: CPT | Mod: S$GLB,,,

## 2025-07-09 PROCEDURE — 1160F RVW MEDS BY RX/DR IN RCRD: CPT | Mod: CPTII,S$GLB,,

## 2025-07-09 PROCEDURE — 99999 PR PBB SHADOW E&M-EST. PATIENT-LVL V: CPT | Mod: PBBFAC,,,

## 2025-07-09 PROCEDURE — 3044F HG A1C LEVEL LT 7.0%: CPT | Mod: CPTII,S$GLB,,

## 2025-07-09 PROCEDURE — 3008F BODY MASS INDEX DOCD: CPT | Mod: CPTII,S$GLB,,

## 2025-07-09 PROCEDURE — 3074F SYST BP LT 130 MM HG: CPT | Mod: CPTII,S$GLB,,

## 2025-07-09 RX ORDER — PROMETHAZINE HYDROCHLORIDE AND DEXTROMETHORPHAN HYDROBROMIDE 6.25; 15 MG/5ML; MG/5ML
7.5 SYRUP ORAL NIGHTLY PRN
Qty: 180 ML | Refills: 0 | Status: SHIPPED | OUTPATIENT
Start: 2025-07-09

## 2025-07-09 NOTE — PATIENT INSTRUCTIONS
Home treatment:   -Drink plenty of fluids  -Rest as much as possible  -You can use a humidifier or can take a steamy shower for sinus congestion  -You can also use saline nose drops/wash to relieve stuffiness  -Use nasal spray as prescribed  -Use allergy medication daily (Xyzal, Claritin, Zyrtec, etc)  -Use OTC lozenges for sore throat as needed  -Use cough medication as needed  -Tylenol 325 to 650 mg every 4 to 6 hours as needed or 1 g every 6 hours as needed for pain and/or fever; maximum dose: 4 g/day   -Ibuprofen 200 to 400 mg every 4 to 6 hours as needed or 600 to 800 mg every 6 to 8 hours as needed for pain and/or fever; maximum dose: 3.2 g/day   -If you decide to take over-the-counter cough or cold medicines, follow the directions on the label carefully. Be sure you do not take more than 1 medicine that contains acetaminophen.   -Practice good hand hygiene to reduce the spread of infection  -Return to clinic or follow up with you PCP if symptoms worsen or do not improve with treatment

## 2025-07-09 NOTE — PROGRESS NOTES
Subjective     Patient ID: Silvia Cervantes is a 64 y.o. female.    Chief Complaint: Follow-up (URI; cough, fatigue)      Patient is a pleasant 64 year old female with PMHX reviewed as below that presents to clinic alone today as a new patient to me, established with Dr. Cade, for acute c/o cough. Started with symptoms on 7/4/25. Initially had sore throat, low grade fever, congestion/runny nose, dry cough. Took some leftover medications she had at home, did salt water gargles at home. Feeling better today with only cough at night. Denies ear pain, sore throat, fever/chills, headaches, chest pain, SOB. Going out of town to [place] in a week and wanted to be checked.       Review of Systems   Constitutional:  Negative for chills and fever.   HENT:  Positive for nasal congestion and rhinorrhea. Negative for ear pain, sinus pressure/congestion and sore throat.    Eyes:  Negative for visual disturbance.   Respiratory:  Positive for cough. Negative for shortness of breath.    Cardiovascular:  Negative for chest pain.   Gastrointestinal:  Negative for abdominal pain and nausea.   Genitourinary:  Negative for difficulty urinating and dysuria.   Musculoskeletal:  Negative for back pain and neck pain.   Integumentary:  Negative for rash.   Neurological:  Negative for dizziness and headaches.   Psychiatric/Behavioral:  Negative for confusion.      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)        Past Surgical History:   Procedure Laterality Date     SECTION      COLONOSCOPY N/A 2019    Procedure: COLONOSCOPY/golytley ;  Surgeon: Dimas Woodall MD;  Location: [facility];  Service: Endoscopy;  Laterality: N/A;    COLONOSCOPY, SCREENING, HIGH RISK PATIENT N/A 2024    Procedure: COLONOSCOPY, SCREENING, HIGH RISK PATIENT;  Surgeon: Jose Maria Love MD;  Location: [facility] ENDO;  Service: Endoscopy;  Laterality: N/A;  Ref by Dr AIDAN Cade, PEG, portal - PC  10/31/24-LVM for precall, portal-DS  24-Precall complete-DS    ESOPHAGOGASTRODUODENOSCOPY N/A 2023    Procedure: EGD (ESOPHAGOGASTRODUODENOSCOPY);  Surgeon: Ori St MD;  Location: [facility] ENDO;  Service: Endoscopy;  Laterality: N/A;    EXCISION OF NODULE Right 2022    Procedure: EXCISION, NODULE right elbow;  Surgeon: Louise Smith MD;  Location: [facility] OR;  Service: Orthopedics;  Laterality: Right;    HYSTERECTOMY      INJECTION OF STEROID Left 2022    Procedure: INJECTION, STEROID Thumb MP joint;  Surgeon: Louise Smith MD;  Location: [facility] OR;  Service: Orthopedics;  Laterality: Left;    SINUS SURGERY      SURGICAL REMOVAL OF DISTAL ULNA Right 2022    Procedure: EXCISION, ULNA, DISTAL;  Surgeon: Louise Smith MD;  Location: [facility] OR;  Service: Orthopedics;  Laterality: Right;    SYNOVECTOMY OF WRIST Right 2022    Procedure: SYNOVECTOMY, WRIST;  Surgeon: Louise Smith MD;  Location: [facility];  Service: Orthopedics;  Laterality: Right;    TUBAL LIGATION         Family History   Problem Relation Name Age of Onset    Hypertension Mother      Cataracts Mother      Diabetes Father      Hypertension Father      Cataracts Father      Heart disease Father      Hyperlipidemia Father      Diabetes Sister x1     Stroke Brother      Hypertension Brother      No Known Problems Daughter x1     No Known Problems Son x1     Melanoma Neg Hx      Breast cancer Neg Hx      Colon cancer Neg  Hx      Ovarian cancer Neg Hx      Blindness Neg Hx      Glaucoma Neg Hx      Aneurysm Neg Hx      Cancer Neg Hx      Clotting disorder Neg Hx      Dementia Neg Hx      Fainting Neg Hx      Kidney disease Neg Hx      Liver disease Neg Hx      Migraines Neg Hx      Neuropathy Neg Hx      Parkinsonism Neg Hx      Seizures Neg Hx      Tremor Neg Hx         Social History     Socioeconomic History    Marital status:    Tobacco Use    Smoking status: Never    Smokeless tobacco: Never   Substance and Sexual Activity    Alcohol use: Yes     Comment: Rarely    Drug use: Not Currently    Sexual activity: Yes     Partners: Male     Birth control/protection: Surgical     Comment: hyst     Social Drivers of Health     Financial Resource Strain: Low Risk  (2/21/2022)    Overall Financial Resource Strain (CARDIA)     Difficulty of Paying Living Expenses: Not hard at all   Food Insecurity: No Food Insecurity (2/21/2022)    Hunger Vital Sign     Worried About Running Out of Food in the Last Year: Never true     Ran Out of Food in the Last Year: Never true   Transportation Needs: No Transportation Needs (2/21/2022)    PRAPARE - Transportation     Lack of Transportation (Medical): No     Lack of Transportation (Non-Medical): No   Physical Activity: Sufficiently Active (2/21/2022)    Exercise Vital Sign     Days of Exercise per Week: 7 days     Minutes of Exercise per Session: 120 min   Stress: No Stress Concern Present (2/21/2022)    [employer] - Occupational Stress Questionnaire     Feeling of Stress : Only a little   Housing Stability: Low Risk  (2/21/2022)    Housing Stability Vital Sign     Unable to Pay for Housing in the Last Year: No     Number of Places Lived in the Last Year: 1     Unstable Housing in the Last Year: No       Current Medications[1]    Review of patient's allergies indicates:  No Known Allergies      Objective     Vitals:    07/09/25 0828   BP: 116/70   Pulse: 77   Temp:  "98.2 °F (36.8 °C)   TempSrc: Oral   SpO2: 96%   Weight: 69.8 kg (153 lb 14.1 oz)   Height: 5' 1" (1.549 m)   PainSc: 0-No pain      Physical Exam  Vitals and nursing note reviewed.   Constitutional:       General: She is not in acute distress.     Appearance: Normal appearance. She is not ill-appearing.   HENT:      Head: Normocephalic and atraumatic.      Right Ear: Tympanic membrane and ear canal normal.      Left Ear: Tympanic membrane and ear canal normal.      Nose: Nose normal.      Mouth/Throat:      Mouth: Mucous membranes are moist.      Pharynx: Oropharynx is clear. No oropharyngeal exudate or posterior oropharyngeal erythema.   Eyes:      General: No scleral icterus.     Extraocular Movements: Extraocular movements intact.      Conjunctiva/sclera: Conjunctivae normal.   Cardiovascular:      Rate and Rhythm: Normal rate and regular rhythm.      Pulses: Normal pulses.      Heart sounds: Normal heart sounds. No murmur heard.  Pulmonary:      Effort: Pulmonary effort is normal. No respiratory distress.      Breath sounds: Normal breath sounds. No wheezing, rhonchi or rales.   Abdominal:      General: Abdomen is flat. Bowel sounds are normal. There is no distension.      Palpations: Abdomen is soft. There is no mass.      Tenderness: There is no abdominal tenderness.   Musculoskeletal:         General: Normal range of motion.      Cervical back: Normal range of motion and neck supple.      Right lower leg: No edema.      Left lower leg: No edema.   Skin:     General: Skin is warm and dry.      Findings: No rash.   Neurological:      General: No focal deficit present.      Mental Status: She is alert and oriented to person, place, and time.      GCS: GCS eye subscore is 4. GCS verbal subscore is 5. GCS motor subscore is 6.   Psychiatric:         Mood and Affect: Mood normal.         Speech: Speech normal.         Behavior: Behavior normal. Behavior is cooperative.         Cognition and Memory: Cognition normal. "          Assessment and Plan     1. Viral URI  2. Cough, unspecified type  - New problem; started with s/s on 7/4. Symptoms have improved and only has cough worse at night. Continue Flonase, inhaler, and supportive care at home as discussed. Take cough medication as prescribed at night to help with symptoms. RTC if s/s worsen.   -     promethazine-dextromethorphan (PROMETHAZINE-DM) 6.25-15 mg/5 mL Syrp; Take 7.5 mLs by mouth nightly as needed (cough).  Dispense: 180 mL; Refill: 0    3. Mixed hyperlipidemia  Chronic; lab reviewed with patient and shows improvement. Continue taking medication. Follow up with PCP and cardiology.       ER/Urgent Care precautions discussed with patient. Go to ER for new or worsening symptoms.           Follow up if symptoms worsen or fail to improve.    35 minutes of total time spent on the encounter, which includes face to face time and non-face to face time preparing to see the patient (eg, review of tests), Obtaining and/or reviewing separately obtained history, Documenting clinical information in the electronic or other health record, Independently interpreting results (not separately reported) and communicating results to the patient/family/caregiver, or Care coordination (not separately reported).      Rosi Huston, MSN, APRN, FNP-C  Family Medicine  Office #[phone]          [1]   Current Outpatient Medications   Medication Sig Dispense Refill    amLODIPine (NORVASC) 10 MG tablet Take 1 tablet (10 mg total) by mouth once daily. 90 tablet 3    atorvastatin (LIPITOR) 10 MG tablet Take 1 tablet (10 mg total) by mouth once daily. 90 tablet 3    chlorhexidine (PERIDEX) 0.12 % solution Use 15-20 mL to rinse mouth twice daily for 2 minutes, spit out and do not rinse mouth with water after. 473 mL 3    clobetasol 0.05% (TEMOVATE) 0.05 % Oint Apply topically 2 (two) times daily. Apply to vulva for 2 weeks then as needed 60 g 1    cyanocobalamin, vitamin B-12, (VITAMIN B-12 ORAL)  Take by mouth.      cycloSPORINE (RESTASIS) 0.05 % ophthalmic emulsion Apply 1 drop into both eyes twice a day 180 vial 3    cycloSPORINE (RESTASIS) 0.05 % ophthalmic emulsion Instill 1 drop Both Eyes twice a day 180 each 6    cycloSPORINE (RESTASIS) 0.05 % ophthalmic emulsion Apply 1 drop into both eyes twice a day 180 each 3    desoximetasone (TOPICORT) 0.25 % cream Apply to affected area every other night x 1 month 60 g 1    diclofenac sodium (VOLTAREN) 1 % Gel Apply 2 g topically 4 (four) times daily. 100 g 1    ergocalciferol (ERGOCALCIFEROL) 50,000 unit Cap Take 1 capsule (50,000 Units total) by mouth every 7 days. 12 capsule 2    etanercept (ENBREL SURECLICK) 50 mg/mL (1 mL) Inject 50 mg (1 pen) into the skin once a week. 4 mL 11    fluticasone propion-salmeterol 115-21 mcg/dose (ADVAIR HFA) 115-21 mcg/actuation HFAA inhaler Inhale 2 puffs into the lungs 2 (two) times daily as needed (Cough). 12 g 11    fluticasone propionate (FLONASE) 50 mcg/actuation nasal spray 2 sprays (100 mcg total) by Each Nostril route once daily. 16 g 11    gabapentin (NEURONTIN) 300 MG capsule Take 1 capsule (300 mg total) by mouth 2 (two) times daily. 180 capsule 3    hydroquinone 4% kojic acid 3% tretinoin 0.025% topical cream apply a pea-sized AMOUNT TO entire face EVERY NIGHT AT BEDTIME THEN moisturize      hydrOXYzine HCL (ATARAX) 25 MG tablet Take 1 tablet (25 mg total) by mouth every evening. 30 tablet 1    losartan (COZAAR) 100 MG tablet Take 1 tablet (100 mg total) by mouth once daily. 90 tablet 3    meclizine (ANTIVERT) 25 mg tablet Take 1 tablet (25 mg total) by mouth 3 (three) times daily as needed for Nausea or Dizziness. 30 tablet 2    metoprolol succinate (TOPROL-XL) 25 MG 24 hr tablet Take 1 tablet (25 mg total) by mouth every evening. 90 tablet 3    mupirocin (BACTROBAN) 2 % ointment Apply topically 2 (two) times daily. 22 g 1    nystatin (NYAMYC) powder Apply to affected area every morning as needed for flare under  breasts 120 g 6    pantoprazole (PROTONIX) 40 MG tablet Take 1 tablet (40 mg total) by mouth once daily. 90 tablet 3    promethazine (PHENERGAN) 25 MG tablet Take 1 tablet (25 mg total) by mouth every 6 (six) hours as needed for Nausea. 25 tablet 0    PROTOPIC 0.1 % ointment AAA bid 100 g 2    ruxolitinib (OPZELURA) 1.5 % Crea Apply to affected area twice daily to neck and hands for vitiligo 60 g 2    ruxolitinib (OPZELURA) 1.5 % Crea Apply to affected area twice a day 60 g 2    tretinoin (RETIN-A) 0.025 % cream Compound tretinoin 0.025% / niacinamide 2% cream. Apply a pea-sized amount to entire face qhs then moisturize. 30 g 10    triamcinolone acetonide 0.025% (KENALOG) 0.025 % cream Apply to affected area every day to twice a day to neck . Not more than 1-2 weeks straight in same location 80 g 1    ZINC ORAL Take by mouth.      leflunomide (ARAVA) 20 MG Tab Take 1 tablet (20 mg total) by mouth once daily. 90 tablet 3    promethazine-dextromethorphan (PROMETHAZINE-DM) 6.25-15 mg/5 mL Syrp Take 7.5 mLs by mouth nightly as needed (cough). 180 mL 0     No current facility-administered medications for this visit.

## 2025-07-18 ENCOUNTER — PATIENT MESSAGE (OUTPATIENT)
Facility: CLINIC | Age: 65
End: 2025-07-18
Payer: MEDICARE
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
## 2025-07-22 ENCOUNTER — OFFICE VISIT (OUTPATIENT)
Dept: CARDIOLOGY | Facility: CLINIC | Age: 65
End: 2025-07-22
Payer: MEDICARE

## 2025-07-22 VITALS
HEART RATE: 76 BPM | BODY MASS INDEX: 28.84 KG/M2 | SYSTOLIC BLOOD PRESSURE: 104 MMHG | OXYGEN SATURATION: 95 % | WEIGHT: 152.75 LBS | DIASTOLIC BLOOD PRESSURE: 71 MMHG | HEIGHT: 61 IN

## 2025-07-22 DIAGNOSIS — I70.0 AORTIC ATHEROSCLEROSIS: ICD-10-CM

## 2025-07-22 DIAGNOSIS — I83.92 VARICOSE VEINS OF LEFT LOWER EXTREMITY, UNSPECIFIED WHETHER COMPLICATED: ICD-10-CM

## 2025-07-22 DIAGNOSIS — E78.2 MIXED HYPERLIPIDEMIA: Primary | ICD-10-CM

## 2025-07-22 DIAGNOSIS — I10 PRIMARY HYPERTENSION: ICD-10-CM

## 2025-07-22 DIAGNOSIS — I47.19 ATRIAL TACHYCARDIA: ICD-10-CM

## 2025-07-22 LAB
OHS QRS DURATION: 82 MS
OHS QTC CALCULATION: 435 MS

## 2025-07-22 PROCEDURE — 99215 OFFICE O/P EST HI 40 MIN: CPT | Mod: 25,S$GLB,, | Performed by: STUDENT IN AN ORGANIZED HEALTH CARE EDUCATION/TRAINING PROGRAM

## 2025-07-22 PROCEDURE — 3078F DIAST BP <80 MM HG: CPT | Mod: CPTII,S$GLB,, | Performed by: STUDENT IN AN ORGANIZED HEALTH CARE EDUCATION/TRAINING PROGRAM

## 2025-07-22 PROCEDURE — 93000 ELECTROCARDIOGRAM COMPLETE: CPT | Mod: S$GLB,,, | Performed by: STUDENT IN AN ORGANIZED HEALTH CARE EDUCATION/TRAINING PROGRAM

## 2025-07-22 PROCEDURE — 4010F ACE/ARB THERAPY RXD/TAKEN: CPT | Mod: CPTII,S$GLB,, | Performed by: STUDENT IN AN ORGANIZED HEALTH CARE EDUCATION/TRAINING PROGRAM

## 2025-07-22 PROCEDURE — 1159F MED LIST DOCD IN RCRD: CPT | Mod: CPTII,S$GLB,, | Performed by: STUDENT IN AN ORGANIZED HEALTH CARE EDUCATION/TRAINING PROGRAM

## 2025-07-22 PROCEDURE — 1160F RVW MEDS BY RX/DR IN RCRD: CPT | Mod: CPTII,S$GLB,, | Performed by: STUDENT IN AN ORGANIZED HEALTH CARE EDUCATION/TRAINING PROGRAM

## 2025-07-22 PROCEDURE — 3008F BODY MASS INDEX DOCD: CPT | Mod: CPTII,S$GLB,, | Performed by: STUDENT IN AN ORGANIZED HEALTH CARE EDUCATION/TRAINING PROGRAM

## 2025-07-22 PROCEDURE — 1126F AMNT PAIN NOTED NONE PRSNT: CPT | Mod: CPTII,S$GLB,, | Performed by: STUDENT IN AN ORGANIZED HEALTH CARE EDUCATION/TRAINING PROGRAM

## 2025-07-22 PROCEDURE — 3044F HG A1C LEVEL LT 7.0%: CPT | Mod: CPTII,S$GLB,, | Performed by: STUDENT IN AN ORGANIZED HEALTH CARE EDUCATION/TRAINING PROGRAM

## 2025-07-22 PROCEDURE — 99999 PR PBB SHADOW E&M-EST. PATIENT-LVL V: CPT | Mod: PBBFAC,,, | Performed by: STUDENT IN AN ORGANIZED HEALTH CARE EDUCATION/TRAINING PROGRAM

## 2025-07-22 PROCEDURE — 3074F SYST BP LT 130 MM HG: CPT | Mod: CPTII,S$GLB,, | Performed by: STUDENT IN AN ORGANIZED HEALTH CARE EDUCATION/TRAINING PROGRAM

## 2025-07-22 RX ORDER — ROSUVASTATIN CALCIUM 10 MG/1
10 TABLET, COATED ORAL DAILY
Qty: 90 TABLET | Refills: 3 | Status: SHIPPED | OUTPATIENT
Start: 2025-07-22 | End: 2026-07-22

## 2025-07-28 ENCOUNTER — TELEPHONE (OUTPATIENT)
Facility: CLINIC | Age: 65
End: 2025-07-28
Payer: MEDICARE

## 2025-07-28 NOTE — TELEPHONE ENCOUNTER
Patient was scheduled for 08/15/2025 patient has been moved   To 08/29/2025 at 10:30  due to the provider being out/pt confirmed

## 2025-07-31 ENCOUNTER — OFFICE VISIT (OUTPATIENT)
Dept: FAMILY MEDICINE | Facility: CLINIC | Age: 65
End: 2025-07-31
Attending: FAMILY MEDICINE
Payer: MEDICARE

## 2025-07-31 VITALS
HEART RATE: 80 BPM | OXYGEN SATURATION: 96 % | HEIGHT: 61 IN | WEIGHT: 152.13 LBS | BODY MASS INDEX: 28.72 KG/M2 | DIASTOLIC BLOOD PRESSURE: 68 MMHG | SYSTOLIC BLOOD PRESSURE: 122 MMHG

## 2025-07-31 DIAGNOSIS — M79.605 PAIN OF LEFT LOWER EXTREMITY: ICD-10-CM

## 2025-07-31 DIAGNOSIS — E55.9 VITAMIN D DEFICIENCY, UNSPECIFIED: ICD-10-CM

## 2025-07-31 DIAGNOSIS — E66.3 OVERWEIGHT: ICD-10-CM

## 2025-07-31 DIAGNOSIS — I10 ESSENTIAL HYPERTENSION: ICD-10-CM

## 2025-07-31 DIAGNOSIS — I10 PRIMARY HYPERTENSION: ICD-10-CM

## 2025-07-31 DIAGNOSIS — F33.9 MAJOR DEPRESSION, RECURRENT, CHRONIC: ICD-10-CM

## 2025-07-31 DIAGNOSIS — E78.2 MIXED HYPERLIPIDEMIA: Primary | ICD-10-CM

## 2025-07-31 DIAGNOSIS — M05.9 RHEUMATOID ARTHRITIS WITH POSITIVE RHEUMATOID FACTOR, INVOLVING UNSPECIFIED SITE: ICD-10-CM

## 2025-07-31 PROCEDURE — 1159F MED LIST DOCD IN RCRD: CPT | Mod: CPTII,S$GLB,, | Performed by: FAMILY MEDICINE

## 2025-07-31 PROCEDURE — 3074F SYST BP LT 130 MM HG: CPT | Mod: CPTII,S$GLB,, | Performed by: FAMILY MEDICINE

## 2025-07-31 PROCEDURE — 3078F DIAST BP <80 MM HG: CPT | Mod: CPTII,S$GLB,, | Performed by: FAMILY MEDICINE

## 2025-07-31 PROCEDURE — 4010F ACE/ARB THERAPY RXD/TAKEN: CPT | Mod: CPTII,S$GLB,, | Performed by: FAMILY MEDICINE

## 2025-07-31 PROCEDURE — 3288F FALL RISK ASSESSMENT DOCD: CPT | Mod: CPTII,S$GLB,, | Performed by: FAMILY MEDICINE

## 2025-07-31 PROCEDURE — 3044F HG A1C LEVEL LT 7.0%: CPT | Mod: CPTII,S$GLB,, | Performed by: FAMILY MEDICINE

## 2025-07-31 PROCEDURE — 99999 PR PBB SHADOW E&M-EST. PATIENT-LVL V: CPT | Mod: PBBFAC,,, | Performed by: FAMILY MEDICINE

## 2025-07-31 PROCEDURE — 3008F BODY MASS INDEX DOCD: CPT | Mod: CPTII,S$GLB,, | Performed by: FAMILY MEDICINE

## 2025-07-31 PROCEDURE — 1101F PT FALLS ASSESS-DOCD LE1/YR: CPT | Mod: CPTII,S$GLB,, | Performed by: FAMILY MEDICINE

## 2025-07-31 PROCEDURE — 1160F RVW MEDS BY RX/DR IN RCRD: CPT | Mod: CPTII,S$GLB,, | Performed by: FAMILY MEDICINE

## 2025-07-31 PROCEDURE — 1126F AMNT PAIN NOTED NONE PRSNT: CPT | Mod: CPTII,S$GLB,, | Performed by: FAMILY MEDICINE

## 2025-07-31 PROCEDURE — 99215 OFFICE O/P EST HI 40 MIN: CPT | Mod: S$GLB,,, | Performed by: FAMILY MEDICINE

## 2025-07-31 RX ORDER — GABAPENTIN 300 MG/1
300 CAPSULE ORAL 2 TIMES DAILY
Qty: 180 CAPSULE | Refills: 3 | Status: SHIPPED | OUTPATIENT
Start: 2025-07-31 | End: 2026-07-26

## 2025-07-31 RX ORDER — PHENTERMINE HYDROCHLORIDE 37.5 MG/1
37.5 TABLET ORAL
Qty: 30 TABLET | Refills: 0 | Status: SHIPPED | OUTPATIENT
Start: 2025-07-31 | End: 2025-08-30

## 2025-07-31 NOTE — PROGRESS NOTES
Subjective:       Patient ID: Silvia Cervantes is a 64 y.o. female.    Chief Complaint: Follow-up (Also wants something for weight loss )    64 yr old pleasant  female with depression, allergies, vitiligo, HTN, HLD, sarcoidosis, Rheumatoid arthritis, Erosive gastritis, and other co morbidities presents today for her 3 month follow up. Not losing weight and need some appetite suppressant.    Chronic low back pain. Onset 6 months ago and gradually worsening - left lower back- does not radiate and no neurological symptoms. No saddle anesthesia or bladder/bowel problems. Details as follows -      HTN - controlled - on Losartan-HCT and Norvasc 5 - compliant - no side effects     HLD - on statin but not compliant -                  Sarcoidosis and lung nodules - follwos pulmonology - need new one as her previous specialist is leaving [facility]      Major depression - uncontrolled - not on meds - would like to start one - given zoloft - she will start from today - - no SI/HI    Vertigo - much better - on vertin as needed          RA - on Enbrel shots - follows Rheumatology - stable      Gastritis - stable - on PPI as needed - no side effects      History as below - reviewed       HM  -labs UTD  -vaccines UTD    Follow-up  Associated symptoms include myalgias, neck pain, numbness and weakness. Pertinent negatives include no arthralgias, chest pain, congestion, diaphoresis, headaches, nausea or sore throat.   Medication Refill  Associated symptoms include myalgias, neck pain, numbness and weakness. Pertinent negatives include no arthralgias, chest pain, congestion, diaphoresis, headaches, nausea or sore throat.   Hypertension  This is a chronic problem. The current episode started more than 1 year ago. The problem has been gradually improving since onset. The problem is controlled. Associated symptoms include neck pain. Pertinent negatives include no chest pain, headaches, malaise/fatigue, palpitations or shortness of  breath. There are no associated agents to hypertension. Risk factors for coronary artery disease include dyslipidemia and post-menopausal state. Past treatments include angiotensin blockers and diuretics. The current treatment provides significant improvement. There are no compliance problems.  There is no history of angina, CAD/MI, CVA, left ventricular hypertrophy, PVD or retinopathy. There is no history of chronic renal disease, coarctation of the aorta, hypercortisolism, hyperparathyroidism, a hypertension causing med or sleep apnea.   Hyperlipidemia  This is a chronic problem. The current episode started more than 1 year ago. The problem is controlled. Recent lipid tests were reviewed and are normal. She has no history of chronic renal disease, diabetes, hypothyroidism or liver disease. There are no known factors aggravating her hyperlipidemia. Associated symptoms include myalgias. Pertinent negatives include no chest pain, focal sensory loss, focal weakness, leg pain or shortness of breath. She is currently on no antihyperlipidemic treatment. The current treatment provides mild improvement of lipids. There are no compliance problems.  Risk factors for coronary artery disease include dyslipidemia, hypertension and post-menopausal.   Back Pain  This is a chronic problem. The current episode started more than 1 month ago. The problem occurs constantly. The problem is unchanged. The pain is present in the sacro-iliac and lumbar spine. The quality of the pain is described as aching. The pain does not radiate. The symptoms are aggravated by bending, sitting and twisting. Associated symptoms include numbness and weakness. Pertinent negatives include no chest pain, dysuria, headaches, leg pain, perianal numbness or weight loss. She has tried nothing for the symptoms. The treatment provided no relief.     Review of Systems   Constitutional: Negative.  Negative for activity change, diaphoresis, malaise/fatigue,  unexpected weight change and weight loss.   HENT: Negative.  Negative for nasal congestion, ear discharge, hearing loss, rhinorrhea, sore throat and voice change.    Eyes: Negative.  Negative for pain, discharge and visual disturbance.   Respiratory: Negative.  Negative for chest tightness, shortness of breath and wheezing.    Cardiovascular: Negative.  Negative for chest pain and palpitations.   Gastrointestinal: Negative.  Negative for abdominal distention, anal bleeding, constipation and nausea.   Endocrine: Negative.  Negative for cold intolerance, polydipsia and polyuria.   Genitourinary: Negative.  Negative for decreased urine volume, difficulty urinating, dysuria, frequency, menstrual problem and vaginal pain.   Musculoskeletal:  Positive for back pain, myalgias and neck pain. Negative for arthralgias, gait problem and leg pain.   Integumentary:  Negative for color change, pallor and wound. Negative.   Allergic/Immunologic: Negative.  Negative for environmental allergies and immunocompromised state.   Neurological:  Positive for weakness and numbness. Negative for dizziness, tremors, focal weakness, seizures, speech difficulty and headaches.   Hematological: Negative.  Negative for adenopathy. Does not bruise/bleed easily.   Psychiatric/Behavioral: Negative.  Negative for agitation, confusion, decreased concentration, hallucinations, self-injury and suicidal ideas. The patient is not nervous/anxious.          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)        Past Surgical History:   Procedure Laterality Date     SECTION      COLONOSCOPY N/A 2019    Procedure: COLONOSCOPY/judeyttanya ;  Surgeon: Dimas Woodall MD;  Location: [facility];  Service: Endoscopy;  Laterality: N/A;    COLONOSCOPY, SCREENING, HIGH RISK PATIENT N/A 2024    Procedure: COLONOSCOPY, SCREENING, HIGH RISK PATIENT;  Surgeon: Jose Maria Love MD;  Location: [facility];  Service: Endoscopy;  Laterality: N/A;  Ref by Dr AIDAN Cade, PEG, portal - PC  10/31/24-LVM for precall, portal-DS  24-Precall complete-DS    ESOPHAGOGASTRODUODENOSCOPY N/A 2023    Procedure: EGD (ESOPHAGOGASTRODUODENOSCOPY);  Surgeon: Ori St MD;  Location: [facility];  Service: Endoscopy;  Laterality: N/A;    EXCISION OF NODULE Right 2022    Procedure: EXCISION, NODULE right elbow;  Surgeon: Louise Smith MD;  Location: [facility];  Service: Orthopedics;  Laterality: Right;    HYSTERECTOMY      INJECTION OF STEROID Left 2022    Procedure: INJECTION, STEROID Thumb MP joint;  Surgeon: Louise Smith MD;  Location: [facility] OR;  Service: Orthopedics;  Laterality: Left;    SINUS SURGERY      SURGICAL REMOVAL OF DISTAL ULNA Right 2022    Procedure: EXCISION, ULNA, DISTAL;  Surgeon: Louise Smith MD;  Location: [facility] OR;  Service: Orthopedics;  Laterality: Right;    SYNOVECTOMY OF WRIST Right 2022    Procedure: SYNOVECTOMY, WRIST;  Surgeon: Louise Smith MD;  Location: [facility] OR;  Service: Orthopedics;  Laterality: Right;    TUBAL LIGATION         Family History   Problem Relation Name Age of Onset    Hypertension Mother      Cataracts Mother      Diabetes Father      Hypertension Father      Cataracts Father      Heart disease Father      Hyperlipidemia Father      Diabetes Sister x1     Stroke Brother      Hypertension Brother       No Known Problems Daughter x1     No Known Problems Son x1     Melanoma Neg Hx      Breast cancer Neg Hx      Colon cancer Neg Hx      Ovarian cancer Neg Hx      Blindness Neg Hx      Glaucoma Neg Hx      Aneurysm Neg Hx      Cancer Neg Hx      Clotting disorder Neg Hx      Dementia Neg Hx      Fainting Neg Hx      Kidney disease Neg Hx      Liver disease Neg Hx      Migraines Neg Hx      Neuropathy Neg Hx      Parkinsonism Neg Hx      Seizures Neg Hx      Tremor Neg Hx         Social History     Socioeconomic History    Marital status:    Tobacco Use    Smoking status: Never    Smokeless tobacco: Never   Substance and Sexual Activity    Alcohol use: Yes     Comment: Rarely    Drug use: Not Currently    Sexual activity: Yes     Partners: Male     Birth control/protection: Surgical     Comment: hyst     Social Drivers of Health     Financial Resource Strain: Low Risk  (2/21/2022)    Overall Financial Resource Strain (CARDIA)     Difficulty of Paying Living Expenses: Not hard at all   Food Insecurity: No Food Insecurity (2/21/2022)    Hunger Vital Sign     Worried About Running Out of Food in the Last Year: Never true     Ran Out of Food in the Last Year: Never true   Transportation Needs: No Transportation Needs (2/21/2022)    PRAPARE - Transportation     Lack of Transportation (Medical): No     Lack of Transportation (Non-Medical): No   Physical Activity: Sufficiently Active (2/21/2022)    Exercise Vital Sign     Days of Exercise per Week: 7 days     Minutes of Exercise per Session: 120 min   Stress: No Stress Concern Present (2/21/2022)    [employer] - Occupational Stress Questionnaire     Feeling of Stress : Only a little   Housing Stability: Low Risk  (2/21/2022)    Housing Stability Vital Sign     Unable to Pay for Housing in the Last Year: No     Number of Places Lived in the Last Year: 1     Unstable Housing in the Last Year: No       Current Outpatient Medications    Medication Sig Dispense Refill    amLODIPine (NORVASC) 10 MG tablet Take 1 tablet (10 mg total) by mouth once daily. 90 tablet 3    chlorhexidine (PERIDEX) 0.12 % solution Use 15-20 mL to rinse mouth twice daily for 2 minutes, spit out and do not rinse mouth with water after. 473 mL 3    clobetasol 0.05% (TEMOVATE) 0.05 % Oint Apply topically 2 (two) times daily. Apply to vulva for 2 weeks then as needed 60 g 1    cyanocobalamin, vitamin B-12, (VITAMIN B-12 ORAL) Take by mouth.      cycloSPORINE (RESTASIS) 0.05 % ophthalmic emulsion Apply 1 drop into both eyes twice a day 180 vial 3    cycloSPORINE (RESTASIS) 0.05 % ophthalmic emulsion Instill 1 drop Both Eyes twice a day 180 each 6    cycloSPORINE (RESTASIS) 0.05 % ophthalmic emulsion Apply 1 drop into both eyes twice a day 180 each 3    desoximetasone (TOPICORT) 0.25 % cream Apply to affected area every other night x 1 month 60 g 1    diclofenac sodium (VOLTAREN) 1 % Gel Apply 2 g topically 4 (four) times daily. 100 g 1    ergocalciferol (ERGOCALCIFEROL) 50,000 unit Cap Take 1 capsule (50,000 Units total) by mouth every 7 days. 12 capsule 2    etanercept (ENBREL SURECLICK) 50 mg/mL (1 mL) Inject 50 mg (1 pen) into the skin once a week. 4 mL 11    fluticasone propion-salmeterol 115-21 mcg/dose (ADVAIR HFA) 115-21 mcg/actuation HFAA inhaler Inhale 2 puffs into the lungs 2 (two) times daily as needed (Cough). 12 g 11    fluticasone propionate (FLONASE) 50 mcg/actuation nasal spray 2 sprays (100 mcg total) by Each Nostril route once daily. 16 g 11    hydroquinone 4% kojic acid 3% tretinoin 0.025% topical cream apply a pea-sized AMOUNT TO entire face EVERY NIGHT AT BEDTIME THEN moisturize      hydrOXYzine HCL (ATARAX) 25 MG tablet Take 1 tablet (25 mg total) by mouth every evening. 30 tablet 1    losartan (COZAAR) 100 MG tablet Take 1 tablet (100 mg total) by mouth once daily. 90 tablet 3    meclizine (ANTIVERT) 25 mg tablet Take 1 tablet (25 mg total) by mouth 3  (three) times daily as needed for Nausea or Dizziness. 30 tablet 2    metoprolol succinate (TOPROL-XL) 25 MG 24 hr tablet Take 1 tablet (25 mg total) by mouth every evening. 90 tablet 3    mupirocin (BACTROBAN) 2 % ointment Apply topically 2 (two) times daily. 22 g 1    nystatin (NYAMYC) powder Apply to affected area every morning as needed for flare under breasts 120 g 6    pantoprazole (PROTONIX) 40 MG tablet Take 1 tablet (40 mg total) by mouth once daily. 90 tablet 3    promethazine (PHENERGAN) 25 MG tablet Take 1 tablet (25 mg total) by mouth every 6 (six) hours as needed for Nausea. 25 tablet 0    promethazine-dextromethorphan (PROMETHAZINE-DM) 6.25-15 mg/5 mL Syrp Take 7.5 mLs by mouth nightly as needed (cough). 180 mL 0    PROTOPIC 0.1 % ointment AAA bid 100 g 2    rosuvastatin (CRESTOR) 10 MG tablet Take 1 tablet (10 mg total) by mouth once daily. 90 tablet 3    ruxolitinib (OPZELURA) 1.5 % Crea Apply to affected area twice daily to neck and hands for vitiligo 60 g 2    ruxolitinib (OPZELURA) 1.5 % Crea Apply to affected area twice a day 60 g 2    tretinoin (RETIN-A) 0.025 % cream Compound tretinoin 0.025% / niacinamide 2% cream. Apply a pea-sized amount to entire face qhs then moisturize. 30 g 10    triamcinolone acetonide 0.025% (KENALOG) 0.025 % cream Apply to affected area every day to twice a day to neck . Not more than 1-2 weeks straight in same location 80 g 1    ZINC ORAL Take by mouth.      gabapentin (NEURONTIN) 300 MG capsule Take 1 capsule (300 mg total) by mouth 2 (two) times daily. 180 capsule 3    leflunomide (ARAVA) 20 MG Tab Take 1 tablet (20 mg total) by mouth once daily. 90 tablet 3    phentermine (ADIPEX-P) 37.5 mg tablet Take 1 tablet (37.5 mg total) by mouth before breakfast. 30 tablet 0     No current facility-administered medications for this visit.       Review of patient's allergies indicates:  No Known Allergies      Objective:       Vitals:    07/31/25 0903   BP: 122/68   Pulse:  80       Physical Exam  Constitutional:       General: She is not in acute distress.     Appearance: She is well-developed. She is not diaphoretic.   HENT:      Head: Normocephalic and atraumatic.      Right Ear: External ear normal.      Left Ear: External ear normal.      Nose: Nose normal.      Mouth/Throat:      Pharynx: No oropharyngeal exudate.   Eyes:      General: No scleral icterus.        Right eye: No discharge.         Left eye: No discharge.      Conjunctiva/sclera: Conjunctivae normal.      Pupils: Pupils are equal, round, and reactive to light.   Neck:      Thyroid: No thyromegaly.      Vascular: No JVD.      Trachea: No tracheal deviation.   Cardiovascular:      Rate and Rhythm: Normal rate and regular rhythm.      Heart sounds: Normal heart sounds. No murmur heard.     No friction rub. No gallop.   Pulmonary:      Effort: Pulmonary effort is normal.      Breath sounds: Normal breath sounds. No stridor. No wheezing or rales.   Chest:      Chest wall: No tenderness.   Abdominal:      General: Bowel sounds are normal. There is no distension.      Palpations: Abdomen is soft. There is no mass.      Tenderness: There is no abdominal tenderness. There is no guarding or rebound.      Hernia: No hernia is present.   Musculoskeletal:         General: No tenderness. Normal range of motion.      Cervical back: Normal range of motion and neck supple.   Lymphadenopathy:      Cervical: No cervical adenopathy.   Skin:     General: Skin is warm and dry.      Coloration: Skin is not pale.      Findings: No erythema or rash.   Neurological:      Mental Status: She is alert and oriented to person, place, and time.      Cranial Nerves: No cranial nerve deficit.      Motor: No abnormal muscle tone.      Coordination: Coordination normal.      Deep Tendon Reflexes: Reflexes are normal and symmetric. Reflexes normal.   Psychiatric:         Behavior: Behavior normal.         Thought Content: Thought content normal.          Judgment: Judgment normal.         Assessment:       Problem List Items Addressed This Visit       Rheumatoid arthritis with positive rheumatoid factor    Mixed hyperlipidemia - Primary    Major depression, recurrent, chronic    Hypertension     Other Visit Diagnoses         Essential hypertension          Vitamin D deficiency, unspecified          Pain of left lower extremity        Relevant Medications    gabapentin (NEURONTIN) 300 MG capsule      Overweight        Relevant Medications    phentermine (ADIPEX-P) 37.5 mg tablet            Plan:       Silvia was seen today for follow-up.    Diagnoses and all orders for this visit:    Mixed hyperlipidemia    Primary hypertension    Major depression, recurrent, chronic    Essential hypertension    Vitamin D deficiency, unspecified    Rheumatoid arthritis with positive rheumatoid factor, involving unspecified site    Pain of left lower extremity  -     gabapentin (NEURONTIN) 300 MG capsule; Take 1 capsule (300 mg total) by mouth 2 (two) times daily.    Overweight  -     phentermine (ADIPEX-P) 37.5 mg tablet; Take 1 tablet (37.5 mg total) by mouth before breakfast.      HLD  -diet control  -start statin    Appetite suppressant x 30 days only.Side effects of medications have been discussed and patient agreed to proceed with treatment and understands the risks and benefits.        HTN  -controlled    RA  -stable  -follow rheumatology    Vertigo  -meclizine prn    Depression  -controlled  -continue zoloft 25 mg daily  -SI/ER precautions given    Headaches  -follow neurology    Spent adequate time in obtaining history and explaining differentials      40 minutes spent during this visit of which greater than 50% devoted to face-face counseling and coordination of care regarding diagnosis and management plan    Follow up in about 6 months (around 1/31/2026), or if symptoms worsen or fail to improve.

## 2025-08-01 DIAGNOSIS — I10 ESSENTIAL HYPERTENSION: ICD-10-CM

## 2025-08-01 RX ORDER — LOSARTAN POTASSIUM 100 MG/1
100 TABLET ORAL DAILY
Qty: 90 TABLET | Refills: 3 | Status: SHIPPED | OUTPATIENT
Start: 2025-08-01 | End: 2026-08-01

## 2025-08-01 RX ORDER — LOSARTAN POTASSIUM 100 MG/1
100 TABLET ORAL DAILY
Qty: 90 TABLET | Refills: 3 | Status: CANCELLED | OUTPATIENT
Start: 2025-08-01 | End: 2026-08-01

## 2025-08-01 NOTE — TELEPHONE ENCOUNTER
No care due was identified.  [facility] Embedded Care Due Messages. Reference number: [id number].   8/01/2025 10:59:35 AM CDT

## 2025-08-01 NOTE — TELEPHONE ENCOUNTER
No care due was identified.  [facility] Embedded Care Due Messages. Reference number: [id number].   8/01/2025 9:40:45 AM CDT

## 2025-08-01 NOTE — TELEPHONE ENCOUNTER
Refill Decision Note   Silvia Cervantes  is requesting a refill authorization.  Brief Assessment and Rationale for Refill:  Approve     Medication Therapy Plan:         Comments:     Note composed:6:03 PM 08/01/2025

## 2025-08-06 ENCOUNTER — OFFICE VISIT (OUTPATIENT)
Dept: DERMATOLOGY | Facility: CLINIC | Age: 65
End: 2025-08-06
Payer: MEDICARE

## 2025-08-06 DIAGNOSIS — D22.9 NEVUS: ICD-10-CM

## 2025-08-06 DIAGNOSIS — L80 VITILIGO: Primary | ICD-10-CM

## 2025-08-06 DIAGNOSIS — D18.01 CHERRY ANGIOMA: ICD-10-CM

## 2025-08-06 DIAGNOSIS — L82.1 SK (SEBORRHEIC KERATOSIS): ICD-10-CM

## 2025-08-06 DIAGNOSIS — L30.4 INTERTRIGO: ICD-10-CM

## 2025-08-06 DIAGNOSIS — L81.4 LENTIGO: ICD-10-CM

## 2025-08-06 PROCEDURE — 1159F MED LIST DOCD IN RCRD: CPT | Mod: CPTII,S$GLB,, | Performed by: DERMATOLOGY

## 2025-08-06 PROCEDURE — 1126F AMNT PAIN NOTED NONE PRSNT: CPT | Mod: CPTII,S$GLB,, | Performed by: DERMATOLOGY

## 2025-08-06 PROCEDURE — 1101F PT FALLS ASSESS-DOCD LE1/YR: CPT | Mod: CPTII,S$GLB,, | Performed by: DERMATOLOGY

## 2025-08-06 PROCEDURE — 99999 PR PBB SHADOW E&M-EST. PATIENT-LVL IV: CPT | Mod: PBBFAC,,, | Performed by: DERMATOLOGY

## 2025-08-06 PROCEDURE — 4010F ACE/ARB THERAPY RXD/TAKEN: CPT | Mod: CPTII,S$GLB,, | Performed by: DERMATOLOGY

## 2025-08-06 PROCEDURE — 3044F HG A1C LEVEL LT 7.0%: CPT | Mod: CPTII,S$GLB,, | Performed by: DERMATOLOGY

## 2025-08-06 PROCEDURE — 3288F FALL RISK ASSESSMENT DOCD: CPT | Mod: CPTII,S$GLB,, | Performed by: DERMATOLOGY

## 2025-08-06 PROCEDURE — 1160F RVW MEDS BY RX/DR IN RCRD: CPT | Mod: CPTII,S$GLB,, | Performed by: DERMATOLOGY

## 2025-08-06 PROCEDURE — 99214 OFFICE O/P EST MOD 30 MIN: CPT | Mod: S$GLB,,, | Performed by: DERMATOLOGY

## 2025-08-06 RX ORDER — NYSTATIN 100000 [USP'U]/G
POWDER TOPICAL
Qty: 120 G | Refills: 6 | Status: SHIPPED | OUTPATIENT
Start: 2025-08-06

## 2025-08-06 NOTE — PATIENT INSTRUCTIONS
Opzelura:  Use 2x per day until clear and then 2 more days then stop. Do not use on more than 20 % of your body at the same time; Should be use intermittently for flares ( do not use when clear).   Do not use this medication if you are are immunosuppressant medications.     There are many side effects reported when this medication is used as an oral drug. Systemic absorption is minimal and having these side effects when using as a cream would be exceptionally rare. However if you experience any of these as new side effects while on the cream, please stop the cream and inform your prescribing physician.   Acneiform eruption   Herpes infections or upper respiratory infection   Other serious infections   Oral medication use can be associated with increased risk of lymphoma   Increased risk of cardiac events or pulmonary emboli   This medication should not be used if pregnant or lactating    We would like to see you back in the clinic in 12 months.  You will be able to schedule this appointment by calling or by using your [facility] portal 3 months before this time. Because our schedule fills so quickly, please set a reminder in your phone or on your calendar to schedule 3 months before you are due to come in so that we can see you in a timely fashion.  You should also receive a reminder from us in the mail. This will help us ensure we can continue to provide excellent healthcare for you. Thank you.

## 2025-08-06 NOTE — PROGRESS NOTES
"  Subjective:      Patient ID:  Silvia Cervantes is a 64 y.o. female who presents for   Chief Complaint   Patient presents with    Skin Check     tbse    Vitiligo     F/u      Pt here today for a TBSE    Patient is here today for a "mole" check.   Pt has a history of  moderate sun exposure in the past.   Pt recalls several blistering sunburns in the past- yes  Pt has history of tanning bed use- yes  Pt has  had moles removed in the past- no  Pt has history of melanoma in first degree relatives-  no     Pt would like refill on Opzelura and nystatin powder. Pt still using opzelura mostly on hands and feels her vitiligo is improved  Gets rash under her breasts and nystatin powder helps . Comes and goes and worse with heat          Review of Systems   Constitutional:  Positive for weight gain. Negative for weight loss, fatigue and malaise.   Genitourinary:  Negative for frequency.   Skin:  Positive for itching, daily sunscreen use, activity-related sunscreen use and wears hat. Negative for rash and recent sunburn.   Hematologic/Lymphatic: Does not bruise/bleed easily.       Objective:   Physical Exam   Constitutional: She appears well-developed and well-nourished. No distress.   Neurological: She is alert and oriented to person, place, and time. She is not disoriented.   Psychiatric: She has a normal mood and affect.   Skin:   Areas Examined (abnormalities noted in diagram):   Scalp / Hair Palpated and Inspected  Head / Face Inspection Performed  Neck Inspection Performed  Chest / Axilla Inspection Performed  Abdomen Inspection Performed  Genitals / Buttocks / Groin Inspection Performed  Back Inspection Performed  RUE Inspected  LUE Inspection Performed  RLE Inspected  LLE Inspection Performed  Nails and Digits Inspection Performed                         Diagram Legend     Erythematous scaling macule/papule c/w actinic keratosis       Vascular papule c/w angioma      Pigmented verrucoid papule/plaque c/w seborrheic " keratosis      Yellow umbilicated papule c/w sebaceous hyperplasia      Irregularly shaped tan macule c/w lentigo     1-2 mm smooth white papules consistent with Milia      Movable subcutaneous cyst with punctum c/w epidermal inclusion cyst      Subcutaneous movable cyst c/w pilar cyst      Firm pink to brown papule c/w dermatofibroma      Pedunculated fleshy papule(s) c/w skin tag(s)      Evenly pigmented macule c/w junctional nevus     Mildly variegated pigmented, slightly irregular-bordered macule c/w mildly atypical nevus      Flesh colored to evenly pigmented papule c/w intradermal nevus       Pink pearly papule/plaque c/w basal cell carcinoma      Erythematous hyperkeratotic cursted plaque c/w SCC      Surgical scar with no sign of skin cancer recurrence      Open and closed comedones      Inflammatory papules and pustules      Verrucoid papule consistent consistent with wart     Erythematous eczematous patches and plaques     Dystrophic onycholytic nail with subungual debris c/w onychomycosis     Umbilicated papule    Erythematous-base heme-crusted tan verrucoid plaque consistent with inflamed seborrheic keratosis     Erythematous Silvery Scaling Plaque c/w Psoriasis     See annotation      Assessment / Plan:        Vitiligo  Continue opzelura   bid to hands and spf daily   Opzelura:  Use 2x per day until clear and then 2 more days then stop. Do not use on more than 20 % of your body at the same time; Should be use intermittently for flares ( do not use when clear).   Do not use this medication if you are are immunosuppressant medications.     There are many side effects reported when this medication is used as an oral drug. Systemic absorption is minimal and having these side effects when using as a cream would be exceptionally rare. However if you experience any of these as new side effects while on the cream, please stop the cream and inform your prescribing physician.   Acneiform eruption   Herpes  infections or upper respiratory infection   Other serious infections   Oral medication use can be associated with increased risk of lymphoma   Increased risk of cardiac events or pulmonary emboli   This medication should not be used if pregnant or lactating    Lentigo  This is a benign hyperpigmented sun induced lesion. Recommend daily sun protection/avoidance and use of at least SPF 30, broad spectrum sunscreen (OTC drug) will reduce the number of new lesions. Treatment of these benign lesions are considered cosmetic.  The nature of sun-induced photo-aging and skin cancers is discussed.  Sun avoidance, protective clothing, and the use of 30-SPF sunscreens is advised. Observe closely for skin damage/changes, and call if such occurs.    Nevus  Discussed ABCDE's of nevi.  Monitor for new mole or moles that are becoming bigger, darker, irritated, or developing irregular borders. Brochure provided. Instructed patient to observe lesion(s) for changes and follow up in clinic if changes are noted. Patient to monitor skin at home for new or changing lesions.       Intertrigo  -     nystatin (NYAMYC) powder; Apply to affected area every morning as needed for flare under breasts  Dispense: 120 g; Refill: 6    Cherry angioma  These are benign vascular lesions that are inherited.  Treatment is not necessary.    SK (seborrheic keratosis)  These are benign inherited growths without a malignant potential. Reassurance given to patient. No treatment is necessary.     Total body skin examination performed today including at least 12 points as noted in physical examination. No lesions suspicious for malignancy noted.    Recommend daily sun protection/avoidance, use of at least SPF 30, broad spectrum sunscreen (OTC drug), skin self examinations, and routine physician surveillance to optimize early detection             Follow up in about 1 year (around 8/6/2026) for Medication Management, TBSE.

## 2025-08-29 ENCOUNTER — OFFICE VISIT (OUTPATIENT)
Facility: CLINIC | Age: 65
End: 2025-08-29
Payer: MEDICARE

## 2025-08-29 VITALS
WEIGHT: 146.13 LBS | BODY MASS INDEX: 27.59 KG/M2 | HEIGHT: 61 IN | DIASTOLIC BLOOD PRESSURE: 82 MMHG | SYSTOLIC BLOOD PRESSURE: 120 MMHG | OXYGEN SATURATION: 94 % | HEART RATE: 73 BPM

## 2025-08-29 DIAGNOSIS — D86.0 SARCOIDOSIS OF LUNG: Primary | ICD-10-CM

## 2025-08-29 DIAGNOSIS — R05.9 COUGH, UNSPECIFIED TYPE: ICD-10-CM

## 2025-08-29 PROCEDURE — 99999 PR PBB SHADOW E&M-EST. PATIENT-LVL V: CPT | Mod: PBBFAC,,, | Performed by: STUDENT IN AN ORGANIZED HEALTH CARE EDUCATION/TRAINING PROGRAM

## 2025-09-02 DIAGNOSIS — R00.2 PALPITATIONS: ICD-10-CM

## 2025-09-02 RX ORDER — METOPROLOL SUCCINATE 25 MG/1
25 TABLET, EXTENDED RELEASE ORAL NIGHTLY
Qty: 90 TABLET | Refills: 3 | Status: SHIPPED | OUTPATIENT
Start: 2025-09-02

## 2025-09-05 ENCOUNTER — HOSPITAL ENCOUNTER (OUTPATIENT)
Dept: RADIOLOGY | Facility: HOSPITAL | Age: 65
Discharge: HOME | End: 2025-09-05
Attending: STUDENT IN AN ORGANIZED HEALTH CARE EDUCATION/TRAINING PROGRAM
Payer: MEDICARE

## 2025-09-05 ENCOUNTER — HOSPITAL ENCOUNTER (OUTPATIENT)
Dept: PULMONOLOGY | Facility: HOSPITAL | Age: 65
Discharge: HOME | End: 2025-09-05
Attending: STUDENT IN AN ORGANIZED HEALTH CARE EDUCATION/TRAINING PROGRAM
Payer: MEDICARE

## 2025-09-05 DIAGNOSIS — D86.0 SARCOIDOSIS OF LUNG: ICD-10-CM

## 2025-09-05 LAB
DLCO ADJ PRE: 15.33 ML/(MIN*MMHG) (ref 14.42–25.89)
DLCO SINGLE BREATH LLN: 14.42
DLCO SINGLE BREATH PRE REF: 76.5 %
DLCO SINGLE BREATH REF: 20.15
DLCOC SBVA LLN: 2.9
DLCOC SBVA PRE REF: 91 %
DLCOC SBVA REF: 4.54
DLCOC SINGLE BREATH LLN: 14.42
DLCOC SINGLE BREATH PRE REF: 76.1 %
DLCOC SINGLE BREATH REF: 20.15
DLCOVA LLN: 2.9
DLCOVA PRE REF: 91.5 %
DLCOVA PRE: 4.16 ML/(MIN*MMHG*L) (ref 2.9–6.19)
DLCOVA REF: 4.54
DLVAADJ PRE: 4.13 ML/(MIN*MMHG*L) (ref 2.9–6.19)
ERVN2 LLN: -16449.31
ERVN2 PRE REF: 89.5 %
ERVN2 PRE: 0.62 L (ref -16449.31–16450.69)
ERVN2 REF: 0.69
FEF 25 75 LLN: 0.92
FEF 25 75 PRE REF: 86.6 %
FEF 25 75 REF: 1.9
FEV1 FVC LLN: 66
FEV1 FVC PRE REF: 98.7 %
FEV1 FVC REF: 79
FEV1 LLN: 1.56
FEV1 PRE REF: 86.8 %
FEV1 REF: 2.11
FRCN2 LLN: 1.71
FRCN2 PRE REF: 70.2 %
FRCN2 REF: 2.54
FVC LLN: 1.99
FVC PRE REF: 87.5 %
FVC REF: 2.68
IVC PRE: 2.44 L (ref 1.99–3.4)
IVC SINGLE BREATH LLN: 1.99
IVC SINGLE BREATH PRE REF: 91.1 %
IVC SINGLE BREATH REF: 2.68
PEF LLN: 3.44
PEF PRE REF: 103.6 %
PEF REF: 5.26
PRE DLCO: 15.42 ML/(MIN*MMHG) (ref 14.42–25.89)
PRE FEF 25 75: 1.65 L/S (ref 0.92–3.26)
PRE FET 100: 6.82 SEC
PRE FEV1 FVC: 78.07 % (ref 66.29–90.01)
PRE FEV1: 1.83 L (ref 1.56–2.63)
PRE FRC N2: 1.78 L (ref 1.71–3.36)
PRE FVC: 2.34 L (ref 1.99–3.4)
PRE PEF: 5.45 L/S (ref 3.44–7.08)
RVN2 LLN: 1.27
RVN2 PRE REF: 63 %
RVN2 PRE: 1.16 L (ref 1.27–2.42)
RVN2 REF: 1.84
RVN2TLCN2 LLN: 31.47
RVN2TLCN2 PRE REF: 77.8 %
RVN2TLCN2 PRE: 31.95 % (ref 31.47–50.65)
RVN2TLCN2 REF: 41.06
TLCN2 LLN: 3.45
TLCN2 PRE REF: 81.9 %
TLCN2 PRE: 3.63 L (ref 3.45–5.42)
TLCN2 REF: 4.44
VA PRE: 3.71 L (ref 4.29–4.29)
VA SINGLE BREATH LLN: 4.29
VA SINGLE BREATH PRE REF: 86.5 %
VA SINGLE BREATH REF: 4.29
VCMAXN2 LLN: 1.99
VCMAXN2 PRE REF: 92.3 %
VCMAXN2 PRE: 2.47 L (ref 1.99–3.4)
VCMAXN2 REF: 2.68

## 2025-09-05 PROCEDURE — 71250 CT THORAX DX C-: CPT | Mod: 26,,, | Performed by: RADIOLOGY

## 2025-09-05 PROCEDURE — 94727 GAS DIL/WSHOT DETER LNG VOL: CPT

## 2025-09-05 PROCEDURE — 71250 CT THORAX DX C-: CPT | Mod: TC

## 2025-09-05 PROCEDURE — 94729 DIFFUSING CAPACITY: CPT

## 2025-09-05 PROCEDURE — 94010 BREATHING CAPACITY TEST: CPT

## (undated) DEVICE — PAD CAST SPECIALIST STRL 3

## (undated) DEVICE — GLOVE BIOGEL PI MICRO INDIC 7

## (undated) DEVICE — DRESSING N ADH OIL EMUL 3X3

## (undated) DEVICE — NDL 22GA X1 1/2 REG BEVEL

## (undated) DEVICE — BNDG COFLEX FOAM LF2 ST 3X5YD

## (undated) DEVICE — Device

## (undated) DEVICE — CORD FOR BIPOLAR FORCEPS 12

## (undated) DEVICE — GAUZE SPONGE 4X4 12PLY

## (undated) DEVICE — SUT PROLENE 3-0 FS-2 18

## (undated) DEVICE — STOCKINETTE DBL PLY ST 4X

## (undated) DEVICE — TOURNIQUET SB QC DP 18X4IN

## (undated) DEVICE — GLOVE BIOGEL SKINSENSE PI 7.0

## (undated) DEVICE — NDL 18GA X1 1/2 REG BEVEL